# Patient Record
Sex: FEMALE | Race: WHITE | NOT HISPANIC OR LATINO | Employment: FULL TIME | URBAN - METROPOLITAN AREA
[De-identification: names, ages, dates, MRNs, and addresses within clinical notes are randomized per-mention and may not be internally consistent; named-entity substitution may affect disease eponyms.]

---

## 2017-01-14 DIAGNOSIS — I10 ESSENTIAL HYPERTENSION: Chronic | ICD-10-CM

## 2017-01-14 DIAGNOSIS — E11.21 CONTROLLED TYPE 2 DIABETES MELLITUS WITH DIABETIC NEPHROPATHY, WITHOUT LONG-TERM CURRENT USE OF INSULIN: ICD-10-CM

## 2017-01-14 RX ORDER — GLIPIZIDE 5 MG/1
TABLET, FILM COATED, EXTENDED RELEASE ORAL
Qty: 30 TABLET | Refills: 1 | Status: SHIPPED | OUTPATIENT
Start: 2017-01-14 | End: 2017-02-06 | Stop reason: SDUPTHER

## 2017-01-14 RX ORDER — LISINOPRIL 40 MG/1
TABLET ORAL
Qty: 30 TABLET | Refills: 1 | Status: SHIPPED | OUTPATIENT
Start: 2017-01-14 | End: 2017-02-06 | Stop reason: SDUPTHER

## 2017-01-31 DIAGNOSIS — E78.5 HYPERLIPIDEMIA, UNSPECIFIED HYPERLIPIDEMIA TYPE: ICD-10-CM

## 2017-01-31 DIAGNOSIS — I10 ESSENTIAL HYPERTENSION: Primary | ICD-10-CM

## 2017-01-31 DIAGNOSIS — E11.22 CONTROLLED TYPE 2 DIABETES MELLITUS WITH CHRONIC KIDNEY DISEASE, UNSPECIFIED CKD STAGE, UNSPECIFIED LONG TERM INSULIN USE STATUS: ICD-10-CM

## 2017-01-31 DIAGNOSIS — R80.9 PROTEINURIA, UNSPECIFIED TYPE: ICD-10-CM

## 2017-02-02 ENCOUNTER — LAB VISIT (OUTPATIENT)
Dept: LAB | Facility: HOSPITAL | Age: 42
End: 2017-02-02
Attending: INTERNAL MEDICINE
Payer: MEDICAID

## 2017-02-02 DIAGNOSIS — E78.5 HYPERLIPIDEMIA, UNSPECIFIED HYPERLIPIDEMIA TYPE: ICD-10-CM

## 2017-02-02 DIAGNOSIS — I10 ESSENTIAL HYPERTENSION: ICD-10-CM

## 2017-02-02 DIAGNOSIS — E11.22 CONTROLLED TYPE 2 DIABETES MELLITUS WITH CHRONIC KIDNEY DISEASE, UNSPECIFIED CKD STAGE, UNSPECIFIED LONG TERM INSULIN USE STATUS: ICD-10-CM

## 2017-02-02 DIAGNOSIS — R80.9 PROTEINURIA, UNSPECIFIED TYPE: ICD-10-CM

## 2017-02-02 LAB
ALBUMIN SERPL BCP-MCNC: 3.5 G/DL
ALP SERPL-CCNC: 82 U/L
ALT SERPL W/O P-5'-P-CCNC: 17 U/L
ANION GAP SERPL CALC-SCNC: 9 MMOL/L
AST SERPL-CCNC: 16 U/L
BASOPHILS # BLD AUTO: 0.01 K/UL
BASOPHILS NFR BLD: 0.1 %
BILIRUB SERPL-MCNC: 0.6 MG/DL
BUN SERPL-MCNC: 17 MG/DL
CALCIUM SERPL-MCNC: 9.4 MG/DL
CHLORIDE SERPL-SCNC: 101 MMOL/L
CHOLEST/HDLC SERPL: 3.6 {RATIO}
CO2 SERPL-SCNC: 28 MMOL/L
CREAT SERPL-MCNC: 0.8 MG/DL
DIFFERENTIAL METHOD: ABNORMAL
EOSINOPHIL # BLD AUTO: 0.1 K/UL
EOSINOPHIL NFR BLD: 0.9 %
ERYTHROCYTE [DISTWIDTH] IN BLOOD BY AUTOMATED COUNT: 16.4 %
EST. GFR  (AFRICAN AMERICAN): >60 ML/MIN/1.73 M^2
EST. GFR  (NON AFRICAN AMERICAN): >60 ML/MIN/1.73 M^2
GLUCOSE SERPL-MCNC: 106 MG/DL
HCT VFR BLD AUTO: 47.3 %
HDL/CHOLESTEROL RATIO: 27.9 %
HDLC SERPL-MCNC: 136 MG/DL
HDLC SERPL-MCNC: 38 MG/DL
HGB BLD-MCNC: 15.6 G/DL
LDLC SERPL CALC-MCNC: 74.6 MG/DL
LYMPHOCYTES # BLD AUTO: 2.4 K/UL
LYMPHOCYTES NFR BLD: 22.3 %
MCH RBC QN AUTO: 28.9 PG
MCHC RBC AUTO-ENTMCNC: 33 %
MCV RBC AUTO: 88 FL
MONOCYTES # BLD AUTO: 0.8 K/UL
MONOCYTES NFR BLD: 7.4 %
NEUTROPHILS # BLD AUTO: 7.3 K/UL
NEUTROPHILS NFR BLD: 69.1 %
NONHDLC SERPL-MCNC: 98 MG/DL
PLATELET # BLD AUTO: 211 K/UL
PMV BLD AUTO: 10.8 FL
POTASSIUM SERPL-SCNC: 4.7 MMOL/L
PROT SERPL-MCNC: 7.2 G/DL
RBC # BLD AUTO: 5.39 M/UL
SODIUM SERPL-SCNC: 138 MMOL/L
TRIGL SERPL-MCNC: 117 MG/DL
TSH SERPL DL<=0.005 MIU/L-ACNC: 1.97 UIU/ML
WBC # BLD AUTO: 10.63 K/UL

## 2017-02-02 PROCEDURE — 36415 COLL VENOUS BLD VENIPUNCTURE: CPT | Mod: PO

## 2017-02-02 PROCEDURE — 84443 ASSAY THYROID STIM HORMONE: CPT

## 2017-02-02 PROCEDURE — 80061 LIPID PANEL: CPT

## 2017-02-02 PROCEDURE — 83036 HEMOGLOBIN GLYCOSYLATED A1C: CPT

## 2017-02-02 PROCEDURE — 80053 COMPREHEN METABOLIC PANEL: CPT

## 2017-02-02 PROCEDURE — 85025 COMPLETE CBC W/AUTO DIFF WBC: CPT

## 2017-02-03 LAB
ESTIMATED AVG GLUCOSE: 137 MG/DL
HBA1C MFR BLD HPLC: 6.4 %

## 2017-02-06 ENCOUNTER — TELEPHONE (OUTPATIENT)
Dept: INTERNAL MEDICINE | Facility: CLINIC | Age: 42
End: 2017-02-06

## 2017-02-06 ENCOUNTER — OFFICE VISIT (OUTPATIENT)
Dept: INTERNAL MEDICINE | Facility: CLINIC | Age: 42
End: 2017-02-06
Payer: MEDICAID

## 2017-02-06 ENCOUNTER — HOSPITAL ENCOUNTER (OUTPATIENT)
Dept: RADIOLOGY | Facility: HOSPITAL | Age: 42
Discharge: HOME | End: 2017-02-06
Attending: INTERNAL MEDICINE
Payer: MEDICAID

## 2017-02-06 VITALS
HEART RATE: 80 BPM | RESPIRATION RATE: 15 BRPM | WEIGHT: 293 LBS | SYSTOLIC BLOOD PRESSURE: 123 MMHG | HEIGHT: 63 IN | BODY MASS INDEX: 51.91 KG/M2 | DIASTOLIC BLOOD PRESSURE: 77 MMHG | TEMPERATURE: 98 F

## 2017-02-06 DIAGNOSIS — R20.0 NUMBNESS AND TINGLING IN RIGHT HAND: Primary | ICD-10-CM

## 2017-02-06 DIAGNOSIS — Z00.00 ANNUAL PHYSICAL EXAM: Primary | ICD-10-CM

## 2017-02-06 DIAGNOSIS — L81.9 DISCOLORATION OF SKIN OF TOE: ICD-10-CM

## 2017-02-06 DIAGNOSIS — B35.3 TINEA PEDIS OF BOTH FEET: ICD-10-CM

## 2017-02-06 DIAGNOSIS — M25.562 CHRONIC PAIN OF LEFT KNEE: ICD-10-CM

## 2017-02-06 DIAGNOSIS — Z12.31 SCREENING MAMMOGRAM FOR HIGH-RISK PATIENT: ICD-10-CM

## 2017-02-06 DIAGNOSIS — E11.21 CONTROLLED TYPE 2 DIABETES MELLITUS WITH DIABETIC NEPHROPATHY, WITHOUT LONG-TERM CURRENT USE OF INSULIN: Chronic | ICD-10-CM

## 2017-02-06 DIAGNOSIS — H53.8 BLURRY VISION, BILATERAL: ICD-10-CM

## 2017-02-06 DIAGNOSIS — M51.9 LUMBAR DISC DISEASE: ICD-10-CM

## 2017-02-06 DIAGNOSIS — B35.1 ONYCHOMYCOSIS OF LEFT GREAT TOE: ICD-10-CM

## 2017-02-06 DIAGNOSIS — N91.2 AMENORRHEA: ICD-10-CM

## 2017-02-06 DIAGNOSIS — R21 RASH AND NONSPECIFIC SKIN ERUPTION: ICD-10-CM

## 2017-02-06 DIAGNOSIS — I10 ESSENTIAL HYPERTENSION: Chronic | ICD-10-CM

## 2017-02-06 DIAGNOSIS — L71.9 ROSACEA: ICD-10-CM

## 2017-02-06 DIAGNOSIS — G89.29 CHRONIC PAIN OF LEFT KNEE: ICD-10-CM

## 2017-02-06 DIAGNOSIS — R60.0 ARM EDEMA: ICD-10-CM

## 2017-02-06 DIAGNOSIS — M79.89 SWELLING OF ARM: ICD-10-CM

## 2017-02-06 DIAGNOSIS — R20.2 NUMBNESS AND TINGLING IN RIGHT HAND: Primary | ICD-10-CM

## 2017-02-06 DIAGNOSIS — I87.2 STASIS DERMATITIS OF BOTH LEGS: ICD-10-CM

## 2017-02-06 DIAGNOSIS — G47.30 SLEEP APNEA, UNSPECIFIED TYPE: ICD-10-CM

## 2017-02-06 DIAGNOSIS — R09.82 POSTNASAL DRIP: ICD-10-CM

## 2017-02-06 PROCEDURE — 99999 PR PBB SHADOW E&M-EST. PATIENT-LVL IV: CPT | Mod: PBBFAC,,, | Performed by: INTERNAL MEDICINE

## 2017-02-06 PROCEDURE — 77067 SCR MAMMO BI INCL CAD: CPT | Mod: 26,,, | Performed by: RADIOLOGY

## 2017-02-06 PROCEDURE — 99396 PREV VISIT EST AGE 40-64: CPT | Mod: S$PBB,,, | Performed by: INTERNAL MEDICINE

## 2017-02-06 PROCEDURE — 77067 SCR MAMMO BI INCL CAD: CPT | Mod: TC

## 2017-02-06 RX ORDER — METRONIDAZOLE 7.5 MG/G
GEL TOPICAL 2 TIMES DAILY
Qty: 45 G | Refills: 1 | Status: SHIPPED | OUTPATIENT
Start: 2017-02-06 | End: 2020-07-11 | Stop reason: ALTCHOICE

## 2017-02-06 RX ORDER — AZELASTINE 1 MG/ML
1 SPRAY, METERED NASAL 2 TIMES DAILY
Qty: 30 ML | Refills: 1 | Status: SHIPPED | OUTPATIENT
Start: 2017-02-06 | End: 2019-08-13 | Stop reason: ALTCHOICE

## 2017-02-06 RX ORDER — GLIPIZIDE 5 MG/1
5 TABLET, FILM COATED, EXTENDED RELEASE ORAL
Qty: 30 TABLET | Refills: 2 | Status: SHIPPED | OUTPATIENT
Start: 2017-02-06 | End: 2017-08-11 | Stop reason: SDUPTHER

## 2017-02-06 RX ORDER — ECONAZOLE NITRATE 10 MG/G
CREAM TOPICAL 2 TIMES DAILY
Qty: 30 G | Refills: 1 | Status: SHIPPED | OUTPATIENT
Start: 2017-02-06 | End: 2017-02-13 | Stop reason: ALTCHOICE

## 2017-02-06 RX ORDER — DICLOFENAC SODIUM 50 MG/1
50 TABLET, DELAYED RELEASE ORAL 2 TIMES DAILY PRN
Qty: 30 TABLET | Refills: 2 | Status: SHIPPED | OUTPATIENT
Start: 2017-02-06 | End: 2017-05-05

## 2017-02-06 RX ORDER — TRIAMCINOLONE ACETONIDE 1 MG/ML
LOTION TOPICAL 2 TIMES DAILY
Qty: 60 ML | Refills: 1 | Status: SHIPPED | OUTPATIENT
Start: 2017-02-06 | End: 2020-07-14 | Stop reason: SDUPTHER

## 2017-02-06 RX ORDER — FUROSEMIDE 20 MG/1
40 TABLET ORAL DAILY
Qty: 30 TABLET | Refills: 2 | Status: SHIPPED | OUTPATIENT
Start: 2017-02-06 | End: 2017-09-22 | Stop reason: SDUPTHER

## 2017-02-06 RX ORDER — LISINOPRIL 40 MG/1
40 TABLET ORAL DAILY
Qty: 30 TABLET | Refills: 2 | Status: SHIPPED | OUTPATIENT
Start: 2017-02-06 | End: 2017-09-22 | Stop reason: SDUPTHER

## 2017-02-06 NOTE — MR AVS SNAPSHOT
[facility]   [facility]  [place] [address]  Phone: [phone]  Fax: [phone]                  Halley Moreno   2017 9:00 AM   Office Visit    Description:  Female : 1975   Provider:  Yamila Tejeda MD   Department:  [facility]           Reason for Visit     Annual Exam           Diagnoses this Visit        Comments    Annual physical exam    -  Primary     Essential hypertension         Controlled type 2 diabetes mellitus with diabetic nephropathy, without long-term current use of insulin         Sleep apnea, unspecified type         Depression, unspecified depression type         BMI 60.0-69.9, adult         Hyperlipidemia, unspecified hyperlipidemia type         Arm edema         Rosacea         Lumbar disc disease         Onychomycosis of left great toe         Acute pain of left knee         Blurry vision, bilateral         Acute pansinusitis, recurrence not specified         Tinea pedis of both feet         Discoloration of skin of toe         Screening mammogram for high-risk patient                To Do List           Future Appointments        Provider Department Dept Phone    2017 11:00 AM [facility] MAM[facility]1 [facility] [phone]      Goals (5 Years of Data)     None       These Medications        Disp Refills Start End    triamcinolone acetonide 0.1% (KENALOG) 0.1 % Lotn 60 mL 1 2017    Apply topically 2 (two) times daily. - Topical (Top)    Ph[facility]: [facility] [address] [place] [address] [address] [facility] Ph #: [phone]       diclofenac (VOLTAREN) 50 MG EC tablet 30 tablet 2 2017     Take 1 tablet (50 mg total) by mouth 2 (two) times daily as needed. - Oral    Pharmacy: [facility] [id number] [facility] [address] [facility] Ph #: [phone]       azelastine (ASTELIN) 137 mcg (0.1 %) nasal spray 30 mL 1 2017    1 spray (137 mcg total) by Nasal route 2 (two)  times daily. - Nasal    Pharmacy: [facility] [place] [address] Ph #: [phone]       furosemide (LASIX) 20 MG tablet 30 tablet 2 2/6/2017     Take 2 tablets (40 mg total) by mouth once daily. - Oral    Pharmacy: [facility] [place] [address] Ph #: [phone]       econazole nitrate 1 % cream 30 g 1 2/6/2017     Apply topically 2 (two) times daily. - Topical (Top)    Pharmacy: [facility] [place] [address] Ph #: [phone]       metronidazole (METROGEL) 0.75 % gel 45 g 1 2/6/2017 2/6/2018    Apply topically 2 (two) times daily. - Topical (Top)    Pharmacy: [facility] [place] [address] Ph #: [phone]       glipiZIDE (GLUCOTROL) 5 MG TR24 30 tablet 2 2/6/2017     Take 1 tablet (5 mg total) by mouth daily with breakfast. - Oral    Pharmacy: [facility] [place] [address] Ph #: [phone]       lisinopril (PRINIVIL,ZESTRIL) 40 MG tablet 30 tablet 2 2/6/2017     Take 1 tablet (40 mg total) by mouth once daily. - Oral    Pharmacy: [facility] [place] [address] Ph #: [phone]         [facility] On [facility]     [facility] - 24/7 Assistance  Registered nurses in the [facility] provide clinical advisement, health education, appointment booking, and other advisory services.  Call for this free service at [phone].             Medications           Message regarding Medications     Verify the changes and/or additions to your medication regime listed below are the same as discussed with your clinician today.  If any of these changes or additions are incorrect, please notify your healthcare provider.        START taking these NEW medications        Refills    triamcinolone acetonide 0.1% (KENALOG) 0.1 % Lotn 1    Sig: Apply topically 2 (two) times daily.    Class: Normal    Route: Topical (Top)    econazole nitrate 1 % cream 1     Sig: Apply topically 2 (two) times daily.    Class: Normal    Route: Topical (Top)    metronidazole (METROGEL) 0.75 % gel 1    Sig: Apply topically 2 (two) times daily.    Class: Normal    Route: Topical (Top)      CHANGE how you are taking these medications     Start Taking Instead of    furosemide (LASIX) 20 MG tablet furosemide (LASIX) 40 MG tablet    Dosage:  Take 2 tablets (40 mg total) by mouth once daily. Dosage:  Take 1 tablet (40 mg total) by mouth once daily.    Reason for Change:  Reorder     glipiZIDE (GLUCOTROL) 5 MG TR24 glipiZIDE (GLUCOTROL) 5 MG TR24    Dosage:  Take 1 tablet (5 mg total) by mouth daily with breakfast. Dosage:  TAKE ONE TABLET BY MOUTH ONCE DAILY WITH BREAKFAST    Reason for Change:  Reorder     lisinopril (PRINIVIL,ZESTRIL) 40 MG tablet lisinopril (PRINIVIL,ZESTRIL) 40 MG tablet    Dosage:  Take 1 tablet (40 mg total) by mouth once daily. Dosage:  TAKE ONE TABLET BY MOUTH EVERY DAY.    Reason for Change:  Reorder       STOP taking these medications     nystatin-triamcinolone (MYCOLOG II) cream Apply topically 2 (two) times daily.           Verify that the below list of medications is an accurate representation of the medications you are currently taking.  If none reported, the list may be blank. If incorrect, please contact your healthcare provider. Carry this list with you in case of emergency.           Current Medications     azelastine (ASTELIN) 137 mcg (0.1 %) nasal spray 1 spray (137 mcg total) by Nasal route 2 (two) times daily.    blood sugar diagnostic Strp 1 strip by Misc.(Non-Drug; Combo Route) route 2 (two) times daily.    diclofenac (VOLTAREN) 50 MG EC tablet Take 1 tablet (50 mg total) by mouth 2 (two) times daily as needed.    furosemide (LASIX) 20 MG tablet Take 2 tablets (40 mg total) by mouth once daily.    glipiZIDE (GLUCOTROL) 5 MG TR24 Take 1 tablet (5 mg total) by mouth daily with breakfast.    hydrocodone-acetaminophen 7.5-325mg (NORCO) 7.5-325 mg per  "tablet Take 1 tablet by mouth as needed.     lancets Misc 1 lancet by Misc.(Non-Drug; Combo Route) route 2 (two) times daily.    lisinopril (PRINIVIL,ZESTRIL) 40 MG tablet Take 1 tablet (40 mg total) by mouth once daily.    methocarbamol (ROBAXIN) 500 MG tablet Take 500 mg by mouth 2 (two) times daily.     phentermine (ADIPEX-P) 37.5 mg tablet Take 1 tablet (37.5 mg total) by mouth before breakfast.    econazole nitrate 1 % cream Apply topically 2 (two) times daily.    metronidazole (METROGEL) 0.75 % gel Apply topically 2 (two) times daily.    triamcinolone acetonide 0.1% (KENALOG) 0.1 % Lotn Apply topically 2 (two) times daily.           Clinical Reference Information           Your Vitals Were     BP Pulse Temp Resp Height Weight    123/77 (BP Location: Left arm, Patient Position: Sitting, BP Method: Automatic) 80 97.8 °F (36.6 °C) (Oral) 15 5' 3" (1.6 m) 175.8 kg (387 lb 9.1 oz)    BMI                68.65 kg/m2          Blood Pressure          Most Recent Value    BP  123/77      Allergies as of 2/6/2017     No Known Allergies      Immunizations Administered on Date of Encounter - 2/6/2017     Name Date Dose VIS Date Route    Pneumococcal Polysaccharide - 23 Valent  Incomplete 0.5 mL 4/24/2015 Intramuscular    TD  Incomplete 0.5 mL 2/24/2015 Intramuscular      Orders Placed During Today's Visit      Normal Orders This Visit    Ambulatory consult to Optometry     Ambulatory referral to Obstetrics / Gynecology     Pneumococcal Polysaccharide Vaccine (23 Valent) (SQ/IM)     Td Vaccine (Adult) - Preservative Free     Future Labs/Procedures Expected by Expires    Mammo Digital Screening Bilat with CAD  2/6/2017 4/7/2018    Cardiology Lab Ankle Brachial Indices W Stress  As directed 2/6/2018    Cardiology Lab US Upper Extremity Veins Right  As directed 2/6/2018      [facility] Sign-Up     Activating your [facility] account is as easy as 1-2-3!     1) Visit [email or web address], select Sign Up Now, enter this activation code " and your date of birth, then select Next.  [email or web address]  Expires: 3/23/2017  9:46 AM      2) Create a username and password to use when you visit [facility] in the future and select a security question in case you lose your password and select Next.    3) Enter your e-mail address and click Sign Up!    Additional Information  If you have questions, please e-mail [email or web address] or call [phone] to talk to our [facility] staff. Remember, [facility] is NOT to be used for urgent needs. For medical emergencies, dial 911.         Instructions    Fungi Nail    claritin or zyrtec or allegra at night       Language Assistance Services     ATTENTION: Language assistance services are available, free of charge. Please call [phone].      ATENCIÓN: Si habla español, tiene a williamson disposición servicios gratuitos de asistencia lingüística. Llame al [phone].     CHÚ Ý: N?u b?n nói Ti?ng Vi?t, có các d?ch v? h? tr? ngôn ng? mi?n phí dành cho b?n. G?i s? [phone].         [facility] complies with applicable Federal civil rights laws and does not discriminate on the basis of race, color, national origin, age, disability, or sex.

## 2017-02-06 NOTE — TELEPHONE ENCOUNTER
"Patient seen in clinic today. Forgot to get answer for why dR Tejeda feels she "gets so dopey after meals". Reports she is testing blood sugar and it is wnl. Reports this was discussed in visit today but doesn't recall answer.  "

## 2017-02-06 NOTE — PROGRESS NOTES
Subjective:      Halley Moreno is a 41 y.o.yo female who presents for annual exam.    Family History:  family history includes Cancer in her father; Diabetes in her father; Heart disease in her paternal grandfather; Hypertension in her mother.    Health Maintenance:  Health Maintenance       Date Due Completion Date    TETANUS VACCINE 5/28/1991 ---    Pneumococcal PPSV23 (Medium Risk) (1) 5/28/1991 ---    Pap Smear 1/27/2013 1/27/2010 (Done)    Override on 1/27/2010: Done    Eye Exam 9/21/2013 9/21/2012    Influenza Vaccine 8/1/2016 ---    Foot Exam 2/17/2017 2/17/2016    Mammogram 4/15/2017 4/15/2015    Hemoglobin A1c 8/2/2017 2/2/2017    Lipid Panel 2/2/2018 2/2/2017      OB/GYN not in last few years, 2013    Eye exam: blurry vision, no recent eye exam, wears   Dental Exam: no issues    Exercise: walk 2-3 blocks with dog 2x weekly, plans to go to [employer] next week  Diet: overall healthy  Body mass index is 68.65 kg/(m^2).      Meds:   Current Outpatient Prescriptions:     azelastine (ASTELIN) 137 mcg (0.1 %) nasal spray, 1 spray (137 mcg total) by Nasal route 2 (two) times daily., Disp: 30 mL, Rfl: 1    blood sugar diagnostic Strp, 1 strip by Misc.(Non-Drug; Combo Route) route 2 (two) times daily., Disp: 100 strip, Rfl: 5    diclofenac (VOLTAREN) 50 MG EC tablet, Take 1 tablet (50 mg total) by mouth 2 (two) times daily as needed., Disp: 30 tablet, Rfl: 2    furosemide (LASIX) 20 MG tablet, Take 2 tablets (40 mg total) by mouth once daily., Disp: 30 tablet, Rfl: 2    glipiZIDE (GLUCOTROL) 5 MG TR24, Take 1 tablet (5 mg total) by mouth daily with breakfast., Disp: 30 tablet, Rfl: 2    hydrocodone-acetaminophen 7.5-325mg (NORCO) 7.5-325 mg per tablet, Take 1 tablet by mouth as needed. , Disp: , Rfl:     lancets Misc, 1 lancet by Misc.(Non-Drug; Combo Route) route 2 (two) times daily., Disp: 100 each, Rfl: 5    lisinopril (PRINIVIL,ZESTRIL) 40 MG tablet, Take 1 tablet (40 mg total) by mouth once  daily., Disp: 30 tablet, Rfl: 2    methocarbamol (ROBAXIN) 500 MG tablet, Take 500 mg by mouth 2 (two) times daily. , Disp: , Rfl:     phentermine (ADIPEX-P) 37.5 mg tablet, Take 1 tablet (37.5 mg total) by mouth before breakfast., Disp: 30 tablet, Rfl: 0    econazole nitrate 1 % cream, Apply topically 2 (two) times daily., Disp: 30 g, Rfl: 1    metronidazole (METROGEL) 0.75 % gel, Apply topically 2 (two) times daily., Disp: 45 g, Rfl: 1    triamcinolone acetonide 0.1% (KENALOG) 0.1 % Lotn, Apply topically 2 (two) times daily., Disp: 60 mL, Rfl: 1    PMHx:   Past Medical History   Diagnosis Date    BMI 70 and over, adult     Depression     Diabetes mellitus     DM (diabetes mellitus) type II controlled with renal manifestation     HTN (hypertension)     Hyperlipidemia     Lumbar disc disease     Morbid obesity     Recurrent nephrolithiasis     Rosacea     Sleep apnea        PSHx:     Past Surgical History   Procedure Laterality Date     section         SocHx:   Social History     Social History    Marital status: Single     Spouse name: N/A    Number of children: N/A    Years of education: N/A     Social History Main Topics    Smoking status: Never Smoker    Smokeless tobacco: Never Used    Alcohol use No    Drug use: No    Sexual activity: No     Other Topics Concern    None     Social History Narrative    She is a pharmacy tech, works at [employer] in [place].  Nonsmoker, social etoh.  No exercise.                       Review of Systems   Constitutional: Negative for chills, diaphoresis, fatigue and fever.   HENT: Positive for congestion (AM cough with mucus production, resolves during day) and postnasal drip. Negative for dental problem, ear discharge, ear pain, mouth sores, rhinorrhea, sinus pressure and sore throat.    Eyes: Positive for visual disturbance (blurry).   Respiratory: Positive for cough. Negative for chest tightness, shortness of breath and wheezing.     Cardiovascular: Negative for chest pain, palpitations and leg swelling.   Gastrointestinal: Negative for abdominal pain, constipation, diarrhea, nausea and vomiting.        Reports drowsiness after meals   Genitourinary: Positive for menstrual problem (amenorrhea). Negative for dysuria, hematuria and urgency.   Musculoskeletal: Negative for arthralgias and myalgias.   Skin: Negative for rash.   Neurological: Negative for dizziness, weakness, light-headedness, numbness and headaches.        Right hand numbness for the last few weeks   Hematological: Negative for adenopathy.   Psychiatric/Behavioral: Negative for dysphoric mood.        History of depression, stable off medications       Objective:      Physical Exam   Constitutional: She is oriented to person, place, and time. Vital signs are normal. She appears well-developed and well-nourished. No distress.   HENT:   Head: Normocephalic and atraumatic.   Right Ear: Hearing, tympanic membrane, external ear and ear canal normal. Tympanic membrane is not erythematous and not bulging.   Left Ear: Hearing, tympanic membrane, external ear and ear canal normal. Tympanic membrane is not erythematous and not bulging.   Nose: Nose normal.   Mouth/Throat: Uvula is midline, oropharynx is clear and moist and mucous membranes are normal. No oropharyngeal exudate.   Eyes: Conjunctivae, EOM and lids are normal. Pupils are equal, round, and reactive to light. No scleral icterus.   Neck: Normal range of motion. Neck supple.   Cardiovascular: Normal rate, regular rhythm, normal heart sounds and intact distal pulses.    No murmur heard.  Pulmonary/Chest: Effort normal and breath sounds normal. She has no wheezes.   Abdominal: Soft. Bowel sounds are normal. She exhibits no distension. There is no hepatosplenomegaly. There is no tenderness. There is no rigidity, no rebound and no guarding.   Musculoskeletal: Normal range of motion. She exhibits no edema.   Lymphadenopathy:     She has  no cervical adenopathy.        Right: No supraclavicular adenopathy present.        Left: No supraclavicular adenopathy present.   Neurological: She is alert and oriented to person, place, and time. She has normal strength and normal reflexes. No sensory deficit. Coordination and gait normal.   Skin: Skin is warm, dry and intact. No rash noted. She is not diaphoretic.   Psychiatric: She has a normal mood and affect.   Vitals reviewed.      Assessment:       1. Annual physical exam    2. Controlled type 2 diabetes mellitus with diabetic nephropathy, without long-term current use of insulin    3. Essential hypertension    4. Arm edema    5. Stasis dermatitis of both legs    6. Discoloration of skin of toe    7. Tinea pedis of both feet    8. Rosacea    9. Lumbar disc disease    10. Chronic pain of left knee    11. Postnasal drip    12. Sleep apnea, unspecified type    13. Rash and nonspecific skin eruption    14. Onychomycosis of left great toe    15. BMI 60.0-69.9, adult    16. Blurry vision, bilateral    17. Amenorrhea    18. Screening mammogram for high-risk patient        Plan:       1,16,17,18. Ordered CBC, CMP, TSH, HbA1c, lipid panel and U/A. Administered Td and pneumococcal vaccine, ordered mammogram, refer to optometry.     2. Tolerates glipizide well, hba1c is at goal of 6.4, continue.    3. BP well controlled with lisinopril. Decrease lasix to 20mg daily since less edema, will monitor closely.    4. Right arm venous ultrasound.    5,6. Check HARMONY.    7. Unable to refer to Podiatry due to insurance but was seen in past by Dr. Maharaj. Will begin econazole cream twice daily under feet.     8. Trial of metrogel bid    9,10. Refilled voltaren    11. Resume astelin nasal spray, use nom-sedating antihistamine at night    12. Noncompliant with cpap    14. Uses cerave for dry skin under pannus, may use TAC lotion on itchy areas on abdomen.    15. OTC Fungi nail for toenail fungus, if no improvement, may try  penlac    16. Weight decreased from 401 lbs to 387 lbs since last visit, plans to return to gym at [employer] starting next week.     RTC in 3 months or sooner if needed    Yamila Tejeda MD

## 2017-02-07 ENCOUNTER — CLINICAL SUPPORT (OUTPATIENT)
Dept: CARDIOLOGY | Facility: CLINIC | Age: 42
End: 2017-02-07
Payer: MEDICAID

## 2017-02-07 DIAGNOSIS — L81.9 DISCOLORATION OF SKIN OF TOE: ICD-10-CM

## 2017-02-07 DIAGNOSIS — R60.0 ARM EDEMA: ICD-10-CM

## 2017-02-07 LAB — VASCULAR ANKLE BRACHIAL INDEX (ABI) LEFT: 1.08 (ref 0.9–1.2)

## 2017-02-07 PROCEDURE — 93971 EXTREMITY STUDY: CPT | Mod: PBBFAC,PO | Performed by: INTERNAL MEDICINE

## 2017-02-07 PROCEDURE — 93924 LWR XTR VASC STDY BILAT: CPT | Mod: 26,S$PBB,, | Performed by: INTERNAL MEDICINE

## 2017-02-07 NOTE — TELEPHONE ENCOUNTER
Called patient at 1709 on 2/7, no answer    Spoke to patient 2/9 @0889- reports drowsiness after meals, BG normal, advised to pay attention to if symptoms occur when meal is high in sugar    Called patient, will check c-spine x-ray and then refer to either Ortho or to a neurologist who accepts Medicaid, patient agreeable with plan.

## 2017-02-08 ENCOUNTER — OFFICE VISIT (OUTPATIENT)
Dept: OPTOMETRY | Facility: CLINIC | Age: 42
End: 2017-02-08
Payer: MEDICAID

## 2017-02-08 DIAGNOSIS — H52.4 BILATERAL PRESBYOPIA: ICD-10-CM

## 2017-02-08 DIAGNOSIS — E11.9 TYPE 2 DIABETES MELLITUS WITHOUT RETINOPATHY: Primary | ICD-10-CM

## 2017-02-08 PROCEDURE — 99999 PR PBB SHADOW E&M-EST. PATIENT-LVL II: CPT | Mod: PBBFAC,,, | Performed by: OPTOMETRIST

## 2017-02-08 PROCEDURE — 99212 OFFICE O/P EST SF 10 MIN: CPT | Mod: PBBFAC,PO | Performed by: OPTOMETRIST

## 2017-02-08 PROCEDURE — 92014 COMPRE OPH EXAM EST PT 1/>: CPT | Mod: S$PBB,,, | Performed by: OPTOMETRIST

## 2017-02-08 NOTE — LETTER
February 8, 2017      Yamila Tejeda MD  [address] [facility]  [place] [address]           [place] [facility]  [address] [facility]  [place] [address]  Phone: [phone]  Fax: [phone]          Patient: Halley Moreno   MR Number: [id number]   YOB: 1975   Date of Visit: 2/8/2017       Dear Dr. Yamila Tejeda:    Thank you for referring Halley Moreno to me for evaluation. Attached you will find relevant portions of my assessment and plan of care.    If you have questions, please do not hesitate to call me. I look forward to following Halley Moreno along with you.    Sincerely,    Sarjo Archibald, OD    Enclosure  CC:  No Recipients    If you would like to receive this communication electronically, please contact [email or web address] or [phone] to request more information on [employer] Link access.    For providers and/or their staff who would like to refer a patient to [facility], please contact us through our one-stop-shop provider referral line, [facility], at [phone].    If you feel you have received this communication in error or would no longer like to receive these types of communications, please e-mail [email or web address]

## 2017-02-08 NOTE — PROGRESS NOTES
HPI     DLS: 9/21/12  Diabetic eye exam  Pt here for diabetic eye check.  Pt states she is having difficulty   reading small print. Pt states she wears glasses to read that are 16 years   old.     (-)flashes  (-)floaters  (-)eye pain  (-)headaches  (-)itching  (+)tearing  (-)burning    No eyedrops  No eye surgery    Hemoglobin A1C       Date                     Value               Ref Range             Status                02/02/2017               6.4 (H)             4.5 - 6.2 %           Final              Comment:    According to ADA guidelines, hemoglobin A1C <7.0% represents  optimal   control in non-pregnant diabetic patients.  Different  metrics may apply   to specific populations.   Standards of Medical Care in Diabetes -   2016.  For the purpose of screening for the presence of diabetes:  <5.7%       Consistent with the absence of diabetes  5.7-6.4%  Consistent with   increasing risk for diabetes   (prediabetes)  >or=6.5%  Consistent with   diabetes  Currently no consensus exists for use of hemoglobin A1C  for   diagnosis of diabetes for children.         07/29/2016               7.0 (H)             4.5 - 6.2 %           Final              Comment:    According to ADA guidelines, hemoglobin A1C <7.0% represents  optimal   control in non-pregnant diabetic patients.  Different  metrics may apply   to specific populations.   Standards of Medical Care in Diabetes -   2016.  For the purpose of screening for the presence of diabetes:  <5.7%       Consistent with the absence of diabetes  5.7-6.4%  Consistent with   increasing risk for diabetes   (prediabetes)  >or=6.5%  Consistent with   diabetes  Currently no consensus exists for use of hemoglobin A1C  for   diagnosis of diabetes for children.         02/17/2016               7.0 (H)             4.5 - 6.2 %           Final            ----------       Last edited by Saroj Archibald, OD on 2/8/2017 10:44 AM.     ROS     Negative for: Constitutional,  Gastrointestinal, Neurological, Skin,   Genitourinary, Musculoskeletal, HENT, Endocrine, Cardiovascular, Eyes,   Respiratory, Psychiatric, Allergic/Imm, Heme/Lymph    Last edited by Saroj Archibald, OD on 2/8/2017 10:04 AM. (History)        Assessment /Plan     For exam results, see Encounter Report.    Type 2 diabetes mellitus without retinopathy    Bilateral presbyopia      1. No diabetic retinopathy, no csme. Return in 1 year for dilated eye exam.  2. Spec Rx given. Different lens options discussed with patient. RTC 1 year full exam.

## 2017-02-09 ENCOUNTER — HOSPITAL ENCOUNTER (OUTPATIENT)
Dept: RADIOLOGY | Facility: HOSPITAL | Age: 42
Discharge: HOME | End: 2017-02-09
Attending: INTERNAL MEDICINE
Payer: MEDICAID

## 2017-02-09 DIAGNOSIS — R20.0 NUMBNESS AND TINGLING IN RIGHT HAND: ICD-10-CM

## 2017-02-09 DIAGNOSIS — M79.89 SWELLING OF ARM: ICD-10-CM

## 2017-02-09 DIAGNOSIS — R20.2 NUMBNESS AND TINGLING IN RIGHT HAND: ICD-10-CM

## 2017-02-09 PROCEDURE — 72050 X-RAY EXAM NECK SPINE 4/5VWS: CPT | Mod: 26,,, | Performed by: RADIOLOGY

## 2017-02-09 PROCEDURE — 72050 X-RAY EXAM NECK SPINE 4/5VWS: CPT | Mod: TC,PO

## 2017-02-13 ENCOUNTER — TELEPHONE (OUTPATIENT)
Dept: INTERNAL MEDICINE | Facility: CLINIC | Age: 42
End: 2017-02-13

## 2017-02-13 RX ORDER — KETOCONAZOLE 20 MG/G
CREAM TOPICAL DAILY
Qty: 60 G | Refills: 0 | Status: SHIPPED | OUTPATIENT
Start: 2017-02-13 | End: 2020-07-12 | Stop reason: SDUPTHER

## 2017-02-13 NOTE — TELEPHONE ENCOUNTER
----- Message from Tammy S Carrero sent at 2/13/2017  1:59 PM CST -----  Contact: Call Pt [phone]  Pt called stating medication (Econazole Nitrate) was denied by insurance to be filled and is requesting one of these medication instead, Trimazole, ketoconazole miconazole Nystatin, Terbinafine to be sent to [facility] [phone]

## 2017-02-16 ENCOUNTER — TELEPHONE (OUTPATIENT)
Dept: OBSTETRICS AND GYNECOLOGY | Facility: CLINIC | Age: 42
End: 2017-02-16

## 2017-02-16 ENCOUNTER — OFFICE VISIT (OUTPATIENT)
Dept: OBSTETRICS AND GYNECOLOGY | Facility: CLINIC | Age: 42
End: 2017-02-16
Payer: MEDICAID

## 2017-02-16 VITALS
BODY MASS INDEX: 51.91 KG/M2 | HEIGHT: 63 IN | DIASTOLIC BLOOD PRESSURE: 76 MMHG | WEIGHT: 293 LBS | SYSTOLIC BLOOD PRESSURE: 138 MMHG

## 2017-02-16 DIAGNOSIS — R10.2 PELVIC PAIN IN FEMALE: ICD-10-CM

## 2017-02-16 DIAGNOSIS — N91.1 SECONDARY PHYSIOLOGIC AMENORRHEA: ICD-10-CM

## 2017-02-16 DIAGNOSIS — Z01.419 VISIT FOR GYNECOLOGIC EXAMINATION: Primary | ICD-10-CM

## 2017-02-16 PROCEDURE — 99213 OFFICE O/P EST LOW 20 MIN: CPT | Mod: PBBFAC | Performed by: NURSE PRACTITIONER

## 2017-02-16 PROCEDURE — 99999 PR PBB SHADOW E&M-EST. PATIENT-LVL III: CPT | Mod: PBBFAC,,, | Performed by: NURSE PRACTITIONER

## 2017-02-16 PROCEDURE — 88175 CYTOPATH C/V AUTO FLUID REDO: CPT

## 2017-02-16 PROCEDURE — 99386 PREV VISIT NEW AGE 40-64: CPT | Mod: S$PBB,,, | Performed by: NURSE PRACTITIONER

## 2017-02-16 NOTE — PROGRESS NOTES
"HISTORY OF PRESENT ILLNESS:    Halley Moreno is a 41 y.o. female, ., No LMP recorded. Patient is not currently having periods (Reason: Other).,  presents for a routine exam and has gyn no complaints.  -States used to see Dr Davis in [place] for her Gyn Visits and had a D&C in  due to abnormal bleeding, but the path was negative.   -Reports no bleeding since, no hot flashes.   -Not sexually active.    DATA REVIEWED:  Last TSH 17 1.973    Past Medical History   Diagnosis Date    BMI 70 and over, adult     Depression     Diabetes mellitus     DM (diabetes mellitus) type II controlled with renal manifestation     HTN (hypertension)     Hyperlipidemia     Lumbar disc disease     Morbid obesity     Recurrent nephrolithiasis     Rosacea     Sleep apnea        Past Surgical History   Procedure Laterality Date     section       x2    Dilation and curettage of uterus       AUB "negative path" per patient    Endometrial ablation         MEDICATIONS AND ALLERGIES:      Current Outpatient Prescriptions:     azelastine (ASTELIN) 137 mcg (0.1 %) nasal spray, 1 spray (137 mcg total) by Nasal route 2 (two) times daily., Disp: 30 mL, Rfl: 1    blood sugar diagnostic Strp, 1 strip by Misc.(Non-Drug; Combo Route) route 2 (two) times daily., Disp: 100 strip, Rfl: 5    diclofenac (VOLTAREN) 50 MG EC tablet, Take 1 tablet (50 mg total) by mouth 2 (two) times daily as needed., Disp: 30 tablet, Rfl: 2    furosemide (LASIX) 20 MG tablet, Take 2 tablets (40 mg total) by mouth once daily., Disp: 30 tablet, Rfl: 2    glipiZIDE (GLUCOTROL) 5 MG TR24, Take 1 tablet (5 mg total) by mouth daily with breakfast., Disp: 30 tablet, Rfl: 2    hydrocodone-acetaminophen 7.5-325mg (NORCO) 7.5-325 mg per tablet, Take 1 tablet by mouth as needed. , Disp: , Rfl:     ketoconazole (NIZORAL) 2 % cream, Apply topically once daily. To feet, Disp: 60 g, Rfl: 0    lancets Misc, 1 lancet by Misc.(Non-Drug; Combo " "Route) route 2 (two) times daily., Disp: 100 each, Rfl: 5    lisinopril (PRINIVIL,ZESTRIL) 40 MG tablet, Take 1 tablet (40 mg total) by mouth once daily., Disp: 30 tablet, Rfl: 2    metronidazole (METROGEL) 0.75 % gel, Apply topically 2 (two) times daily., Disp: 45 g, Rfl: 1    phentermine (ADIPEX-P) 37.5 mg tablet, Take 1 tablet (37.5 mg total) by mouth before breakfast., Disp: 30 tablet, Rfl: 0    triamcinolone acetonide 0.1% (KENALOG) 0.1 % Lotn, Apply topically 2 (two) times daily., Disp: 60 mL, Rfl: 1    Review of patient's allergies indicates:  No Known Allergies    Family History   Problem Relation Age of Onset    Hypertension Mother     Diabetes Father     Cancer Father      cns lymphoma    Heart disease Paternal Grandfather     Colon cancer Neg Hx     Ovarian cancer Neg Hx     Breast cancer Neg Hx        Social History     Social History    Marital status: Single     Spouse name: N/A    Number of children: N/A    Years of education: N/A     Occupational History    Not on file.     Social History Main Topics    Smoking status: Never Smoker    Smokeless tobacco: Never Used    Alcohol use No    Drug use: No    Sexual activity: No     Other Topics Concern    Not on file     Social History Narrative    She is a pharmacy tech, works at [employer] in [place].  Nonsmoker, social etoh.  No exercise.                       OB HISTORY: Number of C/S:2    COMPREHENSIVE GYN HISTORY:  PAP History: Denies abnormal Paps. UNKNOWN DATE OF LAST PAP "NEG".  Infection History: Denies STDs. Denies PID.  Benign History: Denies uterine fibroids. Denies ovarian cysts. Denies endometriosis. Denies other conditions.  Cancer History: Denies cervical cancer. Denies uterine cancer or hyperplasia. Denies ovarian cancer. Denies vulvar cancer or pre-cancer. Denies vaginal cancer or pre-cancer. Denies breast cancer. Denies colon cancer.  Sexual Activity History: Denies currently being sexually active  Menstrual " "History: Denies menses. Pt is : 2010. Not on HRT.     ROS:  GENERAL: No weight changes. No swelling. + FATIGUE. No fever.  CARDIOVASCULAR: No chest pain. No shortness of breath. No leg cramps.   NEUROLOGICAL: No headaches. No vision changes.  BREASTS: No pain. No lumps. No discharge.  ABDOMEN: No pain. No nausea. No vomiting. No diarrhea. + CONSTIPATION.  REPRODUCTIVE: + AMENORRHEA.   VULVA: No pain. No lesions. No itching.  VAGINA: No relaxation. No itching. No odor. No discharge. No lesions.  URINARY: No incontinence. No nocturia. No frequency. No dysuria.    Visit Vitals    /76    Ht 5' 3" (1.6 m)    Wt (!) 175 kg (385 lb 12.9 oz)    BMI 68.34 kg/m2       PE:  APPEARANCE: OBESE. Well nourished, well developed, in no acute distress.  AFFECT: WNL, alert and oriented x 3.  SKIN: No acne or hirsutism. ROSACEA.  NECK: Neck symmetric, without masses or thyromegaly.  NODES: No inguinal, cervical, axillary or femoral lymph node enlargement.  CHEST: Good respiratory effort.   ABDOMEN: OBESE. Soft. DIFFICULT TO PALPATE ANYTHING. SCALY DRY SKIN OF LOWER ABDOMEN, no lesions under pannus. States has Rx from PCP.   BREASTS: Symmetrical, no skin changes, visible lesions, palpable masses or nipple discharge bilaterally.  PELVIC: External female genitalia without lesions.  Female hair distribution. Adequate perineal body, Normal urethral meatus. Vagina moist and well rugated without lesions or discharge.  No significant cystocele or rectocele present. UNABLE TO COMPLETELY VISUALIZE CERVIX EVEN WITH LARGEST SPECULUM, BUT COULD PALPATE A NORMAL CERVIX. CANNOT PALPATE UTERUS. EXAM IS DIFFICULT DUE TO BODY HABITUS. UTERUS IS SLIGHTLY TENDER. Adnexa without masses or tenderness.  EXTREMITIES: STASIS DERMATITIS and EDEMA of BILATERAL LE'S. States has Rx from PCP.     DIAGNOSIS:  1. Visit for gynecologic examination    2. Secondary physiologic amenorrhea    3. Pelvic pain in female        PLAN:    Orders Placed This " Encounter    US Pelvis Comp with Transvag NON-OB (xpd    Follicle stimulating hormone    Luteinizing hormone    Estradiol    Liquid-based pap smear, screening       COUNSELING:  The patient was counseled today on:  -perimenopause vs menopause and to report severe vasomotor symptoms and/or skipping periods, heavy, prolonged bleeding, spotting in between periods;  -that if her hormone levels are in the jason-menopausal range and the ultrasound is normal, would prescribe Provera, as having a period four times a year is recommended to prevent uterine cancer;  -if ultrasound is abnormal, will refer to Gyn MD for consult;  -A.C.S. Pap and pelvic exam guidelines (pap every 3 years), recomendations for yearly mammogram;  -to follow up with her PCP for other health maintenance.    FOLLOW-UP with me pending test results and annually.       ADDENDUM: When I called pt back with normal hormone levels, she remembered that at the time of the D&C she also had a endometrial ablation. Will request old records. No provera for now. Viki Vegas NP

## 2017-02-16 NOTE — LETTER
February 16, 2017      Yamila Tejeda MD  [address] [facility] [place] [address]           [facility] 5th Floor  [address]  [place] [address]  Phone: [phone]          Patient: Halley Moreno   MR Number: [id number]   YOB: 1975   Date of Visit: 2/16/2017       Dear Dr. Yamila Tejeda:    Thank you for referring Halley Moreno to me for evaluation. Attached you will find relevant portions of my assessment and plan of care.    If you have questions, please do not hesitate to call me. I look forward to following Halley Moreno along with you.    Sincerely,    KELIN Vegas NP    Enclosure  CC:  No Recipients    If you would like to receive this communication electronically, please contact [email or web address] or [phone] to request more information on [facility] access.    For providers and/or their staff who would like to refer a patient to [facility], please contact us through our one-stop-shop provider referral line, [facility], at [phone].    If you feel you have received this communication in error or would no longer like to receive these types of communications, please e-mail [email or web address]

## 2017-02-16 NOTE — TELEPHONE ENCOUNTER
PHONE CALL: MISTY  hormone levels indicate that she is perimenopausal. Rx Provera sent to her pharmacy. Erika Vegas NP

## 2017-02-16 NOTE — TELEPHONE ENCOUNTER
----- Message from Rupa Schafer sent at 2/16/2017 12:54 PM CST -----  Contact: KARISSA GOOD [[id number]]  _  1st Request  _  2nd Request  _  3rd Request        Who: KARISSA GOOD [[id number]]    Why: patient is returning a call in regards to test results    What Number to Call Back: [phone]    When to Expect a call back: (Before the end of the day)   -- if call after 3:00 call back will be tomorrow.    PHONE CALL: Advised of normal hormone levels. Pt now remember she had an endometrial ablation at the time of the D&C. Will need to get records. Advised to come up to 5th floor after US on Monday to sign a records release. Until I get the records and the US results, do not take the Provera. Viki Vegas NP

## 2017-02-20 ENCOUNTER — HOSPITAL ENCOUNTER (OUTPATIENT)
Dept: RADIOLOGY | Facility: HOSPITAL | Age: 42
Discharge: HOME | End: 2017-02-20
Attending: NURSE PRACTITIONER
Payer: MEDICAID

## 2017-02-20 DIAGNOSIS — R10.2 PELVIC PAIN IN FEMALE: ICD-10-CM

## 2017-02-21 ENCOUNTER — TELEPHONE (OUTPATIENT)
Dept: OBSTETRICS AND GYNECOLOGY | Facility: CLINIC | Age: 42
End: 2017-02-21

## 2017-02-21 ENCOUNTER — OFFICE VISIT (OUTPATIENT)
Dept: INTERNAL MEDICINE | Facility: CLINIC | Age: 42
End: 2017-02-21
Payer: MEDICAID

## 2017-02-21 ENCOUNTER — HOSPITAL ENCOUNTER (OUTPATIENT)
Dept: RADIOLOGY | Facility: HOSPITAL | Age: 42
Discharge: HOME | End: 2017-02-21
Attending: NURSE PRACTITIONER
Payer: MEDICAID

## 2017-02-21 VITALS
BODY MASS INDEX: 51.91 KG/M2 | TEMPERATURE: 98 F | SYSTOLIC BLOOD PRESSURE: 119 MMHG | HEART RATE: 57 BPM | DIASTOLIC BLOOD PRESSURE: 83 MMHG | WEIGHT: 293 LBS | HEIGHT: 63 IN

## 2017-02-21 DIAGNOSIS — M54.6 ACUTE RIGHT-SIDED THORACIC BACK PAIN: Primary | ICD-10-CM

## 2017-02-21 DIAGNOSIS — N91.1 AMENORRHEA, SECONDARY: Primary | ICD-10-CM

## 2017-02-21 DIAGNOSIS — M79.18 MUSCULOSKELETAL PAIN: ICD-10-CM

## 2017-02-21 DIAGNOSIS — R10.2 PELVIC PAIN IN FEMALE: ICD-10-CM

## 2017-02-21 PROCEDURE — 76830 TRANSVAGINAL US NON-OB: CPT | Mod: TC

## 2017-02-21 PROCEDURE — 76830 TRANSVAGINAL US NON-OB: CPT | Mod: 26,,, | Performed by: RADIOLOGY

## 2017-02-21 PROCEDURE — 99999 PR PBB SHADOW E&M-EST. PATIENT-LVL III: CPT | Mod: PBBFAC,,, | Performed by: INTERNAL MEDICINE

## 2017-02-21 PROCEDURE — 99214 OFFICE O/P EST MOD 30 MIN: CPT | Mod: S$PBB,,, | Performed by: INTERNAL MEDICINE

## 2017-02-21 RX ORDER — KETOROLAC TROMETHAMINE 30 MG/ML
60 INJECTION, SOLUTION INTRAMUSCULAR; INTRAVENOUS
Status: COMPLETED | OUTPATIENT
Start: 2017-02-21 | End: 2017-02-21

## 2017-02-21 RX ORDER — MEDROXYPROGESTERONE ACETATE 10 MG/1
10 TABLET ORAL DAILY
Qty: 10 TABLET | Refills: 0 | Status: SHIPPED | OUTPATIENT
Start: 2017-02-21 | End: 2017-05-05

## 2017-02-21 RX ORDER — METHOCARBAMOL 500 MG/1
500 TABLET, FILM COATED ORAL 2 TIMES DAILY PRN
Qty: 40 TABLET | Refills: 0 | Status: SHIPPED | OUTPATIENT
Start: 2017-02-21 | End: 2017-03-03

## 2017-02-21 RX ORDER — HYDROCODONE BITARTRATE AND ACETAMINOPHEN 5; 325 MG/1; MG/1
1 TABLET ORAL EVERY 6 HOURS PRN
Qty: 45 TABLET | Refills: 0 | Status: SHIPPED | OUTPATIENT
Start: 2017-02-21 | End: 2017-06-30 | Stop reason: SDUPTHER

## 2017-02-21 RX ADMIN — KETOROLAC TROMETHAMINE 60 MG: 60 INJECTION, SOLUTION INTRAMUSCULAR at 11:02

## 2017-02-21 NOTE — PATIENT INSTRUCTIONS
Relieving Back Pain  Back pain is a common problem. You can strain back muscles by lifting too much weight or just by moving the wrong way. Back strain can be uncomfortable, even painful. And it can take weeks or months to improve. To help yourself feel better and prevent future back strains, try these tips.  Important Note: Do not give aspirin to children or teens without first discussing it with your healthcare provider.      ? Ice    Ice reduces muscle pain and swelling. It helps most during the first 24 to 48 hours after an injury.  · Wrap an ice pack or a bag of frozen peas in a thin towel. (Never place ice directly on your skin.)  · Place the ice where your back hurts the most.  · Dont ice for more than 20 minutes at a time.  · You can use ice several times a day.  ? Medicines  Over-the-counter pain relievers can include acetaminophen and anti-inflammatory medicines, which includes aspirin or ibuprofen. They can help ease discomfort. Some also reduce swelling.  · Tell your healthcare provider about any medicines you are already taking.  · Take medicines only as directed.  ? Heat  After the first 48 hours, heat can relax sore muscles and improve blood flow.  · Try a warm bath or shower. Or use a heating pad set on low. To prevent a burn, keep a cloth between you and the heating pad.  · Dont use a heating pad for more than 15 minutes at a time. Never sleep on a heating pad.  Date Last Reviewed: 9/1/2015  © 1372-0572 [employer]. [address] [place] [address], [place] [address]. All rights reserved. This information is not intended as a substitute for professional medical care. Always follow your healthcare professional's instructions.

## 2017-02-21 NOTE — MR AVS SNAPSHOT
[facility]   [facility]  [place] [address]  Phone: [phone]  Fax: [phone]                  Halley Moreno   2017 11:00 AM   Office Visit    Description:  Female : 1975   Provider:  Yamila Tejeda MD   Department:  [facility]           Reason for Visit     Back Pain           Diagnoses this Visit        Comments    Acute right-sided thoracic back pain    -  Primary     Musculoskeletal pain                To Do List           Future Appointments        Provider Department Dept Phone    2017 2:45 PM [facility] US 11 [facility] [phone]      Goals (5 Years of Data)     None       These Medications        Disp Refills Start End    methocarbamol (ROBAXIN) 500 MG Tab 40 tablet 0 2017 3/3/2017    Take 1 tablet (500 mg total) by mouth 2 (two) times daily as needed. - Oral    Pharmacy: [id number] [place], [place] - [address] Ph #: [phone]       hydrocodone-acetaminophen 5-325mg (NORCO) 5-325 mg per tablet 45 tablet 0 2017     Take 1 tablet by mouth every 6 (six) hours as needed for Pain. - Oral    Pharmacy: [id number] [place], [place] - [address] [facility] Ph #: [phone]         [facility]     [facility] Care [facility] -  Assistance  Registered nurses in the [facility] provide clinical advisement, health education, appointment booking, and other advisory services.  Call for this free service at [phone].             Medications           Message regarding Medications     Verify the changes and/or additions to your medication regime listed below are the same as discussed with your clinician today.  If any of these changes or additions are incorrect, please notify your healthcare provider.        START taking these NEW medications        Refills    methocarbamol (ROBAXIN) 500 MG Tab 0    Sig: Take 1 tablet (500 mg total) by mouth 2  (two) times daily as needed.    Class: Normal    Route: Oral    hydrocodone-acetaminophen 5-325mg (NORCO) 5-325 mg per tablet 0    Sig: Take 1 tablet by mouth every 6 (six) hours as needed for Pain.    Class: Print    Route: Oral      These medications were administered today        Dose Freq    ketorolac injection 60 mg 60 mg Clinic/\[facility]\"" 1 time    Sig: Inject 2 mLs (60 mg total) into the muscle one time.    Class: Normal    Route: Intramuscular      STOP taking these medications     hydrocodone-acetaminophen 7.5-325mg (NORCO) 7.5-325 mg per tablet Take 1 tablet by mouth as needed.            Verify that the below list of medications is an accurate representation of the medications you are currently taking.  If none reported, the list may be blank. If incorrect, please contact your healthcare provider. Carry this list with you in case of emergency.           Current Medications     azelastine (ASTELIN) 137 mcg (0.1 %) nasal spray 1 spray (137 mcg total) by Nasal route 2 (two) times daily.    blood sugar diagnostic Strp 1 strip by Misc.(Non-Drug; Combo Route) route 2 (two) times daily.    diclofenac (VOLTAREN) 50 MG EC tablet Take 1 tablet (50 mg total) by mouth 2 (two) times daily as needed.    furosemide (LASIX) 20 MG tablet Take 2 tablets (40 mg total) by mouth once daily.    glipiZIDE (GLUCOTROL) 5 MG TR24 Take 1 tablet (5 mg total) by mouth daily with breakfast.    hydrocodone-acetaminophen 5-325mg (NORCO) 5-325 mg per tablet Take 1 tablet by mouth every 6 (six) hours as needed for Pain.    ketoconazole (NIZORAL) 2 % cream Apply topically once daily. To feet    lancets Misc 1 lancet by Misc.(Non-Drug; Combo Route) route 2 (two) times daily.    lisinopril (PRINIVIL,ZESTRIL) 40 MG tablet Take 1 tablet (40 mg total) by mouth once daily.    methocarbamol (ROBAXIN) 500 MG Tab Take 1 tablet (500 mg total) by mouth 2 (two) times daily as needed.    metronidazole (METROGEL) 0.75 % gel Apply topically 2 (two) times daily.  "   phentermine (ADIPEX-P) 37.5 mg tablet Take 1 tablet (37.5 mg total) by mouth before breakfast.    triamcinolone acetonide 0.1% (KENALOG) 0.1 % Lotn Apply topically 2 (two) times daily.           Clinical Reference Information           Your Vitals Were     BP Pulse Temp Height Weight BMI    119/83 57 98.2 °F (36.8 °C) (Oral) 5' 3" (1.6 m) 177.4 kg (391 lb 1.5 oz) 69.28 kg/m2      Blood Pressure          Most Recent Value    BP  119/83      Allergies as of 2/21/2017     No Known Allergies      Immunizations Administered on Date of Encounter - 2/21/2017     None      [facility] Sign-Up     Activating your [facility] account is as easy as 1-2-3!     1) Visit [email or web address], select Sign Up Now, enter this activation code and your date of birth, then select Next.  [email or web address]  Expires: 3/23/2017  9:46 AM      2) Create a username and password to use when you visit [facility] in the future and select a security question in case you lose your password and select Next.    3) Enter your e-mail address and click Sign Up!    Additional Information  If you have questions, please e-mail [email or web address] or call [phone] to talk to our [facility] staff. Remember, [facility] is NOT to be used for urgent needs. For medical emergencies, dial 911.         Instructions      Relieving Back Pain  Back pain is a common problem. You can strain back muscles by lifting too much weight or just by moving the wrong way. Back strain can be uncomfortable, even painful. And it can take weeks or months to improve. To help yourself feel better and prevent future back strains, try these tips.  Important Note: Do not give aspirin to children or teens without first discussing it with your healthcare provider.      ? Ice    Ice reduces muscle pain and swelling. It helps most during the first 24 to 48 hours after an injury.  · Wrap an ice pack or a bag of frozen peas in a thin towel. (Never place ice directly on your skin.)  · Place the ice " where your back hurts the most.  · Dont ice for more than 20 minutes at a time.  · You can use ice several times a day.  ? Medicines  Over-the-counter pain relievers can include acetaminophen and anti-inflammatory medicines, which includes aspirin or ibuprofen. They can help ease discomfort. Some also reduce swelling.  · Tell your healthcare provider about any medicines you are already taking.  · Take medicines only as directed.  ? Heat  After the first 48 hours, heat can relax sore muscles and improve blood flow.  · Try a warm bath or shower. Or use a heating pad set on low. To prevent a burn, keep a cloth between you and the heating pad.  · Dont use a heating pad for more than 15 minutes at a time. Never sleep on a heating pad.  Date Last Reviewed: 9/1/2015  © 5754-2462 [employer]. [address] [place] [address]. All rights reserved. This information is not intended as a substitute for professional medical care. Always follow your healthcare professional's instructions.             Language Assistance Services     ATTENTION: Language assistance services are available, free of charge. Please call [phone].      ATENCIÓN: Si habla alejandra, tiene a williamson disposición servicios gratuitos de asistencia lingüística. Llame al [phone].     YANIV Ý: N?u b?n nói Ti?ng Vi?t, có các d?ch v? h? tr? ngôn ng? mi?n phí dành cho b?n. G?i s? [phone].         [facility] complies with applicable Federal civil rights laws and does not discriminate on the basis of race, color, national origin, age, disability, or sex.

## 2017-02-21 NOTE — PROGRESS NOTES
Subjective:       Patient ID: Halley Moreno is a 41 y.o. female who presents for Back Pain      Back Pain   This is a new problem. The current episode started in the past 7 days. The problem occurs constantly. The problem is unchanged. The pain is present in the thoracic spine (right sided). The quality of the pain is described as aching. The pain does not radiate. The pain is at a severity of 8/10. The pain is severe. The pain is the same all the time. The symptoms are aggravated by bending and twisting. Pertinent negatives include no abdominal pain, bladder incontinence, bowel incontinence, chest pain, dysuria, fever, headaches, numbness, paresthesias, tingling or weakness. Risk factors include lack of exercise and sedentary lifestyle. She has tried NSAIDs (tried Voltaren without much improvement) for the symptoms. The treatment provided mild relief.        Review of Systems   Constitutional: Negative for chills, fatigue and fever.   HENT: Negative for congestion, rhinorrhea and sinus pressure.    Eyes: Negative for visual disturbance.   Respiratory: Negative for cough, chest tightness and shortness of breath.    Cardiovascular: Negative for chest pain, palpitations and leg swelling.   Gastrointestinal: Negative for abdominal pain, bowel incontinence, diarrhea, nausea and vomiting.   Genitourinary: Negative for bladder incontinence, dysuria, flank pain, frequency and hematuria.   Musculoskeletal: Positive for back pain. Negative for arthralgias and myalgias.   Skin: Negative for rash.   Neurological: Negative for dizziness, tingling, weakness, numbness, headaches and paresthesias.   Hematological: Negative for adenopathy.       Objective:      Physical Exam   Constitutional: She is oriented to person, place, and time. Vital signs are normal. She appears well-developed and well-nourished. No distress.   HENT:   Head: Normocephalic and atraumatic.   Right Ear: Hearing and external ear normal.   Left Ear:  Hearing and external ear normal.   Nose: Nose normal.   Mouth/Throat: Uvula is midline, oropharynx is clear and moist and mucous membranes are normal. No oropharyngeal exudate.   Eyes: EOM and lids are normal.   Neck: Normal range of motion. Neck supple.   Cardiovascular: Normal rate, regular rhythm, normal heart sounds and intact distal pulses.    No murmur heard.  Pulmonary/Chest: Effort normal and breath sounds normal. She has no wheezes.   Abdominal: Soft. Bowel sounds are normal. She exhibits no distension.   Musculoskeletal: She exhibits no edema.        Cervical back: She exhibits no bony tenderness, no pain and no spasm.        Thoracic back: She exhibits tenderness (in musculature near right scapula), pain and spasm. She exhibits no bony tenderness.        Lumbar back: She exhibits no bony tenderness and no pain.   Neurological: She is alert and oriented to person, place, and time. Coordination and gait normal.   Skin: Skin is warm, dry and intact. No rash noted. She is not diaphoretic.   Psychiatric: She has a normal mood and affect.   Vitals reviewed.      Assessment:       1. Acute right-sided thoracic back pain    2. Musculoskeletal pain        Plan:       -- Toradol 60mg IM  -- Norco 5mg every 6 hours as needed for pain but advised to avoid driving after doses  -- begin Robaxin 500mg twice daily as needed for spasms  -- call if no improvement in the next 1-2 weeks    Yamila Tejeda MD

## 2017-02-21 NOTE — TELEPHONE ENCOUNTER
PHONE CALL: Advised US was unable to evaluate her EMS and that she did not have and endometrial ablation per records review. No path results from D&C, Hysteroscope, only blood. Provera sent. To report no withdrawal bleeding 2 weeks after taking the Provera and will sent to Gyn MD for embx, possible D&C hysteroscope again. Viki Vegas NP

## 2017-03-10 ENCOUNTER — TELEPHONE (OUTPATIENT)
Dept: OBSTETRICS AND GYNECOLOGY | Facility: CLINIC | Age: 42
End: 2017-03-10

## 2017-03-27 ENCOUNTER — TELEPHONE (OUTPATIENT)
Dept: OBSTETRICS AND GYNECOLOGY | Facility: CLINIC | Age: 42
End: 2017-03-27

## 2017-03-27 DIAGNOSIS — N83.202 CYST OF LEFT OVARY: Primary | ICD-10-CM

## 2017-05-04 ENCOUNTER — OFFICE VISIT (OUTPATIENT)
Dept: INTERNAL MEDICINE | Facility: CLINIC | Age: 42
End: 2017-05-04
Payer: MEDICAID

## 2017-05-04 ENCOUNTER — LAB VISIT (OUTPATIENT)
Dept: LAB | Facility: HOSPITAL | Age: 42
End: 2017-05-04
Attending: INTERNAL MEDICINE
Payer: MEDICAID

## 2017-05-04 VITALS
HEIGHT: 62 IN | BODY MASS INDEX: 53.92 KG/M2 | DIASTOLIC BLOOD PRESSURE: 80 MMHG | TEMPERATURE: 98 F | SYSTOLIC BLOOD PRESSURE: 108 MMHG | RESPIRATION RATE: 16 BRPM | HEART RATE: 101 BPM | WEIGHT: 293 LBS

## 2017-05-04 DIAGNOSIS — L03.311 CELLULITIS, ABDOMINAL WALL: Primary | ICD-10-CM

## 2017-05-04 DIAGNOSIS — B37.2 INTERTRIGINOUS CANDIDIASIS: ICD-10-CM

## 2017-05-04 DIAGNOSIS — L03.311 CELLULITIS, ABDOMINAL WALL: ICD-10-CM

## 2017-05-04 DIAGNOSIS — R20.2 TINGLING OF RIGHT UPPER EXTREMITY: Primary | ICD-10-CM

## 2017-05-04 LAB
ALBUMIN SERPL BCP-MCNC: 3.3 G/DL
ALP SERPL-CCNC: 72 U/L
ALT SERPL W/O P-5'-P-CCNC: 18 U/L
ANION GAP SERPL CALC-SCNC: 12 MMOL/L
AST SERPL-CCNC: 16 U/L
BASOPHILS # BLD AUTO: 0.01 K/UL
BASOPHILS NFR BLD: 0 %
BILIRUB SERPL-MCNC: 1.6 MG/DL
BUN SERPL-MCNC: 13 MG/DL
CALCIUM SERPL-MCNC: 9.3 MG/DL
CHLORIDE SERPL-SCNC: 96 MMOL/L
CO2 SERPL-SCNC: 25 MMOL/L
CREAT SERPL-MCNC: 1 MG/DL
DIFFERENTIAL METHOD: ABNORMAL
EOSINOPHIL # BLD AUTO: 0 K/UL
EOSINOPHIL NFR BLD: 0 %
ERYTHROCYTE [DISTWIDTH] IN BLOOD BY AUTOMATED COUNT: 15.6 %
EST. GFR  (AFRICAN AMERICAN): >60 ML/MIN/1.73 M^2
EST. GFR  (NON AFRICAN AMERICAN): >60 ML/MIN/1.73 M^2
GLUCOSE SERPL-MCNC: 156 MG/DL
HCT VFR BLD AUTO: 44.4 %
HGB BLD-MCNC: 15 G/DL
LYMPHOCYTES # BLD AUTO: 0.9 K/UL
LYMPHOCYTES NFR BLD: 4.2 %
MCH RBC QN AUTO: 28.5 PG
MCHC RBC AUTO-ENTMCNC: 33.8 %
MCV RBC AUTO: 84 FL
MONOCYTES # BLD AUTO: 1 K/UL
MONOCYTES NFR BLD: 4.5 %
NEUTROPHILS # BLD AUTO: 20.5 K/UL
NEUTROPHILS NFR BLD: 91.3 %
PLATELET # BLD AUTO: 176 K/UL
PMV BLD AUTO: 10.3 FL
POTASSIUM SERPL-SCNC: 4.3 MMOL/L
PROT SERPL-MCNC: 7.4 G/DL
RBC # BLD AUTO: 5.26 M/UL
SODIUM SERPL-SCNC: 133 MMOL/L
WBC # BLD AUTO: 22.64 K/UL

## 2017-05-04 PROCEDURE — 85025 COMPLETE CBC W/AUTO DIFF WBC: CPT

## 2017-05-04 PROCEDURE — 80053 COMPREHEN METABOLIC PANEL: CPT

## 2017-05-04 PROCEDURE — 36415 COLL VENOUS BLD VENIPUNCTURE: CPT | Mod: PO

## 2017-05-04 PROCEDURE — 99999 PR PBB SHADOW E&M-EST. PATIENT-LVL IV: CPT | Mod: 25,PBBFAC,, | Performed by: INTERNAL MEDICINE

## 2017-05-04 PROCEDURE — 99214 OFFICE O/P EST MOD 30 MIN: CPT | Mod: S$PBB,,, | Performed by: INTERNAL MEDICINE

## 2017-05-04 RX ORDER — CLINDAMYCIN HYDROCHLORIDE 300 MG/1
300 CAPSULE ORAL EVERY 6 HOURS
Qty: 40 CAPSULE | Refills: 0 | Status: SHIPPED | OUTPATIENT
Start: 2017-05-04 | End: 2017-05-11 | Stop reason: SDUPTHER

## 2017-05-04 RX ORDER — NYSTATIN 100000 [USP'U]/G
POWDER TOPICAL 2 TIMES DAILY
Qty: 30 G | Refills: 1 | Status: SHIPPED | OUTPATIENT
Start: 2017-05-04 | End: 2017-05-22 | Stop reason: SDUPTHER

## 2017-05-04 RX ORDER — CEFTRIAXONE 1 G/1
1 INJECTION, POWDER, FOR SOLUTION INTRAMUSCULAR; INTRAVENOUS
Status: COMPLETED | OUTPATIENT
Start: 2017-05-04 | End: 2017-05-04

## 2017-05-04 RX ADMIN — CEFTRIAXONE 1 G: 250 INJECTION, POWDER, FOR SOLUTION INTRAMUSCULAR; INTRAVENOUS at 04:05

## 2017-05-04 NOTE — PROGRESS NOTES
Subjective:       Patient ID: Halley Moreno is a 41 y.o. female who presents for Rash (stomach)      HPI     42yo F with morbid obesity, HTN, HLD and DM2 who presents due to malaise and rash on her abdomen. She first noticed the redness ~2 days ago and began to expand. Reports area very warm and tender but denies any open wounds. She reports malaise today and her mother brought her to clinic because of her history of severe cellulitis requiring hospitalization for IV antibiotics in the past. She denies fever, chills or sweats but feels lethargic. She denies chest pain or palpitations.      Review of Systems   Constitutional: Positive for fatigue. Negative for chills, diaphoresis and fever.   HENT: Negative for congestion, rhinorrhea and sinus pressure.    Eyes: Negative for visual disturbance.   Respiratory: Negative for cough, chest tightness and shortness of breath.    Cardiovascular: Negative for chest pain, palpitations and leg swelling.   Gastrointestinal: Negative for abdominal pain, diarrhea, nausea and vomiting.   Genitourinary: Negative for dysuria and hematuria.   Musculoskeletal: Negative for arthralgias and myalgias.   Skin: Positive for color change and rash.   Neurological: Negative for dizziness, weakness, light-headedness and headaches.        Continued RUE numbness and tingling   Hematological: Negative for adenopathy.       Objective:      Physical Exam   Constitutional: She is oriented to person, place, and time. Vital signs are normal. She appears well-developed and well-nourished. No distress.   HENT:   Head: Normocephalic and atraumatic.   Right Ear: Hearing and external ear normal.   Left Ear: Hearing and external ear normal.   Nose: Nose normal.   Mouth/Throat: Uvula is midline and oropharynx is clear and moist.   Eyes: EOM and lids are normal.   Neck: Normal range of motion. Neck supple.   Cardiovascular: Normal rate, regular rhythm, normal heart sounds and intact distal pulses.    No  murmur heard.  Pulmonary/Chest: Effort normal and breath sounds normal. She has no wheezes.   Abdominal: Soft. Bowel sounds are normal. She exhibits no distension.   Musculoskeletal: She exhibits no edema.   Neurological: She is alert and oriented to person, place, and time. Coordination and gait normal.   Skin: Skin is warm, dry and intact. Rash noted. Rash is macular (erythema in slin fold under pannus, few satellite lesions, some crusting). She is not diaphoretic. There is erythema (large area of erythema, warmth inferior to umbilicus, no open lesion, no fluctuance, some induration, minmal tenderness).   Psychiatric: She has a normal mood and affect.   Vitals reviewed.      Assessment:       1. Cellulitis, abdominal wall    2. Intertriginous candidiasis        Plan:       -- clindamycin 300mg tid for 10 days  -- rocephin 1g IM in office  --outlined the border of her cellulitis, monitor 2-3 times daily and call or ER if enlarges despite antibiotics  -- check temperature 2-3 times daily, tylenol and call if >101  -- ER if develops dizziness, racing heart or worsening cellulitis  -- check cbc, cmp  -- may use nystatin in lower abdominal skin fold bid    Yamila Tejeda MD

## 2017-05-04 NOTE — MR AVS SNAPSHOT
[facility]   [facility]  [place] [address]  Phone: [phone]  Fax: [phone]                  Halley Moreno   2017 4:00 PM   Office Visit    Description:  Female : 1975   Provider:  Yamila Tejeda MD   Department:  [facility]           Reason for Visit     Rash           Diagnoses this Visit        Comments    Cellulitis, abdominal wall    -  Primary     Intertriginous candidiasis                To Do List           Future Appointments        Provider Department Dept Phone    2017 4:45 PM LAB, [facility] - [facility] [phone]    2017 2:00 PM [facility] ALL [facility] [phone]      Goals (5 Years of Data)     None       These Medications        Disp Refills Start End    clindamycin (CLEOCIN) 300 MG capsule 40 capsule 0 2017    Take 1 capsule (300 mg total) by mouth every 6 (six) hours. - Oral    Pharmacy: [id number] - [facility] [place] - [address] [facility] Ph #: [phone]       nystatin (MYCOSTATIN) powder 30 g 1 2017     Apply topically 2 (two) times daily. - Topical (Top)    Pharmacy: [id number] - [place], [place] - [address] [facility] #: [phone]         [facility]     [facility] Care Line -  Assistance  Unless otherwise directed by your provider, please contact [facility], our nurse care line that is available for  assistance.     Registered nurses in the [facility] provide: appointment scheduling, clinical advisement, health education, and other advisory services.  Call: [phone] (toll free)               Medications           Message regarding Medications     Verify the changes and/or additions to your medication regime listed below are the same as discussed with your clinician today.  If any of these changes or additions are incorrect, please notify your healthcare provider.        START  taking these NEW medications        Refills    clindamycin (CLEOCIN) 300 MG capsule 0    Sig: Take 1 capsule (300 mg total) by mouth every 6 (six) hours.    Class: Normal    Route: Oral    nystatin (MYCOSTATIN) powder 1    Sig: Apply topically 2 (two) times daily.    Class: Normal    Route: Topical (Top)      These medications were administered today        Dose Freq    cefTRIAXone injection 1 g 1 g Clinic/HOD 1 time    Sig: Inject 1 g into the muscle one time.    Class: Normal    Route: Intramuscular           Verify that the below list of medications is an accurate representation of the medications you are currently taking.  If none reported, the list may be blank. If incorrect, please contact your healthcare provider. Carry this list with you in case of emergency.           Current Medications     azelastine (ASTELIN) 137 mcg (0.1 %) nasal spray 1 spray (137 mcg total) by Nasal route 2 (two) times daily.    blood sugar diagnostic Strp 1 strip by Misc.(Non-Drug; Combo Route) route 2 (two) times daily.    diclofenac (VOLTAREN) 50 MG EC tablet Take 1 tablet (50 mg total) by mouth 2 (two) times daily as needed.    furosemide (LASIX) 20 MG tablet Take 2 tablets (40 mg total) by mouth once daily.    glipiZIDE (GLUCOTROL) 5 MG TR24 Take 1 tablet (5 mg total) by mouth daily with breakfast.    hydrocodone-acetaminophen 5-325mg (NORCO) 5-325 mg per tablet Take 1 tablet by mouth every 6 (six) hours as needed for Pain.    ketoconazole (NIZORAL) 2 % cream Apply topically once daily. To feet    lancets Misc 1 lancet by Misc.(Non-Drug; Combo Route) route 2 (two) times daily.    lisinopril (PRINIVIL,ZESTRIL) 40 MG tablet Take 1 tablet (40 mg total) by mouth once daily.    medroxyPROGESTERone (PROVERA) 10 MG tablet Take 1 tablet (10 mg total) by mouth once daily.    metronidazole (METROGEL) 0.75 % gel Apply topically 2 (two) times daily.    phentermine (ADIPEX-P) 37.5 mg tablet Take 1 tablet (37.5 mg total) by mouth before  "breakfast.    clindamycin (CLEOCIN) 300 MG capsule Take 1 capsule (300 mg total) by mouth every 6 (six) hours.    nystatin (MYCOSTATIN) powder Apply topically 2 (two) times daily.    triamcinolone acetonide 0.1% (KENALOG) 0.1 % Lotn Apply topically 2 (two) times daily.           Clinical Reference Information           Your Vitals Were     BP Pulse Temp Resp Height Weight    108/80 101 98.4 °F (36.9 °C) (Oral) 16 5' 2" (1.575 m) 177.4 kg (391 lb 1.5 oz)    BMI                71.53 kg/m2          Blood Pressure          Most Recent Value    BP  108/80      Allergies as of 5/4/2017     No Known Allergies      Immunizations Administered on Date of Encounter - 5/4/2017     None      Orders Placed During Today's Visit     Future Labs/Procedures Expected by Expires    CBC auto differential  5/4/2017 7/3/2018    Comprehensive metabolic panel  5/4/2017 7/3/2018      [facility] Sign-Up     Activating your [facility] account is as easy as 1-2-3!     1) Visit [email or web address], select Sign Up Now, enter this activation code and your date of birth, then select Next.  [email or web address]  Expires: 6/18/2017  3:22 PM      2) Create a username and password to use when you visit [facility] in the future and select a security question in case you lose your password and select Next.    3) Enter your e-mail address and click Sign Up!    Additional Information  If you have questions, please e-mail [email or web address] or call [phone] to talk to our [facility] staff. Remember, [facility] is NOT to be used for urgent needs. For medical emergencies, dial 911.         Language Assistance Services     ATTENTION: Language assistance services are available, free of charge. Please call [phone].      ATENCIÓN: Si habla español, tiene a williamson disposición servicios gratuitos de asistencia lingüística. Llame al [phone].     CHÚ Ý: N?u b?n nói Ti?ng Vi?t, có các d?ch v? h? tr? en ng? mi?n phí dành cho b?n. G?i s? [phone].         " [facility] complies with applicable Federal civil rights laws and does not discriminate on the basis of race, color, national origin, age, disability, or sex.

## 2017-05-05 ENCOUNTER — TELEPHONE (OUTPATIENT)
Dept: INTERNAL MEDICINE | Facility: CLINIC | Age: 42
End: 2017-05-05

## 2017-05-05 ENCOUNTER — HOSPITAL ENCOUNTER (EMERGENCY)
Facility: HOSPITAL | Age: 42
Discharge: HOME | End: 2017-05-05
Attending: EMERGENCY MEDICINE
Payer: MEDICAID

## 2017-05-05 VITALS
HEART RATE: 90 BPM | WEIGHT: 293 LBS | BODY MASS INDEX: 51.91 KG/M2 | DIASTOLIC BLOOD PRESSURE: 59 MMHG | OXYGEN SATURATION: 98 % | SYSTOLIC BLOOD PRESSURE: 115 MMHG | TEMPERATURE: 98 F | RESPIRATION RATE: 16 BRPM | HEIGHT: 63 IN

## 2017-05-05 DIAGNOSIS — L03.311 CELLULITIS OF ABDOMINAL WALL: Primary | ICD-10-CM

## 2017-05-05 LAB
ALBUMIN SERPL BCP-MCNC: 2.8 G/DL
ALP SERPL-CCNC: 64 U/L
ALT SERPL W/O P-5'-P-CCNC: 14 U/L
ANION GAP SERPL CALC-SCNC: 10 MMOL/L
APTT BLDCRRT: 29.4 SEC
AST SERPL-CCNC: 15 U/L
B-OH-BUTYR BLD STRIP-SCNC: 0.3 MMOL/L
BACTERIA #/AREA URNS AUTO: ABNORMAL /HPF
BASOPHILS # BLD AUTO: 0.02 K/UL
BASOPHILS NFR BLD: 0.1 %
BILIRUB SERPL-MCNC: 1 MG/DL
BILIRUB UR QL STRIP: NEGATIVE
BUN SERPL-MCNC: 14 MG/DL
CALCIUM SERPL-MCNC: 8 MG/DL
CHLORIDE SERPL-SCNC: 99 MMOL/L
CLARITY UR REFRACT.AUTO: ABNORMAL
CO2 SERPL-SCNC: 24 MMOL/L
COLOR UR AUTO: ABNORMAL
CREAT SERPL-MCNC: 0.9 MG/DL
DIFFERENTIAL METHOD: ABNORMAL
EOSINOPHIL # BLD AUTO: 0 K/UL
EOSINOPHIL NFR BLD: 0 %
ERYTHROCYTE [DISTWIDTH] IN BLOOD BY AUTOMATED COUNT: 16 %
EST. GFR  (AFRICAN AMERICAN): >60 ML/MIN/1.73 M^2
EST. GFR  (NON AFRICAN AMERICAN): >60 ML/MIN/1.73 M^2
GLUCOSE SERPL-MCNC: 144 MG/DL
GLUCOSE UR QL STRIP: NEGATIVE
HCT VFR BLD AUTO: 43.8 %
HGB BLD-MCNC: 15 G/DL
HGB UR QL STRIP: NEGATIVE
HYALINE CASTS UR QL AUTO: 0 /LPF
INR PPP: 1.4
KETONES UR QL STRIP: NEGATIVE
LACTATE SERPL-SCNC: 2 MMOL/L
LACTATE SERPL-SCNC: 3.3 MMOL/L
LEUKOCYTE ESTERASE UR QL STRIP: NEGATIVE
LIPASE SERPL-CCNC: 13 U/L
LYMPHOCYTES # BLD AUTO: 1.3 K/UL
LYMPHOCYTES NFR BLD: 7.4 %
MAGNESIUM SERPL-MCNC: 1.4 MG/DL
MCH RBC QN AUTO: 29 PG
MCHC RBC AUTO-ENTMCNC: 34.2 %
MCV RBC AUTO: 85 FL
MICROSCOPIC COMMENT: ABNORMAL
MONOCYTES # BLD AUTO: 0.9 K/UL
MONOCYTES NFR BLD: 5.3 %
NEUTROPHILS # BLD AUTO: 15.2 K/UL
NEUTROPHILS NFR BLD: 86.7 %
NITRITE UR QL STRIP: NEGATIVE
PH UR STRIP: 5 [PH] (ref 5–8)
PHOSPHATE SERPL-MCNC: 2.9 MG/DL
PLATELET # BLD AUTO: 190 K/UL
PMV BLD AUTO: 10.9 FL
POTASSIUM SERPL-SCNC: 3.5 MMOL/L
PROT SERPL-MCNC: 6.5 G/DL
PROT UR QL STRIP: ABNORMAL
PROTHROMBIN TIME: 14.5 SEC
RBC # BLD AUTO: 5.17 M/UL
RBC #/AREA URNS AUTO: 2 /HPF (ref 0–4)
SODIUM SERPL-SCNC: 133 MMOL/L
SP GR UR STRIP: 1.02 (ref 1–1.03)
SQUAMOUS #/AREA URNS AUTO: 4 /HPF
URN SPEC COLLECT METH UR: ABNORMAL
UROBILINOGEN UR STRIP-ACNC: 2 EU/DL
WBC # BLD AUTO: 17.49 K/UL
WBC #/AREA URNS AUTO: 14 /HPF (ref 0–5)

## 2017-05-05 PROCEDURE — 80053 COMPREHEN METABOLIC PANEL: CPT

## 2017-05-05 PROCEDURE — 82010 KETONE BODYS QUAN: CPT

## 2017-05-05 PROCEDURE — 84100 ASSAY OF PHOSPHORUS: CPT

## 2017-05-05 PROCEDURE — 85610 PROTHROMBIN TIME: CPT

## 2017-05-05 PROCEDURE — 85025 COMPLETE CBC W/AUTO DIFF WBC: CPT

## 2017-05-05 PROCEDURE — 83690 ASSAY OF LIPASE: CPT

## 2017-05-05 PROCEDURE — 83605 ASSAY OF LACTIC ACID: CPT | Mod: 91

## 2017-05-05 PROCEDURE — 83735 ASSAY OF MAGNESIUM: CPT

## 2017-05-05 PROCEDURE — 96365 THER/PROPH/DIAG IV INF INIT: CPT

## 2017-05-05 PROCEDURE — 83605 ASSAY OF LACTIC ACID: CPT

## 2017-05-05 PROCEDURE — 93005 ELECTROCARDIOGRAM TRACING: CPT

## 2017-05-05 PROCEDURE — 96361 HYDRATE IV INFUSION ADD-ON: CPT

## 2017-05-05 PROCEDURE — 99284 EMERGENCY DEPT VISIT MOD MDM: CPT | Mod: 25

## 2017-05-05 PROCEDURE — 85730 THROMBOPLASTIN TIME PARTIAL: CPT

## 2017-05-05 PROCEDURE — 81001 URINALYSIS AUTO W/SCOPE: CPT

## 2017-05-05 PROCEDURE — 87040 BLOOD CULTURE FOR BACTERIA: CPT | Mod: 59

## 2017-05-05 PROCEDURE — 93010 ELECTROCARDIOGRAM REPORT: CPT | Mod: ,,, | Performed by: INTERNAL MEDICINE

## 2017-05-05 PROCEDURE — 25000003 PHARM REV CODE 250: Performed by: EMERGENCY MEDICINE

## 2017-05-05 PROCEDURE — 99285 EMERGENCY DEPT VISIT HI MDM: CPT | Mod: ,,, | Performed by: EMERGENCY MEDICINE

## 2017-05-05 PROCEDURE — 25000003 PHARM REV CODE 250: Performed by: INTERNAL MEDICINE

## 2017-05-05 RX ORDER — CLINDAMYCIN PHOSPHATE 600 MG/50ML
600 INJECTION, SOLUTION INTRAVENOUS ONCE
Status: DISCONTINUED | OUTPATIENT
Start: 2017-05-05 | End: 2017-05-05

## 2017-05-05 RX ORDER — CLINDAMYCIN PHOSPHATE 600 MG/50ML
600 INJECTION, SOLUTION INTRAVENOUS
Status: COMPLETED | OUTPATIENT
Start: 2017-05-05 | End: 2017-05-05

## 2017-05-05 RX ORDER — CEFEPIME HYDROCHLORIDE 2 G/50ML
2 INJECTION, SOLUTION INTRAVENOUS
Status: DISCONTINUED | OUTPATIENT
Start: 2017-05-05 | End: 2017-05-05

## 2017-05-05 RX ADMIN — CLINDAMYCIN IN 5 PERCENT DEXTROSE 600 MG: 12 INJECTION, SOLUTION INTRAVENOUS at 03:05

## 2017-05-05 RX ADMIN — SODIUM CHLORIDE, SODIUM LACTATE, POTASSIUM CHLORIDE, AND CALCIUM CHLORIDE 1000 ML: .6; .31; .03; .02 INJECTION, SOLUTION INTRAVENOUS at 12:05

## 2017-05-05 NOTE — TELEPHONE ENCOUNTER
Patient called to report cellulitis has spread beyond border drawn yesterday and she is feeling worse today in spite of antibiotics. Reviewed labs, spoke with Dr Tejeda and patient advise to go to ER now for evaluation and treatment of cellulitis. Patient agreeable and will go now to [facility] ER now.

## 2017-05-05 NOTE — ED NOTES
Reports cellulitis to her lower abdomen.  States she had rocephin shot yesterday and the redness has spread when she woke up this morning.  Had fever up to 99.3.

## 2017-05-05 NOTE — TELEPHONE ENCOUNTER
----- Message from Yamila Tejeda MD sent at 5/4/2017  4:51 PM CDT -----  Neurology referral entered, external, in out basket

## 2017-05-05 NOTE — DISCHARGE INSTRUCTIONS
You should start to see improvement in your symptoms within 48 hours of treatment.  If they fail to respond after 48 hours of oral antibiotics, please return to ED.    Discharge Instructions for Cellulitis  You have been diagnosed with cellulitis. This is an infection in the deepest layer of the skin. In some cases, the infection also affects the muscle. Cellulitis is caused by bacteria. The bacteria can enter the body through broken skin. This can happen with a cut, scratch, animal bite, or an insect bite that has been scratched. You may have been treated in the hospital with antibiotics and fluids. You will likely be given a prescription for antibiotics to take at home. This sheet will help you take care of yourself at home.  Home care  When you are home:  · Take the prescribed antibiotic medicine you are given as directed until it is gone. Take it even if you feel better. It treats the infection and stops it from returning. Not taking all the medicine can make future infections hard to treat.  · Keep the infected area clean.  · When possible, raise the infected area above the level of your heart. This helps keep swelling down.  · Talk with your healthcare provider if you are in pain. Ask what kind of over-the-counter medicine you can take for pain.  · Apply clean bandages as advised.  · Take your temperature once a day for a week.  · Wash your hands often to prevent spreading the infection.  In the future, wash your hands before and after you touch cuts, scratches, or bandages. This will help prevent infection.   When to call your healthcare provider  Call your healthcare provider immediately if you have any of the following:  · Difficulty or pain when moving the joints above or below the infected area  · Discharge or pus draining from the area  · Fever of 100.4°F (38°C) or higher, or as directed by your healthcare provider  · Pain that gets worse in or around the infected   · Redness that gets worse in or  around the infected area, particularly if the area of redness expands to a wider area  · Shaking chills  · Swelling of the infected area  · Vomiting   Date Last Reviewed: 8/1/2016  © 6114-1356 [employer]. [address] [place] [address], [place] [address]. All rights reserved. This information is not intended as a substitute for professional medical care. Always follow your healthcare professional's instructions.

## 2017-05-05 NOTE — ED PROVIDER NOTES
Encounter Date: 2017    SCRIBE #1 NOTE: I, Jf Power, am scribing for, and in the presence of,  Dr. Baxter. I have scribed the following portions of the note - the Resident attestation and the EKG reading.       History     Chief Complaint   Patient presents with    Cellulitis     Review of patient's allergies indicates:  No Known Allergies  HPI Comments: Ms. Moreno is a 40 yo super morbidly obese (BMI 71.5) WF p/w report of failed opt treatment for cellulitis.  Three days ago () she began having malaise, chills, diarrhea (6 watery, nonbloody BM qd) with an associated erythematous, tender pannus rash.  Her symptoms progressed over the next 48 yhours and she went to see IM opt (yesterday,  @ 16:45) where she was given a one time IM shot of rocephin and started on po clinda 300 TID (patient reports she was supposed to start this today but was unable to get Rx yet).  Her rash was outlined and she was instructed to present to the ED if it extended beyond the outline.  After her clinic visit she began having a sore throat with an associated globus sensation (denies difficulty w/ po intake).  This morning, clinic called her re: her leukocytosis and asked her to present to the ED for further evaluation.  Additionally, this morning reports tinnitis w/o any associated vertigo, HA, & improvement reported with going supine.  Of note, she had a  admission for sepsis  to cellulitis that makes both the mother and patient concerned her current presentation may turn out like her previous experience.    Past Medical History:   Diagnosis Date    BMI 70 and over, adult     Depression     Diabetes mellitus     DM (diabetes mellitus) type II controlled with renal manifestation     HTN (hypertension)     Hyperlipidemia     Lumbar disc disease     Morbid obesity     Recurrent nephrolithiasis     Rosacea     Sleep apnea      Past Surgical History:   Procedure Laterality Date     SECTION      x2     "DILATION AND CURETTAGE OF UTERUS  2010    AUB "negative path" per patient    ENDOMETRIAL ABLATION  2010     Family History   Problem Relation Age of Onset    Hypertension Mother     Diabetes Father     Cancer Father      cns lymphoma    Heart disease Paternal Grandfather     Colon cancer Neg Hx     Ovarian cancer Neg Hx     Breast cancer Neg Hx      Social History   Substance Use Topics    Smoking status: Never Smoker    Smokeless tobacco: Never Used    Alcohol use No     Review of Systems   Constitutional: Positive for chills and fatigue. Negative for fever.   HENT: Positive for sore throat and tinnitus. Negative for ear pain, hearing loss and mouth sores.    Eyes: Negative for photophobia, pain and visual disturbance.   Respiratory: Negative for apnea, cough, chest tightness and shortness of breath.    Cardiovascular: Negative for chest pain and palpitations.   Gastrointestinal: Negative for abdominal distention, abdominal pain, anal bleeding, blood in stool, constipation, diarrhea, nausea, rectal pain and vomiting.   Endocrine: Negative for cold intolerance, heat intolerance, polydipsia and polyuria.   Genitourinary: Negative for dysuria and hematuria.   Musculoskeletal: Negative for arthralgias and myalgias.   Skin: Positive for rash. Negative for wound.   Neurological: Negative for dizziness, syncope, speech difficulty, light-headedness, numbness and headaches.       Physical Exam   Initial Vitals   BP Pulse Resp Temp SpO2   05/05/17 0954 05/05/17 0954 05/05/17 0954 05/05/17 0954 05/05/17 0954   122/65 108 16 98 °F (36.7 °C) 98 %     Physical Exam    Constitutional:   Super morbidly obese WF sitting in chair in NAD   HENT:   Head: Normocephalic and atraumatic.   Eyes: EOM are normal. Pupils are equal, round, and reactive to light.   Neck: Normal range of motion. Neck supple.   Limited by body habitus   Cardiovascular: Normal rate, regular rhythm and intact distal pulses. Exam reveals no gallop and no " friction rub.    No murmur heard.  Pulmonary/Chest: Breath sounds normal. No respiratory distress. She has no wheezes. She has no rhonchi. She has no rales.   Limited by body habitus   Abdominal: Soft. Bowel sounds are normal. She exhibits no distension. There is tenderness (Pannus TTP) in the suprapubic area.   Musculoskeletal: She exhibits no edema or tenderness.   Neurological: She is alert and oriented to person, place, and time. No cranial nerve deficit.   Skin: Skin is warm and dry. Rash noted. There is erythema (Exfoliating).   Please see included images below.                 ED Course   Procedures  Labs Reviewed   CBC W/ AUTO DIFFERENTIAL - Abnormal; Notable for the following:        Result Value    WBC 17.49 (*)     RDW 16.0 (*)     Gran # 15.2 (*)     Gran% 86.7 (*)     Lymph% 7.4 (*)     All other components within normal limits   LACTIC ACID, PLASMA - Abnormal; Notable for the following:     Lactate (Lactic Acid) 3.3 (*)     All other components within normal limits   URINALYSIS - Abnormal; Notable for the following:     Appearance, UA Cloudy (*)     Protein, UA 1+ (*)     All other components within normal limits   PROTIME-INR - Abnormal; Notable for the following:     Prothrombin Time 14.5 (*)     INR 1.4 (*)     All other components within normal limits   COMPREHENSIVE METABOLIC PANEL - Abnormal; Notable for the following:     Sodium 133 (*)     Glucose 144 (*)     Calcium 8.0 (*)     Albumin 2.8 (*)     All other components within normal limits   MAGNESIUM - Abnormal; Notable for the following:     Magnesium 1.4 (*)     All other components within normal limits   URINALYSIS MICROSCOPIC - Abnormal; Notable for the following:     WBC, UA 14 (*)     Bacteria, UA Moderate (*)     All other components within normal limits   CULTURE, BLOOD    Narrative:     Aerobic and anaerobic   CULTURE, BLOOD    Narrative:     Aerobic and anaerobic   BETA - HYDROXYBUTYRATE, SERUM   APTT   LACTIC ACID, PLASMA    PHOSPHORUS   LIPASE     EKG Readings: (Independently Interpreted)   Normal sinus at 96. No sign of acute ischemia          Medical Decision Making:   History:   Old Medical Records: I decided to obtain old medical records.  Old Records Summarized: records from clinic visits.       <> Summary of Records: As above  Initial Assessment:   40 yo DM obese WF w/ cellulitis presenting w/ report of failed opt therapy. Pt had labs yesterday with WBC 21, and given one time dose Ceftriaxone IM, but has not started Clindamycin yet, so not true failed outpatient treatment. Minimal extension of erythema passed outline marked by PCP yesterday, with no fever currently.  Differential Diagnosis:   DDx: Sepsis 2/2 to cellulitis (most likely 2/2 to strep given no fluctuant pocket noted on PE) vs uncomplicated cellulitis vs fingal infection  Independently Interpreted Test(s):   I have ordered and independently interpreted EKG Reading(s) - see prior notes  Clinical Tests:   Lab Tests: Ordered and Reviewed  Medical Tests: Ordered and Reviewed       APC / Resident Notes:   While normally this physician would feel comfortable discharging her to home so she may give the clinda a full 48 h before saying it has failed, her WBC of 22.6, tachycardia (which is more likely related to her overall state of health, but still positive), and report of consitutional Sx is concerning.  However, in light of her not starting there clinda yet and her improving WBC (17.4 from 22.6 s/p 1 x IM rocephin), she shows evidence that her infection is responding appropriately.  Her lactic acid was 3.3, however this came down to 2.0 after only 1 L LR, suggesting she was hypovolemic given recent diarrhea.  Given her appropriate response to opt treatment and resolved lactic acidosis, patient is safe for discharge to resume opt cellulities therapy.  Will give a one time 600 mg IV dose of clindamycin and have her begin her po therapy tomorrow.       Scribe Attestation:    Scribe #1: I performed the above scribed service and the documentation accurately describes the services I performed. I attest to the accuracy of the note.    Attending Attestation:   Physician Attestation Statement for Resident:  As the supervising MD   Physician Attestation Statement: I have personally seen and examined this patient.   I agree with the above history. -:   As the supervising MD I agree with the above PE.    As the supervising MD I agree with the above treatment, course, plan, and disposition.   -:     Pt with morbid obesity and DM presents with diffuse lower abdominal cellulitis. Started on Clinda yesterday at clinic  after IM Rocephin but pt has not taken any doses yet. White count 17 but improved from 21 yesterday. Workup otherwise with lactate 3.3 but after IVF now normal, suggesting dehydration as component more than sepsis. Pt afebrile and otherwise normal vitals in ER. She has not failed outpatient treatment yet and no systemic toxicity to warrant admission for IV Abx. Will give IV Clinda now and discharge to continue taking it with wound check within 2 days, strict return precautions    I have reviewed and agree with the residents interpretation of the following: lab data and EKG.          Physician Attestation for Scribe:  Physician Attestation Statement for Scribe #1: I, Dr. Baxter, reviewed documentation, as scribed by Jf Power in my presence, and it is both accurate and complete.                 ED Course     Clinical Impression:   The encounter diagnosis was Cellulitis of the pannus.    Disposition:   Disposition: Discharged  Condition: Fair       Rambo Chen MD  05/06/17 2705

## 2017-05-05 NOTE — PROVIDER PROGRESS NOTES - EMERGENCY DEPT.
Encounter Date: 5/5/2017    ED Physician Progress Notes       SCRIBE NOTE: I, Dejah Atkinson, am scribing for, and in the presence of,  Dr. Velasquez.  Physician Statement: I, Dr. Velasquez, personally performed the services described in this documentation as scribed by Dejah Atkinson in my presence, and it is both accurate and complete.     Physician Note:   Time seen by intake provider: 10:08 AM    41 year old female with a BMI greater than 70, seen yesterday for developing redness and swelling to pannus. She was given a shot of Rocephin yesterday, however symptoms continue to worsen today. Exam reveals significant redness and edematous, apparently with redness and swelling under the pannus. She is in no acute distress. There is an ink line drawn around the erythema, and it extends past it despite antibiotics. Pt denies fever and chills. She reports a history of this 5 years ago and was admitted at [facility].     I will begin workup with labs, blood culture, lactic acid, beta-hydroxybutyrate, and will send back to the ED.    I initially evaluated this patient and ordered workup while in intake.  The patient will receive a full evaluation in an ED pod when space is available.  All results from tests ordered in intake will not be followed by the intake team, including myself. All results will be followed by the ED Pod team.

## 2017-05-05 NOTE — ED AVS SNAPSHOT
[facility]  [address]  [facility] [address]               Halley Moreno   2017  9:57 AM   ED    Description:  Female : 1975   Department:  [facility]           Your Care was Coordinated By:     Provider Role From To    Rambo Chen MD Attending Provider 17 1027 --    Ge Rodriguez MD Resident 17 1014 --      Reason for Visit     Cellulitis           Diagnoses this Visit        Comments    Cellulitis of abdominal wall    -  Primary       ED Disposition     ED Disposition Condition Comment    Discharge             To Do List           Follow-up Information     Follow up with [facility].    Specialty:  Emergency Medicine    Why:  If symptoms worsen or fail to get better within 48 to 72 hours    Contact information:    [address]  [facility] [id number] [phone]      [facility]     [facility] - 24 Assistance  Unless otherwise directed by your provider, please contact [facility], our nurse care line that is available for  assistance.     Registered nurses in the [facility] provide: appointment scheduling, clinical advisement, health education, and other advisory services.  Call: [phone] (toll free)               Medications           Message regarding Medications     Verify the changes and/or additions to your medication regime listed below are the same as discussed with your clinician today.  If any of these changes or additions are incorrect, please notify your healthcare provider.        These medications were administered today        Dose Freq    lactated ringers bolus 1,000 mL 1,000 mL ED 1 Time    Sig: Inject 1,000 mLs into the vein ED 1 Time.    Class: Normal    Route: Intravenous    clindamycin 600 MG/50 ML D5W 600 mg/50 mL IVPB 600 mg 600 mg ED 1 Time    Sig: Inject 50 mLs (600 mg total) into the vein ED  1 Time.    Class: Normal    Route: Intravenous    clindamycin 600 MG/50 ML D5W 600 mg/50 mL IVPB 600 mg 600 mg Once    Sig: Inject 50 mLs (600 mg total) into the vein once.    Class: Normal    Route: Intravenous      STOP taking these medications     medroxyPROGESTERone (PROVERA) 10 MG tablet Take 1 tablet (10 mg total) by mouth once daily.    diclofenac (VOLTAREN) 50 MG EC tablet Take 1 tablet (50 mg total) by mouth 2 (two) times daily as needed.           Verify that the below list of medications is an accurate representation of the medications you are currently taking.  If none reported, the list may be blank. If incorrect, please contact your healthcare provider. Carry this list with you in case of emergency.           Current Medications     furosemide (LASIX) 20 MG tablet Take 2 tablets (40 mg total) by mouth once daily.    glipiZIDE (GLUCOTROL) 5 MG TR24 Take 1 tablet (5 mg total) by mouth daily with breakfast.    hydrocodone-acetaminophen 5-325mg (NORCO) 5-325 mg per tablet Take 1 tablet by mouth every 6 (six) hours as needed for Pain.    ketoconazole (NIZORAL) 2 % cream Apply topically once daily. To feet    lisinopril (PRINIVIL,ZESTRIL) 40 MG tablet Take 1 tablet (40 mg total) by mouth once daily.    phentermine (ADIPEX-P) 37.5 mg tablet Take 1 tablet (37.5 mg total) by mouth before breakfast.    azelastine (ASTELIN) 137 mcg (0.1 %) nasal spray 1 spray (137 mcg total) by Nasal route 2 (two) times daily.    blood sugar diagnostic Strp 1 strip by Misc.(Non-Drug; Combo Route) route 2 (two) times daily.    clindamycin (CLEOCIN) 300 MG capsule Take 1 capsule (300 mg total) by mouth every 6 (six) hours.    clindamycin 600 MG/50 ML D5W 600 mg/50 mL IVPB 600 mg Inject 50 mLs (600 mg total) into the vein ED 1 Time.    clindamycin 600 MG/50 ML D5W 600 mg/50 mL IVPB 600 mg Inject 50 mLs (600 mg total) into the vein once.    lancets Misc 1 lancet by Misc.(Non-Drug; Combo Route) route 2 (two) times daily.     "metronidazole (METROGEL) 0.75 % gel Apply topically 2 (two) times daily.    nystatin (MYCOSTATIN) powder Apply topically 2 (two) times daily.    triamcinolone acetonide 0.1% (KENALOG) 0.1 % Lotn Apply topically 2 (two) times daily.           Clinical Reference Information           Your Vitals Were     BP Pulse Temp Resp Height Weight    107/55 90 98 °F (36.7 °C) (Oral) 16 5' 3" (1.6 m) 177.4 kg (391 lb)    SpO2 BMI             96% 69.26 kg/m2         Allergies as of 5/5/2017     No Known Allergies      Immunizations Administered on Date of Encounter - 5/5/2017     None      ED Micro, Lab, POCT     Start Ordered       Status Ordering Provider    05/06/17 0400 05/05/17 1017    Early Morning Draw,   Status:  Canceled      Canceled     05/05/17 1400 05/05/17 1145  Lactic acid, plasma  Once      Final result     05/05/17 1153 05/05/17 1152  Comprehensive metabolic panel  STAT      Final result     05/05/17 1153 05/05/17 1152  Magnesium  STAT      Final result     05/05/17 1153 05/05/17 1152  Phosphorus  STAT      Final result     05/05/17 1153 05/05/17 1152  Lipase  STAT      Final result     05/05/17 1143 05/05/17 1142  APTT  STAT      Final result     05/05/17 1143 05/05/17 1142  Protime-INR  STAT      Final result     05/05/17 1047 05/05/17 1048    STAT,   Status:  Canceled      Canceled     05/05/17 1047 05/05/17 1048    STAT,   Status:  Canceled      Canceled     05/05/17 1047 05/05/17 1048    Once,   Status:  Canceled      Canceled     05/05/17 1015 05/05/17 1015  Beta - Hydroxybutyrate, Serum  Once      Final result     05/05/17 1015 05/05/17 1015  Urinalysis Microscopic  Once      Final result     05/05/17 1011 05/05/17 1015    Once,   Status:  Canceled      Canceled     05/05/17 1011 05/05/17 1015  Blood culture x two cultures. Draw prior to antibiotics.  Every 15 min     Comments:  Aerobic and anaerobic   Start Status   05/05/17 1011 In process   05/05/17 1026 In process       Acknowledged     05/05/17 1011 " 05/05/17 1015  CBC auto differential  STAT      Final result     05/05/17 1011 05/05/17 1015    STAT,   Status:  Canceled      Canceled     05/05/17 1011 05/05/17 1015  Lactic acid, plasma #1  STAT      Final result     05/05/17 1011 05/05/17 1015    Once,   Status:  Canceled      Canceled     05/05/17 1011 05/05/17 1015    STAT,   Status:  Canceled      Canceled     05/05/17 1011 05/05/17 1015    STAT,   Status:  Canceled      Canceled     05/05/17 1011 05/05/17 1015    STAT,   Status:  Canceled      Canceled     05/05/17 1011 05/05/17 1015  Urinalysis  STAT      Final result       ED Imaging Orders     Start Ordered       Status Ordering Provider    05/05/17 1016 05/05/17 1017    1 time imaging,   Status:  Canceled      Canceled         Discharge Instructions         You should start to see improvement in your symptoms within 48 hours of treatment.  If they fail to respond after 48 hours of oral antibiotics, please return to ED.    Discharge Instructions for Cellulitis  You have been diagnosed with cellulitis. This is an infection in the deepest layer of the skin. In some cases, the infection also affects the muscle. Cellulitis is caused by bacteria. The bacteria can enter the body through broken skin. This can happen with a cut, scratch, animal bite, or an insect bite that has been scratched. You may have been treated in the hospital with antibiotics and fluids. You will likely be given a prescription for antibiotics to take at home. This sheet will help you take care of yourself at home.  Home care  When you are home:  · Take the prescribed antibiotic medicine you are given as directed until it is gone. Take it even if you feel better. It treats the infection and stops it from returning. Not taking all the medicine can make future infections hard to treat.  · Keep the infected area clean.  · When possible, raise the infected area above the level of your heart. This helps keep swelling down.  · Talk with your  healthcare provider if you are in pain. Ask what kind of over-the-counter medicine you can take for pain.  · Apply clean bandages as advised.  · Take your temperature once a day for a week.  · Wash your hands often to prevent spreading the infection.  In the future, wash your hands before and after you touch cuts, scratches, or bandages. This will help prevent infection.   When to call your healthcare provider  Call your healthcare provider immediately if you have any of the following:  · Difficulty or pain when moving the joints above or below the infected area  · Discharge or pus draining from the area  · Fever of 100.4°F (38°C) or higher, or as directed by your healthcare provider  · Pain that gets worse in or around the infected   · Redness that gets worse in or around the infected area, particularly if the area of redness expands to a wider area  · Shaking chills  · Swelling of the infected area  · Vomiting   Date Last Reviewed: 8/1/2016  © 6286-7680 [employer]. [address] [place] [address]. All rights reserved. This information is not intended as a substitute for professional medical care. Always follow your healthcare professional's instructions.          Your Scheduled Appointments     Jun 20, 2017  2:00 PM CDT   [facility] Non Ob with [facility] ([facility] )    [address] [facility] [address]   [phone]              [facility] Sign-Up     Activating your [facility] account is as easy as 1-2-3!     1) Visit [email or web address], select Sign Up Now, enter this activation code and your date of birth, then select Next.  [email or web address]  Expires: 6/18/2017  3:22 PM      2) Create a username and password to use when you visit [facility] in the future and select a security question in case you lose your password and select Next.    3) Enter your e-mail address and click Sign Up!    Additional Information  If you have questions, please  e-mail [email or web address] or call [phone] to talk to our [facility] staff. Remember, [facility] is NOT to be used for urgent needs. For medical emergencies, dial 911.          [facility] complies with applicable Federal civil rights laws and does not discriminate on the basis of race, color, national origin, age, disability, or sex.        Language Assistance Services     ATTENTION: Language assistance services are available, free of charge. Please call [phone].      ATENCIÓN: Si habla español, tiene a williamson disposición servicios gratuitos de asistencia lingüística. Llame al [phone].     CHÚ Ý: N?u b?n nói Ti?ng Vi?t, có các d?ch v? h? tr? ngôn ng? mi?n phí dành cho b?n. G?i s? [phone].

## 2017-05-10 LAB
BACTERIA BLD CULT: NORMAL
BACTERIA BLD CULT: NORMAL

## 2017-05-11 ENCOUNTER — OFFICE VISIT (OUTPATIENT)
Dept: INTERNAL MEDICINE | Facility: CLINIC | Age: 42
End: 2017-05-11
Payer: MEDICAID

## 2017-05-11 VITALS
DIASTOLIC BLOOD PRESSURE: 78 MMHG | HEART RATE: 81 BPM | BODY MASS INDEX: 51.91 KG/M2 | SYSTOLIC BLOOD PRESSURE: 114 MMHG | TEMPERATURE: 98 F | HEIGHT: 63 IN | RESPIRATION RATE: 18 BRPM | WEIGHT: 293 LBS

## 2017-05-11 DIAGNOSIS — L03.311 ABDOMINAL WALL CELLULITIS: Primary | ICD-10-CM

## 2017-05-11 DIAGNOSIS — E11.21 CONTROLLED TYPE 2 DIABETES MELLITUS WITH DIABETIC NEPHROPATHY, WITHOUT LONG-TERM CURRENT USE OF INSULIN: Chronic | ICD-10-CM

## 2017-05-11 PROCEDURE — 99213 OFFICE O/P EST LOW 20 MIN: CPT | Mod: PBBFAC,PO | Performed by: INTERNAL MEDICINE

## 2017-05-11 PROCEDURE — 99999 PR PBB SHADOW E&M-EST. PATIENT-LVL III: CPT | Mod: PBBFAC,,, | Performed by: INTERNAL MEDICINE

## 2017-05-11 PROCEDURE — 99213 OFFICE O/P EST LOW 20 MIN: CPT | Mod: S$PBB,,, | Performed by: INTERNAL MEDICINE

## 2017-05-11 RX ORDER — CLINDAMYCIN HYDROCHLORIDE 300 MG/1
300 CAPSULE ORAL EVERY 8 HOURS
Qty: 30 CAPSULE | Refills: 0
Start: 2017-05-11 | End: 2017-05-21

## 2017-05-11 NOTE — PROGRESS NOTES
Subjective:       Patient ID: Halley Moreno is a 41 y.o. female who presents for Follow-up    HPI     40yo F with HTN, DM2 and recent lower abdominal wall cellulitis. She initially presented to clinic on 5/4 and was given IV rocephin and oral clindamycin. She had a leukocytosis to 22 and worsening cellulitis the day after starting antibiotics and was evaluated in ER on 5/5. Leukocytosis had improved and she was given a dose of IV clindamycin before going home. Since then, the redness has improved and malaise has resolved. Denies fever, chills or sweats. BG has been controlled and was 110 this morning.       Review of Systems   Constitutional: Negative for chills, diaphoresis, fatigue and fever.   HENT: Negative for congestion, rhinorrhea and sinus pressure.    Eyes: Negative for visual disturbance.   Respiratory: Negative for cough, chest tightness and shortness of breath.    Cardiovascular: Positive for leg swelling. Negative for chest pain.   Gastrointestinal: Negative for abdominal pain, diarrhea, nausea and vomiting.   Musculoskeletal: Negative for arthralgias and myalgias.   Skin: Negative for rash.   Neurological: Negative for dizziness, weakness, light-headedness and headaches.   Hematological: Negative for adenopathy.       Objective:      Physical Exam   Constitutional: She is oriented to person, place, and time. Vital signs are normal. She appears well-developed and well-nourished. No distress.   HENT:   Head: Normocephalic and atraumatic.   Right Ear: Hearing and external ear normal.   Left Ear: Hearing and external ear normal.   Nose: Nose normal.   Mouth/Throat: Uvula is midline and oropharynx is clear and moist.   Eyes: EOM and lids are normal.   Neck: Normal range of motion. Neck supple.   Cardiovascular: Normal rate, regular rhythm, normal heart sounds and intact distal pulses.    No murmur heard.  Pulmonary/Chest: Effort normal and breath sounds normal. She has no wheezes.   Abdominal: Soft.  Bowel sounds are normal. She exhibits no distension. There is no tenderness.   Musculoskeletal: She exhibits edema (1+ BLE).   Neurological: She is alert and oriented to person, place, and time. Coordination and gait normal.   Skin: Skin is warm, dry and intact. No rash noted. She is not diaphoretic. There is erythema (large area of erythema lower abdomen and pannus, less erythema, improving induration, + overlying scaly skin, no open lesions or drainage).   Psychiatric: She has a normal mood and affect.   Vitals reviewed.      Assessment:       1. Abdominal wall cellulitis    2. Controlled type 2 diabetes mellitus with diabetic nephropathy, without long-term current use of insulin        Plan:       -- continue clindamycin 300mg three times daily for total of 10 days, improving  -- call if symptoms worsen after stopping antibiotic    Yamila Tejeda MD

## 2017-05-11 NOTE — MR AVS SNAPSHOT
[facility]   [facility]  [place] [address]  Phone: [phone]  Fax: [phone]                  Halley Moreno   2017 7:40 AM   Office Visit    Description:  Female : 1975   Provider:  Yamila Tejeda MD   Department:  [facility]           Reason for Visit     Follow-up           Diagnoses this Visit        Comments    Abdominal wall cellulitis    -  Primary            To Do List           Future Appointments        Provider Department Dept Phone    2017 2:00 PM [facility] [phone]      Goals (5 Years of Data)     None       These Medications        Disp Refills Start End    clindamycin (CLEOCIN) 300 MG capsule 30 capsule 0 2017    Take 1 capsule (300 mg total) by mouth every 8 (eight) hours. - Oral    Pharmacy: [id number] - [place], [place] - [address] [facility] [facility] Ph #: [phone]         [facility]     [facility] -  Assistance  Unless otherwise directed by your provider, please contact [facility], our nurse care line that is available for  assistance.     Registered nurses in the [facility] provide: appointment scheduling, clinical advisement, health education, and other advisory services.  Call: [phone] (toll free)               Medications           Message regarding Medications     Verify the changes and/or additions to your medication regime listed below are the same as discussed with your clinician today.  If any of these changes or additions are incorrect, please notify your healthcare provider.        CHANGE how you are taking these medications     Start Taking Instead of    clindamycin (CLEOCIN) 300 MG capsule clindamycin (CLEOCIN) 300 MG capsule    Dosage:  Take 1 capsule (300 mg total) by mouth every 8 (eight) hours. Dosage:  Take 1 capsule (300 mg total) by mouth every 6 (six) hours.    Reason  "for Change:  Reorder            Verify that the below list of medications is an accurate representation of the medications you are currently taking.  If none reported, the list may be blank. If incorrect, please contact your healthcare provider. Carry this list with you in case of emergency.           Current Medications     azelastine (ASTELIN) 137 mcg (0.1 %) nasal spray 1 spray (137 mcg total) by Nasal route 2 (two) times daily.    blood sugar diagnostic Strp 1 strip by Misc.(Non-Drug; Combo Route) route 2 (two) times daily.    clindamycin (CLEOCIN) 300 MG capsule Take 1 capsule (300 mg total) by mouth every 8 (eight) hours.    furosemide (LASIX) 20 MG tablet Take 2 tablets (40 mg total) by mouth once daily.    glipiZIDE (GLUCOTROL) 5 MG TR24 Take 1 tablet (5 mg total) by mouth daily with breakfast.    hydrocodone-acetaminophen 5-325mg (NORCO) 5-325 mg per tablet Take 1 tablet by mouth every 6 (six) hours as needed for Pain.    ketoconazole (NIZORAL) 2 % cream Apply topically once daily. To feet    lancets Misc 1 lancet by Misc.(Non-Drug; Combo Route) route 2 (two) times daily.    lisinopril (PRINIVIL,ZESTRIL) 40 MG tablet Take 1 tablet (40 mg total) by mouth once daily.    metronidazole (METROGEL) 0.75 % gel Apply topically 2 (two) times daily.    nystatin (MYCOSTATIN) powder Apply topically 2 (two) times daily.    phentermine (ADIPEX-P) 37.5 mg tablet Take 1 tablet (37.5 mg total) by mouth before breakfast.    triamcinolone acetonide 0.1% (KENALOG) 0.1 % Lotn Apply topically 2 (two) times daily.           Clinical Reference Information           Your Vitals Were     BP Pulse Temp Resp Height Weight    114/78 (BP Location: Left arm, Patient Position: Sitting, BP Method: Automatic) 81 97.5 °F (36.4 °C) (Oral) 18 5' 3" (1.6 m) 177.3 kg (390 lb 14 oz)    BMI                69.24 kg/m2          Blood Pressure          Most Recent Value    BP  114/78      Allergies as of 5/11/2017     No Known Allergies    "   Immunizations Administered on Date of Encounter - 5/11/2017     None      [facility] Sign-Up     Activating your [facility] account is as easy as 1-2-3!     1) Visit [email or web address], select Sign Up Now, enter this activation code and your date of birth, then select Next.  [email or web address]  Expires: 6/18/2017  3:22 PM      2) Create a username and password to use when you visit [facility] in the future and select a security question in case you lose your password and select Next.    3) Enter your e-mail address and click Sign Up!    Additional Information  If you have questions, please e-mail [email or web address] or call [phone] to talk to our [facility] staff. Remember, [facility] is NOT to be used for urgent needs. For medical emergencies, dial 911.         Language Assistance Services     ATTENTION: Language assistance services are available, free of charge. Please call [phone].      ATENCIÓN: Si habla español, tiene a williamson disposición servicios gratuitos de asistencia lingüística. Llame al [phone].     YANIV Ý: N?u b?n nói Ti?ng Vi?t, có các d?ch v? h? tr? ngôn ng? mi?n phí dành cho b?n. G?i s? [phone].         [facility] complies with applicable Federal civil rights laws and does not discriminate on the basis of race, color, national origin, age, disability, or sex.

## 2017-05-23 RX ORDER — NYSTATIN 100000 [USP'U]/G
POWDER TOPICAL 2 TIMES DAILY
Qty: 30 G | Refills: 1 | Status: SHIPPED | OUTPATIENT
Start: 2017-05-23 | End: 2017-05-24 | Stop reason: SDUPTHER

## 2017-05-24 RX ORDER — NYSTATIN 100000 [USP'U]/G
POWDER TOPICAL 2 TIMES DAILY
Qty: 60 G | Refills: 2 | Status: SHIPPED | OUTPATIENT
Start: 2017-05-24 | End: 2018-01-17 | Stop reason: SDUPTHER

## 2017-06-30 ENCOUNTER — OFFICE VISIT (OUTPATIENT)
Dept: INTERNAL MEDICINE | Facility: CLINIC | Age: 42
End: 2017-06-30
Payer: MEDICAID

## 2017-06-30 VITALS
WEIGHT: 293 LBS | BODY MASS INDEX: 51.91 KG/M2 | OXYGEN SATURATION: 91 % | HEIGHT: 63 IN | TEMPERATURE: 98 F | DIASTOLIC BLOOD PRESSURE: 82 MMHG | HEART RATE: 56 BPM | SYSTOLIC BLOOD PRESSURE: 132 MMHG | RESPIRATION RATE: 18 BRPM

## 2017-06-30 DIAGNOSIS — E11.21 CONTROLLED TYPE 2 DIABETES MELLITUS WITH DIABETIC NEPHROPATHY, WITHOUT LONG-TERM CURRENT USE OF INSULIN: Chronic | ICD-10-CM

## 2017-06-30 DIAGNOSIS — M54.9 MUSCULOSKELETAL BACK PAIN: ICD-10-CM

## 2017-06-30 DIAGNOSIS — I10 ESSENTIAL HYPERTENSION: Chronic | ICD-10-CM

## 2017-06-30 DIAGNOSIS — M54.50 ACUTE MIDLINE LOW BACK PAIN WITHOUT SCIATICA: Primary | ICD-10-CM

## 2017-06-30 PROCEDURE — 99214 OFFICE O/P EST MOD 30 MIN: CPT | Mod: S$PBB,,, | Performed by: INTERNAL MEDICINE

## 2017-06-30 PROCEDURE — 99999 PR PBB SHADOW E&M-EST. PATIENT-LVL IV: CPT | Mod: PBBFAC,,, | Performed by: INTERNAL MEDICINE

## 2017-06-30 PROCEDURE — 3044F HG A1C LEVEL LT 7.0%: CPT | Mod: ,,, | Performed by: INTERNAL MEDICINE

## 2017-06-30 PROCEDURE — 99214 OFFICE O/P EST MOD 30 MIN: CPT | Mod: PBBFAC,PO | Performed by: INTERNAL MEDICINE

## 2017-06-30 RX ORDER — DICLOFENAC SODIUM 75 MG/1
75 TABLET, DELAYED RELEASE ORAL 2 TIMES DAILY PRN
Qty: 30 TABLET | Refills: 0 | Status: SHIPPED | OUTPATIENT
Start: 2017-06-30 | End: 2017-08-31 | Stop reason: SDUPTHER

## 2017-06-30 RX ORDER — METHOCARBAMOL 500 MG/1
500 TABLET, FILM COATED ORAL NIGHTLY PRN
Qty: 30 TABLET | Refills: 0 | Status: SHIPPED | OUTPATIENT
Start: 2017-06-30 | End: 2017-07-10

## 2017-06-30 RX ORDER — HYDROCODONE BITARTRATE AND ACETAMINOPHEN 5; 325 MG/1; MG/1
1 TABLET ORAL EVERY 6 HOURS PRN
Qty: 45 TABLET | Refills: 0 | Status: SHIPPED | OUTPATIENT
Start: 2017-06-30 | End: 2017-08-05 | Stop reason: ALTCHOICE

## 2017-06-30 NOTE — PROGRESS NOTES
Subjective:       Patient ID: Halley Moreno is a 42 y.o. female who presents for Spasms (pt states when she first wakes up in the morning she has terrible pain in her back; hurts worse when she stretches or reaches for something. States had same condition in 2010- was given muscle relaxers and pain killers; would like a refill on both meds to alleviate pain. )      Back Pain   This is a recurrent problem. The current episode started in the past 7 days (started 3 days ago). The problem occurs constantly. The problem is unchanged. The pain is present in the lumbar spine. The quality of the pain is described as aching. The pain does not radiate. The pain is at a severity of 8/10. The pain is severe. The symptoms are aggravated by bending and standing. Pertinent negatives include no abdominal pain, chest pain, fever, headaches, leg pain, numbness, paresthesias, tingling or weakness. She has tried heat for the symptoms.   Diabetes   She presents for her follow-up diabetic visit. She has type 2 diabetes mellitus. Her disease course has been stable. Pertinent negatives for hypoglycemia include no dizziness, headaches or sweats. Pertinent negatives for diabetes include no chest pain, no fatigue and no weakness. There are no hypoglycemic complications. Symptoms are stable. There are no diabetic complications. Risk factors for coronary artery disease include diabetes mellitus, dyslipidemia, obesity and hypertension. Current diabetic treatment includes oral agent (monotherapy). Her weight is increasing steadily. She is following a generally healthy diet. She rarely participates in exercise. An ACE inhibitor/angiotensin II receptor blocker is being taken.   Hypertension   This is a chronic problem. The current episode started more than 1 year ago. The problem is unchanged. The problem is controlled. Associated symptoms include peripheral edema. Pertinent negatives include no chest pain, headaches, palpitations, shortness  of breath or sweats. There are no associated agents to hypertension. Risk factors for coronary artery disease include obesity. Past treatments include ACE inhibitors and diuretics. Compliance problems include exercise.  There is no history of kidney disease or heart failure.     Walks 2x per week, goes to weight loss clinic for Adipex which she has taken for months with little weight loss. Has considered bariatric surgery but physician wanted her to lose significant amount of weight before considering the surgery and she became frustrated.       Review of Systems   Constitutional: Negative for chills, fatigue and fever.   HENT: Negative for congestion, rhinorrhea and sinus pressure.    Eyes: Negative for visual disturbance.   Respiratory: Negative for cough, chest tightness and shortness of breath.    Cardiovascular: Negative for chest pain, palpitations and leg swelling.   Gastrointestinal: Negative for abdominal pain, diarrhea, nausea and vomiting.   Musculoskeletal: Positive for back pain. Negative for arthralgias (slipped and fell 2 weeks ago, landed on knees, reports some knee soreness) and myalgias.   Skin: Negative for rash.   Neurological: Negative for dizziness, tingling, weakness, numbness, headaches and paresthesias.       Objective:      Physical Exam   Constitutional: She is oriented to person, place, and time. Vital signs are normal. She appears well-developed and well-nourished. No distress.   HENT:   Head: Normocephalic and atraumatic.   Right Ear: Hearing and external ear normal.   Left Ear: Hearing and external ear normal.   Nose: Nose normal.   Mouth/Throat: Uvula is midline.   Eyes: EOM and lids are normal.   Neck: Normal range of motion.   Cardiovascular: Normal rate, regular rhythm, normal heart sounds and intact distal pulses.    No murmur heard.  Pulmonary/Chest: Effort normal and breath sounds normal. She has no wheezes.   Abdominal: Soft. Bowel sounds are normal. There is no tenderness.    Musculoskeletal: Normal range of motion. She exhibits no edema.        Cervical back: She exhibits no bony tenderness and no pain.        Thoracic back: She exhibits no bony tenderness and no pain.        Lumbar back: She exhibits pain.   Neurological: She is alert and oriented to person, place, and time. Coordination and gait normal.   Skin: Skin is warm, dry and intact. She is not diaphoretic. There is erythema (bilateral lower extremities).   Psychiatric: She has a normal mood and affect.   Vitals reviewed.      Assessment:       1. Acute midline low back pain without sciatica    2. Musculoskeletal back pain    3. Essential hypertension    4. Controlled type 2 diabetes mellitus with diabetic nephropathy, without long-term current use of insulin        Plan:       -- Voltaren 75mg bid as needed for mild-moderate back pain  -- Norco every 8 hours as needed for more severe back pain  -- Robaxin 500mg qhs as needed for spasms, may use heat patches for discomfort  -- check bmp, HbA1c  -- continue lisinopril, furosemide  -- BP controlled today, compliant with glipizide    RTC in 3 months or sooner if needed    Yamila Tejeda MD

## 2017-07-11 ENCOUNTER — LAB VISIT (OUTPATIENT)
Dept: LAB | Facility: HOSPITAL | Age: 42
End: 2017-07-11
Attending: INTERNAL MEDICINE
Payer: MEDICAID

## 2017-07-11 DIAGNOSIS — E11.21 CONTROLLED TYPE 2 DIABETES MELLITUS WITH DIABETIC NEPHROPATHY, WITHOUT LONG-TERM CURRENT USE OF INSULIN: Chronic | ICD-10-CM

## 2017-07-11 LAB
ANION GAP SERPL CALC-SCNC: 10 MMOL/L
BUN SERPL-MCNC: 13 MG/DL
CALCIUM SERPL-MCNC: 9.3 MG/DL
CHLORIDE SERPL-SCNC: 101 MMOL/L
CO2 SERPL-SCNC: 29 MMOL/L
CREAT SERPL-MCNC: 0.8 MG/DL
EST. GFR  (AFRICAN AMERICAN): >60 ML/MIN/1.73 M^2
EST. GFR  (NON AFRICAN AMERICAN): >60 ML/MIN/1.73 M^2
ESTIMATED AVG GLUCOSE: 283 MG/DL
GLUCOSE SERPL-MCNC: 138 MG/DL
HBA1C MFR BLD HPLC: 11.5 %
POTASSIUM SERPL-SCNC: 4.5 MMOL/L
SODIUM SERPL-SCNC: 140 MMOL/L

## 2017-07-11 PROCEDURE — 36415 COLL VENOUS BLD VENIPUNCTURE: CPT | Mod: PO

## 2017-07-11 PROCEDURE — 83036 HEMOGLOBIN GLYCOSYLATED A1C: CPT

## 2017-07-11 PROCEDURE — 80048 BASIC METABOLIC PNL TOTAL CA: CPT

## 2017-08-02 DIAGNOSIS — E11.65 UNCONTROLLED TYPE 2 DIABETES MELLITUS WITH CHRONIC KIDNEY DISEASE, WITHOUT LONG-TERM CURRENT USE OF INSULIN, UNSPECIFIED CKD STAGE: Primary | ICD-10-CM

## 2017-08-02 DIAGNOSIS — E11.22 UNCONTROLLED TYPE 2 DIABETES MELLITUS WITH CHRONIC KIDNEY DISEASE, WITHOUT LONG-TERM CURRENT USE OF INSULIN, UNSPECIFIED CKD STAGE: Primary | ICD-10-CM

## 2017-08-05 ENCOUNTER — TELEPHONE (OUTPATIENT)
Dept: INTERNAL MEDICINE | Facility: CLINIC | Age: 42
End: 2017-08-05

## 2017-08-05 ENCOUNTER — CLINICAL SUPPORT (OUTPATIENT)
Dept: INTERNAL MEDICINE | Facility: CLINIC | Age: 42
End: 2017-08-05
Payer: MEDICAID

## 2017-08-05 DIAGNOSIS — M25.569 KNEE PAIN, UNSPECIFIED CHRONICITY, UNSPECIFIED LATERALITY: ICD-10-CM

## 2017-08-05 DIAGNOSIS — M25.561 ACUTE PAIN OF RIGHT KNEE: Primary | ICD-10-CM

## 2017-08-05 PROCEDURE — 96372 THER/PROPH/DIAG INJ SC/IM: CPT | Mod: PBBFAC,PO

## 2017-08-05 RX ORDER — KETOROLAC TROMETHAMINE 30 MG/ML
60 INJECTION, SOLUTION INTRAMUSCULAR; INTRAVENOUS ONCE
Status: CANCELLED | OUTPATIENT
Start: 2017-08-05 | End: 2017-08-05

## 2017-08-05 RX ORDER — KETOROLAC TROMETHAMINE 30 MG/ML
60 INJECTION, SOLUTION INTRAMUSCULAR; INTRAVENOUS
Status: COMPLETED | OUTPATIENT
Start: 2017-08-05 | End: 2017-08-05

## 2017-08-05 RX ORDER — TRAMADOL HYDROCHLORIDE 50 MG/1
50 TABLET ORAL NIGHTLY PRN
Qty: 40 TABLET | Refills: 0 | Status: SHIPPED | OUTPATIENT
Start: 2017-08-05 | End: 2017-08-15

## 2017-08-05 RX ORDER — NAPROXEN SODIUM 550 MG/1
550 TABLET ORAL 2 TIMES DAILY WITH MEALS
Qty: 30 TABLET | Refills: 0 | Status: SHIPPED | OUTPATIENT
Start: 2017-08-05 | End: 2017-08-31 | Stop reason: ALTCHOICE

## 2017-08-05 RX ADMIN — KETOROLAC TROMETHAMINE 60 MG: 60 INJECTION, SOLUTION INTRAMUSCULAR at 12:08

## 2017-08-05 NOTE — TELEPHONE ENCOUNTER
----- Message from Stacie Chapa sent at 8/4/2017  9:35 AM CDT -----  Contact: pt [phone]  Pt would like a call in regards to her knee pain,please advise

## 2017-08-05 NOTE — PROGRESS NOTES
toradol given IM to Lt dorsalgluteal. Patient tolerated well. Instructed to remain in clinic area [id number] for monitoring.

## 2017-08-08 ENCOUNTER — LAB VISIT (OUTPATIENT)
Dept: LAB | Facility: HOSPITAL | Age: 42
End: 2017-08-08
Attending: INTERNAL MEDICINE
Payer: MEDICAID

## 2017-08-08 DIAGNOSIS — E11.65 UNCONTROLLED TYPE 2 DIABETES MELLITUS WITH CHRONIC KIDNEY DISEASE, WITHOUT LONG-TERM CURRENT USE OF INSULIN, UNSPECIFIED CKD STAGE: ICD-10-CM

## 2017-08-08 DIAGNOSIS — E11.22 UNCONTROLLED TYPE 2 DIABETES MELLITUS WITH CHRONIC KIDNEY DISEASE, WITHOUT LONG-TERM CURRENT USE OF INSULIN, UNSPECIFIED CKD STAGE: ICD-10-CM

## 2017-08-08 LAB
ANION GAP SERPL CALC-SCNC: 11 MMOL/L
BUN SERPL-MCNC: 14 MG/DL
CALCIUM SERPL-MCNC: 9.2 MG/DL
CHLORIDE SERPL-SCNC: 100 MMOL/L
CO2 SERPL-SCNC: 28 MMOL/L
CREAT SERPL-MCNC: 0.8 MG/DL
EST. GFR  (AFRICAN AMERICAN): >60 ML/MIN/1.73 M^2
EST. GFR  (NON AFRICAN AMERICAN): >60 ML/MIN/1.73 M^2
GLUCOSE SERPL-MCNC: 107 MG/DL
POTASSIUM SERPL-SCNC: 4.6 MMOL/L
SODIUM SERPL-SCNC: 139 MMOL/L

## 2017-08-08 PROCEDURE — 36415 COLL VENOUS BLD VENIPUNCTURE: CPT | Mod: PO

## 2017-08-08 PROCEDURE — 80048 BASIC METABOLIC PNL TOTAL CA: CPT

## 2017-08-10 ENCOUNTER — HOSPITAL ENCOUNTER (OUTPATIENT)
Dept: RADIOLOGY | Facility: HOSPITAL | Age: 42
Discharge: HOME | End: 2017-08-10
Attending: INTERNAL MEDICINE
Payer: MEDICAID

## 2017-08-10 ENCOUNTER — OFFICE VISIT (OUTPATIENT)
Dept: INTERNAL MEDICINE | Facility: CLINIC | Age: 42
End: 2017-08-10
Payer: MEDICAID

## 2017-08-10 VITALS
WEIGHT: 293 LBS | HEIGHT: 63 IN | SYSTOLIC BLOOD PRESSURE: 121 MMHG | TEMPERATURE: 98 F | HEART RATE: 76 BPM | DIASTOLIC BLOOD PRESSURE: 78 MMHG | BODY MASS INDEX: 51.91 KG/M2

## 2017-08-10 DIAGNOSIS — E11.21 CONTROLLED TYPE 2 DIABETES MELLITUS WITH DIABETIC NEPHROPATHY, WITHOUT LONG-TERM CURRENT USE OF INSULIN: Chronic | ICD-10-CM

## 2017-08-10 DIAGNOSIS — M25.561 RIGHT KNEE PAIN, UNSPECIFIED CHRONICITY: Primary | ICD-10-CM

## 2017-08-10 DIAGNOSIS — I15.2 HYPERTENSION ASSOCIATED WITH DIABETES: ICD-10-CM

## 2017-08-10 DIAGNOSIS — E11.59 HYPERTENSION ASSOCIATED WITH DIABETES: ICD-10-CM

## 2017-08-10 DIAGNOSIS — M25.561 RIGHT KNEE PAIN, UNSPECIFIED CHRONICITY: ICD-10-CM

## 2017-08-10 DIAGNOSIS — M76.891 TENDONITIS OF KNEE, RIGHT: ICD-10-CM

## 2017-08-10 LAB — GLUCOSE SERPL-MCNC: 133 MG/DL (ref 70–110)

## 2017-08-10 PROCEDURE — 99214 OFFICE O/P EST MOD 30 MIN: CPT | Mod: S$PBB,,, | Performed by: INTERNAL MEDICINE

## 2017-08-10 PROCEDURE — 73562 X-RAY EXAM OF KNEE 3: CPT | Mod: TC,PO,RT

## 2017-08-10 PROCEDURE — 3078F DIAST BP <80 MM HG: CPT | Mod: ,,, | Performed by: INTERNAL MEDICINE

## 2017-08-10 PROCEDURE — 3046F HEMOGLOBIN A1C LEVEL >9.0%: CPT | Mod: ,,, | Performed by: INTERNAL MEDICINE

## 2017-08-10 PROCEDURE — 73562 X-RAY EXAM OF KNEE 3: CPT | Mod: 26,RT,, | Performed by: RADIOLOGY

## 2017-08-10 PROCEDURE — 99999 PR PBB SHADOW E&M-EST. PATIENT-LVL IV: CPT | Mod: PBBFAC,,, | Performed by: INTERNAL MEDICINE

## 2017-08-10 PROCEDURE — 3074F SYST BP LT 130 MM HG: CPT | Mod: ,,, | Performed by: INTERNAL MEDICINE

## 2017-08-10 PROCEDURE — 3008F BODY MASS INDEX DOCD: CPT | Mod: ,,, | Performed by: INTERNAL MEDICINE

## 2017-08-11 DIAGNOSIS — E11.21 CONTROLLED TYPE 2 DIABETES MELLITUS WITH DIABETIC NEPHROPATHY, WITHOUT LONG-TERM CURRENT USE OF INSULIN: Chronic | ICD-10-CM

## 2017-08-11 RX ORDER — GLIPIZIDE 5 MG/1
TABLET, FILM COATED, EXTENDED RELEASE ORAL
Qty: 30 TABLET | Refills: 2 | Status: SHIPPED | OUTPATIENT
Start: 2017-08-11 | End: 2017-12-14 | Stop reason: SDUPTHER

## 2017-08-14 ENCOUNTER — TELEPHONE (OUTPATIENT)
Dept: INTERNAL MEDICINE | Facility: CLINIC | Age: 42
End: 2017-08-14

## 2017-08-14 NOTE — TELEPHONE ENCOUNTER
Voice message left for patient. Results reviewed per Dr. Tejeda notes. Instructed to call with further concerns

## 2017-08-14 NOTE — TELEPHONE ENCOUNTER
----- Message from Yamila Tejeda MD sent at 8/14/2017  1:34 AM CDT -----  Knee X-ray showed no significant abnormalities in the joint.

## 2017-08-17 ENCOUNTER — TELEPHONE (OUTPATIENT)
Dept: INTERNAL MEDICINE | Facility: CLINIC | Age: 42
End: 2017-08-17

## 2017-08-17 DIAGNOSIS — M76.891 TENDONITIS OF KNEE, RIGHT: ICD-10-CM

## 2017-08-17 DIAGNOSIS — M25.561 ACUTE PAIN OF RIGHT KNEE: Primary | ICD-10-CM

## 2017-08-17 RX ORDER — HYDROCODONE BITARTRATE AND ACETAMINOPHEN 5; 325 MG/1; MG/1
1 TABLET ORAL EVERY 8 HOURS PRN
Qty: 15 TABLET | Refills: 0 | Status: SHIPPED | OUTPATIENT
Start: 2017-08-17 | End: 2017-09-27 | Stop reason: SDUPTHER

## 2017-08-17 NOTE — TELEPHONE ENCOUNTER
Spoke with patient, reports she has tried ice, heat, wrapping knee, elevation, increased tramadol to 2 tabs and nothing is helping. Seems pain has become worse. Stays off of it most of day but walk on it at work intermittently 1-2 hrs twice daily. Calling [facility] for appt today but needs some relief. Reports unable to sleep due to pain. Message forwarded to Dr Tejeda for advise.

## 2017-08-17 NOTE — TELEPHONE ENCOUNTER
----- Message from Marci Franklin sent at 8/17/2017  9:21 AM CDT -----  Contact: self/[phone]  Pt called in regards to still having pain in right knee. Pt Did x rays and still in bad pain.        Please advise

## 2017-08-17 NOTE — TELEPHONE ENCOUNTER
No relief with NSAID's or tramadol, will try Norco every 8 hours as needed for pain, schedule ortho appt.

## 2017-08-18 NOTE — TELEPHONE ENCOUNTER
----- Message from Martha Felix sent at 8/18/2017  3:25 PM CDT -----  Contact: Mobile: [phone]   Patient would like to get a referral.  Does the patient already have the specialty clinic appointment scheduled:  No  If yes, what date is the appointment scheduled:   N/a   Referral to what specialty:  Orthopedics  Reason (be specific):  Right knee pain   Does the patient want the referral with a specific physician:  Pavel Rodriguez, [facility] [address]  Ph# [phone] and Fax # [phone]  [facility]:  [facility]    Comments:  They will need the Clinical notes, X-rays, her demographic and the referral auth fax to them. Attn: Hailey

## 2017-08-18 NOTE — TELEPHONE ENCOUNTER
----- Message from Ya Burns sent at 8/18/2017 11:29 AM CDT -----  Contact: Self [phone]  Pt requesting a call back in regards to referral to [facility]. They do not accept Medicaid, would like to request another referral. Please advise

## 2017-08-18 NOTE — TELEPHONE ENCOUNTER
Spoke with patient, instructed on hydrocodone rx. Patient reports she already has hydrocodone 5-325mg and is has not helped. Questioned if she can take two tabs or high dose may work? Also informed nurse  [facility] doesn't take medicaid. Needs new referral. Notified Dr Tejeda of above.

## 2017-08-21 ENCOUNTER — TELEPHONE (OUTPATIENT)
Dept: INTERNAL MEDICINE | Facility: CLINIC | Age: 42
End: 2017-08-21

## 2017-08-21 NOTE — TELEPHONE ENCOUNTER
----- Message from Stacie Sammi sent at 8/21/2017  3:29 PM CDT -----  Contact: pt [phone]  Pt would like a call from the nurse in regards to her Orthopedics Dr,pt said she sent a message on Friday and will like to know if her request was sent,please advise

## 2017-08-22 ENCOUNTER — TELEPHONE (OUTPATIENT)
Dept: INTERNAL MEDICINE | Facility: CLINIC | Age: 42
End: 2017-08-22

## 2017-08-22 NOTE — TELEPHONE ENCOUNTER
Is there another Dr. You would like to refer the pt to.  [facility] does not take her insurance.    Please advise.    Thank you

## 2017-08-22 NOTE — TELEPHONE ENCOUNTER
----- Message from Ya Burns sent at 8/22/2017 10:26 AM CDT -----  Contact: [facility]   Pt was referred to Dr Fransisco stevens/ [facility] by Dr Tejeda. Their office does not accept her insurance. Please advise

## 2017-08-22 NOTE — TELEPHONE ENCOUNTER
Spoke with pt re: needing an ortho referral. She found a Dr. @ [facility] in [place] that will see her.  She stated that the Dr is requesting clinic notes, xrays, demographics & a referral.  She is coming to the office to  RX & at that time she will sign a medical release form in order to get things started.  Dr is: Dr. Cox  Attn: June Ofc#: [phone]  Fax #: [phone]  They want to all info faxed to them    Please advise.    Thank you

## 2017-08-22 NOTE — TELEPHONE ENCOUNTER
New external referral was faxed on 8/21/17 for Pavel Rodriguez, [facility] [address]  Ph# [phone] and Fax # [phone]    See telephone note 8/17/17

## 2017-08-22 NOTE — TELEPHONE ENCOUNTER
----- Message from Symone Merida sent at 8/22/2017  8:06 AM CDT -----  Contact: Self/[phone]  Pt is requesting a call in regards to information for orthopedics. Please call pt.

## 2017-08-23 ENCOUNTER — TELEPHONE (OUTPATIENT)
Dept: INTERNAL MEDICINE | Facility: CLINIC | Age: 42
End: 2017-08-23

## 2017-08-23 NOTE — TELEPHONE ENCOUNTER
----- Message from Sushil Pabon sent at 8/23/2017 12:06 PM CDT -----  Contact: self [phone]  Patient is calling to discuss getting a referral for PT . Please call and advise ., Thanks !

## 2017-08-25 ENCOUNTER — HOSPITAL ENCOUNTER (EMERGENCY)
Facility: OTHER | Age: 42
Discharge: HOME | End: 2017-08-25
Attending: EMERGENCY MEDICINE
Payer: MEDICAID

## 2017-08-25 VITALS
HEIGHT: 63 IN | RESPIRATION RATE: 18 BRPM | OXYGEN SATURATION: 95 % | WEIGHT: 293 LBS | HEART RATE: 77 BPM | SYSTOLIC BLOOD PRESSURE: 162 MMHG | TEMPERATURE: 98 F | BODY MASS INDEX: 51.91 KG/M2 | DIASTOLIC BLOOD PRESSURE: 83 MMHG

## 2017-08-25 DIAGNOSIS — M25.561 ACUTE PAIN OF RIGHT KNEE: Primary | ICD-10-CM

## 2017-08-25 DIAGNOSIS — R52 PAIN: ICD-10-CM

## 2017-08-25 PROCEDURE — 99283 EMERGENCY DEPT VISIT LOW MDM: CPT

## 2017-08-25 RX ORDER — TRAMADOL HYDROCHLORIDE 50 MG/1
50 TABLET ORAL EVERY 6 HOURS PRN
COMMUNITY
End: 2017-11-29

## 2017-08-25 RX ORDER — LIDOCAINE 50 MG/G
1 PATCH TOPICAL 2 TIMES DAILY
Qty: 30 PATCH | Refills: 0 | Status: SHIPPED | OUTPATIENT
Start: 2017-08-25 | End: 2019-08-13

## 2017-08-26 NOTE — ED PROVIDER NOTES
Encounter Date: 8/25/2017       History     Chief Complaint   Patient presents with    Knee Injury     stepped wrong on her right knee today and is having pain     42-year-old female who is morbidly obese with depression, diabetes, hypertension, hyperlipidemia, lumbar disc disease, rosacea, sleep apnea and recurrent renal calculi presents to the emergency department with complaints of right knee pain.  She states the pain started at the end of July.  She admits that she's been seen by her primary care physician where she has received prescriptions for diclofenac and Norco.  She states that she has gotten a little relief with these medications however the pain has persisted.  She denies known trauma or injury.  She states that she had an outpatient x-ray obtained with no acute findings.  She states that her doctor told her they were concerned for arthritis versus soft tissue injury causing her pain.  She admits that she was seen at urgent care and received IM Toradol and a prescription for naproxen.  She states that she had no relief with the naproxen so she switched back to be diclofenac and Norco.  She states that today while at work, she misstepped on steps and somehow worsened the pain to her right knee.  She denies any numbness or weakness.  She denies falls.  She states that the pain has significantly increased and is now a 10 out of 10.  She denies any redness, warmth, fever, chills, streaking or other associated symptoms      The history is provided by the patient and a parent.     Review of patient's allergies indicates:  No Known Allergies  Past Medical History:   Diagnosis Date    BMI 70 and over, adult     Depression     Diabetes mellitus     DM (diabetes mellitus) type II controlled with renal manifestation     HTN (hypertension)     Hyperlipidemia     Lumbar disc disease     Morbid obesity     Recurrent nephrolithiasis     Rosacea     Sleep apnea      Past Surgical History:   Procedure  "Laterality Date     SECTION      x2    DILATION AND CURETTAGE OF UTERUS  2010    AUB "negative path" per patient    ENDOMETRIAL ABLATION  2010     Family History   Problem Relation Age of Onset    Hypertension Mother     Diabetes Father     Cancer Father      cns lymphoma    Heart disease Paternal Grandfather     Colon cancer Neg Hx     Ovarian cancer Neg Hx     Breast cancer Neg Hx      Social History   Substance Use Topics    Smoking status: Never Smoker    Smokeless tobacco: Never Used    Alcohol use No     Review of Systems   Constitutional: Negative for chills and fever.   HENT: Negative for sore throat.    Respiratory: Negative for shortness of breath.    Cardiovascular: Negative for chest pain.   Gastrointestinal: Negative for nausea and vomiting.   Genitourinary: Negative for dysuria.   Musculoskeletal: Positive for arthralgias and gait problem. Negative for back pain, neck pain and neck stiffness.        Right knee   Skin: Negative for rash.   Neurological: Negative for weakness and numbness.   Hematological: Does not bruise/bleed easily.       Physical Exam     Initial Vitals [17]   BP Pulse Resp Temp SpO2   (!) 142/78 77 18 98 °F (36.7 °C) 95 %      MAP       99.33         Physical Exam    Nursing note and vitals reviewed.  Constitutional: She appears well-developed and well-nourished. She is not diaphoretic. She is Obese .  Non-toxic appearance. No distress.   Morbidly obese   HENT:   Head: Normocephalic and atraumatic.   Right Ear: External ear normal.   Left Ear: External ear normal.   Nose: Nose normal.   Mouth/Throat: Oropharynx is clear and moist.   Eyes: Conjunctivae and lids are normal. No scleral icterus.   Neck: Normal range of motion and phonation normal. Neck supple.   Cardiovascular: Normal rate and regular rhythm.   Abdominal: Normal appearance.   Musculoskeletal: Normal range of motion.        Right knee: She exhibits normal range of motion, no swelling, no " effusion, no ecchymosis, no deformity, no laceration, no erythema, normal alignment, no LCL laxity, normal patellar mobility, no bony tenderness, normal meniscus and no MCL laxity. Tenderness found. MCL tenderness noted.        Legs:  No obvious deformities, moving all extremities  Right knee-no bony deformity or bony tenderness palpation.  She has pain with tenderness palpation over the MCL.  Pain with range of motion however full range of motion.  Strength 5 out of 5.  Intact distal pulses and no sensory deficits.  No erythema, warmth, edema or ecchymosis.the pain is valgus or varus strain.   Neurological: She is alert and oriented to person, place, and time. She has normal strength. She displays no atrophy. No sensory deficit. She exhibits normal muscle tone. GCS eye subscore is 4. GCS verbal subscore is 5. GCS motor subscore is 6.   Skin: Skin is warm, dry and intact. Capillary refill takes less than 2 seconds. No abrasion, no bruising, no ecchymosis, no laceration, no lesion and no rash noted. No erythema.   Psychiatric: She has a normal mood and affect. Her speech is normal and behavior is normal. Judgment normal. Cognition and memory are normal.         ED Course   Procedures  Labs Reviewed - No data to display     Imaging Results          X-Ray Knee 3 View Right (In process)                 X-Rays:   Independently Interpreted Readings:   Other Readings:  Right knee-no acute fracture or dislocation    Medical Decision Making:   History:   I obtained history from: someone other than patient.       <> Summary of History: mother  Old Medical Records: I decided to obtain old medical records.  Initial Assessment:   42-year-old female with complaints consistent with right knee pain.  Vital signs stable, afebrile, neurovascularly intact.  She is alert, healthy and nontoxic appearing.  She is in no apparent distress.  She is morbidly obese.  Exam documented above.  I am concerned for soft tissue injury, less likely  fracture dislocation.  No evidence of septic joint.  Clinical Tests:   Radiological Study: Ordered and Reviewed  ED Management:  X-ray was obtained from triage and independently interpreted by myself with no evidence of fracture dislocation.  Patient was unable to ambulate with crutches.  Knee immobilizer not large enough to patient.  Ace wrap was applied. Patient's mother has a walker at home which patient is able to use.  Will write for a wheelchair for home use.  She is urged to continue medications as prescribed by her doctor.  Will prescribe lidocaine patches.  She is to follow-up with orthopedist at first available appointment and I do recommend outpatient MRI.  She is urged return for any worsening signs or symptoms.  This patient was discussed with the attending physician who agrees with treatment plan.  Other:   I have discussed this case with another health care provider.       <> Summary of the Discussion: Felix  This note was created using Dragon Medical dictation.  There may be typographical errors secondary to dictation.                     ED Course     Clinical Impression:     1. Acute pain of right knee    2. Pain          Disposition:   Disposition: Discharged  Condition: Stable                        Sandra Vasquez PA-C  08/25/17 2112       Sandra Vasquez PA-C  08/25/17 2135

## 2017-08-26 NOTE — ED NOTES
"Unable to able to apply knee immobilizer due to improper fitting. Crutch training performed by LPN. Pt attempted tp return demonstration "I can't hop on my good leg. I know I will fall."  Pt refused crutches. CHEIKH Conner notified. New orders received and implemented.   "

## 2017-08-29 NOTE — TELEPHONE ENCOUNTER
Spoke with patient, will call [facility] as she was previously seen there under medicaid, if this isn't an option than she will call medicaid and return call to this office with who is medicaid provider for physical therapy.

## 2017-08-30 ENCOUNTER — TELEPHONE (OUTPATIENT)
Dept: INTERNAL MEDICINE | Facility: CLINIC | Age: 42
End: 2017-08-30

## 2017-08-30 DIAGNOSIS — E66.01 MORBID OBESITY, UNSPECIFIED OBESITY TYPE: ICD-10-CM

## 2017-08-30 DIAGNOSIS — R29.898 DIFFICULTY BEARING WEIGHT ON RIGHT LOWER EXTREMITY: ICD-10-CM

## 2017-08-30 DIAGNOSIS — M25.561 ACUTE PAIN OF RIGHT KNEE: Primary | ICD-10-CM

## 2017-08-30 DIAGNOSIS — S83.241A TEAR OF MEDIAL MENISCUS OF RIGHT KNEE, CURRENT, UNSPECIFIED TEAR TYPE, INITIAL ENCOUNTER: ICD-10-CM

## 2017-08-30 NOTE — TELEPHONE ENCOUNTER
"Patient will check into provider for Physical therapy. Requesting MRI secondary to ER visit after fall. Was told she had "no broken bones but soft tissue damage which could mean she tore her menicus and needed to be off leg as much as possible. Would like MRI to verify and to help with obtaining motorized w/c to assist with mobility to allow her to work and move about home. Elderly mother can't move patient/patient can't move self in standard w/c. Sent to Dr Tejeda for advice.  "

## 2017-08-30 NOTE — TELEPHONE ENCOUNTER
----- Message from Vargas Mims sent at 8/28/2017  9:15 AM CDT -----  Contact: [facility]   Patient would like to get a referral.  Does the patient already have the specialty clinic appointment scheduled:  no  If yes, what date is the appointment scheduled:  no   Referral to what specialty:  Mri  Reason (be specific):  Right Torn meniscus  Does the patient want the referral with a specific physician:  no  [facility]  Comments:      ##Advise the patient that once the physician approves this either a nurse or the  will return their call##

## 2017-08-30 NOTE — TELEPHONE ENCOUNTER
Patient was seen in ER after possibly new knee injury, landed on leg while on steps at work with significant worsening of pain.     MRI knee ordered, if there is evidence of a significant tear then will need Ortho asap to determine need for other procedures or surgery.     Wheelchair was ordered in ER, will need to find out who can provide a motorized wc with medicaid.

## 2017-08-31 RX ORDER — DICLOFENAC SODIUM 75 MG/1
TABLET, DELAYED RELEASE ORAL
Qty: 30 TABLET | Refills: 0 | Status: SHIPPED | OUTPATIENT
Start: 2017-08-31 | End: 2017-09-29 | Stop reason: SDUPTHER

## 2017-09-02 ENCOUNTER — HOSPITAL ENCOUNTER (OUTPATIENT)
Dept: RADIOLOGY | Facility: HOSPITAL | Age: 42
Discharge: HOME | End: 2017-09-02
Attending: INTERNAL MEDICINE
Payer: MEDICAID

## 2017-09-02 DIAGNOSIS — M25.561 ACUTE PAIN OF RIGHT KNEE: ICD-10-CM

## 2017-09-02 PROCEDURE — 73721 MRI JNT OF LWR EXTRE W/O DYE: CPT | Mod: 26,RT,, | Performed by: RADIOLOGY

## 2017-09-02 PROCEDURE — 73721 MRI JNT OF LWR EXTRE W/O DYE: CPT | Mod: TC,RT

## 2017-09-05 PROBLEM — S83.241A TEAR OF MEDIAL MENISCUS OF RIGHT KNEE, CURRENT: Status: ACTIVE | Noted: 2017-09-05

## 2017-09-08 ENCOUNTER — TELEPHONE (OUTPATIENT)
Dept: INTERNAL MEDICINE | Facility: CLINIC | Age: 42
End: 2017-09-08

## 2017-09-08 NOTE — TELEPHONE ENCOUNTER
----- Message from Ya Burns sent at 9/8/2017  8:50 AM CDT -----  Contact: self [phone]  Pt requesting a call back in regards to MRI results. Would also like to speak about a wheelchair order. Please advsie

## 2017-09-08 NOTE — TELEPHONE ENCOUNTER
----- Message from Ya Burns sent at 9/8/2017  2:55 PM CDT -----  Contact: Self [phone]  Requesting a call back in regards to a knee brace. Pt would like to know specifically which kind/brand. Please please call back by the end of bussiness today as she will be leaving on a cruise on Sun.

## 2017-09-11 ENCOUNTER — TELEPHONE (OUTPATIENT)
Dept: INTERNAL MEDICINE | Facility: CLINIC | Age: 42
End: 2017-09-11

## 2017-09-11 NOTE — TELEPHONE ENCOUNTER
Called and left message. Should rest and ice the knee as previously recommended. A brace to immobilize would be acceptable.

## 2017-09-11 NOTE — TELEPHONE ENCOUNTER
----- Message from Yamila Tejeda MD sent at 9/5/2017  8:52 AM CDT -----  Torn right medial meniscus, will enter new referral to Dr. Van for patient to be seen asap.

## 2017-09-11 NOTE — TELEPHONE ENCOUNTER
Spoke with patient. Results reviewed per Dr. Tejeda notes. Verbalized understanding. Will call with further concerns. referraL sent to Lianna for authorization. Patient will reschedule with Dr. Van

## 2017-09-22 DIAGNOSIS — I10 ESSENTIAL HYPERTENSION: Chronic | ICD-10-CM

## 2017-09-22 RX ORDER — FUROSEMIDE 20 MG/1
TABLET ORAL
Qty: 60 TABLET | Refills: 2 | Status: SHIPPED | OUTPATIENT
Start: 2017-09-22 | End: 2018-02-19 | Stop reason: SDUPTHER

## 2017-09-22 RX ORDER — LISINOPRIL 40 MG/1
TABLET ORAL
Qty: 30 TABLET | Refills: 2 | Status: SHIPPED | OUTPATIENT
Start: 2017-09-22 | End: 2018-01-17 | Stop reason: SDUPTHER

## 2017-09-27 NOTE — TELEPHONE ENCOUNTER
"----- Message from Martha Felix sent at 9/27/2017 10:44 AM CDT -----  Contact: Mobile: [phone]   RX request - refill or new RX.  Is this a refill or new RX:  Refill  RX name and strength: hydrocodone-acetaminophen 5-325mg (NORCO) 5-325 mg per tablet  Directions:   Is this a 30 day or 90 day RX: 30  Pharmacy name and phone # (DON'T enter "on file" or "in chart"):    Comments:             "

## 2017-09-28 RX ORDER — HYDROCODONE BITARTRATE AND ACETAMINOPHEN 5; 325 MG/1; MG/1
1 TABLET ORAL EVERY 8 HOURS PRN
Qty: 15 TABLET | Refills: 0 | Status: SHIPPED | OUTPATIENT
Start: 2017-09-28 | End: 2017-11-29 | Stop reason: SDUPTHER

## 2017-09-28 NOTE — TELEPHONE ENCOUNTER
Spoke with Dr Cox's nurse at [facility] orthopedics. Requesting copy of mri or ct proving patient has torn meniscus prior to reviewing referral.

## 2017-09-28 NOTE — TELEPHONE ENCOUNTER
Rx printed,     Does she have surgery planned for the knee injury?    Please remind her only one MD( office) can Rx narcotics at one time    (Covering Dr. Tejeda during her medical leave)

## 2017-09-28 NOTE — TELEPHONE ENCOUNTER
----- Message from Stacia Crain sent at 9/25/2017  3:23 PM CDT -----  Contact: [phone]  Halley  Patient's referral to [facility]  was denied.  Patient states she was told that she needs PT prior to them authorizing referral.  Please call to discuss.    M25.561 (ICD-10-CM) - Acute pain of right knee  Difficulty bearing weight on right lower extremity  S83.241A (ICD-10-CM) - Tear of medial meniscus of right knee, current, unspecified tear type, initial encounter    ThanksLianna

## 2017-09-29 ENCOUNTER — TELEPHONE (OUTPATIENT)
Dept: INTERNAL MEDICINE | Facility: CLINIC | Age: 42
End: 2017-09-29

## 2017-09-29 RX ORDER — DICLOFENAC SODIUM 75 MG/1
TABLET, DELAYED RELEASE ORAL
Qty: 60 TABLET | Refills: 1 | Status: SHIPPED | OUTPATIENT
Start: 2017-09-29 | End: 2017-09-29 | Stop reason: SDUPTHER

## 2017-09-29 RX ORDER — DICLOFENAC SODIUM 75 MG/1
75 TABLET, DELAYED RELEASE ORAL 2 TIMES DAILY
Qty: 60 TABLET | Refills: 0 | Status: SHIPPED | OUTPATIENT
Start: 2017-09-29 | End: 2017-11-29 | Stop reason: SDUPTHER

## 2017-10-04 ENCOUNTER — TELEPHONE (OUTPATIENT)
Dept: INTERNAL MEDICINE | Facility: CLINIC | Age: 42
End: 2017-10-04

## 2017-10-04 NOTE — TELEPHONE ENCOUNTER
Spoke with patient, medication reordered, new order for referral faxed to [facility] Friday with MRI included to reconsider ortho consult.

## 2017-10-11 ENCOUNTER — TELEPHONE (OUTPATIENT)
Dept: INTERNAL MEDICINE | Facility: CLINIC | Age: 42
End: 2017-10-11

## 2017-10-11 NOTE — TELEPHONE ENCOUNTER
----- Message from Denton Denson sent at 10/11/2017 12:34 PM CDT -----  Contact: June - [facility] at [phone]  At this time doctors can not do anything for her needs. Pt need to loose weight and get her diabetes in control. Pt can do her basic injections with her primary care doctor.       If Dr. Tejeda feels differently then she can refer pt to [facility]. [phone] (phone)  [phone] (fax)

## 2017-10-13 ENCOUNTER — TELEPHONE (OUTPATIENT)
Dept: INTERNAL MEDICINE | Facility: CLINIC | Age: 42
End: 2017-10-13

## 2017-10-16 ENCOUNTER — LAB VISIT (OUTPATIENT)
Dept: LAB | Facility: HOSPITAL | Age: 42
End: 2017-10-16
Attending: INTERNAL MEDICINE
Payer: MEDICAID

## 2017-10-16 DIAGNOSIS — E11.22 UNCONTROLLED TYPE 2 DIABETES MELLITUS WITH CHRONIC KIDNEY DISEASE, WITHOUT LONG-TERM CURRENT USE OF INSULIN, UNSPECIFIED CKD STAGE: ICD-10-CM

## 2017-10-16 DIAGNOSIS — E11.65 UNCONTROLLED TYPE 2 DIABETES MELLITUS WITH CHRONIC KIDNEY DISEASE, WITHOUT LONG-TERM CURRENT USE OF INSULIN, UNSPECIFIED CKD STAGE: ICD-10-CM

## 2017-10-16 LAB
ESTIMATED AVG GLUCOSE: 140 MG/DL
HBA1C MFR BLD HPLC: 6.5 %

## 2017-10-16 PROCEDURE — 83036 HEMOGLOBIN GLYCOSYLATED A1C: CPT

## 2017-10-16 PROCEDURE — 36415 COLL VENOUS BLD VENIPUNCTURE: CPT | Mod: PO

## 2017-10-17 NOTE — TELEPHONE ENCOUNTER
Spoke with patietn regarding deniel of referral to [facility]. Patient would like to put this referral on hold for now.  She has appt with [facility] orthopedics from previous referral 6months ago for knee pain and will attempt to get torn meniscus addressed at this visit.

## 2017-10-18 NOTE — TELEPHONE ENCOUNTER
Lupe, pt visit has been declined, with this rejoiner    If Dr. Tejeda feels differently then she can refer pt to [facility]. [phone] (phone)   [phone] (fax)   (Routing comment    Thought she was referred to [facility]  Can you see if this is different and what needs to be done to pursue further eval

## 2017-11-29 DIAGNOSIS — G89.29 CHRONIC PAIN OF RIGHT KNEE: ICD-10-CM

## 2017-11-29 DIAGNOSIS — S83.241S TEAR OF MEDIAL MENISCUS OF RIGHT KNEE, CURRENT, UNSPECIFIED TEAR TYPE, SEQUELA: Primary | ICD-10-CM

## 2017-11-29 DIAGNOSIS — M25.561 CHRONIC PAIN OF RIGHT KNEE: ICD-10-CM

## 2017-11-29 RX ORDER — DICLOFENAC SODIUM 75 MG/1
75 TABLET, DELAYED RELEASE ORAL 2 TIMES DAILY
Qty: 60 TABLET | Refills: 0 | Status: SHIPPED | OUTPATIENT
Start: 2017-11-29 | End: 2018-02-19 | Stop reason: SDUPTHER

## 2017-11-29 RX ORDER — HYDROCODONE BITARTRATE AND ACETAMINOPHEN 5; 325 MG/1; MG/1
1 TABLET ORAL EVERY 8 HOURS PRN
Qty: 30 TABLET | Refills: 0 | Status: SHIPPED | OUTPATIENT
Start: 2017-11-29 | End: 2017-11-30 | Stop reason: SDUPTHER

## 2017-11-29 NOTE — TELEPHONE ENCOUNTER
Spoke with patient, agreeable to pt/ot consult and will call [facility] pt/ot dept for available therapist. Patient reports she gets good pain relief with diclofenac use with use of norco for severe pain(which she reports occurs once to a few times a week.). Reports needing refills of both meds. Request sent to Dr nina

## 2017-11-29 NOTE — TELEPHONE ENCOUNTER
----- Message from Symone Merida sent at 11/29/2017 11:27 AM CST -----  Contact: Self/[phone]  Pt is requesting a call in regards to the orthopedics denying her referral. Pt needs to know how knee pain will be treated. Pt also will like to know the status of her abraham chair. Please call and advise.

## 2017-11-29 NOTE — TELEPHONE ENCOUNTER
Ortho recommended PT first after reviewing MRI. Continue pain management with agent that controls her pain, avoid aggravating movement. Will refer to [facility]. Patient may need to call a few locations if able to arrange session with another physical therapist if necessary.

## 2017-11-30 DIAGNOSIS — M25.561 RIGHT KNEE PAIN, UNSPECIFIED CHRONICITY: ICD-10-CM

## 2017-11-30 DIAGNOSIS — S83.241S TEAR OF MEDIAL MENISCUS OF RIGHT KNEE, CURRENT, UNSPECIFIED TEAR TYPE, SEQUELA: Primary | ICD-10-CM

## 2017-11-30 RX ORDER — HYDROCODONE BITARTRATE AND ACETAMINOPHEN 5; 325 MG/1; MG/1
1 TABLET ORAL EVERY 8 HOURS PRN
Qty: 30 TABLET | Refills: 0 | Status: SHIPPED | OUTPATIENT
Start: 2017-11-30 | End: 2018-02-19 | Stop reason: SDUPTHER

## 2017-12-01 ENCOUNTER — TELEPHONE (OUTPATIENT)
Dept: INTERNAL MEDICINE | Facility: CLINIC | Age: 42
End: 2017-12-01

## 2017-12-01 NOTE — TELEPHONE ENCOUNTER
----- Message from Yamila Tejeda MD sent at 12/1/2017  1:18 PM CST -----  New order entered    ----- Message -----  From: Lupe Finley LPN  Sent: 12/1/2017  12:42 PM  To: MD Lianna Canas, coordinator, requested re-enter referral for patient to [facility] and bring to me to process because [facility] doesn't have PT creditialed for medicaid. Medicaid patients are seen at [facility]  ----- Message -----  From: Stacia Crain  Sent: 12/1/2017  11:13 AM  To: Nikhil BOONE Staff    Dr Tejeda entered an external referral for patient to have PT Services at [facility].  I faxed authorized referral to them.  There PT department stated that they don't have a Physical Therapist at their location credentialed with Medicaid.  They stated that they send all Medicaid to [facility].  Please re-enter referral for patient to [facility] and bring to me to process.    Tear of medial meniscus of right knee, current, unspecified tear type, sequela [S83.241S]  Chronic pain of right knee [M25.561, G89.29]    ThanksLianna

## 2017-12-07 DIAGNOSIS — M54.16 LUMBAR RADICULOPATHY: ICD-10-CM

## 2017-12-07 DIAGNOSIS — M54.12 CERVICAL RADICULOPATHY, CHRONIC: Primary | ICD-10-CM

## 2017-12-14 DIAGNOSIS — E11.21 CONTROLLED TYPE 2 DIABETES MELLITUS WITH DIABETIC NEPHROPATHY, WITHOUT LONG-TERM CURRENT USE OF INSULIN: Chronic | ICD-10-CM

## 2017-12-14 RX ORDER — GLIPIZIDE 5 MG/1
TABLET, FILM COATED, EXTENDED RELEASE ORAL
Qty: 30 TABLET | Refills: 1 | Status: SHIPPED | OUTPATIENT
Start: 2017-12-14 | End: 2018-02-19 | Stop reason: SDUPTHER

## 2017-12-20 ENCOUNTER — CLINICAL SUPPORT (OUTPATIENT)
Dept: REHABILITATION | Facility: HOSPITAL | Age: 42
End: 2017-12-20
Attending: STUDENT IN AN ORGANIZED HEALTH CARE EDUCATION/TRAINING PROGRAM
Payer: MEDICAID

## 2017-12-20 DIAGNOSIS — M54.2 NECK PAIN: ICD-10-CM

## 2017-12-20 PROCEDURE — 97161 PT EVAL LOW COMPLEX 20 MIN: CPT

## 2017-12-20 PROCEDURE — 97140 MANUAL THERAPY 1/> REGIONS: CPT

## 2017-12-20 NOTE — PLAN OF CARE
[facility]   PATIENT EVALUATION    Date: 12/20/2017  Start Time: 0930  Stop Time: 1030    Patient Name: Halley Moreno  Clinic Number: [id number]  Age: 42 y.o.  Gender: female    Diagnosis:   Encounter Diagnosis   Name Primary?    Neck pain        Referring Physician: Mendy Szymanski MD  Treatment Orders: PT Eval and Treat      History     Past Medical History:   Diagnosis Date    BMI 70 and over, adult     Depression     Diabetes mellitus     DM (diabetes mellitus) type II controlled with renal manifestation     HTN (hypertension)     Hyperlipidemia     Lumbar disc disease     Morbid obesity     Recurrent nephrolithiasis     Rosacea     Sleep apnea        Current Outpatient Prescriptions   Medication Sig    azelastine (ASTELIN) 137 mcg (0.1 %) nasal spray 1 spray (137 mcg total) by Nasal route 2 (two) times daily.    blood sugar diagnostic Strp 1 strip by Misc.(Non-Drug; Combo Route) route 2 (two) times daily.    diclofenac (VOLTAREN) 75 MG EC tablet Take 1 tablet (75 mg total) by mouth 2 (two) times daily.    furosemide (LASIX) 20 MG tablet TAKE TWO TABLETS BY MOUTH ONCE DAILY    glipiZIDE (GLUCOTROL) 5 MG TR24 TAKE ONE TABLET BY MOUTH ONCE DAILY WITH BREAKFAST    hydrocodone-acetaminophen 5-325mg (NORCO) 5-325 mg per tablet Take 1 tablet by mouth every 8 (eight) hours as needed for Pain.    ketoconazole (NIZORAL) 2 % cream Apply topically once daily. To feet    lancets Misc 1 lancet by Misc.(Non-Drug; Combo Route) route 2 (two) times daily.    lidocaine (LIDODERM) 5 % Place 1 patch onto the skin 2 (two) times daily. Remove & Discard patch within 12 hours or as directed by MD    lisinopril (PRINIVIL,ZESTRIL) 40 MG tablet TAKE ONE TABLET BY MOUTH EVERY DAY.    metronidazole (METROGEL) 0.75 % gel Apply topically 2 (two) times daily.    nystatin (MYCOSTATIN) powder Apply topically 2 (two) times daily. Apply 2gm twice daily    phentermine (ADIPEX-P)  37.5 mg tablet Take 1 tablet (37.5 mg total) by mouth before breakfast.    triamcinolone acetonide 0.1% (KENALOG) 0.1 % Lotn Apply topically 2 (two) times daily.     No current facility-administered medications for this visit.        Review of patient's allergies indicates:  No Known Allergies      Subjective     History of Present Condition: Pt presents to PT with reports of neck and back pain with tingling into the RUE. Prior injury in 2010 that was resolved with physical therapy. Pt reports that she has been able to manage her pain for the most part with band and postural exercises. Pt does report occasional tingling into the hand and this sensation has decreased since she decreased bowling frequency. Notes a ROM limitation turning to the left. Denies headaches, Tinnitus, change in bowel/bladder.    Pt has also been seen for right knee pain secondary to a torn meniscus. 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 Dry skin/discoloration indicating circulation impairment  Posture: Forward head: mild  Gait: Pattern restricted secondary to soft tissue approximation    Dermatomes: Intact  DTRs: Biceps, Triceps, Brachialis 2+    Palpation:  - RUE Flexor Bundle Tenderness  - Left CT junction hypomobility    Cervical Range of Motion  Flex: 50  Ext: 50  R SB: 45  L SB: 50  RR: 70  LR: 55    Shoulder ROM  Left:  Flex: 180  Abd: 170   IR: T7  ER: 90    Right:  Flex: 180  Abd: 170  IR: T9  ER: 90    Pain During Movement: Discomfort with active rotation to the left    Joint Mobility:   - Left Closing restriction with left rotaiton  Flexibility:  - Right Levator Scapula and bilateral UT restriction      Strength   Cervical:  Flex: 4  Ext: 4  R SB: 4  L SB: 4  RR: 4  LR: 4    Shoulder:  Left:  Flex: 5  Abd: 5  IR: 5  ER: 5  : 50#    Right:  Flex: 5  Abd: 5  IR: 5  ER: 4+  : 46#      Special Tests:   - Spurling (-)  - Upper cervical ligamentous stability intact (alar and transverse)  - Median nerve tension testing (+)    Treatment:   - IASTM to flexor bundle with mobilization gliding  - HEP edu: Cervical retraction, Median nerve glides, quad sets, hip abduction, hamstring and hip flexor stretching      Functional Limitations Reports - G Codes  Category: Mobility  Tool: FOTO  Score: 61              Assessment     This is a 42 y.o. female referred to outpatient physical therapy and presents with a medical diagnosis of cervical and right knee pain and demonstrates limitations as described in the problem list. Testing is consistent with a mechanical dysfunction of the cervical spine with median nerve pathology. Pt demonstrates fair rehab potential. Pt will benefit from physcial therapy services in order to maximize pain free and/or functional use of right UE. The following goals were discussed with the patient and patient is in agreement with them as to be addressed in the treatment plan. Pt was given a HEP. Pt verbally  understood the instructions as they were given and demonstrated proper form and technique during therapy. Pt was advised to perform these exercises free of pain, and to stop performing them if pain occurs.     Medical necessity is demonstrated by the following problem list:   - Pain limits function of effected part for all activities  - Unable to participate in daily activities   - Requires skilled supervision to complete and progress HEP  - Continued inability to participate in vocational pursuits    Short Term Goals (6 Weeks):  - Pt will increase ROM to left cervical rotation to 65 degrees  - Pt will increase right  strength to match contralateral side  - Decrease Pain to 0-5/10 pain with standing ADLs involving reaching/grasping  - Pt to self correct posture in sitting without VC  - Pt independent with HEP with progressions.         Plan     Pt will be treated by physical therapy 1 times a week for 8 weeks for manual therapy, therapeutic exercise, home exercise program, patient education, and modalities PRN to achieve established goals. Halley may at times be seen by a PTA as part of the Rehab Team.     Kleber Briscoe, PT, DPT, OCS  12/20/2017    I CERTIFY THE NEED FOR THESE SERVICES FURNISHED UNDER THIS PLAN OF TREATMENT AND WHILE UNDER MY CARE    Physician's comments: ____________________________________________________________________________________________________________________________________________    Physician's Name: ___________________________________

## 2018-01-10 ENCOUNTER — CLINICAL SUPPORT (OUTPATIENT)
Dept: REHABILITATION | Facility: HOSPITAL | Age: 43
End: 2018-01-10
Attending: STUDENT IN AN ORGANIZED HEALTH CARE EDUCATION/TRAINING PROGRAM
Payer: MEDICAID

## 2018-01-10 DIAGNOSIS — M54.2 NECK PAIN: Primary | ICD-10-CM

## 2018-01-10 DIAGNOSIS — M54.12 CERVICAL RADICULOPATHY, CHRONIC: ICD-10-CM

## 2018-01-10 PROCEDURE — 97110 THERAPEUTIC EXERCISES: CPT

## 2018-01-10 NOTE — PROGRESS NOTES
"See POC  Clinical Support     12/20/2017  [facility]   Physical Therapy   Neck pain   Dx   PT Initial Evaluation; Referred by Mendy Szymanski MD   Reason for Visit    Additional Documentation     Encounter Info:    Billing Info,    Detailed Report,    Patient Education,    Care Plan,    History,    Allergies,    Patient-Entered Questionnaires       Plan of Care   Kleber Briscoe PT (Physical Therapist)   Physical Therapy   Cosign Needed      [facility]   PATIENT EVALUATION     Date: 12/20/2017  Start Time: 1015  Stop Time: 1100     Patient Name: Halley Moreno  Clinic Number: [id number]  Age: 42 y.o.  Gender: female          Subjective      Pt denies neck or back pain at present time. Stated compliance with HEP. Pain scale 0/10    Pt has also been seen for right knee pain secondary to a torn meniscus. Pt is unsure how this injury occurred      Onset Date: Chronic  Date of Surgery: NA  Precautions:      Mechanism of Injury: insidious  Pain with cough, sneeze, or strain: -          Objective    15' late for tx today.  Observation: Pt enters PT independently without assistive device or external support.   Posture: Forward head: mild  Gait: Pattern restricted secondary to soft tissue approximation     Treatment:   Moist heat x 10' Neck and back   LTR 20x  GS 20x/3"  PPT 20x/3"  B Median nerve gliding 20x/ea   Cervical retraction 20x  UT stretch 1x/1' ea   Cervical neck flexion/ext 20x  Cervical rotation R/L 20x ea        - IASTM to flexor bundle with mobilization glidingnp  - PTA reviewed HEP with patient: Cervical retraction, Median nerve glides, quad sets, hip abduction, hamstring and hip flexor stretching                      Assessment      This is a 42 y.o. female referred to outpatient physical therapy and presents with a medical diagnosis of cervical and right knee pain and demonstrates limitations as described in the problem list. Testing is " consistent with a mechanical dysfunction of the cervical spine with median nerve pathology. Pt demonstrates fair rehab potential. Pt will benefit from physcial therapy services in order to maximize pain free and/or functional use of right UE. The following goals were discussed with the patient and patient is in agreement with them as to be addressed in the treatment plan. Pt was given a HEP. Pt verbally understood the instructions as they were given and demonstrated proper form and technique during therapy. Pt was advised to perform these exercises free of pain, and to stop performing them if pain occurs.      Medical necessity is demonstrated by the following problem list:   - Pain limits function of effected part for all activities  - Unable to participate in daily activities   - Requires skilled supervision to complete and progress HEP  - Continued inability to participate in vocational pursuits     Short Term Goals (6 Weeks):  - Pt will increase ROM to left cervical rotation to 65 degrees  - Pt will increase right  strength to match contralateral side  - Decrease Pain to 0-5/10 pain with standing ADLs involving reaching/grasping  - Pt to self correct posture in sitting without VC  - Pt independent with HEP with progressions.            Plan      Pt will be treated by physical therapy 1 times a week for 8 weeks for manual therapy, therapeutic exercise, home exercise program, patient education, and modalities PRN to achieve established goals. Halley may at times be seen by a PTA as part of the Rehab Team.      Deep Mora PTA, STS

## 2018-01-17 DIAGNOSIS — I10 ESSENTIAL HYPERTENSION: Chronic | ICD-10-CM

## 2018-01-17 RX ORDER — LISINOPRIL 40 MG/1
TABLET ORAL
Qty: 30 TABLET | Refills: 0 | Status: SHIPPED | OUTPATIENT
Start: 2018-01-17 | End: 2018-02-19 | Stop reason: SDUPTHER

## 2018-01-17 RX ORDER — NYSTATIN 100000 [USP'U]/G
POWDER TOPICAL 2 TIMES DAILY
Qty: 60 G | Refills: 1 | Status: SHIPPED | OUTPATIENT
Start: 2018-01-17 | End: 2018-02-19 | Stop reason: SDUPTHER

## 2018-01-24 ENCOUNTER — CLINICAL SUPPORT (OUTPATIENT)
Dept: REHABILITATION | Facility: HOSPITAL | Age: 43
End: 2018-01-24
Attending: STUDENT IN AN ORGANIZED HEALTH CARE EDUCATION/TRAINING PROGRAM
Payer: MEDICAID

## 2018-01-24 DIAGNOSIS — M54.2 NECK PAIN: ICD-10-CM

## 2018-01-24 PROCEDURE — 97110 THERAPEUTIC EXERCISES: CPT

## 2018-01-24 NOTE — PROGRESS NOTES
"See POC  Clinical Support     12/20/2017  [facility]   Physical Therapy   Neck pain   Dx   PT Initial Evaluation; Referred by Mendy Szymanski MD   Reason for Visit    Additional Documentation     Encounter Info:    Billing Info,    Detailed Report,    Patient Education,    Care Plan,    History,    Allergies,    Patient-Entered Questionnaires       Plan of Care   Kleber Briscoe PT (Physical Therapist)   Physical Therapy   Cosign Needed      [facility]   PATIENT EVALUATION     Date: 1/24/2018  Start Time: 0930  Stop Time: 1030     Patient Name: Halley Moreno  Clinic Number: [id number]  Age: 42 y.o.  Gender: female          Subjective      Pt denies neck pain at this time but notes that her knee has been extremely sore secondary to weather and her meniscus pathology.    Pt has also been seen for right knee pain secondary to a torn meniscus. Pt is unsure how this injury occurred      Onset Date: Chronic  Date of Surgery: NA  Precautions:      Mechanism of Injury: insidious  Pain with cough, sneeze, or strain: -          Objective    10' late for tx today.  Observation: Pt enters PT independently without assistive device or external support.   Posture: Forward head: mild  Gait: Pattern restricted secondary to soft tissue approximation     Treatment:   Moist heat x 10' Neck and back and knee  LTR 20x  GS 20x/3"  PPT 20x/3"  Quad set x 20  Standing hip ext/abd x 20  Heel slides x 20  B Median nerve gliding 20x/ea   Cervical retraction 20x  UT stretch 1x/1' ea   Cervical neck flexion/ext 20x  Cervical rotation R/L 20x ea      -STM to right quad x 8 min                      Assessment      Pt tolerated tx extremely well noting a decrease in pain and an improvement in her gait pattern. Pt has been advised and educated on performing today's activities at home as part of he HEP.      Medical necessity is demonstrated by the following problem list:   - Pain " limits function of effected part for all activities  - Unable to participate in daily activities   - Requires skilled supervision to complete and progress HEP  - Continued inability to participate in vocational pursuits     Short Term Goals (6 Weeks):  - Pt will increase ROM to left cervical rotation to 65 degrees  - Pt will increase right  strength to match contralateral side  - Decrease Pain to 0-5/10 pain with standing ADLs involving reaching/grasping  - Pt to self correct posture in sitting without VC  - Pt independent with HEP with progressions.            Plan      Pt will be treated by physical therapy 1 times a week for 8 weeks for manual therapy, therapeutic exercise, home exercise program, patient education, and modalities PRN to achieve established goals. Halley may at times be seen by a PTA as part of the Rehab Team.      Kleber Briscoe, PT , DPT, OCS

## 2018-02-01 DIAGNOSIS — Z00.00 ANNUAL PHYSICAL EXAM: Primary | ICD-10-CM

## 2018-02-01 DIAGNOSIS — E11.59 HYPERTENSION ASSOCIATED WITH DIABETES: ICD-10-CM

## 2018-02-01 DIAGNOSIS — E11.29 CONTROLLED TYPE 2 DIABETES MELLITUS WITH OTHER DIABETIC KIDNEY COMPLICATION, UNSPECIFIED LONG TERM INSULIN USE STATUS: ICD-10-CM

## 2018-02-01 DIAGNOSIS — Z12.39 SCREENING FOR BREAST CANCER: Primary | ICD-10-CM

## 2018-02-01 DIAGNOSIS — I15.2 HYPERTENSION ASSOCIATED WITH DIABETES: ICD-10-CM

## 2018-02-14 ENCOUNTER — CLINICAL SUPPORT (OUTPATIENT)
Dept: REHABILITATION | Facility: HOSPITAL | Age: 43
End: 2018-02-14
Attending: STUDENT IN AN ORGANIZED HEALTH CARE EDUCATION/TRAINING PROGRAM
Payer: MEDICAID

## 2018-02-14 DIAGNOSIS — M54.16 LUMBAR RADICULOPATHY: ICD-10-CM

## 2018-02-14 DIAGNOSIS — M54.2 NECK PAIN: Primary | ICD-10-CM

## 2018-02-14 DIAGNOSIS — M54.12 CERVICAL RADICULOPATHY, CHRONIC: ICD-10-CM

## 2018-02-14 PROCEDURE — 97110 THERAPEUTIC EXERCISES: CPT

## 2018-02-14 NOTE — PROGRESS NOTES
"See POC [facility]        Date: 2/14/2018  Start Time: 1005  Stop Time: 1100     Patient Name: Halley Moreno  Clinic Number: [id number]  Age: 42 y.o.  Gender: female          Subjective      Pt denies neck pain and back pain but reporting min R knee pain today. Stated compliant with HEP. Pain scale 2/10.     Pt has also been seen for right knee pain secondary to a torn meniscus. Pt is unsure how this injury occurred      Onset Date: Chronic  Date of Surgery: NA  Precautions:      Mechanism of Injury: insidious  Pain with cough, sneeze, or strain: -          Objective     Observation: Pt enters PT independently without assistive device or external support.   Posture: Forward head: mild  Gait: Pattern restricted secondary to soft tissue approximation     Treatment:   Moist heat x 10' neck, back and R knee  LTR 30x  QS R 30x   B SAQ 30x   B SLR 3x10  GS 30x/3"  PPT 30x/3"  Supine B hip abd 3x10  CP x10' R knee       Standing hip ext/abd x 20  Heel slides x 20  B Median nerve gliding 20x/ea   Cervical retraction 20x  UT stretch 1x/1' ea   Cervical neck flexion/ext 20x  Cervical rotation R/L 20x ea                            Assessment      Pt tolerated tx fair with fatigue after increased rep with therex. Min R quad pain with SLR but no increased knee pain Pt has been advised and educated on performing today's activities at home as part of he HEP.      Medical necessity is demonstrated by the following problem list:   - Pain limits function of effected part for all activities  - Unable to participate in daily activities   - Requires skilled supervision to complete and progress HEP  - Continued inability to participate in vocational pursuits     Short Term Goals (6 Weeks):  - Pt will increase ROM to left cervical rotation to 65 degrees  - Pt will increase right  strength to match contralateral side  - Decrease Pain to 0-5/10 pain with standing ADLs involving " reaching/grasping  - Pt to self correct posture in sitting without VC  - Pt independent with HEP with progressions.            Plan      Pt will be treated by physical therapy 1 times a week for 8 weeks for manual therapy, therapeutic exercise, home exercise program, patient education, and modalities PRN to achieve established goals. Halley may at times be seen by a PTA as part of the Rehab Team.      Deep Mora PTA, STS

## 2018-02-15 ENCOUNTER — OFFICE VISIT (OUTPATIENT)
Dept: OPTOMETRY | Facility: CLINIC | Age: 43
End: 2018-02-15
Payer: MEDICAID

## 2018-02-15 ENCOUNTER — HOSPITAL ENCOUNTER (OUTPATIENT)
Dept: RADIOLOGY | Facility: HOSPITAL | Age: 43
Discharge: HOME | End: 2018-02-15
Attending: INTERNAL MEDICINE
Payer: MEDICAID

## 2018-02-15 DIAGNOSIS — Z12.39 SCREENING FOR BREAST CANCER: ICD-10-CM

## 2018-02-15 DIAGNOSIS — E11.9 TYPE 2 DIABETES MELLITUS WITHOUT RETINOPATHY: Primary | ICD-10-CM

## 2018-02-15 DIAGNOSIS — H52.4 BILATERAL PRESBYOPIA: ICD-10-CM

## 2018-02-15 PROCEDURE — 99212 OFFICE O/P EST SF 10 MIN: CPT | Mod: PBBFAC,PO | Performed by: OPTOMETRIST

## 2018-02-15 PROCEDURE — 99999 PR PBB SHADOW E&M-EST. PATIENT-LVL II: CPT | Mod: PBBFAC,,, | Performed by: OPTOMETRIST

## 2018-02-15 PROCEDURE — 77067 SCR MAMMO BI INCL CAD: CPT | Mod: TC,PO

## 2018-02-15 PROCEDURE — 92014 COMPRE OPH EXAM EST PT 1/>: CPT | Mod: S$PBB,,, | Performed by: OPTOMETRIST

## 2018-02-15 PROCEDURE — 77067 SCR MAMMO BI INCL CAD: CPT | Mod: 26,,, | Performed by: RADIOLOGY

## 2018-02-15 PROCEDURE — 77063 BREAST TOMOSYNTHESIS BI: CPT | Mod: 26,,, | Performed by: RADIOLOGY

## 2018-02-15 NOTE — PROGRESS NOTES
ARLYN HURST 02/2017 Diabetic BS 92 yesterday. Has OTC +1.00 readers, only uses   for fine print.  Hasn't noticed any vision changes.    Hemoglobin A1C       Date                     Value               Ref Range             Status                10/16/2017               6.5 (H)             4.0 - 5.6 %           Final         07/11/2017               11.5 (H)            4.0 - 5.6 %           Final           02/02/2017               6.4 (H)             4.5 - 6.2 %           Final              Last edited by My Luis on 2/15/2018  1:14 PM. (History)            Assessment /Plan     For exam results, see Encounter Report.    Type 2 diabetes mellitus without retinopathy    Bilateral presbyopia      1. No diabetic retinopathy, no csme. Return in 1 year for dilated eye exam.  2. Cont with OTC readers.

## 2018-02-19 ENCOUNTER — OFFICE VISIT (OUTPATIENT)
Dept: INTERNAL MEDICINE | Facility: CLINIC | Age: 43
End: 2018-02-19
Payer: MEDICAID

## 2018-02-19 VITALS
TEMPERATURE: 98 F | SYSTOLIC BLOOD PRESSURE: 118 MMHG | HEIGHT: 63 IN | BODY MASS INDEX: 51.91 KG/M2 | WEIGHT: 293 LBS | HEART RATE: 76 BPM | RESPIRATION RATE: 16 BRPM | DIASTOLIC BLOOD PRESSURE: 78 MMHG

## 2018-02-19 DIAGNOSIS — S83.241S TEAR OF MEDIAL MENISCUS OF RIGHT KNEE, CURRENT, UNSPECIFIED TEAR TYPE, SEQUELA: ICD-10-CM

## 2018-02-19 DIAGNOSIS — I15.2 HYPERTENSION ASSOCIATED WITH DIABETES: Primary | ICD-10-CM

## 2018-02-19 DIAGNOSIS — B37.2 CANDIDAL INTERTRIGO: ICD-10-CM

## 2018-02-19 DIAGNOSIS — E11.21 CONTROLLED TYPE 2 DIABETES MELLITUS WITH DIABETIC NEPHROPATHY, WITHOUT LONG-TERM CURRENT USE OF INSULIN: Chronic | ICD-10-CM

## 2018-02-19 DIAGNOSIS — I15.2 OBESITY, DIABETES, AND HYPERTENSION SYNDROME: ICD-10-CM

## 2018-02-19 DIAGNOSIS — E11.69 OBESITY, DIABETES, AND HYPERTENSION SYNDROME: ICD-10-CM

## 2018-02-19 DIAGNOSIS — E66.01 MORBID OBESITY WITH BMI OF 60.0-69.9, ADULT: ICD-10-CM

## 2018-02-19 DIAGNOSIS — E11.59 OBESITY, DIABETES, AND HYPERTENSION SYNDROME: ICD-10-CM

## 2018-02-19 DIAGNOSIS — E66.9 OBESITY, DIABETES, AND HYPERTENSION SYNDROME: ICD-10-CM

## 2018-02-19 DIAGNOSIS — E11.59 HYPERTENSION ASSOCIATED WITH DIABETES: Primary | ICD-10-CM

## 2018-02-19 DIAGNOSIS — G47.30 SLEEP APNEA, UNSPECIFIED TYPE: ICD-10-CM

## 2018-02-19 PROCEDURE — 99999 PR PBB SHADOW E&M-EST. PATIENT-LVL IV: CPT | Mod: PBBFAC,,, | Performed by: INTERNAL MEDICINE

## 2018-02-19 PROCEDURE — 99396 PREV VISIT EST AGE 40-64: CPT | Mod: S$PBB,,, | Performed by: INTERNAL MEDICINE

## 2018-02-19 PROCEDURE — 99214 OFFICE O/P EST MOD 30 MIN: CPT | Mod: PBBFAC,PO | Performed by: INTERNAL MEDICINE

## 2018-02-19 RX ORDER — LISINOPRIL 40 MG/1
40 TABLET ORAL DAILY
Qty: 30 TABLET | Refills: 5 | Status: SHIPPED | OUTPATIENT
Start: 2018-02-19 | End: 2018-11-20 | Stop reason: SDUPTHER

## 2018-02-19 RX ORDER — NYSTATIN 100000 [USP'U]/G
POWDER TOPICAL 2 TIMES DAILY
Qty: 60 G | Refills: 1 | Status: SHIPPED | OUTPATIENT
Start: 2018-02-19 | End: 2020-05-06

## 2018-02-19 RX ORDER — GLIPIZIDE 5 MG/1
TABLET, FILM COATED, EXTENDED RELEASE ORAL
Qty: 30 TABLET | Refills: 5 | Status: SHIPPED | OUTPATIENT
Start: 2018-02-19 | End: 2018-11-20 | Stop reason: SDUPTHER

## 2018-02-19 RX ORDER — FUROSEMIDE 20 MG/1
40 TABLET ORAL DAILY
Qty: 60 TABLET | Refills: 2 | Status: SHIPPED | OUTPATIENT
Start: 2018-02-19 | End: 2018-04-20

## 2018-02-19 RX ORDER — CLOTRIMAZOLE AND BETAMETHASONE DIPROPIONATE 10; .64 MG/G; MG/G
CREAM TOPICAL 2 TIMES DAILY
Qty: 45 G | Refills: 0 | Status: SHIPPED | OUTPATIENT
Start: 2018-02-19 | End: 2018-11-20 | Stop reason: SDUPTHER

## 2018-02-19 RX ORDER — DICLOFENAC SODIUM 75 MG/1
75 TABLET, DELAYED RELEASE ORAL 2 TIMES DAILY
Qty: 60 TABLET | Refills: 1 | Status: SHIPPED | OUTPATIENT
Start: 2018-02-19 | End: 2018-10-23 | Stop reason: SDUPTHER

## 2018-02-19 RX ORDER — HYDROCODONE BITARTRATE AND ACETAMINOPHEN 5; 325 MG/1; MG/1
1 TABLET ORAL EVERY 8 HOURS PRN
Qty: 30 TABLET | Refills: 0 | Status: SHIPPED | OUTPATIENT
Start: 2018-02-19 | End: 2019-03-08 | Stop reason: SDUPTHER

## 2018-02-19 NOTE — PROGRESS NOTES
Subjective:      Halley Moreno is a 42 y.o. female who presents for annual exam.    Patient has YARY and uses CPAP but notices increasing daytime sleepiness. A few weeks ago, she got out of bed and to go to the bathroom, fell asleep and landed on the floor. She did not wake up when she hit the floor and her daughter found her sleeping. She hit and bruised her knee. Reports daytime drowsiness, has not had cpap settings re-evaluated.     Family History:  family history includes Cancer in her father; Diabetes in her father; Heart disease in her paternal grandfather; Hypertension in her mother.    Health Maintenance:  Health Maintenance       Date Due Completion Date    Influenza Vaccine 2017 ---    Foot Exam 2018    Hemoglobin A1c 08/15/2018 2/15/2018    Lipid Panel 02/15/2019 2/15/2018    Eye Exam 02/15/2019 2/15/2018    Mammogram 02/15/2020 2/15/2018    Pap Smear with HPV Cotest 2020    Override on 2010: Done    TETANUS VACCINE 2027    Pneumococcal PPSV23 (Medium Risk) (2) 2038        Eye exam: 2018  Dental Exam: needs  OB/GYN: 3/2017    MM2018  Last Pap: 2017    Influenza: declines  Tetanus: 2017    Exercise: has been working with PT  Diet: healthy  Body mass index is 68.07 kg/m².    Meds:   Current Outpatient Prescriptions:     blood sugar diagnostic Strp, 1 strip by Misc.(Non-Drug; Combo Route) route 2 (two) times daily., Disp: 100 strip, Rfl: 5    diclofenac (VOLTAREN) 75 MG EC tablet, Take 1 tablet (75 mg total) by mouth 2 (two) times daily., Disp: 60 tablet, Rfl: 1    furosemide (LASIX) 20 MG tablet, Take 2 tablets (40 mg total) by mouth once daily., Disp: 60 tablet, Rfl: 2    glipiZIDE (GLUCOTROL) 5 MG TR24, TAKE ONE TABLET BY MOUTH ONCE DAILY WITH BREAKFAST, Disp: 30 tablet, Rfl: 5    ketoconazole (NIZORAL) 2 % cream, Apply topically once daily. To feet, Disp: 60 g, Rfl: 0    lancets Misc, 1 lancet by Misc.(Non-Drug;  "Combo Route) route 2 (two) times daily., Disp: 100 each, Rfl: 5    lidocaine (LIDODERM) 5 %, Place 1 patch onto the skin 2 (two) times daily. Remove & Discard patch within 12 hours or as directed by MD, Disp: 30 patch, Rfl: 0    lisinopril (PRINIVIL,ZESTRIL) 40 MG tablet, Take 1 tablet (40 mg total) by mouth once daily., Disp: 30 tablet, Rfl: 5    nystatin (MYCOSTATIN) powder, Apply topically 2 (two) times daily. Apply 2gm twice daily, Disp: 60 g, Rfl: 1    phentermine (ADIPEX-P) 37.5 mg tablet, Take 1 tablet (37.5 mg total) by mouth before breakfast., Disp: 30 tablet, Rfl: 0    PHENTERMINE-TOPIRAMATE ORAL, Take by mouth., Disp: , Rfl:     azelastine (ASTELIN) 137 mcg (0.1 %) nasal spray, 1 spray (137 mcg total) by Nasal route 2 (two) times daily., Disp: 30 mL, Rfl: 1    clotrimazole-betamethasone 1-0.05% (LOTRISONE) cream, Apply topically 2 (two) times daily., Disp: 45 g, Rfl: 0    hydrocodone-acetaminophen 5-325mg (NORCO) 5-325 mg per tablet, Take 1 tablet by mouth every 8 (eight) hours as needed for Pain., Disp: 30 tablet, Rfl: 0    metronidazole (METROGEL) 0.75 % gel, Apply topically 2 (two) times daily., Disp: 45 g, Rfl: 1    triamcinolone acetonide 0.1% (KENALOG) 0.1 % Lotn, Apply topically 2 (two) times daily., Disp: 60 mL, Rfl: 1    PMHx:   Past Medical History:   Diagnosis Date    BMI 70 and over, adult     Depression     Diabetes mellitus     DM (diabetes mellitus) type II controlled with renal manifestation     HTN (hypertension)     Hyperlipidemia     Lumbar disc disease     Morbid obesity     Recurrent nephrolithiasis     Rosacea     Sleep apnea        PSHx:     Past Surgical History:   Procedure Laterality Date     SECTION      x2    DILATION AND CURETTAGE OF UTERUS      AUB "negative path" per patient    ENDOMETRIAL ABLATION         SocHx:   Social History     Social History    Marital status: Single     Spouse name: N/A    Number of children: N/A    Years of " education: N/A     Social History Main Topics    Smoking status: Never Smoker    Smokeless tobacco: Never Used    Alcohol use No    Drug use: No    Sexual activity: No     Other Topics Concern    None     Social History Narrative    She is a pharmacy tech, works at [employer] in [place].  Nonsmoker, social etoh.  No exercise.       Review of Systems   Constitutional: Negative for chills, fatigue and fever.   HENT: Negative for congestion, ear discharge, ear pain, mouth sores, postnasal drip, rhinorrhea, sinus pressure and sore throat.    Eyes: Negative for redness and visual disturbance.   Respiratory: Negative for cough, chest tightness and shortness of breath.    Cardiovascular: Negative for chest pain, palpitations and leg swelling.   Gastrointestinal: Negative for abdominal pain, constipation, diarrhea, nausea and vomiting.   Endocrine: Negative for polydipsia and polyuria.   Genitourinary: Negative for dysuria, frequency and hematuria.   Musculoskeletal: Positive for arthralgias (right knee pain) and back pain (intermittent low back pain). Negative for myalgias.   Skin: Positive for rash (redness under pannus).   Neurological: Negative for dizziness, weakness, light-headedness, numbness and headaches.   Hematological: Negative for adenopathy.   Psychiatric/Behavioral: Negative for dysphoric mood. The patient is not nervous/anxious.        Objective:      Physical Exam   Constitutional: She is oriented to person, place, and time. Vital signs are normal. She appears well-developed and well-nourished. No distress.   HENT:   Head: Normocephalic and atraumatic.   Right Ear: Hearing, tympanic membrane, external ear and ear canal normal. Tympanic membrane is not erythematous and not bulging.   Left Ear: Hearing, tympanic membrane, external ear and ear canal normal. Tympanic membrane is not erythematous and not bulging.   Nose: Nose normal.   Mouth/Throat: Uvula is midline, oropharynx is clear and moist and  mucous membranes are normal. No oropharyngeal exudate or posterior oropharyngeal erythema.   Eyes: Conjunctivae, EOM and lids are normal. Pupils are equal, round, and reactive to light. No scleral icterus.   Neck: Normal range of motion. Neck supple. No thyroid mass and no thyromegaly present.   Cardiovascular: Normal rate, regular rhythm, normal heart sounds and intact distal pulses.    No murmur heard.  Pulmonary/Chest: Effort normal and breath sounds normal. She has no wheezes.   Abdominal: Soft. Bowel sounds are normal. She exhibits no distension. There is no tenderness. There is no rigidity, no rebound and no guarding.   Musculoskeletal: Normal range of motion. She exhibits no edema.   Lymphadenopathy:     She has no cervical adenopathy.        Right: No supraclavicular adenopathy present.        Left: No supraclavicular adenopathy present.   Neurological: She is alert and oriented to person, place, and time. She has normal reflexes. Coordination and gait normal.   Skin: Skin is warm, dry and intact. No rash noted. She is not diaphoretic. There is erythema (under pannus).   Extremely dry skin lower legs, + hyperpigmentation   Psychiatric: She has a normal mood and affect.   Vitals reviewed.      Assessment:       1. Hypertension associated with diabetes    2. Controlled type 2 diabetes mellitus with diabetic nephropathy, without long-term current use of insulin    3. Tear of medial meniscus of right knee, current, unspecified tear type, sequela    4. Sleep apnea, unspecified type    5. Candidal intertrigo    6. Morbid obesity with BMI of 60.0-69.9, adult    7. Obesity, diabetes, and hypertension syndrome        Plan:       1. Hypertension associated with diabetes  - BP controlled  - furosemide (LASIX) 20 MG tablet; Take 2 tablets (40 mg total) by mouth once daily.  Dispense: 60 tablet; Refill: 2  - lisinopril (PRINIVIL,ZESTRIL) 40 MG tablet; Take 1 tablet (40 mg total) by mouth once daily.  Dispense: 30 tablet;  Refill: 5    2. Controlled type 2 diabetes mellitus with diabetic nephropathy, without long-term current use of insulin  - HbA1c 6.0 at goal  - glipiZIDE (GLUCOTROL) 5 MG TR24; TAKE ONE TABLET BY MOUTH ONCE DAILY WITH BREAKFAST  Dispense: 30 tablet; Refill: 5    3. Tear of medial meniscus of right knee, current, unspecified tear type, sequela  - diclofenac (VOLTAREN) 75 MG EC tablet; Take 1 tablet (75 mg total) by mouth 2 (two) times daily.  Dispense: 60 tablet; Refill: 1  - hydrocodone-acetaminophen 5-325mg (NORCO) 5-325 mg per tablet; Take 1 tablet by mouth every 8 (eight) hours as needed for Pain.  Dispense: 30 tablet; Refill: 0  - pain is improving, continue PT    4. Sleep apnea, unspecified type  - Ambulatory consult to Sleep Disorders  - CPAP/BIPAP SUPPLIES    5. Candidal intertrigo  - nystatin (MYCOSTATIN) powder; Apply topically 2 (two) times daily. Apply 2gm twice daily  Dispense: 60 g; Refill: 1  - clotrimazole-betamethasone 1-0.05% (LOTRISONE) cream; Apply topically 2 (two) times daily.  Dispense: 45 g; Refill: 0  - call if no improvement and may consider diflucan    6. Morbid obesity with BMI of 60.0-69.9, adult  - Ambulatory Referral to [facility] ([facility])  - patient thinks she will,consider bariatrics surgery after she loses ~45 lbs    7. Obesity, diabetes and hypertension syndrome  - weight has decreased 8 lbs in the last few months    RTC in 4 months or sooner if needed    Yamila Tejeda MD

## 2018-03-07 ENCOUNTER — CLINICAL SUPPORT (OUTPATIENT)
Dept: REHABILITATION | Facility: HOSPITAL | Age: 43
End: 2018-03-07
Attending: STUDENT IN AN ORGANIZED HEALTH CARE EDUCATION/TRAINING PROGRAM
Payer: MEDICAID

## 2018-03-07 DIAGNOSIS — M54.2 NECK PAIN: Primary | ICD-10-CM

## 2018-03-07 PROCEDURE — 97110 THERAPEUTIC EXERCISES: CPT

## 2018-03-07 NOTE — PROGRESS NOTES
"See POC [facility]        Date: 2/14/2018  Start Time: 1005  Stop Time: 1100     Patient Name: Halley Moreno  Clinic Number: [id number]  Age: 42 y.o.  Gender: female          Subjective      Pt denies neck pain and back pain but TTP R upper trap  min R knee pain today. Stated compliant with HEP. Pain scale 3/10.     Pt has also been seen for right knee pain secondary to a torn meniscus. Pt is unsure how this injury occurred      Onset Date: Chronic  Date of Surgery: NA  Precautions:      Mechanism of Injury: insidious  Pain with cough, sneeze, or strain: -          Objective     Observation: Pt enters PT independently without assistive device or external support.   Posture: Forward head: mild  Gait: Pattern restricted secondary to soft tissue approximation     Treatment:   Moist heat x 10' neck and R Knee   B Median nerve gliding 20x/ea  Elevated supine  chin tucks 3x10  LTR 30x  PPT 30x/3"  Supine B hip abd 3x10  Cervical rotation R/L 20x ea    B SAQ 30x/3"     CP x 10' R knee     Not today:  QS R 30x   B SLR 3x10  GS 30x/3"  CP x10' R knee   Standing hip ext/abd x 20  Heel slides x 20  UT stretch 1x/1' ea   Cervical neck flexion/ext 20x                            Assessment      Pt tolerated tx well with no increased neck or R knee pain . VC/TC for correcting form/technique. Continue to progress as tolerated. Pt has been advised and educated on performing today's activities at home as part of he HEP.      Medical necessity is demonstrated by the following problem list:   - Pain limits function of effected part for all activities  - Unable to participate in daily activities   - Requires skilled supervision to complete and progress HEP  - Continued inability to participate in vocational pursuits     Short Term Goals (6 Weeks):  - Pt will increase ROM to left cervical rotation to 65 degrees  - Pt will increase right  strength to match contralateral side  - Decrease " Pain to 0-5/10 pain with standing ADLs involving reaching/grasping  - Pt to self correct posture in sitting without VC  - Pt independent with HEP with progressions.            Plan      Pt will be treated by physical therapy 1 times a week for 8 weeks for manual therapy, therapeutic exercise, home exercise program, patient education, and modalities PRN to achieve established goals. Halley may at times be seen by a PTA as part of the Rehab Team.      Deep Mora PTA, STS

## 2018-03-14 ENCOUNTER — CLINICAL SUPPORT (OUTPATIENT)
Dept: REHABILITATION | Facility: HOSPITAL | Age: 43
End: 2018-03-14
Attending: STUDENT IN AN ORGANIZED HEALTH CARE EDUCATION/TRAINING PROGRAM
Payer: MEDICAID

## 2018-03-14 DIAGNOSIS — M54.12 CERVICAL RADICULOPATHY, CHRONIC: ICD-10-CM

## 2018-03-14 DIAGNOSIS — M54.2 NECK PAIN: Primary | ICD-10-CM

## 2018-03-14 DIAGNOSIS — M54.16 LUMBAR RADICULOPATHY: ICD-10-CM

## 2018-04-02 ENCOUNTER — PATIENT MESSAGE (OUTPATIENT)
Dept: ADMINISTRATIVE | Facility: OTHER | Age: 43
End: 2018-04-02

## 2018-04-17 ENCOUNTER — TELEPHONE (OUTPATIENT)
Dept: INTERNAL MEDICINE | Facility: CLINIC | Age: 43
End: 2018-04-17

## 2018-04-17 NOTE — TELEPHONE ENCOUNTER
----- Message from Marci Oliveira sent at 4/17/2018 12:45 PM CDT -----  Contact: patient [phone]  Pt asked to speak with Lupe. She is retaining a lot of water and wants to know if you can ask  to change her lasix dosage .

## 2018-04-17 NOTE — TELEPHONE ENCOUNTER
Emailed patient.      Increase Lasix to 60mg daily for next 2 days but needs re-eval for worsening edema.

## 2018-04-20 ENCOUNTER — LAB VISIT (OUTPATIENT)
Dept: LAB | Facility: HOSPITAL | Age: 43
End: 2018-04-20
Attending: INTERNAL MEDICINE
Payer: MEDICAID

## 2018-04-20 ENCOUNTER — OFFICE VISIT (OUTPATIENT)
Dept: INTERNAL MEDICINE | Facility: CLINIC | Age: 43
End: 2018-04-20
Payer: MEDICAID

## 2018-04-20 VITALS
SYSTOLIC BLOOD PRESSURE: 131 MMHG | BODY MASS INDEX: 51.91 KG/M2 | HEIGHT: 63 IN | HEART RATE: 81 BPM | WEIGHT: 293 LBS | TEMPERATURE: 98 F | RESPIRATION RATE: 16 BRPM | DIASTOLIC BLOOD PRESSURE: 79 MMHG

## 2018-04-20 DIAGNOSIS — E11.59 HYPERTENSION ASSOCIATED WITH DIABETES: ICD-10-CM

## 2018-04-20 DIAGNOSIS — I15.2 HYPERTENSION ASSOCIATED WITH DIABETES: ICD-10-CM

## 2018-04-20 DIAGNOSIS — R60.0 BILATERAL LEG EDEMA: ICD-10-CM

## 2018-04-20 DIAGNOSIS — R60.0 BILATERAL LEG EDEMA: Primary | ICD-10-CM

## 2018-04-20 LAB
ALBUMIN SERPL BCP-MCNC: 3.3 G/DL
ALP SERPL-CCNC: 88 U/L
ALT SERPL W/O P-5'-P-CCNC: 25 U/L
ANION GAP SERPL CALC-SCNC: 8 MMOL/L
AST SERPL-CCNC: 20 U/L
BASOPHILS # BLD AUTO: 0.04 K/UL
BASOPHILS NFR BLD: 0.4 %
BILIRUB SERPL-MCNC: 0.5 MG/DL
BNP SERPL-MCNC: 13 PG/ML
BUN SERPL-MCNC: 13 MG/DL
CALCIUM SERPL-MCNC: 9 MG/DL
CHLORIDE SERPL-SCNC: 101 MMOL/L
CO2 SERPL-SCNC: 31 MMOL/L
CREAT SERPL-MCNC: 0.8 MG/DL
DIFFERENTIAL METHOD: ABNORMAL
EOSINOPHIL # BLD AUTO: 0.1 K/UL
EOSINOPHIL NFR BLD: 0.7 %
ERYTHROCYTE [DISTWIDTH] IN BLOOD BY AUTOMATED COUNT: 16.2 %
EST. GFR  (AFRICAN AMERICAN): >60 ML/MIN/1.73 M^2
EST. GFR  (NON AFRICAN AMERICAN): >60 ML/MIN/1.73 M^2
GLUCOSE SERPL-MCNC: 127 MG/DL
HCT VFR BLD AUTO: 45.2 %
HGB BLD-MCNC: 14.4 G/DL
IMM GRANULOCYTES # BLD AUTO: 0.07 K/UL
IMM GRANULOCYTES NFR BLD AUTO: 0.6 %
LYMPHOCYTES # BLD AUTO: 2.2 K/UL
LYMPHOCYTES NFR BLD: 19.6 %
MCH RBC QN AUTO: 27.9 PG
MCHC RBC AUTO-ENTMCNC: 31.9 G/DL
MCV RBC AUTO: 88 FL
MONOCYTES # BLD AUTO: 0.7 K/UL
MONOCYTES NFR BLD: 5.9 %
NEUTROPHILS # BLD AUTO: 8 K/UL
NEUTROPHILS NFR BLD: 72.8 %
NRBC BLD-RTO: 0 /100 WBC
PLATELET # BLD AUTO: 215 K/UL
PMV BLD AUTO: 10.2 FL
POTASSIUM SERPL-SCNC: 5 MMOL/L
PROT SERPL-MCNC: 7.2 G/DL
RBC # BLD AUTO: 5.16 M/UL
SODIUM SERPL-SCNC: 140 MMOL/L
TSH SERPL DL<=0.005 MIU/L-ACNC: 1.73 UIU/ML
WBC # BLD AUTO: 10.99 K/UL

## 2018-04-20 PROCEDURE — 80053 COMPREHEN METABOLIC PANEL: CPT

## 2018-04-20 PROCEDURE — 99214 OFFICE O/P EST MOD 30 MIN: CPT | Mod: S$PBB,,, | Performed by: INTERNAL MEDICINE

## 2018-04-20 PROCEDURE — 36415 COLL VENOUS BLD VENIPUNCTURE: CPT | Mod: PO

## 2018-04-20 PROCEDURE — 85025 COMPLETE CBC W/AUTO DIFF WBC: CPT

## 2018-04-20 PROCEDURE — 99213 OFFICE O/P EST LOW 20 MIN: CPT | Mod: PBBFAC,PO | Performed by: INTERNAL MEDICINE

## 2018-04-20 PROCEDURE — 99999 PR PBB SHADOW E&M-EST. PATIENT-LVL III: CPT | Mod: PBBFAC,,, | Performed by: INTERNAL MEDICINE

## 2018-04-20 PROCEDURE — 84443 ASSAY THYROID STIM HORMONE: CPT

## 2018-04-20 PROCEDURE — 83880 ASSAY OF NATRIURETIC PEPTIDE: CPT

## 2018-04-20 RX ORDER — PHENDIMETRAZINE TARTRATE 105 MG/1
CAPSULE, EXTENDED RELEASE ORAL
Refills: 0 | COMMUNITY
Start: 2018-03-21 | End: 2019-10-14

## 2018-04-20 RX ORDER — FUROSEMIDE 40 MG/1
60 TABLET ORAL DAILY
Qty: 45 TABLET | Refills: 0 | Status: SHIPPED | OUTPATIENT
Start: 2018-04-20 | End: 2018-08-06 | Stop reason: SDUPTHER

## 2018-04-20 NOTE — PROGRESS NOTES
Subjective:       Patient ID: Halley Moreno is a 42 y.o. female who presents for Follow-up; Edema; Hypertension; and Weight Gain      Edema   This is a new problem. The current episode started 1 to 4 weeks ago. The problem occurs constantly. The problem has been unchanged. Pertinent negatives include no abdominal pain, arthralgias, change in bowel habit, chest pain, chills, congestion, coughing, fatigue, fever, headaches, myalgias, nausea, rash, sore throat, urinary symptoms, visual change, vomiting or weakness. Nothing aggravates the symptoms. Treatments tried: takes Lasix 40mg daily but was increased to Lasix 60mg daily in the last 3 days. The treatment provided no relief.   Hypertension   This is a chronic problem. The current episode started more than 1 year ago. The problem is unchanged. Associated symptoms include peripheral edema. Pertinent negatives include no chest pain, headaches, malaise/fatigue, orthopnea, palpitations or shortness of breath. There are no associated agents to hypertension. Risk factors for coronary artery disease include obesity, diabetes mellitus, dyslipidemia and sedentary lifestyle. Past treatments include diuretics and ACE inhibitors. The current treatment provides moderate improvement. Compliance problems include exercise.  There is no history of kidney disease or heart failure. There is no history of chronic renal disease.      Reports weight gain in last few weeks, increase in leg swelling but no shortness of breath. Denies any significant change in diet. Avoids sodium. Previously on phentermine possibly for years. She was switched to Bonidil for 2 weeks and noticed a weight increased. She is a patient of the [facility] clinic.      Review of Systems   Constitutional: Negative for chills, fatigue, fever and malaise/fatigue.   HENT: Negative for congestion and sore throat.    Eyes: Negative for redness and visual disturbance.   Respiratory: Negative for cough and shortness of  breath.         Denies pnd   Cardiovascular: Positive for leg swelling. Negative for chest pain, palpitations and orthopnea.   Gastrointestinal: Negative for abdominal pain, change in bowel habit, diarrhea, nausea and vomiting.   Genitourinary: Negative for decreased urine volume, dysuria and hematuria.   Musculoskeletal: Negative for arthralgias and myalgias.   Skin: Negative for rash.   Neurological: Negative for dizziness, weakness, light-headedness and headaches.       Objective:      Physical Exam   Constitutional: She is oriented to person, place, and time. Vital signs are normal. She appears well-developed and well-nourished. No distress.   HENT:   Head: Normocephalic and atraumatic.   Right Ear: Hearing and external ear normal.   Left Ear: Hearing and external ear normal.   Nose: Nose normal.   Mouth/Throat: Uvula is midline and mucous membranes are normal.   Eyes: Lids are normal.   Neck: Full passive range of motion without pain.   Cardiovascular: Normal rate, regular rhythm and intact distal pulses.    No murmur heard.  Pulmonary/Chest: Effort normal and breath sounds normal. She has no wheezes.   Abdominal: Soft. Bowel sounds are normal. She exhibits no distension. There is no tenderness.   Musculoskeletal: Normal range of motion. She exhibits edema (1+ BLE edema).        Right ankle: She exhibits swelling.        Left ankle: She exhibits swelling.        Right lower leg: She exhibits swelling.        Left lower leg: She exhibits swelling.   Neurological: She is alert and oriented to person, place, and time.   Skin: Skin is warm, dry and intact. No rash noted. She is not diaphoretic.   Dry scaly skin lower legs   Psychiatric: She has a normal mood and affect.   Vitals reviewed.      Assessment:       1. Bilateral leg edema    2. Hypertension associated with diabetes        Plan:       1. Bilateral leg edema  - CBC auto differential; Future  - TSH; Future  - Comprehensive metabolic panel; Future  - Brain  natriuretic peptide; Future  - increase Lasix to 60mg daily  - furosemide (LASIX) 40 MG tablet; Take 1.5 tablets (60 mg total) by mouth once daily.  Dispense: 45 tablet; Refill: 0  - previous long-term use of adipex, will order 2d echo    2. Hypertension associated with diabetes  - furosemide (LASIX) 40 MG tablet; Take 1.5 tablets (60 mg total) by mouth once daily.  Dispense: 45 tablet; Refill: 0    Yamila Tejeda MD

## 2018-05-02 ENCOUNTER — CLINICAL SUPPORT (OUTPATIENT)
Dept: CARDIOLOGY | Facility: CLINIC | Age: 43
End: 2018-05-02
Attending: INTERNAL MEDICINE
Payer: MEDICAID

## 2018-05-02 DIAGNOSIS — R60.0 BILATERAL LEG EDEMA: ICD-10-CM

## 2018-05-02 LAB
DIASTOLIC DYSFUNCTION: NO
ESTIMATED PA SYSTOLIC PRESSURE: 19.01
RETIRED EF AND QEF - SEE NOTES: 60 (ref 55–65)
TRICUSPID VALVE REGURGITATION: NORMAL

## 2018-05-02 PROCEDURE — 93306 TTE W/DOPPLER COMPLETE: CPT | Mod: PBBFAC,PO | Performed by: INTERNAL MEDICINE

## 2018-08-06 ENCOUNTER — LAB VISIT (OUTPATIENT)
Dept: LAB | Facility: HOSPITAL | Age: 43
End: 2018-08-06
Attending: INTERNAL MEDICINE
Payer: MEDICAID

## 2018-08-06 DIAGNOSIS — I10 HYPERTENSION, UNSPECIFIED TYPE: ICD-10-CM

## 2018-08-06 DIAGNOSIS — R53.83 FATIGUE, UNSPECIFIED TYPE: ICD-10-CM

## 2018-08-06 DIAGNOSIS — I15.2 HYPERTENSION ASSOCIATED WITH DIABETES: ICD-10-CM

## 2018-08-06 DIAGNOSIS — E11.8 TYPE 2 DIABETES MELLITUS WITH COMPLICATION, UNSPECIFIED WHETHER LONG TERM INSULIN USE: Primary | ICD-10-CM

## 2018-08-06 DIAGNOSIS — E11.8 TYPE 2 DIABETES MELLITUS WITH COMPLICATION, UNSPECIFIED WHETHER LONG TERM INSULIN USE: ICD-10-CM

## 2018-08-06 DIAGNOSIS — R60.0 BILATERAL LEG EDEMA: ICD-10-CM

## 2018-08-06 DIAGNOSIS — E11.59 HYPERTENSION ASSOCIATED WITH DIABETES: ICD-10-CM

## 2018-08-06 LAB
ALBUMIN SERPL BCP-MCNC: 3.7 G/DL
ALP SERPL-CCNC: 86 U/L
ALT SERPL W/O P-5'-P-CCNC: 19 U/L
ANION GAP SERPL CALC-SCNC: 6 MMOL/L
AST SERPL-CCNC: 14 U/L
BASOPHILS # BLD AUTO: 0.03 K/UL
BASOPHILS NFR BLD: 0.3 %
BILIRUB SERPL-MCNC: 0.4 MG/DL
BUN SERPL-MCNC: 13 MG/DL
CALCIUM SERPL-MCNC: 9.7 MG/DL
CHLORIDE SERPL-SCNC: 104 MMOL/L
CO2 SERPL-SCNC: 28 MMOL/L
CREAT SERPL-MCNC: 0.9 MG/DL
DIFFERENTIAL METHOD: ABNORMAL
EOSINOPHIL # BLD AUTO: 0.1 K/UL
EOSINOPHIL NFR BLD: 1 %
ERYTHROCYTE [DISTWIDTH] IN BLOOD BY AUTOMATED COUNT: 16 %
EST. GFR  (AFRICAN AMERICAN): >60 ML/MIN/1.73 M^2
EST. GFR  (NON AFRICAN AMERICAN): >60 ML/MIN/1.73 M^2
ESTIMATED AVG GLUCOSE: 148 MG/DL
GLUCOSE SERPL-MCNC: 113 MG/DL
HBA1C MFR BLD HPLC: 6.8 %
HCT VFR BLD AUTO: 46.5 %
HGB BLD-MCNC: 14.8 G/DL
IMM GRANULOCYTES # BLD AUTO: 0.05 K/UL
IMM GRANULOCYTES NFR BLD AUTO: 0.5 %
LYMPHOCYTES # BLD AUTO: 2.3 K/UL
LYMPHOCYTES NFR BLD: 22.9 %
MCH RBC QN AUTO: 28.2 PG
MCHC RBC AUTO-ENTMCNC: 31.8 G/DL
MCV RBC AUTO: 89 FL
MONOCYTES # BLD AUTO: 0.8 K/UL
MONOCYTES NFR BLD: 7.4 %
NEUTROPHILS # BLD AUTO: 7 K/UL
NEUTROPHILS NFR BLD: 67.9 %
NRBC BLD-RTO: 0 /100 WBC
PLATELET # BLD AUTO: 230 K/UL
PMV BLD AUTO: 11 FL
POTASSIUM SERPL-SCNC: 4.2 MMOL/L
PROT SERPL-MCNC: 7.7 G/DL
RBC # BLD AUTO: 5.25 M/UL
SODIUM SERPL-SCNC: 138 MMOL/L
TSH SERPL DL<=0.005 MIU/L-ACNC: 2.07 UIU/ML
WBC # BLD AUTO: 10.23 K/UL

## 2018-08-06 PROCEDURE — 85025 COMPLETE CBC W/AUTO DIFF WBC: CPT

## 2018-08-06 PROCEDURE — 83036 HEMOGLOBIN GLYCOSYLATED A1C: CPT

## 2018-08-06 PROCEDURE — 84443 ASSAY THYROID STIM HORMONE: CPT

## 2018-08-06 PROCEDURE — 36415 COLL VENOUS BLD VENIPUNCTURE: CPT | Mod: PO

## 2018-08-06 PROCEDURE — 80053 COMPREHEN METABOLIC PANEL: CPT

## 2018-08-06 RX ORDER — FUROSEMIDE 40 MG/1
60 TABLET ORAL DAILY
Qty: 45 TABLET | Refills: 1 | Status: SHIPPED | OUTPATIENT
Start: 2018-08-06 | End: 2018-11-20 | Stop reason: SDUPTHER

## 2018-08-14 ENCOUNTER — OFFICE VISIT (OUTPATIENT)
Dept: INTERNAL MEDICINE | Facility: CLINIC | Age: 43
End: 2018-08-14
Payer: MEDICAID

## 2018-08-14 VITALS
RESPIRATION RATE: 16 BRPM | TEMPERATURE: 98 F | HEART RATE: 79 BPM | SYSTOLIC BLOOD PRESSURE: 118 MMHG | WEIGHT: 293 LBS | HEIGHT: 63 IN | BODY MASS INDEX: 51.91 KG/M2 | DIASTOLIC BLOOD PRESSURE: 72 MMHG

## 2018-08-14 DIAGNOSIS — E11.59 HYPERTENSION ASSOCIATED WITH DIABETES: ICD-10-CM

## 2018-08-14 DIAGNOSIS — E11.21 CONTROLLED TYPE 2 DIABETES MELLITUS WITH DIABETIC NEPHROPATHY, WITHOUT LONG-TERM CURRENT USE OF INSULIN: Primary | Chronic | ICD-10-CM

## 2018-08-14 DIAGNOSIS — M25.562 CHRONIC PAIN OF LEFT KNEE: ICD-10-CM

## 2018-08-14 DIAGNOSIS — E11.69 OBESITY, DIABETES, AND HYPERTENSION SYNDROME: ICD-10-CM

## 2018-08-14 DIAGNOSIS — E11.59 OBESITY, DIABETES, AND HYPERTENSION SYNDROME: ICD-10-CM

## 2018-08-14 DIAGNOSIS — G47.30 SLEEP APNEA, UNSPECIFIED TYPE: ICD-10-CM

## 2018-08-14 DIAGNOSIS — I15.2 HYPERTENSION ASSOCIATED WITH DIABETES: ICD-10-CM

## 2018-08-14 DIAGNOSIS — E66.9 OBESITY, DIABETES, AND HYPERTENSION SYNDROME: ICD-10-CM

## 2018-08-14 DIAGNOSIS — G89.29 CHRONIC PAIN OF LEFT KNEE: ICD-10-CM

## 2018-08-14 DIAGNOSIS — I15.2 OBESITY, DIABETES, AND HYPERTENSION SYNDROME: ICD-10-CM

## 2018-08-14 PROCEDURE — 99999 PR PBB SHADOW E&M-EST. PATIENT-LVL III: CPT | Mod: PBBFAC,,, | Performed by: INTERNAL MEDICINE

## 2018-08-14 PROCEDURE — 99214 OFFICE O/P EST MOD 30 MIN: CPT | Mod: S$PBB,,, | Performed by: INTERNAL MEDICINE

## 2018-08-14 PROCEDURE — 99213 OFFICE O/P EST LOW 20 MIN: CPT | Mod: PBBFAC,PO | Performed by: INTERNAL MEDICINE

## 2018-08-14 NOTE — PROGRESS NOTES
Subjective:       Patient ID: Halley Moreno is a 43 y.o. female who presents for Follow-up; Diabetes; Leg Pain; Fatigue; and Obesity      Leg Pain    The incident occurred more than 1 week ago (left leg and knee pains that started months ago). There was no injury mechanism. The pain is present in the left leg and left knee. The quality of the pain is described as aching. The pain is moderate. The pain has been intermittent since onset. Associated symptoms include an inability to bear weight (walks with limp). Pertinent negatives include no loss of motion, muscle weakness or tingling. The symptoms are aggravated by weight bearing. She has tried NSAIDs (uses norco as needed for severe pains) for the symptoms. The treatment provided mild relief.   Fatigue   This is a chronic problem. The current episode started more than 1 month ago. The problem occurs constantly. The problem has been unchanged. Associated symptoms include fatigue. Pertinent negatives include no abdominal pain, arthralgias, chest pain, chills, congestion, coughing, fever, headaches, myalgias, nausea, rash, vomiting or weakness. The symptoms are aggravated by standing and walking. She has tried rest for the symptoms.   Diabetes   She presents for her follow-up diabetic visit. She has type 2 diabetes mellitus. Her disease course has been stable. Pertinent negatives for hypoglycemia include no dizziness or headaches. Associated symptoms include fatigue. Pertinent negatives for diabetes include no chest pain, no polydipsia, no polyuria and no weakness. Symptoms are stable. Pertinent negatives for diabetic complications include no heart disease or peripheral neuropathy. Risk factors for coronary artery disease include diabetes mellitus, hypertension, sedentary lifestyle and obesity. Current diabetic treatment includes oral agent (monotherapy). She is compliant with treatment all of the time. Her weight is stable. She rarely participates in exercise.  There is no change in her home blood glucose trend. An ACE inhibitor/angiotensin II receptor blocker is being taken.      Reports healthy diet but admits to eating [employer] cheeseburger with regular Coke recently. Lunch is usually @ [employer]- 6 inch roasted chicken or [employer] Chicken Carbonara. She eats chips sometimes. Other times, she eats at [employer] or [employer].     Weights-  391 lbs 8/2017 --> 384 lbs 2/2018 --> 396 lbs in 4/2018    Body mass index is 68.97 kg/m².      Review of Systems   Constitutional: Positive for fatigue. Negative for chills and fever.   HENT: Negative for congestion and sinus pressure.    Eyes: Negative for redness and visual disturbance.   Respiratory: Negative for cough and shortness of breath.    Cardiovascular: Negative for chest pain and palpitations.   Gastrointestinal: Negative for abdominal pain, diarrhea, nausea and vomiting.   Endocrine: Negative for polydipsia and polyuria.   Genitourinary: Negative for dysuria and hematuria.   Musculoskeletal: Negative for arthralgias and myalgias.   Skin: Negative for rash.   Neurological: Negative for dizziness, tingling, weakness, light-headedness and headaches.   Hematological: Negative for adenopathy.       Objective:      Physical Exam   Constitutional: She is oriented to person, place, and time. Vital signs are normal. She appears well-developed and well-nourished. No distress.   HENT:   Head: Normocephalic and atraumatic.   Right Ear: Hearing and external ear normal.   Left Ear: Hearing and external ear normal.   Nose: Nose normal.   Mouth/Throat: Uvula is midline.   Eyes: Lids are normal.   Cardiovascular: Normal rate, regular rhythm, normal heart sounds and intact distal pulses.   No murmur heard.  Pulmonary/Chest: Effort normal and breath sounds normal. She has no wheezes.   Abdominal: Soft. Bowel sounds are normal. She exhibits no distension. There is no tenderness.   Musculoskeletal: Normal range of motion. She exhibits no edema.    Neurological: She is alert and oriented to person, place, and time.   Skin: Skin is warm, dry and intact. No rash noted. She is not diaphoretic.   Psychiatric: She has a normal mood and affect.   Vitals reviewed.      Assessment/Plan:       1. Controlled type 2 diabetes mellitus with diabetic nephropathy, without long-term current use of insulin  - recent HbA1c controlled, 6.8  - stable, continue glipizide    2. Hypertension associated with diabetes  - BP is controlled, stable, continue lisinopril and lasix    3. Obesity, diabetes, and hypertension syndrome  - increase physical activity, reduce calorie intake    4. YARY  - re-start CPAP    5. Left knee pain  - will refer to Ortho for continued pain    RTC in 3 months or sooner if needed    Yamila Tejeda MD

## 2018-10-18 ENCOUNTER — TELEPHONE (OUTPATIENT)
Dept: INTERNAL MEDICINE | Facility: CLINIC | Age: 43
End: 2018-10-18

## 2018-10-18 DIAGNOSIS — I15.2 HYPERTENSION ASSOCIATED WITH DIABETES: Primary | ICD-10-CM

## 2018-10-18 DIAGNOSIS — E11.59 HYPERTENSION ASSOCIATED WITH DIABETES: Primary | ICD-10-CM

## 2018-10-18 RX ORDER — MUPIROCIN 20 MG/G
OINTMENT TOPICAL 3 TIMES DAILY
Qty: 15 G | Refills: 0
Start: 2018-10-18 | End: 2018-10-24

## 2018-10-18 NOTE — TELEPHONE ENCOUNTER
If there is extreme redness or pus is draining, then she needs to be seen right away. Should go to urgent care.    If clear drainage and minimal clear drainage, will send bactroban ointment. Will need to see her in clinic in the next week.

## 2018-10-18 NOTE — TELEPHONE ENCOUNTER
----- Message from Shira Burns sent at 10/18/2018  4:06 PM CDT -----  Doctor appointment and lab have been scheduled.  Please link lab orders to the lab appointment.  Date of doctor appointment:  11/20  Physical or EP:  EP/ 3 mo f/u  Date of lab appointment:  11/16  Comments:

## 2018-10-18 NOTE — TELEPHONE ENCOUNTER
"----- Message from Shira Burns sent at 10/18/2018  4:04 PM CDT -----  Contact: Pt [phone]  Patient would like to get medical advice.    Symptoms (please be specific):  Skin discoloration on ankle, skin cracked and oozing    How long has patient had these symptoms:  1 day    Pharmacy name and phone # (DON'T enter "on file" or "in chart"):  [id number] [place] [id number] [place] [phone] (Phone)  [phone] (Fax)      Any drug allergies:  No    Would you prefer a response via [employer]?:  no    Comments:    "

## 2018-10-18 NOTE — TELEPHONE ENCOUNTER
Spoke with pt; states no pus; oozing with clear liquid; swollen ankles, provider sending bactrim; scheduled appt for Tuesday @ 1:40

## 2018-10-23 ENCOUNTER — OFFICE VISIT (OUTPATIENT)
Dept: INTERNAL MEDICINE | Facility: CLINIC | Age: 43
End: 2018-10-23
Payer: MEDICAID

## 2018-10-23 VITALS
WEIGHT: 293 LBS | TEMPERATURE: 98 F | BODY MASS INDEX: 51.91 KG/M2 | HEART RATE: 68 BPM | HEIGHT: 63 IN | SYSTOLIC BLOOD PRESSURE: 114 MMHG | RESPIRATION RATE: 18 BRPM | DIASTOLIC BLOOD PRESSURE: 80 MMHG

## 2018-10-23 DIAGNOSIS — Z91.89 CANDIDATE FOR STATIN THERAPY DUE TO RISK OF FUTURE CARDIOVASCULAR EVENT: ICD-10-CM

## 2018-10-23 DIAGNOSIS — E66.01 MORBID OBESITY WITH BMI OF 60.0-69.9, ADULT: ICD-10-CM

## 2018-10-23 DIAGNOSIS — G89.29 CHRONIC PAIN OF RIGHT KNEE: ICD-10-CM

## 2018-10-23 DIAGNOSIS — M25.512 ACUTE PAIN OF LEFT SHOULDER: Primary | ICD-10-CM

## 2018-10-23 DIAGNOSIS — M25.561 CHRONIC PAIN OF RIGHT KNEE: ICD-10-CM

## 2018-10-23 PROCEDURE — 99214 OFFICE O/P EST MOD 30 MIN: CPT | Mod: S$PBB,,, | Performed by: INTERNAL MEDICINE

## 2018-10-23 PROCEDURE — 99213 OFFICE O/P EST LOW 20 MIN: CPT | Mod: PBBFAC,PO | Performed by: INTERNAL MEDICINE

## 2018-10-23 PROCEDURE — 99999 PR PBB SHADOW E&M-EST. PATIENT-LVL III: CPT | Mod: PBBFAC,,, | Performed by: INTERNAL MEDICINE

## 2018-10-23 RX ORDER — METHOCARBAMOL 500 MG/1
500 TABLET, FILM COATED ORAL NIGHTLY PRN
Qty: 30 TABLET | Refills: 0 | Status: SHIPPED | OUTPATIENT
Start: 2018-10-23 | End: 2018-11-02

## 2018-10-23 RX ORDER — ATORVASTATIN CALCIUM 10 MG/1
10 TABLET, FILM COATED ORAL DAILY
Qty: 90 TABLET | Refills: 0 | Status: SHIPPED | OUTPATIENT
Start: 2018-10-23 | End: 2019-04-12 | Stop reason: SDUPTHER

## 2018-10-23 RX ORDER — DICLOFENAC SODIUM 75 MG/1
75 TABLET, DELAYED RELEASE ORAL 2 TIMES DAILY
Qty: 60 TABLET | Refills: 0 | Status: SHIPPED | OUTPATIENT
Start: 2018-10-23 | End: 2019-10-14

## 2018-10-23 NOTE — PROGRESS NOTES
Subjective:       Patient ID: Halley Moreno is a 43 y.o. female who presents for Skin Check (lower legs); Shoulder Pain (left-sharp pain-limited movement); Arm Pain (left- requesting PT or xray); Knee Pain (right); and Obesity      Shoulder Pain    The pain is present in the left shoulder. This is a new problem. The current episode started 1 to 4 weeks ago. There has been no history of extremity trauma. The problem occurs constantly. The problem has been waxing and waning. The quality of the pain is described as aching. The pain is moderate. Pertinent negatives include no fever, headaches, inability to bear weight, joint swelling, limited range of motion, numbness, stiffness or tingling. The symptoms are aggravated by activity. She has tried NSAIDS for the symptoms. The treatment provided mild relief. Her past medical history is significant for diabetes. There is no history of Injuries to Extremity.   Knee Pain    The incident occurred more than 1 week ago. There was no injury mechanism. The pain is present in the right knee. The quality of the pain is described as aching. The pain is moderate. The pain has been intermittent since onset. Pertinent negatives include no inability to bear weight, muscle weakness, numbness or tingling. Nothing aggravates the symptoms.   Wound Check   She was originally treated 10 to 14 days ago. Previous treatment included wound cleansing or irrigation. Her temperature was unmeasured prior to arrival. There has been no drainage (wound was initially open and red not has since then closed and is healing) from the wound. The redness has not changed (area has a purple/red hue). The swelling has not changed. The pain has improved.     For breakfast, she ate a [employer] with a biscuit and jelly. She eats sandwiches from [employer] and [employer]. Drinks sweet tea regularly. She is more active with her new job and has minimized fast food intake. She does admit to feeling  sleepy after each meal. Reports losing 4 lbs since last appointment.    Body mass index is 68.26 kg/m².      Review of Systems   Constitutional: Negative for chills, diaphoresis and fever.   HENT: Negative for congestion, rhinorrhea and sinus pressure.    Eyes: Negative for redness and visual disturbance.   Respiratory: Negative for cough and shortness of breath.    Cardiovascular: Positive for leg swelling (bilateral leg). Negative for chest pain and palpitations.   Gastrointestinal: Negative for abdominal pain, constipation, diarrhea, nausea and vomiting.   Musculoskeletal: Positive for arthralgias (shoulder pain that radiates to left arm, right knee pain). Negative for myalgias and stiffness.   Skin: Positive for color change (bruising near bilateral ankles) and wound (closed, no drainage). Negative for rash.   Neurological: Negative for dizziness, tingling, weakness, numbness and headaches.   Hematological: Negative for adenopathy.       Objective:      Physical Exam   Constitutional: She is oriented to person, place, and time. Vital signs are normal. She appears well-developed and well-nourished. No distress.   HENT:   Head: Normocephalic and atraumatic.   Right Ear: Hearing and external ear normal.   Left Ear: Hearing and external ear normal.   Nose: Nose normal.   Mouth/Throat: Uvula is midline.   Eyes: Lids are normal.   Cardiovascular: Normal rate, regular rhythm, normal heart sounds and intact distal pulses.   No murmur heard.  Pulmonary/Chest: Effort normal and breath sounds normal. She has no wheezes.   Abdominal: Soft. Bowel sounds are normal. She exhibits no distension. There is no tenderness.   Musculoskeletal: She exhibits edema (BLE).        Left shoulder: She exhibits decreased range of motion, bony tenderness and pain. She exhibits no swelling.        Right knee: She exhibits normal range of motion, no ecchymosis, no erythema and normal patellar mobility. Tenderness found. Medial joint line and  lateral joint line tenderness noted.   Neurological: She is alert and oriented to person, place, and time.   Skin: Skin is warm and dry. Abrasion (healing) and bruising (BLE bruising superior to ankles) noted. No rash noted. She is not diaphoretic.   Psychiatric: She has a normal mood and affect.   Vitals reviewed.      Assessment/Plan:       1. Acute pain of left shoulder  - X-Ray Shoulder 2 or More Views Left; Future  - diclofenac (VOLTAREN) 75 MG EC tablet; Take 1 tablet (75 mg total) by mouth 2 (two) times daily.  Dispense: 60 tablet; Refill: 0  - methocarbamol (ROBAXIN) 500 MG Tab; Take 1 tablet (500 mg total) by mouth nightly as needed.  Dispense: 30 tablet; Refill: 0    2. Chronic pain of right knee  - diclofenac (VOLTAREN) 75 MG EC tablet; Take 1 tablet (75 mg total) by mouth 2 (two) times daily.  Dispense: 60 tablet; Refill: 0  - advised to call insurer to find list of Orthopedists    3. Candidate for statin therapy due to risk of future cardiovascular event  - atorvastatin (LIPITOR) 10 MG tablet; Take 1 tablet (10 mg total) by mouth once daily.  Dispense: 90 tablet; Refill: 0    4. Morbid obesity  - shared [employer] fast food list with patient, she will limit choices from [employer] and [employer] Tejeda MD

## 2018-10-24 ENCOUNTER — HOSPITAL ENCOUNTER (OUTPATIENT)
Dept: RADIOLOGY | Facility: HOSPITAL | Age: 43
Discharge: HOME | End: 2018-10-24
Attending: INTERNAL MEDICINE
Payer: MEDICAID

## 2018-10-24 DIAGNOSIS — M25.512 ACUTE PAIN OF LEFT SHOULDER: ICD-10-CM

## 2018-10-24 PROCEDURE — 73030 X-RAY EXAM OF SHOULDER: CPT | Mod: 26,LT,, | Performed by: RADIOLOGY

## 2018-10-24 PROCEDURE — 73030 X-RAY EXAM OF SHOULDER: CPT | Mod: TC,PO,LT

## 2018-10-25 ENCOUNTER — TELEPHONE (OUTPATIENT)
Dept: INTERNAL MEDICINE | Facility: CLINIC | Age: 43
End: 2018-10-25

## 2018-10-25 NOTE — TELEPHONE ENCOUNTER
----- Message from Yamila Tejeda MD sent at 10/25/2018  9:51 AM CDT -----  Message sent via patient portal.

## 2018-11-16 ENCOUNTER — LAB VISIT (OUTPATIENT)
Dept: LAB | Facility: HOSPITAL | Age: 43
End: 2018-11-16
Attending: INTERNAL MEDICINE
Payer: MEDICAID

## 2018-11-16 DIAGNOSIS — I15.2 HYPERTENSION ASSOCIATED WITH DIABETES: ICD-10-CM

## 2018-11-16 DIAGNOSIS — E11.59 HYPERTENSION ASSOCIATED WITH DIABETES: ICD-10-CM

## 2018-11-16 LAB
ALBUMIN SERPL BCP-MCNC: 3.5 G/DL
ALP SERPL-CCNC: 81 U/L
ALT SERPL W/O P-5'-P-CCNC: 20 U/L
ANION GAP SERPL CALC-SCNC: 6 MMOL/L
AST SERPL-CCNC: 17 U/L
BILIRUB SERPL-MCNC: 0.4 MG/DL
BUN SERPL-MCNC: 16 MG/DL
CALCIUM SERPL-MCNC: 9.5 MG/DL
CHLORIDE SERPL-SCNC: 104 MMOL/L
CHOLEST SERPL-MCNC: 114 MG/DL
CHOLEST/HDLC SERPL: 2.7 {RATIO}
CO2 SERPL-SCNC: 29 MMOL/L
CREAT SERPL-MCNC: 0.9 MG/DL
EST. GFR  (AFRICAN AMERICAN): >60 ML/MIN/1.73 M^2
EST. GFR  (NON AFRICAN AMERICAN): >60 ML/MIN/1.73 M^2
GLUCOSE SERPL-MCNC: 144 MG/DL
HDLC SERPL-MCNC: 42 MG/DL
HDLC SERPL: 36.8 %
LDLC SERPL CALC-MCNC: 54.2 MG/DL
NONHDLC SERPL-MCNC: 72 MG/DL
POTASSIUM SERPL-SCNC: 4.8 MMOL/L
PROT SERPL-MCNC: 7.3 G/DL
SODIUM SERPL-SCNC: 139 MMOL/L
TRIGL SERPL-MCNC: 89 MG/DL

## 2018-11-16 PROCEDURE — 80053 COMPREHEN METABOLIC PANEL: CPT

## 2018-11-16 PROCEDURE — 36415 COLL VENOUS BLD VENIPUNCTURE: CPT | Mod: PO

## 2018-11-16 PROCEDURE — 80061 LIPID PANEL: CPT

## 2018-11-20 ENCOUNTER — LAB VISIT (OUTPATIENT)
Dept: LAB | Facility: HOSPITAL | Age: 43
End: 2018-11-20
Attending: INTERNAL MEDICINE
Payer: MEDICAID

## 2018-11-20 ENCOUNTER — OFFICE VISIT (OUTPATIENT)
Dept: INTERNAL MEDICINE | Facility: CLINIC | Age: 43
End: 2018-11-20
Payer: MEDICAID

## 2018-11-20 VITALS
SYSTOLIC BLOOD PRESSURE: 120 MMHG | HEART RATE: 81 BPM | HEIGHT: 63 IN | DIASTOLIC BLOOD PRESSURE: 79 MMHG | TEMPERATURE: 98 F | WEIGHT: 293 LBS | BODY MASS INDEX: 51.91 KG/M2

## 2018-11-20 DIAGNOSIS — M25.512 CHRONIC LEFT SHOULDER PAIN: ICD-10-CM

## 2018-11-20 DIAGNOSIS — I15.2 HYPERTENSION ASSOCIATED WITH DIABETES: ICD-10-CM

## 2018-11-20 DIAGNOSIS — E11.59 HYPERTENSION ASSOCIATED WITH DIABETES: ICD-10-CM

## 2018-11-20 DIAGNOSIS — R60.0 BILATERAL LEG EDEMA: ICD-10-CM

## 2018-11-20 DIAGNOSIS — E11.21 CONTROLLED TYPE 2 DIABETES MELLITUS WITH DIABETIC NEPHROPATHY, WITHOUT LONG-TERM CURRENT USE OF INSULIN: Chronic | ICD-10-CM

## 2018-11-20 DIAGNOSIS — E11.21 CONTROLLED TYPE 2 DIABETES MELLITUS WITH DIABETIC NEPHROPATHY, WITHOUT LONG-TERM CURRENT USE OF INSULIN: Primary | Chronic | ICD-10-CM

## 2018-11-20 DIAGNOSIS — B36.9 FUNGAL SKIN INFECTION: ICD-10-CM

## 2018-11-20 DIAGNOSIS — M54.2 NECK PAIN: ICD-10-CM

## 2018-11-20 DIAGNOSIS — G89.29 CHRONIC LEFT SHOULDER PAIN: ICD-10-CM

## 2018-11-20 LAB
ESTIMATED AVG GLUCOSE: 143 MG/DL
HBA1C MFR BLD HPLC: 6.6 %

## 2018-11-20 PROCEDURE — 99999 PR PBB SHADOW E&M-EST. PATIENT-LVL III: CPT | Mod: PBBFAC,,, | Performed by: INTERNAL MEDICINE

## 2018-11-20 PROCEDURE — 83036 HEMOGLOBIN GLYCOSYLATED A1C: CPT

## 2018-11-20 PROCEDURE — 99214 OFFICE O/P EST MOD 30 MIN: CPT | Mod: S$PBB,,, | Performed by: INTERNAL MEDICINE

## 2018-11-20 PROCEDURE — 99213 OFFICE O/P EST LOW 20 MIN: CPT | Mod: PBBFAC,PO,25 | Performed by: INTERNAL MEDICINE

## 2018-11-20 PROCEDURE — 90471 IMMUNIZATION ADMIN: CPT | Mod: PBBFAC,PO

## 2018-11-20 PROCEDURE — 36415 COLL VENOUS BLD VENIPUNCTURE: CPT | Mod: PO

## 2018-11-20 RX ORDER — CLOTRIMAZOLE AND BETAMETHASONE DIPROPIONATE 10; .64 MG/G; MG/G
CREAM TOPICAL 2 TIMES DAILY
Qty: 45 G | Refills: 0 | Status: SHIPPED | OUTPATIENT
Start: 2018-11-20 | End: 2019-08-13 | Stop reason: SDUPTHER

## 2018-11-20 RX ORDER — LISINOPRIL 40 MG/1
40 TABLET ORAL DAILY
Qty: 90 TABLET | Refills: 1 | Status: SHIPPED | OUTPATIENT
Start: 2018-11-20 | End: 2019-08-13 | Stop reason: ALTCHOICE

## 2018-11-20 RX ORDER — FUROSEMIDE 40 MG/1
60 TABLET ORAL DAILY
Qty: 135 TABLET | Refills: 1 | Status: SHIPPED | OUTPATIENT
Start: 2018-11-20 | End: 2019-09-19 | Stop reason: SDUPTHER

## 2018-11-20 RX ORDER — GLIPIZIDE 5 MG/1
TABLET, FILM COATED, EXTENDED RELEASE ORAL
Qty: 90 TABLET | Refills: 1 | Status: SHIPPED | OUTPATIENT
Start: 2018-11-20 | End: 2019-09-19 | Stop reason: SDUPTHER

## 2018-11-20 NOTE — PROGRESS NOTES
Subjective:       Patient ID: Halley Moreno is a 43 y.o. female who presents for Follow-up (3 month); Diabetes; Wound Check; and Shoulder Pain      Diabetes   She presents for her follow-up diabetic visit. She has type 2 diabetes mellitus. Her disease course has been stable. There are no hypoglycemic associated symptoms. Pertinent negatives for hypoglycemia include no dizziness or headaches. Pertinent negatives for diabetes include no chest pain, no polydipsia, no polyuria and no weakness. There are no hypoglycemic complications. Symptoms are stable. Pertinent negatives for diabetic complications include no nephropathy. Risk factors for coronary artery disease include diabetes mellitus, family history, obesity, sedentary lifestyle and hypertension. Current diabetic treatment includes oral agent (monotherapy). She is compliant with treatment all of the time. Her weight is decreasing steadily. She is following a generally healthy diet. She rarely participates in exercise. There is no change in her home blood glucose trend. An ACE inhibitor/angiotensin II receptor blocker is being taken.   Wound Check   She was originally treated more than 14 days ago (chronic lower leg discoloration but now left lower leg is scaly and reddened). Her temperature was unmeasured prior to arrival. There has been no drainage from the wound. The redness has worsened. The swelling has not changed. There is no pain present.   Shoulder Pain    The pain is present in the left shoulder. This is a chronic problem. The current episode started more than 1 month ago. There has been no history of extremity trauma. The problem occurs intermittently. The problem has been waxing and waning. The quality of the pain is described as aching. The pain is moderate. Associated symptoms include stiffness. Pertinent negatives include no fever, headaches, joint swelling, limited range of motion or numbness. The symptoms are aggravated by activity. Her past  medical history is significant for diabetes.      385 lbs --> 382 lbs and plans to lose ~10 lbs before the end of the year      Review of Systems   Constitutional: Negative for chills, diaphoresis and fever.   HENT: Negative for congestion, rhinorrhea and sinus pressure.    Eyes: Negative for redness and visual disturbance.   Respiratory: Negative for cough and shortness of breath.    Cardiovascular: Positive for leg swelling (BLE). Negative for chest pain and palpitations.   Gastrointestinal: Negative for abdominal pain, diarrhea, nausea and vomiting.   Endocrine: Negative for polydipsia and polyuria.   Musculoskeletal: Positive for arthralgias, neck pain and stiffness. Negative for gait problem and myalgias.   Skin: Negative for rash.   Neurological: Negative for dizziness, weakness, numbness and headaches.       Objective:      Physical Exam   Constitutional: She is oriented to person, place, and time. Vital signs are normal. She appears well-developed and well-nourished. No distress.   HENT:   Head: Normocephalic and atraumatic.   Right Ear: Hearing and external ear normal.   Left Ear: Hearing and external ear normal.   Nose: Nose normal.   Mouth/Throat: Uvula is midline.   Eyes: Lids are normal.   Neck: Normal range of motion. Neck supple. Muscular tenderness present.   Cardiovascular: Normal rate, regular rhythm, normal heart sounds and intact distal pulses.   No murmur heard.  Pulmonary/Chest: Effort normal and breath sounds normal. She has no wheezes.   Abdominal: Soft. Bowel sounds are normal. She exhibits no distension. There is no tenderness.   Musculoskeletal: Normal range of motion. She exhibits no edema.        Left shoulder: She exhibits tenderness and pain. She exhibits no swelling.   Neurological: She is alert and oriented to person, place, and time.   Skin: Skin is warm, dry and intact. No rash noted. She is not diaphoretic.   Psychiatric: She has a normal mood and affect.   Vitals reviewed.       Assessment/Plan:         1. Controlled type 2 diabetes mellitus with diabetic nephropathy, without long-term current use of insulin  - glipiZIDE (GLUCOTROL) 5 MG TR24; TAKE ONE TABLET BY MOUTH ONCE DAILY WITH BREAKFAST  Dispense: 90 tablet; Refill: 1  - Hemoglobin A1c; Future    2. Hypertension associated with diabetes  - lisinopril (PRINIVIL,ZESTRIL) 40 MG tablet; Take 1 tablet (40 mg total) by mouth once daily.  Dispense: 90 tablet; Refill: 1  - furosemide (LASIX) 40 MG tablet; Take 1.5 tablets (60 mg total) by mouth once daily.  Dispense: 135 tablet; Refill: 1  - BP is controlled    3. Bilateral leg edema  - furosemide (LASIX) 40 MG tablet; Take 1.5 tablets (60 mg total) by mouth once daily.  Dispense: 135 tablet; Refill: 1    4. Chronic left shoulder pain  - Ambulatory consult to Physical Therapy    5. Neck pain  - Ambulatory consult to Physical Therapy    6. Fungal skin infection  - clotrimazole-betamethasone 1-0.05% (LOTRISONE) cream; Apply topically 2 (two) times daily. Apply to left lower leg  Dispense: 45 g; Refill: 0  - may need Dermatology if no improvement    RTC in 2 months or sooner if needed    Yamila Tejeda MD

## 2018-11-24 PROBLEM — M25.512 CHRONIC LEFT SHOULDER PAIN: Status: ACTIVE | Noted: 2018-11-24

## 2018-11-24 PROBLEM — G89.29 CHRONIC LEFT SHOULDER PAIN: Status: ACTIVE | Noted: 2018-11-24

## 2019-01-04 ENCOUNTER — OFFICE VISIT (OUTPATIENT)
Dept: INTERNAL MEDICINE | Facility: CLINIC | Age: 44
End: 2019-01-04
Payer: MEDICAID

## 2019-01-04 VITALS
TEMPERATURE: 99 F | HEART RATE: 64 BPM | HEIGHT: 63 IN | RESPIRATION RATE: 16 BRPM | WEIGHT: 293 LBS | SYSTOLIC BLOOD PRESSURE: 124 MMHG | DIASTOLIC BLOOD PRESSURE: 68 MMHG | BODY MASS INDEX: 51.91 KG/M2

## 2019-01-04 DIAGNOSIS — E11.21 CONTROLLED TYPE 2 DIABETES MELLITUS WITH DIABETIC NEPHROPATHY, WITHOUT LONG-TERM CURRENT USE OF INSULIN: Primary | Chronic | ICD-10-CM

## 2019-01-04 DIAGNOSIS — E11.59 OBESITY, DIABETES, AND HYPERTENSION SYNDROME: ICD-10-CM

## 2019-01-04 DIAGNOSIS — E66.01 MORBID OBESITY WITH BMI OF 60.0-69.9, ADULT: ICD-10-CM

## 2019-01-04 DIAGNOSIS — E11.59 HYPERTENSION ASSOCIATED WITH DIABETES: ICD-10-CM

## 2019-01-04 DIAGNOSIS — I15.2 OBESITY, DIABETES, AND HYPERTENSION SYNDROME: ICD-10-CM

## 2019-01-04 DIAGNOSIS — E66.9 OBESITY, DIABETES, AND HYPERTENSION SYNDROME: ICD-10-CM

## 2019-01-04 DIAGNOSIS — E11.69 OBESITY, DIABETES, AND HYPERTENSION SYNDROME: ICD-10-CM

## 2019-01-04 DIAGNOSIS — I15.2 HYPERTENSION ASSOCIATED WITH DIABETES: ICD-10-CM

## 2019-01-04 PROCEDURE — 99213 OFFICE O/P EST LOW 20 MIN: CPT | Mod: PBBFAC,PO | Performed by: INTERNAL MEDICINE

## 2019-01-04 PROCEDURE — 99999 PR PBB SHADOW E&M-EST. PATIENT-LVL III: CPT | Mod: PBBFAC,,, | Performed by: INTERNAL MEDICINE

## 2019-01-04 PROCEDURE — 99999 PR PBB SHADOW E&M-EST. PATIENT-LVL III: ICD-10-PCS | Mod: PBBFAC,,, | Performed by: INTERNAL MEDICINE

## 2019-01-04 PROCEDURE — 99214 OFFICE O/P EST MOD 30 MIN: CPT | Mod: S$PBB,,, | Performed by: INTERNAL MEDICINE

## 2019-01-04 PROCEDURE — 99214 PR OFFICE/OUTPT VISIT, EST, LEVL IV, 30-39 MIN: ICD-10-PCS | Mod: S$PBB,,, | Performed by: INTERNAL MEDICINE

## 2019-01-04 RX ORDER — TOPIRAMATE 25 MG/1
TABLET ORAL
COMMUNITY
Start: 2019-01-02 | End: 2020-05-06

## 2019-01-04 NOTE — PROGRESS NOTES
Subjective:       Patient ID: Halley Moreno is a 43 y.o. female who presents for Follow-up; Obesity; Diabetes; and Rash      Diabetes   She presents for her follow-up diabetic visit. She has type 2 diabetes mellitus. Her disease course has been stable. Pertinent negatives for hypoglycemia include no dizziness, headaches, sweats or tremors. There are no diabetic associated symptoms. Pertinent negatives for diabetes include no chest pain, no polydipsia, no polyuria and no weakness. There are no hypoglycemic complications. There are no diabetic complications. Risk factors for coronary artery disease include diabetes mellitus, dyslipidemia, family history, obesity and hypertension. Current diabetic treatment includes oral agent (monotherapy). She is compliant with treatment all of the time. Her weight is decreasing steadily. She is following a generally healthy diet. She rarely participates in exercise. There is no change in her home blood glucose trend. An ACE inhibitor/angiotensin II receptor blocker is being taken.   Rash   This is a recurrent problem. The current episode started in the past 7 days. The problem has been gradually improving since onset. The affected locations include the abdomen (under pannus). The rash is characterized by dryness and draining (at one time, wound was oozing). She was exposed to nothing. Pertinent negatives include no congestion, cough, diarrhea, fever, rhinorrhea, shortness of breath or vomiting. Treatments tried: used medicated powder, calmoseptine cream. The treatment provided mild relief. There is no history of allergies.      Weight a few months ago was 389 and weight today is 380. She lost 2 lbs since last appointment 2 weeks ago. She has been reducing meal portion sizes and she switched to wheat bread and pasta. She has avoided sugary drinks. Has not started exercising but will start PT on Thursday.    Body mass index is 67.4 kg/m².      Review of Systems   Constitutional:  Negative for chills and fever.   HENT: Negative for congestion, rhinorrhea and sinus pressure.    Eyes: Negative for discharge and redness.   Respiratory: Negative for cough and shortness of breath.    Cardiovascular: Negative for chest pain and palpitations.   Gastrointestinal: Negative for abdominal pain, constipation, diarrhea, nausea and vomiting.   Endocrine: Negative for polydipsia and polyuria.   Genitourinary: Negative for frequency and urgency.   Musculoskeletal: Negative for arthralgias and myalgias.   Skin: Positive for color change and rash.   Neurological: Negative for dizziness, tremors, weakness and headaches.       Objective:      Physical Exam   Constitutional: She is oriented to person, place, and time. Vital signs are normal. She appears well-developed and well-nourished. No distress.   HENT:   Head: Normocephalic and atraumatic.   Right Ear: Hearing and external ear normal.   Left Ear: Hearing and external ear normal.   Nose: Nose normal.   Mouth/Throat: Uvula is midline.   Eyes: Lids are normal.   Cardiovascular: Normal rate, regular rhythm, normal heart sounds and intact distal pulses.   No murmur heard.  Pulmonary/Chest: Effort normal and breath sounds normal. She has no wheezes.   Abdominal: Soft. Bowel sounds are normal. She exhibits no distension. There is no tenderness.   Musculoskeletal: Normal range of motion.   Neurological: She is alert and oriented to person, place, and time.   Skin: Skin is warm and dry. No rash noted. She is not diaphoretic. There is erythema (under pannus, mild, no open lesions).   Psychiatric: She has a normal mood and affect.   Vitals reviewed.      Assessment/Plan:       1. Controlled type 2 diabetes mellitus with diabetic nephropathy, without long-term current use of insulin  - Hemoglobin A1c; Future  - stable, controlled, continue glipizide    2. Hypertension associated with diabetes  - continue lisinopril  - Basic metabolic panel; Future    3. Obesity,  diabetes, and hypertension syndrome  - discussed additional dietary modifications to try    4. Morbid obesity with BMI of 60.0-69.9, adult  - patient agreeable with walking 3-5 times weekly    RTC in 2 months or sooner if needed    Yamila Tejeda MD

## 2019-01-08 ENCOUNTER — CLINICAL SUPPORT (OUTPATIENT)
Dept: REHABILITATION | Facility: HOSPITAL | Age: 44
End: 2019-01-08
Attending: INTERNAL MEDICINE
Payer: MEDICAID

## 2019-01-08 DIAGNOSIS — R26.89 DECREASED FUNCTIONAL MOBILITY: ICD-10-CM

## 2019-01-08 DIAGNOSIS — R26.89 DECREASED SPINAL MOBILITY: ICD-10-CM

## 2019-01-08 PROCEDURE — 97161 PT EVAL LOW COMPLEX 20 MIN: CPT | Mod: PO

## 2019-01-08 NOTE — PROGRESS NOTES
Physical Therapy Initial Evaluation     Name: Halley Moreno  Clinic Number: [id number]    Diagnosis:   Encounter Diagnoses   Name Primary?    Decreased spinal mobility     Decreased functional mobility      Physician: Yamila Tejeda MD  Treatment Orders: PT Eval and Treat  Past Medical History:   Diagnosis Date    BMI 70 and over, adult     Depression     Diabetes mellitus     DM (diabetes mellitus) type II controlled with renal manifestation     HTN (hypertension)     Hyperlipidemia     Lumbar disc disease     Morbid obesity     Recurrent nephrolithiasis     Rosacea     Sleep apnea      Current Outpatient Medications   Medication Sig    atorvastatin (LIPITOR) 10 MG tablet Take 1 tablet (10 mg total) by mouth once daily.    azelastine (ASTELIN) 137 mcg (0.1 %) nasal spray 1 spray (137 mcg total) by Nasal route 2 (two) times daily.    blood sugar diagnostic Strp 1 strip by Misc.(Non-Drug; Combo Route) route 2 (two) times daily.    clotrimazole-betamethasone 1-0.05% (LOTRISONE) cream Apply topically 2 (two) times daily. Apply to left lower leg    diclofenac (VOLTAREN) 75 MG EC tablet Take 1 tablet (75 mg total) by mouth 2 (two) times daily.    furosemide (LASIX) 40 MG tablet Take 1.5 tablets (60 mg total) by mouth once daily.    glipiZIDE (GLUCOTROL) 5 MG TR24 TAKE ONE TABLET BY MOUTH ONCE DAILY WITH BREAKFAST    hydrocodone-acetaminophen 5-325mg (NORCO) 5-325 mg per tablet Take 1 tablet by mouth every 8 (eight) hours as needed for Pain.    ketoconazole (NIZORAL) 2 % cream Apply topically once daily. To feet    lancets Misc 1 lancet by Misc.(Non-Drug; Combo Route) route 2 (two) times daily.    lidocaine (LIDODERM) 5 % Place 1 patch onto the skin 2 (two) times daily. Remove & Discard patch within 12 hours or as directed by MD    lisinopril (PRINIVIL,ZESTRIL) 40 MG tablet Take 1 tablet (40 mg total) by mouth once daily.     metronidazole (METROGEL) 0.75 % gel Apply topically 2 (two) times daily.    nystatin (MYCOSTATIN) powder Apply topically 2 (two) times daily. Apply 2gm twice daily    phendimetrazine tartrate 105 mg CpSR TK 1 C PO QD    topiramate (TOPAMAX) 25 MG tablet     triamcinolone acetonide 0.1% (KENALOG) 0.1 % Lotn Apply topically 2 (two) times daily.     No current facility-administered medications for this visit.      Review of patient's allergies indicates:  No Known Allergies    Evaluation Date: 1/8/19   Visit # authorized: 1  Authorization period: 11/20/19   Plan of care Expiration: 3/8/19   MD referral: Y      Subjective     Patient states: That a few year ago she had an accident at the [email or web address]. She went to use the restroom and the toilet seat broke and she fell. She reports that after that she had a lot of low back pain. She reports that she was in PT at our [facility] and that did help some. She then began having pain in her R knee, neck and shoulder. She was in PT for that as well but was unable to finish that therapy. She reports that the therapy for that was helping significantly at the time. She reports today that she is having difficulty turning her head to the side, difficulty lifting her arms above her head, pain with carrying her purse for too long and difficulty sleeping. Pt denies radicular symptoms in her arms but does report being dx with B carpal tunnel and still gets periodic symptoms from that. She does reports that she uses B wrist braces and that helps a lot with the carpal tunnel pain. The pt takes prescription pain meds and anti-inflammatories when the pain gets really bad and that helps.   Pain Scale: Halley rates pain on a scale of 0-10 to be 7 at worst; 5 currently; 1 at best .  Onset: sudden  Radicular symptoms:  B carpal tunnel   Aggravating factors:   See above   Easing factors:  Medication and heat   Prior Therapy: multiple rounds of PT that were very helpful   Functional Deficits  Leading to Referral: pain, decreased mobility   Prior functional status: independent with no pain prior to the fall   DME owned/used: none   Occupation:  Unemployed                        Pts goals:  To reduce the pain     Objective     Posture Alignment: increased thoracic kyphosis, forward head, rounded shoulders     CERVICAL SPINE AROM:   Flexion: 80   Extension: 55   Left Sidebend: 28   Right Sidebend: 34   Left Rotation: 58   Right Rotation: 55     SEGMENTAL MOBILITY: Unable to assess due to body habitus that does not allow for palpation of bony structures     UPPER EXTREMITY STRENGTH:   Left Right   Shoulder Flexion 4/5 4/5   Shoulder Abduction 4/5 4/5     Shoulder Internal Rotation 4/5 4/5   Shoulder External Rotation 4/5 4/5       DTR's: WNL     Dermatomes: Sensation: Light Touch: Intact  Myotomes: WNL  Palpation: unable to palpate bony structures due to body habitus but no tenderness to palpation of soft tissue structures     Pt/family was provided educational information, including: role of PT, goals for PT, scheduling - pt verbalized understanding. Discussed insurance limitations with pt.     TREATMENT     Time In: 11:00 AM   Time Out: 11:40 AM     PT Evaluation Completed? Yes  Discussed Plan of Care with patient: Yes    Halley received 10 minutes of therapeutic exercise & instruction including:  EDUCATION OF HEP SEE PATIENT INSTRUCTIONS     Written Home Exercises Provided: SEE PATIENT INSTRUCTIONS   Halley demo good understanding of the education provided. Patient demo good return demo of skill of exercises.    Assessment     Patient presents to the clinic with c/o neck pain. The pt does present with decreased neck AROM, poor postural awareness, decreased functional mobility and decreased UE strength. The pt was also falling asleep at the treatment table multiple times throughout the evaluation.   Pt prognosis is Fair.  Pt will benefit from skilled outpatient physical therapy to address the above stated  deficits, provide pt/family education and to maximize pt's level of independence.     Medical necessity is demonstrated by the following IMPAIRMENTS/PROBLEMS:  1. Increased Pain  2. Decreased Segmental Mobility & Decreased ROM  3. Decreased Core & BUE strength  4. Postural Imbalance  5. Decreased Tolerance to Functional Activities    Pt's spiritual, cultural and educational needs considered and pt agreeable to plan of care and goals as stated below:     Anticipated Barriers for physical therapy: Body habitus, several co-morbidities     History  Co-morbidities and personal factors that may impact the plan of care Examination  Body Structures and Functions, activity limitations and participation restrictions that may impact the plan of care    Clinical Presentation   Co-morbidities:   CKD stage unknown , diabetes, difficulty sleeping, high BMI and HTN        Personal Factors:   social background  lifestyle Body Regions:   neck  upper extremities    Body Systems:    ROM  strength            Participation Restrictions:   Unable to work per the patient      Activity limitations:   Learning and applying knowledge  no deficits    General Tasks and Commands  no deficits    Communication  no deficits    Mobility  lifting and carrying objects  fine hand use (grasping/picking up)    Self care  caring for body parts (brushing teeth, shaving, grooming)    Domestic Life  doing house work (cleaning house, washing dishes, laundry)    Interactions/Relationships  no deficits    Life Areas  employment    Community and Social Life  community life  recreation and leisure         stable and uncomplicated                      low   low  moderate Decision Making/ Complexity Score:  low       Short Term GOALS: 2 weeks. Pt agrees with goals set.  1. Patient demonstrates independence with HEP.   2. Patient demonstrates independence with Postural Awareness.   3. Patient demonstrates independence with body mechanics.     Long Term GOALS: 4  weeks. Pt agrees with goals set.  1. Patient demonstrates increased ROM to WNL to improve tolerance to functional activities pain free.   2. Patient demonstrates increased strength BUE's to 4+/5 or greater to improve tolerance to functional activities pain free.     PLAN     Outpatient physical therapy 1-2 times weekly to include: pt ed, hep, therapeutic exercises, neuromuscular re-education/ balance exercises, joint mobilizations, aquatic therapy and modalities prn. Cont PT for  4 weeks. Pt may be seen by PTA as part of the rehabilitation team.     Therapist: Gila Joseph, PT  1/8/2019

## 2019-01-08 NOTE — PLAN OF CARE
Physical Therapy Initial Evaluation     Name: Halley Moreno  Clinic Number: [id number]    Diagnosis:   Encounter Diagnoses   Name Primary?    Decreased spinal mobility     Decreased functional mobility      Physician: Yamila Tejeda MD  Treatment Orders: PT Eval and Treat  Past Medical History:   Diagnosis Date    BMI 70 and over, adult     Depression     Diabetes mellitus     DM (diabetes mellitus) type II controlled with renal manifestation     HTN (hypertension)     Hyperlipidemia     Lumbar disc disease     Morbid obesity     Recurrent nephrolithiasis     Rosacea     Sleep apnea      Current Outpatient Medications   Medication Sig    atorvastatin (LIPITOR) 10 MG tablet Take 1 tablet (10 mg total) by mouth once daily.    azelastine (ASTELIN) 137 mcg (0.1 %) nasal spray 1 spray (137 mcg total) by Nasal route 2 (two) times daily.    blood sugar diagnostic Strp 1 strip by Misc.(Non-Drug; Combo Route) route 2 (two) times daily.    clotrimazole-betamethasone 1-0.05% (LOTRISONE) cream Apply topically 2 (two) times daily. Apply to left lower leg    diclofenac (VOLTAREN) 75 MG EC tablet Take 1 tablet (75 mg total) by mouth 2 (two) times daily.    furosemide (LASIX) 40 MG tablet Take 1.5 tablets (60 mg total) by mouth once daily.    glipiZIDE (GLUCOTROL) 5 MG TR24 TAKE ONE TABLET BY MOUTH ONCE DAILY WITH BREAKFAST    hydrocodone-acetaminophen 5-325mg (NORCO) 5-325 mg per tablet Take 1 tablet by mouth every 8 (eight) hours as needed for Pain.    ketoconazole (NIZORAL) 2 % cream Apply topically once daily. To feet    lancets Misc 1 lancet by Misc.(Non-Drug; Combo Route) route 2 (two) times daily.    lidocaine (LIDODERM) 5 % Place 1 patch onto the skin 2 (two) times daily. Remove & Discard patch within 12 hours or as directed by MD    lisinopril (PRINIVIL,ZESTRIL) 40 MG tablet Take 1 tablet (40 mg total) by mouth once daily.     metronidazole (METROGEL) 0.75 % gel Apply topically 2 (two) times daily.    nystatin (MYCOSTATIN) powder Apply topically 2 (two) times daily. Apply 2gm twice daily    phendimetrazine tartrate 105 mg CpSR TK 1 C PO QD    topiramate (TOPAMAX) 25 MG tablet     triamcinolone acetonide 0.1% (KENALOG) 0.1 % Lotn Apply topically 2 (two) times daily.     No current facility-administered medications for this visit.      Review of patient's allergies indicates:  No Known Allergies    Evaluation Date: 1/8/19   Visit # authorized: 1  Authorization period: 11/20/19   Plan of care Expiration: 3/8/19   MD referral: Y      Subjective     Patient states: That a few year ago she had an accident at the [employer]. She went to use the restroom and the toilet seat broke and she fell. She reports that after that she had a lot of low back pain. She reports that she was in PT at our [facility] and that did help some. She then began having pain in her R knee, neck and shoulder. She was in PT for that as well but was unable to finish that therapy. She reports that the therapy for that was helping significantly at the time. She reports today that she is having difficulty turning her head to the side, difficulty lifting her arms above her head, pain with carrying her purse for too long and difficulty sleeping. Pt denies radicular symptoms in her arms but does report being dx with B carpal tunnel and still gets periodic symptoms from that. She does reports that she uses B wrist braces and that helps a lot with the carpal tunnel pain. The pt takes prescription pain meds and anti-inflammatories when the pain gets really bad and that helps.   Pain Scale: Halley rates pain on a scale of 0-10 to be 7 at worst; 5 currently; 1 at best .  Onset: sudden  Radicular symptoms:  B carpal tunnel   Aggravating factors:   See above   Easing factors:  Medication and heat   Prior Therapy: multiple rounds of PT that were very helpful   Functional Deficits  Leading to Referral: pain, decreased mobility   Prior functional status: independent with no pain prior to the fall   DME owned/used: none   Occupation:  Unemployed                        Pts goals:  To reduce the pain     Objective     Posture Alignment: increased thoracic kyphosis, forward head, rounded shoulders     CERVICAL SPINE AROM:   Flexion: 80   Extension: 55   Left Sidebend: 28   Right Sidebend: 34   Left Rotation: 58   Right Rotation: 55     SEGMENTAL MOBILITY: Unable to assess due to body habitus that does not allow for palpation of bony structures     UPPER EXTREMITY STRENGTH:   Left Right   Shoulder Flexion 4/5 4/5   Shoulder Abduction 4/5 4/5     Shoulder Internal Rotation 4/5 4/5   Shoulder External Rotation 4/5 4/5       DTR's: WNL     Dermatomes: Sensation: Light Touch: Intact  Myotomes: WNL  Palpation: unable to palpate bony structures due to body habitus but no tenderness to palpation of soft tissue structures     Pt/family was provided educational information, including: role of PT, goals for PT, scheduling - pt verbalized understanding. Discussed insurance limitations with pt.     TREATMENT     Time In: 11:00 AM   Time Out: 11:40 AM     PT Evaluation Completed? Yes  Discussed Plan of Care with patient: Yes    Halley received 10 minutes of therapeutic exercise & instruction including:  EDUCATION OF HEP SEE PATIENT INSTRUCTIONS     Written Home Exercises Provided: SEE PATIENT INSTRUCTIONS   Halley demo good understanding of the education provided. Patient demo good return demo of skill of exercises.    Assessment     Patient presents to the clinic with c/o neck pain. The pt does present with decreased neck AROM, poor postural awareness, decreased functional mobility and decreased UE strength. The pt was also falling asleep at the treatment table multiple times throughout the evaluation.   Pt prognosis is Fair.  Pt will benefit from skilled outpatient physical therapy to address the above stated  deficits, provide pt/family education and to maximize pt's level of independence.     Medical necessity is demonstrated by the following IMPAIRMENTS/PROBLEMS:  1. Increased Pain  2. Decreased Segmental Mobility & Decreased ROM  3. Decreased Core & BUE strength  4. Postural Imbalance  5. Decreased Tolerance to Functional Activities    Pt's spiritual, cultural and educational needs considered and pt agreeable to plan of care and goals as stated below:     Anticipated Barriers for physical therapy: Body habitus, several co-morbidities     History  Co-morbidities and personal factors that may impact the plan of care Examination  Body Structures and Functions, activity limitations and participation restrictions that may impact the plan of care    Clinical Presentation   Co-morbidities:   CKD stage unknown , diabetes, difficulty sleeping, high BMI and HTN        Personal Factors:   social background  lifestyle Body Regions:   neck  upper extremities    Body Systems:    ROM  strength            Participation Restrictions:   Unable to work per the patient      Activity limitations:   Learning and applying knowledge  no deficits    General Tasks and Commands  no deficits    Communication  no deficits    Mobility  lifting and carrying objects  fine hand use (grasping/picking up)    Self care  caring for body parts (brushing teeth, shaving, grooming)    Domestic Life  doing house work (cleaning house, washing dishes, laundry)    Interactions/Relationships  no deficits    Life Areas  employment    Community and Social Life  community life  recreation and leisure         stable and uncomplicated                      low   low  moderate Decision Making/ Complexity Score:  low       Short Term GOALS: 2 weeks. Pt agrees with goals set.  1. Patient demonstrates independence with HEP.   2. Patient demonstrates independence with Postural Awareness.   3. Patient demonstrates independence with body mechanics.     Long Term GOALS: 4  weeks. Pt agrees with goals set.  1. Patient demonstrates increased ROM to WNL to improve tolerance to functional activities pain free.   2. Patient demonstrates increased strength BUE's to 4+/5 or greater to improve tolerance to functional activities pain free.     PLAN     Outpatient physical therapy 1-2 times weekly to include: pt ed, hep, therapeutic exercises, neuromuscular re-education/ balance exercises, joint mobilizations, aquatic therapy and modalities prn. Cont PT for  4 weeks. Pt may be seen by PTA as part of the rehabilitation team.     Therapist: Gila Joseph, PT  1/8/2019

## 2019-01-15 ENCOUNTER — CLINICAL SUPPORT (OUTPATIENT)
Dept: REHABILITATION | Facility: HOSPITAL | Age: 44
End: 2019-01-15
Attending: INTERNAL MEDICINE
Payer: MEDICAID

## 2019-01-15 DIAGNOSIS — R26.89 DECREASED SPINAL MOBILITY: ICD-10-CM

## 2019-01-15 DIAGNOSIS — R26.89 DECREASED FUNCTIONAL MOBILITY: ICD-10-CM

## 2019-01-15 PROCEDURE — 97110 THERAPEUTIC EXERCISES: CPT | Mod: PO

## 2019-01-24 NOTE — PROGRESS NOTES
Physical Therapy Daily Note     Name: Halley Moreno  Clinic Number: [id number]  Diagnosis:   Encounter Diagnoses   Name Primary?    Decreased spinal mobility     Decreased functional mobility      Physician: Yamila Tejeda MD  Precautions: fall   Visit #: 2 of 12  PTA Visit #: 0  Time In: 11:00 am   Time Out: 11:45 pm   Total Treatment Time 1:1: 45    Evaluation Date: 1/8/19  Visit # authorized: 20   Authorization period: 12/31/19   Plan of care Expiration: 3/8/19   MD referral: y     Subjective     Pt reports: that she still has a lot of neck and low back pain. She reports that she just cannot get comfortable   Pain Scale: Halley rates pain on a scale of 0-10 to be 5 currently.    Objective     Halley received individual therapeutic exercises to develop strength, endurance and core stabilization for 45 minutes including:  Mini squats holding edge of mat 3  x10   Standing hip ext /abd 2 x 10 each B   Tband row LOTB 2 X 10   Tband ext LOTB 2 x 10     Written Home Exercises Provided: per above   Pt demo good understanding of the education provided. Halley demonstrated good return demonstration of activities.     Education provided re: no new   Halley verbalized good understanding of education provided.   No spiritual or educational barriers to learning provided    Assessment     Patient tolerated all treatment fairly but demos poor tolerance to mat exercises due to inability to lie prone even with the head of the bed elevated. The pt also with decreased tolerance to prolonged standing which also limits the amount of exercises we are able to perform. Will continue to attempt to advance core stabilization exercises to improve endurance and overall function.   This is a 43 y.o. female referred to outpatient physical therapy and presents with a medical diagnosis of cervical and lumbar pain and demonstrates limitations as described in the problem list. Pt prognosis is  Fair. Pt will continue to benefit from skilled outpatient physical therapy to address the deficits listed in the problem list, provide pt/family education and to maximize pt's level of independence in the home and community environment.      Plan     Continue with established Plan of Care towards PT goals.    Therapist: Gila Joseph, PT  1/24/2019

## 2019-01-31 ENCOUNTER — CLINICAL SUPPORT (OUTPATIENT)
Dept: REHABILITATION | Facility: HOSPITAL | Age: 44
End: 2019-01-31
Attending: INTERNAL MEDICINE
Payer: MEDICAID

## 2019-01-31 DIAGNOSIS — R26.89 DECREASED FUNCTIONAL MOBILITY: ICD-10-CM

## 2019-01-31 DIAGNOSIS — R26.89 DECREASED SPINAL MOBILITY: ICD-10-CM

## 2019-01-31 PROCEDURE — 97110 THERAPEUTIC EXERCISES: CPT | Mod: PO

## 2019-02-08 ENCOUNTER — CLINICAL SUPPORT (OUTPATIENT)
Dept: REHABILITATION | Facility: HOSPITAL | Age: 44
End: 2019-02-08
Attending: INTERNAL MEDICINE
Payer: MEDICAID

## 2019-02-08 DIAGNOSIS — R26.89 DECREASED SPINAL MOBILITY: ICD-10-CM

## 2019-02-08 DIAGNOSIS — R26.89 DECREASED FUNCTIONAL MOBILITY: ICD-10-CM

## 2019-02-08 PROCEDURE — 97110 THERAPEUTIC EXERCISES: CPT | Mod: PO

## 2019-02-19 NOTE — PROGRESS NOTES
Physical Therapy Daily Note     Name: Halley Moreno  Clinic Number: [id number]  Diagnosis:   Encounter Diagnoses   Name Primary?    Decreased spinal mobility     Decreased functional mobility      Physician: Yamila Tejeda MD  Precautions: fall   Visit #: 4 of 12  PTA Visit #: 0  Time In: 12:30 Pm   Time Out: 1:20 Pm   Total Treatment Time 1:1: 30    Evaluation Date: 1/8/19  Visit # authorized: 20   Authorization period: 12/31/19   Plan of care Expiration: 3/8/19   MD referral: y     Subjective     Pt reports: that she is feeling better and notes continued compliance with her HEP.    Pain Scale: Halley rates pain on a scale of 0-10 to be 3 currently.    Objective     Halley received individual therapeutic exercises to develop strength, endurance and core stabilization for 50 minutes including:  UBE 2 min F/2 min B   Seated Breuggers OTB 3 x 10   Sit to stand 3 x 10from low mat    Standing hip ext /abd 3 x 10 each B   Tband row LOTB 2 X 10   Tband ext LOTB 2 x 10     Written Home Exercises Provided: per above   Pt demo good understanding of the education provided. Halley demonstrated good return demonstration of activities.     Education provided re: no new   Halley verbalized good understanding of education provided.   No spiritual or educational barriers to learning provided    Assessment     Although no new exercises were added today, the pt was able to increase her repetitions on many of her exercises today by at least 10. The ot demos improving endurance and overall postural awareness. Will continue to gradually progress as tolerated.   This is a 43 y.o. female referred to outpatient physical therapy and presents with a medical diagnosis of cervical and lumbar pain and demonstrates limitations as described in the problem list. Pt prognosis is Fair. Pt will continue to benefit from skilled outpatient physical therapy to address the deficits listed in the  problem list, provide pt/family education and to maximize pt's level of independence in the home and community environment.      Plan     Continue with established Plan of Care towards PT goals.    Therapist: Gila Joseph, PT  2/19/2019

## 2019-02-27 ENCOUNTER — CLINICAL SUPPORT (OUTPATIENT)
Dept: REHABILITATION | Facility: HOSPITAL | Age: 44
End: 2019-02-27
Attending: INTERNAL MEDICINE
Payer: MEDICAID

## 2019-02-27 DIAGNOSIS — R26.89 DECREASED SPINAL MOBILITY: ICD-10-CM

## 2019-02-27 DIAGNOSIS — R26.89 DECREASED FUNCTIONAL MOBILITY: ICD-10-CM

## 2019-02-27 PROCEDURE — 97110 THERAPEUTIC EXERCISES: CPT | Mod: PO

## 2019-03-04 ENCOUNTER — LAB VISIT (OUTPATIENT)
Dept: LAB | Facility: HOSPITAL | Age: 44
End: 2019-03-04
Attending: INTERNAL MEDICINE
Payer: MEDICAID

## 2019-03-04 DIAGNOSIS — E11.21 CONTROLLED TYPE 2 DIABETES MELLITUS WITH DIABETIC NEPHROPATHY, WITHOUT LONG-TERM CURRENT USE OF INSULIN: Chronic | ICD-10-CM

## 2019-03-04 DIAGNOSIS — I15.2 HYPERTENSION ASSOCIATED WITH DIABETES: ICD-10-CM

## 2019-03-04 DIAGNOSIS — E11.59 HYPERTENSION ASSOCIATED WITH DIABETES: ICD-10-CM

## 2019-03-04 LAB
ANION GAP SERPL CALC-SCNC: 6 MMOL/L
BUN SERPL-MCNC: 12 MG/DL
CALCIUM SERPL-MCNC: 9.1 MG/DL
CHLORIDE SERPL-SCNC: 104 MMOL/L
CO2 SERPL-SCNC: 28 MMOL/L
CREAT SERPL-MCNC: 0.8 MG/DL
EST. GFR  (AFRICAN AMERICAN): >60 ML/MIN/1.73 M^2
EST. GFR  (NON AFRICAN AMERICAN): >60 ML/MIN/1.73 M^2
ESTIMATED AVG GLUCOSE: 151 MG/DL
GLUCOSE SERPL-MCNC: 183 MG/DL
HBA1C MFR BLD HPLC: 6.9 %
POTASSIUM SERPL-SCNC: 4.9 MMOL/L
SODIUM SERPL-SCNC: 138 MMOL/L

## 2019-03-04 PROCEDURE — 83036 HEMOGLOBIN GLYCOSYLATED A1C: CPT

## 2019-03-04 PROCEDURE — 80048 BASIC METABOLIC PNL TOTAL CA: CPT

## 2019-03-04 PROCEDURE — 36415 COLL VENOUS BLD VENIPUNCTURE: CPT | Mod: PO

## 2019-03-08 ENCOUNTER — PATIENT MESSAGE (OUTPATIENT)
Dept: INTERNAL MEDICINE | Facility: CLINIC | Age: 44
End: 2019-03-08

## 2019-03-08 ENCOUNTER — CLINICAL SUPPORT (OUTPATIENT)
Dept: REHABILITATION | Facility: HOSPITAL | Age: 44
End: 2019-03-08
Attending: INTERNAL MEDICINE
Payer: MEDICAID

## 2019-03-08 ENCOUNTER — OFFICE VISIT (OUTPATIENT)
Dept: INTERNAL MEDICINE | Facility: CLINIC | Age: 44
End: 2019-03-08
Payer: MEDICAID

## 2019-03-08 VITALS
SYSTOLIC BLOOD PRESSURE: 118 MMHG | BODY MASS INDEX: 51.91 KG/M2 | WEIGHT: 293 LBS | DIASTOLIC BLOOD PRESSURE: 74 MMHG | HEART RATE: 82 BPM | TEMPERATURE: 99 F | HEIGHT: 63 IN

## 2019-03-08 DIAGNOSIS — R26.89 DECREASED FUNCTIONAL MOBILITY: ICD-10-CM

## 2019-03-08 DIAGNOSIS — S83.241S TEAR OF MEDIAL MENISCUS OF RIGHT KNEE, CURRENT, UNSPECIFIED TEAR TYPE, SEQUELA: ICD-10-CM

## 2019-03-08 DIAGNOSIS — G47.30 SLEEP APNEA, UNSPECIFIED TYPE: ICD-10-CM

## 2019-03-08 DIAGNOSIS — R26.89 DECREASED SPINAL MOBILITY: ICD-10-CM

## 2019-03-08 DIAGNOSIS — M51.9 LUMBAR DISC DISEASE: ICD-10-CM

## 2019-03-08 DIAGNOSIS — E66.01 MORBID OBESITY WITH BMI OF 60.0-69.9, ADULT: Primary | ICD-10-CM

## 2019-03-08 PROCEDURE — 97110 THERAPEUTIC EXERCISES: CPT | Mod: PO

## 2019-03-08 PROCEDURE — 99999 PR PBB SHADOW E&M-EST. PATIENT-LVL III: CPT | Mod: PBBFAC,,, | Performed by: INTERNAL MEDICINE

## 2019-03-08 PROCEDURE — 99213 OFFICE O/P EST LOW 20 MIN: CPT | Mod: PBBFAC,PO | Performed by: INTERNAL MEDICINE

## 2019-03-08 PROCEDURE — 99214 OFFICE O/P EST MOD 30 MIN: CPT | Mod: S$PBB,,, | Performed by: INTERNAL MEDICINE

## 2019-03-08 PROCEDURE — 99214 PR OFFICE/OUTPT VISIT, EST, LEVL IV, 30-39 MIN: ICD-10-PCS | Mod: S$PBB,,, | Performed by: INTERNAL MEDICINE

## 2019-03-08 PROCEDURE — 99999 PR PBB SHADOW E&M-EST. PATIENT-LVL III: ICD-10-PCS | Mod: PBBFAC,,, | Performed by: INTERNAL MEDICINE

## 2019-03-08 RX ORDER — HYDROCODONE BITARTRATE AND ACETAMINOPHEN 5; 325 MG/1; MG/1
1 TABLET ORAL
Qty: 15 TABLET | Refills: 0 | Status: SHIPPED | OUTPATIENT
Start: 2019-03-08 | End: 2019-10-14 | Stop reason: SDUPTHER

## 2019-03-08 NOTE — PROGRESS NOTES
Subjective:       Patient ID: Halley Moreno is a 43 y.o. female who presents for Follow-up; Obesity; Knee Pain; and Back Pain      Knee Pain    The incident occurred more than 1 week ago. The injury mechanism is unknown. The pain is present in the right knee. The quality of the pain is described as aching. The pain is moderate. The pain has been intermittent since onset. Pertinent negatives include no inability to bear weight, loss of motion, muscle weakness, numbness or tingling. The symptoms are aggravated by weight bearing. She has tried elevation, rest, NSAIDs and acetaminophen for the symptoms. The treatment provided mild relief.   Back Pain   This is a chronic problem. The current episode started more than 1 month ago. The problem occurs intermittently. The problem has been waxing and waning since onset. The pain is present in the lumbar spine. The quality of the pain is described as aching. The pain does not radiate. The pain is moderate. Pertinent negatives include no abdominal pain, chest pain, dysuria, fever, headaches, numbness, pelvic pain or tingling. She has tried NSAIDs for the symptoms. The treatment provided mild relief.      Weight 380 lbs on 1/4 but increased to 382 lbs today  Body mass index is 67.8 kg/m².      Review of Systems   Constitutional: Positive for fatigue. Negative for chills and fever.        + drowsiness but does not use CPAP   HENT: Negative for congestion, rhinorrhea and sinus pressure.    Respiratory: Negative for cough, chest tightness, shortness of breath and wheezing.    Cardiovascular: Negative for chest pain and palpitations.   Gastrointestinal: Negative for abdominal pain, constipation, diarrhea, nausea and vomiting.   Endocrine: Negative for polydipsia and polyuria.   Genitourinary: Negative for dysuria, hematuria and pelvic pain.   Musculoskeletal: Positive for arthralgias (chronic right knee pain, now in therapy) and back pain. Negative for myalgias.   Skin:  Negative for color change and rash.   Neurological: Negative for dizziness, tingling, numbness and headaches.       Objective:      Physical Exam   Constitutional: She is oriented to person, place, and time. Vital signs are normal. She appears well-developed and well-nourished. No distress.   HENT:   Head: Normocephalic and atraumatic.   Right Ear: Hearing and external ear normal.   Left Ear: Hearing and external ear normal.   Nose: Nose normal.   Mouth/Throat: Uvula is midline and mucous membranes are normal.   Eyes: Lids are normal.   Cardiovascular: Normal rate, regular rhythm, normal heart sounds and intact distal pulses.   No murmur heard.  Pulmonary/Chest: Effort normal and breath sounds normal. She has no wheezes.   Abdominal: Soft. Bowel sounds are normal. She exhibits no distension. There is no tenderness.   Musculoskeletal: Normal range of motion. She exhibits no edema.   Neurological: She is alert and oriented to person, place, and time.   Skin: Skin is warm, dry and intact. No rash noted. She is not diaphoretic.   Psychiatric: She has a normal mood and affect.   Vitals reviewed.      Assessment/Plan:         1. Morbid obesity with BMI of 60.0-69.9, adult  - continues with compounded topamax and phentermine with minimal weight loss  - discussed diet and patient will record a food journal    2. Tear of medial meniscus of right knee, current, unspecified tear type, sequela  - HYDROcodone-acetaminophen (NORCO) 5-325 mg per tablet; Take 1 tablet by mouth every 24 hours as needed for Pain.  Dispense: 15 tablet; Refill: 0  - continue with PT    3. Sleep apnea, unspecified type  - advised to resume using CPAP    4. Lumbar disc disease  - should continue with PT    RTC in 3 months for annual exam or sooner if needed    Yamila Tejeda MD

## 2019-03-14 NOTE — PROGRESS NOTES
Physical Therapy Daily Note     Name: Halley Moreno  Clinic Number: [id number]  Diagnosis:   Encounter Diagnoses   Name Primary?    Decreased spinal mobility     Decreased functional mobility      Physician: Yamila Tejeda MD  Precautions: fall   Visit #: 5 of 12  PTA Visit #: 0  Time In: 11:30 Pm   Time Out: 12:20 Pm   Total Treatment Time 1:1: 50    Evaluation Date: 1/8/19  Visit # authorized: 20   Authorization period: 12/31/19   Plan of care Expiration: 3/8/19   MD referral: y     Subjective     Pt reports: that her knees are bothering her more than usual lately. .    Pain Scale: Halley rates pain on a scale of 0-10 to be 3 currently.    Objective     Halley received individual therapeutic exercises to develop strength, endurance and core stabilization for 50 minutes including:  UBE 2 min F/2 min B   Seated Breuggers OTB 3 x 10   Sit to stand 3 x 10from low mat    Standing hip ext /abd 3 x 10 each B   Tband row LOTB 2 X 10   Tband ext LOTB 2 x 10   Leg press 20# 2 x 10   tband pavloff press OTB 2 x 10 B     Written Home Exercises Provided: per above   Pt demo good understanding of the education provided. Halley demonstrated good return demonstration of activities.     Education provided re: no new   Halley verbalized good understanding of education provided.   No spiritual or educational barriers to learning provided    Assessment   Pt continues to demo decreased muscular and cardiovascular endurance. The pt does demo good form with therex but overall decreased functional mobility. It is evident the patient is compliant with HEP and demos good motivation. The pt was able to add leg press and notes improvement in knee pain following leg press.   This is a 43 y.o. female referred to outpatient physical therapy and presents with a medical diagnosis of cervical and lumbar pain and demonstrates limitations as described in the problem list. Pt prognosis is Fair. Pt  will continue to benefit from skilled outpatient physical therapy to address the deficits listed in the problem list, provide pt/family education and to maximize pt's level of independence in the home and community environment.      Plan     Continue with established Plan of Care towards PT goals.    Therapist: Gila Joseph, PT  3/14/2019

## 2019-03-19 NOTE — PROGRESS NOTES
Physical Therapy Daily Note     Name: Halley Moreno  Clinic Number: [id number]  Diagnosis:   Encounter Diagnoses   Name Primary?    Decreased spinal mobility     Decreased functional mobility      Physician: Yamila Tejeda MD  Precautions: fall   Visit #: 6 of 12  PTA Visit #: 0  Time In: 12:30 Pm   Time Out: 1:20 Pm   Total Treatment Time 1:1: 30    Evaluation Date: 1/8/19  Visit # authorized: 20   Authorization period: 12/31/19   Plan of care Expiration: 3/8/19   MD referral: y     Subjective     Pt reports: that her knees are bothering her more than usual lately and that the R knee is extremely sore.    Pain Scale: Halley rates pain on a scale of 0-10 to be 3 currently.    Objective     Halley received individual therapeutic exercises to develop strength, endurance and core stabilization for 40 minutes including:  UBE 2 min F/2 min B   Seated Breuggers OTB 3 x 10   Sit to stand 3 x 10from low mat    Standing hip ext /abd 3 x 10 each B   Tband row LOTB 2 X 10   Tband ext LOTB 2 x 10   Leg press 20# 2 x 10 --np today   tband pavloff press OTB 2 x 10 B       ICE X 10 MIN FOLLOWING THEREX     Written Home Exercises Provided: per above   Pt demo good understanding of the education provided. Halley demonstrated good return demonstration of activities.     Education provided re: no new   Halley verbalized good understanding of education provided.   No spiritual or educational barriers to learning provided    Assessment   Pt tolerated treatment fairly but was still able to perform most exercises. We were unable to advance today secondary to her knee pain. Will attempt to advance next session. Will also re-assess next session.   This is a 43 y.o. female referred to outpatient physical therapy and presents with a medical diagnosis of cervical and lumbar pain and demonstrates limitations as described in the problem list. Pt prognosis is Fair. Pt will continue to benefit  from skilled outpatient physical therapy to address the deficits listed in the problem list, provide pt/family education and to maximize pt's level of independence in the home and community environment.      Plan     Continue with established Plan of Care towards PT goals.    Therapist: Gila Joseph, PT  3/19/2019

## 2019-03-29 ENCOUNTER — CLINICAL SUPPORT (OUTPATIENT)
Dept: REHABILITATION | Facility: HOSPITAL | Age: 44
End: 2019-03-29
Attending: INTERNAL MEDICINE
Payer: MEDICAID

## 2019-03-29 DIAGNOSIS — R26.89 DECREASED SPINAL MOBILITY: ICD-10-CM

## 2019-03-29 DIAGNOSIS — R26.89 DECREASED FUNCTIONAL MOBILITY: ICD-10-CM

## 2019-03-29 PROCEDURE — 97110 THERAPEUTIC EXERCISES: CPT | Mod: PO

## 2019-04-10 NOTE — PROGRESS NOTES
Physical Therapy Daily Note     Name: Halley Moreno  Clinic Number: [id number]  Diagnosis:   Encounter Diagnoses   Name Primary?    Decreased spinal mobility     Decreased functional mobility      Physician: Yamila Tejeda MD  Precautions: fall   Visit #: 7 of 12  PTA Visit #: 0  Time In: 11:30 Pm   Time Out: 12:20 Pm   Total Treatment Time 1:1: 15  Evaluation Date: 1/8/19  Visit # authorized: 20   Authorization period: 12/31/19   Plan of care Expiration: 3/8/19   MD referral: y     Subjective     Pt reports: that her knees continue to cause her discomfort and have not improved.   Pain Scale: Halley rates pain on a scale of 0-10 to be 3 currently.    Objective     Halley received individual therapeutic exercises to develop strength, endurance and core stabilization for 35 minutes including:  UBE 2 min F/2 min B   Seated Breuggers OTB 3 x 10   Sit to stand 3 x 10from low mat    Standing hip ext /abd 3 x 10 each B   Tband row LOTB 2 X 10   Tband ext LOTB 2 x 10   Leg press 20# 2 x 10 --np today   tband pavloff press OTB 2 x 10 B       ICE X 10 MIN FOLLOWING THEREX     Written Home Exercises Provided: per above   Pt demo good understanding of the education provided. Halley demonstrated good return demonstration of activities.     Education provided re: no new   Halley verbalized good understanding of education provided.   No spiritual or educational barriers to learning provided    Assessment   Pt continues to make slow progress in overall functional strength and mobility. The pt also with limited improvement in function with ADLs. The pt does demo slight improvement in endurance as evidenced by decreased need for rest breaks. Will attempt to advance therex nest session and will develop HEP to transition pt to HEP.   This is a 43 y.o. female referred to outpatient physical therapy and presents with a medical diagnosis of cervical and lumbar pain and demonstrates  limitations as described in the problem list. Pt prognosis is Fair. Pt will continue to benefit from skilled outpatient physical therapy to address the deficits listed in the problem list, provide pt/family education and to maximize pt's level of independence in the home and community environment.      Plan     Continue with established Plan of Care towards PT goals.    Therapist: Gila Joseph, PT  4/9/2019

## 2019-04-11 ENCOUNTER — CLINICAL SUPPORT (OUTPATIENT)
Dept: REHABILITATION | Facility: HOSPITAL | Age: 44
End: 2019-04-11
Attending: INTERNAL MEDICINE
Payer: MEDICAID

## 2019-04-11 DIAGNOSIS — R26.89 DECREASED FUNCTIONAL MOBILITY: ICD-10-CM

## 2019-04-11 DIAGNOSIS — R26.89 DECREASED SPINAL MOBILITY: ICD-10-CM

## 2019-04-11 PROCEDURE — 97110 THERAPEUTIC EXERCISES: CPT | Mod: PO

## 2019-04-11 NOTE — PROGRESS NOTES
Physical Therapy Daily Note     Name: Halley Moreno  Clinic Number: [id number]  Diagnosis:   Encounter Diagnoses   Name Primary?    Decreased spinal mobility     Decreased functional mobility      Physician: Yamila Tejeda MD  Precautions: fall   Visit #:   PTA Visit #: 0  Time In: 10:15 AM (pt with late arrival)  Time Out: 10:55AM  Total Treatment Time 1:1: 15  Evaluation Date: 19  Visit # authorized: 20   Authorization period: 19   Plan of care Expiration: 5/10/19  MD referral: y     Subjective     Pt reports: that her knees continue to cause her discomfort but her neck and arms feel good. Pt reports she is doing exercises at home.  Pain Scale: Halley rates pain on a scale of 0-10 to be 3/10 in R knee currently. No neck pain    Objective   UPPER EXTREMITY STRENGTH:  Upper Extremity Strength  (R) UE  (L) UE    Shoulder flexion: 5/5 Shoulder flexion: 5/5   Shoulder Abduction: 5/5 Shoulder abduction: 5/5   Shoulder ER 5/5 Shoulder ER 5/5   Shoulder IR 5/5 Shoulder IR 5/5   Elbow flexion: 5/5 Elbow flexion: 5/5   Elbow extension: 5/5 Elbow extension: 5/5    good : good           Halley received individual therapeutic exercises to develop strength, endurance and core stabilization for 34 minutes includin:1 15 min    UBE 3min forward, 3 min backward  Seated Breuggers OTB 3 x 10   Sit to stand 3 x 5 from chair  Tband row GTB 2 X 10   Tband ext GTB 2 x 10   tband pavloff press GTB 2 x 10 B   Supine Horizontal ABD, 2 x 10 reps  Supine serratus punches, 2 x 10 reps  Shoulder flexion with ball on wall, 2 x 10 reps    Written Home Exercises Provided: no change   Pt demo good understanding of the education provided. Halley demonstrated good return demonstration of activities.     Education provided re: no new   Halley verbalized good understanding of education provided.   No spiritual or educational barriers to learning provided    Assessment    Pt continues to make slow progress in overall functional strength. Pt demo's improved BUE strength and denies cervical pain. Pt has met goals as noted below. Pt's primary complaint at this time is knee pain. PT explained that pt would need new PT referral for knee pain, so that PT can perform evaluation to address her knee symptoms. Pt verbalizes understanding. Pt states she will reach out to her MD for new referral, as will PT.  Will follow up as appropriate. In the meantime, extending POC to 5/10/19.  This is a 43 y.o. female referred to outpatient physical therapy and presents with a medical diagnosis of cervical and lumbar pain and demonstrates limitations as described in the problem list. Pt prognosis is Fair. Pt will continue to benefit from skilled outpatient physical therapy to address the deficits listed in the problem list, provide pt/family education and to maximize pt's level of independence in the home and community environment.     Medical necessity is demonstrated by the following IMPAIRMENTS/PROBLEMS:  1. Increased Pain  2. Decreased Segmental Mobility & Decreased ROM  3. Decreased Core & BUE strength  4. Postural Imbalance  5. Decreased Tolerance to Functional Activities     Pt's spiritual, cultural and educational needs considered and pt agreeable to plan of care and goals as stated below:      Anticipated Barriers for physical therapy: Body habitus, several co-morbidities              History  Co-morbidities and personal factors that may impact the plan of care Examination  Body Structures and Functions, activity limitations and participation restrictions that may impact the plan of care      Clinical Presentation   Co-morbidities:   CKD stage unknown , diabetes, difficulty sleeping, high BMI and HTN           Personal Factors:   social background  lifestyle Body Regions:   neck  upper extremities     Body Systems:    ROM  strength                 Participation Restrictions:   Unable to work per  the patient        Activity limitations:   Learning and applying knowledge  no deficits     General Tasks and Commands  no deficits     Communication  no deficits     Mobility  lifting and carrying objects  fine hand use (grasping/picking up)     Self care  caring for body parts (brushing teeth, shaving, grooming)     Domestic Life  doing house work (cleaning house, washing dishes, laundry)     Interactions/Relationships  no deficits     Life Areas  employment     Community and Social Life  community life  recreation and leisure             stable and uncomplicated                                low   low   moderate Decision Making/ Complexity Score:  low         Short Term GOALS: 2 weeks. Pt agrees with goals set.  1. Patient demonstrates independence with HEP.- Goal met  2. Patient demonstrates independence with Postural Awareness. Progressing, not met  3. Patient demonstrates independence with body mechanics. Progressing, not met     Long Term GOALS: 4 weeks. Pt agrees with goals set.  1. Patient demonstrates increased Cervical ROM to WNL to improve tolerance to functional activities pain free. -Ongoing  2. Patient demonstrates increased strength BUE's to 4+/5 or greater to improve tolerance to functional activities pain free. - Goal exceeded, 4/11              Plan   Outpatient physical therapy 1-2 times weekly to include: pt ed, hep, therapeutic exercises, neuromuscular re-education/ balance exercises, joint mobilizations, aquatic therapy and modalities prn. Cont PT for  4 weeks. Pt may be seen by PTA as part of the rehabilitation team. Will evaluate for knee, pending receipt of new PT referral          Therapist: Gila Bell, PT  4/11/2019

## 2019-04-12 ENCOUNTER — TELEPHONE (OUTPATIENT)
Dept: INTERNAL MEDICINE | Facility: CLINIC | Age: 44
End: 2019-04-12

## 2019-04-12 DIAGNOSIS — Z87.828 HISTORY OF KNEE SPRAIN: ICD-10-CM

## 2019-04-12 DIAGNOSIS — Z91.89 CANDIDATE FOR STATIN THERAPY DUE TO RISK OF FUTURE CARDIOVASCULAR EVENT: ICD-10-CM

## 2019-04-12 DIAGNOSIS — G89.29 CHRONIC PAIN OF RIGHT KNEE: Primary | ICD-10-CM

## 2019-04-12 DIAGNOSIS — M25.561 CHRONIC PAIN OF RIGHT KNEE: Primary | ICD-10-CM

## 2019-04-12 RX ORDER — ATORVASTATIN CALCIUM 10 MG/1
TABLET, FILM COATED ORAL
Qty: 90 TABLET | Refills: 1 | Status: SHIPPED | OUTPATIENT
Start: 2019-04-12 | End: 2020-01-29 | Stop reason: SDUPTHER

## 2019-04-12 NOTE — TELEPHONE ENCOUNTER
----- Message from Arnel Lyn sent at 4/12/2019  2:46 PM CDT -----  Contact: Patient [phone]  She is getting PT on her shoulder and neck and is requesting orders to get PT for her knee per, the [facility].    Thank you

## 2019-04-12 NOTE — PLAN OF CARE
Outpatient Therapy Updated Plan of Care     Visit Date: 4/11/2019  Name: Halley Moreno  Clinic Number: [id number]    Therapy Diagnosis:   Encounter Diagnoses   Name Primary?    Decreased spinal mobility     Decreased functional mobility      Physician: Yamila Tejeda MD    Physician Orders: PT Eval and treat  Medical Diagnosis: Cervical and shoulder pain  Evaluation Date: 1/8/19    Total Visits Received: 8  Cancelled Visits: unknown  No Show Visits: unknown    Current Certification Period:  1/8/19 to 3/8/19  Precautions:  Falls, safety  Visits from Evaluation Date:  7  Functional Level Prior to Evaluation:  Increased neck and shoulder pain    Subjective     Update: no neck or shoulder pain, only R knee pain    Objective     Update: 5/5 BUE strength    Assessment     Update: Pt continues to make slow progress in overall functional strength. Pt demo's improved BUE strength and denies cervical pain. Pt has met goals as noted below. Pt's primary complaint at this time is knee pain. PT explained that pt would need new PT referral for knee pain, so that PT can perform evaluation to address her knee symptoms. Pt verbalizes understanding. Pt states she will reach out to her MD for new referral, as will PT.  Will follow up as appropriate. In the meantime, extending POC to 5/10/19.  This is a 43 y.o. female referred to outpatient physical therapy and presents with a medical diagnosis of cervical and lumbar pain and demonstrates limitations as described in the problem list. Pt prognosis is Fair. Pt will continue to benefit from skilled outpatient physical therapy to address the deficits listed in the problem list, provide pt/family education and to maximize pt's level of independence in the home and community environment.         Previous Short Term Goals Status:   Partially met  New Short Term Goals Status:   Partially met  Long Term Goal Status:   modified:  See today's note  Reasons for Recertification of  Therapy:   Continue POC as pt has made progress, Requesting new PT referral for knee pain    Plan     Updated Certification Period: 4/11/2019 to 5/10/19  Recommended Treatment Plan: 1-2 times per week for 4 weeks: Manual Therapy, Moist Heat/ Ice, Neuromuscular Re-ed, Patient Education, Self Care, Therapeutic Activites, Therapeutic Exercise and modliaties as appropriate  Other Recommendations: none    Gila Bell, PT  4/11/2019      I CERTIFY THE NEED FOR THESE SERVICES FURNISHED UNDER THIS PLAN OF TREATMENT AND WHILE UNDER MY CARE    Physician's comments:        Physician's Signature: ___________________________________________________

## 2019-04-16 DIAGNOSIS — E11.22 CONTROLLED TYPE 2 DIABETES MELLITUS WITH CHRONIC KIDNEY DISEASE, UNSPECIFIED CKD STAGE, UNSPECIFIED WHETHER LONG TERM INSULIN USE: Primary | Chronic | ICD-10-CM

## 2019-04-30 ENCOUNTER — TELEPHONE (OUTPATIENT)
Dept: INTERNAL MEDICINE | Facility: CLINIC | Age: 44
End: 2019-04-30

## 2019-04-30 NOTE — TELEPHONE ENCOUNTER
----- Message from Arnel Lyn sent at 4/29/2019  2:27 PM CDT -----  Contact: [facility]/ [place] [phone]  They faxed prescription request for a CPAP machine and supplies on 4/23/19 and she will re fax one today.    Please call to let them know if have received it.    Thank you

## 2019-05-17 ENCOUNTER — CLINICAL SUPPORT (OUTPATIENT)
Dept: REHABILITATION | Facility: HOSPITAL | Age: 44
End: 2019-05-17
Attending: INTERNAL MEDICINE
Payer: MEDICAID

## 2019-05-17 DIAGNOSIS — M25.361 INSTABILITY OF RIGHT KNEE JOINT: ICD-10-CM

## 2019-05-17 DIAGNOSIS — M62.81 MUSCLE WEAKNESS: ICD-10-CM

## 2019-05-17 DIAGNOSIS — Z74.09 DECREASED FUNCTIONAL MOBILITY AND ENDURANCE: ICD-10-CM

## 2019-05-17 DIAGNOSIS — M25.569 KNEE PAIN, UNSPECIFIED CHRONICITY, UNSPECIFIED LATERALITY: ICD-10-CM

## 2019-05-17 PROCEDURE — 97161 PT EVAL LOW COMPLEX 20 MIN: CPT | Mod: PO

## 2019-05-17 PROCEDURE — 97110 THERAPEUTIC EXERCISES: CPT | Mod: PO

## 2019-05-17 NOTE — PLAN OF CARE
"[facility]  Physical Therapy Initial Evaluation    Name: Halley Moreno  Clinic Number: [id number]    Therapy Diagnosis:   Encounter Diagnoses   Name Primary?    Knee pain, unspecified chronicity, unspecified laterality     Muscle weakness     Decreased functional mobility and endurance     Instability of right knee joint      Physician: Yamila Tejeda MD    Physician Orders: PT Eval and Treat   Medical Diagnosis from Referral: Chronic pain of right knee, history of knee sprain  Evaluation Date: 2019  Authorization Period Expiration: 20  Plan of Care Expiration: 19  Visit # / Visits authorized:     Time In: 8:15 (late arrival)  Time Out: 8:55  Total Billable Time: 40 minutes    Precautions: Standard and Fall    Subjective   Date of onset: ~1-2 years ago  History of current condition - Halley reports: R knee pain that's "kind of there all the time." pt states she had an MRI in the past that revealed a R meniscus tear. Pt denies specific injury or event that led to onset of symptoms. "I have good days and bad days." pt denies recent imaging. Of note, pt had an incident in , where the toilet broke underneath her and she hurt her back and hit her R knee.     Medical History:   Past Medical History:   Diagnosis Date    BMI 70 and over, adult     Depression     Diabetes mellitus     DM (diabetes mellitus) type II controlled with renal manifestation     HTN (hypertension)     Hyperlipidemia     Lumbar disc disease     Morbid obesity     Recurrent nephrolithiasis     Rosacea     Sleep apnea        Surgical History:   Halley Moreno  has a past surgical history that includes  section; Dilation and curettage of uterus (); and Endometrial ablation ().    Medications:   Halley has a current medication list which includes the following prescription(s): atorvastatin, azelastine, blood sugar diagnostic, clotrimazole-betamethasone 1-0.05%, " "diclofenac, furosemide, glipizide, hydrocodone-acetaminophen, ketoconazole, lancets, lidocaine, lisinopril, metronidazole, nystatin, phendimetrazine tartrate, topiramate, and triamcinolone acetonide 0.1%.    Allergies:   Review of patient's allergies indicates:  No Known Allergies     Imaging, MRI studies: Markedly limited examination secondary to patient's body habitus.    Radial tear junction of body segment and posterior horn medial meniscus with resulting 0.8 cm fluid filled gap.    Edema about the superficial segment of the MCL, likely reactive in nature to the meniscal tear. A grade I sprain is possible given patient's reported history of recent trauma.    Small joint effusion.    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 Pt states she doesn't have to  kids. Pt states she may have to go on field trips with summer camp and wants to be able to walk better for this  Prior Level of Function: better standing and walking tolerance, could stand for 8 hours/day.  Pt states she could tolerate going up and down stairs better.  Current Level of Function: has to negotiate stairs with step-to pattern, unable to perform single limb stance on her RLE. Pt states she can walk for ~15min before pain becomes an issue, cramps in R knee at night.    Pain:  Current 0/10, worst 7/10, best 0/10   Location: right knee- anterior aspect  Description: Aching and Throbbing  Aggravating Factors: Standing, Walking and stairs  Easing Factors: pain medication, ice, heating pad and rest    Pts goals: "I'd like to be more active in preparation for summer camp and strengthen the knee a little more"    Objective     Observation: pt received amb independently. Pt with fair gait speed, increased SALVADOR, B out-toeing " noted, decreased B step length and heel strike. No loss of balance noted    Posture: rounded shoulders, forward head, morbidly obse      Range of Motion:   Knee Left active Right Active   Flexion 113 105   Extension 0 -2           Lower Extremity Strength (Seated)  Right LE  Left LE    Knee extension: 4+/5 Knee extension: 5/5   Knee flexion: 5/5 Knee flexion: 5/5   Hip flexion: 4+/5 Hip flexion: 5/5   Hip extension:  >/=3+/5 Hip extension: >/=3+/5   Hip abduction: 4-/5 Hip abduction: 5/5   Hip adduction: 5/5 Hip adduction 5/5   Ankle dorsiflexion: 5/5 Ankle dorsiflexion: 5/5   Ankle plantarflexion: 5/5 Ankle plantarflexion: 5/5           Special Tests:   Left Right   Valgus Stress Test -   -   Varus Stress test -   -         Function:    - SLS R: negative  - SLS L: negative  - Sit <--> Stand:Mod I   - Bed Mobility: Mod I  - Bridge test: negative      Palpation: tender to palpation over R patellar tendon and medial joint line    Sensation: intact    Edema: none noted but distal LE's are dark and dry, symmetrical          TREATMENT   Treatment Time In: 8:45  Treatment Time Out: 8:55  Total Treatment time separate from Evaluation: 10 minutes    Halley received therapeutic exercises to develop strength, ROM, posture and core stabilization for 10 minutes including:  SAQ's x 10 reps, 5s holds  Supine Hip ABD, orange band, 10 reps, 3s  R SLR (Straight leg raise), 10 reps, 3s    Home Exercises and Patient Education Provided    Education provided:   - exercise technique, HEP, POC, potential benefit of walking/exercising in the pool    Written Home Exercises Provided: yes.  Exercises were reviewed and Halley was able to demonstrate them prior to the end of the session.  Halley demonstrated good  understanding of the education provided.     See EMR under Patient Instructions for exercises provided 5/17/2019.    Assessment   Halley is a 43 y.o. female referred to outpatient Physical Therapy with a medical diagnosis of chronic pain of R  knee and history of R knee sprain. Pt presents with R knee pain, decreased strength, poor posture,impaired gait, decreased endurance, impaired functional mobility, and knee instability      Pt prognosis is Good.   Pt will benefit from skilled outpatient Physical Therapy to address the deficits stated above and in the chart below, provide pt/family education, and to maximize pt's level of independence.     Plan of care discussed with patient: Yes  Pt's spiritual, cultural and educational needs considered and patient is agreeable to the plan of care and goals as stated below:     Anticipated Barriers for therapy: Chronicity of symptoms, co-morbidities    Medical Necessity is demonstrated by the following  History  Co-morbidities and personal factors that may impact the plan of care Co-morbidities:   depression, diabetes, HTN and morbid obesity    Personal Factors:   no deficits     moderate   Examination  Body Structures and Functions, activity limitations and participation restrictions that may impact the plan of care Body Regions:   lower extremities  trunk    Body Systems:    strength  balance  gait  endurance/activity tolerance    Participation Restrictions:   None anticipated    Activity limitations:   Learning and applying knowledge  no deficits    General Tasks and Commands  no deficits    Communication  no deficits    Mobility  lifting and carrying objects  walking    Self care  no deficits    Domestic Life  doing house work (cleaning house, washing dishes, laundry)    Interactions/Relationships  no deficits    Life Areas  employment    Community and Social Life  recreation and leisure         high   Clinical Presentation stable and uncomplicated low   Decision Making/ Complexity Score: low     Goals:  Short Term Goals: 4 weeks   1. Pt will tolerate HEP for improved strength, functional mobility, ROM, posture, and endurance. (progressing, not met)  2. Pt will demo 5/5 strength in BLE's for improved functional  mobility, endurance, and posture. (progressing, not met)  3. Pt will report reduced R knee pain to </= 5/10 at worst for improved functional mobility and ability to participate in work activities/ADL's. (progressing, not met)  4. Pt will report being able to walk for >/=25min without increase in symptoms for improved functional mobility/community access. (progressing, not met)     Long Term Goals: 8 weeks   1. Pt will be Independent with updated HEP for improved strength, functional mobility, ROM, posture, and endurance. (progressing, not met)  2. Pt will negotiate 1 flight of stairs reciprocally and without pain for improved functional mobility. (progressing, not met)  3. Pt will report reduced R knee pain to </= 3/10 at worst for improved functional mobility and ability to participate in work activities/ADL's. (progressing, not met)  4. Pt will report being able to tolerate walking during a field trip at work without increase in symptoms for improved functional mobility/community access. (progressing, not met)     Plan   Plan of care Certification: 5/17/2019 to 7/12/19.    Outpatient Physical Therapy 2 times weekly for 8 weeks to include the following interventions: Gait Training, Manual Therapy, Moist Heat/ Ice, Neuromuscular Re-ed, Patient Education, Self Care, Therapeutic Activites, Therapeutic Exercise and modalities as appropriate.     Gila Bell, PT

## 2019-05-20 PROBLEM — R26.89 DECREASED FUNCTIONAL MOBILITY: Status: RESOLVED | Noted: 2019-01-08 | Resolved: 2019-05-20

## 2019-05-20 PROBLEM — M25.569 KNEE PAIN: Status: ACTIVE | Noted: 2019-05-20

## 2019-05-20 PROBLEM — R26.89 DECREASED SPINAL MOBILITY: Status: RESOLVED | Noted: 2019-01-08 | Resolved: 2019-05-20

## 2019-05-20 PROBLEM — M62.81 MUSCLE WEAKNESS: Status: ACTIVE | Noted: 2019-05-20

## 2019-05-20 PROBLEM — M25.361 INSTABILITY OF RIGHT KNEE JOINT: Status: ACTIVE | Noted: 2019-05-20

## 2019-05-20 PROBLEM — Z74.09 DECREASED FUNCTIONAL MOBILITY AND ENDURANCE: Status: ACTIVE | Noted: 2019-05-20

## 2019-06-05 ENCOUNTER — CLINICAL SUPPORT (OUTPATIENT)
Dept: REHABILITATION | Facility: HOSPITAL | Age: 44
End: 2019-06-05
Attending: INTERNAL MEDICINE
Payer: MEDICAID

## 2019-06-05 DIAGNOSIS — M25.569 KNEE PAIN, UNSPECIFIED CHRONICITY, UNSPECIFIED LATERALITY: ICD-10-CM

## 2019-06-05 DIAGNOSIS — M62.81 MUSCLE WEAKNESS: ICD-10-CM

## 2019-06-05 DIAGNOSIS — Z74.09 DECREASED FUNCTIONAL MOBILITY AND ENDURANCE: ICD-10-CM

## 2019-06-05 DIAGNOSIS — M25.361 INSTABILITY OF RIGHT KNEE JOINT: ICD-10-CM

## 2019-06-05 PROCEDURE — 97110 THERAPEUTIC EXERCISES: CPT | Mod: PO

## 2019-06-19 ENCOUNTER — TELEPHONE (OUTPATIENT)
Dept: INTERNAL MEDICINE | Facility: CLINIC | Age: 44
End: 2019-06-19

## 2019-06-19 DIAGNOSIS — E11.59 HYPERTENSION ASSOCIATED WITH DIABETES: ICD-10-CM

## 2019-06-19 DIAGNOSIS — I15.2 HYPERTENSION ASSOCIATED WITH DIABETES: ICD-10-CM

## 2019-06-19 DIAGNOSIS — E11.21 CONTROLLED TYPE 2 DIABETES MELLITUS WITH DIABETIC NEPHROPATHY, WITHOUT LONG-TERM CURRENT USE OF INSULIN: Primary | Chronic | ICD-10-CM

## 2019-06-19 DIAGNOSIS — Z00.00 ANNUAL PHYSICAL EXAM: ICD-10-CM

## 2019-06-19 NOTE — TELEPHONE ENCOUNTER
----- Message from Marci Oliveira sent at 6/19/2019  3:57 PM CDT -----  Contact: fyi  Doctor appointment and lab have been scheduled.  Please link lab orders to the lab appointment.  Date of doctor appointment:  08/13/19  Date of lab appointment:  08/06/19  Physical or EP: physical  Comments:

## 2019-07-08 ENCOUNTER — CLINICAL SUPPORT (OUTPATIENT)
Dept: REHABILITATION | Facility: HOSPITAL | Age: 44
End: 2019-07-08
Attending: INTERNAL MEDICINE
Payer: MEDICAID

## 2019-07-08 DIAGNOSIS — M25.569 KNEE PAIN, UNSPECIFIED CHRONICITY, UNSPECIFIED LATERALITY: ICD-10-CM

## 2019-07-08 DIAGNOSIS — Z74.09 DECREASED FUNCTIONAL MOBILITY AND ENDURANCE: ICD-10-CM

## 2019-07-08 DIAGNOSIS — M25.361 INSTABILITY OF RIGHT KNEE JOINT: ICD-10-CM

## 2019-07-08 DIAGNOSIS — M62.81 MUSCLE WEAKNESS: ICD-10-CM

## 2019-07-08 PROCEDURE — 97110 THERAPEUTIC EXERCISES: CPT | Mod: PO

## 2019-07-08 NOTE — PLAN OF CARE
"  Outpatient Therapy Updated Plan of Care     Visit Date: 7/8/2019  Name: Halley Moreno  Clinic Number: [id number]    Therapy Diagnosis:   Encounter Diagnoses   Name Primary?    Knee pain, unspecified chronicity, unspecified laterality     Muscle weakness     Decreased functional mobility and endurance     Instability of right knee joint      Physician: Yamila Tejeda MD    Physician Orders: PT Eval and Treat   Medical Diagnosis from Referral: Chronic pain of right knee, history of knee sprain  Evaluation Date: 5/17/2019    Total Visits Received: 3  Cancelled Visits: unknown  No Show Visits: unknown    Current Certification Period:  5/17/19 to 7/12/19  Precautions:  Standard, falls  Visits from Evaluation Date:  2      Subjective     Update: Pt reports: she has been sleeping on the couch because she had a bed bug infestation and had to get a new mattress, so she is waiting for that. Pt states she's been getting pain in her knee after she lies with her knees straight and gets knee pain when trying to bend them. Pt endorses cramping. Pt states walking feels fine.  Pt endorses pain in R heel with walking.  She was compliant with home exercise program  Response to previous treatment: "felt pretty good"  Functional change: none     Pain: 0/10 in R knee, 4 /10 in R heel    Objective     Update: Lower Extremity Strength (Seated)  Right LE   Left LE     Knee extension: 5/5 Knee extension: 5/5   Knee flexion: 5/5 Knee flexion: 5/5   Hip flexion: 5/5* Hip flexion: 5/5*   Hip extension:  >/=3+/5 Hip extension: >/=3+/5   Hip abduction: 5/5 Hip abduction: 5/5   Hip adduction: 5/5 Hip adduction 5/5   Ankle dorsiflexion: 5/5 Ankle dorsiflexion: 5/5   Ankle plantarflexion: 5/5 Ankle plantarflexion: 5/5          Range of Motion:   Knee Left active Right Active   Flexion 105 110   Extension 0 0          Assessment     Update: Pt presents with minimal knee pain today and with a good tolerance to progressions made. Pt's " attendance has been limited due to pt's recent work schedule, but pt states now her schedule will be more consistent. Regardless pt presents with reduced knee pain, and she demo's increased strength. Pt has met goals as noted below  Will continue to progress with knee strengthening and ROM as tolerated. Extending POC to 8/30/19.  Halley is progressing well towards her goals.   Pt prognosis is Good.     Pt will continue to benefit from skilled outpatient physical therapy to address the deficits listed in the problem list box on initial evaluation, provide pt/family education and to maximize pt's level of independence in the home and community environment.   Pt's spiritual, cultural and educational needs considered and pt agreeable to plan of care and goals.    Previous Short Term Goals Status:   Partially met, see today's note  New Short Term Goals Status:   N/A  Long Term Goal Status:   modified:  New goal (7/8/19):  5.. Pt will demo 5/5 strength in BL gluteal muscles for improved functional mobility, endurance, and posture (progressing, not met)  Reasons for Recertification of Therapy:   To continue to make gains in strength, function, endurance, and stability, as pt now has more regular work schedule    Plan     Updated Certification Period: 7/8/2019 to 8/30/19  Recommended Treatment Plan: 2 times per week for 6 weeks: Gait Training, Manual Therapy, Moist Heat/ Ice, Neuromuscular Re-ed, Patient Education, Therapeutic Activites, Therapeutic Exercise and modalities as appropriate  Other Recommendations: none at this time    Gila Bell, PT  7/8/2019      I CERTIFY THE NEED FOR THESE SERVICES FURNISHED UNDER THIS PLAN OF TREATMENT AND WHILE UNDER MY CARE    Physician's comments:        Physician's Signature: ___________________________________________________

## 2019-07-08 NOTE — PROGRESS NOTES
"  Physical Therapy Daily Treatment Note     Name: Halley Moreno  Clinic Number: [id number]    Therapy Diagnosis:   Encounter Diagnoses   Name Primary?    Knee pain, unspecified chronicity, unspecified laterality     Muscle weakness     Decreased functional mobility and endurance     Instability of right knee joint      Physician: Yamila eTjeda MD    Visit Date: 7/8/2019    Physician Orders: PT Eval and Treat   Medical Diagnosis from Referral: Chronic pain of right knee, history of knee sprain  Evaluation Date: 5/17/2019  Authorization Period Expiration: 4/11/20  Plan of Care Expiration: 8/30/19   Visit # / Visits authorized: 3 (auth pending)      Time In: 9:05 AM  Time Out:  10:00 AM  Total Billable Time: 55 min    Precautions: Standard    Subjective     Pt reports: she has been sleeping on the couch because she had a bed bug infestation and had to get a new mattress, so she is waiting for that. Pt states she's been getting pain in her knee after she lies with her knees straight and gets knee pain when trying to bend them. Pt endorses cramping. Pt states walking feels fine.  Pt endorses pain in R heel with walking.  She was compliant with home exercise program  Response to previous treatment: "felt pretty good"  Functional change: none    Pain: 0/10 in R knee, 4 /10 in R heel  Location: right knee      Objective     Halley received therapeutic exercises to develop strength, endurance and ROM for 55 minutes including:    Lower Extremity Strength (Seated)  Right LE   Left LE     Knee extension: 5/5 Knee extension: 5/5   Knee flexion: 5/5 Knee flexion: 5/5   Hip flexion: 5/5* Hip flexion: 5/5*   Hip extension:  >/=3+/5 Hip extension: >/=3+/5   Hip abduction: 5/5 Hip abduction: 5/5   Hip adduction: 5/5 Hip adduction 5/5   Ankle dorsiflexion: 5/5 Ankle dorsiflexion: 5/5   Ankle plantarflexion: 5/5 Ankle plantarflexion: 5/5          Range of Motion:   Knee Left active Right Active   Flexion 105 110   Extension " "0 0        Seated ankle pumps x 20  Seated kicks 20/LE  SAQ's  2 x 10 reps, 5s holds  Supine Hip ADD, 2 x 10, 3s holds  Posterior pelvic tilts, 2 x 10 reps  Bridge on wedge, 10 x 2s holds  LTR 10 reps B  Hip ABD in sitting, orange resistance, 2 x 10  Seated hamstring curl, 2 x 10 reps, orange resistance  Standing Hip ABD, 10 reps/LE  Standing Hip Ext, 10 reps/LE  Calf stretch at table, 2 x 30s/LE    Not performed today:  Quad sets, 5 sec hold, 2 x 10 reps  Seated Hip ABD, orange band, 10 reps, 3s  R SLR (Straight leg raise), 10 reps, 3s  Long sitting gastroc stretch c/ strap, 20" hold x 4 reps  Ankle pumps c/ orange t-band, 3 x 10 reps  LAQ, 3 x 10 reps, 3 sec hold   Sit to stands,  2 x 10 reps  Standing calf raises, 3 x 10 reps   Lateral walks x 2 laps along counter (knee pain with weigt bearing on R)      Home Exercises Provided and Patient Education Provided     Education provided:   - Progress to date, exercise technique, POC    Written Home Exercises Provided: yes.  Exercises were reviewed and Halley was able to demonstrate them prior to the end of the session.  Halley demonstrated good  understanding of the education provided.     See EMR under Patient Instructions for exercises provided 6/5/2019.    Assessment     Pt presents with minimal knee pain today and with a good tolerance to progressions made. Pt's attendance has been limited due to pt's recent work schedule, but pt states now her schedule will be more consistent. Regardless pt presents with reduced knee pain, and she demo's increased strength. Pt has met goals as noted below  Will continue to progress with knee strengthening and ROM as tolerated. Extending POC to 8/30/19.  Halley is progressing well towards her goals.   Pt prognosis is Good.     Pt will continue to benefit from skilled outpatient physical therapy to address the deficits listed in the problem list box on initial evaluation, provide pt/family education and to maximize pt's level of " independence in the home and community environment.   Pt's spiritual, cultural and educational needs considered and pt agreeable to plan of care and goals.    Anticipated barriers to physical therapy: Chronicity of symptoms, co-morbidities    Goals: Short Term Goals: 4 weeks   1. Pt will tolerate HEP for improved strength, functional mobility, ROM, posture, and endurance. (Goal met. 7/8/19)  2. Pt will demo 5/5 strength in BLE's for improved functional mobility, endurance, and posture. (Goal met with exception of glutes, 7/8/19)  3. Pt will report reduced R knee pain to </= 5/10 at worst for improved functional mobility and ability to participate in work activities/ADL's. (Goal met 7/8/19)  4. Pt will report being able to walk for >/=25min without increase in symptoms for improved functional mobility/community access. (progressing, not met)      Long Term Goals: 8 weeks   1. Pt will be Independent with updated HEP for improved strength, functional mobility, ROM, posture, and endurance. (progressing, not met)  2. Pt will negotiate 1 flight of stairs reciprocally and without pain for improved functional mobility. (progressing, not met)  3. Pt will report reduced R knee pain to </= 3/10 at worst for improved functional mobility and ability to participate in work activities/ADL's. (progressing, not met)  4. Pt will report being able to tolerate walking during a field trip at work without increase in symptoms for improved functional mobility/community access. (progressing, not met)     New goal (7/8/19):  5.. Pt will demo 5/5 strength in BL gluteal muscles for improved functional mobility, endurance, and posture (progressing, not met)    Plan     Progress with R LE strengthening and ROM.     Gila Bell, PT

## 2019-07-16 ENCOUNTER — CLINICAL SUPPORT (OUTPATIENT)
Dept: REHABILITATION | Facility: HOSPITAL | Age: 44
End: 2019-07-16
Attending: INTERNAL MEDICINE
Payer: MEDICAID

## 2019-07-16 DIAGNOSIS — M62.81 MUSCLE WEAKNESS: ICD-10-CM

## 2019-07-16 DIAGNOSIS — M25.569 KNEE PAIN, UNSPECIFIED CHRONICITY, UNSPECIFIED LATERALITY: ICD-10-CM

## 2019-07-16 DIAGNOSIS — M25.361 INSTABILITY OF RIGHT KNEE JOINT: ICD-10-CM

## 2019-07-16 DIAGNOSIS — Z74.09 DECREASED FUNCTIONAL MOBILITY AND ENDURANCE: ICD-10-CM

## 2019-07-16 PROCEDURE — 97110 THERAPEUTIC EXERCISES: CPT | Mod: PO

## 2019-07-16 NOTE — PROGRESS NOTES
"  Physical Therapy Daily Treatment Note     Name: Halley Moreno  Clinic Number: [id number]    Therapy Diagnosis:   Encounter Diagnoses   Name Primary?    Knee pain, unspecified chronicity, unspecified laterality     Muscle weakness     Decreased functional mobility and endurance     Instability of right knee joint      Physician: Yamila Tejeda MD    Visit Date: 7/16/2019    Physician Orders: PT Eval and Treat   Medical Diagnosis from Referral: Chronic pain of right knee, history of knee sprain  Evaluation Date: 5/17/2019  Authorization Period Expiration: 4/11/20  Plan of Care Expiration: 8/30/19   Visit # / Visits authorized: 4 (auth pending)      Time In: 11:14 AM  Time Out: 12:00  Total Billable Time: 40 min    Precautions: Standard    Subjective     Pt reports: "I had a lot of pain in both legs over the past few days, but it's better now. I think it's because of the weather."  She was compliant with home exercise program  Response to previous treatment: "felt pretty good"  Functional change: none    Pain: 0/10 in R knee, 4 /10 in R heel  Location: right knee      Objective     Halley received therapeutic exercises to develop strength, endurance and ROM for 35 minutes including:    Seated marching x 20  Seated kicks 20/LE  SAQ's  2 x 10 reps, 5s holds  Bridge on wedge, 10 x 2s holds  Single leg fallouts in hook-lying, orange band, 2 x 10 reps  B UE pulldowns with LE's on wedge, orange band, 2 x 10 reps  Seated Hip ADD, 2 x 10, 3s holds    Sci-fit stepper x 5 min  Pt walks 1 x 100 and 2 x 150' without a device for improved endurance.    Halley received the following manual therapy techniques: Soft tissue Mobilization were applied to the: B quads for 5 minutes.    thera-roller to B quads        Not performed today:  Posterior pelvic tilts, 2 x 10 reps  LTR 10 reps B  Hip ABD in sitting, orange resistance, 2 x 10  Seated hamstring curl, 2 x 10 reps, orange resistance  Standing Hip ABD, 10 reps/LE  Standing " "Hip Ext, 10 reps/LE  Calf stretch at table, 2 x 30s/LE  Quad sets, 5 sec hold, 2 x 10 reps  Seated Hip ABD, orange band, 10 reps, 3s  R SLR (Straight leg raise), 10 reps, 3s  Long sitting gastroc stretch c/ strap, 20" hold x 4 reps  Ankle pumps c/ orange t-band, 3 x 10 reps  LAQ, 3 x 10 reps, 3 sec hold   Sit to stands,  2 x 10 reps  Standing calf raises, 3 x 10 reps   Lateral walks x 2 laps along counter (knee pain with weigt bearing on R)          Home Exercises Provided and Patient Education Provided     Education provided:   - Progress to date, exercise technique, POC    Written Home Exercises Provided: Patient instructed to cont prior HEP.  Exercises were reviewed and Halley was able to demonstrate them prior to the end of the session.  Halley demonstrated good  understanding of the education provided.     See EMR under Patient Instructions for exercises provided prior visit.    Assessment     Pt presents with minimal knee pain today and with a good tolerance to progressions made. Session limited due to late arrival. Pt reports increased pain over the past 3-4 days, but state it's better now. Pt responds well to manual techniques. Pt also endorses reduced heel pain since incorporating calf stretches. Will continue to progress with knee strengthening and ROM as tolerated. Extending POC to 8/30/19.  Halley is progressing well towards her goals.   Pt prognosis is Good.     Pt will continue to benefit from skilled outpatient physical therapy to address the deficits listed in the problem list box on initial evaluation, provide pt/family education and to maximize pt's level of independence in the home and community environment.   Pt's spiritual, cultural and educational needs considered and pt agreeable to plan of care and goals.    Anticipated barriers to physical therapy: Chronicity of symptoms, co-morbidities    Goals: Short Term Goals: 4 weeks   1. Pt will tolerate HEP for improved strength, functional mobility, ROM, " posture, and endurance. (Goal met. 7/8/19)  2. Pt will demo 5/5 strength in BLE's for improved functional mobility, endurance, and posture. (Goal met with exception of glutes, 7/8/19)  3. Pt will report reduced R knee pain to </= 5/10 at worst for improved functional mobility and ability to participate in work activities/ADL's. (Goal met 7/8/19)  4. Pt will report being able to walk for >/=25min without increase in symptoms for improved functional mobility/community access. (progressing, not met)      Long Term Goals: 8 weeks   1. Pt will be Independent with updated HEP for improved strength, functional mobility, ROM, posture, and endurance. (progressing, not met)  2. Pt will negotiate 1 flight of stairs reciprocally and without pain for improved functional mobility. (progressing, not met)  3. Pt will report reduced R knee pain to </= 3/10 at worst for improved functional mobility and ability to participate in work activities/ADL's. (progressing, not met)  4. Pt will report being able to tolerate walking during a field trip at work without increase in symptoms for improved functional mobility/community access. (progressing, not met)     New goal (7/8/19):  5.. Pt will demo 5/5 strength in BL gluteal muscles for improved functional mobility, endurance, and posture (progressing, not met)    Plan     Progress with R LE strengthening and ROM.     Gila Bell, PT

## 2019-07-23 ENCOUNTER — CLINICAL SUPPORT (OUTPATIENT)
Dept: REHABILITATION | Facility: HOSPITAL | Age: 44
End: 2019-07-23
Attending: INTERNAL MEDICINE
Payer: MEDICAID

## 2019-07-23 DIAGNOSIS — M25.569 KNEE PAIN, UNSPECIFIED CHRONICITY, UNSPECIFIED LATERALITY: ICD-10-CM

## 2019-07-23 DIAGNOSIS — Z74.09 DECREASED FUNCTIONAL MOBILITY AND ENDURANCE: ICD-10-CM

## 2019-07-23 DIAGNOSIS — M62.81 MUSCLE WEAKNESS: ICD-10-CM

## 2019-07-23 DIAGNOSIS — M25.361 INSTABILITY OF RIGHT KNEE JOINT: ICD-10-CM

## 2019-07-23 PROCEDURE — 97110 THERAPEUTIC EXERCISES: CPT | Mod: PO

## 2019-07-23 NOTE — PROGRESS NOTES
"  Physical Therapy Daily Treatment Note     Name: Halley Moreno  Clinic Number: [id number]    Therapy Diagnosis:   Encounter Diagnoses   Name Primary?    Knee pain, unspecified chronicity, unspecified laterality     Muscle weakness     Decreased functional mobility and endurance     Instability of right knee joint      Physician: Yamila Tejeda MD    Visit Date: 7/23/2019    Physician Orders: PT Eval and Treat   Medical Diagnosis from Referral: Chronic pain of right knee, history of knee sprain  Evaluation Date: 5/17/2019  Authorization Period Expiration: 4/11/20  Plan of Care Expiration: 8/30/19   Visit # / Visits authorized: 5 (auth pending)      Time In: 9: 13 am   Time Out: 10:00 am  Total Billable Time: 47 minutes (TE-3)    Precautions: Standard, falls    Subjective     Pt reports: she was in a lot of pain last week and could barely walk, stating she thinks it was because of the bad weather. Pt stated she is doing a lot better today . Pt stated her heel pain is feeling a lot better as well.  Pt stated that she missed her appointment on Friday because she over slept and had to drop her mom off at work .   She was compliant with home exercise program  Response to previous treatment: "I felt really good and I haven't had much pain at all since".   Functional change: none    Pain: 3/10 in R knee, 0 /10 in R heel  Location: right knee      Objective     Halley received therapeutic exercises to develop strength, endurance and ROM for 47 minutes including:    Seated marching x 20  Seated kicks 20/LE  SAQ's  2 x 10 reps, 5s holds  Bridge on wedge, x15 x 2s holds  Single leg fallouts in hook-lying, orange band, 2 x 10 reps  B UE pulldowns with LE's on wedge, orange band, 2 x 10 reps  Seated Hip ADD, 2 x 10, 3s holds  Hip ABD in sitting, orange resistance, 2 x 10  Seated hamstring curl, 2 x 10 reps, orange resistance  Standing Hip ABD, 12 reps/LE  Standing Hip Ext, 12 reps/LE  Standing heel raises: 3 x " "10  Sit<>Stands from hi-lo table : 2 x 10      Pt walks 1 x 200ft with no AD and 1 x 150' with quad cane for improved gait mechanics with cues for proper cane sequencing provided .     Not performed today:  Sci-fit stepper x 5 min - NP  Posterior pelvic tilts, 2 x 10 reps  LTR 10 reps B  Calf stretch at table, 2 x 30s/LE  Quad sets, 5 sec hold, 2 x 10 reps  Seated Hip ABD, orange band, 10 reps, 3s  R SLR (Straight leg raise), 10 reps, 3s  Long sitting gastroc stretch c/ strap, 20" hold x 4 reps  Ankle pumps c/ orange t-band, 3 x 10 reps  LAQ, 3 x 10 reps, 3 sec hold   Sit to stands,  2 x 10 reps  Standing calf raises, 3 x 10 reps   Lateral walks x 2 laps along counter (knee pain with weigt bearing on R)    Halley received the following manual therapy techniques: Soft tissue Mobilization were applied to the: B quads for 0 minutes. - NP due to time constraints   thera-roller to B quads      Home Exercises Provided and Patient Education Provided     Education provided:   - Progress to date, exercise technique, POC    Written Home Exercises Provided: Patient instructed to cont prior HEP.  Exercises were reviewed and Halley was able to demonstrate them prior to the end of the session.  Halley demonstrated good  understanding of the education provided.     See EMR under Patient Instructions for exercises provided prior visit.    Assessment     Pt presents with decreased pain in her knee and heel. Pt was able to slightly progress today and complete session with no adverse effects. Pt denoted some discomfort while weight bearing on RLE with standing activities although it is not as bad as it used to be. Pt reports a painless crack in her knee every time she lowers her leg while performing LAQs. Pt practiced gait training with and without an AD today. Pt demonstrated a slighty improved gait pattern while using the quad cane and would benefit from continued gait training and LE strengthening.   Halley is progressing well towards her " goals.   Pt prognosis is Good.     Pt will continue to benefit from skilled outpatient physical therapy to address the deficits listed in the problem list box on initial evaluation, provide pt/family education and to maximize pt's level of independence in the home and community environment.   Pt's spiritual, cultural and educational needs considered and pt agreeable to plan of care and goals.    Anticipated barriers to physical therapy: Chronicity of symptoms, co-morbidities    Goals: Short Term Goals: 4 weeks   1. Pt will tolerate HEP for improved strength, functional mobility, ROM, posture, and endurance. (Goal met. 7/8/19)  2. Pt will demo 5/5 strength in BLE's for improved functional mobility, endurance, and posture. (Goal met with exception of glutes, 7/8/19)  3. Pt will report reduced R knee pain to </= 5/10 at worst for improved functional mobility and ability to participate in work activities/ADL's. (Goal met 7/8/19)  4. Pt will report being able to walk for >/=25min without increase in symptoms for improved functional mobility/community access. (progressing, not met)      Long Term Goals: 8 weeks   1. Pt will be Independent with updated HEP for improved strength, functional mobility, ROM, posture, and endurance. (progressing, not met)  2. Pt will negotiate 1 flight of stairs reciprocally and without pain for improved functional mobility. (progressing, not met)  3. Pt will report reduced R knee pain to </= 3/10 at worst for improved functional mobility and ability to participate in work activities/ADL's. (progressing, not met)  4. Pt will report being able to tolerate walking during a field trip at work without increase in symptoms for improved functional mobility/community access. (progressing, not met)     New goal (7/8/19):  5.. Pt will demo 5/5 strength in BL gluteal muscles for improved functional mobility, endurance, and posture (progressing, not met)    Plan     Progress with R LE strengthening and  ROM.     Sowmya Soria, PTA

## 2019-08-01 ENCOUNTER — CLINICAL SUPPORT (OUTPATIENT)
Dept: REHABILITATION | Facility: HOSPITAL | Age: 44
End: 2019-08-01
Attending: INTERNAL MEDICINE
Payer: MEDICAID

## 2019-08-01 DIAGNOSIS — M25.361 INSTABILITY OF RIGHT KNEE JOINT: ICD-10-CM

## 2019-08-01 DIAGNOSIS — M62.81 MUSCLE WEAKNESS: ICD-10-CM

## 2019-08-01 DIAGNOSIS — Z74.09 DECREASED FUNCTIONAL MOBILITY AND ENDURANCE: ICD-10-CM

## 2019-08-01 DIAGNOSIS — M25.569 KNEE PAIN, UNSPECIFIED CHRONICITY, UNSPECIFIED LATERALITY: ICD-10-CM

## 2019-08-01 PROCEDURE — 97110 THERAPEUTIC EXERCISES: CPT | Mod: PO

## 2019-08-01 NOTE — PROGRESS NOTES
"  Physical Therapy Daily Treatment Note     Name: Halley Moreno  Clinic Number: [id number]    Therapy Diagnosis:   Encounter Diagnoses   Name Primary?    Knee pain, unspecified chronicity, unspecified laterality     Muscle weakness     Decreased functional mobility and endurance     Instability of right knee joint      Physician: Yamila Tejeda MD    Visit Date: 8/1/2019    Physician Orders: PT Eval and Treat   Medical Diagnosis from Referral: Chronic pain of right knee, history of knee sprain  Evaluation Date: 5/17/2019  Authorization Period Expiration: 4/11/20  Plan of Care Expiration: 8/30/19   Visit # / Visits authorized: 6 (auth pending)      Time In: 2:00 pm  Time Out: 2:55 pm  Total Billable Time: 55 minutes (TE-4)    Precautions: Standard, falls    Subjective     Pt reports: she feels great and only has minimal pain in her left knee. Pt stated she did a lot of walking over the weekend at the [employer]. Pt stated that her mom gave her a soft brace for her heel and her heel pain is now gone.   She was compliant with home exercise program  Response to previous treatment: "I felt really good after"  Functional change: none    Pain: 2-3/10 in R knee, 0 /10 in R heel   Location: right knee      Objective     Halley received therapeutic exercises to develop strength, endurance and ROM for 55 minutes including:    Sci-fit stepper x 10 min   Seated marching x 20  Seated kicks : 3 x 10 B  Bridge on wedge, x15 x 2s holds  Single leg fallouts in hook-lying, green band, 2 x 10 reps  Seated Hip ADD, 2 x 10, 3s holds  Hip ABD in sitting, orange resistance, 2 x 10  Seated hamstring curl, 2 x 10 reps, orange resistance  Standing Hip ABD, 12 reps/RLE  Standing Hip Ext, 12 reps/RLE  Standing heel raises: 3 x 10  Sit<>Stands from hi-lo table : 2 x 10      Pt walks 1 x 200ft with no AD and 1 x 150' with quad cane for improved gait mechanics with cues for proper cane sequencing provided .     Not performed today:    SAQ's  " "2 x 10 reps, 5s holds   B UE pulldowns with LE's on wedge, orange band, 2 x 10 reps  Posterior pelvic tilts, 2 x 10 reps  LTR 10 reps B  Calf stretch at table, 2 x 30s/LE  Quad sets, 5 sec hold, 2 x 10 reps  Seated Hip ABD, orange band, 10 reps, 3s  R SLR (Straight leg raise), 10 reps, 3s  Long sitting gastroc stretch c/ strap, 20" hold x 4 reps  Ankle pumps c/ orange t-band, 3 x 10 reps  LAQ, 3 x 10 reps, 3 sec hold   Sit to stands,  2 x 10 reps  Standing calf raises, 3 x 10 reps   Lateral walks x 2 laps along counter (knee pain with weigt bearing on R)    Halley received the following manual therapy techniques: Soft tissue Mobilization were applied to the: B quads for 0 minutes. - NP due to time constraints   thera-roller to B quads      Home Exercises Provided and Patient Education Provided     Education provided:   - Progress to date, exercise technique, POC  - Walking program    Written Home Exercises Provided: Patient instructed to cont prior HEP.  Exercises were reviewed and Halley was able to demonstrate them prior to the end of the session.  Halley demonstrated good  understanding of the education provided.     See EMR under Patient Instructions for exercises provided prior visit.    Assessment     Pt presents with decreased pain in her knee and heel. Pt was able to complete session with no adverse effects. Pt progressed to 10 minutes on the Nu[employer] and reports enjoying performing this as she does not know how else to do cardio without her knee bothering her. Discussed a walking progression program with pt which she will possibly start to try to do.  Pt with an appropriate amount of exercise induced muscular fatigue towards completion.   Halley is progressing well towards her goals.   Pt prognosis is Good.     Pt will continue to benefit from skilled outpatient physical therapy to address the deficits listed in the problem list box on initial evaluation, provide pt/family education and to maximize pt's level of " independence in the home and community environment.   Pt's spiritual, cultural and educational needs considered and pt agreeable to plan of care and goals.    Anticipated barriers to physical therapy: Chronicity of symptoms, co-morbidities    Goals: Short Term Goals: 4 weeks   1. Pt will tolerate HEP for improved strength, functional mobility, ROM, posture, and endurance. (Goal met. 7/8/19)  2. Pt will demo 5/5 strength in BLE's for improved functional mobility, endurance, and posture. (Goal met with exception of glutes, 7/8/19)  3. Pt will report reduced R knee pain to </= 5/10 at worst for improved functional mobility and ability to participate in work activities/ADL's. (Goal met 7/8/19)  4. Pt will report being able to walk for >/=25min without increase in symptoms for improved functional mobility/community access. (progressing, not met)      Long Term Goals: 8 weeks   1. Pt will be Independent with updated HEP for improved strength, functional mobility, ROM, posture, and endurance. (progressing, not met)  2. Pt will negotiate 1 flight of stairs reciprocally and without pain for improved functional mobility. (progressing, not met)  3. Pt will report reduced R knee pain to </= 3/10 at worst for improved functional mobility and ability to participate in work activities/ADL's. (progressing, not met)  4. Pt will report being able to tolerate walking during a field trip at work without increase in symptoms for improved functional mobility/community access. (progressing, not met)     New goal (7/8/19):  5.. Pt will demo 5/5 strength in BL gluteal muscles for improved functional mobility, endurance, and posture (progressing, not met)    Plan     Progress with R LE strengthening and ROM.     Sowmya Soria, PTA

## 2019-08-06 ENCOUNTER — LAB VISIT (OUTPATIENT)
Dept: LAB | Facility: HOSPITAL | Age: 44
End: 2019-08-06
Attending: INTERNAL MEDICINE
Payer: MEDICAID

## 2019-08-06 DIAGNOSIS — E11.59 HYPERTENSION ASSOCIATED WITH DIABETES: ICD-10-CM

## 2019-08-06 DIAGNOSIS — Z00.00 ANNUAL PHYSICAL EXAM: ICD-10-CM

## 2019-08-06 DIAGNOSIS — E11.21 CONTROLLED TYPE 2 DIABETES MELLITUS WITH DIABETIC NEPHROPATHY, WITHOUT LONG-TERM CURRENT USE OF INSULIN: Chronic | ICD-10-CM

## 2019-08-06 DIAGNOSIS — I15.2 HYPERTENSION ASSOCIATED WITH DIABETES: ICD-10-CM

## 2019-08-06 LAB
25(OH)D3+25(OH)D2 SERPL-MCNC: 18 NG/ML (ref 30–96)
ALBUMIN SERPL BCP-MCNC: 3.7 G/DL (ref 3.5–5.2)
ALP SERPL-CCNC: 87 U/L (ref 55–135)
ALT SERPL W/O P-5'-P-CCNC: 19 U/L (ref 10–44)
ANION GAP SERPL CALC-SCNC: 14 MMOL/L (ref 8–16)
AST SERPL-CCNC: 17 U/L (ref 10–40)
BASOPHILS # BLD AUTO: 0.03 K/UL (ref 0–0.2)
BASOPHILS NFR BLD: 0.3 % (ref 0–1.9)
BILIRUB SERPL-MCNC: 0.4 MG/DL (ref 0.1–1)
BUN SERPL-MCNC: 15 MG/DL (ref 6–20)
CALCIUM SERPL-MCNC: 9.8 MG/DL (ref 8.7–10.5)
CHLORIDE SERPL-SCNC: 98 MMOL/L (ref 95–110)
CHOLEST SERPL-MCNC: 125 MG/DL (ref 120–199)
CHOLEST/HDLC SERPL: 2.8 {RATIO} (ref 2–5)
CO2 SERPL-SCNC: 27 MMOL/L (ref 23–29)
CREAT SERPL-MCNC: 0.9 MG/DL (ref 0.5–1.4)
DIFFERENTIAL METHOD: ABNORMAL
EOSINOPHIL # BLD AUTO: 0.1 K/UL (ref 0–0.5)
EOSINOPHIL NFR BLD: 1.1 % (ref 0–8)
ERYTHROCYTE [DISTWIDTH] IN BLOOD BY AUTOMATED COUNT: 16 % (ref 11.5–14.5)
EST. GFR  (AFRICAN AMERICAN): >60 ML/MIN/1.73 M^2
EST. GFR  (NON AFRICAN AMERICAN): >60 ML/MIN/1.73 M^2
ESTIMATED AVG GLUCOSE: 154 MG/DL (ref 68–131)
GLUCOSE SERPL-MCNC: 149 MG/DL (ref 70–110)
HBA1C MFR BLD HPLC: 7 % (ref 4–5.6)
HCT VFR BLD AUTO: 48.1 % (ref 37–48.5)
HDLC SERPL-MCNC: 45 MG/DL (ref 40–75)
HDLC SERPL: 36 % (ref 20–50)
HGB BLD-MCNC: 14.9 G/DL (ref 12–16)
IMM GRANULOCYTES # BLD AUTO: 0.07 K/UL (ref 0–0.04)
IMM GRANULOCYTES NFR BLD AUTO: 0.6 % (ref 0–0.5)
LDLC SERPL CALC-MCNC: 60.2 MG/DL (ref 63–159)
LYMPHOCYTES # BLD AUTO: 2.4 K/UL (ref 1–4.8)
LYMPHOCYTES NFR BLD: 21.3 % (ref 18–48)
MCH RBC QN AUTO: 28.4 PG (ref 27–31)
MCHC RBC AUTO-ENTMCNC: 31 G/DL (ref 32–36)
MCV RBC AUTO: 92 FL (ref 82–98)
MONOCYTES # BLD AUTO: 0.7 K/UL (ref 0.3–1)
MONOCYTES NFR BLD: 6.1 % (ref 4–15)
NEUTROPHILS # BLD AUTO: 7.9 K/UL (ref 1.8–7.7)
NEUTROPHILS NFR BLD: 70.6 % (ref 38–73)
NONHDLC SERPL-MCNC: 80 MG/DL
NRBC BLD-RTO: 0 /100 WBC
PLATELET # BLD AUTO: 248 K/UL (ref 150–350)
PMV BLD AUTO: 10.5 FL (ref 9.2–12.9)
POTASSIUM SERPL-SCNC: 4.4 MMOL/L (ref 3.5–5.1)
PROT SERPL-MCNC: 7.6 G/DL (ref 6–8.4)
RBC # BLD AUTO: 5.24 M/UL (ref 4–5.4)
SODIUM SERPL-SCNC: 139 MMOL/L (ref 136–145)
TRIGL SERPL-MCNC: 99 MG/DL (ref 30–150)
WBC # BLD AUTO: 11.25 K/UL (ref 3.9–12.7)

## 2019-08-06 PROCEDURE — 82306 VITAMIN D 25 HYDROXY: CPT

## 2019-08-06 PROCEDURE — 36415 COLL VENOUS BLD VENIPUNCTURE: CPT | Mod: PO

## 2019-08-06 PROCEDURE — 83036 HEMOGLOBIN GLYCOSYLATED A1C: CPT

## 2019-08-06 PROCEDURE — 85025 COMPLETE CBC W/AUTO DIFF WBC: CPT

## 2019-08-06 PROCEDURE — 80053 COMPREHEN METABOLIC PANEL: CPT

## 2019-08-06 PROCEDURE — 80061 LIPID PANEL: CPT

## 2019-08-13 ENCOUNTER — OFFICE VISIT (OUTPATIENT)
Dept: INTERNAL MEDICINE | Facility: CLINIC | Age: 44
End: 2019-08-13
Payer: MEDICAID

## 2019-08-13 ENCOUNTER — TELEPHONE (OUTPATIENT)
Dept: INTERNAL MEDICINE | Facility: CLINIC | Age: 44
End: 2019-08-13

## 2019-08-13 VITALS
DIASTOLIC BLOOD PRESSURE: 58 MMHG | HEIGHT: 60 IN | WEIGHT: 293 LBS | RESPIRATION RATE: 16 BRPM | TEMPERATURE: 98 F | SYSTOLIC BLOOD PRESSURE: 94 MMHG | BODY MASS INDEX: 57.52 KG/M2 | HEART RATE: 88 BPM

## 2019-08-13 DIAGNOSIS — E55.9 VITAMIN D INSUFFICIENCY: ICD-10-CM

## 2019-08-13 DIAGNOSIS — B36.9 FUNGAL SKIN INFECTION: ICD-10-CM

## 2019-08-13 DIAGNOSIS — E11.59 OBESITY, DIABETES, AND HYPERTENSION SYNDROME: ICD-10-CM

## 2019-08-13 DIAGNOSIS — Z12.31 ENCOUNTER FOR SCREENING MAMMOGRAM FOR HIGH-RISK PATIENT: ICD-10-CM

## 2019-08-13 DIAGNOSIS — E11.59 HYPERTENSION ASSOCIATED WITH DIABETES: ICD-10-CM

## 2019-08-13 DIAGNOSIS — E66.01 MORBID OBESITY WITH BMI OF 70 AND OVER, ADULT: ICD-10-CM

## 2019-08-13 DIAGNOSIS — E11.21 CONTROLLED TYPE 2 DIABETES MELLITUS WITH DIABETIC NEPHROPATHY, WITHOUT LONG-TERM CURRENT USE OF INSULIN: Primary | Chronic | ICD-10-CM

## 2019-08-13 DIAGNOSIS — E11.69 OBESITY, DIABETES, AND HYPERTENSION SYNDROME: ICD-10-CM

## 2019-08-13 DIAGNOSIS — I15.2 HYPERTENSION ASSOCIATED WITH DIABETES: ICD-10-CM

## 2019-08-13 DIAGNOSIS — E04.9 PALPABLE THYROID: ICD-10-CM

## 2019-08-13 DIAGNOSIS — E66.9 OBESITY, DIABETES, AND HYPERTENSION SYNDROME: ICD-10-CM

## 2019-08-13 DIAGNOSIS — I15.2 OBESITY, DIABETES, AND HYPERTENSION SYNDROME: ICD-10-CM

## 2019-08-13 DIAGNOSIS — Z00.00 ANNUAL PHYSICAL EXAM: ICD-10-CM

## 2019-08-13 PROBLEM — M25.569 KNEE PAIN: Status: RESOLVED | Noted: 2019-05-20 | Resolved: 2019-08-13

## 2019-08-13 PROBLEM — Z74.09 DECREASED FUNCTIONAL MOBILITY AND ENDURANCE: Status: RESOLVED | Noted: 2019-05-20 | Resolved: 2019-08-13

## 2019-08-13 PROBLEM — M54.2 NECK PAIN: Status: RESOLVED | Noted: 2017-12-20 | Resolved: 2019-08-13

## 2019-08-13 PROBLEM — M25.361 INSTABILITY OF RIGHT KNEE JOINT: Status: RESOLVED | Noted: 2019-05-20 | Resolved: 2019-08-13

## 2019-08-13 PROCEDURE — 99214 OFFICE O/P EST MOD 30 MIN: CPT | Mod: PBBFAC,PO | Performed by: INTERNAL MEDICINE

## 2019-08-13 PROCEDURE — 99214 OFFICE O/P EST MOD 30 MIN: CPT | Mod: S$PBB,,, | Performed by: INTERNAL MEDICINE

## 2019-08-13 PROCEDURE — 99214 PR OFFICE/OUTPT VISIT, EST, LEVL IV, 30-39 MIN: ICD-10-PCS | Mod: S$PBB,,, | Performed by: INTERNAL MEDICINE

## 2019-08-13 PROCEDURE — 99999 PR PBB SHADOW E&M-EST. PATIENT-LVL IV: ICD-10-PCS | Mod: PBBFAC,,, | Performed by: INTERNAL MEDICINE

## 2019-08-13 PROCEDURE — 99999 PR PBB SHADOW E&M-EST. PATIENT-LVL IV: CPT | Mod: PBBFAC,,, | Performed by: INTERNAL MEDICINE

## 2019-08-13 RX ORDER — LOSARTAN POTASSIUM 50 MG/1
50 TABLET ORAL DAILY
Qty: 90 TABLET | Refills: 0 | Status: SHIPPED | OUTPATIENT
Start: 2019-08-13 | End: 2019-09-25 | Stop reason: SDUPTHER

## 2019-08-13 RX ORDER — VIT C/E/ZN/COPPR/LUTEIN/ZEAXAN 250MG-90MG
2000 CAPSULE ORAL DAILY
Refills: 0 | COMMUNITY
Start: 2019-08-13

## 2019-08-13 RX ORDER — CLOTRIMAZOLE AND BETAMETHASONE DIPROPIONATE 10; .64 MG/G; MG/G
CREAM TOPICAL 2 TIMES DAILY
Qty: 45 G | Refills: 2 | Status: SHIPPED | OUTPATIENT
Start: 2019-08-13 | End: 2020-01-29

## 2019-08-13 RX ORDER — MULTIVITAMIN
1 TABLET ORAL DAILY
COMMUNITY

## 2019-08-13 NOTE — PATIENT INSTRUCTIONS
To Do List:       - Prescriptions:   1. : Lotrisone, losartan      Misc. Tests:  Mammogram, thyroid ultrasound    Follow-up appointments:    In 2 months

## 2019-08-13 NOTE — PROGRESS NOTES
Subjective:      Halley Moreno is a 44 y.o. female who presents for annual exam.    HTN- well controlled, compliant with medications, does not eat a high-sodium diet. No chest pain, no palpitations, no shortness of breath, no headaches.    HLD- compliant, admits to eating fatty foods regularly. No calf cramping, no leg pains.    DM2- reviewed recent labs and HbA1c was 7.0. Patient reports a healthy diet but often eats at restaurants and fast food restaurants.  She does not exercise.  She is compliant with her medication and checks blood sugars regularly.  Denies excessive thirst or hunger.      Family History:  family history includes Cancer in her father; Diabetes in her father; Heart disease in her paternal grandfather; Hypertension in her mother.    Health Maintenance:  Health Maintenance       Date Due Completion Date    Foot Exam 2018    Eye Exam 02/15/2019 2/15/2018    Influenza Vaccine (1) 2019    Hemoglobin A1c 2020    Mammogram 02/15/2020 2/15/2018    Pap Smear with HPV Cotest 2020    Override on 2010: Done    Low Dose Statin 2020    Lipid Panel 2020    TETANUS VACCINE 2027        Eye exam: 2018  OB/GYN: [facility]    MM2018  Last Pap: 2017    Tetanus: 2017    Exercise: physical therapy  - swims 0-1 times per week  Diet: fairly unhealthy, eats restaurant food and fast food 4 times weekly, [place], [employer], [employer], [facility]  Body mass index is 74.96 kg/m².    Meds:   Current Outpatient Medications:     atorvastatin (LIPITOR) 10 MG tablet, TAKE ONE TABLET BY MOUTH EVERY DAY., Disp: 90 tablet, Rfl: 1    blood sugar diagnostic Strp, 1 strip by Misc.(Non-Drug; Combo Route) route 2 (two) times daily., Disp: 100 strip, Rfl: 5    clotrimazole-betamethasone 1-0.05% (LOTRISONE) cream, Apply topically 2 (two) times daily. Apply to left lower leg, Disp: 45 g, Rfl: 2     diclofenac (VOLTAREN) 75 MG EC tablet, Take 1 tablet (75 mg total) by mouth 2 (two) times daily., Disp: 60 tablet, Rfl: 0    furosemide (LASIX) 40 MG tablet, Take 1.5 tablets (60 mg total) by mouth once daily., Disp: 135 tablet, Rfl: 1    glipiZIDE (GLUCOTROL) 5 MG TR24, TAKE ONE TABLET BY MOUTH ONCE DAILY WITH BREAKFAST, Disp: 90 tablet, Rfl: 1    HYDROcodone-acetaminophen (NORCO) 5-325 mg per tablet, Take 1 tablet by mouth every 24 hours as needed for Pain., Disp: 15 tablet, Rfl: 0    ketoconazole (NIZORAL) 2 % cream, Apply topically once daily. To feet, Disp: 60 g, Rfl: 0    lancets Misc, 1 lancet by Misc.(Non-Drug; Combo Route) route 2 (two) times daily., Disp: 100 each, Rfl: 5    metronidazole (METROGEL) 0.75 % gel, Apply topically 2 (two) times daily., Disp: 45 g, Rfl: 1    multivitamin (ONE DAILY MULTIVITAMIN) per tablet, Take 1 tablet by mouth once daily., Disp: , Rfl:     phendimetrazine tartrate 105 mg CpSR, TK 1 C PO QD, Disp: , Rfl: 0    topiramate (TOPAMAX) 25 MG tablet, , Disp: , Rfl:     triamcinolone acetonide 0.1% (KENALOG) 0.1 % Lotn, Apply topically 2 (two) times daily., Disp: 60 mL, Rfl: 1    cholecalciferol, vitamin D3, (VITAMIN D3) 1,000 unit capsule, Take 2 capsules (2,000 Units total) by mouth once daily., Disp: , Rfl: 0    losartan (COZAAR) 50 MG tablet, Take 1 tablet (50 mg total) by mouth once daily., Disp: 90 tablet, Rfl: 0    nystatin (MYCOSTATIN) powder, Apply topically 2 (two) times daily. Apply 2gm twice daily, Disp: 60 g, Rfl: 1    PMHx:   Past Medical History:   Diagnosis Date    BMI 70 and over, adult     Depression     Diabetes mellitus     DM (diabetes mellitus) type II controlled with renal manifestation     HTN (hypertension)     Hyperlipidemia     Lumbar disc disease     Morbid obesity     Recurrent nephrolithiasis     Rosacea     Sleep apnea        PSHx:  Past Surgical History:   Procedure Laterality Date     SECTION      x2    DILATION AND  "CURETTAGE OF UTERUS  2010    AUB "negative path" per patient    ENDOMETRIAL ABLATION  2010       SocHx:   Social History     Socioeconomic History    Marital status: Single     Spouse name: Not on file    Number of children: Not on file    Years of education: Not on file    Highest education level: Not on file   Occupational History    Not on file   Social Needs    Financial resource strain: Not on file    Food insecurity:     Worry: Not on file     Inability: Not on file    Transportation needs:     Medical: Not on file     Non-medical: Not on file   Tobacco Use    Smoking status: Never Smoker    Smokeless tobacco: Never Used   Substance and Sexual Activity    Alcohol use: No    Drug use: No    Sexual activity: Never     Partners: Male   Lifestyle    Physical activity:     Days per week: Not on file     Minutes per session: Not on file    Stress: Not on file   Relationships    Social connections:     Talks on phone: Not on file     Gets together: Not on file     Attends Islam service: Not on file     Active member of club or organization: Not on file     Attends meetings of clubs or organizations: Not on file     Relationship status: Not on file   Other Topics Concern    Not on file   Social History Narrative    She is a pharmacy tech, works at [employer] in [place].  Nonsmoker, social etoh.  No exercise.       Review of Systems   Constitutional: Negative for chills, diaphoresis and fever.   HENT: Negative for congestion, dental problem, ear discharge, ear pain, postnasal drip, rhinorrhea, sinus pressure, sore throat and trouble swallowing.    Eyes: Negative for discharge, redness and visual disturbance.   Respiratory: Negative for cough, chest tightness and shortness of breath.    Cardiovascular: Positive for leg swelling. Negative for chest pain and palpitations.   Gastrointestinal: Negative for abdominal pain, blood in stool, constipation, diarrhea, nausea and vomiting.   Endocrine: " Negative for polydipsia and polyuria.   Genitourinary: Negative for decreased urine volume, dysuria, frequency, hematuria and urgency.   Musculoskeletal: Positive for arthralgias (knee pain). Negative for myalgias.   Skin: Positive for color change (hyperpigmentation on lower legs) and rash (on abdomen). Negative for wound.   Neurological: Negative for dizziness, weakness, numbness and headaches.   Hematological: Negative for adenopathy.   Psychiatric/Behavioral: Negative for dysphoric mood and sleep disturbance. The patient is not nervous/anxious.        Objective:      Physical Exam   Constitutional: She is oriented to person, place, and time. Vital signs are normal. She appears well-developed and well-nourished. No distress.   HENT:   Head: Normocephalic and atraumatic.   Right Ear: Hearing, tympanic membrane, external ear and ear canal normal. Tympanic membrane is not erythematous and not bulging.   Left Ear: Hearing, tympanic membrane, external ear and ear canal normal. Tympanic membrane is not erythematous and not bulging.   Nose: Nose normal.   Mouth/Throat: Uvula is midline, oropharynx is clear and moist and mucous membranes are normal. No oropharyngeal exudate or posterior oropharyngeal erythema.   Eyes: Pupils are equal, round, and reactive to light. Conjunctivae, EOM and lids are normal. No scleral icterus.   Neck: Normal range of motion. Neck supple. Muscular tenderness present. No thyroid mass present. Thyromegaly: thyroid is palpable.   Cardiovascular: Normal rate, regular rhythm, normal heart sounds and intact distal pulses.   No murmur heard.  Pulmonary/Chest: Effort normal and breath sounds normal. She has no wheezes.   Abdominal: Soft. Bowel sounds are normal. There is no tenderness. There is no rigidity, no rebound and no guarding.   Musculoskeletal: Normal range of motion. She exhibits edema (BLE).   Lymphadenopathy:     She has no cervical adenopathy.        Right: No supraclavicular adenopathy  present.        Left: No supraclavicular adenopathy present.   Neurological: She is alert and oriented to person, place, and time. She has normal reflexes. Coordination and gait normal.   Skin: Skin is warm, dry and intact. Rash noted. Rash is maculopapular (erythematous rash lower abdomen). She is not diaphoretic. There is erythema (hyperpigementation BLE above ankles).   Psychiatric: She has a normal mood and affect.   Vitals reviewed.      Assessment:       1. Controlled type 2 diabetes mellitus with diabetic nephropathy, without long-term current use of insulin    2. Hypertension associated with diabetes    3. Obesity, diabetes, and hypertension syndrome    4. Vitamin D insufficiency    5. Palpable thyroid    6. Fungal skin infection    7. Morbid obesity with BMI of 70 and over, adult    8. Annual physical exam    9. Encounter for screening mammogram for high-risk patient        Plan:       1. Controlled type 2 diabetes mellitus with diabetic nephropathy, without long-term current use of insulin  - hemoglobin A1c has increased, decrease sugar intake and exercise  - continue current regimen    2. Hypertension associated with diabetes  - blood pressure is somewhat low today  - discussed the risks associated with ACE-I, change lisinopril to losartan  - losartan (COZAAR) 50 MG tablet; Take 1 tablet (50 mg total) by mouth once daily.  Dispense: 90 tablet; Refill: 0  - monitor blood pressure closely and call with readings    3. Obesity, diabetes, and hypertension syndrome  - reduce sugar intake, reduce fat intake, reduce salt intake and exercise    4. Vitamin D insufficiency  - cholecalciferol, vitamin D3, (VITAMIN D3) 1,000 unit capsule; Take 2 capsules (2,000 Units total) by mouth once daily.; Refill: 0    5. Palpable thyroid  - US Soft Tissue Head Neck Thyroid; Future    6. Fungal skin infection  - clotrimazole-betamethasone 1-0.05% (LOTRISONE) cream; Apply topically 2 (two) times daily. Apply to left lower leg   Dispense: 45 g; Refill: 2    7. Morbid obesity with BMI of 70 and over, adult  - continues to take medication from a weight loss clinic, only lost 2 lb in the last few months  - discussed reducing fast food intake, start an exercise program    8. Annual physical exam  - reviewed recent labs with patient    9. Encounter for screening mammogram for high-risk patient  - Mammo Digital Screening Bilat; Future    RTC in 2 months for follow-up or sooner if needed    Yamila Tejeda MD  Internal Medicine, [facility]

## 2019-08-13 NOTE — TELEPHONE ENCOUNTER
----- Message from Dixie Ingram sent at 8/13/2019  2:56 PM CDT -----  Pt has medicaid and needs to be scheduled for a 2 month follow up am unable to schedule this appointment

## 2019-08-16 ENCOUNTER — CLINICAL SUPPORT (OUTPATIENT)
Dept: REHABILITATION | Facility: HOSPITAL | Age: 44
End: 2019-08-16
Attending: INTERNAL MEDICINE
Payer: MEDICAID

## 2019-08-16 DIAGNOSIS — M62.81 MUSCLE WEAKNESS: ICD-10-CM

## 2019-08-16 DIAGNOSIS — M25.562 CHRONIC PAIN OF BOTH KNEES: ICD-10-CM

## 2019-08-16 DIAGNOSIS — G89.29 CHRONIC PAIN OF BOTH KNEES: ICD-10-CM

## 2019-08-16 DIAGNOSIS — M25.361 INSTABILITY OF RIGHT KNEE JOINT: ICD-10-CM

## 2019-08-16 DIAGNOSIS — Z74.09 DECREASED FUNCTIONAL MOBILITY AND ENDURANCE: ICD-10-CM

## 2019-08-16 DIAGNOSIS — M25.561 CHRONIC PAIN OF BOTH KNEES: ICD-10-CM

## 2019-08-16 PROCEDURE — 97110 THERAPEUTIC EXERCISES: CPT | Mod: PO

## 2019-08-16 NOTE — PROGRESS NOTES
"  Physical Therapy Daily Treatment Note     Name: Halley Moreno  Clinic Number: [id number]    Therapy Diagnosis:   Encounter Diagnoses   Name Primary?    Muscle weakness     Chronic pain of both knees     Instability of right knee joint     Decreased functional mobility and endurance      Physician: Yamila Tejeda MD    Visit Date: 8/16/2019    Physician Orders: PT Eval and Treat   Medical Diagnosis from Referral: Chronic pain of right knee, history of knee sprain  Evaluation Date: 5/17/2019  Authorization Period Expiration: 4/11/20  Plan of Care Expiration: 8/30/19   Visit # / Visits authorized: 7 (auth pending)      Time In: 9:05 AM  Time Out: 9:55 AM  Total Billable Time: 50 minutes     Precautions: Standard, falls    Subjective     Pt reports: No knee pain, "I just get some cracking in the right knee sometimes."  "My doctor wants me to walk more."  She was compliant with home exercise program  Response to previous treatment: "I felt really good after"  Functional change: none    Pain: 010 in R knee, 0 /10 in R heel   Location: right knee      Objective     Halley received therapeutic exercises to develop strength, endurance and ROM for 50 minutes including:      Seated marching x 20, 1.5#  Seated kicks : 2 x 10 B, 1.5#  Hip ADD squeeze,  x 10 reps,  Pilates ring for 1st set and ball for 2nd set  Hip ABD in sitting, orange resistance, 2 x 10/LE  Seated hamstring curl, 2 x 10 reps, green resistance  Bridge on wedge, x20 x 3s holds  SAQ on wedge, 15/LE  Standing Hip Ext, 15 reps/RLE  Standing toe raises, 2 x 10  Standing heel raises: 2x 10  Standing Hip ABD, 10 reps/RLE  Standing Hip Ext, 10 reps/LE    Pt walks between exercises for increased endurance, 1 x 400' and 1 x 300' with no AD     Not performed today:  Sci-fit stepper x 10 min   Single leg fallouts in hook-lying, green band, 2 x 10 reps  Seated Hip ADD, 2 x 10, 3s holds  Sit<>Stands from hi-lo table : 2 x 10  B UE pulldowns with LE's on wedge, " "orange band, 2 x 10 reps  Posterior pelvic tilts, 2 x 10 reps  LTR 10 reps B  Calf stretch at table, 2 x 30s/LE  Quad sets, 5 sec hold, 2 x 10 reps  R SLR (Straight leg raise), 10 reps, 3s  Long sitting gastroc stretch c/ strap, 20" hold x 4 reps  Ankle pumps c/ orange t-band, 3 x 10 reps  Lateral walks x 2 laps along counter (knee pain with weigt bearing on R)          Home Exercises Provided and Patient Education Provided     Education provided:   - Progress to date, exercise technique, POC  - Walking program    Written Home Exercises Provided: Patient instructed to cont prior HEP.  Exercises were reviewed and Halley was able to demonstrate them prior to the end of the session.  Halley demonstrated good  understanding of the education provided.     See EMR under Patient Instructions for exercises provided prior visit.    Assessment     Pt presents with decreased pain in her knee and heel. Pt endorses reduced pain with standing activities and feels stronger.  Pt states steps are the most difficult. Increased walking today.   Pt with an appropriate amount of exercise induced muscular fatigue towards completion. Will progress as tolerated with emphasis on stair training.  Halley is progressing well towards her goals.   Pt prognosis is Good.     Pt will continue to benefit from skilled outpatient physical therapy to address the deficits listed in the problem list box on initial evaluation, provide pt/family education and to maximize pt's level of independence in the home and community environment.   Pt's spiritual, cultural and educational needs considered and pt agreeable to plan of care and goals.    Anticipated barriers to physical therapy: Chronicity of symptoms, co-morbidities    Goals: Short Term Goals: 4 weeks   1. Pt will tolerate HEP for improved strength, functional mobility, ROM, posture, and endurance. (Goal met. 7/8/19)  2. Pt will demo 5/5 strength in BLE's for improved functional mobility, endurance, and " posture. (Goal met with exception of glutes, 7/8/19)  3. Pt will report reduced R knee pain to </= 5/10 at worst for improved functional mobility and ability to participate in work activities/ADL's. (Goal met 7/8/19)  4. Pt will report being able to walk for >/=25min without increase in symptoms for improved functional mobility/community access. (progressing, not met)      Long Term Goals: 8 weeks   1. Pt will be Independent with updated HEP for improved strength, functional mobility, ROM, posture, and endurance. (progressing, not met)  2. Pt will negotiate 1 flight of stairs reciprocally and without pain for improved functional mobility. (progressing, not met)  3. Pt will report reduced R knee pain to </= 3/10 at worst for improved functional mobility and ability to participate in work activities/ADL's. (progressing, not met)  4. Pt will report being able to tolerate walking during a field trip at work without increase in symptoms for improved functional mobility/community access. (progressing, not met)     New goal (7/8/19):  5.. Pt will demo 5/5 strength in BL gluteal muscles for improved functional mobility, endurance, and posture (progressing, not met)    Plan     Progress with R LE strengthening and ROM.     Gila Bell, PT

## 2019-08-20 ENCOUNTER — DOCUMENTATION ONLY (OUTPATIENT)
Dept: REHABILITATION | Facility: HOSPITAL | Age: 44
End: 2019-08-20

## 2019-08-20 ENCOUNTER — CLINICAL SUPPORT (OUTPATIENT)
Dept: REHABILITATION | Facility: HOSPITAL | Age: 44
End: 2019-08-20
Attending: INTERNAL MEDICINE
Payer: MEDICAID

## 2019-08-20 DIAGNOSIS — M25.562 CHRONIC PAIN OF BOTH KNEES: ICD-10-CM

## 2019-08-20 DIAGNOSIS — M62.81 MUSCLE WEAKNESS: ICD-10-CM

## 2019-08-20 DIAGNOSIS — G89.29 CHRONIC PAIN OF BOTH KNEES: ICD-10-CM

## 2019-08-20 DIAGNOSIS — M25.561 CHRONIC PAIN OF BOTH KNEES: ICD-10-CM

## 2019-08-20 DIAGNOSIS — Z74.09 DECREASED FUNCTIONAL MOBILITY AND ENDURANCE: ICD-10-CM

## 2019-08-20 DIAGNOSIS — M25.361 INSTABILITY OF RIGHT KNEE JOINT: ICD-10-CM

## 2019-08-20 PROCEDURE — 97110 THERAPEUTIC EXERCISES: CPT | Mod: PO

## 2019-08-20 NOTE — PROGRESS NOTES
Physical Therapy Daily Treatment Note     Name: Halley Moreno  Clinic Number: [id number]    Therapy Diagnosis:   Encounter Diagnoses   Name Primary?    Chronic pain of both knees     Instability of right knee joint     Decreased functional mobility and endurance     Muscle weakness      Physician: Yamila Tejeda MD    Visit Date: 8/20/2019    Physician Orders: PT Eval and Treat   Medical Diagnosis from Referral: Chronic pain of right knee, history of knee sprain  Evaluation Date: 5/17/2019  Authorization Period Expiration: 4/11/20  Plan of Care Expiration: 8/30/19   Visit # / Visits authorized: 8 (auth pending)      Time In: 11:04 AM  Time Out: 11:55 AM  Total Billable Time: 50 minutes     Precautions: Standard, falls    Subjective     Pt reports: some discomfort in the knee since Saturday. Reports her heel pain comes and goes and that whenever it gives her trouble she wears the brace for a day or two.   She was compliant with home exercise program  Response to previous treatment: no adverse effects   Functional change: none    Pain: 4-510 in R knee, 0 /10 in R heel   Location: right knee      Objective     Halley received therapeutic exercises to develop strength, endurance and ROM for 50 minutes including:    Sci-fit stepper x 10 min   Seated marching x 20, 1.5#  Seated kicks : 2 x 10 B, 1.5#  Hip ADD squeeze, 2 x 10 reps   Hip ABD in sitting, orange resistance, 2 x 10/LE  Seated hamstring curl, 2 x 10 reps, green resistance  Bridge on wedge, x20 x 3s holds  SAQ on wedge, 15/LE  Standing Hip Ext, 10 reps/RLE  Standing toe raises, 2 x 10  Standing heel raises: 2x 10  Standing Hip ABD, 10 reps/RLE    Pt walks between exercises for increased endurance, 2 x 400' and 1 x 200' with no AD     Not performed today:  Single leg fallouts in hook-lying, green band, 2 x 10 reps  Seated Hip ADD, 2 x 10, 3s holds  Sit<>Stands from hi-lo table : 2 x 10  B UE pulldowns with LE's on wedge, orange band, 2 x 10  "reps  Posterior pelvic tilts, 2 x 10 reps  LTR 10 reps B  Calf stretch at table, 2 x 30s/LE  Quad sets, 5 sec hold, 2 x 10 reps  R SLR (Straight leg raise), 10 reps, 3s  Long sitting gastroc stretch c/ strap, 20" hold x 4 reps  Ankle pumps c/ orange t-band, 3 x 10 reps  Lateral walks x 2 laps along counter (knee pain with weight bearing on R)      Home Exercises Provided and Patient Education Provided     Education provided:   - Progress to date, exercise technique, POC  - Walking program    Written Home Exercises Provided: Patient instructed to cont prior HEP.  Exercises were reviewed and Halley was able to demonstrate them prior to the end of the session.  Halley demonstrated good  understanding of the education provided.     See EMR under Patient Instructions for exercises provided prior visit.    Assessment     Pt presents with increased knee discomfort today. Patient tolerated treatment well overall. Patient with no reports of knee discomfort while on the nu-step or with exercises, however initially when pt begins to walk she has discomfort that decreases the more she walks. Patient rates pain to be a 1-2/10 post treatment. Will progress as tolerated with emphasis on stair training.  Halley is progressing well towards her goals.   Pt prognosis is Good.     Pt will continue to benefit from skilled outpatient physical therapy to address the deficits listed in the problem list box on initial evaluation, provide pt/family education and to maximize pt's level of independence in the home and community environment.   Pt's spiritual, cultural and educational needs considered and pt agreeable to plan of care and goals.    Anticipated barriers to physical therapy: Chronicity of symptoms, co-morbidities    Goals: Short Term Goals: 4 weeks   1. Pt will tolerate HEP for improved strength, functional mobility, ROM, posture, and endurance. (Goal met. 7/8/19)  2. Pt will demo 5/5 strength in BLE's for improved functional mobility, " endurance, and posture. (Goal met with exception of glutes, 7/8/19)  3. Pt will report reduced R knee pain to </= 5/10 at worst for improved functional mobility and ability to participate in work activities/ADL's. (Goal met 7/8/19)  4. Pt will report being able to walk for >/=25min without increase in symptoms for improved functional mobility/community access. (progressing, not met)      Long Term Goals: 8 weeks   1. Pt will be Independent with updated HEP for improved strength, functional mobility, ROM, posture, and endurance. (progressing, not met)  2. Pt will negotiate 1 flight of stairs reciprocally and without pain for improved functional mobility. (progressing, not met)  3. Pt will report reduced R knee pain to </= 3/10 at worst for improved functional mobility and ability to participate in work activities/ADL's. (progressing, not met)  4. Pt will report being able to tolerate walking during a field trip at work without increase in symptoms for improved functional mobility/community access. (progressing, not met)     New goal (7/8/19):  5.. Pt will demo 5/5 strength in BL gluteal muscles for improved functional mobility, endurance, and posture (progressing, not met)    Plan     Progress with R LE strengthening and ROM.     Ekaterina Blair, PTA

## 2019-08-20 NOTE — PROGRESS NOTES
PT/PTA met face to face to discuss pt's treatment plan and progress towards established goals. Pt will be seen by a physical therapist minimally every 6th visit or every 30 days.      Ekaterina Blair PTA

## 2019-08-29 ENCOUNTER — CLINICAL SUPPORT (OUTPATIENT)
Dept: REHABILITATION | Facility: HOSPITAL | Age: 44
End: 2019-08-29
Attending: INTERNAL MEDICINE
Payer: MEDICAID

## 2019-08-29 DIAGNOSIS — M25.561 CHRONIC PAIN OF BOTH KNEES: ICD-10-CM

## 2019-08-29 DIAGNOSIS — Z74.09 DECREASED FUNCTIONAL MOBILITY AND ENDURANCE: ICD-10-CM

## 2019-08-29 DIAGNOSIS — M62.81 MUSCLE WEAKNESS: ICD-10-CM

## 2019-08-29 DIAGNOSIS — M25.361 INSTABILITY OF RIGHT KNEE JOINT: ICD-10-CM

## 2019-08-29 DIAGNOSIS — M25.562 CHRONIC PAIN OF BOTH KNEES: ICD-10-CM

## 2019-08-29 DIAGNOSIS — G89.29 CHRONIC PAIN OF BOTH KNEES: ICD-10-CM

## 2019-08-29 PROCEDURE — 97110 THERAPEUTIC EXERCISES: CPT | Mod: PO

## 2019-08-29 NOTE — PLAN OF CARE
Outpatient Therapy Updated Plan of Care     Visit Date: 8/29/2019  Name: Halley Moreno  Clinic Number: [id number]    Therapy Diagnosis:   Encounter Diagnoses   Name Primary?    Chronic pain of both knees     Instability of right knee joint     Decreased functional mobility and endurance     Muscle weakness      Physician: Yamila Tejeda MD    Physician Orders: PT Eval and Treat   Medical Diagnosis from Referral: Chronic pain of right knee, history of knee sprain  Evaluation Date: 5/17/2019    Total Visits Received: 9  Cancelled Visits: 2  No Show Visits: unknown    Current Certification Period: 7/12/19 to 8/30/19  Precautions:  Standard, falls  Visits from Evaluation Date:  8      Subjective     Update: Pt reports: pain in knee but likely due to standing all day. Pt states she can walk better but still doesn't feel confident walking her dog  Response to previous treatment: no adverse reaction  Functional change: better able to walk (can walk 10 min before having to rest)     Pain: 3/10 in R knee    Objective     Update: Lower Extremity Strength (Seated)  Right LE   Left LE     Knee extension: 5/5 Knee extension: 5/5   Knee flexion: 5/5 Knee flexion: 5/5   Hip flexion: 5/5 Hip flexion: 5/5   Hip extension:  >/=3+/5 Hip extension: >/=3+/5   Hip abduction: 5/5 Hip abduction: 5/5   Hip adduction: 5/5 Hip adduction 5/5   Ankle dorsiflexion: 5/5 Ankle dorsiflexion: 5/5   Ankle plantarflexion: 5/5 Ankle plantarflexion: 5/5          Range of Motion:   Knee Left active Right Active   Flexion 110 110   Extension 0 0      Sit <> stands in 30s from bench: 8 reps with arms    TUG: (gold chair, no device): 12s    SSWS (self-selected walking speed): 0.75 m/s  FWS: (fast walking speed): 1m/s    Assessment     Update: Pt tolerated session well despite some R knee pain today. Pt demo's improved knee AROM, she reports improved walking tolerance, and states she has been more active. Pt reports she does not feel confident  walking her dog yet, and has pain with stair and curb negotiation. Will progress as tolerated with emphasis on stair training and dynamic standing activities. Extending POC to 9/30/19.  Pt has met goals as noted below and will work towards remaining/new goals.  Halley is progressing well towards her goals.   Pt prognosis is Good.     Pt will continue to benefit from skilled outpatient physical therapy to address the deficits listed in the problem list box on initial evaluation, provide pt/family education and to maximize pt's level of independence in the home and community environment.   Pt's spiritual, cultural and educational needs considered and pt agreeable to plan of care and goals.    Previous Short Term Goals Status:   Partially met, see today's note  New Short Term Goals Status:   N/A  Long Term Goal Status:   modified:    6. Pt will reduce TUG (timed up and go) time to </= 10 sec for improved safety with community ambulation and decreased falls risk. (progressing, not met)  7. Pt will improve SSWS (self selected walking speed) by >/=1s for improved safety with community ambulation. (progressing, not met)  8. Pt will perform 10 sit <> stands with use of armrests in 30s for improved endurance. (progressing, not met)  Reasons for Recertification of Therapy:   To continue to make gains in strength, function, endurance, and stability, as pt now has more regular work schedule    Plan     Updated Certification Period: 8/29/2019 to 8/30/19  Recommended Treatment Plan: 2 times per week for 6 weeks: Gait Training, Manual Therapy, Moist Heat/ Ice, Neuromuscular Re-ed, Patient Education, Therapeutic Activites, Therapeutic Exercise and modalities as appropriate  Other Recommendations: none at this time    Gila Bell, PT  8/29/2019      I CERTIFY THE NEED FOR THESE SERVICES FURNISHED UNDER THIS PLAN OF TREATMENT AND WHILE UNDER MY CARE    Physician's comments:        Physician's Signature:  ___________________________________________________

## 2019-08-29 NOTE — PROGRESS NOTES
Physical Therapy Daily Treatment Note     Name: Halley Moreno  Clinic Number: [id number]    Therapy Diagnosis:   Encounter Diagnoses   Name Primary?    Chronic pain of both knees     Instability of right knee joint     Decreased functional mobility and endurance     Muscle weakness      Physician: Yamila Tejeda MD    Visit Date: 8/29/2019    Physician Orders: PT Eval and Treat   Medical Diagnosis from Referral: Chronic pain of right knee, history of knee sprain  Evaluation Date: 5/17/2019  Authorization Period Expiration: 4/11/20  Updated Plan of Care Expiration: 9/30/19   Visit # / Visits authorized: 9 (6/20 authorized visits)      Time In: 10:10 AM (pt with late arrival)  Time Out: 11:00 AM  Total Billable Time: 40 minutes     Precautions: Standard, falls    Subjective     Pt reports: some discomfort in her R knee, but states it is likely because she's been on her feet all morning. Pt states she wants to be able to better go up a step or curb with less pain.   She was compliant with home exercise program  Response to previous treatment: no adverse effects   Functional change: increased ability to stand and walk     Pain: 3/10  Location: right knee      Objective     Halley participates in physical performance testing with report x 15 min- see Plan of Care for details (Objective measures)    Halley received therapeutic exercises to develop strength, endurance and ROM for 25 minutes including:    Sci-fit stepper x 10 min, level  Posterior pelvic tilt x 20, 3s holds  SAQ, 2 x 10, 2#  Bridge, x 20  SLR x 10/LE  Hip ADD squeeze, 2 x 10 reps, seated    Not performed today:  Seated marching x 20, 1.5#  Seated kicks : 2 x 10 B, 1.5#  Hip ABD in sitting, orange resistance, 2 x 10/LE  Seated hamstring curl, 2 x 10 reps, green resistance  Standing Hip Ext, 10 reps/RLE  Standing toe raises, 2 x 10  Standing heel raises: 2x 10  Standing Hip ABD, 10 reps/RLE        Home Exercises Provided and Patient Education  Provided     Education provided:   - Progress to date, exercise technique, POC      Written Home Exercises Provided: Patient instructed to cont prior HEP.  Exercises were reviewed and Halley was able to demonstrate them prior to the end of the session.  Halley demonstrated good  understanding of the education provided.     See EMR under Patient Instructions for exercises provided prior visit.    Assessment     Pt tolerated session well despite some R knee pain today. Pt demo's improved knee AROM, she reports improved walking tolerance, and states she has been more active. Pt reports she does not feel confident walking her dog yet, and has pain with stair and curb negotiation. Will progress as tolerated with emphasis on stair training and dynamic standing activities. Extending POC to 9/30/19.  Pt has met goals as noted below and will work towards remaining/new goals.  Halley is progressing well towards her goals.   Pt prognosis is Good.     Pt will continue to benefit from skilled outpatient physical therapy to address the deficits listed in the problem list box on initial evaluation, provide pt/family education and to maximize pt's level of independence in the home and community environment.   Pt's spiritual, cultural and educational needs considered and pt agreeable to plan of care and goals.    Anticipated barriers to physical therapy: Chronicity of symptoms, co-morbidities    Goals: Short Term Goals: 4 weeks   1. Pt will tolerate HEP for improved strength, functional mobility, ROM, posture, and endurance. (Goal met. 7/8/19)  2. Pt will demo 5/5 strength in BLE's for improved functional mobility, endurance, and posture. (Goal met with exception of glutes, 7/8/19)  3. Pt will report reduced R knee pain to </= 5/10 at worst for improved functional mobility and ability to participate in work activities/ADL's. (Goal met 7/8/19)  4. Pt will report being able to walk for >/=25min without increase in symptoms for improved  functional mobility/community access. (progressing, not met)      Long Term Goals: 8 weeks   1. Pt will be Independent with updated HEP for improved strength, functional mobility, ROM, posture, and endurance. (Met 8/29)  2. Pt will negotiate 1 flight of stairs reciprocally and without pain for improved functional mobility. (progressing, not met)  3. Pt will report reduced R knee pain to </= 3/10 at worst for improved functional mobility and ability to participate in work activities/ADL's. (progressing, not met)  4. Pt will report being able to tolerate walking during a field trip at work without increase in symptoms for improved functional mobility/community access. (progressing, not met)     New goal (7/8/19):  5.. Pt will demo 5/5 strength in BL gluteal muscles for improved functional mobility, endurance, and posture (progressing, not met)    New goals: (8/29/19):  6. Pt will reduce TUG (timed up and go) time to </= 10 sec for improved safety with community ambulation and decreased falls risk. (progressing, not met)  7. Pt will improve SSWS (self selected walking speed) by >/=1s for improved safety with community ambulation. (progressing, not met)  8. Pt will perform 10 sit <> stands with use of armrests in 30s for improved endurance. (progressing, not met)      Plan     Cont PT POC, extending to 9/30/19.  Emphasize stair training and dynamic standing exercises    Gila Bell, PT

## 2019-09-19 ENCOUNTER — CLINICAL SUPPORT (OUTPATIENT)
Dept: REHABILITATION | Facility: HOSPITAL | Age: 44
End: 2019-09-19
Attending: INTERNAL MEDICINE
Payer: MEDICAID

## 2019-09-19 ENCOUNTER — TELEPHONE (OUTPATIENT)
Dept: INTERNAL MEDICINE | Facility: CLINIC | Age: 44
End: 2019-09-19

## 2019-09-19 DIAGNOSIS — R60.0 BILATERAL LEG EDEMA: ICD-10-CM

## 2019-09-19 DIAGNOSIS — I15.2 HYPERTENSION ASSOCIATED WITH DIABETES: ICD-10-CM

## 2019-09-19 DIAGNOSIS — G89.29 CHRONIC PAIN OF BOTH KNEES: ICD-10-CM

## 2019-09-19 DIAGNOSIS — Z74.09 DECREASED FUNCTIONAL MOBILITY AND ENDURANCE: ICD-10-CM

## 2019-09-19 DIAGNOSIS — E11.59 HYPERTENSION ASSOCIATED WITH DIABETES: ICD-10-CM

## 2019-09-19 DIAGNOSIS — M25.562 CHRONIC PAIN OF BOTH KNEES: ICD-10-CM

## 2019-09-19 DIAGNOSIS — M25.361 INSTABILITY OF RIGHT KNEE JOINT: ICD-10-CM

## 2019-09-19 DIAGNOSIS — M62.81 MUSCLE WEAKNESS: ICD-10-CM

## 2019-09-19 DIAGNOSIS — M25.561 CHRONIC PAIN OF BOTH KNEES: ICD-10-CM

## 2019-09-19 DIAGNOSIS — E11.21 CONTROLLED TYPE 2 DIABETES MELLITUS WITH DIABETIC NEPHROPATHY, WITHOUT LONG-TERM CURRENT USE OF INSULIN: Chronic | ICD-10-CM

## 2019-09-19 PROCEDURE — 97110 THERAPEUTIC EXERCISES: CPT | Mod: PO

## 2019-09-19 RX ORDER — FUROSEMIDE 40 MG/1
TABLET ORAL
Qty: 135 TABLET | Refills: 0 | Status: SHIPPED | OUTPATIENT
Start: 2019-09-19 | End: 2020-01-29 | Stop reason: SDUPTHER

## 2019-09-19 RX ORDER — GLIPIZIDE 5 MG/1
TABLET, FILM COATED, EXTENDED RELEASE ORAL
Qty: 90 TABLET | Refills: 1 | Status: SHIPPED | OUTPATIENT
Start: 2019-09-19 | End: 2020-06-19 | Stop reason: SDUPTHER

## 2019-09-19 NOTE — PROGRESS NOTES
Physical Therapy Daily Treatment Note     Name: Halley Moreno  Clinic Number: [id number]    Therapy Diagnosis:   Encounter Diagnoses   Name Primary?    Chronic pain of both knees     Instability of right knee joint     Decreased functional mobility and endurance     Muscle weakness      Physician: Yamila Tejeda MD    Visit Date: 9/19/2019    Physician Orders: PT Eval and Treat   Medical Diagnosis from Referral: Chronic pain of right knee, history of knee sprain  Evaluation Date: 5/17/2019  Authorization Period Expiration: 4/11/20  Updated Plan of Care Expiration: 9/30/19   Visit # / Visits authorized: 10 (7/20 authorized visits)      Time In: 10:00 am   Time Out: 10:55 am   Total Billable Time:  55 minutes (TE-4)    Precautions: Standard, falls    Subjective     Pt reports: she is doing well , today is one of her better days . She has been doing her best with trying to stay active and performing her exercises. Pt stated she wants to know what to do to strengthen her ankle because she still has ankle pain.      She was compliant with home exercise program  Response to previous treatment: no adverse effects   Functional change: increased ability to stand and walk     Pain: 2/10  Location: right knee      Objective     Halley received therapeutic exercises to develop strength, endurance and ROM for 55 minutes including:    Sci-fit stepper x 10 min, level 2  Posterior pelvic tilt x 20, 3s holds  SAQ, 2 x 10, 2#  Bridge, x 20  SLR x 10/LE  Seated gastroc stretch c/ strap: 3 x 30''   Ankle t-band 4 way OTB: x 10 each  Standing heel raises: 2x 10  Standing Hip ABD, 10 reps/RLE  Standing Marching x 15  Standing Hip Ext, 10 reps/RLE  Mini squats : x 15  Stair navigation 4 steps reciprocally x3 (minimal pain reported with last round )         Not performed today:  Hip ADD squeeze, 2 x 10 reps, seated   Seated marching x 20, 1.5#  Seated kicks : 2 x 10 B, 1.5#  Hip ABD in sitting, orange resistance, 2 x  10/LE  Seated hamstring curl, 2 x 10 reps, green resistance  Standing toe raises, 2 x 10      Home Exercises Provided and Patient Education Provided     Education provided:   - Progress to date, exercise technique, POC      Written Home Exercises Provided: Patient instructed to cont prior HEP.  Exercises were reviewed and Halley was able to demonstrate them prior to the end of the session.  Halley demonstrated good  understanding of the education provided.     See EMR under Patient Instructions for exercises provided prior visit.    Assessment     Pt tolerated session well and was able to complete with no adverse effects . Pt was shown gastroc stretch and ankle t-band exercises per pt request of wanting to strengthen her right ankle . Pt was able to complete stair navigation with no pain in her knee for the first two rounds , she denoted a slight pain while performing her last round. Pt with no pain while weight bearing on her right leg with standing activities . Will continue to progress as tolerated.   Halley is progressing well towards her goals.   Pt prognosis is Good.     Pt will continue to benefit from skilled outpatient physical therapy to address the deficits listed in the problem list box on initial evaluation, provide pt/family education and to maximize pt's level of independence in the home and community environment.   Pt's spiritual, cultural and educational needs considered and pt agreeable to plan of care and goals.    Anticipated barriers to physical therapy: Chronicity of symptoms, co-morbidities    Goals: Short Term Goals: 4 weeks   1. Pt will tolerate HEP for improved strength, functional mobility, ROM, posture, and endurance. (Goal met. 7/8/19)  2. Pt will demo 5/5 strength in BLE's for improved functional mobility, endurance, and posture. (Goal met with exception of glutes, 7/8/19)  3. Pt will report reduced R knee pain to </= 5/10 at worst for improved functional mobility and ability to participate  in work activities/ADL's. (Goal met 7/8/19)  4. Pt will report being able to walk for >/=25min without increase in symptoms for improved functional mobility/community access. (progressing, not met)      Long Term Goals: 8 weeks   1. Pt will be Independent with updated HEP for improved strength, functional mobility, ROM, posture, and endurance. (Met 8/29)  2. Pt will negotiate 1 flight of stairs reciprocally and without pain for improved functional mobility. (progressing, not met)  3. Pt will report reduced R knee pain to </= 3/10 at worst for improved functional mobility and ability to participate in work activities/ADL's. (progressing, not met)  4. Pt will report being able to tolerate walking during a field trip at work without increase in symptoms for improved functional mobility/community access. (progressing, not met)     New goal (7/8/19):  5.. Pt will demo 5/5 strength in BL gluteal muscles for improved functional mobility, endurance, and posture (progressing, not met)    New goals: (8/29/19):  6. Pt will reduce TUG (timed up and go) time to </= 10 sec for improved safety with community ambulation and decreased falls risk. (progressing, not met)  7. Pt will improve SSWS (self selected walking speed) by >/=1s for improved safety with community ambulation. (progressing, not met)  8. Pt will perform 10 sit <> stands with use of armrests in 30s for improved endurance. (progressing, not met)      Plan     Cont PT POC, extending to 9/30/19.  Emphasize stair training and dynamic standing exercises    Sowmya Soria, PTA

## 2019-09-19 NOTE — TELEPHONE ENCOUNTER
----- Message from Martha Felix sent at 9/19/2019 12:47 PM CDT -----  Patient is calling for an RX refill or new RX.  Is this a refill or new RX:  Refill  RX name and strength: Lasix 40 mg   Directions (copy/paste from chart):  Take 1 1/2 daily  Is this a 30 day or 90 day RX:  90  Local pharmacy or mail order pharmacy: Local   Pharmacy name and phone # (copy/paste from chart):  [facility]  [phone]   Comments:          Patient is calling for an RX refill or new RX.  Is this a refill or new RX:  Refill  RX name and strength: Glipizide ER 5 mg  Directions (copy/paste from chart):  Take one daily  Is this a 30 day or 90 day RX: 90   Local pharmacy or mail order pharmacy:  Local   Pharmacy name and phone # (copy/paste from chart):[facility]     Comments:

## 2019-09-24 ENCOUNTER — CLINICAL SUPPORT (OUTPATIENT)
Dept: REHABILITATION | Facility: HOSPITAL | Age: 44
End: 2019-09-24
Attending: INTERNAL MEDICINE
Payer: MEDICAID

## 2019-09-24 DIAGNOSIS — M62.81 MUSCLE WEAKNESS: ICD-10-CM

## 2019-09-24 DIAGNOSIS — M25.361 INSTABILITY OF RIGHT KNEE JOINT: ICD-10-CM

## 2019-09-24 DIAGNOSIS — G89.29 CHRONIC PAIN OF BOTH KNEES: ICD-10-CM

## 2019-09-24 DIAGNOSIS — M25.561 CHRONIC PAIN OF BOTH KNEES: ICD-10-CM

## 2019-09-24 DIAGNOSIS — Z74.09 DECREASED FUNCTIONAL MOBILITY AND ENDURANCE: ICD-10-CM

## 2019-09-24 DIAGNOSIS — M25.562 CHRONIC PAIN OF BOTH KNEES: ICD-10-CM

## 2019-09-24 PROCEDURE — 97110 THERAPEUTIC EXERCISES: CPT | Mod: PO

## 2019-09-24 NOTE — PROGRESS NOTES
"  Physical Therapy Daily Treatment Note     Name: Halley Moreno  Clinic Number: [id number]    Therapy Diagnosis:   Encounter Diagnoses   Name Primary?    Chronic pain of both knees     Instability of right knee joint     Decreased functional mobility and endurance     Muscle weakness      Physician: Yamila Tejeda MD    Visit Date: 2019    Physician Orders: PT Eval and Treat   Medical Diagnosis from Referral: Chronic pain of right knee, history of knee sprain  Evaluation Date: 2019  Authorization Period Expiration: 20  Updated Plan of Care Expiration: 19   Visit # / Visits authorized: 11 ( authorized visits)      Time In: 10:00 am   Time Out: 10:55 am   Total Billable Time:  55 minutes (TE-4)    Precautions: Standard, falls    Subjective     Pt reports: she is doing well.  Pt states walking and stairs are getting better. Pt reports she's been getting heel pain     She was compliant with home exercise program  Response to previous treatment: no adverse effects   Functional change: improved ability to walk and climb stairs     Pain: 1/10  Location: right knee      Objective     Halley received therapeutic exercises to develop strength, endurance and ROM for 55 minutes includin:1 for 23 min    Sci-fit stepper x 10 min, level 2- -not performed today  Pt amb x 3 laps around perimeter of downstairs gym  Seated gastroc stretch c/ strap: 3 x 30''/LE   Ankle t-band 4 way OTB: x 10 each LE  Standing heel raises: 2x 10  Standing Hip ABD, 10 reps/LE  Standing Marching x 15 B  Standing Hip Ext, 10 reps/LE  Mini squats : x 15  Seated Hamstring curl, 2 x 12 reps, green band  Sit <> stands with 5# kettlebell: 10 reps with bell at chest, 10 reps with chest press  Seated hamstring stretch, 2 x 30s/LE  Step-ups on 4" step: 10 forward and lateral on each LE  Standing gastroc stretch, 1 min/LE    Not performed today:  Posterior pelvic tilt x 20, 3s holds  SAQ, 2 x 10, 2#  Bridge, x 20  SLR x " 10/LE  Stair navigation 4 steps reciprocally x3 (minimal pain reported with last round )   Hip ADD squeeze, 2 x 10 reps, seated   Hip ABD in sitting, orange resistance, 2 x 10/LE  Standing toe raises, 2 x 10      Home Exercises Provided and Patient Education Provided     Education provided:   - Progress to date, exercise technique, POC      Written Home Exercises Provided: Patient instructed to cont prior HEP.  Exercises were reviewed and Halley was able to demonstrate them prior to the end of the session.  Halley demonstrated good  understanding of the education provided.     See EMR under Patient Instructions for exercises provided prior visit.    Assessment     Pt tolerated session well and was able to complete with no adverse effects. Pt advised to continue with gastric stretches and use of ice for hell pain. Pt with minimal pain today. Pt reports improved ability to walk and climb stairs. Pt able to increase standing exercises an progress weight without issue. Will continue to progress as tolerated.   Halley is progressing well towards her goals.   Pt prognosis is Good.     Pt will continue to benefit from skilled outpatient physical therapy to address the deficits listed in the problem list box on initial evaluation, provide pt/family education and to maximize pt's level of independence in the home and community environment.   Pt's spiritual, cultural and educational needs considered and pt agreeable to plan of care and goals.    Anticipated barriers to physical therapy: Chronicity of symptoms, co-morbidities    Goals: Short Term Goals: 4 weeks   1. Pt will tolerate HEP for improved strength, functional mobility, ROM, posture, and endurance. (Goal met. 7/8/19)  2. Pt will demo 5/5 strength in BLE's for improved functional mobility, endurance, and posture. (Goal met with exception of glutes, 7/8/19)  3. Pt will report reduced R knee pain to </= 5/10 at worst for improved functional mobility and ability to participate  in work activities/ADL's. (Goal met 7/8/19)  4. Pt will report being able to walk for >/=25min without increase in symptoms for improved functional mobility/community access. (progressing, not met)      Long Term Goals: 8 weeks   1. Pt will be Independent with updated HEP for improved strength, functional mobility, ROM, posture, and endurance. (Met 8/29)  2. Pt will negotiate 1 flight of stairs reciprocally and without pain for improved functional mobility. (progressing, not met)  3. Pt will report reduced R knee pain to </= 3/10 at worst for improved functional mobility and ability to participate in work activities/ADL's. (progressing, not met)  4. Pt will report being able to tolerate walking during a field trip at work without increase in symptoms for improved functional mobility/community access. (progressing, not met)     New goal (7/8/19):  5.. Pt will demo 5/5 strength in BL gluteal muscles for improved functional mobility, endurance, and posture (progressing, not met)    New goals: (8/29/19):  6. Pt will reduce TUG (timed up and go) time to </= 10 sec for improved safety with community ambulation and decreased falls risk. (progressing, not met)  7. Pt will improve SSWS (self selected walking speed) by >/=1s for improved safety with community ambulation. (progressing, not met)  8. Pt will perform 10 sit <> stands with use of armrests in 30s for improved endurance. (progressing, not met)      Plan     Cont PT POC, extending to 9/30/19.  Emphasize stair training and dynamic standing exercises    Gila Bell, PT

## 2019-09-25 DIAGNOSIS — E11.59 HYPERTENSION ASSOCIATED WITH DIABETES: ICD-10-CM

## 2019-09-25 DIAGNOSIS — I15.2 HYPERTENSION ASSOCIATED WITH DIABETES: ICD-10-CM

## 2019-09-25 RX ORDER — LOSARTAN POTASSIUM 50 MG/1
TABLET ORAL
Qty: 90 TABLET | Refills: 0 | Status: SHIPPED | OUTPATIENT
Start: 2019-09-25 | End: 2020-01-29 | Stop reason: SDUPTHER

## 2019-09-26 ENCOUNTER — TELEPHONE (OUTPATIENT)
Dept: INTERNAL MEDICINE | Facility: CLINIC | Age: 44
End: 2019-09-26

## 2019-09-26 NOTE — TELEPHONE ENCOUNTER
I sent in glipizide TR 24 5mg daily on 9/19/19 and it is the same medication order that was sent previously.    Please call pharmacy and tell them she takes the extended release (time release) 24 hour glucotrol daily.

## 2019-09-26 NOTE — TELEPHONE ENCOUNTER
[facility] calling for clarification on Glipizide Rx.    States previous Rx was ER, this one was regular.    Should we continue with Extended Release.  Don't see any info on a change.

## 2019-10-03 ENCOUNTER — CLINICAL SUPPORT (OUTPATIENT)
Dept: REHABILITATION | Facility: HOSPITAL | Age: 44
End: 2019-10-03
Attending: INTERNAL MEDICINE
Payer: MEDICAID

## 2019-10-03 DIAGNOSIS — Z74.09 DECREASED FUNCTIONAL MOBILITY AND ENDURANCE: ICD-10-CM

## 2019-10-03 DIAGNOSIS — M62.81 MUSCLE WEAKNESS: ICD-10-CM

## 2019-10-03 DIAGNOSIS — M25.561 CHRONIC PAIN OF BOTH KNEES: ICD-10-CM

## 2019-10-03 DIAGNOSIS — M25.361 INSTABILITY OF RIGHT KNEE JOINT: ICD-10-CM

## 2019-10-03 DIAGNOSIS — M25.562 CHRONIC PAIN OF BOTH KNEES: ICD-10-CM

## 2019-10-03 DIAGNOSIS — G89.29 CHRONIC PAIN OF BOTH KNEES: ICD-10-CM

## 2019-10-03 PROCEDURE — 97110 THERAPEUTIC EXERCISES: CPT | Mod: PO

## 2019-10-03 NOTE — PLAN OF CARE
Outpatient Therapy Updated Plan of Care     Visit Date: 10/3/2019  Name: Halley Moreno  Clinic Number: [id number]    Therapy Diagnosis:   Encounter Diagnoses   Name Primary?    Chronic pain of both knees     Instability of right knee joint     Decreased functional mobility and endurance     Muscle weakness      Physician: Yamila Tejeda MD    Physician Orders: PT Eval and Treat   Medical Diagnosis from Referral: Chronic pain of right knee, history of knee sprain  Evaluation Date: 5/17/2019    Total Visits Received: 9  Cancelled Visits: 2  No Show Visits: unknown    Current Certification Period: 7/12/19 to 8/30/19  Precautions:  Standard, falls  Visits from Evaluation Date:  8      Subjective     Update: Pt reports: pain in knee but likely due to standing all day. Pt states she can walk better but still doesn't feel confident walking her dog  Response to previous treatment: no adverse reaction  Functional change: better able to walk (can walk 10 min before having to rest)     Pain: 3/10 in R knee    Objective     Update: Lower Extremity Strength (Seated)  Right LE   Left LE     Knee extension: 5/5 Knee extension: 5/5   Knee flexion: 5/5 Knee flexion: 5/5   Hip flexion: 5/5 Hip flexion: 5/5   Hip extension:  >/=3+/5 Hip extension: >/=3+/5   Hip abduction: 5/5 Hip abduction: 5/5   Hip adduction: 5/5 Hip adduction 5/5   Ankle dorsiflexion: 5/5 Ankle dorsiflexion: 5/5   Ankle plantarflexion: 5/5 Ankle plantarflexion: 5/5       Sit <> stands in 30s from bench:14 reps with arms    TUG: (gold chair, no device): 7s      Assessment     Update: Pt tolerated session well despite some R knee pain today. Pt demo's improved knee AROM, she reports improved walking tolerance, and states she has been more active. Pt reports she does not feel confident walking her dog yet, and has pain with stair and curb negotiation. Will progress as tolerated with emphasis on stair training and dynamic standing activities. Extending  POC to 9/30/19.  Pt has met goals as noted below and will work towards remaining/new goals.  Halley is progressing well towards her goals.   Pt prognosis is Good.     Pt will continue to benefit from skilled outpatient physical therapy to address the deficits listed in the problem list box on initial evaluation, provide pt/family education and to maximize pt's level of independence in the home and community environment.   Pt's spiritual, cultural and educational needs considered and pt agreeable to plan of care and goals.    Previous Short Term Goals Status:   Partially met, see today's note  New Short Term Goals Status:   N/A  Long Term Goal Status:   modified:    6. Pt will reduce TUG (timed up and go) time to </= 10 sec for improved safety with community ambulation and decreased falls risk. (progressing, not met)  7. Pt will improve SSWS (self selected walking speed) by >/=1s for improved safety with community ambulation. (progressing, not met)  8. Pt will perform 10 sit <> stands with use of armrests in 30s for improved endurance. (progressing, not met)  Reasons for Recertification of Therapy:   To continue to make gains in strength, function, endurance, and stability, as pt now has more regular work schedule    Plan     Updated Certification Period: 10/3/2019 to 8/30/19  Recommended Treatment Plan: 2 times per week for 6 weeks: Gait Training, Manual Therapy, Moist Heat/ Ice, Neuromuscular Re-ed, Patient Education, Therapeutic Activites, Therapeutic Exercise and modalities as appropriate  Other Recommendations: none at this time    Gila Bell, PT  10/3/2019      I CERTIFY THE NEED FOR THESE SERVICES FURNISHED UNDER THIS PLAN OF TREATMENT AND WHILE UNDER MY CARE    Physician's comments:        Physician's Signature: ___________________________________________________

## 2019-10-03 NOTE — PROGRESS NOTES
Physical Therapy Daily Treatment Note     Name: Halley Moreno  Clinic Number: [id number]    Therapy Diagnosis:   Encounter Diagnoses   Name Primary?    Chronic pain of both knees     Instability of right knee joint     Decreased functional mobility and endurance     Muscle weakness      Physician: Yamila Tejeda MD    Visit Date: 10/3/2019    Physician Orders: PT Eval and Treat   Medical Diagnosis from Referral: Chronic pain of right knee, history of knee sprain  Evaluation Date: 5/17/2019  Authorization Period Expiration: 4/11/20  Updated Plan of Care Expiration: 9/30/19   Visit # / Visits authorized: 12 (9/20 authorized visits)      Time In: 11:08 am   Time Out: 12:00 pm  Total Billable Time: 15  minutes (PT worked with pt 1:1 for 15 min, pt was seen by Sowmya Soria PTA for remainder of time. Please refer to Sowmya's note for details    Precautions: Standard, falls    Subjective     Pt reports:Denies knee pain.  Pt states walking and stairs are getting better, and that she is trying to be more active. Pt reports she's been getting R heel pain, that is worse in the morning, but eases with movement. PT explains this is likely plantar fasPt states she has been icing and stretching. PT advised pt to try icing 2x/day, try resting night splint, and shoe insert vs. New shoes. PT explained if symptoms do not improve, pt can request referral for PT to address R heel pain/plantar fasciitis. Pt with good understanding.     She was compliant with home exercise program  Response to previous treatment: no adverse effects   Functional change: improved ability to walk and climb stairs     Pain: 1/10  Location: right knee      Objective     Halley received physical performance testing with report for 10 minutes, see below:    Update: Lower Extremity Strength (Seated)  Right LE   Left LE     Knee extension: 5/5 Knee extension: 5/5   Knee flexion: 5/5 Knee flexion: 5/5   Hip flexion: 5/5 Hip flexion: 5/5   Hip  extension:  >/=3+/5 Hip extension: >/=3+/5   Hip abduction: 5/5 Hip abduction: 5/5   Hip adduction: 5/5 Hip adduction 5/5   Ankle dorsiflexion: 5/5 Ankle dorsiflexion: 5/5   Ankle plantarflexion: 5/5 Ankle plantarflexion: 5/5       Sit <> stands in 30s from bench:14 reps with arms    TUG: (gold chair, no device): 7s    Pt participated in 1:1 education x 5min to discuss Plan of Care and recommendations for heel pain. Please refer to subjective portion for full details.        Home Exercises Provided and Patient Education Provided     Education provided:   - Progress to date, exercise technique, POC      Written Home Exercises Provided: Patient instructed to cont prior HEP.  Exercises were reviewed and Halley was able to demonstrate them prior to the end of the session.  Halley demonstrated good  understanding of the education provided.     See EMR under Patient Instructions for exercises provided prior visit.    Assessment     Pt tolerated session well and was able to complete with no adverse effects. Pt with improved endurance and reduced TUG time which show improved community mobility and decreased falls risk. Pt reports improved ambulation tolerance and endurance and states she's been more active. Pt has met goals as noted below and is appropriate for discharge from PT at this time. If heel pain symptoms do not improve, pt understands she can request a referral for PT for this.    Halley is progressing well towards her goals.   Pt prognosis is Good.     Pt will continue to benefit from skilled outpatient physical therapy to address the deficits listed in the problem list box on initial evaluation, provide pt/family education and to maximize pt's level of independence in the home and community environment.   Pt's spiritual, cultural and educational needs considered and pt agreeable to plan of care and goals.    Anticipated barriers to physical therapy: Chronicity of symptoms, co-morbidities    Goals: Short Term Goals: 4  weeks   1. Pt will tolerate HEP for improved strength, functional mobility, ROM, posture, and endurance. (Goal met. 7/8/19)  2. Pt will demo 5/5 strength in BLE's for improved functional mobility, endurance, and posture. (Goal met with exception of glutes, 7/8/19)  3. Pt will report reduced R knee pain to </= 5/10 at worst for improved functional mobility and ability to participate in work activities/ADL's. (Goal met 7/8/19)  4. Pt will report being able to walk for >/=25min without increase in symptoms for improved functional mobility/community access. (Met 10/3   Long Term Goals: 8 weeks   1. Pt will be Independent with updated HEP for improved strength, functional mobility, ROM, posture, and endurance. (Met 8/29)  2. Pt will negotiate 1 flight of stairs reciprocally and without pain for improved functional mobility. (Met, 10/3)  3. Pt will report reduced R knee pain to </= 3/10 at worst for improved functional mobility and ability to participate in work activities/ADL's. (met 10/3)  4. Pt will report being able to tolerate walking during a field trip at work without increase in symptoms for improved functional mobility/community access. (Met 10/3)     New goal (7/8/19):  5.. Pt will demo 5/5 strength in BL gluteal muscles for improved functional mobility, endurance, and posture (Met     New goals: (8/29/19):  6. Pt will reduce TUG (timed up and go) time to </= 10 sec for improved safety with community ambulation and decreased falls risk. (Met 10/3  7. Pt will improve SSWS (self selected walking speed) by >/=1s for improved safety with community ambulation. (not tested)  8. Pt will perform 10 sit <> stands with use of armrests in 30s for improved endurance. (met 10/3)      Plan     Discharge from PT    Gila Bell, PT

## 2019-10-03 NOTE — PROGRESS NOTES
"  Physical Therapy Daily Treatment Note     Name: Halley Moreno  Clinic Number: [id number]    Therapy Diagnosis:   No diagnosis found.  Physician: Yamila Tejeda MD    Visit Date: 10/3/2019    Physician Orders: PT Eval and Treat   Medical Diagnosis from Referral: Chronic pain of right knee, history of knee sprain  Evaluation Date: 5/17/2019  Authorization Period Expiration: 4/11/20  Updated Plan of Care Expiration: 9/30/19   Visit # / Visits authorized: 12 (9/20 authorized visits)    Time In: 10:00 am   Time Out: 10:55 am   Total Billable Time:  45 minutes (TE-3)    Precautions: Standard, falls    Subjective     Pt reports: she is doing well and her knees have not been hurting her. Pt stated therapy has helped "tremendously".  Pt reports she's still been getting really bad heel pain.      She was compliant with home exercise program  Response to previous treatment: no adverse effects   Functional change: improved ability to walk and climb stairs     Pain: 1/10  Location: right knee      Objective     Halley received therapeutic exercises to develop strength, endurance and ROM for 55 minutes including:  Bolded performed today    Sci-fit stepper x 10 min, level 2  Gastroc stretch on slant board: 3 x 30''  Standing heel raises: 2x 10  Standing Hip ABD, 10 reps/LE  Step-ups on 4" step: 10 forward and lateral on each LE      Not performed today:  Posterior pelvic tilt x 20, 3s holds  SAQ, 2 x 10, 2#  Bridge, x 20  SLR x 10/LE  Stair navigation 4 steps reciprocally x3 (minimal pain reported with last round )   Hip ADD squeeze, 2 x 10 reps, seated   Hip ABD in sitting, orange resistance, 2 x 10/LE  Standing toe raises, 2 x 10  Pt amb x 3 laps around perimeter of downstairs gym  Seated gastroc stretch c/ strap: 3 x 30''/LE   Ankle t-band 4 way OTB: x 10 each LE  Sit <> stands with 5# kettlebell: 10 reps with bell at chest, 10 reps with chest press  Standing Marching x 15 B  Standing Hip Ext, 10 reps/LE  Mini squats : " x 15  Seated Hamstring curl, 2 x 12 reps, green band  Seated hamstring stretch, 2 x 30s/LE      Home Exercises Provided and Patient Education Provided     Education provided:   - Progress to date, exercise technique, POC      Written Home Exercises Provided: Patient instructed to cont prior HEP.  Exercises were reviewed and Halley was able to demonstrate them prior to the end of the session.  Halley demonstrated good  understanding of the education provided.     See EMR under Patient Instructions for exercises provided prior visit.    Assessment     Pt was able to complete session with no adverse effects. Pt with no knee pain reported with any activities performed today and is progressing well.     Halley is progressing well towards her goals.   Pt prognosis is Good.     Pt will continue to benefit from skilled outpatient physical therapy to address the deficits listed in the problem list box on initial evaluation, provide pt/family education and to maximize pt's level of independence in the home and community environment.   Pt's spiritual, cultural and educational needs considered and pt agreeable to plan of care and goals.    Anticipated barriers to physical therapy: Chronicity of symptoms, co-morbidities    Goals: Short Term Goals: 4 weeks   1. Pt will tolerate HEP for improved strength, functional mobility, ROM, posture, and endurance. (Goal met. 7/8/19)  2. Pt will demo 5/5 strength in BLE's for improved functional mobility, endurance, and posture. (Goal met with exception of glutes, 7/8/19)  3. Pt will report reduced R knee pain to </= 5/10 at worst for improved functional mobility and ability to participate in work activities/ADL's. (Goal met 7/8/19)  4. Pt will report being able to walk for >/=25min without increase in symptoms for improved functional mobility/community access. (progressing, not met)      Long Term Goals: 8 weeks   1. Pt will be Independent with updated HEP for improved strength, functional  mobility, ROM, posture, and endurance. (Met 8/29)  2. Pt will negotiate 1 flight of stairs reciprocally and without pain for improved functional mobility. (progressing, not met)  3. Pt will report reduced R knee pain to </= 3/10 at worst for improved functional mobility and ability to participate in work activities/ADL's. (progressing, not met)  4. Pt will report being able to tolerate walking during a field trip at work without increase in symptoms for improved functional mobility/community access. (progressing, not met)     New goal (7/8/19):  5.. Pt will demo 5/5 strength in BL gluteal muscles for improved functional mobility, endurance, and posture (progressing, not met)    New goals: (8/29/19):  6. Pt will reduce TUG (timed up and go) time to </= 10 sec for improved safety with community ambulation and decreased falls risk. (progressing, not met)  7. Pt will improve SSWS (self selected walking speed) by >/=1s for improved safety with community ambulation. (progressing, not met)  8. Pt will perform 10 sit <> stands with use of armrests in 30s for improved endurance. (progressing, not met)      Plan     Cont PT POC, extending to 9/30/19.  Emphasize stair training and dynamic standing exercises    Sowmya Soria, PTA

## 2019-10-14 ENCOUNTER — OFFICE VISIT (OUTPATIENT)
Dept: INTERNAL MEDICINE | Facility: CLINIC | Age: 44
End: 2019-10-14
Payer: MEDICAID

## 2019-10-14 VITALS
DIASTOLIC BLOOD PRESSURE: 68 MMHG | SYSTOLIC BLOOD PRESSURE: 108 MMHG | RESPIRATION RATE: 15 BRPM | HEART RATE: 81 BPM | BODY MASS INDEX: 74.92 KG/M2 | OXYGEN SATURATION: 93 % | TEMPERATURE: 97 F | WEIGHT: 293 LBS

## 2019-10-14 DIAGNOSIS — E11.9 DIABETES MELLITUS TYPE 2 WITHOUT RETINOPATHY: ICD-10-CM

## 2019-10-14 DIAGNOSIS — E11.59 HYPERTENSION ASSOCIATED WITH DIABETES: Primary | ICD-10-CM

## 2019-10-14 DIAGNOSIS — I15.2 HYPERTENSION ASSOCIATED WITH DIABETES: Primary | ICD-10-CM

## 2019-10-14 DIAGNOSIS — E66.01 MORBID OBESITY WITH BMI OF 70 AND OVER, ADULT: ICD-10-CM

## 2019-10-14 DIAGNOSIS — G89.29 CHRONIC MIDLINE LOW BACK PAIN WITHOUT SCIATICA: ICD-10-CM

## 2019-10-14 DIAGNOSIS — E55.9 VITAMIN D INSUFFICIENCY: ICD-10-CM

## 2019-10-14 DIAGNOSIS — M54.50 CHRONIC MIDLINE LOW BACK PAIN WITHOUT SCIATICA: ICD-10-CM

## 2019-10-14 DIAGNOSIS — E11.21 CONTROLLED TYPE 2 DIABETES MELLITUS WITH DIABETIC NEPHROPATHY, WITHOUT LONG-TERM CURRENT USE OF INSULIN: Chronic | ICD-10-CM

## 2019-10-14 PROCEDURE — 99999 PR PBB SHADOW E&M-EST. PATIENT-LVL III: CPT | Mod: PBBFAC,,, | Performed by: INTERNAL MEDICINE

## 2019-10-14 PROCEDURE — 99213 OFFICE O/P EST LOW 20 MIN: CPT | Mod: PBBFAC,PO,25 | Performed by: INTERNAL MEDICINE

## 2019-10-14 PROCEDURE — 90686 IIV4 VACC NO PRSV 0.5 ML IM: CPT | Mod: PBBFAC,PO

## 2019-10-14 PROCEDURE — 99214 PR OFFICE/OUTPT VISIT, EST, LEVL IV, 30-39 MIN: ICD-10-PCS | Mod: S$PBB,,, | Performed by: INTERNAL MEDICINE

## 2019-10-14 PROCEDURE — 99214 OFFICE O/P EST MOD 30 MIN: CPT | Mod: S$PBB,,, | Performed by: INTERNAL MEDICINE

## 2019-10-14 PROCEDURE — 99999 PR PBB SHADOW E&M-EST. PATIENT-LVL III: ICD-10-PCS | Mod: PBBFAC,,, | Performed by: INTERNAL MEDICINE

## 2019-10-14 RX ORDER — HYDROCODONE BITARTRATE AND ACETAMINOPHEN 5; 325 MG/1; MG/1
1 TABLET ORAL
Qty: 15 TABLET | Refills: 0 | Status: SHIPPED | OUTPATIENT
Start: 2019-10-14 | End: 2020-01-25 | Stop reason: SDUPTHER

## 2019-10-14 NOTE — PATIENT INSTRUCTIONS
To Do List:     - Labs:  1. Non-fasting labs on 11/14/19 @ 8:30am      - Prescriptions:   1. :      Injections:  Flu shot      Follow-up appointments:    3 month f/u

## 2019-10-14 NOTE — Clinical Note
3 month f/u DM2 Health Maintenance Summary     Topic Due On Due Status Completed On    MAMMOGRAM - BREAST CANCER SCREENING Sep 7, 2019 Not Due Sep 7, 2017    Colorectal Cancer Screening - Colonoscopy Jan 16, 2027 Not Due Jan 16, 2017    Osteoporosis Screening  Completed Nov 14, 2016    Immunization - Pneumococcal Jul 7, 2017 Overdue     Immunization - TDAP Pregnancy  Hidden     Medicare Wellness Visit Oct 5, 2018 Not Due Oct 5, 2017    IMMUNIZATION - DTaP/Tdap/Td Mar 28, 2018 Not Due Mar 28, 2008    Immunization-Influenza  Completed Oct 7, 2017          Patient is due for topics as listed above, she wishes to discuss with provider .

## 2019-10-14 NOTE — PROGRESS NOTES
Subjective:       Patient ID: Halley Moreno is a 44 y.o. female who presents for Follow-up; Diabetes; Hypertension; and Low-back Pain      Diabetes   She presents for her follow-up diabetic visit. She has type 2 diabetes mellitus. There are no hypoglycemic associated symptoms. Pertinent negatives for hypoglycemia include no dizziness, headaches or sweats. There are no diabetic associated symptoms. Pertinent negatives for diabetes include no chest pain, no polydipsia and no weakness. There are no hypoglycemic complications. Symptoms are stable. There are no diabetic complications. Risk factors for coronary artery disease include diabetes mellitus, dyslipidemia, obesity, sedentary lifestyle and hypertension. Current diabetic treatment includes oral agent (monotherapy). She is compliant with treatment all of the time. She is following a generally healthy diet. She participates in exercise intermittently. An ACE inhibitor/angiotensin II receptor blocker is being taken.   Hypertension   This is a chronic problem. The current episode started more than 1 year ago. The problem is unchanged. The problem is controlled. Associated symptoms include peripheral edema. Pertinent negatives include no chest pain, headaches, malaise/fatigue, palpitations, shortness of breath or sweats. There are no associated agents to hypertension. Risk factors for coronary artery disease include diabetes mellitus and dyslipidemia. Past treatments include angiotensin blockers and diuretics. The current treatment provides moderate improvement. Compliance problems include exercise.  There is no history of kidney disease. There is no history of a hypertension causing med.   Low-back Pain   Associated symptoms include congestion and coughing (slight, mild, nonproductive cough in last 1-2 days). Pertinent negatives include no abdominal pain, arthralgias, chest pain, chills, diaphoresis, fever, headaches, myalgias, nausea, rash, sore throat,  vomiting or weakness.      She cooks more at home, eats chicken, turkey and salad, hot dogs on wheat buns. Weight has not changed and is 383 lbs today. But she reports that she gained a few pounds but in the last 3 weeks lost the 8 lbs she gained.     Recently started vit D 2000 IU      Review of Systems   Constitutional: Negative for chills, diaphoresis, fever and malaise/fatigue.   HENT: Positive for congestion and postnasal drip. Negative for ear pain, rhinorrhea, sinus pressure and sore throat.    Eyes: Negative for redness and itching.   Respiratory: Positive for cough (slight, mild, nonproductive cough in last 1-2 days) and wheezing (slight). Negative for chest tightness and shortness of breath.    Cardiovascular: Positive for leg swelling. Negative for chest pain and palpitations.   Gastrointestinal: Positive for abdominal distention (bloating and gas since changed diet and eats more vegetables). Negative for abdominal pain, constipation, diarrhea, nausea and vomiting.   Endocrine: Negative for polydipsia.   Genitourinary: Negative for frequency and urgency.   Musculoskeletal: Positive for back pain. Negative for arthralgias and myalgias.        Right knee pain is better after PT   Skin: Negative for rash and wound.   Neurological: Negative for dizziness, weakness and headaches.       Objective:      Physical Exam   Constitutional: She is oriented to person, place, and time. Vital signs are normal. She appears well-developed and well-nourished. No distress.   HENT:   Head: Normocephalic and atraumatic.   Right Ear: Hearing and external ear normal.   Left Ear: Hearing and external ear normal.   Nose: Nose normal.   Mouth/Throat: Uvula is midline.   Eyes: Conjunctivae and lids are normal.   Cardiovascular: Normal rate, regular rhythm, normal heart sounds and intact distal pulses.   No murmur heard.  Pulmonary/Chest: Effort normal and breath sounds normal. She has no wheezes.   Abdominal: Soft. Bowel sounds are  normal. She exhibits no distension. There is no tenderness.   Musculoskeletal: Normal range of motion. She exhibits no edema.   Neurological: She is alert and oriented to person, place, and time.   Skin: Skin is warm, dry and intact. No rash noted. She is not diaphoretic.   Psychiatric: She has a normal mood and affect.   Vitals reviewed.      Assessment/Plan:       1. Hypertension associated with diabetes  - BP is controlled, continue losartan and and furosemide    2. Controlled type 2 diabetes mellitus with diabetic nephropathy, without long-term current use of insulin  - continue metformin, check labs in 1 month  - Basic metabolic panel; Future  - Hemoglobin A1c; Future    3. Chronic midline low back pain without sciatica  - will give small supply of Norco, will need to consider PT or Ortho for back if it continues  - HYDROcodone-acetaminophen (NORCO) 5-325 mg per tablet; Take 1 tablet by mouth every 24 hours as needed for Pain.  Dispense: 15 tablet; Refill: 0    4. Vitamin D insufficiency  - continue Vitamin D 2000 IU daily    5. Diabetes mellitus type 2 without retinopathy  - advised to see Optometry, overdue    6. Morbid obesity with BMI of 70 and over, adult  - continue to work on increasing physical activity and continue compounded topamax/phentermine/folic acid from weight loss clinic    RTC in 3 months or sooner if needed    Yamila Tejeda MD

## 2019-11-08 ENCOUNTER — OFFICE VISIT (OUTPATIENT)
Dept: URGENT CARE | Facility: CLINIC | Age: 44
End: 2019-11-08
Payer: MEDICAID

## 2019-11-08 VITALS
HEIGHT: 60 IN | RESPIRATION RATE: 20 BRPM | WEIGHT: 293 LBS | OXYGEN SATURATION: 95 % | TEMPERATURE: 97 F | HEART RATE: 74 BPM | DIASTOLIC BLOOD PRESSURE: 82 MMHG | SYSTOLIC BLOOD PRESSURE: 151 MMHG | BODY MASS INDEX: 57.52 KG/M2

## 2019-11-08 DIAGNOSIS — R05.9 COUGH: ICD-10-CM

## 2019-11-08 DIAGNOSIS — J32.9 BACTERIAL SINUSITIS: Primary | ICD-10-CM

## 2019-11-08 DIAGNOSIS — B96.89 BACTERIAL SINUSITIS: Primary | ICD-10-CM

## 2019-11-08 DIAGNOSIS — J02.9 SORE THROAT: ICD-10-CM

## 2019-11-08 LAB
CTP QC/QA: YES
S PYO RRNA THROAT QL PROBE: NEGATIVE

## 2019-11-08 PROCEDURE — 87880 STREP A ASSAY W/OPTIC: CPT | Mod: QW,S$GLB,, | Performed by: PHYSICIAN ASSISTANT

## 2019-11-08 PROCEDURE — 71046 XR CHEST PA AND LATERAL: ICD-10-PCS | Mod: FY,S$GLB,, | Performed by: RADIOLOGY

## 2019-11-08 PROCEDURE — 87880 POCT RAPID STREP A: ICD-10-PCS | Mod: QW,S$GLB,, | Performed by: PHYSICIAN ASSISTANT

## 2019-11-08 PROCEDURE — 99214 PR OFFICE/OUTPT VISIT, EST, LEVL IV, 30-39 MIN: ICD-10-PCS | Mod: S$GLB,,, | Performed by: PHYSICIAN ASSISTANT

## 2019-11-08 PROCEDURE — 71046 X-RAY EXAM CHEST 2 VIEWS: CPT | Mod: FY,S$GLB,, | Performed by: RADIOLOGY

## 2019-11-08 PROCEDURE — 99214 OFFICE O/P EST MOD 30 MIN: CPT | Mod: S$GLB,,, | Performed by: PHYSICIAN ASSISTANT

## 2019-11-08 RX ORDER — AMOXICILLIN AND CLAVULANATE POTASSIUM 875; 125 MG/1; MG/1
1 TABLET, FILM COATED ORAL 2 TIMES DAILY
Qty: 20 TABLET | Refills: 0 | Status: SHIPPED | OUTPATIENT
Start: 2019-11-08 | End: 2019-11-18

## 2019-11-08 NOTE — PROGRESS NOTES
Subjective:       Patient ID: Halley Moreno is a 44 y.o. female.    Vitals:  vitals were not taken for this visit.     Chief Complaint: Cough    HPI  <OUCOOHADULT>    Objective:      Physical Exam      Assessment:       No diagnosis found.    Plan:         There are no diagnoses linked to this encounter.

## 2019-11-08 NOTE — PATIENT INSTRUCTIONS
Sinusitis (Antibiotic Treatment)    The sinuses are air-filled spaces within the bones of the face. They connect to the inside of the nose. Sinusitis is an inflammation of the tissue lining the sinus cavity. Sinus inflammation can occur during a cold. It can also be due to allergies to pollens and other particles in the air. Sinusitis can cause symptoms of sinus congestion and fullness. A sinus infection causes fever, headache and facial pain. There is often green or yellow drainage from the nose or into the back of the throat (post-nasal drip). You have been given antibiotics to treat this condition.  Home care:  · Take the full course of antibiotics as instructed. Do not stop taking them, even if you feel better.  · Drink plenty of water, hot tea, and other liquids. This may help thin mucus. It also may promote sinus drainage.  · Heat may help soothe painful areas of the face. Use a towel soaked in hot water. Or,  the shower and direct the hot spray onto your face. Using a vaporizer along with a menthol rub at night may also help.   · An expectorant containing guaifenesin may help thin the mucus and promote drainage from the sinuses.  · Over-the-counter decongestants may be used unless a similar medicine was prescribed. Nasal sprays work the fastest. Use one that contains phenylephrine or oxymetazoline. First blow the nose gently. Then use the spray. Do not use these medicines more often than directed on the label or symptoms may get worse. You may also use tablets containing pseudoephedrine. Avoid products that combine ingredients, because side effects may be increased. Read labels. You can also ask the pharmacist for help. (NOTE: Persons with high blood pressure should not use decongestants. They can raise blood pressure.)  · Over-the-counter antihistamines may help if allergies contributed to your sinusitis.    · Do not use nasal rinses or irrigation during an acute sinus infection, unless told to by  your health care provider. Rinsing may spread the infection to other sinuses.  · Use acetaminophen or ibuprofen to control pain, unless another pain medicine was prescribed. (If you have chronic liver or kidney disease or ever had a stomach ulcer, talk with your doctor before using these medicines. Aspirin should never be used in anyone under 18 years of age who is ill with a fever. It may cause severe liver damage.)  · Don't smoke. This can worsen symptoms.  Follow-up care  Follow up with your healthcare provider or our staff if you are not improving within the next week.  When to seek medical advice  Call your healthcare provider if any of these occur:  · Facial pain or headache becoming more severe  · Stiff neck  · Unusual drowsiness or confusion  · Swelling of the forehead or eyelids  · Vision problems, including blurred or double vision  · Fever of 100.4ºF (38ºC) or higher, or as directed by your healthcare provider  · Seizure  · Breathing problems  · Symptoms not resolving within 10 days  Date Last Reviewed: 4/13/2015  © 3272-7016 [employer]. [address] [place] [address]. All rights reserved. This information is not intended as a substitute for professional medical care. Always follow your healthcare professional's instructions.      Patient Instructions   -Below are suggestions for symptomatic relief:              -Tylenol every 4 hours OR ibuprofen every 6 hours as needed for pain/fever.              -Salt water gargles to soothe throat pain.              -Chloroseptic spray also helps to numb throat pain.              -Nasal saline spray reduces inflammation and dryness.              -Warm face compresses to help with facial sinus pain/pressure.              -Vicks vapor rub at night.              -Flonase OTC or Nasacort OTC for nasal congestion.              -Simple foods like chicken noodle soup.              -Delsym helps with coughing at night              -Zyrtec/Claritin  during the day & Benadryl at night may help with allergies.                If you DO NOT have Hypertension or any history of palpitations, it is ok to take over the counter Sudafed or Mucinex D or Allegra-D or Claritin-D or Zyrtec-D.  If you do take one of the above, it is ok to combine that with plain over the counter Mucinex or Allegra or Claritin or Zyrtec. If, for example, you are taking Zyrtec -D, you can combine that with Mucinex, but not Mucinex-D.  If you are taking Mucinex-D, you can combine that with plain Allegra or Claritin or Zyrtec.   If you DO have Hypertension or palpitations, it is safe to take Coricidin HBP for relief of sinus symptoms.    Please follow up with your Primary care provider within 2-5 days if your signs and symptoms have not resolved or worsen.     If your condition worsens or fails to improve we recommend that you receive another evaluation at the emergency room immediately or contact your primary medical clinic to discuss your concerns.   You must understand that you have received an Urgent Care treatment only and that you may be released before all of your medical problems are known or treated. You, the patient, will arrange for follow up care as instructed.     RED FLAGS/WARNING SYMPTOMS DISCUSSED WITH PATIENT THAT WOULD WARRANT EMERGENT MEDICAL ATTENTION. PATIENT VERBALIZED UNDERSTANDING.     Elevated Blood Pressure  Your blood pressure was elevated during your visit to the urgent care.  It was not so high that immediate care was needed but it is recommended that you monitor your blood pressure over the next week or two to make sure that it is not staying elevated.  Please have your blood pressure taken 2-3 times daily at different times of the day.  Write all of those blood pressures down and record the time that they were taken.  Keep all that information and take it with you to see your Primary Care Physician.  If your blood pressure is consistently above 140/90 you will need  to follow up with your PCP more quickly

## 2019-11-08 NOTE — PROGRESS NOTES
Subjective:       Patient ID: Halley Moreno is a 44 y.o. female.    Vitals:  height is 5' (1.524 m) and weight is 173.7 kg (383 lb) (abnormal). Her temperature is 97.2 °F (36.2 °C). Her blood pressure is 151/82 (abnormal) and her pulse is 74. Her respiration is 20 and oxygen saturation is 95%.     Chief Complaint: Cough    Cough   This is a new problem. The current episode started in the past 7 days (5 days ). The problem has been gradually worsening. The problem occurs constantly. The cough is productive of sputum. Associated symptoms include nasal congestion and a sore throat. Pertinent negatives include no chills, ear pain, eye redness, fever, headaches, hemoptysis, myalgias, rash, shortness of breath or wheezing. The symptoms are aggravated by lying down. She has tried OTC cough suppressant for the symptoms. The treatment provided mild relief. Her past medical history is significant for bronchitis and pneumonia.   Sinusitis   This is a new problem. Episode onset: 6 days. The problem has been gradually worsening since onset. There has been no fever. Her pain is at a severity of 7/10. The pain is moderate. Associated symptoms include congestion, coughing, sinus pressure, a sore throat and swollen glands. Pertinent negatives include no chills, diaphoresis, ear pain, headaches, hoarse voice, neck pain or shortness of breath. Treatments tried: mucinex. The treatment provided mild relief.       Constitution: Negative for chills, sweating, fatigue and fever.   HENT: Positive for congestion, sinus pain, sinus pressure and sore throat. Negative for ear pain and voice change.    Neck: Negative for neck pain and painful lymph nodes.   Eyes: Negative for eye redness.   Respiratory: Positive for cough. Negative for chest tightness, sputum production, bloody sputum, COPD, shortness of breath, stridor, wheezing and asthma.    Gastrointestinal: Negative for nausea and vomiting.   Musculoskeletal: Negative for muscle ache.    Skin: Negative for rash.   Allergic/Immunologic: Negative for seasonal allergies and asthma.   Neurological: Negative for headaches.   Hematologic/Lymphatic: Negative for swollen lymph nodes.       Objective:      Physical Exam   Constitutional: She is oriented to person, place, and time. She appears well-developed and well-nourished. She is cooperative.  Non-toxic appearance. She does not have a sickly appearance. She does not appear ill. No distress.   HENT:   Head: Normocephalic and atraumatic.   Right Ear: Hearing, tympanic membrane, external ear and ear canal normal.   Left Ear: Hearing, tympanic membrane, external ear and ear canal normal.   Nose: Mucosal edema, purulent discharge and sinus tenderness present. No rhinorrhea or nasal deformity. No epistaxis. Right sinus exhibits maxillary sinus tenderness. Right sinus exhibits no frontal sinus tenderness. Left sinus exhibits maxillary sinus tenderness. Left sinus exhibits no frontal sinus tenderness.   Mouth/Throat: Uvula is midline, oropharynx is clear and moist and mucous membranes are normal. No trismus in the jaw. Normal dentition. No uvula swelling. No oropharyngeal exudate, posterior oropharyngeal edema, posterior oropharyngeal erythema or tonsillar abscesses. Tonsils are 2+ on the right. Tonsils are 2+ on the left. No tonsillar exudate.   Eyes: Conjunctivae and lids are normal. No scleral icterus.   Neck: Trachea normal, full passive range of motion without pain and phonation normal. Neck supple. No neck rigidity. No edema and no erythema present.   Cardiovascular: Normal rate, regular rhythm, normal heart sounds, intact distal pulses and normal pulses.   Pulmonary/Chest: Effort normal. No accessory muscle usage or stridor. No respiratory distress. She has no decreased breath sounds. She has no wheezes. She has no rhonchi. She has no rales. Chest wall is not dull to percussion. She exhibits no tenderness and no bony tenderness.   Abdominal: Normal  appearance.   Musculoskeletal: Normal range of motion. She exhibits no edema or deformity.   Lymphadenopathy:        Head (right side): Submandibular adenopathy present. No submental, no preauricular, no posterior auricular and no occipital adenopathy present.        Head (left side): Submandibular adenopathy present. No submental, no preauricular, no posterior auricular and no occipital adenopathy present.     She has cervical adenopathy.        Right cervical: Superficial cervical adenopathy present. No deep cervical and no posterior cervical adenopathy present.       Left cervical: Superficial cervical adenopathy present. No deep cervical and no posterior cervical adenopathy present.   Neurological: She is alert and oriented to person, place, and time. She exhibits normal muscle tone. Coordination normal.   Skin: Skin is warm, dry, intact, not diaphoretic and not pale.   Psychiatric: She has a normal mood and affect. Her speech is normal and behavior is normal. Judgment and thought content normal. Cognition and memory are normal.   Nursing note and vitals reviewed.      XRAY chest: Heart mediastinum are normal lungs are clear and the bones and bowel gas are noncontributory. Per radiologist    Assessment:       1. Bacterial sinusitis    2. Sore throat    3. Cough        Plan:         Bacterial sinusitis  -     amoxicillin-clavulanate 875-125mg (AUGMENTIN) 875-125 mg per tablet; Take 1 tablet by mouth 2 (two) times daily. for 10 days  Dispense: 20 tablet; Refill: 0    Sore throat  -     POCT rapid strep A    Cough  -     X-Ray Chest PA And Lateral; Future; Expected date: 11/08/2019             Sinusitis (Antibiotic Treatment)    The sinuses are air-filled spaces within the bones of the face. They connect to the inside of the nose. Sinusitis is an inflammation of the tissue lining the sinus cavity. Sinus inflammation can occur during a cold. It can also be due to allergies to pollens and other particles in the air.  Sinusitis can cause symptoms of sinus congestion and fullness. A sinus infection causes fever, headache and facial pain. There is often green or yellow drainage from the nose or into the back of the throat (post-nasal drip). You have been given antibiotics to treat this condition.  Home care:  · Take the full course of antibiotics as instructed. Do not stop taking them, even if you feel better.  · Drink plenty of water, hot tea, and other liquids. This may help thin mucus. It also may promote sinus drainage.  · Heat may help soothe painful areas of the face. Use a towel soaked in hot water. Or,  the shower and direct the hot spray onto your face. Using a vaporizer along with a menthol rub at night may also help.   · An expectorant containing guaifenesin may help thin the mucus and promote drainage from the sinuses.  · Over-the-counter decongestants may be used unless a similar medicine was prescribed. Nasal sprays work the fastest. Use one that contains phenylephrine or oxymetazoline. First blow the nose gently. Then use the spray. Do not use these medicines more often than directed on the label or symptoms may get worse. You may also use tablets containing pseudoephedrine. Avoid products that combine ingredients, because side effects may be increased. Read labels. You can also ask the pharmacist for help. (NOTE: Persons with high blood pressure should not use decongestants. They can raise blood pressure.)  · Over-the-counter antihistamines may help if allergies contributed to your sinusitis.    · Do not use nasal rinses or irrigation during an acute sinus infection, unless told to by your health care provider. Rinsing may spread the infection to other sinuses.  · Use acetaminophen or ibuprofen to control pain, unless another pain medicine was prescribed. (If you have chronic liver or kidney disease or ever had a stomach ulcer, talk with your doctor before using these medicines. Aspirin should never be used  in anyone under 18 years of age who is ill with a fever. It may cause severe liver damage.)  · Don't smoke. This can worsen symptoms.  Follow-up care  Follow up with your healthcare provider or our staff if you are not improving within the next week.  When to seek medical advice  Call your healthcare provider if any of these occur:  · Facial pain or headache becoming more severe  · Stiff neck  · Unusual drowsiness or confusion  · Swelling of the forehead or eyelids  · Vision problems, including blurred or double vision  · Fever of 100.4ºF (38ºC) or higher, or as directed by your healthcare provider  · Seizure  · Breathing problems  · Symptoms not resolving within 10 days  Date Last Reviewed: 4/13/2015 © 2000-2017 [employer]. [address] [place] [address], [place] [address]. All rights reserved. This information is not intended as a substitute for professional medical care. Always follow your healthcare professional's instructions.      Patient Instructions   -Below are suggestions for symptomatic relief:              -Tylenol every 4 hours OR ibuprofen every 6 hours as needed for pain/fever.              -Salt water gargles to soothe throat pain.              -Chloroseptic spray also helps to numb throat pain.              -Nasal saline spray reduces inflammation and dryness.              -Warm face compresses to help with facial sinus pain/pressure.              -Vicks vapor rub at night.              -Flonase OTC or Nasacort OTC for nasal congestion.              -Simple foods like chicken noodle soup.              -Delsym helps with coughing at night              -Zyrtec/Claritin during the day & Benadryl at night may help with allergies.                If you DO NOT have Hypertension or any history of palpitations, it is ok to take over the counter Sudafed or Mucinex D or Allegra-D or Claritin-D or Zyrtec-D.  If you do take one of the above, it is ok to combine that with plain over the counter Mucinex  or Allegra or Claritin or Zyrtec. If, for example, you are taking Zyrtec -D, you can combine that with Mucinex, but not Mucinex-D.  If you are taking Mucinex-D, you can combine that with plain Allegra or Claritin or Zyrtec.   If you DO have Hypertension or palpitations, it is safe to take Coricidin HBP for relief of sinus symptoms.    Please follow up with your Primary care provider within 2-5 days if your signs and symptoms have not resolved or worsen.     If your condition worsens or fails to improve we recommend that you receive another evaluation at the emergency room immediately or contact your primary medical clinic to discuss your concerns.   You must understand that you have received an Urgent Care treatment only and that you may be released before all of your medical problems are known or treated. You, the patient, will arrange for follow up care as instructed.     RED FLAGS/WARNING SYMPTOMS DISCUSSED WITH PATIENT THAT WOULD WARRANT EMERGENT MEDICAL ATTENTION. PATIENT VERBALIZED UNDERSTANDING.     Elevated Blood Pressure  Your blood pressure was elevated during your visit to the urgent care.  It was not so high that immediate care was needed but it is recommended that you monitor your blood pressure over the next week or two to make sure that it is not staying elevated.  Please have your blood pressure taken 2-3 times daily at different times of the day.  Write all of those blood pressures down and record the time that they were taken.  Keep all that information and take it with you to see your Primary Care Physician.  If your blood pressure is consistently above 140/90 you will need to follow up with your PCP more quickly

## 2019-11-15 ENCOUNTER — TELEPHONE (OUTPATIENT)
Dept: INTERNAL MEDICINE | Facility: CLINIC | Age: 44
End: 2019-11-15

## 2019-11-15 NOTE — TELEPHONE ENCOUNTER
----- Message from Bhavani Rodriguez sent at 11/15/2019  3:27 PM CST -----  Contact: Pt self Mobile [phone]  Patient is calling in regards to her saying that she's having pains in the mid right side of her back and its started on yesterday. She said that she would like for you to prescribe her a script that is stronger than the amoxicillin-clavulanate 875-125mg (AUGMENTIN) 875-125 mg per tablet and the Diabetic Tussin and have it sent to..    Patient's Pharmacy: [id number] - [facility] - [place], [place] - [address]  Phone# [phone], Fax# [phone]

## 2019-11-15 NOTE — TELEPHONE ENCOUNTER
Spoke to pt.  She went to UC last week for sinusitis.    Still coughing, productive.    Has a slight pain on her back, right side beneath bra strap.  Thinks she may have pneumonia.    No fever  No chest pain  No SOB    Explained you are out and may not respond until Monday.  She wants to know if she should try mucinex.  States she's drinking plenty of water.    Told her if pain worsens or she has chest pain or trouble breathing to go back to UC or ER.

## 2019-11-20 NOTE — TELEPHONE ENCOUNTER
Pt had not read message.    I called her with Dr Tejeda' message.  Pt verbalized understanding.    States she's feeling much better.  Finished the abx. Just has a cough.  She said she will try the plain mucinex and will drink plenty of water.  Doesn't want the tessalon perls.

## 2020-01-13 ENCOUNTER — TELEPHONE (OUTPATIENT)
Dept: INTERNAL MEDICINE | Facility: CLINIC | Age: 45
End: 2020-01-13

## 2020-01-13 DIAGNOSIS — E55.9 VITAMIN D INSUFFICIENCY: Primary | ICD-10-CM

## 2020-01-13 NOTE — TELEPHONE ENCOUNTER
----- Message from Bertha Oro MA sent at 1/13/2020 11:05 AM CST -----  Contact: Self  [phone]  Please schedule Medicaid pt.  See below for pt preference on day/time.    Thank you!    ----- Message -----  From: Martha Felix  Sent: 1/13/2020   9:52 AM CST  To: Nikhil BOONE Staff    Patient need a 3 mo F/u from the Recall appointment. Medicaid Insurance.  Prefers mornings any day after 9:00 am.

## 2020-01-13 NOTE — TELEPHONE ENCOUNTER
Pt scheduled her 3 month f/u.  Also scheduled a lab.  There was a BMP and A1c order from October.    Any labs you need to add?

## 2020-01-16 ENCOUNTER — TELEPHONE (OUTPATIENT)
Dept: INTERNAL MEDICINE | Facility: CLINIC | Age: 45
End: 2020-01-16

## 2020-01-16 NOTE — TELEPHONE ENCOUNTER
----- Message from Rupa Xiong sent at 1/16/2020 10:18 AM CST -----  Contact: Halley [phone]  Needs Advice    Reason for call:        Pt had an appt for today for her annual but can not make it but wants to reschedule for 1/23 due to having a lab appt for 9:30 and would like to come after that.      Communication Preference: a[facility]lley [phone]    Additional Information:  Mrs. Rowley is requesting a call back to schedule appt.

## 2020-01-16 NOTE — TELEPHONE ENCOUNTER
Spoke to pt.    She wants to reschedule her MMG.  Her annual is on 1/29.    Message sent to scheduling.

## 2020-01-23 ENCOUNTER — LAB VISIT (OUTPATIENT)
Dept: LAB | Facility: HOSPITAL | Age: 45
End: 2020-01-23
Attending: INTERNAL MEDICINE
Payer: MEDICAID

## 2020-01-23 DIAGNOSIS — E55.9 VITAMIN D INSUFFICIENCY: ICD-10-CM

## 2020-01-23 DIAGNOSIS — E11.21 CONTROLLED TYPE 2 DIABETES MELLITUS WITH DIABETIC NEPHROPATHY, WITHOUT LONG-TERM CURRENT USE OF INSULIN: Chronic | ICD-10-CM

## 2020-01-23 LAB
ANION GAP SERPL CALC-SCNC: 8 MMOL/L (ref 8–16)
BUN SERPL-MCNC: 10 MG/DL (ref 6–20)
CALCIUM SERPL-MCNC: 8.9 MG/DL (ref 8.7–10.5)
CHLORIDE SERPL-SCNC: 104 MMOL/L (ref 95–110)
CO2 SERPL-SCNC: 28 MMOL/L (ref 23–29)
CREAT SERPL-MCNC: 0.8 MG/DL (ref 0.5–1.4)
EST. GFR  (AFRICAN AMERICAN): >60 ML/MIN/1.73 M^2
EST. GFR  (NON AFRICAN AMERICAN): >60 ML/MIN/1.73 M^2
ESTIMATED AVG GLUCOSE: 174 MG/DL (ref 68–131)
GLUCOSE SERPL-MCNC: 144 MG/DL (ref 70–110)
HBA1C MFR BLD HPLC: 7.7 % (ref 4–5.6)
POTASSIUM SERPL-SCNC: 4.5 MMOL/L (ref 3.5–5.1)
SODIUM SERPL-SCNC: 140 MMOL/L (ref 136–145)

## 2020-01-23 PROCEDURE — 82306 VITAMIN D 25 HYDROXY: CPT

## 2020-01-23 PROCEDURE — 83036 HEMOGLOBIN GLYCOSYLATED A1C: CPT

## 2020-01-23 PROCEDURE — 80048 BASIC METABOLIC PNL TOTAL CA: CPT

## 2020-01-23 PROCEDURE — 36415 COLL VENOUS BLD VENIPUNCTURE: CPT | Mod: PO

## 2020-01-24 LAB — 25(OH)D3+25(OH)D2 SERPL-MCNC: 22 NG/ML (ref 30–96)

## 2020-01-25 ENCOUNTER — HOSPITAL ENCOUNTER (EMERGENCY)
Facility: HOSPITAL | Age: 45
Discharge: HOME | End: 2020-01-25
Attending: EMERGENCY MEDICINE
Payer: MEDICAID

## 2020-01-25 VITALS
WEIGHT: 293 LBS | SYSTOLIC BLOOD PRESSURE: 159 MMHG | OXYGEN SATURATION: 95 % | TEMPERATURE: 98 F | DIASTOLIC BLOOD PRESSURE: 89 MMHG | BODY MASS INDEX: 51.91 KG/M2 | HEART RATE: 83 BPM | HEIGHT: 63 IN | RESPIRATION RATE: 20 BRPM

## 2020-01-25 DIAGNOSIS — L03.032 PARONYCHIA OF GREAT TOE, LEFT: Primary | ICD-10-CM

## 2020-01-25 DIAGNOSIS — E11.628 TYPE 2 DIABETES MELLITUS WITH OTHER SKIN COMPLICATION, UNSPECIFIED WHETHER LONG TERM INSULIN USE: ICD-10-CM

## 2020-01-25 DIAGNOSIS — M54.50 CHRONIC MIDLINE LOW BACK PAIN WITHOUT SCIATICA: ICD-10-CM

## 2020-01-25 DIAGNOSIS — G89.29 CHRONIC MIDLINE LOW BACK PAIN WITHOUT SCIATICA: ICD-10-CM

## 2020-01-25 LAB
B-HCG UR QL: NEGATIVE
CTP QC/QA: YES

## 2020-01-25 PROCEDURE — 10060 PR DRAIN SKIN ABSCESS SIMPLE: ICD-10-PCS | Mod: ,,, | Performed by: PHYSICIAN ASSISTANT

## 2020-01-25 PROCEDURE — 10060 I&D ABSCESS SIMPLE/SINGLE: CPT | Mod: ,,, | Performed by: PHYSICIAN ASSISTANT

## 2020-01-25 PROCEDURE — 99284 EMERGENCY DEPT VISIT MOD MDM: CPT | Mod: 25,,, | Performed by: EMERGENCY MEDICINE

## 2020-01-25 PROCEDURE — 99284 PR EMERGENCY DEPT VISIT,LEVEL IV: ICD-10-PCS | Mod: 25,,, | Performed by: EMERGENCY MEDICINE

## 2020-01-25 PROCEDURE — 99284 EMERGENCY DEPT VISIT MOD MDM: CPT | Mod: 25

## 2020-01-25 PROCEDURE — 10060 I&D ABSCESS SIMPLE/SINGLE: CPT

## 2020-01-25 PROCEDURE — 81025 URINE PREGNANCY TEST: CPT | Performed by: PHYSICIAN ASSISTANT

## 2020-01-25 PROCEDURE — 25000003 PHARM REV CODE 250: Performed by: PHYSICIAN ASSISTANT

## 2020-01-25 RX ORDER — LIDOCAINE HYDROCHLORIDE 10 MG/ML
5 INJECTION, SOLUTION EPIDURAL; INFILTRATION; INTRACAUDAL; PERINEURAL
Status: COMPLETED | OUTPATIENT
Start: 2020-01-25 | End: 2020-01-25

## 2020-01-25 RX ORDER — ACETAMINOPHEN 325 MG/1
650 TABLET ORAL
Status: COMPLETED | OUTPATIENT
Start: 2020-01-25 | End: 2020-01-25

## 2020-01-25 RX ORDER — HYDROCODONE BITARTRATE AND ACETAMINOPHEN 5; 325 MG/1; MG/1
1 TABLET ORAL EVERY 4 HOURS PRN
Qty: 2 TABLET | Refills: 0 | Status: SHIPPED | OUTPATIENT
Start: 2020-01-25 | End: 2020-07-11

## 2020-01-25 RX ORDER — CIPROFLOXACIN 500 MG/1
500 TABLET ORAL 2 TIMES DAILY
Qty: 20 TABLET | Refills: 0 | Status: SHIPPED | OUTPATIENT
Start: 2020-01-25 | End: 2020-02-04

## 2020-01-25 RX ORDER — HYDROCODONE BITARTRATE AND ACETAMINOPHEN 5; 325 MG/1; MG/1
1 TABLET ORAL
Status: COMPLETED | OUTPATIENT
Start: 2020-01-25 | End: 2020-01-25

## 2020-01-25 RX ADMIN — ACETAMINOPHEN 650 MG: 325 TABLET ORAL at 09:01

## 2020-01-25 RX ADMIN — HYDROCODONE BITARTRATE AND ACETAMINOPHEN 1 TABLET: 5; 325 TABLET ORAL at 09:01

## 2020-01-25 RX ADMIN — LIDOCAINE HYDROCHLORIDE 50 MG: 10 INJECTION, SOLUTION EPIDURAL; INFILTRATION; INTRACAUDAL; PERINEURAL at 08:01

## 2020-01-26 NOTE — ED PROVIDER NOTES
"Encounter Date: 2020       History     Chief Complaint   Patient presents with    Toe Pain     left great toe possible infection x 2-3 days. pt thinks its an ingrown toe nail      Ms Moreno is a 44yoF who presents for toe infection; pertinent PMHx morbid obesity, DM 2, hypertension, HLD, sleep apnea.  Patient presents for 3 days of rapidly worsening left great toe swelling and pain. Initially suspected she had an ingrown toenail.  She clipped her toenails and dug under the toe to try to get it away from the skin.  She then soaked her toe in water.  Since, she has had worsening pain, swelling. She has not seen Podiatry in 2 years.  Not associated with left foreleg skin changes, fever, chills, nausea, vomiting, decreased appetite, left leg pain.  The patients available PMH, PSH, Social History, medications, allergies, and triage vital signs were reviewed just prior to their medical evaluation.  A ten point review of systems was completed and is negative except as documented above.  Patient denies any other acute medical complaint.    Please be advised this text was dictated with [employer] software and may contain errors due to translation.           Review of patient's allergies indicates:  No Known Allergies  Past Medical History:   Diagnosis Date    BMI 70 and over, adult     Depression     Diabetes mellitus     DM (diabetes mellitus) type II controlled with renal manifestation     HTN (hypertension)     Hyperlipidemia     Lumbar disc disease     Morbid obesity     Recurrent nephrolithiasis     Rosacea     Sleep apnea      Past Surgical History:   Procedure Laterality Date     SECTION      x2    DILATION AND CURETTAGE OF UTERUS  2010    AUB "negative path" per patient    ENDOMETRIAL ABLATION  2010     Family History   Problem Relation Age of Onset    Hypertension Mother     Diabetes Father     Cancer Father         cns lymphoma    Heart disease Paternal Grandfather     Colon cancer " Neg Hx     Ovarian cancer Neg Hx     Breast cancer Neg Hx     Amblyopia Neg Hx     Blindness Neg Hx     Cataracts Neg Hx     Glaucoma Neg Hx     Macular degeneration Neg Hx     Retinal detachment Neg Hx     Strabismus Neg Hx     Stroke Neg Hx     Thyroid disease Neg Hx      Social History     Tobacco Use    Smoking status: Never Smoker    Smokeless tobacco: Never Used   Substance Use Topics    Alcohol use: No    Drug use: No     Review of Systems   Constitutional: Negative for chills and fever.   Respiratory: Negative for shortness of breath.    Cardiovascular: Negative for chest pain.   Gastrointestinal: Negative for nausea and vomiting.   Musculoskeletal: Negative for arthralgias, joint swelling and myalgias.   Skin: Positive for color change and wound. Negative for pallor and rash.   Neurological: Negative for weakness and numbness.       Physical Exam     Initial Vitals [01/25/20 1833]   BP Pulse Resp Temp SpO2   (!) 159/89 83 20 98.1 °F (36.7 °C) 95 %      MAP       --         Physical Exam    Vitals reviewed.  Constitutional: She appears well-developed and well-nourished. She is not diaphoretic. No distress.   Morbidly obese   HENT:   Head: Normocephalic and atraumatic.   Right Ear: External ear normal.   Left Ear: External ear normal.   Nose: Nose normal.   Mouth/Throat: Oropharynx is clear and moist.   Eyes: Conjunctivae and EOM are normal. Pupils are equal, round, and reactive to light.   Cardiovascular: Normal rate, regular rhythm and intact distal pulses.   Pulmonary/Chest: Breath sounds normal.   Musculoskeletal: Normal range of motion.   Neurological: She is alert and oriented to person, place, and time. She has normal strength. No cranial nerve deficit.   Skin: Skin is warm and dry. Capillary refill takes less than 2 seconds. No rash noted. No pallor.   Venous stasis changes to BLE    Left hallux is grossly edematous, mildly violaceous with good cap refill.  Area of mild fluctuance and  severe tenderness limited to proximal nail fold    All digits with any onychomycosis changes   Psychiatric: She has a normal mood and affect. Her behavior is normal. Thought content normal.         ED Course   I & D - Incision and Drainage  Date/Time: 1/27/2020 12:05 AM  Performed by: Marleni Willams PA-C  Authorized by: Soledad Galan MD   Pre-operative diagnosis: Paronychia  Post-operative diagnosis: Paronychia  Type: abscess  Body area: lower extremity  Location details: left big toe  Anesthesia: digital block    Anesthesia:  Local anesthesia used: yes  Local Anesthetic: lidocaine 1% without epinephrine  Anesthetic total: 5 mL  Scalpel size: 11  Incision type: single straight  Complexity: simple  Drainage: pus and  bloody  Drainage amount: scant  Wound treatment: incision,  drainage,  expression of material and  wound left open  Complications: No  Estimated blood loss (mL): 2  Specimens: No  Implants: No  Patient tolerance: Patient tolerated the procedure well with no immediate complications        Labs Reviewed   POCT URINE PREGNANCY          Imaging Results    None          Medical Decision Making:   History:   Old Medical Records: I decided to obtain old medical records.  Old Records Summarized: records from clinic visits and records from previous admission(s).  Initial Assessment:   Patient with diabetes presents with left hallux infection + localized paronychia, VSS, afebrile, no signs of ascending infection  Differential Diagnosis:   DDx includes paronychia, onychomycosis. Physical exam and history taking lower clinical suspicion for subungual infection, fat pad infection, sepsis, ischemic ulcer.  ED Management:  Paronychia lanced in ED without immediate complication.  Given close follow-up to Podiatry, as patient should have nail removed by their service.  Given instructions on diabetic foot care.  Due to fresh water exposure, will cover for pseudomonas with Cipro. Patient agreed to plan of care and  voiced understanding. Discharged in stable condition with strict ED return precautions.    Marleni Willams PA-C  01/27/2020    I discussed the following case, diagnosis and plan of care with attending physician.                Attending Attestation:     Physician Attestation Statement for NP/PA:   I have conducted a face to face encounter with this patient in addition to the NP/PA, due to Medical Complexity    Other NP/PA Attestation Additions:    History of Present Illness: 43 y/o F with Lt 1st toe pain and swelling   Physical Exam: Lt toe erythema and swelling, diffusely ttp most over the medial aspect of nailbed, hypertrophic nail   Medical Decision Making: Paronychia- I&D  referral to podiatry   Procedure Note: As the supervising MD I was personally present during the critical portions of the procedure(s) performed by the PA and was immediately available in the ED to provide services and assistance as needed during the entire procedure.              Clinical Impression:       ICD-10-CM ICD-9-CM   1. Paronychia of great toe, left L03.032 681.11   2. Type 2 diabetes mellitus with other skin complication, unspecified whether long term insulin use E11.628 250.80     709.8   3. Chronic midline low back pain without sciatica M54.5 724.2    G89.29 338.29         Disposition:   Disposition: Discharged  Condition: Stable        Marleni Willams PA-C  01/27/20 0007       Soledad Galan MD  01/27/20 0237

## 2020-01-26 NOTE — DISCHARGE INSTRUCTIONS
Take antibiotics To completion, even if symptoms improve.  Follow up with Podiatry as soon as possible.  Return to the emergency department if you develop significant swelling or drainage from wound site or fever.    Our goal in the emergency department is to always give you outstanding care and exceptional service. You may receive a survey by mail or e-mail in the next week regarding your experience in our ED. We would greatly appreciate your completing and returning the survey. Your feedback provides us with a way to recognize our staff who give very good care and it helps us learn how to improve when your experience was below our aspiration of excellence.

## 2020-01-26 NOTE — ED NOTES
Patient identifiers for Halley Moreno 44 y.o. female checked and correct.  Chief Complaint   Patient presents with    Toe Pain     left great toe possible infection x 2-3 days. pt thinks its an ingrown toe nail      Past Medical History:   Diagnosis Date    BMI 70 and over, adult     Depression     Diabetes mellitus     DM (diabetes mellitus) type II controlled with renal manifestation     HTN (hypertension)     Hyperlipidemia     Lumbar disc disease     Morbid obesity     Recurrent nephrolithiasis     Rosacea     Sleep apnea      Allergies reported: Review of patient's allergies indicates:  No Known Allergies      LOC: Patient is awake, alert, and aware of environment with an appropriate affect. Patient is oriented x 4 and speaking appropriately.  APPEARANCE: Patient resting comfortably and in no acute distress. Patient is clean and well groomed, patient's clothing is properly fastened.  SKIN: The skin is warm and dry. Patient has normal skin turgor and moist mucus membranes. Reports left big toe pain; rating 8/10 with ambulating. Skin is red around open wound to left side of nail.  MUSKULOSKELETAL: Patient is moving all extremities well, no obvious deformities noted. Pulses intact.   RESPIRATORY: Airway is open and patent. Respirations are spontaneous and non-labored with normal effort and rate.  CARDIAC: Patient has a normal rate and rhythm. Bilateral lower extremitiy peripheral edema noted to ankles; skin is red/purplish with dry, cracked skin.   ABDOMEN: No distention noted. Soft and non-tender upon palpation.  NEUROLOGICAL: PERRL. Facial expression is symmetrical. Hand grasps are equal bilaterally. Normal sensation in all extremities when touched with finger.

## 2020-01-29 ENCOUNTER — TELEPHONE (OUTPATIENT)
Dept: INTERNAL MEDICINE | Facility: CLINIC | Age: 45
End: 2020-01-29

## 2020-01-29 ENCOUNTER — OFFICE VISIT (OUTPATIENT)
Dept: INTERNAL MEDICINE | Facility: CLINIC | Age: 45
End: 2020-01-29
Payer: MEDICAID

## 2020-01-29 VITALS
TEMPERATURE: 98 F | SYSTOLIC BLOOD PRESSURE: 122 MMHG | HEART RATE: 84 BPM | RESPIRATION RATE: 16 BRPM | DIASTOLIC BLOOD PRESSURE: 62 MMHG | HEIGHT: 63 IN | WEIGHT: 293 LBS | BODY MASS INDEX: 51.91 KG/M2

## 2020-01-29 DIAGNOSIS — I15.2 HYPERTENSION ASSOCIATED WITH DIABETES: ICD-10-CM

## 2020-01-29 DIAGNOSIS — B36.9 FUNGAL INFECTION OF SKIN OF ABDOMEN: ICD-10-CM

## 2020-01-29 DIAGNOSIS — Z12.31 ENCOUNTER FOR SCREENING MAMMOGRAM FOR HIGH-RISK PATIENT: ICD-10-CM

## 2020-01-29 DIAGNOSIS — L03.032 PARONYCHIA OF GREAT TOE OF LEFT FOOT: ICD-10-CM

## 2020-01-29 DIAGNOSIS — Z91.89 CANDIDATE FOR STATIN THERAPY DUE TO RISK OF FUTURE CARDIOVASCULAR EVENT: ICD-10-CM

## 2020-01-29 DIAGNOSIS — R60.0 BILATERAL LEG EDEMA: ICD-10-CM

## 2020-01-29 DIAGNOSIS — E11.59 HYPERTENSION ASSOCIATED WITH DIABETES: ICD-10-CM

## 2020-01-29 DIAGNOSIS — E11.9 DIABETES MELLITUS TYPE 2 WITHOUT RETINOPATHY: Primary | ICD-10-CM

## 2020-01-29 DIAGNOSIS — E66.01 MORBID OBESITY WITH BMI OF 60.0-69.9, ADULT: ICD-10-CM

## 2020-01-29 PROBLEM — S83.241A TEAR OF MEDIAL MENISCUS OF RIGHT KNEE, CURRENT: Status: RESOLVED | Noted: 2017-09-05 | Resolved: 2020-01-29

## 2020-01-29 PROBLEM — Z74.09 DECREASED FUNCTIONAL MOBILITY AND ENDURANCE: Status: RESOLVED | Noted: 2019-08-16 | Resolved: 2020-01-29

## 2020-01-29 PROCEDURE — 99215 OFFICE O/P EST HI 40 MIN: CPT | Mod: PBBFAC,PO | Performed by: INTERNAL MEDICINE

## 2020-01-29 PROCEDURE — 99999 PR PBB SHADOW E&M-EST. PATIENT-LVL V: CPT | Mod: PBBFAC,,, | Performed by: INTERNAL MEDICINE

## 2020-01-29 PROCEDURE — 99214 PR OFFICE/OUTPT VISIT, EST, LEVL IV, 30-39 MIN: ICD-10-PCS | Mod: S$PBB,,, | Performed by: INTERNAL MEDICINE

## 2020-01-29 PROCEDURE — 99999 PR PBB SHADOW E&M-EST. PATIENT-LVL V: ICD-10-PCS | Mod: PBBFAC,,, | Performed by: INTERNAL MEDICINE

## 2020-01-29 PROCEDURE — 99214 OFFICE O/P EST MOD 30 MIN: CPT | Mod: S$PBB,,, | Performed by: INTERNAL MEDICINE

## 2020-01-29 RX ORDER — CLOTRIMAZOLE AND BETAMETHASONE DIPROPIONATE 10; .64 MG/G; MG/G
CREAM TOPICAL 2 TIMES DAILY
Qty: 45 G | Refills: 2 | Status: SHIPPED | OUTPATIENT
Start: 2020-01-29 | End: 2020-07-11

## 2020-01-29 RX ORDER — LOSARTAN POTASSIUM 50 MG/1
50 TABLET ORAL DAILY
Qty: 90 TABLET | Refills: 1 | Status: ON HOLD | OUTPATIENT
Start: 2020-01-29 | End: 2020-11-09 | Stop reason: SDUPTHER

## 2020-01-29 RX ORDER — ATORVASTATIN CALCIUM 10 MG/1
10 TABLET, FILM COATED ORAL DAILY
Qty: 90 TABLET | Refills: 1 | Status: SHIPPED | OUTPATIENT
Start: 2020-01-29 | End: 2021-11-01 | Stop reason: SDUPTHER

## 2020-01-29 RX ORDER — FLUCONAZOLE 100 MG/1
100 TABLET ORAL DAILY
Qty: 5 TABLET | Refills: 0 | Status: SHIPPED | OUTPATIENT
Start: 2020-01-29 | End: 2020-02-03

## 2020-01-29 RX ORDER — FUROSEMIDE 40 MG/1
60 TABLET ORAL DAILY
Qty: 135 TABLET | Refills: 1 | Status: SHIPPED | OUTPATIENT
Start: 2020-01-29 | End: 2020-05-06 | Stop reason: SDUPTHER

## 2020-01-29 NOTE — PROGRESS NOTES
Subjective:       Patient ID: Halley Moreno is a 44 y.o. female who presents for Diabetes (Pt state haven't been checking blood sugars, out of test strips); Medication Refill (Lipitor, Lasix, Losartan); Nail Problem (Left Foot, Great Toe; edema present (ingrown nail), tx Cipro; request referral to Podiatry); and Cellulitis (Abdomen; Pt c/o dry skin and clear drainage across flap of lower abdomen)      Diabetes   She presents for her follow-up diabetic visit. She has type 2 diabetes mellitus. Her disease course has been worsening. There are no hypoglycemic associated symptoms. Pertinent negatives for hypoglycemia include no dizziness, headaches or tremors. Associated symptoms include fatigue. Pertinent negatives for diabetes include no chest pain, no polydipsia, no polyuria, no visual change and no weight loss. There are no hypoglycemic complications. Symptoms are worsening. Diabetic complications include nephropathy. Risk factors for coronary artery disease include hypertension, obesity, sedentary lifestyle, diabetes mellitus, dyslipidemia and family history. Current diabetic treatment includes oral agent (dual therapy) and oral agent (monotherapy). She is compliant with treatment all of the time. Her weight is stable. She is following a generally healthy diet. She rarely participates in exercise. There is no change in her home blood glucose trend. An ACE inhibitor/angiotensin II receptor blocker is being taken.   Toe Pain    The incident occurred more than 1 week ago. The injury mechanism is unknown. The pain is present in the left toes. The quality of the pain is described as aching. Pertinent negatives include no inability to bear weight, loss of motion or tingling. The symptoms are aggravated by palpation. Treatments tried: I&D, started antibiotic and pain medications. The treatment provided moderate relief.   Rash   This is a chronic problem. The current episode started more than 1 month ago. The problem  has been gradually worsening since onset. The affected locations include the abdomen (erythema under pannus and scaly skin on lower abdomen). The rash is characterized by dryness, itchiness, redness and scaling. She was exposed to nothing. Associated symptoms include fatigue. Pertinent negatives include no congestion, cough, diarrhea, fever, shortness of breath, sore throat or vomiting. Past treatments include moisturizer (used Calmoseptine cream). The treatment provided no relief.      Reports toenail pain and worsened after she picked at the toenail. Became more erythematous and painful. She went to ER and was treated with Cipro for paronychia. I&D was done but patient was advised to follow-up with Podiatry. The wound is healing and is much less painful. She has not noticed much drainage.    She continues to try to eat a healthy diet but reports minimal weight loss. She does not keep a food journal. She has not started exercising. She has exercise DVD's at home ([employer]) but she has never watched them regularly. She does enjoy dancing.    Body mass index is 69.63 kg/m².      Review of Systems   Constitutional: Positive for fatigue. Negative for chills, diaphoresis, fever and weight loss.   HENT: Negative for congestion, ear pain, postnasal drip, sinus pressure and sore throat.    Eyes: Negative for discharge and redness.   Respiratory: Negative for cough, chest tightness and shortness of breath.    Cardiovascular: Negative for chest pain and palpitations.   Gastrointestinal: Negative for abdominal pain, constipation, diarrhea, nausea and vomiting.   Endocrine: Negative for polydipsia and polyuria.   Musculoskeletal: Negative for arthralgias and myalgias.        Knee pain has improved   Skin: Positive for color change (lower legs) and rash. Negative for wound (left great toe wound).   Neurological: Negative for dizziness, tingling, tremors and headaches.       Objective:      Physical Exam   Constitutional: She is  oriented to person, place, and time. Vital signs are normal. She appears well-developed and well-nourished. She appears listless. No distress.   HENT:   Head: Normocephalic and atraumatic.   Right Ear: Hearing and external ear normal.   Left Ear: Hearing and external ear normal.   Nose: Nose normal.   Mouth/Throat: Uvula is midline and mucous membranes are normal.   Eyes: Lids are normal.   Cardiovascular: Normal rate, regular rhythm, normal heart sounds and intact distal pulses.   No murmur heard.  Pulmonary/Chest: Effort normal and breath sounds normal. She has no wheezes.   Abdominal: Soft. Bowel sounds are normal. She exhibits no distension. There is no tenderness.   Musculoskeletal: Normal range of motion. She exhibits no edema (BLE).   Neurological: She is oriented to person, place, and time. She appears listless.   Appears drowsy at times   Skin: Skin is warm, dry and intact. Lesion (area of hyperkeratotic skin lower abdomen with scaling and peeling, one tiny area of bleeding) and rash noted. Rash is macular (erythematous macules on lower abdomen under pannus, no skin breakdown, no drainage). She is not diaphoretic. There is erythema (around left great toe, slight serous exudate around nail bed,).   Purple area of skin with hyperpigmentation bilateral lower extremities. Hyperpigmentation overlying medial malleoli   Psychiatric: She has a normal mood and affect.   Vitals reviewed.      Assessment/Plan:       1. Diabetes mellitus type 2 without retinopathy  - HbA1c has increased to 7.7, add Ozempic  - Ambulatory Referral to Podiatry  - semaglutide (OZEMPIC) 0.25 mg or 0.5 mg(2 mg/1.5 mL) PnIj; Inject 0.25 mg into the skin every 7 days.  Dispense: 2 mL; Refill: 0  - blood sugar diagnostic Strp; 1 strip by Misc.(Non-Drug; Combo Route) route 2 (two) times daily.  Dispense: 100 strip; Refill: 5    2. Paronychia of great toe of left foot  - continue Cipro  - Ambulatory Referral to Podiatry    3. Fungal infection of  skin of abdomen  - start lotrisone cream now, keep area under pannus clean and dry, use cotton panties, dry the area with a hair dryer after baths  - clotrimazole-betamethasone 1-0.05% (LOTRISONE) cream; Apply topically 2 (two) times daily.  Dispense: 45 g; Refill: 2  - start Diflucan 100mg daily after Cipro course is complete due to possible interaction    4. Hypertension associated with diabetes  - BP is controlled, continue losartan and lasix  - furosemide (LASIX) 40 MG tablet; Take 1.5 tablets (60 mg total) by mouth once daily.  Dispense: 135 tablet; Refill: 1  - losartan (COZAAR) 50 MG tablet; Take 1 tablet (50 mg total) by mouth once daily.  Dispense: 90 tablet; Refill: 1    5. Bilateral leg edema  - stable, continue Lasix  - furosemide (LASIX) 40 MG tablet; Take 1.5 tablets (60 mg total) by mouth once daily.  Dispense: 135 tablet; Refill: 1    6. Morbid obesity with BMI of 60.0-69.9, adult  - patient agrees to consider referral to Bariatric Medicine, will send to [facility]  - patient agrees to exercise with [employer] or [employer] 3 times every week    7. Candidate for statin therapy due to risk of future cardiovascular event  - atorvastatin (LIPITOR) 10 MG tablet; Take 1 tablet (10 mg total) by mouth once daily.  Dispense: 90 tablet; Refill: 1    8. Encounter for screening mammogram for high-risk patient  - Mammo Digital Screening Bilat w/ Lex; Future    RTC in 1 month for follow-up or sooner if needed    Yamila Tejeda MD

## 2020-01-29 NOTE — TELEPHONE ENCOUNTER
Medicaid Patient    Please schedule her on:    Monday 3/2 at 3:00pm  1 mo DM f/u    Thank you!  Bertha

## 2020-01-29 NOTE — PROGRESS NOTES
Message sent to Jennie Morales to schedule pt on 3/2 at 3pm      Pt notified and verbalized understanding

## 2020-01-31 ENCOUNTER — TELEPHONE (OUTPATIENT)
Dept: INTERNAL MEDICINE | Facility: CLINIC | Age: 45
End: 2020-01-31

## 2020-02-18 NOTE — TELEPHONE ENCOUNTER
REGINALD    Portal message not read.  Called Pt.    She said she's been getting advertisements for CONTRAVE, a pill.    She said she will call her insurance co tomorrow to find out if they cover Victoza or Contrave.     She'll send a portal message with list of covered DM meds.

## 2020-02-19 ENCOUNTER — PATIENT MESSAGE (OUTPATIENT)
Dept: INTERNAL MEDICINE | Facility: CLINIC | Age: 45
End: 2020-02-19

## 2020-03-03 ENCOUNTER — PATIENT OUTREACH (OUTPATIENT)
Dept: ADMINISTRATIVE | Facility: HOSPITAL | Age: 45
End: 2020-03-03

## 2020-03-17 ENCOUNTER — OFFICE VISIT (OUTPATIENT)
Dept: INTERNAL MEDICINE | Facility: CLINIC | Age: 45
End: 2020-03-17
Payer: MEDICAID

## 2020-03-17 VITALS
SYSTOLIC BLOOD PRESSURE: 130 MMHG | BODY MASS INDEX: 51.91 KG/M2 | HEART RATE: 72 BPM | DIASTOLIC BLOOD PRESSURE: 66 MMHG | WEIGHT: 293 LBS | HEIGHT: 63 IN | TEMPERATURE: 98 F

## 2020-03-17 DIAGNOSIS — E11.69 OBESITY, DIABETES, AND HYPERTENSION SYNDROME: ICD-10-CM

## 2020-03-17 DIAGNOSIS — E11.59 OBESITY, DIABETES, AND HYPERTENSION SYNDROME: ICD-10-CM

## 2020-03-17 DIAGNOSIS — I15.2 OBESITY, DIABETES, AND HYPERTENSION SYNDROME: ICD-10-CM

## 2020-03-17 DIAGNOSIS — E66.9 OBESITY, DIABETES, AND HYPERTENSION SYNDROME: ICD-10-CM

## 2020-03-17 DIAGNOSIS — I15.2 HYPERTENSION ASSOCIATED WITH DIABETES: ICD-10-CM

## 2020-03-17 DIAGNOSIS — E11.9 DIABETES MELLITUS TYPE 2 WITHOUT RETINOPATHY: Primary | ICD-10-CM

## 2020-03-17 DIAGNOSIS — E11.59 HYPERTENSION ASSOCIATED WITH DIABETES: ICD-10-CM

## 2020-03-17 PROCEDURE — 99999 PR PBB SHADOW E&M-EST. PATIENT-LVL III: ICD-10-PCS | Mod: PBBFAC,,, | Performed by: INTERNAL MEDICINE

## 2020-03-17 PROCEDURE — 99214 PR OFFICE/OUTPT VISIT, EST, LEVL IV, 30-39 MIN: ICD-10-PCS | Mod: S$PBB,,, | Performed by: INTERNAL MEDICINE

## 2020-03-17 PROCEDURE — 99214 OFFICE O/P EST MOD 30 MIN: CPT | Mod: S$PBB,,, | Performed by: INTERNAL MEDICINE

## 2020-03-17 PROCEDURE — 99213 OFFICE O/P EST LOW 20 MIN: CPT | Mod: PBBFAC,PO | Performed by: INTERNAL MEDICINE

## 2020-03-17 PROCEDURE — 99999 PR PBB SHADOW E&M-EST. PATIENT-LVL III: CPT | Mod: PBBFAC,,, | Performed by: INTERNAL MEDICINE

## 2020-03-17 NOTE — PROGRESS NOTES
Subjective:       Patient ID: Halley Moreno is a 44 y.o. female who presents for Diabetes (follow-up) and Hypertension      Diabetes   She presents for her follow-up diabetic visit. She has type 2 diabetes mellitus. Her disease course has been stable. Hypoglycemia symptoms include sleepiness. Pertinent negatives for hypoglycemia include no dizziness, headaches, nervousness/anxiousness or tremors. Pertinent negatives for diabetes include no chest pain, no fatigue, no polydipsia, no polyuria and no weakness. There are no hypoglycemic complications. Symptoms are stable. Pertinent negatives for diabetic complications include no heart disease. Risk factors for coronary artery disease include family history, diabetes mellitus, hypertension, obesity and sedentary lifestyle. Current diabetic treatment includes oral agent (dual therapy) and oral agent (monotherapy). She is compliant with treatment all of the time. She is following a generally healthy diet. She rarely participates in exercise. An ACE inhibitor/angiotensin II receptor blocker is being taken.   Hypertension   This is a chronic problem. The current episode started more than 1 year ago. The problem is unchanged. The problem is controlled. Associated symptoms include peripheral edema (BLE). Pertinent negatives include no anxiety, chest pain, headaches, palpitations or shortness of breath. There are no associated agents to hypertension. Risk factors for coronary artery disease include diabetes mellitus, dyslipidemia, family history, sedentary lifestyle and obesity. Past treatments include diuretics and angiotensin blockers. The current treatment provides moderate improvement. Compliance problems include exercise.  There is no history of heart failure. There is no history of a hypertension causing med.      Weight has not changed very much despite using an anoretic agent. She reports eating smaller portion sizes but admits to splurging on unhealthy food at  times. She does not exercise even though she agreed to try working out with [employer] workouts. She has not found one yet.       Review of Systems   Constitutional: Negative for chills, diaphoresis, fatigue and fever.   HENT: Negative for congestion, ear pain and sinus pressure.    Eyes: Negative for discharge and redness.   Respiratory: Negative for cough, chest tightness and shortness of breath.    Cardiovascular: Negative for chest pain and palpitations.   Gastrointestinal: Negative for abdominal pain, constipation, diarrhea, nausea and vomiting.   Endocrine: Negative for polydipsia and polyuria.   Genitourinary: Negative for dysuria, hematuria and urgency.   Musculoskeletal: Negative for arthralgias and myalgias.   Skin: Negative for rash and wound.   Neurological: Negative for dizziness, tremors, weakness and headaches.   Psychiatric/Behavioral: The patient is not nervous/anxious.        Objective:      Physical Exam   Constitutional: She is oriented to person, place, and time. Vital signs are normal. She appears well-developed and well-nourished. No distress.   HENT:   Head: Normocephalic and atraumatic.   Right Ear: Hearing and external ear normal.   Left Ear: Hearing and external ear normal.   Nose: Nose normal.   Mouth/Throat: Uvula is midline and mucous membranes are normal.   Eyes: Lids are normal.   Neck: Normal range of motion. Neck supple.   Cardiovascular: Normal rate, regular rhythm, normal heart sounds and intact distal pulses.   No murmur heard.  Pulmonary/Chest: Effort normal and breath sounds normal. She has no wheezes.   Abdominal: Soft. Bowel sounds are normal. She exhibits no distension. There is no tenderness.   Musculoskeletal: Normal range of motion. She exhibits no edema.   Neurological: She is alert and oriented to person, place, and time.   Skin: Skin is warm, dry and intact. No rash noted. She is not diaphoretic.   Psychiatric: She has a normal mood and affect.   Vitals reviewed.       Assessment/Plan:         1. Diabetes mellitus type 2 without retinopathy  - recent hbA1c was 7.7, compliant with glipizide but intolerant of metformin  - tried to order Ozempic but not on formulary  - will try Trulicity to help with BG control and possibly help with weight loss  - liraglutide 0.6 mg/0.1 mL, 18 mg/3 mL, subq PNIJ (VICTOZA 2-YVONNE) 0.6 mg/0.1 mL (18 mg/3 mL) PnIj; Inject 0.6 mg into the skin once daily.  Dispense: 3 mL; Refill: 0    2. Hypertension associated with diabetes  - BP is controlled, continue losartan, furosemide    3. Obesity, diabetes, and hypertension syndrome  - discussed a few options for lifestyle modification: stop skipping meals, eat either a snack or small meal every 3-4 hours, start drinking a protein shake every morning for breakfast, eat only the low-sugar fruits, measure everything she eats including condiments  - will check in with patient periodically to determine if she is compliant  - discussed possibility of bariatric surgery    RTC in 6 weeks or sooner if needed    Yamila Tejeda MD

## 2020-03-17 NOTE — PATIENT INSTRUCTIONS
Eat breakfast every morning (8am)    Try Premier Protein shake for breakfast    100 calorie Snack between Breakfast and Lunch    100 calorie Snack between Lunch and Dinner    Lunch at 12 or 1pm    Dinner at 6pm    Fruit- berries, melons, apples in small portions, small oranges    Measure everything    Do not drink your calories    Week 1-3 - exercise 3 times weekly

## 2020-03-19 ENCOUNTER — TELEPHONE (OUTPATIENT)
Dept: INTERNAL MEDICINE | Facility: CLINIC | Age: 45
End: 2020-03-19

## 2020-03-19 NOTE — TELEPHONE ENCOUNTER
PA denied by [facility].    Called to get alternatives.    Was told to resend the PA with more information.    They want to know why patient stopped metformin.

## 2020-03-24 ENCOUNTER — TELEPHONE (OUTPATIENT)
Dept: INTERNAL MEDICINE | Facility: CLINIC | Age: 45
End: 2020-03-24

## 2020-03-24 NOTE — TELEPHONE ENCOUNTER
----- Message from Bhavani Rodriguez sent at 3/23/2020  1:04 PM CDT -----  Contact: Pt self Mobile [phone]  Requesting an RX refill or new RX.  Is this a refill or new RX:  New  RX name and strength: William  Directions (copy/paste from chart):  N/A  Is this a 30 day or 90 day RX:  30  Local pharmacy or mail order pharmacy:  [facility] [id number] [place] [address] [address]  Pharmacy name and phone #  [phone]  Fax# [phone]   Comments:  Patient said that she would like for you to send a prior authorization to her insurance company [employer] for her new script please.

## 2020-03-25 ENCOUNTER — TELEPHONE (OUTPATIENT)
Dept: INTERNAL MEDICINE | Facility: CLINIC | Age: 45
End: 2020-03-25

## 2020-03-28 ENCOUNTER — PATIENT MESSAGE (OUTPATIENT)
Dept: INTERNAL MEDICINE | Facility: CLINIC | Age: 45
End: 2020-03-28

## 2020-03-31 ENCOUNTER — PATIENT MESSAGE (OUTPATIENT)
Dept: INTERNAL MEDICINE | Facility: CLINIC | Age: 45
End: 2020-03-31

## 2020-03-31 NOTE — TELEPHONE ENCOUNTER
Spoke to pt.    Post nasal drip - not sure, just a little yesterday, none today  Cough this morning, clear mucus  No muscle weakness or pain  No Temp  No CP when coughing  No diarrhea  No n/v  No SOB    No contact

## 2020-03-31 NOTE — TELEPHONE ENCOUNTER
Does she have any other symptoms? Any weakness or muscle pain? Does she notice chest pain with the coughing spells? Has she been in contact with any covid-positive people?

## 2020-03-31 NOTE — TELEPHONE ENCOUNTER
Could be allergies or a mild viral URI.    Use an OTC antihistamine daily like Allegra or Claritin. May also use robitussin DM for the cough.    Call us if she develops fever, muscle pain or SOB.

## 2020-04-06 NOTE — TELEPHONE ENCOUNTER
Letter received from [facility] cancelling the appeal because patient needs to give authorization for us to do an appeal.    I called Ms Rowley,  She has already spent several hours on the phone trying to get the appeal accepted verbally.  They did accept and we're now waiting for answer.

## 2020-04-10 ENCOUNTER — OFFICE VISIT (OUTPATIENT)
Dept: URGENT CARE | Facility: CLINIC | Age: 45
End: 2020-04-10
Payer: MEDICAID

## 2020-04-10 VITALS
DIASTOLIC BLOOD PRESSURE: 78 MMHG | TEMPERATURE: 97 F | SYSTOLIC BLOOD PRESSURE: 140 MMHG | OXYGEN SATURATION: 95 % | HEART RATE: 88 BPM

## 2020-04-10 DIAGNOSIS — M79.672 LEFT FOOT PAIN: ICD-10-CM
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
## 2020-04-10 PROCEDURE — 99214 OFFICE O/P EST MOD 30 MIN: CPT | Mod: S$GLB,,, | Performed by: NURSE PRACTITIONER

## 2020-04-10 PROCEDURE — 99214 PR OFFICE/OUTPT VISIT, EST, LEVL IV, 30-39 MIN: ICD-10-PCS | Mod: S$GLB,,, | Performed by: NURSE PRACTITIONER

## 2020-04-10 PROCEDURE — 73630 X-RAY EXAM OF FOOT: CPT | Mod: FY,LT,S$GLB, | Performed by: RADIOLOGY

## 2020-04-10 PROCEDURE — 73630 XR FOOT COMPLETE 3 VIEW LEFT: ICD-10-PCS | Mod: FY,LT,S$GLB, | Performed by: RADIOLOGY

## 2020-04-10 NOTE — PATIENT INSTRUCTIONS
Rest - Rest the injured area, wear splint for the next week or two as needed for comfort    Ice - Apply ice  to affected area for the first 24-48 hours (DO NOT APPLY ICE DIRECTLY TO THE SKIN.  DO NOT LEAVE ON AFFECTED BODY PART FOR MORE THAN 15 MINUTES AT AT TIME TO AVOID INJURY TO SOFT TISSUE)     Compression - Wear ACE wrap or splint provided for compression and comfort to help reduce pain and swelling    Elevate - Elevated affected area higher than your heart to reduce swelling    -  If you were prescribed an anti-inflammatory, please take as directed as needed for pain and inflammation. If you were not prescribed an anti-inflammatory, you can take Tylenol or ibuprofen over the counter as directed for pain unless you have an allergy to them or medical condition such as stomach ulcers, kidney or liver disease or blood thinners etc for which you should not be taking these type of medications.     Follow up with your PCP in 1 week or as needed if no improvement.      Repeat X ray in 1 week if you still have pain.    If your condition worsens or fails to improve we recommend that you receive another evaluation at the ER immediately or contact your PCP to discuss your concerns or return here.     You must understand that you've received an urgent care treatment only and that you may be released before all your medical problems are known or treated.     You the patient will arrange for followup care as instructed.    Uncertain Causes of Fall  You have had a fall today. But the cause of your fall is not certain. Falls can occur due to slipping, tripping or losing your balance. A fall can also occur from a fainting spell or seizure.  While a fall can happen for a simple reason (tripping over something), falls in elderly people are often caused by a combination of things:  · Age-related decline in function with worsening balance, stability, vision, and muscle strength  · Chronic illness such as heart arrhythmias, heart  valve disease, vascular disease, COPD, diabetes, strokes, arthritis  · Effects or side effects of medicines  · Dehydration.  · Environmental hazards such as uneven or slippery ground, unfamiliar place, obstacles, uneven surfaces, or slippery ground  · Situational factors (related to the activity being done, e.g., rushing to the bathroom)  Because the cause of your fall today is not certain, it is possible that a fainting spell or seizure was the cause. This means that it could happen again, without warning. If you fall again, without a cause, then you should return to this facility promptly to have further tests. Otherwise, follow up with your doctor as explained below.  It is normal to feel sore and tight in your muscles and back the next day, and not just the muscles you initially injured. Remember, all the parts of your body are connected, so while initially one area hurts, the next day another may hurt. Also, when you injure yourself, it causes inflammation, which then causes the muscles to tighten up and hurt more. After the initial worsening, it should gradually improve over the next few days. However, more severe pain should be reported.  Even without a definite head injury, you can still get a concussion. Concussions and even bleeding can still occur, especially if you have had a recent injury or take blood thinner medicine. It is not unusual to have a mild headache and feel tired and even nauseous or dizzy.  Home care  · Rest today and resume your normal activities as soon as you are feeling back to normal. It is best to remain with someone who can check on you for the next 24 hours to watch for another episode of falling.  · If you were injured during the fall, follow the advice from your doctor regarding care of your injury.  ·  If you become light-headed or dizzy, lie down immediately or sit and lean forward with your head down.  · As a precaution, do not drive a car or operate dangerous equipment, do  not take a bath alone (use a shower instead) and do not swim alone until you see your doctor. A condition causing fainting or seizures must be ruled out before resuming these activities.  · You may use acetaminophen or ibuprofen to control pain, unless another pain medicine was prescribed. If you have chronic liver or kidney disease or ever had a stomach ulcer or gastrointestinal bleeding, talk with your doctor before using these medicines.  · Keep your appointments for any further testing that may have been scheduled for you.  Follow-up care  Follow up with your healthcare provider, or as advised.  If X-rays or CT scan were done, you will be notified if there is a change in the reading, especially if it affects treatment.  Call 911  Call 911 if any of these occur:  · Trouble breathing  · Confused or difficulty arousing  · Fainting or loss of consciousness  · Rapid or very slow heart rate  · Seizure  · Difficulty with speech or vision, weakness of an arm or leg  · Difficulty walking or talking, loss of balance, numbness or weakness in one side of your body, facial droop  When to seek medical advice  Call your healthcare provider right away if any of these occur:  · Another unexplained fall  · Dizziness  · Severe headache  · Nausea and vomiting  · Blood in vomit, stools (black or red color)  Date Last Reviewed: 11/5/2015  © 4024-1806 [employer] [address] [place] [address], [place] [address]. All rights reserved. This information is not intended as a substitute for professional medical care. Always follow your healthcare professional's instructions.

## 2020-04-10 NOTE — PROGRESS NOTES
Subjective:       Patient ID: Halley Moreno is a 44 y.o. female.    Vitals:  tympanic temperature is 97.3 °F (36.3 °C). Her blood pressure is 140/78 (abnormal) and her pulse is 88. Her oxygen saturation is 95%.     Chief Complaint: Toe Injury    Toe Injury   This is a new problem. The current episode started 1 to 4 weeks ago (last friday). The problem occurs constantly. The problem has been unchanged. Associated symptoms include arthralgias. Pertinent negatives include no abdominal pain, anorexia, change in bowel habit, chest pain, chills, congestion, coughing, diaphoresis, fatigue, fever, headaches, joint swelling, myalgias, nausea, neck pain, numbness, rash, sore throat, swollen glands, urinary symptoms, vertigo, visual change, vomiting or weakness. The symptoms are aggravated by bending. She has tried acetaminophen for the symptoms. The treatment provided mild relief.       Constitution: Negative for chills, sweating, fatigue and fever.   HENT: Negative for congestion and sore throat.    Neck: Negative for neck pain.   Cardiovascular: Negative for chest pain.   Respiratory: Negative for cough.    Gastrointestinal: Negative for abdominal pain, nausea and vomiting.   Musculoskeletal: Positive for joint pain. Negative for joint swelling and muscle ache.   Skin: Negative for rash.   Neurological: Negative for history of vertigo, headaches and numbness.       Objective:      Physical Exam   Constitutional: She is oriented to person, place, and time. She appears well-developed and well-nourished. She is cooperative.  Non-toxic appearance. She does not appear ill. No distress.   HENT:   Head: Normocephalic and atraumatic. Head is without abrasion, without contusion and without laceration.   Right Ear: Hearing, tympanic membrane, external ear and ear canal normal. No hemotympanum.   Left Ear: Hearing, tympanic membrane, external ear and ear canal normal. No hemotympanum.   Nose: Nose normal. No mucosal edema,  rhinorrhea or nasal deformity. No epistaxis. Right sinus exhibits no maxillary sinus tenderness and no frontal sinus tenderness. Left sinus exhibits no maxillary sinus tenderness and no frontal sinus tenderness.   Mouth/Throat: Uvula is midline, oropharynx is clear and moist and mucous membranes are normal. No trismus in the jaw. Normal dentition. No uvula swelling. No posterior oropharyngeal erythema.   Eyes: Pupils are equal, round, and reactive to light. Conjunctivae, EOM and lids are normal. Right eye exhibits no discharge. Left eye exhibits no discharge. No scleral icterus.   Neck: Trachea normal, normal range of motion, full passive range of motion without pain and phonation normal. Neck supple. No spinous process tenderness and no muscular tenderness present. No neck rigidity. No tracheal deviation present.   Cardiovascular: Normal rate, regular rhythm, normal heart sounds, intact distal pulses and normal pulses.   Pulmonary/Chest: Effort normal and breath sounds normal. No respiratory distress.   Abdominal: Soft. Normal appearance and bowel sounds are normal. She exhibits no distension, no pulsatile midline mass and no mass. There is no tenderness.   Musculoskeletal: Normal range of motion. She exhibits no edema or deformity.        Feet:    Neurological: She is alert and oriented to person, place, and time. She has normal strength. No cranial nerve deficit or sensory deficit. She exhibits normal muscle tone. She displays no seizure activity. Coordination normal. GCS eye subscore is 4. GCS verbal subscore is 5. GCS motor subscore is 6.   Skin: Skin is warm, dry, intact, not diaphoretic and not pale. Capillary refill takes less than 2 seconds. abrasion, burn, bruising and ecchymosis  Psychiatric: She has a normal mood and affect. Her speech is normal and behavior is normal. Judgment and thought content normal. Cognition and memory are normal.   Nursing note and vitals reviewed.      X-ray Foot Complete  Left    Result Date: 4/10/2020  EXAMINATION: XR FOOT COMPLETE 3 VIEW LEFT CLINICAL HISTORY: .  Unspecified fall, initial encounter TECHNIQUE: AP, lateral and oblique views of the left foot were performed. COMPARISON: None FINDINGS: Bones are well mineralized. Overall alignment is within normal limits.  Lisfranc articulation is congruent.  No displaced fracture, dislocation or destructive osseous process.  Mild degenerative change at the 1st MTP joint.  Plantar calcaneal spur noted.  Enthesophyte at the Achilles insertion site. Prominence of the dorsal soft tissues overlying the metatarsals.  No subcutaneous emphysema or radiodense retained foreign body.     Suspected nonspecific dorsal soft tissue swelling overlying the metatarsals, without acute displaced fracture-dislocation identified. Electronically signed by: Marcelo Aguilar MD Date:    04/10/2020 Time:    17:33      Discussed results/diagnosis/plan with patient in clinic. RICE discussed with patient.   Strict precautions given to patient to monitor for worsening signs and symptoms. Advised to follow up with primary.All questions answered. Strict ER precautions given. If your symptoms worsens of fail to improve you should go to the Emergency Room. Patient voiced understanding and in agreement with current treatment plan.          Assessment:       1. Fall, initial encounter    2. Left foot pain        Plan:         Fall, initial encounter  -     X-Ray Foot Complete Left; Future; Expected date: 04/10/2020    Left foot pain  -     X-Ray Foot Complete Left; Future; Expected date: 04/10/2020      Patient Instructions     Rest - Rest the injured area, wear splint for the next week or two as needed for comfort    Ice - Apply ice  to affected area for the first 24-48 hours (DO NOT APPLY ICE DIRECTLY TO THE SKIN.  DO NOT LEAVE ON AFFECTED BODY PART FOR MORE THAN 15 MINUTES AT AT TIME TO AVOID INJURY TO SOFT TISSUE)     Compression - Wear ACE wrap or splint provided for  compression and comfort to help reduce pain and swelling    Elevate - Elevated affected area higher than your heart to reduce swelling    -  If you were prescribed an anti-inflammatory, please take as directed as needed for pain and inflammation. If you were not prescribed an anti-inflammatory, you can take Tylenol or ibuprofen over the counter as directed for pain unless you have an allergy to them or medical condition such as stomach ulcers, kidney or liver disease or blood thinners etc for which you should not be taking these type of medications.     Follow up with your PCP in 1 week or as needed if no improvement.      Repeat X ray in 1 week if you still have pain.    If your condition worsens or fails to improve we recommend that you receive another evaluation at the ER immediately or contact your PCP to discuss your concerns or return here.     You must understand that you've received an urgent care treatment only and that you may be released before all your medical problems are known or treated.     You the patient will arrange for followup care as instructed.    Uncertain Causes of Fall  You have had a fall today. But the cause of your fall is not certain. Falls can occur due to slipping, tripping or losing your balance. A fall can also occur from a fainting spell or seizure.  While a fall can happen for a simple reason (tripping over something), falls in elderly people are often caused by a combination of things:  · Age-related decline in function with worsening balance, stability, vision, and muscle strength  · Chronic illness such as heart arrhythmias, heart valve disease, vascular disease, COPD, diabetes, strokes, arthritis  · Effects or side effects of medicines  · Dehydration.  · Environmental hazards such as uneven or slippery ground, unfamiliar place, obstacles, uneven surfaces, or slippery ground  · Situational factors (related to the activity being done, e.g., rushing to the bathroom)  Because the  cause of your fall today is not certain, it is possible that a fainting spell or seizure was the cause. This means that it could happen again, without warning. If you fall again, without a cause, then you should return to this facility promptly to have further tests. Otherwise, follow up with your doctor as explained below.  It is normal to feel sore and tight in your muscles and back the next day, and not just the muscles you initially injured. Remember, all the parts of your body are connected, so while initially one area hurts, the next day another may hurt. Also, when you injure yourself, it causes inflammation, which then causes the muscles to tighten up and hurt more. After the initial worsening, it should gradually improve over the next few days. However, more severe pain should be reported.  Even without a definite head injury, you can still get a concussion. Concussions and even bleeding can still occur, especially if you have had a recent injury or take blood thinner medicine. It is not unusual to have a mild headache and feel tired and even nauseous or dizzy.  Home care  · Rest today and resume your normal activities as soon as you are feeling back to normal. It is best to remain with someone who can check on you for the next 24 hours to watch for another episode of falling.  · If you were injured during the fall, follow the advice from your doctor regarding care of your injury.  ·  If you become light-headed or dizzy, lie down immediately or sit and lean forward with your head down.  · As a precaution, do not drive a car or operate dangerous equipment, do not take a bath alone (use a shower instead) and do not swim alone until you see your doctor. A condition causing fainting or seizures must be ruled out before resuming these activities.  · You may use acetaminophen or ibuprofen to control pain, unless another pain medicine was prescribed. If you have chronic liver or kidney disease or ever had a  stomach ulcer or gastrointestinal bleeding, talk with your doctor before using these medicines.  · Keep your appointments for any further testing that may have been scheduled for you.  Follow-up care  Follow up with your healthcare provider, or as advised.  If X-rays or CT scan were done, you will be notified if there is a change in the reading, especially if it affects treatment.  Call 911  Call 911 if any of these occur:  · Trouble breathing  · Confused or difficulty arousing  · Fainting or loss of consciousness  · Rapid or very slow heart rate  · Seizure  · Difficulty with speech or vision, weakness of an arm or leg  · Difficulty walking or talking, loss of balance, numbness or weakness in one side of your body, facial droop  When to seek medical advice  Call your healthcare provider right away if any of these occur:  · Another unexplained fall  · Dizziness  · Severe headache  · Nausea and vomiting  · Blood in vomit, stools (black or red color)  Date Last Reviewed: 11/5/2015  © 6472-3844 [employer]. [address] [place] [address], [place] [address]. All rights reserved. This information is not intended as a substitute for professional medical care. Always follow your healthcare professional's instructions.

## 2020-05-06 ENCOUNTER — OFFICE VISIT (OUTPATIENT)
Dept: INTERNAL MEDICINE | Facility: CLINIC | Age: 45
End: 2020-05-06
Payer: MEDICAID

## 2020-05-06 DIAGNOSIS — E11.9 DIABETES MELLITUS TYPE 2 WITHOUT RETINOPATHY: Primary | ICD-10-CM

## 2020-05-06 DIAGNOSIS — I15.2 HYPERTENSION ASSOCIATED WITH DIABETES: ICD-10-CM

## 2020-05-06 DIAGNOSIS — E66.01 MORBID OBESITY WITH BMI OF 70 AND OVER, ADULT: ICD-10-CM

## 2020-05-06 DIAGNOSIS — E11.59 HYPERTENSION ASSOCIATED WITH DIABETES: ICD-10-CM

## 2020-05-06 DIAGNOSIS — R60.0 BILATERAL LEG EDEMA: ICD-10-CM

## 2020-05-06 PROCEDURE — 99213 OFFICE O/P EST LOW 20 MIN: CPT | Mod: 95,,, | Performed by: INTERNAL MEDICINE

## 2020-05-06 PROCEDURE — 99213 PR OFFICE/OUTPT VISIT, EST, LEVL III, 20-29 MIN: ICD-10-PCS | Mod: 95,,, | Performed by: INTERNAL MEDICINE

## 2020-05-06 RX ORDER — FUROSEMIDE 80 MG/1
80 TABLET ORAL DAILY
Qty: 90 TABLET | Refills: 0 | Status: ON HOLD | OUTPATIENT
Start: 2020-05-06 | End: 2020-11-09 | Stop reason: SDUPTHER

## 2020-05-06 NOTE — PROGRESS NOTES
Primary Care Telemedicine Note    The patient location is:  Patient Home   The chief complaint leading to consultation is: diabetes, obesity, leg swelling  Total time spent with patient: 45 minutes      Visit type: Virtual visit with synchronous audio only and video  Each patient to whom he or she provides medical services by telemedicine is:  (1) informed of the relationship between the physician and patient and the respective role of any other health care provider with respect to management of the patient; and (2) notified that he or she may decline to receive medical services by telemedicine and may withdraw from such care at any time.    Subjective:      Patient ID: Halley Moreno is a 44 y.o. female.    Chief Complaint: Diabetes; Obesity; and Leg Swelling    Diabetes   She presents for her follow-up diabetic visit. She has type 2 diabetes mellitus. Her disease course has been stable. Pertinent negatives for hypoglycemia include no dizziness or headaches. There are no diabetic associated symptoms. Pertinent negatives for diabetes include no chest pain, no fatigue, no polydipsia, no polyphagia and no weakness. Symptoms are stable. There are no diabetic complications. Risk factors for coronary artery disease include diabetes mellitus, sedentary lifestyle, hypertension, obesity and family history. Current diabetic treatment includes oral agent (monotherapy) and oral agent (dual therapy). She is compliant with treatment most of the time. She is following a generally unhealthy (diet was unhealthy prior to starting the new eating plan) diet. She rarely participates in exercise. An ACE inhibitor/angiotensin II receptor blocker is being taken.   Edema   This is a chronic problem. The current episode started more than 1 month ago. The problem occurs intermittently. The problem has been gradually worsening. Pertinent negatives include no abdominal pain, arthralgias, chest pain, congestion, coughing, fatigue,  headaches, myalgias, nausea, sore throat, urinary symptoms or weakness. Nothing aggravates the symptoms. She has tried position changes (she also bought compression stockings that she will begin using) for the symptoms.      She has been following a diet with the [employer]andro. Meals involve smaller portion sizes and frequent meals every 2-3 hours. Foods include fresh strawberries, low-fat greek yogurt, 1 oz. tuna on 2 wheat crackers, sugar free pudding, reduced calorie ice cream, 100 calorie nut packs and Kashi cereal. She lost 5 lbs the first week and then 1 lb the second week. She cheated with fast food on 2 days with [employer] and with mexican food on [address]. She does not feel deprived on the plan. She also increased water intake by using a cup that infuses calorie free flavorings in the water.    Before she started the diet, she ate fast food almost daily. She likes [employer] but also selects unhealthy meals from [employer] and [employer]. She has not started exercising. Weight is 406 lbs today.      Review of Systems   Constitutional: Negative for appetite change, fatigue and unexpected weight change (lost 6 lbs but she was hoping to lose more).   HENT: Negative for congestion and sore throat.    Respiratory: Negative for cough and shortness of breath.    Cardiovascular: Positive for leg swelling. Negative for chest pain and palpitations.   Gastrointestinal: Negative for abdominal pain, constipation, diarrhea and nausea.   Endocrine: Negative for polydipsia and polyphagia.   Musculoskeletal: Negative for arthralgias and myalgias.   Neurological: Negative for dizziness, weakness and headaches.       Objective:      Physical Exam   Constitutional: She is oriented to person, place, and time. She appears well-developed and well-nourished. No distress.   HENT:   Head: Normocephalic and atraumatic.   Right Ear: Hearing normal.   Left Ear: Hearing normal.   Eyes: Lids are normal.   Pulmonary/Chest:  Effort normal. No respiratory distress.   Neurological: She is alert and oriented to person, place, and time.   Skin: Skin is dry and intact. She is not diaphoretic. There is erythema.   + facial erythema   Psychiatric: She has a normal mood and affect. Her speech is normal.       Assessment:       1. Diabetes mellitus type 2 without retinopathy    2. Morbid obesity with BMI of 70 and over, adult    3. Bilateral leg edema    4. Hypertension associated with diabetes        Plan:         1. Diabetes mellitus type 2 without retinopathy  - will arrange HbA1c  - Basic metabolic panel; Future  - Hemoglobin A1C; Future  - she has not started Victoza due to multiple attempts to authorize use    2. Morbid obesity with BMI of 70 and over, adult  - continue with current diet regimen  - e-mail me a log for 2-3 days and will see if any other modifications are needed  - patient agrees to start exercising today with one of the "[employer] DVD's  - goal will be to exercise 3x weekly  - she purchased a pool and will begin swimming soon    3. Bilateral leg edema  - increase Lasix to 80mg daily, check potassium in 1-2 weeks  - furosemide (LASIX) 80 MG tablet; Take 1 tablet (80 mg total) by mouth once daily.  Dispense: 90 tablet; Refill: 0  - begin wearing compression stockings as needed    4. Hypertension associated with diabetes  - continue losartan, monitor BP after increase in Lasix dose  - furosemide (LASIX) 80 MG tablet; Take 1 tablet (80 mg total) by mouth once daily.  Dispense: 90 tablet; Refill: 0    RTC in August for annual exam or sooner if needed    Yamila Tejeda MD

## 2020-05-11 ENCOUNTER — PATIENT MESSAGE (OUTPATIENT)
Dept: INTERNAL MEDICINE | Facility: CLINIC | Age: 45
End: 2020-05-11

## 2020-05-12 ENCOUNTER — PATIENT MESSAGE (OUTPATIENT)
Dept: INTERNAL MEDICINE | Facility: CLINIC | Age: 45
End: 2020-05-12

## 2020-05-12 NOTE — TELEPHONE ENCOUNTER
Victoza PA approved.  Spoke to Juan Stone.  Original Rx went through; new Rx not required.    Pt wants to know if she should continue Glipizide when she starts Victoza?    Also she sent a picture of her new meal plan.

## 2020-05-13 ENCOUNTER — PATIENT MESSAGE (OUTPATIENT)
Dept: INTERNAL MEDICINE | Facility: CLINIC | Age: 45
End: 2020-05-13

## 2020-05-13 RX ORDER — BLOOD SUGAR DIAGNOSTIC
1 STRIP MISCELLANEOUS DAILY
COMMUNITY
Start: 2020-05-13 | End: 2020-05-13 | Stop reason: SDUPTHER

## 2020-05-13 RX ORDER — BLOOD SUGAR DIAGNOSTIC
1 STRIP MISCELLANEOUS DAILY
Qty: 90 EACH | Refills: 3 | Status: SHIPPED | OUTPATIENT
Start: 2020-05-13 | End: 2021-03-04 | Stop reason: SDUPTHER

## 2020-05-27 ENCOUNTER — LAB VISIT (OUTPATIENT)
Dept: LAB | Facility: HOSPITAL | Age: 45
End: 2020-05-27
Attending: INTERNAL MEDICINE
Payer: MEDICAID

## 2020-05-27 DIAGNOSIS — E11.9 DIABETES MELLITUS TYPE 2 WITHOUT RETINOPATHY: ICD-10-CM

## 2020-05-27 LAB
ANION GAP SERPL CALC-SCNC: 7 MMOL/L (ref 8–16)
BUN SERPL-MCNC: 12 MG/DL (ref 6–20)
CALCIUM SERPL-MCNC: 9 MG/DL (ref 8.7–10.5)
CHLORIDE SERPL-SCNC: 99 MMOL/L (ref 95–110)
CO2 SERPL-SCNC: 34 MMOL/L (ref 23–29)
CREAT SERPL-MCNC: 0.8 MG/DL (ref 0.5–1.4)
EST. GFR  (AFRICAN AMERICAN): >60 ML/MIN/1.73 M^2
EST. GFR  (NON AFRICAN AMERICAN): >60 ML/MIN/1.73 M^2
ESTIMATED AVG GLUCOSE: 183 MG/DL (ref 68–131)
GLUCOSE SERPL-MCNC: 150 MG/DL (ref 70–110)
HBA1C MFR BLD HPLC: 8 % (ref 4–5.6)
POTASSIUM SERPL-SCNC: 4.5 MMOL/L (ref 3.5–5.1)
SODIUM SERPL-SCNC: 140 MMOL/L (ref 136–145)

## 2020-05-27 PROCEDURE — 36415 COLL VENOUS BLD VENIPUNCTURE: CPT | Mod: PO

## 2020-05-27 PROCEDURE — 83036 HEMOGLOBIN GLYCOSYLATED A1C: CPT

## 2020-05-27 PROCEDURE — 80048 BASIC METABOLIC PNL TOTAL CA: CPT

## 2020-06-09 NOTE — ED NOTES
Loreta Conway is a 78 y.o. male evaluated via telephone on 6/9/2020. Documentation:  I communicated with the patient and/or health care decision maker regarding: The patient has hypertension. Since last visit: changed from valsartan to amlodipine by his request.  He was worried about valsartan causing congestion. He did not notice any changes so restarted it. He reports BP was well controlled until Saturday, 6/6. He reports having several episodes of incontinence and some slight burning burning. That lasted for < 1 hr and resolved. Has not returned. He has had no other symptoms. He went to the ER on 6/7 because his BP was high. He denies ever having any issues with fevers, chills, dizziness, headaches, vision changes, numbness, tingling, chest pain, chest pressure, shortness of breath, abdominal pain, or nausea/vomiting. He did have a brief episode of left-sided abdominal discomfort, lasted for only a few minutes a day or 2 ago, and has not returned. Is not having any dysuria, hematuria, urinary urgency, or urinary frequency. He increased his valsartan to 2 tabs this morning and reports BP is doing better and is more stable. He is feeling better. He is using the TV for distraction during the day or benadryl at night. He reports having some low grade anxiety but nothing worse. Labs: reviewed and discussed with patient. ROS - per HPI      Details of this discussion including any medical advice provided is documented below. Diagnoses and all orders for this visit:    1. Benign essential hypertension- normally well controlled, fluctuating currently, seems to be improved. Most likely anxiety and or pain driven. Reviewed to continue with 2 tabs of valsartan for now, can most likely decrease to 1 in the future. Reviewed ways to reduce stress. Reviewed other things that can cause elevated BP (ie pain).   Will monitor urinary symptoms, but will hold off on a work-up at this Wound wrapped with Tefla and gauze.   time.       The following sections were reviewed and/or updated:  Prior to Admission medications    Medication Sig Start Date End Date Taking? Authorizing Provider   valsartan (DIOVAN) 80 mg tablet TAKE 1 TABLET BY MOUTH DAILY 3/30/20  Yes Sumaya Diop MD   amLODIPine (NORVASC) 5 mg tablet Take 1 Tab by mouth daily. 5/22/20 6/9/20  Sumaya Diop MD   iron 18 mg tab Take 1 Tab by mouth daily. Provider, Historical          Consent:  Dinora Hill, who was seen by synchronous (real-time) audio only technology, and/or his healthcare decision maker, is aware that this patient-initiated, Telehealth encounter on 6/9/2020 is a billable service. He is aware that he may receive a bill for any such additional services and has provided verbal consent to proceed: Yes. Patient identification was verified prior to start of the visit. A caregiver was present when appropriate. Due to this being a TeleHealth encounter (during [place] [address] emergency), evaluation of the following organ systems was limited: VS/Constitutional/EENT/Resp/CV/GI//MS/Neuro/Skin/Heme-Lymph-Imm. Pursuant to the emergency declaration under the [facility] waiver authority and the [employer] and [employer] Act, this Virtual Visit was conducted, with patient's (and/or legal guardian's) consent, to reduce the patient's risk of exposure to COVID-19 and provide necessary medical care. Services were provided through a synchronous discussion virtually to substitute for in-person clinic visit. I was at home. The patient was at home. I affirm this is a Patient Initiated Episode with an Established Patient who has not had a related appointment within my department in the past 7 days or scheduled within the next 24 hours.     Total Time: minutes: 11-20 minutes    Note: not billable if this call serves to triage the patient into an appointment for the relevant concern    Bernice Mccarthy MD

## 2020-06-10 ENCOUNTER — TELEPHONE (OUTPATIENT)
Dept: INTERNAL MEDICINE | Facility: CLINIC | Age: 45
End: 2020-06-10

## 2020-06-10 DIAGNOSIS — Z00.00 ANNUAL PHYSICAL EXAM: Primary | ICD-10-CM

## 2020-06-10 DIAGNOSIS — E55.9 VITAMIN D INSUFFICIENCY: ICD-10-CM

## 2020-06-10 DIAGNOSIS — Z91.89 CANDIDATE FOR STATIN THERAPY DUE TO RISK OF FUTURE CARDIOVASCULAR EVENT: ICD-10-CM

## 2020-06-10 DIAGNOSIS — E11.9 DIABETES MELLITUS TYPE 2 WITHOUT RETINOPATHY: ICD-10-CM

## 2020-06-10 NOTE — TELEPHONE ENCOUNTER
----- Message from Ibeth Calhoun sent at 6/10/2020  2:57 PM CDT -----  Contact: [phone]  Doctor appointment and lab have been scheduled.  Please link lab orders to the lab appointment.  Date of doctor appointment:  8-13-20  Date of lab appointment:  8-6-20  Physical or EP: Physical  Comments:

## 2020-06-18 ENCOUNTER — PATIENT MESSAGE (OUTPATIENT)
Dept: INTERNAL MEDICINE | Facility: CLINIC | Age: 45
End: 2020-06-18

## 2020-06-18 DIAGNOSIS — R60.0 BILATERAL LEG EDEMA: Primary | ICD-10-CM

## 2020-06-18 NOTE — TELEPHONE ENCOUNTER
Spoke with patient.    Changed her diet, unclear if sodium intake for new diet is higher. Victoza might not be a cause but ok to hold right now.    Please schedule labs at 10:30 am on 6/19/20.

## 2020-06-19 ENCOUNTER — LAB VISIT (OUTPATIENT)
Dept: LAB | Facility: HOSPITAL | Age: 45
End: 2020-06-19
Attending: INTERNAL MEDICINE
Payer: MEDICAID

## 2020-06-19 DIAGNOSIS — E11.21 CONTROLLED TYPE 2 DIABETES MELLITUS WITH DIABETIC NEPHROPATHY, WITHOUT LONG-TERM CURRENT USE OF INSULIN: Chronic | ICD-10-CM

## 2020-06-19 DIAGNOSIS — R60.0 BILATERAL LEG EDEMA: ICD-10-CM

## 2020-06-19 LAB
ALBUMIN SERPL BCP-MCNC: 3.6 G/DL (ref 3.5–5.2)
ALP SERPL-CCNC: 97 U/L (ref 55–135)
ALT SERPL W/O P-5'-P-CCNC: 35 U/L (ref 10–44)
ANION GAP SERPL CALC-SCNC: 9 MMOL/L (ref 8–16)
AST SERPL-CCNC: 22 U/L (ref 10–40)
BASOPHILS # BLD AUTO: 0.03 K/UL (ref 0–0.2)
BASOPHILS NFR BLD: 0.3 % (ref 0–1.9)
BILIRUB SERPL-MCNC: 0.4 MG/DL (ref 0.1–1)
BNP SERPL-MCNC: 22 PG/ML (ref 0–99)
BUN SERPL-MCNC: 14 MG/DL (ref 6–20)
CALCIUM SERPL-MCNC: 9.1 MG/DL (ref 8.7–10.5)
CHLORIDE SERPL-SCNC: 102 MMOL/L (ref 95–110)
CO2 SERPL-SCNC: 29 MMOL/L (ref 23–29)
CREAT SERPL-MCNC: 0.9 MG/DL (ref 0.5–1.4)
DIFFERENTIAL METHOD: ABNORMAL
EOSINOPHIL # BLD AUTO: 0.1 K/UL (ref 0–0.5)
EOSINOPHIL NFR BLD: 1 % (ref 0–8)
ERYTHROCYTE [DISTWIDTH] IN BLOOD BY AUTOMATED COUNT: 17.9 % (ref 11.5–14.5)
EST. GFR  (AFRICAN AMERICAN): >60 ML/MIN/1.73 M^2
EST. GFR  (NON AFRICAN AMERICAN): >60 ML/MIN/1.73 M^2
GLUCOSE SERPL-MCNC: 264 MG/DL (ref 70–110)
HCT VFR BLD AUTO: 48.3 % (ref 37–48.5)
HGB BLD-MCNC: 14.4 G/DL (ref 12–16)
IMM GRANULOCYTES # BLD AUTO: 0.06 K/UL (ref 0–0.04)
IMM GRANULOCYTES NFR BLD AUTO: 0.5 % (ref 0–0.5)
LYMPHOCYTES # BLD AUTO: 2.5 K/UL (ref 1–4.8)
LYMPHOCYTES NFR BLD: 22.3 % (ref 18–48)
MCH RBC QN AUTO: 28.2 PG (ref 27–31)
MCHC RBC AUTO-ENTMCNC: 29.8 G/DL (ref 32–36)
MCV RBC AUTO: 95 FL (ref 82–98)
MONOCYTES # BLD AUTO: 0.8 K/UL (ref 0.3–1)
MONOCYTES NFR BLD: 7.2 % (ref 4–15)
NEUTROPHILS # BLD AUTO: 7.5 K/UL (ref 1.8–7.7)
NEUTROPHILS NFR BLD: 68.7 % (ref 38–73)
NRBC BLD-RTO: 0 /100 WBC
PLATELET # BLD AUTO: 218 K/UL (ref 150–350)
PMV BLD AUTO: 10 FL (ref 9.2–12.9)
POTASSIUM SERPL-SCNC: 4.3 MMOL/L (ref 3.5–5.1)
PROT SERPL-MCNC: 7.6 G/DL (ref 6–8.4)
RBC # BLD AUTO: 5.1 M/UL (ref 4–5.4)
SODIUM SERPL-SCNC: 140 MMOL/L (ref 136–145)
WBC # BLD AUTO: 10.97 K/UL (ref 3.9–12.7)

## 2020-06-19 PROCEDURE — 83880 ASSAY OF NATRIURETIC PEPTIDE: CPT

## 2020-06-19 PROCEDURE — 36415 COLL VENOUS BLD VENIPUNCTURE: CPT | Mod: PO

## 2020-06-19 PROCEDURE — 85025 COMPLETE CBC W/AUTO DIFF WBC: CPT

## 2020-06-19 PROCEDURE — 80053 COMPREHEN METABOLIC PANEL: CPT

## 2020-06-19 RX ORDER — GLIPIZIDE 10 MG/1
10 TABLET, FILM COATED, EXTENDED RELEASE ORAL
Qty: 90 TABLET | Refills: 1
Start: 2020-06-19 | End: 2020-07-09 | Stop reason: SDUPTHER

## 2020-07-09 ENCOUNTER — PATIENT MESSAGE (OUTPATIENT)
Dept: INTERNAL MEDICINE | Facility: CLINIC | Age: 45
End: 2020-07-09

## 2020-07-09 ENCOUNTER — TELEPHONE (OUTPATIENT)
Dept: OPTOMETRY | Facility: CLINIC | Age: 45
End: 2020-07-09

## 2020-07-09 DIAGNOSIS — E11.21 CONTROLLED TYPE 2 DIABETES MELLITUS WITH DIABETIC NEPHROPATHY, WITHOUT LONG-TERM CURRENT USE OF INSULIN: Chronic | ICD-10-CM

## 2020-07-09 RX ORDER — GLIPIZIDE 10 MG/1
10 TABLET, FILM COATED, EXTENDED RELEASE ORAL
Qty: 90 TABLET | Refills: 1
Start: 2020-07-09 | End: 2020-07-11 | Stop reason: SDUPTHER

## 2020-07-09 NOTE — TELEPHONE ENCOUNTER
See portal message    Pt needs new Rx for Glipizide 20mg.  Was taking 10mg.    See also, she is now taking 100mg lasix.    Please advise.    LOV:  5/6/20  Next:  8/13/20    Juan Stone

## 2020-07-09 NOTE — TELEPHONE ENCOUNTER
I never ordered the glipizide XR 10mg tablets because she was to take 2 of the 10mg tablets first. Will fill glipizide XR 10mg tablets.    She stopped Victoza because she thought it was causing her to swell. If she stopped it and is still swollen, then the cause was not Victoza. She needs an appointment to evaluate the swelling. Please see if the appointment can be moved to a sooner date.

## 2020-07-09 NOTE — TELEPHONE ENCOUNTER
Spoke to pt.  She stopped the victoza in June.  Also stopped a diet pill and B12 inj.    Pt scheduled to see you tomorrow.

## 2020-07-10 ENCOUNTER — PATIENT MESSAGE (OUTPATIENT)
Dept: INTERNAL MEDICINE | Facility: CLINIC | Age: 45
End: 2020-07-10

## 2020-07-10 ENCOUNTER — TELEPHONE (OUTPATIENT)
Dept: WOUND CARE | Facility: CLINIC | Age: 45
End: 2020-07-10

## 2020-07-10 ENCOUNTER — OFFICE VISIT (OUTPATIENT)
Dept: INTERNAL MEDICINE | Facility: CLINIC | Age: 45
End: 2020-07-10
Payer: MEDICAID

## 2020-07-10 VITALS
OXYGEN SATURATION: 90 % | RESPIRATION RATE: 16 BRPM | TEMPERATURE: 98 F | BODY MASS INDEX: 51.91 KG/M2 | SYSTOLIC BLOOD PRESSURE: 106 MMHG | DIASTOLIC BLOOD PRESSURE: 64 MMHG | HEIGHT: 63 IN | WEIGHT: 293 LBS | HEART RATE: 82 BPM

## 2020-07-10 DIAGNOSIS — E11.21 CONTROLLED TYPE 2 DIABETES MELLITUS WITH DIABETIC NEPHROPATHY, WITHOUT LONG-TERM CURRENT USE OF INSULIN: ICD-10-CM

## 2020-07-10 DIAGNOSIS — R60.0 BILATERAL LEG EDEMA: ICD-10-CM

## 2020-07-10 DIAGNOSIS — B36.9 DERMATITIS FUNGAL: ICD-10-CM

## 2020-07-10 DIAGNOSIS — S81.802A WOUND OF LEFT LOWER EXTREMITY, INITIAL ENCOUNTER: Primary | ICD-10-CM

## 2020-07-10 PROCEDURE — 99215 OFFICE O/P EST HI 40 MIN: CPT | Mod: PBBFAC,PO | Performed by: INTERNAL MEDICINE

## 2020-07-10 PROCEDURE — 99999 PR PBB SHADOW E&M-EST. PATIENT-LVL V: CPT | Mod: PBBFAC,,, | Performed by: INTERNAL MEDICINE

## 2020-07-10 PROCEDURE — 99214 OFFICE O/P EST MOD 30 MIN: CPT | Mod: S$PBB,,, | Performed by: INTERNAL MEDICINE

## 2020-07-10 PROCEDURE — 99214 PR OFFICE/OUTPT VISIT, EST, LEVL IV, 30-39 MIN: ICD-10-PCS | Mod: S$PBB,,, | Performed by: INTERNAL MEDICINE

## 2020-07-10 PROCEDURE — 99999 PR PBB SHADOW E&M-EST. PATIENT-LVL V: ICD-10-PCS | Mod: PBBFAC,,, | Performed by: INTERNAL MEDICINE

## 2020-07-10 NOTE — PROGRESS NOTES
Subjective:       Patient ID: Halley Moreno is a 45 y.o. female who presents for Leg Swelling (bilateral), Diabetes, and Recurrent Skin Infections      Diabetes  She presents for her follow-up diabetic visit. She has type 2 diabetes mellitus. Her disease course has been stable. Pertinent negatives for hypoglycemia include no dizziness, headaches or tremors. There are no diabetic associated symptoms. Pertinent negatives for diabetes include no chest pain, no foot ulcerations, no polydipsia, no polyuria and no weight loss. There are no hypoglycemic complications. There are no diabetic complications. Pertinent negatives for diabetic complications include no CVA or nephropathy. Risk factors for coronary artery disease include diabetes mellitus, hypertension, sedentary lifestyle and obesity. Current diabetic treatment includes oral agent (monotherapy). She is compliant with treatment all of the time. She is following a generally healthy diet. She rarely participates in exercise. Her breakfast blood glucose range is generally 180-200 mg/dl. An ACE inhibitor/angiotensin II receptor blocker is being taken.   Edema  This is a new problem. The current episode started more than 1 month ago. The problem has been unchanged. Associated symptoms include myalgias (reports leg cramps when she stands for too long) and a rash (under pannus). Pertinent negatives include no abdominal pain, arthralgias, chest pain, chills, congestion, coughing, diaphoresis, fever, headaches, nausea, numbness or vomiting. Nothing aggravates the symptoms. She has tried rest and position changes (she increased Lasix dose to 100mg daily) for the symptoms. The treatment provided no relief.   Rash  This is a chronic problem. The current episode started more than 1 month ago. The problem is unchanged. The affected locations include the left lower leg and right lower leg. The rash is characterized by itchiness, scaling, redness and swelling. It is  unknown if there was an exposure to a precipitant. Associated symptoms include shortness of breath (mild). Pertinent negatives include no congestion, cough, diarrhea, facial edema, fever or vomiting. Past treatments include nothing. There is no history of allergies.      She also thought that Victoza might be causing the swelling and she stopped. Will resume it.      Review of Systems   Constitutional: Positive for unexpected weight change (weight increased from 399 lbs to 429 lbs). Negative for chills, diaphoresis, fever and weight loss.   HENT: Negative for congestion, ear discharge, ear pain and sinus pressure.    Eyes: Negative for discharge and redness.   Respiratory: Positive for shortness of breath (mild). Negative for cough and chest tightness.    Cardiovascular: Positive for leg swelling. Negative for chest pain and palpitations.   Gastrointestinal: Negative for abdominal pain, constipation, diarrhea, nausea and vomiting.   Endocrine: Negative for polydipsia and polyuria.   Musculoskeletal: Positive for myalgias (reports leg cramps when she stands for too long). Negative for arthralgias and back pain.   Skin: Positive for color change, rash (under pannus) and wound (left lower leg with abrasions from scratching).   Neurological: Negative for dizziness, tremors, numbness and headaches.   Hematological: Negative for adenopathy.       Objective:      Physical Exam  Vitals signs reviewed.   Constitutional:       General: She is not in acute distress.     Appearance: She is well-developed. She is not diaphoretic.   HENT:      Head: Normocephalic and atraumatic.      Right Ear: Hearing and external ear normal.      Left Ear: Hearing and external ear normal.      Nose: Nose normal.      Mouth/Throat:      Pharynx: Uvula midline.   Eyes:      General: Lids are normal.      Conjunctiva/sclera: Conjunctivae normal.   Cardiovascular:      Rate and Rhythm: Normal rate and regular rhythm.      Heart sounds: Normal heart  sounds. No murmur.   Pulmonary:      Effort: Pulmonary effort is normal.      Breath sounds: Normal breath sounds. No wheezing.   Abdominal:      General: Bowel sounds are normal. There is no distension.      Palpations: Abdomen is soft.      Tenderness: There is no abdominal tenderness.   Musculoskeletal: Normal range of motion.   Skin:     General: Skin is warm and dry.      Findings: No rash.   Neurological:      Mental Status: She is alert and oriented to person, place, and time.                         Assessment/Plan:       1. Wound of left lower extremity, initial encounter  - Ambulatory referral/consult to Wound Clinic; Future  - keep the area clean and dry, do not scratch the aerea    2. Bilateral leg edema  - reviewed recent labs with patient  - CV US Lower Extremity Veins Bilateral Insufficiency; Future  - continue Lasix    3. Dermatitis fungal  - will use TAC and ketoconazole, refer to wound care to examine    4. Controlled type 2 diabetes mellitus with diabetic nephropathy, without long-term current use of insulin  - glipiZIDE (GLUCOTROL) 10 MG TR24; Take 1 tablet (10 mg total) by mouth daily with breakfast.  Dispense: 90 tablet; Refill: 1  - will resume Victoza    Call if no improvement in symptoms    Yamila Tejeda MD

## 2020-07-10 NOTE — TELEPHONE ENCOUNTER
Called and spoke with patient regarding referral from Dr. Tejeda - offered patient appointment for 1030am on 7/14/2020.  Patient accepted appointment date/time and was instructed to report to the 5th floor [facility] location.

## 2020-07-10 NOTE — TELEPHONE ENCOUNTER
----- Message from Marisa Cool sent at 7/10/2020 12:35 PM CDT -----  Regarding: Assist in Scheduling Appointment  Dr. Yamila Tejeda has put in a referral for patient to consult with Wound Clinic. Can you please assist in scheduling an appointment. Thank you     Wound of left lower extremity, initial encounter [G52.535P]

## 2020-07-11 DIAGNOSIS — E11.21 CONTROLLED TYPE 2 DIABETES MELLITUS WITH DIABETIC NEPHROPATHY, WITHOUT LONG-TERM CURRENT USE OF INSULIN: ICD-10-CM

## 2020-07-11 RX ORDER — GLIPIZIDE 10 MG/1
10 TABLET, FILM COATED, EXTENDED RELEASE ORAL
Qty: 90 TABLET | Refills: 1 | Status: SHIPPED | OUTPATIENT
Start: 2020-07-11 | End: 2020-07-12 | Stop reason: SDUPTHER

## 2020-07-12 ENCOUNTER — PATIENT MESSAGE (OUTPATIENT)
Dept: INTERNAL MEDICINE | Facility: CLINIC | Age: 45
End: 2020-07-12

## 2020-07-12 DIAGNOSIS — E11.9 DIABETES MELLITUS TYPE 2 WITHOUT RETINOPATHY: ICD-10-CM

## 2020-07-12 RX ORDER — GLIPIZIDE 10 MG/1
10 TABLET, FILM COATED, EXTENDED RELEASE ORAL
Qty: 90 TABLET | Refills: 0 | Status: SHIPPED | OUTPATIENT
Start: 2020-07-12 | End: 2020-07-12

## 2020-07-12 RX ORDER — KETOCONAZOLE 20 MG/G
CREAM TOPICAL DAILY
Qty: 60 G | Refills: 0 | Status: SHIPPED | OUTPATIENT
Start: 2020-07-12 | End: 2020-09-30

## 2020-07-13 ENCOUNTER — PATIENT MESSAGE (OUTPATIENT)
Dept: INTERNAL MEDICINE | Facility: CLINIC | Age: 45
End: 2020-07-13

## 2020-07-13 ENCOUNTER — HOSPITAL ENCOUNTER (OUTPATIENT)
Dept: RADIOLOGY | Facility: HOSPITAL | Age: 45
Discharge: HOME | End: 2020-07-13
Attending: INTERNAL MEDICINE
Payer: MEDICAID

## 2020-07-13 DIAGNOSIS — Z12.31 ENCOUNTER FOR SCREENING MAMMOGRAM FOR HIGH-RISK PATIENT: ICD-10-CM

## 2020-07-13 PROCEDURE — 77067 MAMMO DIGITAL SCREENING BILAT WITH TOMOSYNTHESIS_CAD: ICD-10-PCS | Mod: 26,,, | Performed by: RADIOLOGY

## 2020-07-13 PROCEDURE — 77063 MAMMO DIGITAL SCREENING BILAT WITH TOMOSYNTHESIS_CAD: ICD-10-PCS | Mod: 26,,, | Performed by: RADIOLOGY

## 2020-07-13 PROCEDURE — 77067 SCR MAMMO BI INCL CAD: CPT | Mod: 26,,, | Performed by: RADIOLOGY

## 2020-07-13 PROCEDURE — 77063 BREAST TOMOSYNTHESIS BI: CPT | Mod: 26,,, | Performed by: RADIOLOGY

## 2020-07-13 PROCEDURE — 77067 SCR MAMMO BI INCL CAD: CPT | Mod: TC,PO

## 2020-07-13 RX ORDER — LIRAGLUTIDE 6 MG/ML
0.6 INJECTION SUBCUTANEOUS DAILY
Qty: 3 ML | Refills: 0 | Status: SHIPPED | OUTPATIENT
Start: 2020-07-13 | End: 2020-08-13 | Stop reason: SDUPTHER

## 2020-07-14 ENCOUNTER — OFFICE VISIT (OUTPATIENT)
Dept: WOUND CARE | Facility: CLINIC | Age: 45
End: 2020-07-14
Payer: MEDICAID

## 2020-07-14 ENCOUNTER — TELEPHONE (OUTPATIENT)
Dept: INTERNAL MEDICINE | Facility: CLINIC | Age: 45
End: 2020-07-14

## 2020-07-14 VITALS
SYSTOLIC BLOOD PRESSURE: 131 MMHG | HEIGHT: 63 IN | WEIGHT: 293 LBS | BODY MASS INDEX: 51.91 KG/M2 | HEART RATE: 93 BPM | DIASTOLIC BLOOD PRESSURE: 77 MMHG | TEMPERATURE: 98 F

## 2020-07-14 DIAGNOSIS — S81.802A WOUND OF LEFT LOWER EXTREMITY, INITIAL ENCOUNTER: Primary | ICD-10-CM

## 2020-07-14 DIAGNOSIS — R60.0 LOWER LEG EDEMA: ICD-10-CM

## 2020-07-14 PROCEDURE — 99215 OFFICE O/P EST HI 40 MIN: CPT | Mod: PBBFAC | Performed by: NURSE PRACTITIONER

## 2020-07-14 PROCEDURE — 99999 PR PBB SHADOW E&M-EST. PATIENT-LVL V: CPT | Mod: PBBFAC,,, | Performed by: NURSE PRACTITIONER

## 2020-07-14 PROCEDURE — 99203 OFFICE O/P NEW LOW 30 MIN: CPT | Mod: S$PBB,,, | Performed by: NURSE PRACTITIONER

## 2020-07-14 PROCEDURE — 99999 PR PBB SHADOW E&M-EST. PATIENT-LVL V: ICD-10-PCS | Mod: PBBFAC,,, | Performed by: NURSE PRACTITIONER

## 2020-07-14 PROCEDURE — 99203 PR OFFICE/OUTPT VISIT, NEW, LEVL III, 30-44 MIN: ICD-10-PCS | Mod: S$PBB,,, | Performed by: NURSE PRACTITIONER

## 2020-07-14 RX ORDER — TRIAMCINOLONE ACETONIDE 1 MG/G
CREAM TOPICAL 2 TIMES DAILY
Qty: 454 G | Refills: 0 | Status: SHIPPED | OUTPATIENT
Start: 2020-07-14 | End: 2020-07-28

## 2020-07-14 RX ORDER — TRIAMCINOLONE ACETONIDE 1 MG/ML
LOTION TOPICAL 2 TIMES DAILY
Qty: 60 ML | Refills: 1 | Status: SHIPPED | OUTPATIENT
Start: 2020-07-14 | End: 2020-07-24

## 2020-07-14 RX ORDER — GLIPIZIDE 10 MG/1
10 TABLET, FILM COATED, EXTENDED RELEASE ORAL
Qty: 90 TABLET | Refills: 0 | Status: SHIPPED | OUTPATIENT
Start: 2020-07-14 | End: 2021-01-26 | Stop reason: SDUPTHER

## 2020-07-14 RX ORDER — GLIPIZIDE 10 MG/1
TABLET ORAL
COMMUNITY
Start: 2020-07-12 | End: 2020-07-14 | Stop reason: SDUPTHER

## 2020-07-14 NOTE — LETTER
July 14, 2020      Yamila Tejeda MD  [address]  [place] [address]           [facility]  [address]  [place] [address]  Phone: [phone]          Patient: Halley Moreno   MR Number: [id number]   YOB: 1975   Date of Visit: 7/14/2020       Dear Dr. Yamila Tejeda:    Thank you for referring Halley Moreno to me for evaluation. Attached you will find relevant portions of my assessment and plan of care.    If you have questions, please do not hesitate to call me. I look forward to following Halley Moreno along with you.    Sincerely,    Tangela Tapia, ANGIE    Enclosure  CC:  No Recipients    If you would like to receive this communication electronically, please contact [email or web address] or [phone] to request more information on [facility] [facility] access.    For providers and/or their staff who would like to refer a patient to [facility], please contact us through our one-stop-shop provider referral line, [facility], at [phone].    If you feel you have received this communication in error or would no longer like to receive these types of communications, please e-mail [email or web address]

## 2020-07-14 NOTE — PROGRESS NOTES
Subjective:       Patient ID: Halley Moreno is a 45 y.o. female.    Chief Complaint: Edema    HPI     44 yo female with history of type 2 diabetes, morbid obesity, HTN presents with rash under the pannus and to lower extremities with open wound to LLE.  C/o rash itching, scaling, and edema. Current treatment is ketoconazole to the rash which has improved some.  States gets wounds to LLE when leg is swollen.  She has cutaneous changes to her lower abdominal wall that has been there for years, states gets worse in summer and better in winter.  Has applied different lotions with little help. Scheduled for venous ultrasound to BLE this afternoon.       Past Medical History:   Diagnosis Date    BMI 70 and over, adult     Depression     Diabetes mellitus     DM (diabetes mellitus) type II controlled with renal manifestation     HTN (hypertension)     Hyperlipidemia     Lumbar disc disease     Morbid obesity     Recurrent nephrolithiasis     Rosacea     Sleep apnea     Tear of medial meniscus of right knee, current 9/5/2017       Review of Systems   Constitutional: Negative for activity change, chills, diaphoresis, fatigue and fever.   Respiratory: Negative for apnea, cough, chest tightness and shortness of breath.    Cardiovascular: Negative for chest pain, palpitations and leg swelling.   Musculoskeletal: Negative for gait problem and joint swelling.   Skin: Positive for color change, rash and wound. Negative for pallor.   Neurological: Negative for syncope, weakness and numbness.   Psychiatric/Behavioral: Negative for agitation. The patient is not nervous/anxious.    All other systems reviewed and are negative.            Objective:      Physical Exam  Vitals signs reviewed.   Constitutional:       General: She is not in acute distress.     Appearance: She is well-developed.   HENT:      Head: Normocephalic and atraumatic.   Eyes:      Pupils: Pupils are equal, round, and reactive to light.   Neck:       Musculoskeletal: Normal range of motion.   Cardiovascular:      Rate and Rhythm: Normal rate.      Pulses: Normal pulses.           Dorsalis pedis pulses are 2+ on the right side and 2+ on the left side.        Posterior tibial pulses are 2+ on the right side and 2+ on the left side.   Pulmonary:      Effort: Pulmonary effort is normal. No respiratory distress.   Musculoskeletal:         General: Swelling present. No tenderness.   Skin:     General: Skin is warm and dry.      Capillary Refill: Capillary refill takes less than 2 seconds.          Neurological:      Mental Status: She is alert and oriented to person, place, and time.   Psychiatric:         Judgment: Judgment normal.         Abdominal wall                  Assessment:       1. Lower leg edema    2. Wound of left lower extremity, initial encounter              Plan:       Clean area with mild soap and water and pat dry  Apply triamcinolone/ ketoconazole to abdominal wall and LLE once daily  Rx: triamcinolone to [facility] Stone  Tubigrip (G) to BLE, instructed to remove at night and replace in morning  Elevate legs as much as possible  Observe for signs and symptoms of infection and report symptoms to clinic  RTC 2 weeks

## 2020-07-14 NOTE — TELEPHONE ENCOUNTER
Fax request from [facility] Stone for glipizide    Glipizide marked out on med list.  Office Visit 7/10 confirms pt is still taking 10mg Glipizide    LOV:  7/10/20  Next:  8/13/20

## 2020-07-14 NOTE — TELEPHONE ENCOUNTER
----- Message from Farnaz White sent at 7/14/2020  2:59 PM CDT -----  Type:  Needs Medical Advice    Who Called: abad       Would the patient rather a call back or a response via [facility]? Call back     Best Call Back Number: [phone]    Additional Information:abad would like to reschedule this appt [facility] CV VENOUS BILATERAL LEG [[id number]].

## 2020-07-14 NOTE — PATIENT INSTRUCTIONS
Leg Swelling in Both Legs    Swelling of the feet, ankles, and legs is called edema. It is caused by excess fluid that has collected in the tissues. Extra fluid in the body settles in the lowest part because of gravity. This is why the legs and feet are most affected.  Some of the causes for edema include:  · Disease of the heart like congestive heart failure  · Standing or sitting for long periods of time  · Infection of the feet or legs  · Blood pooling in the veins of your legs (venous insufficiency)  · Dilated veins in your lower leg (varicose veins)  · Garters or other clothing that is tight on your legs. This will cause blood to pool in your legs because the clothing limits blood flow.  · Some medicines such as hormones like birth control pills, some blood pressure medicines like calcium channel blockers (amlodipine) and steroids, some antidepressants like MAO inhibitors and tricyclics  · Menstrual periods that cause you to retain fluids  · Many types of renal disease  · Liver failure or cirrhosis  · Pregnancy, some swelling is normal, but a sudden increase in leg swelling or weight gain can be a sign of a dangerous complication of pregnancy  · Poor nutrition  · Thyroid disease  Medical treatment will depend on what is causing the swelling in your legs. Your healthcare provider may prescribe water pills (diuretics) to get rid of the extra fluid.  Home care  Follow these guidelines when caring for yourself at home:  · Don't wear clothing like garters that is tight on your legs.  · Keep your legs up while lying or sitting.  · If infection, injury, or recent surgery is causing the swelling, stay off your legs as much as possible until symptoms get better.  · If your healthcare provider says that your leg swelling is caused by venous insufficiency or varicose veins, don't sit or  one place for long periods of time. Take breaks and walk about every few hours. Brisk walking is a good exercise. It helps  circulate the blood that has collected in your leg. Talk with your provider about using support stockings to stop daytime leg swelling.  · If your provider says that heart disease is causing your leg swelling, follow a low-salt diet to stop extra fluid from staying in your body. You may also need medicine.  Follow-up care  Follow up with your healthcare provider, or as advised.  When to seek medical advice  Call your healthcare provider right away if any of these occur:  · New shortness of breath or chest pain  · Shortness of breath or chest pain that gets worse  · Swelling in both legs or ankles that gets worse  · Swelling of the abdomen  · Redness, warmth, or swelling in one leg  · Fever of 100.4ºF (38ºC) or higher, or as directed by your healthcare provider  · Yellow color to your skin or eyes  · Rapid, unexplained weight gain  · Having to sleep upright or use an increased number of pillows  Date Last Reviewed: 3/31/2016  © 8768-6176 [employer]. [address] [place] [address], [place] [address]. All rights reserved. This information is not intended as a substitute for professional medical care. Always follow your healthcare professional's instructions.

## 2020-07-17 ENCOUNTER — CLINICAL SUPPORT (OUTPATIENT)
Dept: CARDIOLOGY | Facility: CLINIC | Age: 45
End: 2020-07-17
Attending: INTERNAL MEDICINE
Payer: MEDICAID

## 2020-07-17 DIAGNOSIS — R60.0 BILATERAL LEG EDEMA: ICD-10-CM

## 2020-07-17 LAB
LEFT GREAT SAPHENOUS DISTAL THIGH DIA: 0.65 CM
LEFT GREAT SAPHENOUS JUNCTION DIA: 0.76 CM
LEFT GREAT SAPHENOUS JUNCTION REFLUX: 2580 MS
LEFT GREAT SAPHENOUS KNEE DIA: 0.53 CM
LEFT GREAT SAPHENOUS MIDDLE THIGH DIA: 0.8 CM
LEFT GREAT SAPHENOUS PROXIMAL CALF DIA: 0.41 CM
LEFT GREAT SAPHENOUS PROXIMAL CALF REFLUX: 536 MS
LEFT SMALL SAPHENOUS KNEE DIA: 0.92 CM
RIGHT GREAT SAPHENOUS DISTAL THIGH DIA: 0.73 CM
RIGHT GREAT SAPHENOUS JUNCTION DIA: 1.1 CM
RIGHT GREAT SAPHENOUS KNEE DIA: 0.5 CM
RIGHT GREAT SAPHENOUS MIDDLE THIGH DIA: 0.94 CM
RIGHT GREAT SAPHENOUS PROXIMAL CALF DIA: 0.44 CM
RIGHT GREAT SAPHENOUS PROXIMAL CALF REFLUX: 953 MS
RIGHT SMALL SAPHENOUS KNEE DIA: 0.84 CM
RIGHT SMALL SAPHENOUS SPJ DIA: 0.29 CM

## 2020-07-17 PROCEDURE — 93970 CV US LOWER VENOUS INSUFFICIENCY BILATERAL (CUPID ONLY): ICD-10-PCS | Mod: 26,S$PBB,, | Performed by: INTERNAL MEDICINE

## 2020-07-17 PROCEDURE — 93970 EXTREMITY STUDY: CPT | Mod: PBBFAC,PO | Performed by: INTERNAL MEDICINE

## 2020-07-20 ENCOUNTER — PATIENT MESSAGE (OUTPATIENT)
Dept: INTERNAL MEDICINE | Facility: CLINIC | Age: 45
End: 2020-07-20

## 2020-07-22 ENCOUNTER — PATIENT MESSAGE (OUTPATIENT)
Dept: INTERNAL MEDICINE | Facility: CLINIC | Age: 45
End: 2020-07-22

## 2020-07-22 DIAGNOSIS — S81.802A WOUND OF LEFT LOWER EXTREMITY, INITIAL ENCOUNTER: Primary | ICD-10-CM

## 2020-07-22 DIAGNOSIS — M79.605 BILATERAL LEG PAIN: ICD-10-CM

## 2020-07-22 DIAGNOSIS — R60.0 BILATERAL LEG EDEMA: ICD-10-CM

## 2020-07-22 DIAGNOSIS — M79.604 BILATERAL LEG PAIN: ICD-10-CM

## 2020-07-23 NOTE — TELEPHONE ENCOUNTER
Would recommend following the instructions by the wound care staff. She does have a wound on the leg. Should wait until further evaluation by the specialists. Also elevate her legs regularly.    She is overdue for a Pap. Can we arrange that?

## 2020-07-23 NOTE — TELEPHONE ENCOUNTER
Pt notified and verbalized understanding.    She wants to know if it's ok to use compression stockings?    Message sent to Referral Coord

## 2020-07-24 ENCOUNTER — TELEPHONE (OUTPATIENT)
Dept: INTERNAL MEDICINE | Facility: CLINIC | Age: 45
End: 2020-07-24

## 2020-07-24 ENCOUNTER — TELEPHONE (OUTPATIENT)
Dept: WOUND CARE | Facility: CLINIC | Age: 45
End: 2020-07-24

## 2020-07-24 DIAGNOSIS — I87.2 VENOUS INSUFFICIENCY OF BOTH LOWER EXTREMITIES: Primary | ICD-10-CM

## 2020-07-24 RX ORDER — CLOTRIMAZOLE AND BETAMETHASONE DIPROPIONATE 10; .5 MG/ML; MG/ML
LOTION TOPICAL 2 TIMES DAILY
Qty: 30 ML | Refills: 0 | Status: SHIPPED | OUTPATIENT
Start: 2020-07-24 | End: 2022-09-13 | Stop reason: ALTCHOICE

## 2020-07-24 NOTE — TELEPHONE ENCOUNTER
Called no answer left voice message asking patient to return call at [phone] to discuss an appointment time for the vascular lab study that was ordered by Mery after her conversation with Dr. Tejeda.  I left a message that I have a 9am vaslab appointment for Tuesday, 7/28/2020 which is the same date of her appointment with wound care for 10am.  I will call the patient before the end of the day to confirm scheduled appointment for 9am with vaslab.

## 2020-07-24 NOTE — TELEPHONE ENCOUNTER
----- Message from Isabel Callejas NP sent at 7/24/2020  9:24 AM CDT -----  Good morning!    Medicaid will reimburse for EVLT procedures with the proper documentation.  The first thing she needs though is a pre-EVLT ultrasound to see if she would qualify.  Once we get those results we can then refer her to vascular.  I will place the order for the ultrasound and ask Margoth to schedule it. I am including Tangela and Margoth on this so that Tangela can keep you in the loop once we find out whether or not she qualifies.    Margoth can you please call the patient and schedule the study?    Thanks so much and have a good weekend!    Mery    ----- Message -----  From: Yamila Tejeda MD  Sent: 7/24/2020   9:01 AM CDT  To: Isabel Callejas NP      Hi,    I was hoping that you would be able to see this (Medicaid) patient for her leg wound with swelling. But she was already scheduled and she was evaluated by Tangela Tapia on 7/14/20.     She has venous insufficiency with reflux in the greater saphenous veins. She has leg pain and skin changes.    I know that you work closely with Vascular Surgery but want to know if a surgical procedure might be helpful based on how her wounds look. If it looks like she needs it, will refer her to Vascular Surgery.    I thought that Wound Care would not see Medicaid patients but my patient was seen. Do you know if Vascular sees Medicaid patients too?    Any information would be very helpful.    Thanks!    Yamila

## 2020-07-24 NOTE — TELEPHONE ENCOUNTER
Spoke to patient.    Ultrasound was scheduled. Will need to see that and if abnormal, will instead refer her to Vascular Surgery. [facility]

## 2020-07-24 NOTE — TELEPHONE ENCOUNTER
----- Message from Farida Reza sent at 7/24/2020  8:45 AM CDT -----  Regarding: Vascular Cardiology  Due to pt's insurance, there is no appt available for her. External?

## 2020-07-28 ENCOUNTER — OFFICE VISIT (OUTPATIENT)
Dept: WOUND CARE | Facility: CLINIC | Age: 45
End: 2020-07-28
Payer: MEDICAID

## 2020-07-28 VITALS
DIASTOLIC BLOOD PRESSURE: 76 MMHG | SYSTOLIC BLOOD PRESSURE: 145 MMHG | BODY MASS INDEX: 51.91 KG/M2 | WEIGHT: 293 LBS | HEIGHT: 63 IN | HEART RATE: 86 BPM | TEMPERATURE: 98 F

## 2020-07-28 DIAGNOSIS — R60.0 LOWER LEG EDEMA: Primary | ICD-10-CM

## 2020-07-28 DIAGNOSIS — I87.2 VENOUS INSUFFICIENCY OF BOTH LOWER EXTREMITIES: ICD-10-CM

## 2020-07-28 DIAGNOSIS — E66.01 MORBID OBESITY: ICD-10-CM

## 2020-07-28 PROCEDURE — 99999 PR PBB SHADOW E&M-EST. PATIENT-LVL V: CPT | Mod: PBBFAC,,, | Performed by: NURSE PRACTITIONER

## 2020-07-28 PROCEDURE — 99213 PR OFFICE/OUTPT VISIT, EST, LEVL III, 20-29 MIN: ICD-10-PCS | Mod: S$PBB,,, | Performed by: NURSE PRACTITIONER

## 2020-07-28 PROCEDURE — 99999 PR PBB SHADOW E&M-EST. PATIENT-LVL V: ICD-10-PCS | Mod: PBBFAC,,, | Performed by: NURSE PRACTITIONER

## 2020-07-28 PROCEDURE — 99215 OFFICE O/P EST HI 40 MIN: CPT | Mod: PBBFAC | Performed by: NURSE PRACTITIONER

## 2020-07-28 PROCEDURE — 99213 OFFICE O/P EST LOW 20 MIN: CPT | Mod: S$PBB,,, | Performed by: NURSE PRACTITIONER

## 2020-07-28 RX ORDER — TRIAMCINOLONE ACETONIDE 1 MG/ML
LOTION TOPICAL
COMMUNITY
Start: 2020-07-14 | End: 2021-03-24 | Stop reason: SDUPTHER

## 2020-07-28 RX ORDER — CLOTRIMAZOLE AND BETAMETHASONE DIPROPIONATE 10; .64 MG/G; MG/G
CREAM TOPICAL
Status: ON HOLD | COMMUNITY
Start: 2020-07-24 | End: 2020-11-09 | Stop reason: HOSPADM

## 2020-07-28 NOTE — PROGRESS NOTES
Subjective:       Patient ID: Halley Moreno is a 45 y.o. female.    Chief Complaint: Wound Check    HPI     44 yo female presents for re evaluation of edema and dermatitis to LLE.  She also has cutaneous changes to her lower abdominal wall that has been there for years, states gets worse in summer and better in winter. She has been applying triamcinolone to LLE and abdomen and the dermatitis has resolved on LLE and improved on abdomen.  Denies wounds to BLE, fever, chills, drainage, warmth, erythema.  Has tubigrip for compression.  She has venous ultrasound to BLE/Pre EVLT on 7/17/20, results reviewed in chart.       Past Medical History:   Diagnosis Date    BMI 70 and over, adult     Depression     Diabetes mellitus     DM (diabetes mellitus) type II controlled with renal manifestation     HTN (hypertension)     Hyperlipidemia     Lumbar disc disease     Morbid obesity     Recurrent nephrolithiasis     Rosacea     Sleep apnea     Tear of medial meniscus of right knee, current 9/5/2017       Review of Systems   Constitutional: Negative for activity change, chills, diaphoresis, fatigue and fever.   Respiratory: Negative for apnea, cough, chest tightness and shortness of breath.    Cardiovascular: Negative for chest pain, palpitations and leg swelling.   Musculoskeletal: Negative for gait problem and joint swelling.   Skin: Positive for color change. Negative for pallor, rash and wound.   Neurological: Negative for syncope, weakness and numbness.   Psychiatric/Behavioral: Negative for agitation. The patient is not nervous/anxious.    All other systems reviewed and are negative.            Objective:      Physical Exam  Vitals signs reviewed.   Constitutional:       General: She is not in acute distress.     Appearance: She is well-developed.   HENT:      Head: Normocephalic and atraumatic.   Eyes:      Pupils: Pupils are equal, round, and reactive to light.   Neck:      Musculoskeletal: Normal range  of motion.   Cardiovascular:      Rate and Rhythm: Normal rate.      Pulses: Normal pulses.           Dorsalis pedis pulses are 2+ on the right side and 2+ on the left side.        Posterior tibial pulses are 2+ on the right side and 2+ on the left side.   Pulmonary:      Effort: Pulmonary effort is normal. No respiratory distress.   Musculoskeletal:         General: No tenderness.      Right lower leg: Edema present.      Left lower leg: Edema present.   Skin:     General: Skin is warm and dry.      Capillary Refill: Capillary refill takes less than 2 seconds.          Neurological:      Mental Status: She is alert and oriented to person, place, and time.   Psychiatric:         Judgment: Judgment normal.         Left leg          Abdominal wall                      Assessment:       1. Lower leg edema    2. Venous insufficiency of both lower extremities              Plan:       Tubigrip (G) to BLE, instructed to remove at night and replace in morning  Elevate legs as much as possible  RTC PRN

## 2020-08-07 ENCOUNTER — LAB VISIT (OUTPATIENT)
Dept: LAB | Facility: HOSPITAL | Age: 45
End: 2020-08-07
Attending: INTERNAL MEDICINE
Payer: MEDICAID

## 2020-08-07 DIAGNOSIS — Z00.00 ANNUAL PHYSICAL EXAM: ICD-10-CM

## 2020-08-07 DIAGNOSIS — E55.9 VITAMIN D INSUFFICIENCY: ICD-10-CM

## 2020-08-07 DIAGNOSIS — Z91.89 CANDIDATE FOR STATIN THERAPY DUE TO RISK OF FUTURE CARDIOVASCULAR EVENT: ICD-10-CM

## 2020-08-07 DIAGNOSIS — E11.9 DIABETES MELLITUS TYPE 2 WITHOUT RETINOPATHY: ICD-10-CM

## 2020-08-07 LAB
25(OH)D3+25(OH)D2 SERPL-MCNC: 24 NG/ML (ref 30–96)
ALBUMIN SERPL BCP-MCNC: 3.4 G/DL (ref 3.5–5.2)
ALP SERPL-CCNC: 88 U/L (ref 55–135)
ALT SERPL W/O P-5'-P-CCNC: 37 U/L (ref 10–44)
ANION GAP SERPL CALC-SCNC: 4 MMOL/L (ref 8–16)
AST SERPL-CCNC: 32 U/L (ref 10–40)
BASOPHILS # BLD AUTO: 0.02 K/UL (ref 0–0.2)
BASOPHILS NFR BLD: 0.2 % (ref 0–1.9)
BILIRUB SERPL-MCNC: 0.7 MG/DL (ref 0.1–1)
BUN SERPL-MCNC: 14 MG/DL (ref 6–20)
CALCIUM SERPL-MCNC: 9.1 MG/DL (ref 8.7–10.5)
CHLORIDE SERPL-SCNC: 98 MMOL/L (ref 95–110)
CHOLEST SERPL-MCNC: 100 MG/DL (ref 120–199)
CHOLEST/HDLC SERPL: 2.6 {RATIO} (ref 2–5)
CO2 SERPL-SCNC: 39 MMOL/L (ref 23–29)
CREAT SERPL-MCNC: 0.8 MG/DL (ref 0.5–1.4)
DIFFERENTIAL METHOD: ABNORMAL
EOSINOPHIL # BLD AUTO: 0.1 K/UL (ref 0–0.5)
EOSINOPHIL NFR BLD: 0.9 % (ref 0–8)
ERYTHROCYTE [DISTWIDTH] IN BLOOD BY AUTOMATED COUNT: 18.8 % (ref 11.5–14.5)
EST. GFR  (AFRICAN AMERICAN): >60 ML/MIN/1.73 M^2
EST. GFR  (NON AFRICAN AMERICAN): >60 ML/MIN/1.73 M^2
ESTIMATED AVG GLUCOSE: 194 MG/DL (ref 68–131)
GLUCOSE SERPL-MCNC: 136 MG/DL (ref 70–110)
HBA1C MFR BLD HPLC: 8.4 % (ref 4–5.6)
HCT VFR BLD AUTO: 50.6 % (ref 37–48.5)
HDLC SERPL-MCNC: 39 MG/DL (ref 40–75)
HDLC SERPL: 39 % (ref 20–50)
HGB BLD-MCNC: 14.6 G/DL (ref 12–16)
IMM GRANULOCYTES # BLD AUTO: 0.05 K/UL (ref 0–0.04)
IMM GRANULOCYTES NFR BLD AUTO: 0.5 % (ref 0–0.5)
LDLC SERPL CALC-MCNC: 47.2 MG/DL (ref 63–159)
LYMPHOCYTES # BLD AUTO: 1.7 K/UL (ref 1–4.8)
LYMPHOCYTES NFR BLD: 17.9 % (ref 18–48)
MCH RBC QN AUTO: 27.2 PG (ref 27–31)
MCHC RBC AUTO-ENTMCNC: 28.9 G/DL (ref 32–36)
MCV RBC AUTO: 94 FL (ref 82–98)
MONOCYTES # BLD AUTO: 0.6 K/UL (ref 0.3–1)
MONOCYTES NFR BLD: 6.2 % (ref 4–15)
NEUTROPHILS # BLD AUTO: 7.2 K/UL (ref 1.8–7.7)
NEUTROPHILS NFR BLD: 74.3 % (ref 38–73)
NONHDLC SERPL-MCNC: 61 MG/DL
NRBC BLD-RTO: 0 /100 WBC
PLATELET # BLD AUTO: 157 K/UL (ref 150–350)
PMV BLD AUTO: 9.8 FL (ref 9.2–12.9)
POTASSIUM SERPL-SCNC: 4.1 MMOL/L (ref 3.5–5.1)
PROT SERPL-MCNC: 6.9 G/DL (ref 6–8.4)
RBC # BLD AUTO: 5.37 M/UL (ref 4–5.4)
SODIUM SERPL-SCNC: 141 MMOL/L (ref 136–145)
TRIGL SERPL-MCNC: 69 MG/DL (ref 30–150)
TSH SERPL DL<=0.005 MIU/L-ACNC: 1.79 UIU/ML (ref 0.4–4)
WBC # BLD AUTO: 9.71 K/UL (ref 3.9–12.7)

## 2020-08-07 PROCEDURE — 84443 ASSAY THYROID STIM HORMONE: CPT

## 2020-08-07 PROCEDURE — 82306 VITAMIN D 25 HYDROXY: CPT

## 2020-08-07 PROCEDURE — 85025 COMPLETE CBC W/AUTO DIFF WBC: CPT

## 2020-08-07 PROCEDURE — 80053 COMPREHEN METABOLIC PANEL: CPT

## 2020-08-07 PROCEDURE — 36415 COLL VENOUS BLD VENIPUNCTURE: CPT | Mod: PO

## 2020-08-07 PROCEDURE — 80061 LIPID PANEL: CPT

## 2020-08-07 PROCEDURE — 83036 HEMOGLOBIN GLYCOSYLATED A1C: CPT

## 2020-08-09 ENCOUNTER — PATIENT OUTREACH (OUTPATIENT)
Dept: ADMINISTRATIVE | Facility: OTHER | Age: 45
End: 2020-08-09

## 2020-08-09 NOTE — PROGRESS NOTES
Care Everywhere: updated  Immunization:   Health Maintenance:   Media Review:   Legacy Review:   Order placed:   Upcoming appts:optometry 8/11/2020

## 2020-08-10 ENCOUNTER — TELEPHONE (OUTPATIENT)
Dept: INTERNAL MEDICINE | Facility: CLINIC | Age: 45
End: 2020-08-10

## 2020-08-10 NOTE — TELEPHONE ENCOUNTER
----- Message from Yamila Tejeda MD sent at 8/9/2020 11:18 PM CDT -----  Message sent via patient portal.

## 2020-08-11 ENCOUNTER — OFFICE VISIT (OUTPATIENT)
Dept: OPTOMETRY | Facility: CLINIC | Age: 45
End: 2020-08-11
Payer: MEDICAID

## 2020-08-11 DIAGNOSIS — E11.59 HYPERTENSION ASSOCIATED WITH DIABETES: ICD-10-CM

## 2020-08-11 DIAGNOSIS — E11.9 DIABETES MELLITUS TYPE 2 WITHOUT RETINOPATHY: Primary | ICD-10-CM

## 2020-08-11 DIAGNOSIS — H40.053 BILATERAL OCULAR HYPERTENSION: ICD-10-CM

## 2020-08-11 DIAGNOSIS — I15.2 HYPERTENSION ASSOCIATED WITH DIABETES: ICD-10-CM

## 2020-08-11 PROCEDURE — 92014 COMPRE OPH EXAM EST PT 1/>: CPT | Mod: S$PBB,,, | Performed by: OPTOMETRIST

## 2020-08-11 PROCEDURE — 99999 PR PBB SHADOW E&M-EST. PATIENT-LVL III: ICD-10-PCS | Mod: PBBFAC,,, | Performed by: OPTOMETRIST

## 2020-08-11 PROCEDURE — 99999 PR PBB SHADOW E&M-EST. PATIENT-LVL III: CPT | Mod: PBBFAC,,, | Performed by: OPTOMETRIST

## 2020-08-11 PROCEDURE — 99213 OFFICE O/P EST LOW 20 MIN: CPT | Mod: PBBFAC,PO | Performed by: OPTOMETRIST

## 2020-08-11 PROCEDURE — 92014 PR EYE EXAM, EST PATIENT,COMPREHESV: ICD-10-PCS | Mod: S$PBB,,, | Performed by: OPTOMETRIST

## 2020-08-11 RX ORDER — LATANOPROST 50 UG/ML
1 SOLUTION/ DROPS OPHTHALMIC NIGHTLY
Qty: 2.5 ML | Refills: 11 | Status: SHIPPED | OUTPATIENT
Start: 2020-08-11 | End: 2021-09-27

## 2020-08-11 NOTE — PROGRESS NOTES
HPI     DLS 02/15/2018  Patient here for routine Diabetic Eye Exam. Patient wears OTC +1.50  Patient states BS  Has been out of control.  AIC 6.5-8.4 Friday Morning.  Patient suggest FLASHES+ When Tired  FLOATERS-  HA-  DIPLOPIA-  PHOTOPHOBIA-  PAIN-  IRRITATION-  Hemoglobin A1C       Date                     Value               Ref Range             Status                08/07/2020               8.4 (H)             4.0 - 5.6 %           Final              Comment:    A       05/27/2020               8.0 (H)             4.0 - 5.6 %           Final              Comment:           01/23/2020               7.7 (H)             4.0 - 5.6 %           Final              Comment:       HgC, etc)do        Last edited by Juan Jose Bolaños on 8/11/2020  9:06 AM. (History)            Assessment /Plan     For exam results, see Encounter Report.    Diabetes mellitus type 2 without retinopathy    Hypertension associated with diabetes    Bilateral ocular hypertension  -     latanoprost 0.005 % ophthalmic solution; Place 1 drop into both eyes every evening.  Dispense: 2.5 mL; Refill: 11      1. No diabetic retinopathy, no csme. Return in 1 year for dilated eye exam.  2. Monitor condition. Patient to report any changes. RTC 1 year recheck.  3. IOP too high for nerve, start Latanaprost qhs ou, no fam hist of glaucoma. RTC 1 month IOP ck, gonio, pachy.

## 2020-08-13 ENCOUNTER — OFFICE VISIT (OUTPATIENT)
Dept: INTERNAL MEDICINE | Facility: CLINIC | Age: 45
End: 2020-08-13
Payer: MEDICAID

## 2020-08-13 ENCOUNTER — LAB VISIT (OUTPATIENT)
Dept: LAB | Facility: HOSPITAL | Age: 45
End: 2020-08-13
Attending: INTERNAL MEDICINE
Payer: MEDICAID

## 2020-08-13 VITALS
WEIGHT: 293 LBS | BODY MASS INDEX: 51.91 KG/M2 | DIASTOLIC BLOOD PRESSURE: 98 MMHG | HEART RATE: 74 BPM | HEIGHT: 63 IN | RESPIRATION RATE: 20 BRPM | TEMPERATURE: 97 F | SYSTOLIC BLOOD PRESSURE: 132 MMHG

## 2020-08-13 DIAGNOSIS — E11.59 HYPERTENSION ASSOCIATED WITH DIABETES: ICD-10-CM

## 2020-08-13 DIAGNOSIS — E11.59 OBESITY, DIABETES, AND HYPERTENSION SYNDROME: ICD-10-CM

## 2020-08-13 DIAGNOSIS — R06.09 DOE (DYSPNEA ON EXERTION): ICD-10-CM

## 2020-08-13 DIAGNOSIS — Z00.00 ANNUAL PHYSICAL EXAM: ICD-10-CM

## 2020-08-13 DIAGNOSIS — G47.30 SLEEP APNEA, UNSPECIFIED TYPE: ICD-10-CM

## 2020-08-13 DIAGNOSIS — E66.01 MORBID OBESITY WITH BMI OF 70 AND OVER, ADULT: ICD-10-CM

## 2020-08-13 DIAGNOSIS — E11.69 OBESITY, DIABETES, AND HYPERTENSION SYNDROME: ICD-10-CM

## 2020-08-13 DIAGNOSIS — R60.0 BILATERAL LEG EDEMA: ICD-10-CM

## 2020-08-13 DIAGNOSIS — I15.2 HYPERTENSION ASSOCIATED WITH DIABETES: ICD-10-CM

## 2020-08-13 DIAGNOSIS — E55.9 VITAMIN D INSUFFICIENCY: ICD-10-CM

## 2020-08-13 DIAGNOSIS — E66.9 OBESITY, DIABETES, AND HYPERTENSION SYNDROME: ICD-10-CM

## 2020-08-13 DIAGNOSIS — I15.2 OBESITY, DIABETES, AND HYPERTENSION SYNDROME: ICD-10-CM

## 2020-08-13 DIAGNOSIS — E11.9 DIABETES MELLITUS TYPE 2 WITHOUT RETINOPATHY: Primary | ICD-10-CM

## 2020-08-13 LAB — BNP SERPL-MCNC: 63 PG/ML (ref 0–99)

## 2020-08-13 PROCEDURE — 99999 PR PBB SHADOW E&M-EST. PATIENT-LVL V: CPT | Mod: PBBFAC,,, | Performed by: INTERNAL MEDICINE

## 2020-08-13 PROCEDURE — 99214 OFFICE O/P EST MOD 30 MIN: CPT | Mod: S$PBB,,, | Performed by: INTERNAL MEDICINE

## 2020-08-13 PROCEDURE — 99215 OFFICE O/P EST HI 40 MIN: CPT | Mod: PBBFAC,PO | Performed by: INTERNAL MEDICINE

## 2020-08-13 PROCEDURE — 36415 COLL VENOUS BLD VENIPUNCTURE: CPT | Mod: PO

## 2020-08-13 PROCEDURE — 99999 PR PBB SHADOW E&M-EST. PATIENT-LVL V: ICD-10-PCS | Mod: PBBFAC,,, | Performed by: INTERNAL MEDICINE

## 2020-08-13 PROCEDURE — 99214 PR OFFICE/OUTPT VISIT, EST, LEVL IV, 30-39 MIN: ICD-10-PCS | Mod: S$PBB,,, | Performed by: INTERNAL MEDICINE

## 2020-08-13 PROCEDURE — 83880 ASSAY OF NATRIURETIC PEPTIDE: CPT

## 2020-08-13 RX ORDER — LIRAGLUTIDE 6 MG/ML
1.2 INJECTION SUBCUTANEOUS DAILY
Qty: 3 ML | Refills: 0
Start: 2020-08-13 | End: 2020-09-10 | Stop reason: SDUPTHER

## 2020-08-13 NOTE — PROGRESS NOTES
Subjective:      Halley Moreno is a 45 y.o. female who presents for annual exam.    HTN- BP is elevated today, compliant with medications. No chest pain, no palpitations, no headaches. + leg swelling.    DM2- last HbA1c increased 8.4, added Victoza but she stopped due to concern that it could cause swelling.  She resumed recently in notices no increase in swelling.  Will work on increasing the dose Victoza as tolerated. Denies excessive appetite or thirst.    Leg edema- chronic episodic leg swelling continues but has improved slightly.  Continues to use Lasix.  Denies significant shortness of breath.  No orthopnea or PND.  No change in urine output.    Family History:  family history includes Cancer in her father; Diabetes in her father; Heart disease in her paternal grandfather; Hypertension in her mother.    Health Maintenance:  Health Maintenance       Date Due Completion Date    HIV Screening 1988 ---    Foot Exam 2018    Cervical Cancer Screening 2020    Override on 2010: Done    Influenza Vaccine (1) 2020 10/14/2019    Hemoglobin A1c 2020    Lipid Panel 2021    Low Dose Statin 2021    Eye Exam 2021    Mammogram 2022    TETANUS VACCINE 2027        Eye exam: 2020  OB/GYN: [facility]  [id number]  Last Pap: 2017, due now  Tetanus: 2017    Exercise: minimal  Body mass index is 76.66 kg/m².     Wt Readings from Last 3 Encounters:   20 (!) 196.3 kg (432 lb 12.2 oz)   20 (!) 196.8 kg (433 lb 13.8 oz)   20 (!) 194.9 kg (429 lb 10.8 oz)     -patient of [facility] and took phendimetramine, phentermine and topiramate with minimal weight loss  - triggers for weight loss include having babies and death in family      Meds:   Current Outpatient Medications:     atorvastatin (LIPITOR) 10 MG tablet, Take 1 tablet (10 mg total) by mouth once daily., Disp:  "90 tablet, Rfl: 1    blood sugar diagnostic Strp, 1 strip by Misc.(Non-Drug; Combo Route) route 2 (two) times daily., Disp: 100 strip, Rfl: 5    cholecalciferol, vitamin D3, (VITAMIN D3) 1,000 unit capsule, Take 2 capsules (2,000 Units total) by mouth once daily., Disp: , Rfl: 0    clotrimazole-betamethasone (LOTRISONE) lotion, Apply topically 2 (two) times daily., Disp: 30 mL, Rfl: 0    clotrimazole-betamethasone 1-0.05% (LOTRISONE) cream, , Disp: , Rfl:     furosemide (LASIX) 80 MG tablet, Take 1 tablet (80 mg total) by mouth once daily., Disp: 90 tablet, Rfl: 0    glipiZIDE (GLUCOTROL) 10 MG TR24, Take 1 tablet (10 mg total) by mouth daily with breakfast., Disp: 90 tablet, Rfl: 0    lancets Misc, 1 lancet by Misc.(Non-Drug; Combo Route) route 2 (two) times daily., Disp: 100 each, Rfl: 5    latanoprost 0.005 % ophthalmic solution, Place 1 drop into both eyes every evening., Disp: 2.5 mL, Rfl: 11    liraglutide 0.6 mg/0.1 mL, 18 mg/3 mL, subq PNIJ (VICTOZA 2-YVONNE) 0.6 mg/0.1 mL (18 mg/3 mL) PnIj pen, Inject 1.2 mg into the skin once daily., Disp: 3 mL, Rfl: 0    losartan (COZAAR) 50 MG tablet, Take 1 tablet (50 mg total) by mouth once daily., Disp: 90 tablet, Rfl: 1    multivitamin (ONE DAILY MULTIVITAMIN) per tablet, Take 1 tablet by mouth once daily., Disp: , Rfl:     pen needle, diabetic 32 gauge x 1/4" Ndle, 1 each by Misc.(Non-Drug; Combo Route) route once daily. Pt uses one pen needle daily for injection of Victoza, Disp: 90 each, Rfl: 3    triamcinolone acetonide 0.1% (KENALOG) 0.1 % Lotn, , Disp: , Rfl:     ketoconazole (NIZORAL) 2 % cream, Apply topically once daily. To feet (Patient not taking: Reported on 8/13/2020), Disp: 60 g, Rfl: 0    PMHx:   Past Medical History:   Diagnosis Date    BMI 70 and over, adult     Depression     Diabetes mellitus     DM (diabetes mellitus) type II controlled with renal manifestation     HTN (hypertension)     Hyperlipidemia     Lumbar disc disease  " "   Morbid obesity     Recurrent nephrolithiasis     Rosacea     Sleep apnea     Tear of medial meniscus of right knee, current 2017       PSHx:  Past Surgical History:   Procedure Laterality Date     SECTION       SECTION  2000    DILATION AND CURETTAGE OF UTERUS      AUB "negative path" per patient    ENDOMETRIAL ABLATION  2010       SocHx:   Social History     Socioeconomic History    Marital status: Single     Spouse name: Not on file    Number of children: Not on file    Years of education: Not on file    Highest education level: Not on file   Occupational History    Not on file   Social Needs    Financial resource strain: Not on file    Food insecurity     Worry: Not on file     Inability: Not on file    Transportation needs     Medical: Not on file     Non-medical: Not on file   Tobacco Use    Smoking status: Never Smoker    Smokeless tobacco: Never Used   Substance and Sexual Activity    Alcohol use: No    Drug use: No    Sexual activity: Never     Partners: Male   Lifestyle    Physical activity     Days per week: Not on file     Minutes per session: Not on file    Stress: Not on file   Relationships    Social connections     Talks on phone: Not on file     Gets together: Not on file     Attends Sabianism service: Not on file     Active member of club or organization: Not on file     Attends meetings of clubs or organizations: Not on file     Relationship status: Not on file   Other Topics Concern    Not on file   Social History Narrative    She is a pharmacy tech, works at [employer] in [place].  Nonsmoker, social etoh.  No exercise.       Review of Systems   Constitutional: Negative for chills, diaphoresis, fatigue and fever.   HENT: Negative for congestion, dental problem, ear pain, postnasal drip, rhinorrhea, sinus pressure, sore throat and trouble swallowing.    Eyes: Negative for discharge, redness and visual disturbance.   Respiratory: " Positive for shortness of breath (with exertion). Negative for cough and chest tightness.    Cardiovascular: Positive for leg swelling. Negative for chest pain and palpitations.   Gastrointestinal: Negative for abdominal pain, blood in stool, constipation, diarrhea, nausea and vomiting.   Endocrine: Negative for polydipsia and polyuria.   Genitourinary: Negative for decreased urine volume, dysuria, frequency and hematuria.   Musculoskeletal: Negative for arthralgias and myalgias.   Skin: Negative for rash and wound (leg wound has healed).   Neurological: Negative for dizziness, weakness, numbness and headaches.   Hematological: Negative for adenopathy.   Psychiatric/Behavioral: Negative for dysphoric mood and sleep disturbance. The patient is not nervous/anxious.        Objective:      Physical Exam  Vitals signs reviewed.   Constitutional:       General: She is not in acute distress.     Appearance: She is well-developed. She is not diaphoretic.   HENT:      Head: Normocephalic and atraumatic.      Right Ear: Hearing, tympanic membrane, ear canal and external ear normal. Tympanic membrane is not erythematous or bulging.      Left Ear: Hearing, tympanic membrane, ear canal and external ear normal. Tympanic membrane is not erythematous or bulging.      Nose: Nose normal.      Mouth/Throat:      Pharynx: Uvula midline. No oropharyngeal exudate or posterior oropharyngeal erythema.   Eyes:      General: Lids are normal. No scleral icterus.     Conjunctiva/sclera: Conjunctivae normal.      Pupils: Pupils are equal, round, and reactive to light.   Neck:      Musculoskeletal: Normal range of motion and neck supple.      Thyroid: No thyroid mass or thyromegaly.   Cardiovascular:      Rate and Rhythm: Normal rate and regular rhythm.      Heart sounds: Normal heart sounds. No murmur.   Pulmonary:      Effort: Pulmonary effort is normal.      Breath sounds: Normal breath sounds. No wheezing.   Abdominal:      General: Bowel  sounds are normal. There is no distension.      Palpations: Abdomen is soft. Abdomen is not rigid.      Tenderness: There is no abdominal tenderness. There is no guarding or rebound.   Musculoskeletal: Normal range of motion.      Right lower le+ Edema present.      Left lower le+ Edema present.   Lymphadenopathy:      Cervical: No cervical adenopathy.      Upper Body:      Right upper body: No supraclavicular adenopathy.      Left upper body: No supraclavicular adenopathy.   Skin:     General: Skin is warm and dry.      Findings: Erythema (lower legs) present. No rash.   Neurological:      Mental Status: She is alert and oriented to person, place, and time.      Coordination: Coordination normal.      Gait: Gait normal.      Deep Tendon Reflexes: Reflexes are normal and symmetric.   Psychiatric:         Attention and Perception: Attention normal.         Mood and Affect: Mood normal.         Assessment:       1. Diabetes mellitus type 2 without retinopathy    2. Hypertension associated with diabetes    3. VELÁZQUEZ (dyspnea on exertion)    4. Bilateral leg edema    5. Sleep apnea, unspecified type    6. Obesity, diabetes, and hypertension syndrome    7. Vitamin D insufficiency    8. Annual physical exam    9. Morbid obesity with BMI of 70 and over, adult        Plan:         1. Diabetes mellitus type 2 without retinopathy  - continue glipizide and increase Victoza  - liraglutide 0.6 mg/0.1 mL, 18 mg/3 mL, subq PNIJ (VICTOZA 2-YVONNE) 0.6 mg/0.1 mL (18 mg/3 mL) PnIj pen; Inject 1.2 mg into the skin once daily.  Dispense: 3 mL; Refill: 0  - will also plan to switch glipizide to SGLT2-I     2. Hypertension associated with diabetes  - BP elevated, continue furosemide, losartan   - monitor BP at home, may need to increase losartan dose    3. VELÁZQUEZ (dyspnea on exertion)  - Brain Natriuretic Peptide; Future  - Echo Color Flow Doppler? Yes; Future    4. Bilateral leg edema  - Echo Color Flow Doppler? Yes; Future    5. Sleep  apnea, unspecified type  - will determine if she needs to return to sleep medicine for further evaluation    6. Obesity, diabetes, and hypertension syndrome  - continue to adjust medications     7. Vitamin D insufficiency  - decreased vitamin-D to 4000 iu daily    8. Annual physical exam  - reviewed recent labs with patient    9. Morbid obesity with BMI of 70 and over, adult  - discussed following current diet but measure serving sizes and reduce fast food    RTC in 1 month for follow-up    Yamila Tejeda MD

## 2020-08-13 NOTE — PATIENT INSTRUCTIONS
Protein 20-30 grams of protein with each meal    Do not drink your calories! Drink water! Drink low-sugar Gold Peak tea!    1500 calories total   g carbohydrate per day  30-40g fat per day    Contact me in 2 weeks to discuss increasing Victoza

## 2020-09-09 ENCOUNTER — PATIENT MESSAGE (OUTPATIENT)
Dept: INTERNAL MEDICINE | Facility: CLINIC | Age: 45
End: 2020-09-09

## 2020-09-09 ENCOUNTER — PATIENT OUTREACH (OUTPATIENT)
Dept: ADMINISTRATIVE | Facility: OTHER | Age: 45
End: 2020-09-09

## 2020-09-09 DIAGNOSIS — E11.9 DIABETES MELLITUS TYPE 2 WITHOUT RETINOPATHY: ICD-10-CM

## 2020-09-10 ENCOUNTER — TELEPHONE (OUTPATIENT)
Dept: OPTOMETRY | Facility: CLINIC | Age: 45
End: 2020-09-10

## 2020-09-10 RX ORDER — LIRAGLUTIDE 6 MG/ML
1.8 INJECTION SUBCUTANEOUS DAILY
Qty: 9 ML | Refills: 2
Start: 2020-09-10 | End: 2020-09-24

## 2020-09-10 NOTE — TELEPHONE ENCOUNTER
Refilled Victoza with dose 1.8mg daily.    [facility]

## 2020-09-15 ENCOUNTER — CLINICAL SUPPORT (OUTPATIENT)
Dept: CARDIOLOGY | Facility: CLINIC | Age: 45
End: 2020-09-15
Attending: INTERNAL MEDICINE
Payer: MEDICAID

## 2020-09-15 VITALS
DIASTOLIC BLOOD PRESSURE: 70 MMHG | SYSTOLIC BLOOD PRESSURE: 116 MMHG | BODY MASS INDEX: 51.91 KG/M2 | HEIGHT: 63 IN | HEART RATE: 81 BPM | WEIGHT: 293 LBS

## 2020-09-15 DIAGNOSIS — R60.0 BILATERAL LEG EDEMA: ICD-10-CM

## 2020-09-15 DIAGNOSIS — R06.09 DOE (DYSPNEA ON EXERTION): ICD-10-CM

## 2020-09-15 DIAGNOSIS — R06.09 DOE (DYSPNEA ON EXERTION): Primary | ICD-10-CM

## 2020-09-15 DIAGNOSIS — R93.1 ABNORMAL FINDING ON ECHOCARDIOGRAM: ICD-10-CM

## 2020-09-15 LAB
ASCENDING AORTA: 3.19 CM
AV INDEX (PROSTH): 0.94
AV MEAN GRADIENT: 8 MMHG
AV PEAK GRADIENT: 12 MMHG
AV VALVE AREA: 3.44 CM2
AV VELOCITY RATIO: 0.86
BSA FOR ECHO PROCEDURE: 2.95 M2
CV ECHO LV RWT: 0.41 CM
DOP CALC AO PEAK VEL: 1.76 M/S
DOP CALC AO VTI: 33.55 CM
DOP CALC LVOT AREA: 3.7 CM2
DOP CALC LVOT DIAMETER: 2.16 CM
DOP CALC LVOT PEAK VEL: 1.51 M/S
DOP CALC LVOT STROKE VOLUME: 115.41 CM3
DOP CALCLVOT PEAK VEL VTI: 31.51 CM
E WAVE DECELERATION TIME: 216.39 MSEC
E/A RATIO: 0.98
E/E' RATIO: 7.5 M/S
ECHO LV POSTERIOR WALL: 0.86 CM (ref 0.6–1.1)
FRACTIONAL SHORTENING: 38 % (ref 28–44)
INTERVENTRICULAR SEPTUM: 0.84 CM (ref 0.6–1.1)
IVRT: 94.2 MSEC
LA MAJOR: 6.02 CM
LA MINOR: 5.82 CM
LA WIDTH: 3.86 CM
LEFT ATRIUM SIZE: 3.69 CM
LEFT ATRIUM VOLUME INDEX: 26.7 ML/M2
LEFT ATRIUM VOLUME: 71.65 CM3
LEFT INTERNAL DIMENSION IN SYSTOLE: 2.6 CM (ref 2.1–4)
LEFT VENTRICLE DIASTOLIC VOLUME INDEX: 28.85 ML/M2
LEFT VENTRICLE DIASTOLIC VOLUME: 77.39 ML
LEFT VENTRICLE MASS INDEX: 40 G/M2
LEFT VENTRICLE SYSTOLIC VOLUME INDEX: 9.2 ML/M2
LEFT VENTRICLE SYSTOLIC VOLUME: 24.72 ML
LEFT VENTRICULAR INTERNAL DIMENSION IN DIASTOLE: 4.17 CM (ref 3.5–6)
LEFT VENTRICULAR MASS: 108.55 G
LV LATERAL E/E' RATIO: 6 M/S
LV SEPTAL E/E' RATIO: 10 M/S
MV PEAK A VEL: 0.92 M/S
MV PEAK E VEL: 0.9 M/S
MV STENOSIS PRESSURE HALF TIME: 62.75 MS
MV VALVE AREA P 1/2 METHOD: 3.51 CM2
PISA TR MAX VEL: 4.77 M/S
PV PEAK VELOCITY: 1.36 CM/S
RA MAJOR: 5.52 CM
RA PRESSURE: 8 MMHG
RA WIDTH: 4.16 CM
RIGHT VENTRICULAR END-DIASTOLIC DIMENSION: 5 CM
SINUS: 3.26 CM
STJ: 2.88 CM
TDI LATERAL: 0.15 M/S
TDI SEPTAL: 0.09 M/S
TDI: 0.12 M/S
TR MAX PG: 91 MMHG
TRICUSPID ANNULAR PLANE SYSTOLIC EXCURSION: 2.31 CM
TV REST PULMONARY ARTERY PRESSURE: 99 MMHG

## 2020-09-15 PROCEDURE — 99212 OFFICE O/P EST SF 10 MIN: CPT | Mod: PBBFAC,PO,25

## 2020-09-15 PROCEDURE — 93306 TTE W/DOPPLER COMPLETE: CPT | Mod: PBBFAC,PO | Performed by: INTERNAL MEDICINE

## 2020-09-15 PROCEDURE — 93306 ECHO (CUPID ONLY): ICD-10-PCS | Mod: 26,S$PBB,, | Performed by: INTERNAL MEDICINE

## 2020-09-15 PROCEDURE — 99999 PR PBB SHADOW E&M-EST. PATIENT-LVL II: CPT | Mod: PBBFAC,,,

## 2020-09-15 PROCEDURE — 99999 PR PBB SHADOW E&M-EST. PATIENT-LVL II: ICD-10-PCS | Mod: PBBFAC,,,

## 2020-09-16 ENCOUNTER — TELEPHONE (OUTPATIENT)
Dept: INTERNAL MEDICINE | Facility: CLINIC | Age: 45
End: 2020-09-16

## 2020-09-16 NOTE — TELEPHONE ENCOUNTER
----- Message from Yamila Tejeda MD sent at 9/15/2020  9:11 PM CDT -----  Message sent via patient portal.    - Cardiology referral

## 2020-09-16 NOTE — TELEPHONE ENCOUNTER
----- Message from Marisa Cool sent at 9/16/2020 11:39 AM CDT -----  Regarding: Test Results  Called patient to schedule Cardiology appointment, Patient would like a call from provider she has some questions about her test that she took. Please call patient, Thank you

## 2020-09-16 NOTE — TELEPHONE ENCOUNTER
Spoke to patient about results and Cardiology evaluation. Will see if a sooner appointment with Cardiology can be scheduled.

## 2020-09-29 ENCOUNTER — OFFICE VISIT (OUTPATIENT)
Dept: INTERNAL MEDICINE | Facility: CLINIC | Age: 45
End: 2020-09-29
Payer: MEDICAID

## 2020-09-29 DIAGNOSIS — E11.59 OBESITY, DIABETES, AND HYPERTENSION SYNDROME: ICD-10-CM

## 2020-09-29 DIAGNOSIS — E66.9 OBESITY, DIABETES, AND HYPERTENSION SYNDROME: ICD-10-CM

## 2020-09-29 DIAGNOSIS — R06.09 DOE (DYSPNEA ON EXERTION): ICD-10-CM

## 2020-09-29 DIAGNOSIS — E11.69 OBESITY, DIABETES, AND HYPERTENSION SYNDROME: ICD-10-CM

## 2020-09-29 DIAGNOSIS — R60.0 BILATERAL LEG EDEMA: ICD-10-CM

## 2020-09-29 DIAGNOSIS — I15.2 HYPERTENSION ASSOCIATED WITH DIABETES: ICD-10-CM

## 2020-09-29 DIAGNOSIS — E11.9 DIABETES MELLITUS TYPE 2 WITHOUT RETINOPATHY: Primary | ICD-10-CM

## 2020-09-29 DIAGNOSIS — E11.59 HYPERTENSION ASSOCIATED WITH DIABETES: ICD-10-CM

## 2020-09-29 DIAGNOSIS — I15.2 OBESITY, DIABETES, AND HYPERTENSION SYNDROME: ICD-10-CM

## 2020-09-29 PROCEDURE — 99214 PR OFFICE/OUTPT VISIT, EST, LEVL IV, 30-39 MIN: ICD-10-PCS | Mod: 95,,, | Performed by: INTERNAL MEDICINE

## 2020-09-29 PROCEDURE — 99214 OFFICE O/P EST MOD 30 MIN: CPT | Mod: 95,,, | Performed by: INTERNAL MEDICINE

## 2020-09-29 NOTE — PATIENT INSTRUCTIONS
Saline nasal spray at night before CPAP    May use Allegra or Zyrtec at night before bed for nasal

## 2020-09-29 NOTE — PROGRESS NOTES
Subjective:       Patient ID: Halley Moreno is a 45 y.o. female who presents for Diabetes, Hypertension, and Shortness of Breath      Diabetes  She presents for her follow-up diabetic visit. She has type 2 diabetes mellitus. Her disease course has been stable. Pertinent negatives for hypoglycemia include no dizziness, headaches, sweats or tremors. Pertinent negatives for diabetes include no chest pain, no polydipsia and no polyuria. There are no hypoglycemic complications. Symptoms are stable. Risk factors for coronary artery disease include obesity, sedentary lifestyle, diabetes mellitus, dyslipidemia, family history and hypertension. Current diabetic treatment includes oral agent (dual therapy). She is compliant with treatment all of the time. Her weight is increasing steadily. She is following a generally healthy diet. She rarely (has been home, ) participates in exercise. Her breakfast blood glucose range is generally 130-140 mg/dl.   Hypertension  This is a chronic problem. The current episode started more than 1 year ago. The problem is unchanged. The problem is controlled. Associated symptoms include peripheral edema and shortness of breath. Pertinent negatives include no chest pain, headaches, malaise/fatigue, palpitations or sweats. There are no associated agents to hypertension. Risk factors for coronary artery disease include diabetes mellitus, dyslipidemia, obesity and sedentary lifestyle. Past treatments include diuretics and angiotensin blockers. The current treatment provides moderate improvement. Compliance problems include exercise and diet.  There is no history of heart failure. There is no history of a hypertension causing med.   Shortness of Breath  This is a new problem. The current episode started more than 1 month ago. The problem occurs every few minutes. The problem has been waxing and waning. Associated symptoms include leg swelling. Pertinent negatives include no chest pain, ear  pain, fever, headaches, sore throat, sputum production or vomiting. Nothing aggravates the symptoms. The patient has no known risk factors for DVT/PE. She has tried rest for the symptoms. There is no history of CAD, a heart failure or a recent surgery.      Weight increased from 431 lbs to 450 lbs in the last 2 weeks. She is considering trying Weight Watchers or meal plan from [employer] meals. Goal has been [id number] robby per day but often eats less than goal.    b- Cornflakes and milk  l- meatballs and spaghetti on hamburger bun  D- red beans and rice and sausage  Snacks- skinny pop,  Drank diet tea and water      Review of Systems   Constitutional: Positive for appetite change (decreased) and unexpected weight change (gained 10+ pounds in the last few weeks). Negative for chills, diaphoresis, fever and malaise/fatigue.   HENT: Negative for congestion, ear pain, sinus pressure and sore throat.         Reports that nasal mucosa burns when wearing cpap   Eyes: Negative for redness and itching.   Respiratory: Positive for shortness of breath. Negative for cough, sputum production and chest tightness.    Cardiovascular: Positive for leg swelling. Negative for chest pain and palpitations.   Gastrointestinal: Negative for blood in stool, diarrhea, nausea and vomiting.   Endocrine: Negative for polydipsia and polyuria.   Musculoskeletal: Negative for arthralgias and myalgias.   Neurological: Negative for dizziness, tremors and headaches.       Objective:      Physical Exam  Constitutional:       General: She is not in acute distress.     Appearance: Normal appearance. She is not ill-appearing or diaphoretic.   HENT:      Head: Normocephalic and atraumatic.      Right Ear: Hearing normal.      Left Ear: Hearing normal.   Eyes:      General: Lids are normal.   Pulmonary:      Effort: No tachypnea or bradypnea.   Musculoskeletal:      Right lower leg: Edema present.      Left lower leg: Edema present.   Neurological:       Mental Status: She is alert and oriented to person, place, and time.   Psychiatric:         Attention and Perception: Attention normal.         Mood and Affect: Mood is depressed.         Assessment/Plan:       1. Diabetes mellitus type 2 without retinopathy  - BG is 135 now, begin monitoring BG regularly, increase Victoza dose to 3mg daily, continue glipizide  - liraglutide 0.6 mg/0.1 mL, 18 mg/3 mL, subq PNIJ (VICTOZA 2-YVONNE) 0.6 mg/0.1 mL (18 mg/3 mL) PnIj pen; Inject 3 mg into the skin once daily.  Dispense: 9 mL; Refill: 2    2. Hypertension associated with diabetes  - stable, continue losartan, furosemide    3. Bilateral leg edema  - begin elevating legs while she sits at home    4. Obesity, diabetes, and hypertension syndrome  - she agrees to walk 5 minutes per day every day  - review the plan for Weight Watchers  - eat every 4 hours, consider a trial of contrave    5. VELÁZQUEZ  - Ambulatory referral/consult to Pulmonology; Future      RTC in 1 month for follow-up      Yamila Tejeda MD

## 2020-09-30 RX ORDER — LIRAGLUTIDE 6 MG/ML
3 INJECTION SUBCUTANEOUS DAILY
Qty: 9 ML | Refills: 2
Start: 2020-09-30 | End: 2020-10-13 | Stop reason: SINTOL

## 2020-10-05 ENCOUNTER — OFFICE VISIT (OUTPATIENT)
Dept: OPTOMETRY | Facility: CLINIC | Age: 45
End: 2020-10-05
Payer: MEDICAID

## 2020-10-05 ENCOUNTER — PATIENT MESSAGE (OUTPATIENT)
Dept: ADMINISTRATIVE | Facility: HOSPITAL | Age: 45
End: 2020-10-05

## 2020-10-05 DIAGNOSIS — H40.053 BILATERAL OCULAR HYPERTENSION: Primary | ICD-10-CM

## 2020-10-05 PROCEDURE — 92012 INTRM OPH EXAM EST PATIENT: CPT | Mod: S$PBB,,, | Performed by: OPTOMETRIST

## 2020-10-05 PROCEDURE — 99213 OFFICE O/P EST LOW 20 MIN: CPT | Mod: PBBFAC,PO | Performed by: OPTOMETRIST

## 2020-10-05 PROCEDURE — 99999 PR PBB SHADOW E&M-EST. PATIENT-LVL III: CPT | Mod: PBBFAC,,, | Performed by: OPTOMETRIST

## 2020-10-05 PROCEDURE — 99999 PR PBB SHADOW E&M-EST. PATIENT-LVL III: ICD-10-PCS | Mod: PBBFAC,,, | Performed by: OPTOMETRIST

## 2020-10-05 PROCEDURE — 92012 PR EYE EXAM, EST PATIENT,INTERMED: ICD-10-PCS | Mod: S$PBB,,, | Performed by: OPTOMETRIST

## 2020-10-05 NOTE — PROGRESS NOTES
ARLYN HURST 08/20  Here for 1 month IOP check.  Using Latanoprost OU Q HS, last   used Saturday night.    Last edited by My Luis on 10/5/2020 11:13 AM. (History)            Assessment /Plan     For exam results, see Encounter Report.    Bilateral ocular hypertension      1. IOP lower on Latanaprost, cont drops, RTC 4 mos IOP ck, HVF(24-2) and oct of rnfl.

## 2020-10-08 ENCOUNTER — TELEPHONE (OUTPATIENT)
Dept: INTERNAL MEDICINE | Facility: CLINIC | Age: 45
End: 2020-10-08

## 2020-10-13 ENCOUNTER — PATIENT MESSAGE (OUTPATIENT)
Dept: INTERNAL MEDICINE | Facility: CLINIC | Age: 45
End: 2020-10-13

## 2020-10-13 DIAGNOSIS — E11.9 DIABETES MELLITUS TYPE 2 WITHOUT RETINOPATHY: Primary | ICD-10-CM

## 2020-10-13 RX ORDER — SEMAGLUTIDE 1.34 MG/ML
0.25 INJECTION, SOLUTION SUBCUTANEOUS
Qty: 2 ML | Refills: 2 | Status: SHIPPED | OUTPATIENT
Start: 2020-10-13 | End: 2020-12-02

## 2020-10-14 ENCOUNTER — PATIENT MESSAGE (OUTPATIENT)
Dept: INTERNAL MEDICINE | Facility: CLINIC | Age: 45
End: 2020-10-14

## 2020-10-15 ENCOUNTER — TELEPHONE (OUTPATIENT)
Dept: INTERNAL MEDICINE | Facility: CLINIC | Age: 45
End: 2020-10-15

## 2020-10-19 ENCOUNTER — TELEPHONE (OUTPATIENT)
Dept: INTERNAL MEDICINE | Facility: CLINIC | Age: 45
End: 2020-10-19

## 2020-10-20 ENCOUNTER — PATIENT MESSAGE (OUTPATIENT)
Dept: INTERNAL MEDICINE | Facility: CLINIC | Age: 45
End: 2020-10-20

## 2020-10-21 ENCOUNTER — TELEPHONE (OUTPATIENT)
Dept: INTERNAL MEDICINE | Facility: CLINIC | Age: 45
End: 2020-10-21

## 2020-10-21 NOTE — TELEPHONE ENCOUNTER
Per Dr Tejeda' request, pt scheduled for nurse visit tomorrow.    Pt said she has a brand new scale that giving her the 477 reading.

## 2020-10-21 NOTE — TELEPHONE ENCOUNTER
"Spoke to patient.    She is doing weight watchers, faithfully and usually doesn't even eat all the "points" she's allowed.    Drinking 5-6 bottles of water daily.    Has been monitoring her weight weekly.    First week was 454  Last week 431  Yesterday 477    She's upset and crying, because she's been doing everything right.  She's said she scared because she's so close to 500 lbs and she's scared to die.    She's having a hard time walking.  States she's always short of breath    Taking 100mg furosemide     Has not started Ozempic yet.  Will  this afternoon.    For the past 3 days, before breakfast reading have been 97, 96, 98.  After eating average 139.  "

## 2020-10-21 NOTE — TELEPHONE ENCOUNTER
----- Message from Ibeth Calhoun sent at 10/21/2020  3:41 PM CDT -----  Contact: [phone]  Patient is requesting a call back from the nurse to discuss her symptoms. Patient states she is feeling miserable. Please call and advise

## 2020-10-22 ENCOUNTER — OFFICE VISIT (OUTPATIENT)
Dept: INTERNAL MEDICINE | Facility: CLINIC | Age: 45
DRG: 291 | End: 2020-10-22
Payer: MEDICAID

## 2020-10-22 ENCOUNTER — CLINICAL SUPPORT (OUTPATIENT)
Dept: INTERNAL MEDICINE | Facility: CLINIC | Age: 45
DRG: 291 | End: 2020-10-22
Payer: MEDICAID

## 2020-10-22 ENCOUNTER — HOSPITAL ENCOUNTER (INPATIENT)
Facility: HOSPITAL | Age: 45
LOS: 18 days | Discharge: OTHER INSTITUTION | DRG: 291 | End: 2020-11-09
Attending: EMERGENCY MEDICINE | Admitting: INTERNAL MEDICINE
Payer: MEDICAID

## 2020-10-22 VITALS
HEART RATE: 67 BPM | OXYGEN SATURATION: 80 % | TEMPERATURE: 97 F | BODY MASS INDEX: 51.91 KG/M2 | WEIGHT: 293 LBS | HEIGHT: 63 IN | SYSTOLIC BLOOD PRESSURE: 120 MMHG | RESPIRATION RATE: 24 BRPM | DIASTOLIC BLOOD PRESSURE: 53 MMHG

## 2020-10-22 DIAGNOSIS — I27.29 RIGHT HEART FAILURE DUE TO PULMONARY HYPERTENSION: ICD-10-CM

## 2020-10-22 DIAGNOSIS — Z74.09 IMPAIRED MOBILITY AND ACTIVITIES OF DAILY LIVING: ICD-10-CM

## 2020-10-22 DIAGNOSIS — N17.9 ACUTE KIDNEY INJURY: ICD-10-CM

## 2020-10-22 DIAGNOSIS — E11.59 HYPERTENSION ASSOCIATED WITH DIABETES: ICD-10-CM

## 2020-10-22 DIAGNOSIS — R93.1 ECHOCARDIOGRAM ABNORMAL: ICD-10-CM

## 2020-10-22 DIAGNOSIS — I15.2 HYPERTENSION ASSOCIATED WITH DIABETES: ICD-10-CM

## 2020-10-22 DIAGNOSIS — E87.70 HYPERVOLEMIA, UNSPECIFIED HYPERVOLEMIA TYPE: ICD-10-CM

## 2020-10-22 DIAGNOSIS — I50.9 ACUTE ON CHRONIC CONGESTIVE HEART FAILURE, UNSPECIFIED HEART FAILURE TYPE: ICD-10-CM

## 2020-10-22 DIAGNOSIS — J96.01 ACUTE RESPIRATORY FAILURE WITH HYPOXIA AND HYPERCAPNIA: ICD-10-CM

## 2020-10-22 DIAGNOSIS — R06.02 SHORTNESS OF BREATH: ICD-10-CM

## 2020-10-22 DIAGNOSIS — R14.0 ABDOMINAL DISTENSION: ICD-10-CM

## 2020-10-22 DIAGNOSIS — I50.810 RIGHT HEART FAILURE DUE TO PULMONARY HYPERTENSION: ICD-10-CM

## 2020-10-22 DIAGNOSIS — R23.8 DUSKY DISCOLORATION OF SKIN: ICD-10-CM

## 2020-10-22 DIAGNOSIS — Z78.9 IMPAIRED MOBILITY AND ACTIVITIES OF DAILY LIVING: ICD-10-CM

## 2020-10-22 DIAGNOSIS — E66.01 MORBID OBESITY: ICD-10-CM

## 2020-10-22 DIAGNOSIS — E11.9 DIABETES MELLITUS TYPE 2 WITHOUT RETINOPATHY: ICD-10-CM

## 2020-10-22 DIAGNOSIS — G47.30 SLEEP APNEA, UNSPECIFIED TYPE: ICD-10-CM

## 2020-10-22 DIAGNOSIS — I50.810 RIGHT HEART FAILURE: ICD-10-CM

## 2020-10-22 DIAGNOSIS — E87.5 HYPERKALEMIA: ICD-10-CM

## 2020-10-22 DIAGNOSIS — J96.02 ACUTE RESPIRATORY FAILURE WITH HYPOXIA AND HYPERCAPNIA: ICD-10-CM

## 2020-10-22 DIAGNOSIS — R63.5 WEIGHT GAIN: Primary | ICD-10-CM

## 2020-10-22 DIAGNOSIS — E66.2 OBESITY HYPOVENTILATION SYNDROME: ICD-10-CM

## 2020-10-22 DIAGNOSIS — R06.02 SOB (SHORTNESS OF BREATH): Primary | ICD-10-CM

## 2020-10-22 DIAGNOSIS — J96.01 ACUTE RESPIRATORY FAILURE WITH HYPOXIA: Primary | ICD-10-CM

## 2020-10-22 DIAGNOSIS — R60.0 BILATERAL LEG EDEMA: ICD-10-CM

## 2020-10-22 LAB
ALBUMIN SERPL BCP-MCNC: 3.1 G/DL (ref 3.5–5.2)
ALBUMIN SERPL BCP-MCNC: 3.3 G/DL (ref 3.5–5.2)
ALLENS TEST: ABNORMAL
ALP SERPL-CCNC: 73 U/L (ref 55–135)
ALP SERPL-CCNC: 75 U/L (ref 55–135)
ALT SERPL W/O P-5'-P-CCNC: 25 U/L (ref 10–44)
ALT SERPL W/O P-5'-P-CCNC: 26 U/L (ref 10–44)
ANION GAP SERPL CALC-SCNC: 10 MMOL/L (ref 8–16)
ANION GAP SERPL CALC-SCNC: 13 MMOL/L (ref 8–16)
AST SERPL-CCNC: 30 U/L (ref 10–40)
AST SERPL-CCNC: 33 U/L (ref 10–40)
BASOPHILS # BLD AUTO: 0.03 K/UL (ref 0–0.2)
BASOPHILS NFR BLD: 0.3 % (ref 0–1.9)
BILIRUB SERPL-MCNC: 1 MG/DL (ref 0.1–1)
BILIRUB SERPL-MCNC: 1.1 MG/DL (ref 0.1–1)
BNP SERPL-MCNC: 645 PG/ML (ref 0–99)
BUN SERPL-MCNC: 43 MG/DL (ref 6–20)
BUN SERPL-MCNC: 43 MG/DL (ref 6–20)
CALCIUM SERPL-MCNC: 8.7 MG/DL (ref 8.7–10.5)
CALCIUM SERPL-MCNC: 8.9 MG/DL (ref 8.7–10.5)
CHLORIDE SERPL-SCNC: 97 MMOL/L (ref 95–110)
CHLORIDE SERPL-SCNC: 99 MMOL/L (ref 95–110)
CO2 SERPL-SCNC: 31 MMOL/L (ref 23–29)
CO2 SERPL-SCNC: 33 MMOL/L (ref 23–29)
CREAT SERPL-MCNC: 1.2 MG/DL (ref 0.5–1.4)
CREAT SERPL-MCNC: 1.3 MG/DL (ref 0.5–1.4)
CTP QC/QA: YES
DIFFERENTIAL METHOD: ABNORMAL
EOSINOPHIL # BLD AUTO: 0.1 K/UL (ref 0–0.5)
EOSINOPHIL NFR BLD: 0.5 % (ref 0–8)
ERYTHROCYTE [DISTWIDTH] IN BLOOD BY AUTOMATED COUNT: 22 % (ref 11.5–14.5)
EST. GFR  (AFRICAN AMERICAN): 56.5 ML/MIN/1.73 M^2
EST. GFR  (AFRICAN AMERICAN): >60 ML/MIN/1.73 M^2
EST. GFR  (NON AFRICAN AMERICAN): 49 ML/MIN/1.73 M^2
EST. GFR  (NON AFRICAN AMERICAN): 53.9 ML/MIN/1.73 M^2
GLUCOSE SERPL-MCNC: 100 MG/DL (ref 70–110)
GLUCOSE SERPL-MCNC: 107 MG/DL (ref 70–110)
HCO3 UR-SCNC: 36.6 MMOL/L (ref 24–28)
HCT VFR BLD AUTO: 52.7 % (ref 37–48.5)
HGB BLD-MCNC: 15.2 G/DL (ref 12–16)
IMM GRANULOCYTES # BLD AUTO: 0.13 K/UL (ref 0–0.04)
IMM GRANULOCYTES NFR BLD AUTO: 1.2 % (ref 0–0.5)
INR PPP: 1.1 (ref 0.8–1.2)
LYMPHOCYTES # BLD AUTO: 1.2 K/UL (ref 1–4.8)
LYMPHOCYTES NFR BLD: 11.3 % (ref 18–48)
MCH RBC QN AUTO: 26.1 PG (ref 27–31)
MCHC RBC AUTO-ENTMCNC: 28.8 G/DL (ref 32–36)
MCV RBC AUTO: 91 FL (ref 82–98)
MONOCYTES # BLD AUTO: 0.9 K/UL (ref 0.3–1)
MONOCYTES NFR BLD: 8.2 % (ref 4–15)
NEUTROPHILS # BLD AUTO: 8.6 K/UL (ref 1.8–7.7)
NEUTROPHILS NFR BLD: 78.5 % (ref 38–73)
NRBC BLD-RTO: 1 /100 WBC
PCO2 BLDA: 82.3 MMHG (ref 35–45)
PH SMN: 7.26 [PH] (ref 7.35–7.45)
PLATELET # BLD AUTO: 196 K/UL (ref 150–350)
PMV BLD AUTO: 11.3 FL (ref 9.2–12.9)
PO2 BLDA: 67 MMHG (ref 40–60)
POC BE: 9 MMOL/L
POC SATURATED O2: 88 % (ref 95–100)
POC TCO2: 39 MMOL/L (ref 24–29)
POCT GLUCOSE: 81 MG/DL (ref 70–110)
POCT GLUCOSE: 98 MG/DL (ref 70–110)
POTASSIUM SERPL-SCNC: 5.3 MMOL/L (ref 3.5–5.1)
POTASSIUM SERPL-SCNC: 5.4 MMOL/L (ref 3.5–5.1)
PROT SERPL-MCNC: 6.7 G/DL (ref 6–8.4)
PROT SERPL-MCNC: 7.1 G/DL (ref 6–8.4)
PROTHROMBIN TIME: 11.8 SEC (ref 9–12.5)
RBC # BLD AUTO: 5.82 M/UL (ref 4–5.4)
SAMPLE: ABNORMAL
SARS-COV-2 RDRP RESP QL NAA+PROBE: NEGATIVE
SITE: ABNORMAL
SODIUM SERPL-SCNC: 140 MMOL/L (ref 136–145)
SODIUM SERPL-SCNC: 143 MMOL/L (ref 136–145)
TROPONIN I SERPL DL<=0.01 NG/ML-MCNC: 0.02 NG/ML (ref 0–0.03)
WBC # BLD AUTO: 10.97 K/UL (ref 3.9–12.7)

## 2020-10-22 PROCEDURE — 63600175 PHARM REV CODE 636 W HCPCS: Performed by: STUDENT IN AN ORGANIZED HEALTH CARE EDUCATION/TRAINING PROGRAM

## 2020-10-22 PROCEDURE — U0002 COVID-19 LAB TEST NON-CDC: HCPCS | Performed by: EMERGENCY MEDICINE

## 2020-10-22 PROCEDURE — 84484 ASSAY OF TROPONIN QUANT: CPT

## 2020-10-22 PROCEDURE — 93005 ELECTROCARDIOGRAM TRACING: CPT

## 2020-10-22 PROCEDURE — 11000001 HC ACUTE MED/SURG PRIVATE ROOM

## 2020-10-22 PROCEDURE — 94761 N-INVAS EAR/PLS OXIMETRY MLT: CPT

## 2020-10-22 PROCEDURE — 93010 EKG 12-LEAD: ICD-10-PCS | Mod: S$PBB,59,, | Performed by: INTERNAL MEDICINE

## 2020-10-22 PROCEDURE — 96374 THER/PROPH/DIAG INJ IV PUSH: CPT

## 2020-10-22 PROCEDURE — 93005 ELECTROCARDIOGRAM TRACING: CPT | Mod: PBBFAC,PO | Performed by: INTERNAL MEDICINE

## 2020-10-22 PROCEDURE — 99211 OFF/OP EST MAY X REQ PHY/QHP: CPT | Mod: PBBFAC,25,27,PO

## 2020-10-22 PROCEDURE — 99291 CRITICAL CARE FIRST HOUR: CPT | Mod: ,,, | Performed by: EMERGENCY MEDICINE

## 2020-10-22 PROCEDURE — 99999 PR PBB SHADOW E&M-EST. PATIENT-LVL III: CPT | Mod: PBBFAC,,, | Performed by: INTERNAL MEDICINE

## 2020-10-22 PROCEDURE — 80053 COMPREHEN METABOLIC PANEL: CPT

## 2020-10-22 PROCEDURE — 99213 OFFICE O/P EST LOW 20 MIN: CPT | Mod: PBBFAC,PO,25 | Performed by: INTERNAL MEDICINE

## 2020-10-22 PROCEDURE — 99223 PR INITIAL HOSPITAL CARE,LEVL III: ICD-10-PCS | Mod: ,,, | Performed by: INTERNAL MEDICINE

## 2020-10-22 PROCEDURE — 99999 PR PBB SHADOW E&M-EST. PATIENT-LVL I: ICD-10-PCS | Mod: PBBFAC,,,

## 2020-10-22 PROCEDURE — 36415 COLL VENOUS BLD VENIPUNCTURE: CPT

## 2020-10-22 PROCEDURE — 99223 1ST HOSP IP/OBS HIGH 75: CPT | Mod: ,,, | Performed by: INTERNAL MEDICINE

## 2020-10-22 PROCEDURE — 80053 COMPREHEN METABOLIC PANEL: CPT | Mod: 91

## 2020-10-22 PROCEDURE — 99900035 HC TECH TIME PER 15 MIN (STAT)

## 2020-10-22 PROCEDURE — 99285 EMERGENCY DEPT VISIT HI MDM: CPT | Mod: 25,27

## 2020-10-22 PROCEDURE — 83880 ASSAY OF NATRIURETIC PEPTIDE: CPT

## 2020-10-22 PROCEDURE — 99999 PR PBB SHADOW E&M-EST. PATIENT-LVL III: ICD-10-PCS | Mod: PBBFAC,,, | Performed by: INTERNAL MEDICINE

## 2020-10-22 PROCEDURE — 27000221 HC OXYGEN, UP TO 24 HOURS

## 2020-10-22 PROCEDURE — 93010 ELECTROCARDIOGRAM REPORT: CPT | Mod: ,,, | Performed by: INTERNAL MEDICINE

## 2020-10-22 PROCEDURE — 93010 ELECTROCARDIOGRAM REPORT: CPT | Mod: S$PBB,59,, | Performed by: INTERNAL MEDICINE

## 2020-10-22 PROCEDURE — 82803 BLOOD GASES ANY COMBINATION: CPT

## 2020-10-22 PROCEDURE — 93010 EKG 12-LEAD: ICD-10-PCS | Mod: ,,, | Performed by: INTERNAL MEDICINE

## 2020-10-22 PROCEDURE — 25000003 PHARM REV CODE 250: Performed by: STUDENT IN AN ORGANIZED HEALTH CARE EDUCATION/TRAINING PROGRAM

## 2020-10-22 PROCEDURE — 99291 PR CRITICAL CARE, E/M 30-74 MINUTES: ICD-10-PCS | Mod: ,,, | Performed by: EMERGENCY MEDICINE

## 2020-10-22 PROCEDURE — 63600175 PHARM REV CODE 636 W HCPCS: Performed by: EMERGENCY MEDICINE

## 2020-10-22 PROCEDURE — 85025 COMPLETE CBC W/AUTO DIFF WBC: CPT

## 2020-10-22 PROCEDURE — 99999 PR PBB SHADOW E&M-EST. PATIENT-LVL I: CPT | Mod: PBBFAC,,,

## 2020-10-22 PROCEDURE — 85610 PROTHROMBIN TIME: CPT

## 2020-10-22 RX ORDER — ENOXAPARIN SODIUM 100 MG/ML
60 INJECTION SUBCUTANEOUS EVERY 12 HOURS
Status: DISCONTINUED | OUTPATIENT
Start: 2020-10-22 | End: 2020-11-09 | Stop reason: HOSPADM

## 2020-10-22 RX ORDER — IBUPROFEN 200 MG
24 TABLET ORAL
Status: DISCONTINUED | OUTPATIENT
Start: 2020-10-22 | End: 2020-10-24

## 2020-10-22 RX ORDER — PROMETHAZINE HYDROCHLORIDE 25 MG/1
25 TABLET ORAL EVERY 6 HOURS PRN
Status: DISCONTINUED | OUTPATIENT
Start: 2020-10-22 | End: 2020-10-23

## 2020-10-22 RX ORDER — FUROSEMIDE 10 MG/ML
80 INJECTION INTRAMUSCULAR; INTRAVENOUS
Status: DISCONTINUED | OUTPATIENT
Start: 2020-10-23 | End: 2020-10-22

## 2020-10-22 RX ORDER — GLUCAGON 1 MG
1 KIT INJECTION
Status: DISCONTINUED | OUTPATIENT
Start: 2020-10-22 | End: 2020-10-24

## 2020-10-22 RX ORDER — SODIUM CHLORIDE 0.9 % (FLUSH) 0.9 %
10 SYRINGE (ML) INJECTION
Status: DISCONTINUED | OUTPATIENT
Start: 2020-10-22 | End: 2020-11-09 | Stop reason: HOSPADM

## 2020-10-22 RX ORDER — FUROSEMIDE 10 MG/ML
120 INJECTION INTRAMUSCULAR; INTRAVENOUS
Status: COMPLETED | OUTPATIENT
Start: 2020-10-22 | End: 2020-10-22

## 2020-10-22 RX ORDER — ENOXAPARIN SODIUM 100 MG/ML
40 INJECTION SUBCUTANEOUS EVERY 24 HOURS
Status: DISCONTINUED | OUTPATIENT
Start: 2020-10-22 | End: 2020-10-22

## 2020-10-22 RX ORDER — ACETAMINOPHEN 325 MG/1
650 TABLET ORAL EVERY 4 HOURS PRN
Status: DISCONTINUED | OUTPATIENT
Start: 2020-10-22 | End: 2020-11-09 | Stop reason: HOSPADM

## 2020-10-22 RX ORDER — POLYETHYLENE GLYCOL 3350 17 G/17G
17 POWDER, FOR SOLUTION ORAL DAILY
Status: DISCONTINUED | OUTPATIENT
Start: 2020-10-22 | End: 2020-11-09 | Stop reason: HOSPADM

## 2020-10-22 RX ORDER — IBUPROFEN 200 MG
16 TABLET ORAL
Status: DISCONTINUED | OUTPATIENT
Start: 2020-10-22 | End: 2020-10-24

## 2020-10-22 RX ORDER — ENOXAPARIN SODIUM 100 MG/ML
40 INJECTION SUBCUTANEOUS EVERY 12 HOURS
Status: DISCONTINUED | OUTPATIENT
Start: 2020-10-22 | End: 2020-10-22

## 2020-10-22 RX ORDER — LOSARTAN POTASSIUM 50 MG/1
50 TABLET ORAL DAILY
Status: DISCONTINUED | OUTPATIENT
Start: 2020-10-22 | End: 2020-10-22

## 2020-10-22 RX ORDER — ATORVASTATIN CALCIUM 10 MG/1
10 TABLET, FILM COATED ORAL DAILY
Status: DISCONTINUED | OUTPATIENT
Start: 2020-10-22 | End: 2020-11-09 | Stop reason: HOSPADM

## 2020-10-22 RX ORDER — FUROSEMIDE 10 MG/ML
40 INJECTION INTRAMUSCULAR; INTRAVENOUS DAILY
Status: DISCONTINUED | OUTPATIENT
Start: 2020-10-22 | End: 2020-10-22

## 2020-10-22 RX ORDER — ONDANSETRON 8 MG/1
8 TABLET, ORALLY DISINTEGRATING ORAL EVERY 8 HOURS PRN
Status: DISCONTINUED | OUTPATIENT
Start: 2020-10-22 | End: 2020-11-09 | Stop reason: HOSPADM

## 2020-10-22 RX ORDER — FUROSEMIDE 10 MG/ML
80 INJECTION INTRAMUSCULAR; INTRAVENOUS 3 TIMES DAILY
Status: DISCONTINUED | OUTPATIENT
Start: 2020-10-22 | End: 2020-10-23

## 2020-10-22 RX ORDER — TALC
6 POWDER (GRAM) TOPICAL NIGHTLY PRN
Status: DISCONTINUED | OUTPATIENT
Start: 2020-10-22 | End: 2020-11-09 | Stop reason: HOSPADM

## 2020-10-22 RX ORDER — INSULIN ASPART 100 [IU]/ML
0-5 INJECTION, SOLUTION INTRAVENOUS; SUBCUTANEOUS
Status: DISCONTINUED | OUTPATIENT
Start: 2020-10-22 | End: 2020-11-09 | Stop reason: HOSPADM

## 2020-10-22 RX ADMIN — FUROSEMIDE 80 MG: 10 INJECTION, SOLUTION INTRAMUSCULAR; INTRAVENOUS at 08:10

## 2020-10-22 RX ADMIN — MELATONIN TAB 3 MG 6 MG: 3 TAB at 08:10

## 2020-10-22 RX ADMIN — ATORVASTATIN CALCIUM 10 MG: 10 TABLET, FILM COATED ORAL at 09:10

## 2020-10-22 RX ADMIN — FUROSEMIDE 120 MG: 10 INJECTION, SOLUTION INTRAMUSCULAR; INTRAVENOUS at 03:10

## 2020-10-22 RX ADMIN — ENOXAPARIN SODIUM 60 MG: 60 INJECTION SUBCUTANEOUS at 08:10

## 2020-10-22 RX ADMIN — FUROSEMIDE 120 MG: 10 INJECTION, SOLUTION INTRAMUSCULAR; INTRAVENOUS at 12:10

## 2020-10-22 NOTE — ASSESSMENT & PLAN NOTE
Plan:  - Low dose Sliding scale insulin  - Diabetic cardiac diet  - Fluid restriction  - Hypoglycemic protocol  - Adjust as needed

## 2020-10-22 NOTE — PROGRESS NOTES
Pt here today for a nurse visit, due to weight gain.  See previous telephone message.    Weight 478  Oxygen level 80    Dr Tejeda requested pt be put on the schedule for regular visit.    Dr Tejeda notified and pt placed on 2lt 02.  With no change after 5 minutes, increased to 3 ltr  At 10:15, pt still at 80%  02 increased to 4ltr.    02 with 4 ltr pedro to 95%    See Office Visit for more information

## 2020-10-22 NOTE — PROGRESS NOTES
Subjective:       Patient ID: Halley Moreno is a 45 y.o. female who presents for Edema, Shortness of Breath, and Fatigue      HPI     46yo F with DM2, HTN, morbid obesity who presents today due to a significant weight gain, worsening SOB and abdominal distension. She increased the dose of Lasix to 100mg daily in the last few days but the weight gain has continued. She reports that 2 weeks ago, her weight was 431 lbs. 1 week later, it was 450 lbs and yesterday, her weight was 477 lbs. She denies chest pain, no chest tightness, no nausea, no confusion.     She had an episode of significant SOB with leg edema in 2018 but echo was normal and she recovered. In the last few months, she reports increase in weight and she attributed it to Victoza. Recent echo showed possible pulmonary hypertension and she was awaiting evaluations by Cardiology and Pulmonology. She has been less able to ambulate comfortably and remains seated for much of the day.     Initial O2 sat was 80% and improved to the low 90's with 4L O2. She appeared uncomfortable with visible SOB with movement. Hands are dusky. Her mother reports that hands have been discolored for days.       Review of Systems   Constitutional: Positive for fatigue and unexpected weight change. Negative for chills, diaphoresis and fever.   HENT: Negative for congestion, ear discharge, ear pain, postnasal drip, rhinorrhea, sinus pressure and sore throat.    Respiratory: Positive for shortness of breath. Negative for cough and chest tightness.    Cardiovascular: Positive for leg swelling. Negative for chest pain and palpitations.   Gastrointestinal: Positive for abdominal distention. Negative for abdominal pain, constipation, diarrhea, nausea and vomiting.   Endocrine: Negative for cold intolerance and heat intolerance.   Genitourinary: Negative for dysuria, frequency and urgency.   Musculoskeletal: Negative for arthralgias and myalgias.   Skin: Positive for color change  (dusky hands). Negative for rash and wound.   Neurological: Negative for dizziness, weakness and headaches.   Hematological: Negative for adenopathy.   Psychiatric/Behavioral: Negative for confusion and sleep disturbance. The patient is nervous/anxious (due to her upcoming cardiology workup).        Objective:      Physical Exam  Vitals signs reviewed.   Constitutional:       General: She is not in acute distress.     Appearance: She is well-developed. She is not diaphoretic.   HENT:      Head: Normocephalic and atraumatic.      Right Ear: Hearing, tympanic membrane, ear canal and external ear normal. Tympanic membrane is not erythematous or bulging.      Left Ear: Hearing, tympanic membrane, ear canal and external ear normal. Tympanic membrane is not erythematous or bulging.      Nose: Nose normal.      Mouth/Throat:      Pharynx: Uvula midline. No oropharyngeal exudate or posterior oropharyngeal erythema.   Eyes:      General: Lids are normal. No scleral icterus.     Conjunctiva/sclera: Conjunctivae normal.      Pupils: Pupils are equal, round, and reactive to light.   Neck:      Musculoskeletal: Normal range of motion and neck supple.   Cardiovascular:      Rate and Rhythm: Normal rate and regular rhythm.      Heart sounds: Normal heart sounds. No murmur.   Pulmonary:      Effort: Pulmonary effort is normal. Tachypnea present.      Breath sounds: Normal breath sounds. No decreased breath sounds or wheezing.      Comments: + crackles at lung bases  Abdominal:      General: Abdomen is protuberant. Bowel sounds are normal. There is distension.      Palpations: Abdomen is soft. Abdomen is not rigid.      Tenderness: There is no abdominal tenderness.   Musculoskeletal: Normal range of motion.   Skin:     General: Skin is cool and dry.      Coloration: Skin is cyanotic (fingers).      Findings: No rash.   Neurological:      Mental Status: She is alert and oriented to person, place, and time.      Gait: Gait normal.    Psychiatric:         Attention and Perception: Attention normal.         Assessment/Plan:         1. SOB (shortness of breath)  - IN OFFICE EKG 12-LEAD (to Muse)  - recent echo abnormal, appointments for Cardiology & Pulmonology scheduled    2. Dusky discoloration of skin  - initial O2 sat was 80%, started O2    3. Abdominal distension  - with significant weight increase and little change in peripheral edema, may have ascites    4. Diabetes mellitus type 2 without retinopathy  - POCT Glucose, Hand-Held Device, normal    5. Echocardiogram abnormal    Discussed patient with ER staff and patient was transported to [facility] [place] heart failure, new onset    Yamila Tejeda MD

## 2020-10-22 NOTE — ASSESSMENT & PLAN NOTE
Plan:  - Hold home medication  - Target /80 unless otherwise indicated  - Lifestyle modifications (DASH diet, Mediterranean diet, weight loss with target BMI 18-25, at least 150 minute moderate intensity exercise/week)  - Risk factor modification (smoking cessation, HbA1C < 6.5, Minimize alcohol intake with no more than 1-2 glasses of beer or wine per day or 10 or less drinks per week)  - Statin therapy as indicated by The ASCVD Risk score (Rosiealaina RIOS Jr., et al., 2013) failed to calculate for the following reasons:    The valid total cholesterol range is 130 to 320 mg/dL  - Target LDL < 130, HDL > 40  - Magnesium > 2, Potassium > 4

## 2020-10-22 NOTE — ASSESSMENT & PLAN NOTE
Patient is a chronic CO2 retainer in setting of obesity hypoventilation syndrome 2/2 morbid obesity. Stepped down from ICU today, currently continuing diuresis with minimal supplemental O2 requirements.     Plan:   - Target SpO2 of >88%; wean supplemental O2 as tolerated   - Continue diuresis -> furosemide gtt @ 20mg/hr   - Approx 26L net negative since admission   - Repeat VBG in AM to assess progress   - Risk factor reduction (encouraged weight loss, use of nightly CPAP consistently)

## 2020-10-22 NOTE — H&P
[facility] Hospital Medicine  History & Physical    Patient Name: Halley Moreno  MRN: [id number]  Admission Date: 10/22/2020  Attending Physician: Adán Muller MD   Primary Care Provider: Yamila Tejeda MD    [facility] Medicine Team: [facility] 1 Yaniv Jorgensen MD     Patient information was obtained from patient, past medical records and ER records.     Subjective:     Principal Problem: Right sided heart failure due to severe pulmonary hypertension    Chief Complaint:   Chief Complaint   Patient presents with    Congestive Heart Failure     pt sent from [facility] for CHF exas. with a 50lb weight gain in the last 2 week.  Pt desats to 80s when talking.  Pt lasix order was switched in April        HPI: Patient is a 45-year-old female who is presenting to us with acute respiratory distress.  She has a recent diagnosis of severe pulmonary hypertension with right-sided heart failure.  Her last echo showed a PA systolic pressure of 99.  She initially presented due to increasing weight gain over the last 7 months.  Patient states that in March she began gaining weight and she was placed on diuretic to trying keep that weight down.  Despite 40 mg of furosemide b.i.d. she continued to gain weight.  She has gained approximately 100 lb since March and 80 lb since May.  She does not have a cardiologist but was scheduled to see 1 in December.  Patient states that at home she sleeps in a elevated position secondary to shortness of breath.  She denies any episodes of waking up in the middle of the night out of breath.  She does not use any home oxygen nor she ever been told explicitly that she has heart failure.  She demonstrates poor understanding of the disease stating that the right heart was backing up into the lungs and cause in the shortness of breath.  She presented to the clinic today for evaluation and was found to be in acute respiratory distress with hypoxia.  She was referred  "to the ED for evaluation and diuresis.    Patient states that she has no known family history of heart failure in the family however she does state that her father had a pacemaker for unknown reasons.  She states that she does not think that anyone her and her family has had pulmonary hypertension.  Patient states that she does not drink, does not smoke, and does not use IV drugs.    Past Medical History:   Diagnosis Date    BMI 70 and over, adult     Depression     Diabetes mellitus     DM (diabetes mellitus) type II controlled with renal manifestation     HTN (hypertension)     Hyperlipidemia     Lumbar disc disease     Morbid obesity     Recurrent nephrolithiasis     Rosacea     Sleep apnea     Tear of medial meniscus of right knee, current 2017       Past Surgical History:   Procedure Laterality Date     SECTION       SECTION  2000    DILATION AND CURETTAGE OF UTERUS      AUB "negative path" per patient    ENDOMETRIAL ABLATION         Review of patient's allergies indicates:   Allergen Reactions    Victoza [liraglutide] Swelling       No current facility-administered medications on file prior to encounter.      Current Outpatient Medications on File Prior to Encounter   Medication Sig    atorvastatin (LIPITOR) 10 MG tablet Take 1 tablet (10 mg total) by mouth once daily.    cholecalciferol, vitamin D3, (VITAMIN D3) 1,000 unit capsule Take 2 capsules (2,000 Units total) by mouth once daily.    furosemide (LASIX) 80 MG tablet Take 1 tablet (80 mg total) by mouth once daily. (Patient taking differently: Take 100 mg by mouth once daily. )    glipiZIDE (GLUCOTROL) 10 MG TR24 Take 1 tablet (10 mg total) by mouth daily with breakfast.    blood sugar diagnostic Strp 1 strip by Misc.(Non-Drug; Combo Route) route 2 (two) times daily.    clotrimazole-betamethasone (LOTRISONE) lotion Apply topically 2 (two) times daily.    clotrimazole-betamethasone 1-0.05% " "(LOTRISONE) cream     lancets Misc 1 lancet by Misc.(Non-Drug; Combo Route) route 2 (two) times daily.    latanoprost 0.005 % ophthalmic solution Place 1 drop into both eyes every evening.    losartan (COZAAR) 50 MG tablet Take 1 tablet (50 mg total) by mouth once daily.    multivitamin (ONE DAILY MULTIVITAMIN) per tablet Take 1 tablet by mouth once daily.    pen needle, diabetic 32 gauge x 1/4" Ndle 1 each by Misc.(Non-Drug; Combo Route) route once daily. Pt uses one pen needle daily for injection of Victoza    semaglutide (OZEMPIC) 0.25 mg or 0.5 mg(2 mg/1.5 mL) PnIj Inject 0.25 mg into the skin every 7 days.    triamcinolone acetonide 0.1% (KENALOG) 0.1 % Lotn      Family History     Problem Relation (Age of Onset)    Cancer Father    Diabetes Father    Heart disease Paternal Grandfather    Hypertension Mother        Tobacco Use    Smoking status: Never Smoker    Smokeless tobacco: Never Used   Substance and Sexual Activity    Alcohol use: No     Frequency: Monthly or less     Drinks per session: 1 or 2     Binge frequency: Never    Drug use: No    Sexual activity: Never     Partners: Male     Review of Systems   Constitutional: Negative for chills and fever.   HENT: Negative for congestion and sore throat.    Eyes: Negative for visual disturbance.   Respiratory: Positive for apnea and shortness of breath. Negative for cough and chest tightness.    Cardiovascular: Positive for leg swelling. Negative for chest pain and palpitations.   Gastrointestinal: Positive for abdominal distention. Negative for abdominal pain, constipation, diarrhea, nausea and vomiting.   Genitourinary: Negative for difficulty urinating and dysuria.   Musculoskeletal: Negative for back pain.   Skin: Negative for rash.   Neurological: Negative for dizziness, weakness and headaches.   Hematological: Does not bruise/bleed easily.     Objective:     Vital Signs (Most Recent):  Temp: 98.4 °F (36.9 °C) (10/22/20 1155)  Pulse: 86 " (10/22/20 1437)  Resp: (!) 25 (10/22/20 1437)  BP: 115/68 (10/22/20 1437)  SpO2: 97 % (10/22/20 1437) Vital Signs (24h Range):  Temp:  [97.2 °F (36.2 °C)-98.4 °F (36.9 °C)] 98.4 °F (36.9 °C)  Pulse:  [67-86] 86  Resp:  [10-25] 25  SpO2:  [80 %-99 %] 97 %  BP: (115-133)/(53-76) 115/68     Weight: (!) 216.8 kg (478 lb)  Body mass index is 84.67 kg/m².    Physical Exam  Vitals signs and nursing note reviewed.   Constitutional:       General: She is in acute distress.      Appearance: Normal appearance. She is obese. She is ill-appearing.   HENT:      Head: Normocephalic and atraumatic.   Eyes:      General: No scleral icterus.     Pupils: Pupils are equal, round, and reactive to light.   Neck:      Comments: Thick neck. Restricted neck movement secondary to swelling.  Cardiovascular:      Rate and Rhythm: Normal rate and regular rhythm.      Pulses: Normal pulses.      Comments: No heart beat heard secondary to extreme body habitus. ECG shows regular rate and rhythm  Pulmonary:      Effort: Respiratory distress present.      Comments: Significant effort needed for breathing. Breath sounds not audible 2/2 body habitus  Abdominal:      General: There is distension.      Palpations: Abdomen is soft.      Tenderness: There is no abdominal tenderness.   Musculoskeletal:         General: Swelling present.      Right lower leg: No edema.      Left lower leg: No edema.   Skin:     General: Skin is warm and dry.      Capillary Refill: Capillary refill takes less than 2 seconds.      Findings: Rash (purpuric rash with hyperkeratotic thickening overlying RLE and LLE ) present.   Neurological:      General: No focal deficit present.      Mental Status: She is alert and oriented to person, place, and time.           CRANIAL NERVES     CN III, IV, VI   Pupils are equal, round, and reactive to light.       Significant Labs: All pertinent labs within the past 24 hours have been reviewed.    Significant Imaging: I have reviewed all  pertinent imaging results/findings within the past 24 hours.    Assessment/Plan:     Right heart failure due to pulmonary hypertension  Plan:  - 120 mg IV furosemide once. Begin 80 mg IV BID tomorrow  - Avoid salt intake > 2 g/day  - Strict I's/O's  - Daily standing weights  - Compression stockings as tolerate  - magnesium > 2, potassium > 4  - Goal directed medical therapy as indicated including:    - Hold Ace/Arb/Entresto, Spironolactone, BB until stable  - Fluid restriction to 1.5 - 2.0 L/day  - Cardiac Rehabilitation referral if NYHA class 2 or 3        Acute respiratory failure with hypoxia and hypercapnia    Plan:  - Risk factor reduction (Smoking cessation/avoidance, management of GERD, pHTN treatment)  - Avoidance of triggers (Air pollution, respiratory infections, pulmonary embolisms)  - Peripheral venous blood gas (CO2 83, pH 7.25)   - Target SpO2 of >88-92% or PaO2 of 60-70 mmHg on ABG  - is not on home oxygen therapy.  - Home therapy- None        Diabetes mellitus type 2 without retinopathy  Plan:  - Low dose Sliding scale insulin  - Diabetic cardiac diet  - Fluid restriction  - Hypoglycemic protocol  - Adjust as needed      Sleep apnea  Plan:   - On CPAP previously      Morbid obesity with BMI of 70 and over, adult  Plan:  - Dry weight of approximately 370 lb, today 478  - BMI 84.67      Hypertension associated with diabetes  Plan:  - Hold home medication  - Target /80 unless otherwise indicated  - Lifestyle modifications (DASH diet, Mediterranean diet, weight loss with target BMI 18-25, at least 150 minute moderate intensity exercise/week)  - Risk factor modification (smoking cessation, HbA1C < 6.5, Minimize alcohol intake with no more than 1-2 glasses of beer or wine per day or 10 or less drinks per week)  - Statin therapy as indicated by The ASCVD Risk score ([place] GABRIEL Jr., et al., 2013) failed to calculate for the following reasons:    The valid total cholesterol range is 130 to 320 mg/dL  -  Target LDL < 130, HDL > 40  - Magnesium > 2, Potassium > 4            VTE Risk Mitigation (From admission, onward)         Ordered     enoxaparin injection 40 mg  Every 12 hours      10/22/20 1414     IP VTE HIGH RISK PATIENT  Once      10/22/20 1346     Place sequential compression device  Until discontinued      10/22/20 1346                   Yaniv Jorgensen MD  Department of Hospital Medicine   [facility]

## 2020-10-22 NOTE — ASSESSMENT & PLAN NOTE
Plan:  - Risk factor reduction (Smoking cessation/avoidance, management of GERD, pHTN treatment)  - Avoidance of triggers (Air pollution, respiratory infections, pulmonary embolisms)  - Peripheral venous blood gas (CO2 83, pH 7.25)   - Target SpO2 of >88-92% or PaO2 of 60-70 mmHg on ABG  - is not on home oxygen therapy.  - Home therapy- None

## 2020-10-22 NOTE — HPI
Patient is a 45-year-old female who is presenting to us with acute respiratory distress.  She has a recent diagnosis of severe pulmonary hypertension with right-sided heart failure.  Her last echo showed a PA systolic pressure of 99.  She initially presented due to increasing weight gain over the last 7 months.  Patient states that in March she began gaining weight and she was placed on diuretic to trying keep that weight down.  Despite 40 mg of furosemide b.i.d. she continued to gain weight.  She has gained approximately 100 lb since March and 80 lb since May.  She does not have a cardiologist but was scheduled to see 1 in December.  Patient states that at home she sleeps in a elevated position secondary to shortness of breath.  She denies any episodes of waking up in the middle of the night out of breath.  She does not use any home oxygen nor she ever been told explicitly that she has heart failure.  She demonstrates poor understanding of the disease stating that the right heart was backing up into the lungs and cause in the shortness of breath.  She presented to the clinic today for evaluation and was found to be in acute respiratory distress with hypoxia.  She was referred to the ED for evaluation and diuresis.    Patient states that she has no known family history of heart failure in the family however she does state that her father had a pacemaker for unknown reasons.  She states that she does not think that anyone her and her family has had pulmonary hypertension.  Patient states that she does not drink, does not smoke, and does not use IV drugs.

## 2020-10-22 NOTE — SUBJECTIVE & OBJECTIVE
"Past Medical History:   Diagnosis Date    BMI 70 and over, adult     Depression     Diabetes mellitus     DM (diabetes mellitus) type II controlled with renal manifestation     HTN (hypertension)     Hyperlipidemia     Lumbar disc disease     Morbid obesity     Recurrent nephrolithiasis     Rosacea     Sleep apnea     Tear of medial meniscus of right knee, current 2017       Past Surgical History:   Procedure Laterality Date     SECTION       SECTION  2000    DILATION AND CURETTAGE OF UTERUS      AUB "negative path" per patient    ENDOMETRIAL ABLATION         Review of patient's allergies indicates:   Allergen Reactions    Victoza [liraglutide] Swelling       No current facility-administered medications on file prior to encounter.      Current Outpatient Medications on File Prior to Encounter   Medication Sig    atorvastatin (LIPITOR) 10 MG tablet Take 1 tablet (10 mg total) by mouth once daily.    cholecalciferol, vitamin D3, (VITAMIN D3) 1,000 unit capsule Take 2 capsules (2,000 Units total) by mouth once daily.    furosemide (LASIX) 80 MG tablet Take 1 tablet (80 mg total) by mouth once daily. (Patient taking differently: Take 100 mg by mouth once daily. )    glipiZIDE (GLUCOTROL) 10 MG TR24 Take 1 tablet (10 mg total) by mouth daily with breakfast.    blood sugar diagnostic Strp 1 strip by Misc.(Non-Drug; Combo Route) route 2 (two) times daily.    clotrimazole-betamethasone (LOTRISONE) lotion Apply topically 2 (two) times daily.    clotrimazole-betamethasone 1-0.05% (LOTRISONE) cream     lancets Misc 1 lancet by Misc.(Non-Drug; Combo Route) route 2 (two) times daily.    latanoprost 0.005 % ophthalmic solution Place 1 drop into both eyes every evening.    losartan (COZAAR) 50 MG tablet Take 1 tablet (50 mg total) by mouth once daily.    multivitamin (ONE DAILY MULTIVITAMIN) per tablet Take 1 tablet by mouth once daily.    pen needle, diabetic 32 " "gauge x 1/4" Ndle 1 each by Misc.(Non-Drug; Combo Route) route once daily. Pt uses one pen needle daily for injection of Victoza    semaglutide (OZEMPIC) 0.25 mg or 0.5 mg(2 mg/1.5 mL) PnIj Inject 0.25 mg into the skin every 7 days.    triamcinolone acetonide 0.1% (KENALOG) 0.1 % Lotn      Family History     Problem Relation (Age of Onset)    Cancer Father    Diabetes Father    Heart disease Paternal Grandfather    Hypertension Mother        Tobacco Use    Smoking status: Never Smoker    Smokeless tobacco: Never Used   Substance and Sexual Activity    Alcohol use: No     Frequency: Monthly or less     Drinks per session: 1 or 2     Binge frequency: Never    Drug use: No    Sexual activity: Never     Partners: Male     Review of Systems   Constitutional: Negative for chills and fever.   HENT: Negative for congestion and sore throat.    Eyes: Negative for visual disturbance.   Respiratory: Positive for apnea and shortness of breath. Negative for cough and chest tightness.    Cardiovascular: Positive for leg swelling. Negative for chest pain and palpitations.   Gastrointestinal: Positive for abdominal distention. Negative for abdominal pain, constipation, diarrhea, nausea and vomiting.   Genitourinary: Negative for difficulty urinating and dysuria.   Musculoskeletal: Negative for back pain.   Skin: Negative for rash.   Neurological: Negative for dizziness, weakness and headaches.   Hematological: Does not bruise/bleed easily.     Objective:     Vital Signs (Most Recent):  Temp: 98.4 °F (36.9 °C) (10/22/20 1155)  Pulse: 86 (10/22/20 1437)  Resp: (!) 25 (10/22/20 1437)  BP: 115/68 (10/22/20 1437)  SpO2: 97 % (10/22/20 1437) Vital Signs (24h Range):  Temp:  [97.2 °F (36.2 °C)-98.4 °F (36.9 °C)] 98.4 °F (36.9 °C)  Pulse:  [67-86] 86  Resp:  [10-25] 25  SpO2:  [80 %-99 %] 97 %  BP: (115-133)/(53-76) 115/68     Weight: (!) 216.8 kg (478 lb)  Body mass index is 84.67 kg/m².    Physical Exam  Vitals signs and nursing " note reviewed.   Constitutional:       General: She is in acute distress.      Appearance: Normal appearance. She is obese. She is ill-appearing.   HENT:      Head: Normocephalic and atraumatic.   Eyes:      General: No scleral icterus.     Pupils: Pupils are equal, round, and reactive to light.   Neck:      Comments: Thick neck. Restricted neck movement secondary to swelling.  Cardiovascular:      Rate and Rhythm: Normal rate and regular rhythm.      Pulses: Normal pulses.      Comments: No heart beat heard secondary to extreme body habitus. ECG shows regular rate and rhythm  Pulmonary:      Effort: Respiratory distress present.      Comments: Significant effort needed for breathing. Breath sounds not audible 2/2 body habitus  Abdominal:      General: There is distension.      Palpations: Abdomen is soft.      Tenderness: There is no abdominal tenderness.   Musculoskeletal:         General: Swelling present.      Right lower leg: No edema.      Left lower leg: No edema.   Skin:     General: Skin is warm and dry.      Capillary Refill: Capillary refill takes less than 2 seconds.      Findings: Rash (purpuric rash with hyperkeratotic thickening overlying RLE and LLE ) present.   Neurological:      General: No focal deficit present.      Mental Status: She is alert and oriented to person, place, and time.           CRANIAL NERVES     CN III, IV, VI   Pupils are equal, round, and reactive to light.       Significant Labs: All pertinent labs within the past 24 hours have been reviewed.    Significant Imaging: I have reviewed all pertinent imaging results/findings within the past 24 hours.

## 2020-10-22 NOTE — PLAN OF CARE
Problem: Fall Injury Risk  Goal: Absence of Fall and Fall-Related Injury  10/22/2020 1617 by Alannah Ahn RN  Outcome: Ongoing, Progressing  10/22/2020 1617 by Alannah Ahn RN  Outcome: Ongoing, Progressing     Problem: Infection  Goal: Infection Symptom Resolution  10/22/2020 1617 by Alannah Ahn RN  Outcome: Ongoing, Progressing  10/22/2020 1617 by Alannah Ahn RN  Outcome: Ongoing, Progressing     Problem: Adult Inpatient Plan of Care  Goal: Plan of Care Review  10/22/2020 1617 by Alannah Ahn RN  Outcome: Ongoing, Progressing  10/22/2020 1617 by Alannah Ahn RN  Outcome: Ongoing, Progressing  Goal: Patient-Specific Goal (Individualization)  10/22/2020 1617 by Alannah Ahn RN  Outcome: Ongoing, Progressing  10/22/2020 1617 by Alannah Ahn RN  Outcome: Ongoing, Progressing  Goal: Absence of Hospital-Acquired Illness or Injury  10/22/2020 1617 by Alannah Ahn RN  Outcome: Ongoing, Progressing  10/22/2020 1617 by Alannah Ahn RN  Outcome: Ongoing, Progressing  Goal: Optimal Comfort and Wellbeing  10/22/2020 1617 by Alannah Ahn RN  Outcome: Ongoing, Progressing  10/22/2020 1617 by Alannah Ahn RN  Outcome: Ongoing, Progressing  Goal: Readiness for Transition of Care  Outcome: Ongoing, Progressing  Goal: Rounds/Family Conference  Outcome: Ongoing, Progressing

## 2020-10-22 NOTE — ED PROVIDER NOTES
"Encounter Date: 10/22/2020       History     Chief Complaint   Patient presents with    Congestive Heart Failure     pt sent from [facility] for CHF exas. with a 50lb weight gain in the last 2 week.  Pt desats to 80s when talking.  Pt lasix order was switched in    Ms. Moreno is a 45 y.o. female with morbid obesity, DM, hypertension, sleep apnea, pulmonary hypertension with right-sided heart failure (not on home O2) here today with shortness of breath and swelling.  Patient reports a 50 lb weight gain over the last 1-2 months.  Has gained 80 lb since May.  Was seen in clinic today and was sent to the ED for hypoxia and need for diuresis.  States she has medication compliance; on Lasix 100 mg daily.  Has decreased urine output.  She does not routinely wear her CPAP device.  Sleeps with her head of bed elevated significantly.  Denies fevers, chills, nausea, vomiting, abdominal pain, chest pain or tightness, dysuria.     Review of patient's allergies indicates:   Allergen Reactions    Victoza [liraglutide] Swelling     Past Medical History:   Diagnosis Date    BMI 70 and over, adult     Depression     Diabetes mellitus     DM (diabetes mellitus) type II controlled with renal manifestation     HTN (hypertension)     Hyperlipidemia     Lumbar disc disease     Morbid obesity     Recurrent nephrolithiasis     Rosacea     Sleep apnea     Tear of medial meniscus of right knee, current 2017     Past Surgical History:   Procedure Laterality Date     SECTION       SECTION  2000    DILATION AND CURETTAGE OF UTERUS  2010    AUB "negative path" per patient    ENDOMETRIAL ABLATION  2010     Family History   Problem Relation Age of Onset    Hypertension Mother     Diabetes Father     Cancer Father         cns lymphoma    Heart disease Paternal Grandfather     Colon cancer Neg Hx     Ovarian cancer Neg Hx     Breast cancer Neg Hx     Amblyopia Neg Hx     " Blindness Neg Hx     Cataracts Neg Hx     Glaucoma Neg Hx     Macular degeneration Neg Hx     Retinal detachment Neg Hx     Strabismus Neg Hx     Stroke Neg Hx     Thyroid disease Neg Hx      Social History     Tobacco Use    Smoking status: Never Smoker    Smokeless tobacco: Never Used   Substance Use Topics    Alcohol use: No     Frequency: Monthly or less     Drinks per session: 1 or 2     Binge frequency: Never    Drug use: No     Review of Systems   Constitutional: Negative for fatigue and fever.   HENT: Negative for sore throat.    Respiratory: Positive for shortness of breath. Negative for cough, choking and chest tightness.    Cardiovascular: Positive for leg swelling. Negative for chest pain.   Gastrointestinal: Positive for abdominal distention. Negative for abdominal pain, anal bleeding, nausea and vomiting.   Genitourinary: Negative for dysuria.   Musculoskeletal: Negative for back pain.   Skin: Negative for rash.   Neurological: Negative for weakness.   Hematological: Does not bruise/bleed easily.       Physical Exam     Initial Vitals [10/22/20 1155]   BP Pulse Resp Temp SpO2   133/72 77 18 98.4 °F (36.9 °C) 99 %      MAP       --         Physical Exam    Nursing note and vitals reviewed.  Constitutional: She appears well-developed and well-nourished. She is not diaphoretic. No distress.   Morbidly obese   HENT:   Head: Normocephalic and atraumatic.   Right Ear: External ear normal.   Left Ear: External ear normal.   Nasal cannula in place   Neck: Neck supple. JVD: Unable to assess for JVD due to habitus.   Cardiovascular: Normal rate, regular rhythm, normal heart sounds and intact distal pulses.   Pulmonary/Chest: Breath sounds normal. No respiratory distress. She has no wheezes. She has no rhonchi. She has no rales.   No rales abrasion on exam but lung sounds significantly diminished and exam difficult due to habitus.   Abdominal: Soft. She exhibits distension ( with pitting edema  present). There is no abdominal tenderness. There is no rebound and no guarding.   Musculoskeletal: Edema ( severe bilateral lower extremities extending above the knees.) present.   Neurological: She is alert and oriented to person, place, and time. GCS score is 15. GCS eye subscore is 4. GCS verbal subscore is 5. GCS motor subscore is 6.   Skin: Skin is warm. Capillary refill takes less than 2 seconds. No rash noted.   Psychiatric: She has a normal mood and affect.         ED Course   Procedures  Labs Reviewed   CBC W/ AUTO DIFFERENTIAL - Abnormal; Notable for the following components:       Result Value    RBC 5.82 (*)     Hematocrit 52.7 (*)     Mean Corpuscular Hemoglobin 26.1 (*)     Mean Corpuscular Hemoglobin Conc 28.8 (*)     RDW 22.0 (*)     Immature Granulocytes 1.2 (*)     Gran # (ANC) 8.6 (*)     Immature Grans (Abs) 0.13 (*)     nRBC 1 (*)     Gran% 78.5 (*)     Lymph% 11.3 (*)     All other components within normal limits   COMPREHENSIVE METABOLIC PANEL - Abnormal; Notable for the following components:    Potassium 5.3 (*)     CO2 33 (*)     BUN, Bld 43 (*)     Albumin 3.3 (*)     Total Bilirubin 1.1 (*)     eGFR if  56.5 (*)     eGFR if non  49.0 (*)     All other components within normal limits   B-TYPE NATRIURETIC PEPTIDE - Abnormal; Notable for the following components:     (*)     All other components within normal limits   TROPONIN I   PROTIME-INR   SARS-COV-2 RDRP GENE    Narrative:     This test utilizes isothermal nucleic acid amplification   technology to detect the SARS-CoV-2 RdRp nucleic acid segment.   The analytical sensitivity (limit of detection) is 125 genome   equivalents/mL.   A POSITIVE result implies infection with the SARS-CoV-2 virus;   the patient is presumed to be contagious.     A NEGATIVE result means that SARS-CoV-2 nucleic acids are not   present above the limit of detection. A NEGATIVE result should be   treated as presumptive. It  does not rule out the possibility of   COVID-19 and should not be the sole basis for treatment decisions.   If COVID-19 is strongly suspected based on clinical and exposure   history, re-testing using an alternate molecular assay should be   considered.   This test is only for use under the Food and Drug   Administration s Emergency Use Authorization (EUA).   Commercial kits are provided by [employer].   Performance characteristics of the EUA have been independently   verified by [facility] Department of   Pathology and Laboratory Medicine.   _________________________________________________________________   The authorized Fact Sheet for Healthcare Providers and the authorized Fact   Sheet for Patients of the ID NOW COVID-19 are available on the FDA   website:     https://www.fda.gov/media/703310/download  https://www.fda.gov/media/539940/download              ECG Results          EKG 12-lead (Final result)  Result time 10/22/20 12:55:04    Final result by Interface, Lab In [facility] (10/22/20 12:55:04)                 Narrative:    Test Reason : R06.02,    Vent. Rate : 075 BPM     Atrial Rate : 075 BPM     P-R Int : 174 ms          QRS Dur : 078 ms      QT Int : 364 ms       P-R-T Axes : 072 -72 073 degrees     QTc Int : 406 ms    Normal sinus rhythm  Left axis deviation  Abnormal ECG  When compared with ECG of 22-OCT-2020 11:01,  Premature atrial complexes are no longer Present  Confirmed by KLAUDIA CAO MD ([id number]) on 10/22/2020 12:54:55 PM    Referred By: [email or web address]   SELF           Confirmed By:KLAUDIA CAO MD                            Imaging Results          X-Ray Chest AP Portable (Final result)  Result time 10/22/20 12:48:56    Final result by Pavel Hernandes MD (10/22/20 12:48:56)                 Impression:      See above      Electronically signed by: Pavel Hernandes MD  Date:    10/22/2020  Time:    12:48             Narrative:    EXAMINATION:  XR CHEST AP  PORTABLE    CLINICAL HISTORY:  CHF;    TECHNIQUE:  Single frontal view of the chest was performed.    COMPARISON:  11/08/2019    FINDINGS:  Cardiac size is mildly enlarged.  Pulmonary vascularity is a little increased.  No obvious pleural effusion is seen on this portable film.                                 Medical Decision Making:   History:   Old Medical Records: I decided to obtain old medical records.  Old Records Summarized: other records.       <> Summary of Records: Sent from PCP today for diuresis and likely admission  Independently Interpreted Test(s):   I have ordered and independently interpreted X-rays - see summary below.       <> Summary of X-Ray Reading(s): CXR with cardiomegaly.  Given portable technique and obesity, difficult to assess lung fields.  Does not appear to have pleural effusion or pulmonary edema on my read.  I have ordered and independently interpreted EKG Reading(s) - see summary below       <> Summary of EKG Reading(s): Normal sinus rhythm rate 75.  Normal intervals.  No ischemic changes.  No STEMI.  Clinical Tests:   Lab Tests: Ordered and Reviewed  Radiological Study: Ordered and Reviewed  Medical Tests: Reviewed and Ordered  ED Management:  Vitals normal.  Afebrile.  However is hypoxic on room air.  Has poor waveform on her pulse ox but does dip whenever she's talking.  She appears exceptionally hypervolemic with pitting edema above her knees bilaterally as well as a significantly distended abdomen with pitting edema as well.  Will need to be admitted for extensive diuresis.  CBC, CMP, BNP, troponin, COVID-19 swab, CXR, EKG obtained.  120 mg IV Lasix given.  Hernandez placed for strict inputs and outputs.  Continuing on 4 L nasal cannula that she came in with for now.    Labs gross within normal limits except creatinine 1.3 (up from baseline of 0.8-0.9), , K 5.3.   No EKG changes.    Patient will need extensive diuresis as an inpatient.  Discussed with hospital medicine who  admitted patient.    Other:   I have discussed this case with another health care provider.       <> Summary of the Discussion: Hospital medicine.              Attending Attestation:         Attending Critical Care:   Critical Care Times:   ==============================================================  · Total Critical Care Time - exclusive of procedural time: 40 minutes.  ==============================================================  Critical care was necessary to treat or prevent imminent or life-threatening deterioration of the following conditions: congestive heart failure and respiratory failure.   The following critical care procedures were done by me (see procedure notes): pulse oximetry.   Critical care was time spent personally by me on the following activities: obtaining history from patient or relative, examination of patient, review of old charts, ordering lab, x-rays, and/or EKG, development of treatment plan with patient or relative, ordering and performing treatments and interventions, evaluation of patient's response to treatment, discussion with consultants, interpretation of cardiac measurements and re-evaluation of patient's conition.   Critical Care Condition: potentially life-threatening                         Clinical Impression:     ICD-10-CM ICD-9-CM   1. Acute respiratory failure with hypoxia  J96.01 518.81   2. Shortness of breath  R06.02 786.05   3. Hypervolemia, unspecified hypervolemia type  E87.70 276.69   4. Acute on chronic congestive heart failure, unspecified heart failure type  I50.9 428.0   5. Hyperkalemia  E87.5 276.7   6. Morbid obesity  E66.01 278.01   7. Acute kidney injury  N17.9 584.9                          ED Disposition Condition    Admit                             Duke Whitmore MD  10/22/20 5217

## 2020-10-22 NOTE — ASSESSMENT & PLAN NOTE
Plan:  - 120 mg IV furosemide once. Begin 80 mg IV BID tomorrow  - Avoid salt intake > 2 g/day  - Strict I's/O's  - Daily standing weights  - Compression stockings as tolerate  - magnesium > 2, potassium > 4  - Goal directed medical therapy as indicated including:    - Hold Ace/Arb/Entresto, Spironolactone, BB until stable  - Fluid restriction to 1.5 - 2.0 L/day  - Cardiac Rehabilitation referral if NYHA class 2 or 3

## 2020-10-22 NOTE — ASSESSMENT & PLAN NOTE
Plan:  - Strict Is/Os  - Daily weights  - Keep K>4, Mg>2  - Diabetic/cardiac low sodium diet (<2 g/day)  - Fluid restriction to 1.5 - 2.0 L/day  - Will obtain repeat echo once patient is closer to euvolemia

## 2020-10-22 NOTE — ED TRIAGE NOTES
Halley Moreno, a 45 y.o. female presents to the ED w/ complaint of 50 lb weight gain in the last 2 weeks on sats in the clinic in the 70s reported.  Pt placed on 4L NC.  Pt denies CP but has increased VELÁZQUEZ with a dry cough.  No covid symptoms at this time.           Triage note:  Chief Complaint   Patient presents with    Congestive Heart Failure     pt sent from [facility] for CHF exas. with a 50lb weight gain in the last 2 week.  Pt desats to 80s when talking.  Pt lasix order was switched in April     Review of patient's allergies indicates:   Allergen Reactions    Victoza [liraglutide] Swelling     Past Medical History:   Diagnosis Date    BMI 70 and over, adult     Depression     Diabetes mellitus     DM (diabetes mellitus) type II controlled with renal manifestation     HTN (hypertension)     Hyperlipidemia     Lumbar disc disease     Morbid obesity     Recurrent nephrolithiasis     Rosacea     Sleep apnea     Tear of medial meniscus of right knee, current 9/5/2017

## 2020-10-22 NOTE — NURSING
"Pt admitted to floor in stable condition. VISI refused at this time pt feels she is already "swollen" and doesn't need another piece of equipment on her. Pt AAOX4, bariatric bed ordered d/t regular bed uncomfortable and too small for patient needs. POCT checked. Dinner ordered. 1500 fluid restriction and strict IO as well as daily weights ordered. Pt is able to pivot from stretcher to bed. Wound care consult, case management consult ordered. Pt requesting flu vaccine at discharge. Bed low and locked and call light in reach, wctm.   "

## 2020-10-22 NOTE — HOSPITAL COURSE
Patient was admitted to Hospital Medicine. Was initially hypoxic and put on nasal cannula with normalization of her oxygen status to approximately 95% and given a bolus of 120mg IV furosemide. Hypoxia and hypercapnia worsened so was stepped up to ICU on 10/23. Was placed on BiPAP and aggressively diuresed with improvement in respiratory status. Weaned to 2L NC and subsequently stepped back down to Hospital Medicine on 10/26. Continued diuresis with IV Lasix gtt, diuril, and aldactone. On 10/27 VBGs noted pCO2 in 90s - 100s and patient was started back on continuous BiPAP with improvement in pCO2 levels. Patient weaned from BiPAP throughout admission with improving oxygenation, wearing BiPAP QHS. Spironolactone and Diuril adjuncts were subsequently stopped as patient's BUN/Cr began to rise. Lasix gtt stopped 11/5 and transitioned to PO Lasix 80mg BID on 11/6. Pt had a 40kg weight loss throughout her hospital stay. Continue lasix 80 BID, and follow up with PCP/Cardiology to resume losartan.

## 2020-10-23 PROBLEM — E66.2 OBESITY HYPOVENTILATION SYNDROME: Status: ACTIVE | Noted: 2020-10-23

## 2020-10-23 PROBLEM — E83.39 HYPERPHOSPHATEMIA: Status: ACTIVE | Noted: 2020-10-23

## 2020-10-23 PROBLEM — I50.33 ACUTE ON CHRONIC HEART FAILURE WITH PRESERVED EJECTION FRACTION: Status: ACTIVE | Noted: 2020-10-23

## 2020-10-23 PROBLEM — E87.5 HYPERKALEMIA: Status: ACTIVE | Noted: 2020-10-23

## 2020-10-23 LAB
ALLENS TEST: ABNORMAL
ANION GAP SERPL CALC-SCNC: 14 MMOL/L (ref 8–16)
ANISOCYTOSIS BLD QL SMEAR: SLIGHT
AV INDEX (PROSTH): 0.82
AV MEAN GRADIENT: 6 MMHG
AV PEAK GRADIENT: 9 MMHG
AV VALVE AREA: 3.13 CM2
AV VELOCITY RATIO: 0.95
BASOPHILS # BLD AUTO: 0.04 K/UL (ref 0–0.2)
BASOPHILS NFR BLD: 0.4 % (ref 0–1.9)
BSA FOR ECHO PROCEDURE: 3.17 M2
BUN SERPL-MCNC: 42 MG/DL (ref 6–20)
CALCIUM SERPL-MCNC: 8.6 MG/DL (ref 8.7–10.5)
CHLORIDE SERPL-SCNC: 98 MMOL/L (ref 95–110)
CO2 SERPL-SCNC: 29 MMOL/L (ref 23–29)
CREAT SERPL-MCNC: 1.2 MG/DL (ref 0.5–1.4)
CV ECHO LV RWT: 0.34 CM
DELSYS: ABNORMAL
DIFFERENTIAL METHOD: ABNORMAL
DOP CALC AO PEAK VEL: 1.46 M/S
DOP CALC AO VTI: 31.67 CM
DOP CALC LVOT AREA: 3.8 CM2
DOP CALC LVOT DIAMETER: 2.21 CM
DOP CALC LVOT PEAK VEL: 1.38 M/S
DOP CALC LVOT STROKE VOLUME: 99.11 CM3
DOP CALCLVOT PEAK VEL VTI: 25.85 CM
E WAVE DECELERATION TIME: 217.82 MSEC
E/A RATIO: 1.49
E/E' RATIO: 7 M/S
ECHO LV POSTERIOR WALL: 0.7 CM (ref 0.6–1.1)
EOSINOPHIL # BLD AUTO: 0.1 K/UL (ref 0–0.5)
EOSINOPHIL NFR BLD: 0.9 % (ref 0–8)
EP: 10
EP: 5
EP: 7
EP: 7
ERYTHROCYTE [DISTWIDTH] IN BLOOD BY AUTOMATED COUNT: 21.7 % (ref 11.5–14.5)
ERYTHROCYTE [SEDIMENTATION RATE] IN BLOOD BY WESTERGREN METHOD: 12 MM/H
ERYTHROCYTE [SEDIMENTATION RATE] IN BLOOD BY WESTERGREN METHOD: 25 MM/H
ERYTHROCYTE [SEDIMENTATION RATE] IN BLOOD BY WESTERGREN METHOD: 28 MM/H
EST. GFR  (AFRICAN AMERICAN): >60 ML/MIN/1.73 M^2
EST. GFR  (NON AFRICAN AMERICAN): 53.9 ML/MIN/1.73 M^2
FIO2: 30
FIO2: 45
FRACTIONAL SHORTENING: 30 % (ref 28–44)
GLUCOSE SERPL-MCNC: 86 MG/DL (ref 70–110)
HCO3 UR-SCNC: 37.4 MMOL/L (ref 24–28)
HCO3 UR-SCNC: 39.2 MMOL/L (ref 24–28)
HCO3 UR-SCNC: 40.7 MMOL/L (ref 24–28)
HCO3 UR-SCNC: 41.1 MMOL/L (ref 24–28)
HCO3 UR-SCNC: 41.8 MMOL/L (ref 24–28)
HCT VFR BLD AUTO: 52.5 % (ref 37–48.5)
HGB BLD-MCNC: 14.4 G/DL (ref 12–16)
IMM GRANULOCYTES # BLD AUTO: 0.13 K/UL (ref 0–0.04)
IMM GRANULOCYTES NFR BLD AUTO: 1.2 % (ref 0–0.5)
INTERVENTRICULAR SEPTUM: 0.96 CM (ref 0.6–1.1)
IP: 16
IP: 16
IP: 20
IP: 23
LEFT ATRIUM SIZE: 3.04 CM
LEFT INTERNAL DIMENSION IN SYSTOLE: 2.92 CM (ref 2.1–4)
LEFT VENTRICLE DIASTOLIC VOLUME INDEX: 27.11 ML/M2
LEFT VENTRICLE DIASTOLIC VOLUME: 77.4 ML
LEFT VENTRICLE MASS INDEX: 37 G/M2
LEFT VENTRICLE SYSTOLIC VOLUME INDEX: 11.5 ML/M2
LEFT VENTRICLE SYSTOLIC VOLUME: 32.78 ML
LEFT VENTRICULAR INTERNAL DIMENSION IN DIASTOLE: 4.17 CM (ref 3.5–6)
LEFT VENTRICULAR MASS: 105.14 G
LV LATERAL E/E' RATIO: 6.5 M/S
LV SEPTAL E/E' RATIO: 7.58 M/S
LYMPHOCYTES # BLD AUTO: 1.6 K/UL (ref 1–4.8)
LYMPHOCYTES NFR BLD: 15.5 % (ref 18–48)
MAGNESIUM SERPL-MCNC: 1.8 MG/DL (ref 1.6–2.6)
MCH RBC QN AUTO: 25.3 PG (ref 27–31)
MCHC RBC AUTO-ENTMCNC: 27.4 G/DL (ref 32–36)
MCV RBC AUTO: 92 FL (ref 82–98)
MIN VOL: 12.5
MIN VOL: 20.7
MODE: ABNORMAL
MONOCYTES # BLD AUTO: 1.1 K/UL (ref 0.3–1)
MONOCYTES NFR BLD: 10 % (ref 4–15)
MV PEAK A VEL: 0.61 M/S
MV PEAK E VEL: 0.91 M/S
MV STENOSIS PRESSURE HALF TIME: 63.17 MS
MV VALVE AREA P 1/2 METHOD: 3.48 CM2
NEUTROPHILS # BLD AUTO: 7.6 K/UL (ref 1.8–7.7)
NEUTROPHILS NFR BLD: 72 % (ref 38–73)
NRBC BLD-RTO: 1 /100 WBC
PCO2 BLDA: 112.7 MMHG (ref 35–45)
PCO2 BLDA: 83.6 MMHG (ref 35–45)
PCO2 BLDA: 86.9 MMHG (ref 35–45)
PCO2 BLDA: 88.2 MMHG (ref 35–45)
PCO2 BLDA: 88.9 MMHG (ref 35–45)
PH SMN: 7.17 [PH] (ref 7.35–7.45)
PH SMN: 7.26 [PH] (ref 7.35–7.45)
PH SMN: 7.26 [PH] (ref 7.35–7.45)
PH SMN: 7.28 [PH] (ref 7.35–7.45)
PH SMN: 7.28 [PH] (ref 7.35–7.45)
PHOSPHATE SERPL-MCNC: 5.4 MG/DL (ref 2.7–4.5)
PISA TR MAX VEL: 4.86 M/S
PLATELET # BLD AUTO: 177 K/UL (ref 150–350)
PLATELET BLD QL SMEAR: ABNORMAL
PMV BLD AUTO: 10 FL (ref 9.2–12.9)
PO2 BLDA: 29 MMHG (ref 40–60)
PO2 BLDA: 31 MMHG (ref 40–60)
PO2 BLDA: 76 MMHG (ref 80–100)
PO2 BLDA: 78 MMHG (ref 80–100)
PO2 BLDA: 85 MMHG (ref 80–100)
POC BE: 10 MMOL/L
POC BE: 12 MMOL/L
POC BE: 12 MMOL/L
POC BE: 14 MMOL/L
POC BE: 15 MMOL/L
POC SATURATED O2: 44 % (ref 95–100)
POC SATURATED O2: 47 % (ref 95–100)
POC SATURATED O2: 91 % (ref 95–100)
POC SATURATED O2: 92 % (ref 95–100)
POC SATURATED O2: 92 % (ref 95–100)
POC TCO2: 40 MMOL/L (ref 23–27)
POC TCO2: 42 MMOL/L (ref 23–27)
POC TCO2: 44 MMOL/L (ref 23–27)
POC TCO2: 44 MMOL/L (ref 24–29)
POC TCO2: 44 MMOL/L (ref 24–29)
POCT GLUCOSE: 107 MG/DL (ref 70–110)
POCT GLUCOSE: 89 MG/DL (ref 70–110)
POCT GLUCOSE: 92 MG/DL (ref 70–110)
POLYCHROMASIA BLD QL SMEAR: ABNORMAL
POTASSIUM SERPL-SCNC: 5.2 MMOL/L (ref 3.5–5.1)
RA PRESSURE: 15 MMHG
RBC # BLD AUTO: 5.7 M/UL (ref 4–5.4)
RIGHT VENTRICULAR END-DIASTOLIC DIMENSION: 5.1 CM
RV TISSUE DOPPLER FREE WALL SYSTOLIC VELOCITY 1 (APICAL 4 CHAMBER VIEW): 12.94 CM/S
SAMPLE: ABNORMAL
SINUS: 3.41 CM
SITE: ABNORMAL
SODIUM SERPL-SCNC: 141 MMOL/L (ref 136–145)
SP02: 96
SPONT RATE: 13
TDI LATERAL: 0.14 M/S
TDI SEPTAL: 0.12 M/S
TDI: 0.13 M/S
TR MAX PG: 94 MMHG
TRICUSPID ANNULAR PLANE SYSTOLIC EXCURSION: 1.37 CM
TV REST PULMONARY ARTERY PRESSURE: 109 MMHG
WBC # BLD AUTO: 10.55 K/UL (ref 3.9–12.7)

## 2020-10-23 PROCEDURE — 83735 ASSAY OF MAGNESIUM: CPT

## 2020-10-23 PROCEDURE — 82803 BLOOD GASES ANY COMBINATION: CPT

## 2020-10-23 PROCEDURE — 63600175 PHARM REV CODE 636 W HCPCS: Performed by: STUDENT IN AN ORGANIZED HEALTH CARE EDUCATION/TRAINING PROGRAM

## 2020-10-23 PROCEDURE — 80048 BASIC METABOLIC PNL TOTAL CA: CPT

## 2020-10-23 PROCEDURE — 25000003 PHARM REV CODE 250: Performed by: STUDENT IN AN ORGANIZED HEALTH CARE EDUCATION/TRAINING PROGRAM

## 2020-10-23 PROCEDURE — 63600175 PHARM REV CODE 636 W HCPCS: Performed by: INTERNAL MEDICINE

## 2020-10-23 PROCEDURE — 99900035 HC TECH TIME PER 15 MIN (STAT)

## 2020-10-23 PROCEDURE — 36600 WITHDRAWAL OF ARTERIAL BLOOD: CPT

## 2020-10-23 PROCEDURE — 97165 OT EVAL LOW COMPLEX 30 MIN: CPT

## 2020-10-23 PROCEDURE — 20000000 HC ICU ROOM

## 2020-10-23 PROCEDURE — 36415 COLL VENOUS BLD VENIPUNCTURE: CPT

## 2020-10-23 PROCEDURE — 27000221 HC OXYGEN, UP TO 24 HOURS

## 2020-10-23 PROCEDURE — 94660 CPAP INITIATION&MGMT: CPT

## 2020-10-23 PROCEDURE — 97535 SELF CARE MNGMENT TRAINING: CPT

## 2020-10-23 PROCEDURE — 97530 THERAPEUTIC ACTIVITIES: CPT

## 2020-10-23 PROCEDURE — 99291 CRITICAL CARE FIRST HOUR: CPT | Mod: ,,, | Performed by: INTERNAL MEDICINE

## 2020-10-23 PROCEDURE — 97161 PT EVAL LOW COMPLEX 20 MIN: CPT

## 2020-10-23 PROCEDURE — 94761 N-INVAS EAR/PLS OXIMETRY MLT: CPT

## 2020-10-23 PROCEDURE — 84100 ASSAY OF PHOSPHORUS: CPT

## 2020-10-23 PROCEDURE — 25000003 PHARM REV CODE 250: Performed by: INTERNAL MEDICINE

## 2020-10-23 PROCEDURE — 27000190 HC CPAP FULL FACE MASK W/VALVE

## 2020-10-23 PROCEDURE — 85025 COMPLETE CBC W/AUTO DIFF WBC: CPT

## 2020-10-23 PROCEDURE — 99291 PR CRITICAL CARE, E/M 30-74 MINUTES: ICD-10-PCS | Mod: ,,, | Performed by: INTERNAL MEDICINE

## 2020-10-23 RX ORDER — MICONAZOLE NITRATE 2 %
POWDER (GRAM) TOPICAL 2 TIMES DAILY
Status: DISCONTINUED | OUTPATIENT
Start: 2020-10-23 | End: 2020-11-09 | Stop reason: HOSPADM

## 2020-10-23 RX ORDER — MAGNESIUM SULFATE HEPTAHYDRATE 40 MG/ML
2 INJECTION, SOLUTION INTRAVENOUS ONCE
Status: COMPLETED | OUTPATIENT
Start: 2020-10-23 | End: 2020-10-23

## 2020-10-23 RX ORDER — FUROSEMIDE 10 MG/ML
80 INJECTION INTRAMUSCULAR; INTRAVENOUS ONCE
Status: COMPLETED | OUTPATIENT
Start: 2020-10-23 | End: 2020-10-23

## 2020-10-23 RX ADMIN — MAGNESIUM SULFATE 2 G: 2 INJECTION INTRAVENOUS at 08:10

## 2020-10-23 RX ADMIN — FUROSEMIDE 80 MG: 10 INJECTION, SOLUTION INTRAMUSCULAR; INTRAVENOUS at 08:10

## 2020-10-23 RX ADMIN — MICONAZOLE NITRATE: 20 POWDER TOPICAL at 09:10

## 2020-10-23 RX ADMIN — ENOXAPARIN SODIUM 60 MG: 60 INJECTION SUBCUTANEOUS at 09:10

## 2020-10-23 RX ADMIN — HUMAN ALBUMIN MICROSPHERES AND PERFLUTREN 0.11 MG: 10; .22 INJECTION, SOLUTION INTRAVENOUS at 10:10

## 2020-10-23 RX ADMIN — FUROSEMIDE 20 MG/HR: 10 INJECTION, SOLUTION INTRAMUSCULAR; INTRAVENOUS at 12:10

## 2020-10-23 RX ADMIN — FUROSEMIDE 80 MG: 10 INJECTION, SOLUTION INTRAMUSCULAR; INTRAVENOUS at 12:10

## 2020-10-23 RX ADMIN — ATORVASTATIN CALCIUM 10 MG: 10 TABLET, FILM COATED ORAL at 08:10

## 2020-10-23 RX ADMIN — ENOXAPARIN SODIUM 60 MG: 60 INJECTION SUBCUTANEOUS at 08:10

## 2020-10-23 NOTE — PLAN OF CARE
Patient was noted has some desaturations overnight with SpO2 use of between 70 and 80.  She improved with increased supplemental oxygen by nasal cannula. On initial assessment in the morning she was difficult to arouse but remained AAO x3.  I discussed with her our efforts to diurese some of the fluid that had built up on her along with our concern over her respiratory status and worsening mentation.  Patient demonstrated understanding and was able to express that should she decompensate and her cardiac or pulmonary status or compromised, that she would want chest compressions and intubation with mechanical ventilation. An ABG was ordered to assess her respiratory status which showed a PaCO2 of 83.  Patient was placed on BiPAP at this time with instructions to repeat an ABG in approximately 1 hour.  Patient was reassessed and found to be increasingly somnolent and difficult to arouse.  Patient's saturation remained above 88% and her blood pressure was above 120.  She had good peripheral pulses and was perfusing her peripheral extremities well.  A repeat ABG showed a PaCO2 of 112 and a rapid response was called based on her increasing somnolence and worsening respiratory status. Critical care medicine was consulted at this time and following their assessment a decision was made to admit the patient to critical care.

## 2020-10-23 NOTE — SUBJECTIVE & OBJECTIVE
"Past Medical History:   Diagnosis Date    BMI 70 and over, adult     Depression     Diabetes mellitus     DM (diabetes mellitus) type II controlled with renal manifestation     HTN (hypertension)     Hyperlipidemia     Lumbar disc disease     Morbid obesity     Recurrent nephrolithiasis     Rosacea     Sleep apnea     Tear of medial meniscus of right knee, current 2017       Past Surgical History:   Procedure Laterality Date     SECTION       SECTION  2000    DILATION AND CURETTAGE OF UTERUS      AUB "negative path" per patient    ENDOMETRIAL ABLATION         Review of patient's allergies indicates:   Allergen Reactions    Victoza [liraglutide] Swelling       Current Facility-Administered Medications   Medication    acetaminophen tablet 650 mg    atorvastatin tablet 10 mg    dextrose 50% injection 12.5 g    dextrose 50% injection 25 g    enoxaparin injection 60 mg    furosemide (LASIX) 500 mg infusion (conc: 10 mg/mL)    furosemide injection 80 mg    glucagon (human recombinant) injection 1 mg    glucose chewable tablet 16 g    glucose chewable tablet 24 g    influenza (QUADRIVALENT PF) vaccine 0.5 mL    insulin aspart U-100 pen 0-5 Units    melatonin tablet 6 mg    ondansetron disintegrating tablet 8 mg    polyethylene glycol packet 17 g    sodium chloride 0.9% flush 10 mL     Family History     Problem Relation (Age of Onset)    Cancer Father    Diabetes Father    Heart disease Paternal Grandfather    Hypertension Mother        Tobacco Use    Smoking status: Never Smoker    Smokeless tobacco: Never Used   Substance and Sexual Activity    Alcohol use: No     Frequency: Monthly or less     Drinks per session: 1 or 2     Binge frequency: Never    Drug use: No    Sexual activity: Never     Partners: Male     Review of Systems   Constitutional: Positive for activity change, appetite change, fatigue and unexpected weight change.   HENT: Negative " for dental problem.    Respiratory: Positive for shortness of breath and wheezing. Negative for cough.    Cardiovascular: Positive for leg swelling. Negative for chest pain.   Gastrointestinal: Negative for abdominal pain, constipation and diarrhea.   Genitourinary: Negative for difficulty urinating.   Musculoskeletal: Negative for arthralgias and back pain.   Neurological: Negative for weakness.   Psychiatric/Behavioral: Negative for agitation.     Objective:     Vital Signs (Most Recent):  Temp: 97.9 °F (36.6 °C) (10/23/20 1137)  Pulse: 77 (10/23/20 1010)  Resp: 18 (10/23/20 0826)  BP: 119/64 (10/23/20 1010)  SpO2: (!) 90 % (10/23/20 0826) Vital Signs (24h Range):  Temp:  [97.9 °F (36.6 °C)-99.1 °F (37.3 °C)] 97.9 °F (36.6 °C)  Pulse:  [70-86] 77  Resp:  [10-25] 18  SpO2:  [90 %-99 %] 90 %  BP: (108-136)/(57-76) 119/64     Patient Vitals for the past 72 hrs (Last 3 readings):   Weight   10/23/20 1010 (!) 226.8 kg (500 lb)   10/23/20 0400 (!) 226 kg (498 lb 3.8 oz)   10/22/20 1541 (!) 220.9 kg (486 lb 15.9 oz)     Body mass index is 88.57 kg/m².      Intake/Output Summary (Last 24 hours) at 10/23/2020 1150  Last data filed at 10/23/2020 0600  Gross per 24 hour   Intake 240 ml   Output 3450 ml   Net -3210 ml       Physical Exam  Vitals signs reviewed.   Constitutional:       General: She is not in acute distress.     Appearance: She is obese.      Comments: Morbidly obese    HENT:      Head: Normocephalic.      Nose: Congestion present.   Eyes:      General:         Right eye: No discharge.         Left eye: No discharge.   Neck:      Musculoskeletal: Normal range of motion.      Vascular: No JVD.   Cardiovascular:      Rate and Rhythm: Normal rate.      Heart sounds: Murmur (distant heart sound) present.   Pulmonary:      Effort: Pulmonary effort is normal. No respiratory distress.      Breath sounds: Normal breath sounds.   Abdominal:      Palpations: Abdomen is soft.   Musculoskeletal: Normal range of motion.          General: Swelling present.      Right lower leg: Edema present.      Left lower leg: Edema present.   Skin:     General: Skin is warm.   Neurological:      Mental Status: She is alert and oriented to person, place, and time.         Significant Labs:  CBC:  Recent Labs   Lab 10/22/20  1224 10/23/20  0325   WBC 10.97 10.55   RBC 5.82* 5.70*   HGB 15.2 14.4   HCT 52.7* 52.5*    177   MCV 91 92   MCH 26.1* 25.3*   MCHC 28.8* 27.4*     BNP:  Recent Labs   Lab 10/22/20  1224   *     CMP:  Recent Labs   Lab 10/22/20  1224 10/22/20  2006 10/23/20  0325    107 86   CALCIUM 8.9 8.7 8.6*   ALBUMIN 3.3* 3.1*  --    PROT 7.1 6.7  --     143 141   K 5.3* 5.4* 5.2*   CO2 33* 31* 29   CL 97 99 98   BUN 43* 43* 42*   CREATININE 1.3 1.2 1.2   ALKPHOS 75 73  --    ALT 26 25  --    AST 33 30  --    BILITOT 1.1* 1.0  --       Coagulation:   Recent Labs   Lab 10/22/20  1224   INR 1.1     LDH:  No results for input(s): LDH in the last 72 hours.  Microbiology:  Microbiology Results (last 7 days)     ** No results found for the last 168 hours. **          I have reviewed all pertinent labs within the past 24 hours.    Diagnostic Results:  I have reviewed all pertinent imaging results/findings within the past 24 hours.  I have reviewed and interpreted all pertinent imaging results/findings within the past 24 hours.

## 2020-10-23 NOTE — RESPIRATORY THERAPY
Rapid Response Respiratory Therapy Note      Code Status: Full Code   : 1975  Bed: 1103/1103 A:   MRN: [id number]  Time page Received: 1308   Time Rapid Response RT at Bedside: 1310  Time Rapid Response RT left Bedside: 1340  Report given to: Ava JIMENEZ     Evaluated patient for: Rapid response for respiratory distress and hypercapnia.    BACKGROUND     Patient has a past medical history of BMI 70 and over, adult, Depression, Diabetes mellitus, DM (diabetes mellitus) type II controlled with renal manifestation, HTN (hypertension), Hyperlipidemia, Lumbar disc disease, Morbid obesity, Recurrent nephrolithiasis, Rosacea, Sleep apnea, and Tear of medial meniscus of right knee, current.    ASSESSMENT/INTERVENTIONS     Upon arrival in room Pt was on BiPAP 18/7 and 45% and lethargic but arousable and answering questions appropriately.     Pulse:  Respiratory rate:  BP: BP: 126/79 SpO2:94  Level of Consciousness: Level of Consciousness (AVPU): alert  Respiratory Effort: Respiratory Effort: Unlabored, Normal  Expansion/Accessory Muscle Usage: Expansion/Accessory Muscles/Retractions: no retractions, no use of accessory muscles, expansion symmetric  All Lung Field Breath Sounds: All Lung Fields Breath Sounds: Anterior:, Posterior:, clear, diminished  THEO Breath Sounds: diminished  LLL Breath Sounds: diminished  RUL Breath Sounds: diminished  RML Breath Sounds: diminished  RLL Breath Sounds: Posterior:, Anterior:, clear, diminished  O2 Device/Concentration: BiPAP  Most recent blood gas:   Recent Labs     10/23/20  1242   PH 7.165*   PCO2 112.7*   PO2 85   HCO3 40.7*   POCSATURATED 92*   BE 12     Current Respiratory Care Orders:   10/23/20 1035  Pulse Oximetry Continuous Continuous      10/23/20 1034   10/23/20 1033  Bipap Continuous Continuous (4 of 22 released)    Release   Question Answer Comment   FiO2% 40    Inspiratory pressure: 15    Expiratory pressure: 8               NIPPV: Yes, Type: BiPAP  Settings: 18/7 45% Surgical airway: No Vent: No  ETCO2 monitored:    Ambu at bedside: Ambu bag with the patient?: Yes, Adult Ambu    RECOMMENDATIONS   ?  We recommend: Getting pt to ICU for higher care and monitor.     ESCALATION      Discussed plan of care with Dr. Dumont and primary RT, Eboni.     FOLLOW-UP     Disposition: Tx in ICU bed 6091.    Please call back the Rapid Response RT, Ayanna Quintanilla, RRT at x [phone] for any questions or concerns.

## 2020-10-23 NOTE — ASSESSMENT & PLAN NOTE
46 y/o with acute hypoxic respiratory failure with hypoxia and hypercapnia in setting of morbid obesity, severe pulmonary HTN ccx by right sided HF, DM, and YARY. Labs notable for hyperkalemia (5.2), hyperphosphatemia (5.4), and ABG (pH 7.165/pCO2 112.7/pO2 85/HCO3 37.4). CXR notable for mild cardiomegaly; no pleural effusion evident. Hemodynamically stable. Nil alteration in mental status. Chest clear to ausculation. Weight however is a significant barrier to exam.       - continue BiPAP; 90%/50 lpm  - goal O2 saturations of > 88-92%  - repeat VBG @ 22:00; evaluate and optimize BiPAP settings  - continue diuresis 80 mg Furosemide q.d   - strict I/O's   - maintain alamo cath  - DVT prophylaxis enoxaparin 40 mg q.d

## 2020-10-23 NOTE — PROGRESS NOTES
Instructed my medical team to call rapid response due to patient's failing respiratory status. Rapid called and charge nurse Serene notified. Patient transferred to room 6091, report given.

## 2020-10-23 NOTE — CONSULTS
Wound care consult received from nursing for assessment of abdomen. 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  Pt with hyperkeratotic thickening skin over BLE and abdomen. No open wounds.  Recommendations:  - Continue with pressure injury prevention interventions with Bariatric SHAJI surface  - Miconazole powder to breast, abdomen and groin skin folds to reduce risk of fungal rash  Wound care team to sign off.  Please reconsult for any other concerns [phone]     Abdomen- hyperkeratotic thickening skin and moist skin folds.

## 2020-10-23 NOTE — PT/OT/SLP EVAL
Occupational Therapy   Co-Evaluation/Treatment    Name: Halley Moreno  MRN: [id number]  Admitting Diagnosis:  Congestive Heart Failure      Recommendations:     Discharge Recommendations: rehabilitation facility  Discharge Equipment Recommendations:  (TBD)  Barriers to discharge:  Decreased caregiver support    Assessment:     Halley Moreno is a 45 y.o. female with a medical diagnosis of Congestive Heart Failure.  She presents with impaired ADL and functional mobility performance. Pt is a fall risk. Performance deficits affecting function: weakness, impaired endurance, impaired self care skills, impaired functional mobilty, decreased upper extremity function, decreased lower extremity function, impaired skin, edema.  -Co-tx with PT performed due to need for education and assistance from two skilled therapy disciplines at pt's current functional level.     Rehab Prognosis: Fair; patient would benefit from acute skilled OT services to address these deficits and reach maximum level of function.       Plan:     Patient to be seen 3 x/week to address the above listed problems via self-care/home management, therapeutic activities, therapeutic exercises  · Plan of Care Expires: 11/22/20  · Plan of Care Reviewed with: patient    Subjective   Pt agreeable to session.  Chief Complaint: abdominal swelling  Patient/Family Comments/goals: be strong enough to go home    Occupational Profile:  Living Environment: Pt lives with mother, daughter, son, and son's girlfriend in a [address] with [address] [place]. Pt states she uses her jameson bathroom for toileting and her mother's bathroom for showering. Jameson bathroom has raised toilet. Mother's bathroom has walk-in shower with built in bench and grab bars to help support when sitting on bench. Pt states that prior to admit, she needed assistance with dressing (able to perform upper body dressing without assist), toileting, showering, and ambulating outside of the house.  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 Pt enjoyed bowling but stopped due to progressive weight gain and BLE weakness. Upon discharge, pt will have assistance from family but states she "wants to get stronger because her family all has jobs." Pt does not currently drive but intends to if start driving again if she "gets better." Pt is R handed.  Equipment Used at Home:  raised toilet, grab bar, power chair (broken), rollator, BIPAP, built-in shower chair      Pain/Comfort:  · Pain Rating 1: 0/10  · Pain Rating Post-Intervention 1: 0/10    Patients cultural, spiritual, Amish conflicts given the current situation: no    Objective:     Communicated with: nursing prior to session.  Patient found supine with alamo catheter, oxygen, peripheral IV, telemetry upon OT entry to room. Pt's daughter present during session.    General Precautions: Standard, fall   Orthopedic Precautions:N/A   Braces: N/A     Occupational Performance:    Bed Mobility:    · Patient completed Rolling/Turning to Left with  minimum assistance and x2 persons  · Patient completed Supine to Sit with moderate assistance x2 persons  · Patient completed Sit to Supine with maximal assistance and x2 persons    Functional Mobility/Transfers:  · Patient completed Sit <> Stand Transfer from EOB, couch, and BSC each with minimum assistance x2 persons  with  no assistive device   · Patient completed Bed <> Chair Transfer using Stand Pivot technique with contact guard assistance x2 persons with no assistive device  · Patient completed Toilet Transfer Stand Pivot technique with contact guard assistance x2 persons with  no AD. Pt sat quickly without supporting herself and leaned posteriorly on BSC requiring min assist to correct safely.     Activities of Daily Living:  · Upper Body Dressing: minimum assistance to ihsan gown seated EOB  · Toileting: maximal assistance     Cognitive/Visual " Perceptual:  Cognitive/Psychosocial Skills:     -       Oriented to: Person, Place, Time and Situation   -       Follows Commands/attention:Follows multistep  commands  -       Communication: clear/fluent  -       Memory: No Deficits noted  -       Safety awareness/insight to disability: impaired and but with poor insight to ability when sitting. Pt sits quickly and without supporting herself    -       Mood/Affect/Coping skills/emotional control: Appropriate to situation    Physical Exam:  Postural examination/scapula alignment:    -       No postural abnormalities identified  Skin integrity: Dry and red skin BLE below knee level  Edema:  Severe abdominal and BLE  Sensation:    -       Intact  Upper Extremity Range of Motion:     -       Right Upper Extremity: WFL  -       Left Upper Extremity: WFL  Upper Extremity Strength:    -       Right Upper Extremity: WFL  -       Left Upper Extremity: WFL   Strength:    -       Right Upper Extremity: WFL  -       Left Upper Extremity: WFL  Fine Motor Coordination:    -       Intact  Gross motor coordination:   WFL    AMPAC 6 Click ADL:  AMPAC Total Score: 15    Treatment & Education:  Pt edu on role of OT, POC, safety when performing self care tasks , benefit of performing OOB activity, and safety when performing functional transfers and mobility.  - White board updated  - Self care tasks completed-- as noted above     Pt educated to call for assistance for OOB activities.  Education:    Patient left supine with all lines intact, call button in reach, nursing notified and daughter present    GOALS:   Multidisciplinary Problems     Occupational Therapy Goals        Problem: Occupational Therapy Goal    Goal Priority Disciplines Outcome Interventions   Occupational Therapy Goal     OT, PT/OT Ongoing, Progressing    Description: Goals to be met by: 11/6/2020    Patient will increase functional independence with ADLs by performing:    UE Dressing with Stand-by  "Assistance.  LE Dressing with Moderate Assistance.  Grooming while EOB with Modified Hughes.  Toileting from bedside commode with Minimal Assistance for hygiene and clothing management.   Rolling to Bilateral with Stand-by Assistance.   Supine to sit with Contact Guard Assistance.  Stand pivot transfers with Supervision.  Toilet transfer to bedside commode with Supervision.                     History:     Past Medical History:   Diagnosis Date    BMI 70 and over, adult     Depression     Diabetes mellitus     DM (diabetes mellitus) type II controlled with renal manifestation     HTN (hypertension)     Hyperlipidemia     Lumbar disc disease     Morbid obesity     Recurrent nephrolithiasis     Rosacea     Sleep apnea     Tear of medial meniscus of right knee, current 2017       Past Surgical History:   Procedure Laterality Date     SECTION       SECTION  2000    DILATION AND CURETTAGE OF UTERUS      AUB "negative path" per patient    ENDOMETRIAL ABLATION         Time Tracking:     OT Date of Treatment: 10/23/20  OT Start Time: 920  OT Stop Time: 1004  OT Total Time (min): 44 min    Billable Minutes:Evaluation 10  Self Care/Home Management 17  Therapeutic Activity 17    LINSEY Orantes  10/23/2020    "

## 2020-10-23 NOTE — H&P
"[facility]  Critical Care Medicine  History & Physical    Patient Name: Halley Moreno  MRN: [id number]  Admission Date: 10/22/2020  Hospital Length of Stay: 1 days  Code Status: Full Code  Attending Physician: Kingston Matamoros*   Primary Care Provider: Yamila Tejeda MD   Principal Problem: Acute respiratory failure with hypoxia and hypercapnia    Subjective:     HPI:  44 y/o morbidly obese F presents for SOB on PMHx of pulmonary HTN with right-sided HF, YARY, DM, HTN, and depression. History obtained via patient and EMR.     Patient presented to clinic on 10/22/2020 for increasing weight gain refractory to diuretics (Lasix 40 mg b.i.d). Weight increased over 7 month period; approximately 100 lbs since 2020 and 80 lbs since May 2020. Patient has gained approximately 50 lbs over 2 week period. Patient was subsequently transferred to [facility] for acute hypoxic respiratory failure. At baseline sleeps on 3 pillows at home in an adjustable bed. She is not on home oxygen. Tolerates walking a block before being SOB. Patient non-compliant with CPAP. Has concomitant skin changes to hands bilaterally. Last echo notable for PA systolic pressure of 99. Admitted to ICU on 10/23/2020 for higher level of care.    ROS as noted below.    Hospital/ICU Course:  No notes on file     Past Medical History:   Diagnosis Date    BMI 70 and over, adult     Depression     Diabetes mellitus     DM (diabetes mellitus) type II controlled with renal manifestation     HTN (hypertension)     Hyperlipidemia     Lumbar disc disease     Morbid obesity     Recurrent nephrolithiasis     Rosacea     Sleep apnea     Tear of medial meniscus of right knee, current 2017       Past Surgical History:   Procedure Laterality Date     SECTION       SECTION  2000    DILATION AND CURETTAGE OF UTERUS  2010    AUB "negative path" per patient    ENDOMETRIAL ABLATION  2010       Review of " patient's allergies indicates:   Allergen Reactions    Victoza [liraglutide] Swelling       Family History     Problem Relation (Age of Onset)    Cancer Father    Diabetes Father    Heart disease Paternal Grandfather    Hypertension Mother        Tobacco Use    Smoking status: Never Smoker    Smokeless tobacco: Never Used   Substance and Sexual Activity    Alcohol use: No     Frequency: Monthly or less     Drinks per session: 1 or 2     Binge frequency: Never    Drug use: No    Sexual activity: Never     Partners: Male      Review of Systems   Constitutional: Negative for chills, diaphoresis and fever.   HENT: Negative for sore throat.    Eyes: Negative for visual disturbance.   Respiratory: Positive for shortness of breath. Negative for cough, chest tightness and wheezing.    Cardiovascular: Positive for leg swelling. Negative for chest pain.   Gastrointestinal: Positive for abdominal distention. Negative for constipation, diarrhea and nausea.   Genitourinary: Negative for dysuria.   Musculoskeletal: Negative for arthralgias, back pain and myalgias.   Neurological: Negative for light-headedness.   Psychiatric/Behavioral: Negative for agitation and confusion.     Objective:     Vital Signs (Most Recent):  Temp: 97.9 °F (36.6 °C) (10/23/20 1255)  Pulse: 80 (10/23/20 1310)  Resp: (!) 25 (10/23/20 1310)  BP: 126/79 (10/23/20 1305)  SpO2: (!) 94 % (10/23/20 1310) Vital Signs (24h Range):  Temp:  [97.9 °F (36.6 °C)-99.1 °F (37.3 °C)] 97.9 °F (36.6 °C)  Pulse:  [70-86] 80  Resp:  [16-25] 25  SpO2:  [90 %-99 %] 94 %  BP: (108-138)/(57-87) 126/79   Weight: (!) 226.8 kg (500 lb)  Body mass index is 88.57 kg/m².      Intake/Output Summary (Last 24 hours) at 10/23/2020 1427  Last data filed at 10/23/2020 0600  Gross per 24 hour   Intake 240 ml   Output 3450 ml   Net -3210 ml       Physical Exam  Vitals signs and nursing note reviewed.   Constitutional:       General: She is in acute distress.      Appearance: She is  morbidly obese. She is ill-appearing. She is not toxic-appearing or diaphoretic.      Interventions: Face mask in place.      Comments: Morbidly obese   HENT:      Head: Normocephalic and atraumatic.      Right Ear: External ear normal.      Left Ear: External ear normal.      Nose: Nose normal.      Mouth/Throat:      Comments: Unable to assess; biPAP mask obstructing view  Eyes:      General:         Right eye: No discharge.         Left eye: No discharge.      Extraocular Movements: Extraocular movements intact.      Conjunctiva/sclera: Conjunctivae normal.      Pupils: Pupils are equal, round, and reactive to light.   Neck:      Musculoskeletal: Normal range of motion.   Cardiovascular:      Rate and Rhythm: Normal rate and regular rhythm.      Pulses: Normal pulses.      Heart sounds: Normal heart sounds.   Pulmonary:      Effort: Tachypnea present.      Breath sounds: No stridor. Decreased breath sounds present. No wheezing or rales.      Comments: Chest CTAB; decreased breath sounds due to body habitus  Abdominal:      General: Bowel sounds are normal. There is distension.      Palpations: Abdomen is soft.      Tenderness: There is no abdominal tenderness. There is no guarding.   Musculoskeletal:      Right lower leg: Edema present.      Left lower leg: Edema present.      Comments: Edema to knees bilaterally   Skin:     General: Skin is warm and dry.      Coloration: Skin is not jaundiced.      Comments: Hemosiderin skin deposition of the bilateral lower limbs extending to ankles   Neurological:      General: No focal deficit present.      Mental Status: She is oriented to person, place, and time and easily aroused.   Psychiatric:         Mood and Affect: Mood normal.         Behavior: Behavior normal.         Vents:  Oxygen Concentration (%): 50 (10/23/20 1310)  Lines/Drains/Airways     Drain                 Urethral Catheter 10/22/20 1234 16 Fr. 1 day          Peripheral Intravenous Line                  Peripheral IV - Single Lumen 10/23/20 22 G Right Shoulder less than 1 day              Significant Labs:    CBC/Anemia Profile:  Recent Labs   Lab 10/22/20  1224 10/23/20  0325   WBC 10.97 10.55   HGB 15.2 14.4   HCT 52.7* 52.5*    177   MCV 91 92   RDW 22.0* 21.7*        Chemistries:  Recent Labs   Lab 10/22/20  1224 10/22/20  2006 10/23/20  0325    143 141   K 5.3* 5.4* 5.2*   CL 97 99 98   CO2 33* 31* 29   BUN 43* 43* 42*   CREATININE 1.3 1.2 1.2   CALCIUM 8.9 8.7 8.6*   ALBUMIN 3.3* 3.1*  --    PROT 7.1 6.7  --    BILITOT 1.1* 1.0  --    ALKPHOS 75 73  --    ALT 26 25  --    AST 33 30  --    MG  --   --  1.8   PHOS  --   --  5.4*       All pertinent labs within the past 24 hours have been reviewed.    Significant Imaging: I have reviewed all pertinent imaging results/findings within the past 24 hours.    Assessment/Plan:     Pulmonary  * Acute respiratory failure with hypoxia and hypercapnia  44 y/o with acute hypoxic respiratory failure with hypoxia and hypercapnia in setting of morbid obesity, severe pulmonary HTN ccx by right sided HF, DM, and YARY. Labs notable for hyperkalemia (5.2), hyperphosphatemia (5.4), and ABG (pH 7.165/pCO2 112.7/pO2 85/HCO3 37.4). CXR notable for mild cardiomegaly; no pleural effusion evident. Hemodynamically stable. Nil alteration in mental status. Chest clear to ausculation. Weight however is a significant barrier to exam.       - continue BiPAP; 90%/50 lpm  - goal O2 saturations of > 88-92%  - repeat VBG @ 22:00; evaluate and optimize BiPAP settings  - continue diuresis 80 mg Furosemide q.d   - strict I/O's   - maintain alamo cath  - DVT prophylaxis enoxaparin 40 mg q.d        Right heart failure due to pulmonary hypertension  - continue diuretic   - avoid salt intake < 2 g/day  - hold antihypertensives until stable  - fluid restriction    Cardiac/Vascular  Hypertension associated with diabetes  Normotensive.       - MAP > 65  - hold blood pressure medications  -  continue home statin    Renal/  Hyperphosphatemia  5.4      - monitor q.d    Hyperkalemia  5.2       - monitor q.d    Other  Sleep apnea  - continue biPAP overnight        Critical Care Daily Checklist:    A: Awake: RASS Goal/Actual Goal: RASS Goal: 0-->alert and calm  Actual: Narayanan Agitation Sedation Scale (RASS): Alert and calm   B: Spontaneous Breathing Trial Performed?     C: SAT & SBT Coordinated?  N/A                      D: Delirium: CAM-ICU Overall CAM-ICU: Negative   E: Early Mobility Performed? No   F: Feeding Goal:    Status:     Current Diet Order   Procedures    Diet diabetic [facility]; 2000 Calorie; Cardiac (Low Na/Chol); Fluid - 1500mL     Order Specific Question:   Indicate patient location for additional diet options:     Answer:   [facility]     Order Specific Question:   Total calories:     Answer:   2000 Calorie     Order Specific Question:   Additional Diet Options:     Answer:   Cardiac (Low Na/Chol)     Order Specific Question:   Fluid restriction:     Answer:   Fluid - 1500mL      AS: Analgesia/Sedation No   T: Thromboembolic Prophylaxis Yes   H: HOB > 300 Yes   U: Stress Ulcer Prophylaxis (if needed) Yes   G: Glucose Control Yes   B: Bowel Function     I: Indwelling Catheter (Lines & Hernandez) Necessity Yes   D: De-escalation of Antimicrobials/Pharmacotherapies N/A    Plan for the day/ETD Yes    Code Status:  Family/Goals of Care: Full Code         Critical secondary to Patient has a condition that poses threat to life and bodily function: Severe Respiratory Distress     Critical care was time spent personally by me on the following activities: development of treatment plan with patient or surrogate and bedside caregivers, discussions with consultants, evaluation of patient's response to treatment, examination of patient, ordering and performing treatments and interventions, ordering and review of laboratory studies, ordering and review of radiographic studies, pulse  oximetry, re-evaluation of patient's condition. This critical care time did not overlap with that of any other provider or involve time for any procedures.     Miky Goode MD  Critical Care Medicine  [facility]

## 2020-10-23 NOTE — HPI
46 y/o morbidly obese F presents for SOB on PMHx of pulmonary HTN with right-sided HF, YARY, DM, HTN, and depression. History obtained via patient and EMR.     Patient presented to clinic on 10/22/2020 for increasing weight gain refractory to diuretics (Lasix 40 mg b.i.d). Weight increased over 7 month period; approximately 100 lbs since March 2020 and 80 lbs since May 2020. Patient has gained approximately 50 lbs over 2 week period. Patient was subsequently transferred to [facility] for acute hypoxic respiratory failure. At baseline sleeps on 3 pillows at home in an adjustable bed. She is not on home oxygen. Tolerates walking a block before being SOB. Patient non-compliant with CPAP. Has concomitant skin changes to hands bilaterally. Last echo notable for PA systolic pressure of 99. Admitted to ICU on 10/23/2020 for higher level of care. Managed with aggressive diuresis and BiPAP. Stepped down on 10/25/2020.

## 2020-10-23 NOTE — CONSULTS
Midline placed  in left cephalic, size 53Sn13ft Lot# [id number] Removal date on or before 11/21/20. Needle advanced into vessel with real time ultrasound guidance. Image recorded and saved.

## 2020-10-23 NOTE — PLAN OF CARE
Problem: Physical Therapy Goal  Goal: Physical Therapy Goal  Description: Goals to be met by: 2020    Patient will increase functional independence with mobility by performin. Supine to sit with MInimal Assistance of 2 persons.  2. Sit to supine with Moderate Assistance of 2 persons.  3. Sit to stand transfer with Minimal Assistance of 1 person.  4. Bed to chair stand pivot transfer with Contact Guard Assistance of 1 person using Rolling Walker.  5. Gait  x 40 feet with Contact Guard Assistance of 1 person using Rolling Walker.   6. Lower extremity exercise program x20 reps per handout, with assistance as needed.    10/23/2020 1449 by Jenny Kitchen, SPT  Outcome: Ongoing, Progressing  10/23/2020 1415 by Jenny Kitchen, SPT  Outcome: Ongoing, Progressing

## 2020-10-23 NOTE — PLAN OF CARE
CM met with patient and mom (Ann) at the bedside to discuss D/C POC needs. Patient AAO x's 3 and able to verify demographics in the chart are correct. CM name and contact number listed on the patient's white board.  CM provided explanation of discharge plan process. CM left dc planning booklet at the bedside with explanation of qualification for placement and facility resources. Patient/family expressed understanding. CM remains available for any further patient needs or concerns.   Mother states that she helps care for patient at home.  Lives in a single level home, no steps to enter.      Yamila Tejeda MD  [address] / [place] [address]      [id number] - [place], [place] - [address] [facility]  [address] [facility] [address]  Phone: [phone] Fax: [phone]    Extended Emergency Contact Information  Primary Emergency Contact: Ann Aragon  Address: [address] [place] [id number] [facility]  Home Phone: [phone]  Relation: Mother    Future Appointments   Date Time Provider Department Center   11/2/2020  2:00 PM Yamila Tejeda MD [facility] IM [place]   12/15/2020  1:30 PM Trey Rose MD [facility] CARDIO [facility]   2/8/2021  9:45 AM [place] OPHTHAL [place]   2/8/2021 10:00 AM Saroj Archibald OD [facility] OPTOMTY [place]         Payor: MEDICAID / Plan: [facility] ([place] MEDICAID) / Product Type: Managed Medicaid /     Morbid obesity [E66.01]  Shortness of breath [R06.02]  Hyperkalemia [E87.5]  Acute respiratory failure with hypoxia [J96.01]  Right heart failure due to pulmonary hypertension [I27.29, I50.810]  Acute kidney injury [N17.9]  Hypervolemia, unspecified hypervolemia type [E87.70]  Acute on chronic congestive heart failure, unspecified heart failure type [I50.9]  Acute respiratory failure with hypoxia [J96.01]  Acute respiratory failure with hypoxia [J96.01]  Acute respiratory failure  with hypoxia [J96.01]  Acute respiratory failure with hypoxia [J96.01]         10/23/20 1425   Discharge Assessment   Assessment Type Discharge Planning Assessment   Confirmed/corrected address and phone number on facesheet? Yes   Assessment information obtained from? Caregiver;Patient   Prior to hospitilization cognitive status: Alert/Oriented   Prior to hospitalization functional status: Assistive Equipment;Needs Assistance   Current cognitive status: Alert/Oriented   Current Functional Status: Needs Assistance;Partially Dependent   Facility Arrived From: ED   Lives With parent(s)   Able to Return to Prior Arrangements other (see comments)  (TBD)   Is patient able to care for self after discharge? Unable to determine at this time (comments)   Who are your caregiver(s) and their phone number(s)? Ann Moreno ([facility]) [phone]   Readmission Within the Last 30 Days no previous admission in last 30 days   Patient currently being followed by outpatient case management? No   Patient currently receives any other outside agency services? No   Equipment Currently Used at Home grab bar;power chair;rollator;BIPAP;raised toilet;shower chair   Do you have any problems affording any of your prescribed medications? No   Is the patient taking medications as prescribed? yes   Does the patient have transportation home? Yes   Transportation Anticipated family or friend will provide   Does the patient receive services at the [facility]? No   Discharge Plan A Home with family;Home Health   Discharge Plan B Skilled Nursing Facility   DME Needed Upon Discharge  other (see comments)  (TBD)       Krish De Oliveira MSN, RN-BC  Critical Care-   Ext. [phone]

## 2020-10-23 NOTE — CONSULTS
"[facility]  Heart Transplant  Consult Note    Patient Name: Halley Moreno  MRN: [id number]  Admission Date: 10/22/2020  Hospital Length of Stay: 1 days  Attending Physician: Adán Muller MD  Primary Care Provider: Yamila Tejeda MD   Principal Problem:<principal problem not specified>    Inpatient consult to Heart Transplant  Consult performed by: Juanpablo Willis MD  Consult ordered by: Miky Beltran MD        Subjective:     History of Present Illness:  Ms. Halley Moreno, 45 y.o. 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 Moderately reduced RV pressure. She presented to ED on 10/22/2020 from Internal Medicine clinic Dr. Yamila Tejeda MD with symptoms of worsening dyspnea on exertion and increasing in her weight gain > 50 lb in the last two weeks. She was only on lasix 80 mg daily. She is very limited in her ambulation given her very elevated BMI. This symptoms of worsening dyspnea on exertion was associated with orthopnea and weight gain. She denies chest pain, palpitation or loss of consciousness. Since admission, she was on diuresis with good response to lasix. \[facility] [place] [facility]\"" consulted for recommendation on HF management.     Past Medical History:   Diagnosis Date    BMI 70 and over, adult     Depression     Diabetes mellitus     DM (diabetes mellitus) type II controlled with renal manifestation     HTN (hypertension)     Hyperlipidemia     Lumbar disc disease     Morbid obesity     Recurrent nephrolithiasis     Rosacea     Sleep apnea     Tear of medial meniscus of right knee, current 2017       Past Surgical History:   Procedure Laterality Date     SECTION       SECTION  2000    DILATION AND CURETTAGE OF UTERUS  2010    AUB "negative path" per patient    ENDOMETRIAL ABLATION         Review of patient's allergies indicates:   Allergen Reactions    Victoza [liraglutide] " Swelling       Current Facility-Administered Medications   Medication    acetaminophen tablet 650 mg    atorvastatin tablet 10 mg    dextrose 50% injection 12.5 g    dextrose 50% injection 25 g    enoxaparin injection 60 mg    furosemide (LASIX) 500 mg infusion (conc: 10 mg/mL)    furosemide injection 80 mg    glucagon (human recombinant) injection 1 mg    glucose chewable tablet 16 g    glucose chewable tablet 24 g    influenza (QUADRIVALENT PF) vaccine 0.5 mL    insulin aspart U-100 pen 0-5 Units    melatonin tablet 6 mg    ondansetron disintegrating tablet 8 mg    polyethylene glycol packet 17 g    sodium chloride 0.9% flush 10 mL     Family History     Problem Relation (Age of Onset)    Cancer Father    Diabetes Father    Heart disease Paternal Grandfather    Hypertension Mother        Tobacco Use    Smoking status: Never Smoker    Smokeless tobacco: Never Used   Substance and Sexual Activity    Alcohol use: No     Frequency: Monthly or less     Drinks per session: 1 or 2     Binge frequency: Never    Drug use: No    Sexual activity: Never     Partners: Male     Review of Systems   Constitutional: Positive for activity change, appetite change, fatigue and unexpected weight change.   HENT: Negative for dental problem.    Respiratory: Positive for shortness of breath and wheezing. Negative for cough.    Cardiovascular: Positive for leg swelling. Negative for chest pain.   Gastrointestinal: Negative for abdominal pain, constipation and diarrhea.   Genitourinary: Negative for difficulty urinating.   Musculoskeletal: Negative for arthralgias and back pain.   Neurological: Negative for weakness.   Psychiatric/Behavioral: Negative for agitation.     Objective:     Vital Signs (Most Recent):  Temp: 97.9 °F (36.6 °C) (10/23/20 1137)  Pulse: 77 (10/23/20 1010)  Resp: 18 (10/23/20 0826)  BP: 119/64 (10/23/20 1010)  SpO2: (!) 90 % (10/23/20 0826) Vital Signs (24h Range):  Temp:  [97.9 °F (36.6 °C)-99.1  °F (37.3 °C)] 97.9 °F (36.6 °C)  Pulse:  [70-86] 77  Resp:  [10-25] 18  SpO2:  [90 %-99 %] 90 %  BP: (108-136)/(57-76) 119/64     Patient Vitals for the past 72 hrs (Last 3 readings):   Weight   10/23/20 1010 (!) 226.8 kg (500 lb)   10/23/20 0400 (!) 226 kg (498 lb 3.8 oz)   10/22/20 1541 (!) 220.9 kg (486 lb 15.9 oz)     Body mass index is 88.57 kg/m².      Intake/Output Summary (Last 24 hours) at 10/23/2020 1150  Last data filed at 10/23/2020 0600  Gross per 24 hour   Intake 240 ml   Output 3450 ml   Net -3210 ml       Physical Exam  Vitals signs reviewed.   Constitutional:       General: She is not in acute distress.     Appearance: She is obese.      Comments: Morbidly obese    HENT:      Head: Normocephalic.      Nose: Congestion present.   Eyes:      General:         Right eye: No discharge.         Left eye: No discharge.   Neck:      Musculoskeletal: Normal range of motion.      Vascular: No JVD.   Cardiovascular:      Rate and Rhythm: Normal rate.      Heart sounds: Murmur (distant heart sound) present.   Pulmonary:      Effort: Pulmonary effort is normal. No respiratory distress.      Breath sounds: Normal breath sounds.   Abdominal:      Palpations: Abdomen is soft.   Musculoskeletal: Normal range of motion.         General: Swelling present.      Right lower leg: Edema present.      Left lower leg: Edema present.   Skin:     General: Skin is warm.   Neurological:      Mental Status: She is alert and oriented to person, place, and time.         Significant Labs:  CBC:  Recent Labs   Lab 10/22/20  1224 10/23/20  0325   WBC 10.97 10.55   RBC 5.82* 5.70*   HGB 15.2 14.4   HCT 52.7* 52.5*    177   MCV 91 92   MCH 26.1* 25.3*   MCHC 28.8* 27.4*     BNP:  Recent Labs   Lab 10/22/20  1224   *     CMP:  Recent Labs   Lab 10/22/20  1224 10/22/20  2006 10/23/20  0325    107 86   CALCIUM 8.9 8.7 8.6*   ALBUMIN 3.3* 3.1*  --    PROT 7.1 6.7  --     143 141   K 5.3* 5.4* 5.2*   CO2 33* 31*  29   CL 97 99 98   BUN 43* 43* 42*   CREATININE 1.3 1.2 1.2   ALKPHOS 75 73  --    ALT 26 25  --    AST 33 30  --    BILITOT 1.1* 1.0  --       Coagulation:   Recent Labs   Lab 10/22/20  1224   INR 1.1     LDH:  No results for input(s): LDH in the last 72 hours.  Microbiology:  Microbiology Results (last 7 days)     ** No results found for the last 168 hours. **          I have reviewed all pertinent labs within the past 24 hours.    Diagnostic Results:  I have reviewed all pertinent imaging results/findings within the past 24 hours.  I have reviewed and interpreted all pertinent imaging results/findings within the past 24 hours.    Assessment/Plan:     Acute on chronic heart failure with preserved ejection fraction  - Acute exacerbation of heart failure could be from non-compliance with medication or diet restriction, less likely to be in ischemia or arrhythmia in the absence of symptoms and ECG changes.  - Based on TTE dated 9/2020 showed EF is preserved with severely elevated PASP (coued % with no diastolic dysfunction.  - This is most likely Group III WHO Pulmonary Hypertension given her history of YARY and possible obesity hypoventilation syndrome.   - Continue avoiding worsening of her pressure by control her YARY (CPAP) and weight loss referral   - NYHA functional classification III, ACCF/AHA Stage of Heart Failure is C.  - Agree with aggressive diuresis:   - Consider changing the IV push to IV continuous infusion (I.e., 80 mg IV lasix x 1 followed by lasix 20 mg/hr continue infusion)   - Twice daily electrolytes to ensure stability   - Continue strict I/Os and adjust lasix gtt when she is close to be euvolemic     Thank you for your consult. I will sign off. Please contact us if you have any additional questions.    Juanpablo Willis MD  Heart Transplant  [facility]

## 2020-10-23 NOTE — SUBJECTIVE & OBJECTIVE
"Past Medical History:   Diagnosis Date    BMI 70 and over, adult     Depression     Diabetes mellitus     DM (diabetes mellitus) type II controlled with renal manifestation     HTN (hypertension)     Hyperlipidemia     Lumbar disc disease     Morbid obesity     Recurrent nephrolithiasis     Rosacea     Sleep apnea     Tear of medial meniscus of right knee, current 2017       Past Surgical History:   Procedure Laterality Date     SECTION       SECTION  2000    DILATION AND CURETTAGE OF UTERUS      AUB "negative path" per patient    ENDOMETRIAL ABLATION         Review of patient's allergies indicates:   Allergen Reactions    Victoza [liraglutide] Swelling       Family History     Problem Relation (Age of Onset)    Cancer Father    Diabetes Father    Heart disease Paternal Grandfather    Hypertension Mother        Tobacco Use    Smoking status: Never Smoker    Smokeless tobacco: Never Used   Substance and Sexual Activity    Alcohol use: No     Frequency: Monthly or less     Drinks per session: 1 or 2     Binge frequency: Never    Drug use: No    Sexual activity: Never     Partners: Male      Review of Systems   Constitutional: Negative for chills, diaphoresis and fever.   HENT: Negative for sore throat.    Eyes: Negative for visual disturbance.   Respiratory: Positive for shortness of breath. Negative for cough, chest tightness and wheezing.    Cardiovascular: Positive for leg swelling. Negative for chest pain.   Gastrointestinal: Positive for abdominal distention. Negative for constipation, diarrhea and nausea.   Genitourinary: Negative for dysuria.   Musculoskeletal: Negative for arthralgias, back pain and myalgias.   Neurological: Negative for light-headedness.   Psychiatric/Behavioral: Negative for agitation and confusion.     Objective:     Vital Signs (Most Recent):  Temp: 97.9 °F (36.6 °C) (10/23/20 1255)  Pulse: 80 (10/23/20 1310)  Resp: (!) 25 (10/23/20 " 1310)  BP: 126/79 (10/23/20 1305)  SpO2: (!) 94 % (10/23/20 1310) Vital Signs (24h Range):  Temp:  [97.9 °F (36.6 °C)-99.1 °F (37.3 °C)] 97.9 °F (36.6 °C)  Pulse:  [70-86] 80  Resp:  [16-25] 25  SpO2:  [90 %-99 %] 94 %  BP: (108-138)/(57-87) 126/79   Weight: (!) 226.8 kg (500 lb)  Body mass index is 88.57 kg/m².      Intake/Output Summary (Last 24 hours) at 10/23/2020 1427  Last data filed at 10/23/2020 0600  Gross per 24 hour   Intake 240 ml   Output 3450 ml   Net -3210 ml       Physical Exam  Vitals signs and nursing note reviewed.   Constitutional:       General: She is in acute distress.      Appearance: She is morbidly obese. She is ill-appearing. She is not toxic-appearing or diaphoretic.      Interventions: Face mask in place.      Comments: Morbidly obese   HENT:      Head: Normocephalic and atraumatic.      Right Ear: External ear normal.      Left Ear: External ear normal.      Nose: Nose normal.      Mouth/Throat:      Comments: Unable to assess; biPAP mask obstructing view  Eyes:      General:         Right eye: No discharge.         Left eye: No discharge.      Extraocular Movements: Extraocular movements intact.      Conjunctiva/sclera: Conjunctivae normal.      Pupils: Pupils are equal, round, and reactive to light.   Neck:      Musculoskeletal: Normal range of motion.   Cardiovascular:      Rate and Rhythm: Normal rate and regular rhythm.      Pulses: Normal pulses.      Heart sounds: Normal heart sounds.   Pulmonary:      Effort: Tachypnea present.      Breath sounds: No stridor. Decreased breath sounds present. No wheezing or rales.      Comments: Chest CTAB; decreased breath sounds due to body habitus  Abdominal:      General: Bowel sounds are normal. There is distension.      Palpations: Abdomen is soft.      Tenderness: There is no abdominal tenderness. There is no guarding.   Musculoskeletal:      Right lower leg: Edema present.      Left lower leg: Edema present.      Comments: Edema to knees  bilaterally   Skin:     General: Skin is warm and dry.      Coloration: Skin is not jaundiced.      Comments: Hemosiderin skin deposition of the bilateral lower limbs extending to ankles   Neurological:      General: No focal deficit present.      Mental Status: She is oriented to person, place, and time and easily aroused.   Psychiatric:         Mood and Affect: Mood normal.         Behavior: Behavior normal.         Vents:  Oxygen Concentration (%): 50 (10/23/20 1310)  Lines/Drains/Airways     Drain                 Urethral Catheter 10/22/20 1234 16 Fr. 1 day          Peripheral Intravenous Line                 Peripheral IV - Single Lumen 10/23/20 22 G Right Shoulder less than 1 day              Significant Labs:    CBC/Anemia Profile:  Recent Labs   Lab 10/22/20  1224 10/23/20  0325   WBC 10.97 10.55   HGB 15.2 14.4   HCT 52.7* 52.5*    177   MCV 91 92   RDW 22.0* 21.7*        Chemistries:  Recent Labs   Lab 10/22/20  1224 10/22/20  2006 10/23/20  0325    143 141   K 5.3* 5.4* 5.2*   CL 97 99 98   CO2 33* 31* 29   BUN 43* 43* 42*   CREATININE 1.3 1.2 1.2   CALCIUM 8.9 8.7 8.6*   ALBUMIN 3.3* 3.1*  --    PROT 7.1 6.7  --    BILITOT 1.1* 1.0  --    ALKPHOS 75 73  --    ALT 26 25  --    AST 33 30  --    MG  --   --  1.8   PHOS  --   --  5.4*       All pertinent labs within the past 24 hours have been reviewed.    Significant Imaging: I have reviewed all pertinent imaging results/findings within the past 24 hours.

## 2020-10-23 NOTE — CODE/ RAPID DOCUMENTATION
"RAPID RESPONSE NURSE NOTE     Admit Date: 10/22/2020  LOS: 1  Code Status: Full Code   Date of Consult: 10/23/2020  : 1975  Age: 45 y.o.  Weight:   Wt Readings from Last 1 Encounters:   10/23/20 (!) 226.8 kg (500 lb)     Sex: female  Race: White   Bed: [address] [place] [address] A:   MRN: [id number]  Time Rapid Response Team page Received: 1250  Time Rapid Response Team at Bedside: 1251  Time Rapid Response Team left Bedside: 1325  Was the patient discharged from an ICU this admission?   no  Was the patient discharged from a PACU within last 24 hours?  no  Did the patient receive conscious sedation/general anesthesia within last 24 hours?  no  Was the patient in the ED within the past 24 hours?  no  Was the patient started on NIPPV within the past 24 hours?  yes  Did this progress into an ARC or CPA:  no  Attending Physician: Kingston Matamoros*  Primary Service: [facility]  Consult Requested By: Kingston Matamoros*     SITUATION     Notified by code pager.  Reason for alert: Hypoxia, Hypercapnia  Called to evaluate the patient for Respiratory    BACKGROUND     Why is the patient in the hospital?: <principal problem not specified>    Patient has a past medical history of BMI 70 and over, adult, Depression, Diabetes mellitus, DM (diabetes mellitus) type II controlled with renal manifestation, HTN (hypertension), Hyperlipidemia, Lumbar disc disease, Morbid obesity, Recurrent nephrolithiasis, Rosacea, Sleep apnea, and Tear of medial meniscus of right knee, current.    ASSESSMENT/INTERVENTIONS     /79   Pulse 80   Temp 97.9 °F (36.6 °C)   Resp (!) 25   Ht 5' 3" (1.6 m)   Wt (!) 226.8 kg (500 lb)   LMP  (LMP Unknown)   SpO2 (!) 94%   Breastfeeding No   BMI 88.57 kg/m²     What did you find:     RRT RNs called to bedside R/T hypoxia/hypercapnia.  Upon arrival to room, pt in bed.   HOB increased to 45 degrees.   Pt AAOx4.   Opens eyes spontaneously.   Following all commands.   Nods/gestures " appropriately.   ABG obtained.   See Results Review for details.   Pt on BiPAP, FiO2: 45%, 10/5.  FANNY Jorgensen MD at bedside.   CCM called to bedside.   22g IV obtained per RRT RN.   Labs collected.  Plan of care reviewed with pt.   Pts mother to be contacted.   Pt transferred to ICU 6091.      RECOMMENDATIONS     We recommend: transfer to ICU 6091.     FOLLOW-UP/CONTINGENCY PLAN     Call the Rapid Response Nurse, Nery Suárez RN at x [phone] for additional questions or concerns.    PHYSICIAN ESCALATION     Orders received and case discussed with Dr. FANNY Dumont.    Disposition: Tx in ICU bed 6091.

## 2020-10-23 NOTE — HPI
Ms. Halley Moreno, 45 y.o. 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 Moderately reduced RV pressure. She presented to ED on 10/22/2020 from Internal Medicine clinic Dr. Yamila Tejeda MD with symptoms of worsening dyspnea on exertion and increasing in her weight gain > 50 lb in the last two weeks. She was only on lasix 80 mg daily. She is very limited in her ambulation given her very elevated BMI. This symptoms of worsening dyspnea on exertion was associated with orthopnea and weight gain. She denies chest pain, palpitation or loss of consciousness. Since admission, she was on diuresis with good response to lasix. [facility] consulted for recommendation on HF management.

## 2020-10-23 NOTE — NURSING
Informed IM1 that patient was desating into the 85-89% range when she was falling asleep.  Patient is refusing cpap but states she will have family bring her home bipap up to her.  Will continue to monitor.

## 2020-10-23 NOTE — ASSESSMENT & PLAN NOTE
- Acute exacerbation of heart failure could be from non-compliance with medication or diet restriction, less likely to be in ischemia or arrhythmia in the absence of symptoms and ECG changes.  - Based on TTE dated 9/2020 showed EF is preserved with severely elevated PASP (coued % with no diastolic dysfunction.  - NYHA functional classification III, ACCF/AHA Stage of Heart Failure is C.  - Agree with aggressive diuresis:   - Consider changing the IV push to IV continuous infusion (I.e., 80 mg IV lasix x 1 followed by lasix 20 mg/hr continue infusion)   - Twice daily electrolytes to ensure stability   - Continue strict I/Os and adjust lasix gtt when she is close to be euvolemic

## 2020-10-23 NOTE — PLAN OF CARE
Problem: Occupational Therapy Goal  Goal: Occupational Therapy Goal  Description: Goals to be met by: 11/6/2020    Patient will increase functional independence with ADLs by performing:    UE Dressing with Stand-by Assistance.  LE Dressing with Moderate Assistance.  Grooming while EOB with Modified [place].  Toileting from bedside commode with Minimal Assistance for hygiene and clothing management.   Rolling to Bilateral with Stand-by Assistance.   Supine to sit with Contact Guard Assistance.  Stand pivot transfers with Supervision.  Toilet transfer to bedside commode with Supervision.    Outcome: Ongoing, Progressing   Pt evaluated and POC established.

## 2020-10-23 NOTE — CONSULTS
Consult received and appreciated. Patient evaluated and will be admitted to the MICU service for further care. Thank you.    Jerrod Sandoval MD  Critical Care Medicine

## 2020-10-23 NOTE — PLAN OF CARE
Problem: Physical Therapy Goal  Goal: Physical Therapy Goal  Description: Goals to be met by: 2020    Patient will increase functional independence with mobility by performin. Supine to sit with MInimal Assistance.  2. Sit to supine with Moderate Assistance.  3. Sit to stand transfer with Contact Guard Assistance.  4. Bed to chair transfer with Stand-by Assistance using Rolling Walker.  5. Gait  x 40 feet with Contact Guard Assistance using Rolling Walker.   6. Lower extremity exercise program x20 reps per handout, with assistance as needed.    Outcome: Ongoing, Progressing   PT goals established.     Jenny Kitchen, SPT

## 2020-10-23 NOTE — PT/OT/SLP EVAL
"Physical Therapy Evaluation    Patient Name:  Halley Moreno   MRN:  [id number]    Recommendations:     Discharge Recommendations:  rehabilitation facility   Discharge Equipment Recommendations: (TBD pending pt progress)   Barriers to discharge: Decreased caregiver support    Assessment:     Halley Moreno is a 45 y.o. female admitted with a medical diagnosis of Acute on chronic heart failure with preserved ejection fraction.    Upon PT evaluation, pt presents with Morbid Obesity with BMI 88.57, Diabetes Mellitus, Depression. She presents with the following impairments/functional limitations:  weakness, impaired endurance, impaired self care skills, impaired functional mobilty, gait instability, impaired balance, decreased lower extremity function, decreased upper extremity function, decreased ROM, edema, impaired cardiopulmonary response to activity. Pt able to perform stand-pivot transfer with CGA x 2 persons. Pt is unsafe with functional mobility and requires assistance for safety with bed mobility and transfers. Pt reports feeling weaker and more short-winded than usual, and is motivated to improve her strength and endurance to promote independence.    Rehab Prognosis: Good; patient would benefit from acute skilled PT services to address these deficits and reach maximum level of function.    Recent Surgery: * No surgery found *      Plan:     During this hospitalization, patient to be seen 3 x/week to address the identified rehab impairments via gait training, therapeutic activities, therapeutic exercises, neuromuscular re-education and progress toward the following goals:    · Plan of Care Expires:  11/21/20    Subjective     Chief Complaint: Weakness and decreased endurance  Patient/Family Comments/goals: "I'm feeling alive."  Pain/Comfort:  · Pain Rating 1: 0/10    Patients cultural, spiritual, Restoration conflicts given the current situation: no    Living Environment:  Pt [facility] various family " members [facility]     Prior to admission, patients level of function was independent with short distance ambulation in the home, i.e. from her chair to her bed. She needs assistance with showering, toileting, and dressing. Pt does not leave the home. Pt states that she is limited primarily by her SOB and feelings of weakness. Equipment used at home: grab bar, rollator, BIPAP, raised toilet, shower chair. Pt states her power chair recently broke. DME owned (not currently used): recently purchased a bariatric rollator.  Upon discharge, patient will have assistance from family.    Objective:     Communicated with nursing prior to session.  Patient found supine with peripheral IV, oxygen, alamo catheter, telemetry(VISI)  upon PT entry to room.    General Precautions: Standard, fall   Orthopedic Precautions:N/A   Braces: N/A     Exams:  · Cognitive Exam:  Patient is oriented to Person, Place, Time and Situation  · Fine Motor Coordination:    · -       Intact  · Gross Motor Coordination:  WFL  · Postural Exam:  Patient presented with the following abnormalities:    · -       No postural abnormalities identified  · Sensation:    · -       Intact  · Skin Integrity/Edema:      · -      Hemosiderin staining BLE.  · -       Edema: Moderate due to weight gain and fluid retention.  · RUE ROM: limited shoulder flexion  · RUE Strength: WFL  · LUE ROM: limited shoulder flexion  · LUE Strength: WFL  · RLE ROM: WFL  · RLE Strength: WFL  · LLE ROM: WFL  · LLE Strength: WFL    Functional Mobility:  · Bed Mobility:     · Rolling Left:  moderate assistance of 1 person  · Scooting: contact guard assistance to HOB with use of UE on bedrails  · Supine to Sit: moderate assistance of 2 persons  · Sit to Supine: maximal assistance of 2 persons    · Transfers:     · Sit to Stand:  minimal assistance of 2 persons with no AD, from bed, chair, and BSC  · Bed to Couch: contact guard assistance of 2 persons with  no AD  using  Stand  Pivot  · Couch to BSC: contact guard assistance of 2 persons with  no AD  using  Stand Pivot  · BSC to Bed: contact guard assistance of 2 persons with  no AD  using  Stand Pivot    · Balance: CGA dynamic standing. Required blocking of B knee while EOB secondary to anterior sliding on bariatric bed.    Therapeutic Activities and Exercises:   Whiteboard updated.    Pt educated on avoidance of prolonged sitting in order to prevent loss of circulation or occlusion of nerves.     Nurse present to tend to IV secondary to pt complaints of itching and swelling.    Toileting with maxA in stance.    AM-PAC 6 CLICK MOBILITY  Total Score:15     Patient left supine with all lines intact, call button in reach and nurse notified and daughter present.    GOALS:   Multidisciplinary Problems     Physical Therapy Goals        Problem: Physical Therapy Goal    Goal Priority Disciplines Outcome Goal Variances Interventions   Physical Therapy Goal     PT, PT/OT Ongoing, Progressing     Description: Goals to be met by: 2020    Patient will increase functional independence with mobility by performin. Supine to sit with MInimal Assistance of 2 persons.  2. Sit to supine with Moderate Assistance of 2 persons.  3. Sit to stand transfer with Minimal Assistance of 1 person.  4. Bed to chair stand pivot transfer with Contact Guard Assistance of 1 person using Rolling Walker.  5. Gait  x 40 feet with Contact Guard Assistance of 1 person using Rolling Walker.   6. Lower extremity exercise program x20 reps per handout, with assistance as needed.                     History:     Past Medical History:   Diagnosis Date    BMI 70 and over, adult     Depression     Diabetes mellitus     DM (diabetes mellitus) type II controlled with renal manifestation     HTN (hypertension)     Hyperlipidemia     Lumbar disc disease     Morbid obesity     Recurrent nephrolithiasis     Rosacea     Sleep apnea     Tear of medial meniscus of right  "knee, current 2017       Past Surgical History:   Procedure Laterality Date     SECTION       SECTION  2000    DILATION AND CURETTAGE OF UTERUS      AUB "negative path" per patient    ENDOMETRIAL ABLATION         Time Tracking:     PT Received On: 10/23/20  PT Start Time: 920     PT Stop Time: 1008  PT Total Time (min): 48 min     Billable Minutes: Evaluation 10 and Therapeutic Activity 38    Co-treat with OT due to medical complexity of pt and need for skilled hands for safe intervention.    Jenny Kitchen, SPT  10/23/2020  "

## 2020-10-24 LAB
ALLENS TEST: ABNORMAL
ANION GAP SERPL CALC-SCNC: 10 MMOL/L (ref 8–16)
ANION GAP SERPL CALC-SCNC: 11 MMOL/L (ref 8–16)
ANION GAP SERPL CALC-SCNC: 13 MMOL/L (ref 8–16)
BASOPHILS # BLD AUTO: 0.03 K/UL (ref 0–0.2)
BASOPHILS NFR BLD: 0.4 % (ref 0–1.9)
BUN SERPL-MCNC: 27 MG/DL (ref 6–20)
BUN SERPL-MCNC: 34 MG/DL (ref 6–20)
BUN SERPL-MCNC: 35 MG/DL (ref 6–20)
CALCIUM SERPL-MCNC: 8.4 MG/DL (ref 8.7–10.5)
CALCIUM SERPL-MCNC: 8.5 MG/DL (ref 8.7–10.5)
CALCIUM SERPL-MCNC: 8.5 MG/DL (ref 8.7–10.5)
CHLORIDE SERPL-SCNC: 93 MMOL/L (ref 95–110)
CHLORIDE SERPL-SCNC: 96 MMOL/L (ref 95–110)
CHLORIDE SERPL-SCNC: 96 MMOL/L (ref 95–110)
CO2 SERPL-SCNC: 36 MMOL/L (ref 23–29)
CO2 SERPL-SCNC: 40 MMOL/L (ref 23–29)
CO2 SERPL-SCNC: 41 MMOL/L (ref 23–29)
CREAT SERPL-MCNC: 0.8 MG/DL (ref 0.5–1.4)
CREAT SERPL-MCNC: 0.8 MG/DL (ref 0.5–1.4)
CREAT SERPL-MCNC: 0.9 MG/DL (ref 0.5–1.4)
DELSYS: ABNORMAL
DIFFERENTIAL METHOD: ABNORMAL
EOSINOPHIL # BLD AUTO: 0.1 K/UL (ref 0–0.5)
EOSINOPHIL NFR BLD: 0.9 % (ref 0–8)
EP: 5
ERYTHROCYTE [DISTWIDTH] IN BLOOD BY AUTOMATED COUNT: 21.2 % (ref 11.5–14.5)
ERYTHROCYTE [SEDIMENTATION RATE] IN BLOOD BY WESTERGREN METHOD: 28 MM/H
EST. GFR  (AFRICAN AMERICAN): >60 ML/MIN/1.73 M^2
EST. GFR  (NON AFRICAN AMERICAN): >60 ML/MIN/1.73 M^2
FIO2: 35
GLUCOSE SERPL-MCNC: 106 MG/DL (ref 70–110)
GLUCOSE SERPL-MCNC: 68 MG/DL (ref 70–110)
GLUCOSE SERPL-MCNC: 76 MG/DL (ref 70–110)
HCO3 UR-SCNC: 43.5 MMOL/L (ref 24–28)
HCT VFR BLD AUTO: 50.2 % (ref 37–48.5)
HGB BLD-MCNC: 14.1 G/DL (ref 12–16)
IMM GRANULOCYTES # BLD AUTO: 0.07 K/UL (ref 0–0.04)
IMM GRANULOCYTES NFR BLD AUTO: 0.8 % (ref 0–0.5)
IP: 20
LYMPHOCYTES # BLD AUTO: 1.1 K/UL (ref 1–4.8)
LYMPHOCYTES NFR BLD: 12.5 % (ref 18–48)
MAGNESIUM SERPL-MCNC: 1.5 MG/DL (ref 1.6–2.6)
MAGNESIUM SERPL-MCNC: 1.6 MG/DL (ref 1.6–2.6)
MCH RBC QN AUTO: 25.5 PG (ref 27–31)
MCHC RBC AUTO-ENTMCNC: 28.1 G/DL (ref 32–36)
MCV RBC AUTO: 91 FL (ref 82–98)
MIN VOL: 11.6
MODE: ABNORMAL
MONOCYTES # BLD AUTO: 0.7 K/UL (ref 0.3–1)
MONOCYTES NFR BLD: 8.1 % (ref 4–15)
NEUTROPHILS # BLD AUTO: 6.5 K/UL (ref 1.8–7.7)
NEUTROPHILS NFR BLD: 77.3 % (ref 38–73)
NRBC BLD-RTO: 0 /100 WBC
PCO2 BLDA: 72.6 MMHG (ref 35–45)
PH SMN: 7.39 [PH] (ref 7.35–7.45)
PHOSPHATE SERPL-MCNC: 2.9 MG/DL (ref 2.7–4.5)
PHOSPHATE SERPL-MCNC: 4.2 MG/DL (ref 2.7–4.5)
PLATELET # BLD AUTO: 148 K/UL (ref 150–350)
PMV BLD AUTO: 9.2 FL (ref 9.2–12.9)
PO2 BLDA: 45 MMHG (ref 40–60)
POC BE: 18 MMOL/L
POC SATURATED O2: 77 % (ref 95–100)
POC TCO2: 46 MMOL/L (ref 24–29)
POCT GLUCOSE: 74 MG/DL (ref 70–110)
POCT GLUCOSE: 82 MG/DL (ref 70–110)
POTASSIUM SERPL-SCNC: 3.5 MMOL/L (ref 3.5–5.1)
POTASSIUM SERPL-SCNC: 3.8 MMOL/L (ref 3.5–5.1)
POTASSIUM SERPL-SCNC: 4.2 MMOL/L (ref 3.5–5.1)
RBC # BLD AUTO: 5.53 M/UL (ref 4–5.4)
SAMPLE: ABNORMAL
SITE: ABNORMAL
SODIUM SERPL-SCNC: 145 MMOL/L (ref 136–145)
SODIUM SERPL-SCNC: 145 MMOL/L (ref 136–145)
SODIUM SERPL-SCNC: 146 MMOL/L (ref 136–145)
SP02: 92
SPONT RATE: 28
WBC # BLD AUTO: 8.43 K/UL (ref 3.9–12.7)

## 2020-10-24 PROCEDURE — 80048 BASIC METABOLIC PNL TOTAL CA: CPT

## 2020-10-24 PROCEDURE — 99233 SBSQ HOSP IP/OBS HIGH 50: CPT | Mod: ,,, | Performed by: INTERNAL MEDICINE

## 2020-10-24 PROCEDURE — 80048 BASIC METABOLIC PNL TOTAL CA: CPT | Mod: 91

## 2020-10-24 PROCEDURE — 25000003 PHARM REV CODE 250: Performed by: INTERNAL MEDICINE

## 2020-10-24 PROCEDURE — 63600175 PHARM REV CODE 636 W HCPCS: Performed by: STUDENT IN AN ORGANIZED HEALTH CARE EDUCATION/TRAINING PROGRAM

## 2020-10-24 PROCEDURE — 25000003 PHARM REV CODE 250: Performed by: STUDENT IN AN ORGANIZED HEALTH CARE EDUCATION/TRAINING PROGRAM

## 2020-10-24 PROCEDURE — 99900035 HC TECH TIME PER 15 MIN (STAT)

## 2020-10-24 PROCEDURE — 83735 ASSAY OF MAGNESIUM: CPT | Mod: 91

## 2020-10-24 PROCEDURE — 25000003 PHARM REV CODE 250: Performed by: NURSE PRACTITIONER

## 2020-10-24 PROCEDURE — 85025 COMPLETE CBC W/AUTO DIFF WBC: CPT

## 2020-10-24 PROCEDURE — 94761 N-INVAS EAR/PLS OXIMETRY MLT: CPT

## 2020-10-24 PROCEDURE — 82803 BLOOD GASES ANY COMBINATION: CPT

## 2020-10-24 PROCEDURE — 99233 PR SUBSEQUENT HOSPITAL CARE,LEVL III: ICD-10-PCS | Mod: ,,, | Performed by: INTERNAL MEDICINE

## 2020-10-24 PROCEDURE — 20000000 HC ICU ROOM

## 2020-10-24 PROCEDURE — 83735 ASSAY OF MAGNESIUM: CPT

## 2020-10-24 PROCEDURE — 27000221 HC OXYGEN, UP TO 24 HOURS

## 2020-10-24 PROCEDURE — 94660 CPAP INITIATION&MGMT: CPT

## 2020-10-24 PROCEDURE — 84100 ASSAY OF PHOSPHORUS: CPT

## 2020-10-24 PROCEDURE — 84100 ASSAY OF PHOSPHORUS: CPT | Mod: 91

## 2020-10-24 RX ORDER — LATANOPROST 50 UG/ML
1 SOLUTION/ DROPS OPHTHALMIC NIGHTLY
Status: DISCONTINUED | OUTPATIENT
Start: 2020-10-24 | End: 2020-11-09 | Stop reason: HOSPADM

## 2020-10-24 RX ORDER — LORAZEPAM/0.9% SODIUM CHLORIDE 100MG/0.1L
2 PLASTIC BAG, INJECTION (ML) INTRAVENOUS ONCE
Status: COMPLETED | OUTPATIENT
Start: 2020-10-24 | End: 2020-10-24

## 2020-10-24 RX ORDER — FUROSEMIDE 10 MG/ML
80 INJECTION INTRAMUSCULAR; INTRAVENOUS ONCE
Status: DISCONTINUED | OUTPATIENT
Start: 2020-10-24 | End: 2020-10-24

## 2020-10-24 RX ADMIN — MAGNESIUM SULFATE IN WATER 2 G: 40 INJECTION, SOLUTION INTRAVENOUS at 10:10

## 2020-10-24 RX ADMIN — ENOXAPARIN SODIUM 60 MG: 60 INJECTION SUBCUTANEOUS at 08:10

## 2020-10-24 RX ADMIN — MICONAZOLE NITRATE: 20 POWDER TOPICAL at 09:10

## 2020-10-24 RX ADMIN — DEXTROSE MONOHYDRATE 12.5 G: 25 INJECTION, SOLUTION INTRAVENOUS at 10:10

## 2020-10-24 RX ADMIN — ENOXAPARIN SODIUM 60 MG: 60 INJECTION SUBCUTANEOUS at 09:10

## 2020-10-24 RX ADMIN — ATORVASTATIN CALCIUM 10 MG: 10 TABLET, FILM COATED ORAL at 08:10

## 2020-10-24 RX ADMIN — FUROSEMIDE 20 MG/HR: 10 INJECTION, SOLUTION INTRAMUSCULAR; INTRAVENOUS at 09:10

## 2020-10-24 RX ADMIN — LATANOPROST 1 DROP: 50 SOLUTION OPHTHALMIC at 09:10

## 2020-10-24 RX ADMIN — MICONAZOLE NITRATE: 20 POWDER TOPICAL at 08:10

## 2020-10-24 NOTE — PROGRESS NOTES
[facility]  Critical Care Medicine  Progress Note    Patient Name: Halley Moreno  MRN: [id number]  Admission Date: 10/22/2020  Hospital Length of Stay: 2 days  Code Status: Full Code  Attending Provider: Kingston Matamoros*  Primary Care Provider: Yamila Tejeda MD   Principal Problem: Acute respiratory failure with hypoxia and hypercapnia    Subjective:     HPI:  46 y/o morbidly obese F presents for SOB on PMHx of pulmonary HTN with right-sided HF, YARY, DM, HTN, and depression. History obtained via patient and EMR.     Patient presented to clinic on 10/22/2020 for increasing weight gain refractory to diuretics (Lasix 40 mg b.i.d). Weight increased over 7 month period; approximately 100 lbs since March 2020 and 80 lbs since May 2020. Patient has gained approximately 50 lbs over 2 week period. Patient was subsequently transferred to [facility] for acute hypoxic respiratory failure. At baseline sleeps on 3 pillows at home in an adjustable bed. She is not on home oxygen. Tolerates walking a block before being SOB. Patient non-compliant with CPAP. Has concomitant skin changes to hands bilaterally. Last echo notable for PA systolic pressure of 99. Admitted to ICU on 10/23/2020 for higher level of care.        Hospital/ICU Course:  No notes on file    Interval History/Significant Events: No concerns noted from overnight staff. Patient alert and comfortable. Transitioned to NC 2L in morning. No other questions per patient or mother at this time.     Review of Systems   Constitutional: Negative for diaphoresis and fever.   HENT: Negative for sore throat.    Eyes: Negative for visual disturbance.   Respiratory: Negative for cough, choking, chest tightness and stridor.    Cardiovascular: Positive for leg swelling. Negative for chest pain.   Gastrointestinal: Negative for abdominal pain, constipation, diarrhea and vomiting.   Genitourinary: Negative for dysuria.   Musculoskeletal: Negative for back  pain.   Neurological: Negative for speech difficulty.   Psychiatric/Behavioral: Negative for agitation and confusion.     Objective:     Vital Signs (Most Recent):  Temp: 98.5 °F (36.9 °C) (10/24/20 0300)  Pulse: 73 (10/24/20 0857)  Resp: 19 (10/24/20 0857)  BP: 133/69 (10/24/20 0600)  SpO2: (!) 92 % (10/24/20 0857) Vital Signs (24h Range):  Temp:  [97.9 °F (36.6 °C)-98.5 °F (36.9 °C)] 98.5 °F (36.9 °C)  Pulse:  [68-85] 73  Resp:  [7-30] 19  SpO2:  [85 %-99 %] 92 %  BP: (106-160)/(58-87) 133/69   Weight: (!) 226.8 kg (500 lb)  Body mass index is 88.57 kg/m².      Intake/Output Summary (Last 24 hours) at 10/24/2020 1032  Last data filed at 10/24/2020 0600  Gross per 24 hour   Intake 254 ml   Output 7875 ml   Net -7621 ml       Physical Exam  Vitals signs and nursing note reviewed.   Constitutional:       General: She is not in acute distress.     Appearance: She is morbidly obese. She is not toxic-appearing or diaphoretic.      Interventions: Face mask in place.   HENT:      Head: Normocephalic and atraumatic.      Right Ear: External ear normal.      Left Ear: External ear normal.      Nose: Nose normal.      Mouth/Throat:      Mouth: Mucous membranes are dry.   Eyes:      General:         Right eye: No discharge.         Left eye: No discharge.      Extraocular Movements: Extraocular movements intact.      Conjunctiva/sclera: Conjunctivae normal.      Pupils: Pupils are equal, round, and reactive to light.   Neck:      Musculoskeletal: Normal range of motion.   Cardiovascular:      Rate and Rhythm: Normal rate and regular rhythm.      Pulses: Normal pulses.      Heart sounds: Normal heart sounds.   Pulmonary:      Effort: Pulmonary effort is normal. No respiratory distress or retractions.      Breath sounds: Normal breath sounds. No stridor. No wheezing or rhonchi.   Abdominal:      General: There is distension.      Palpations: Abdomen is soft.      Tenderness: There is no abdominal tenderness.   Musculoskeletal:       Right lower leg: Edema present.      Left lower leg: Edema present.   Skin:     General: Skin is warm and dry.      Coloration: Skin is not jaundiced.   Neurological:      General: No focal deficit present.      Mental Status: She is alert and oriented to person, place, and time.   Psychiatric:         Mood and Affect: Mood normal.         Behavior: Behavior normal.         Vents:  Oxygen Concentration (%): 40 (10/24/20 0721)  Lines/Drains/Airways     Drain                 Urethral Catheter 10/22/20 1234 16 Fr. 1 day          Peripheral Intravenous Line                 Peripheral IV - Single Lumen 10/23/20 22 G Right Shoulder 1 day         Midline Catheter Insertion/Assessment  - Single Lumen 10/23/20 1500 Left basilic vein (medial side of arm) less than 1 day         Peripheral IV - Single Lumen 10/23/20 1500 20 G Left;Posterior Hand less than 1 day              Significant Labs:    CBC/Anemia Profile:  Recent Labs   Lab 10/22/20  1224 10/23/20  0325 10/24/20  0410   WBC 10.97 10.55 8.43   HGB 15.2 14.4 14.1   HCT 52.7* 52.5* 50.2*    177 148*   MCV 91 92 91   RDW 22.0* 21.7* 21.2*        Chemistries:  Recent Labs   Lab 10/22/20  1224 10/22/20  2006 10/23/20  0325 10/24/20  0410 10/24/20  0839    143 141 145 146*   K 5.3* 5.4* 5.2* 4.2 3.8   CL 97 99 98 96 96   CO2 33* 31* 29 36* 40*   BUN 43* 43* 42* 35* 34*   CREATININE 1.3 1.2 1.2 0.9 0.8   CALCIUM 8.9 8.7 8.6* 8.5* 8.5*   ALBUMIN 3.3* 3.1*  --   --   --    PROT 7.1 6.7  --   --   --    BILITOT 1.1* 1.0  --   --   --    ALKPHOS 75 73  --   --   --    ALT 26 25  --   --   --    AST 33 30  --   --   --    MG  --   --  1.8 1.6  --    PHOS  --   --  5.4* 4.2  --        All pertinent labs within the past 24 hours have been reviewed.    Significant Imaging:  I have reviewed all pertinent imaging results/findings within the past 24 hours.      ABG  Recent Labs   Lab 10/24/20  0857   PH 7.386   PO2 45   PCO2 72.6*   HCO3 43.5*   BE 18      Assessment/Plan:     Pulmonary  * Acute respiratory failure with hypoxia and hypercapnia  46 y/o with acute hypoxic respiratory failure with hypoxia and hypercapnia in setting of morbid obesity, severe pulmonary HTN ccx by right sided HF, DM, and YARY. Labs notable for hyperkalemia (5.2), hyperphosphatemia (5.4), and ABG (pH 7.165/pCO2 112.7/pO2 85/HCO3 37.4). CXR notable for mild cardiomegaly; no pleural effusion evident. Hemodynamically stable. Nil alteration in mental status. Chest clear to ausculation. Weight however is a significant barrier to exam. Repeat VBG (10/24/2020) pH7.386/pCO2 72.6/pO2 45/pHCO3 43.5. Transitioned to NC 2L, O2 sats 88-92%.     - continue NC 2L; transition to CPAP/BiPAP overnight  - goal O2 saturations of 88-92%  - continue diuresis 80 mg Furosemide q.d   - strict I/O's   - maintain alamo cath  - DVT prophylaxis enoxaparin 40 mg q.d  - start clear liquid diet; progress as tolerated        Right heart failure due to pulmonary hypertension  ECHO (10/23/2020) notable for LV EF 75%, elevated central venous pressure (15 mmHg), and PA systolic pressure of 109 mmHg.    - continue diuretic   - avoid salt intake < 2 g/day  - hold antihypertensives until stable  - fluid restriction    Cardiac/Vascular  Hypertension associated with diabetes  Normotensive.     - MAP > 65  - hold blood pressure medications  - continue home statin    Renal/  Hyperphosphatemia  - monitor q.d    Hyperkalemia  - monitor q.d    Endocrine  Diabetes mellitus type 2 without retinopathy  HbA1C 8.4      - monitor glucose    Other  Obesity hypoventilation syndrome  - supplemental oxygen    Acute on chronic heart failure with preserved ejection fraction  - see Right heart failure due to pulmonary hypertension    Sleep apnea  - continue CPAP overnight       Critical Care Daily Checklist:    A: Awake: RASS Goal/Actual Goal: RASS Goal: 0-->alert and calm  Actual: Narayanan Agitation Sedation Scale (RASS): Alert and calm   B:  Spontaneous Breathing Trial Performed?     C: SAT & SBT Coordinated?  Yes                      D: Delirium: CAM-ICU Overall CAM-ICU: Negative   E: Early Mobility Performed? Yes   F: Feeding Goal:    Status:     Current Diet Order   Procedures    Diet clear liquid      AS: Analgesia/Sedation No   T: Thromboembolic Prophylaxis Yes   H: HOB > 300 Yes   U: Stress Ulcer Prophylaxis (if needed) Yes   G: Glucose Control Yes   B: Bowel Function     I: Indwelling Catheter (Lines & Hernandez) Necessity Yes   D: De-escalation of Antimicrobials/Pharmacotherapies N/A     Plan for the day/ETD Yes    Code Status:  Family/Goals of Care: Full Code         Critical secondary to Patient has a condition that poses threat to life and bodily function: Severe Respiratory Distress      Critical care was time spent personally by me on the following activities: development of treatment plan with patient or surrogate and bedside caregivers, discussions with consultants, evaluation of patient's response to treatment, examination of patient, ordering and performing treatments and interventions, ordering and review of laboratory studies, ordering and review of radiographic studies, pulse oximetry, re-evaluation of patient's condition. This critical care time did not overlap with that of any other provider or involve time for any procedures.     Miky Goode MD  Critical Care Medicine  [facility]

## 2020-10-24 NOTE — ASSESSMENT & PLAN NOTE
44 y/o with acute hypoxic respiratory failure with hypoxia and hypercapnia in setting of morbid obesity, severe pulmonary HTN ccx by right sided HF, DM, and YARY. Labs notable for hyperkalemia (5.2), hyperphosphatemia (5.4), and ABG (pH 7.165/pCO2 112.7/pO2 85/HCO3 37.4). CXR notable for mild cardiomegaly; no pleural effusion evident. Hemodynamically stable. Nil alteration in mental status. Chest clear to ausculation. Weight however is a significant barrier to exam. Repeat VBG (10/24/2020) pH7.386/pCO2 72.6/pO2 45/pHCO3 43.5. Transitioned to NC 2L, O2 sats 88-92%.     - continue NC 2L; transition to CPAP/BiPAP overnight  - goal O2 saturations of 88-92%  - continue diuresis 80 mg Furosemide q.d   - strict I/O's   - maintain alamo cath  - DVT prophylaxis enoxaparin 40 mg q.d  - start clear liquid diet; progress as tolerated

## 2020-10-24 NOTE — PLAN OF CARE
CMICU DAILY GOALS       A: Awake    RASS: Goal - RASS Goal: 0-->alert and calm  Actual - RASS (Narayanan Agitation-Sedation Scale): 0-->alert and calm   Restraint necessity:    B: Breath   SBT: {SBT pass/fail:69914:::0}   C: Coordinate A & B, analgesics/sedatives   Pain: {Managed/ Uncontrolled:68558:::0}    SAT: {SBT pass/fail:40073:::0}  D: Delirium   CAM-ICU: Overall CAM-ICU: Negative  E: Early(intubated/ Progressive (non-intubated) Mobility   MOVE Screen: {Pass/Fail:55986:::0}   Activity: Activity Management: rolling - L1  FAS: Feeding/Nutrition   Diet order: Diet/Nutrition Received: clear liquid,   Fluid restriction:    T: Thrombus   DVT prophylaxis: VTE Required Core Measure: Pharmacological prophylaxis initiated/maintained  H: HOB Elevation   Head of Bed (HOB): HOB at 30-45 degrees  U: Ulcer Prophylaxis   GI: {YES/NO:76028}  G: Glucose control   {Managed/ Uncontrolled:79584:::0}    S: Skin   Bundle compliance: {YES/NO:85387}   Bathing/Skin Care: bath, complete, linen changed Date: {Bath Date:33199:::0}  B: Bowel Function   {bowel:04342:::0}   I: Indwelling Catheters   Hernandez necessity:      Urethral Catheter 10/22/20 1234 16 Fr.-Reason for Continuing Urinary Catheterization: Critically ill in ICU requiring intensive monitoring   CVC necessity: {YES:99142}   IPAD offered: {Ipad:80226:::0}  D: De-escalation Antibx   {YES:14577}  Plan for the day   ***  Family/Goals of care/Code Status   Code Status: Full Code     No acute events throughout day, VS and assessment per flow sheet, patient progressing towards goals as tolerated, plan of care reviewed with Halley Moreno and family, all concerns addressed, will continue to monitor.

## 2020-10-24 NOTE — SUBJECTIVE & OBJECTIVE
Interval History/Significant Events: No concerns noted from overnight staff. Patient alert and comfortable. Transitioned to NC 2L in morning. No other questions per patient or mother at this time.     Review of Systems   Constitutional: Negative for diaphoresis and fever.   HENT: Negative for sore throat.    Eyes: Negative for visual disturbance.   Respiratory: Negative for cough, choking, chest tightness and stridor.    Cardiovascular: Positive for leg swelling. Negative for chest pain.   Gastrointestinal: Negative for abdominal pain, constipation, diarrhea and vomiting.   Genitourinary: Negative for dysuria.   Musculoskeletal: Negative for back pain.   Neurological: Negative for speech difficulty.   Psychiatric/Behavioral: Negative for agitation and confusion.     Objective:     Vital Signs (Most Recent):  Temp: 98.5 °F (36.9 °C) (10/24/20 0300)  Pulse: 73 (10/24/20 0857)  Resp: 19 (10/24/20 0857)  BP: 133/69 (10/24/20 0600)  SpO2: (!) 92 % (10/24/20 0857) Vital Signs (24h Range):  Temp:  [97.9 °F (36.6 °C)-98.5 °F (36.9 °C)] 98.5 °F (36.9 °C)  Pulse:  [68-85] 73  Resp:  [7-30] 19  SpO2:  [85 %-99 %] 92 %  BP: (106-160)/(58-87) 133/69   Weight: (!) 226.8 kg (500 lb)  Body mass index is 88.57 kg/m².      Intake/Output Summary (Last 24 hours) at 10/24/2020 1032  Last data filed at 10/24/2020 0600  Gross per 24 hour   Intake 254 ml   Output 7875 ml   Net -7621 ml       Physical Exam  Vitals signs and nursing note reviewed.   Constitutional:       General: She is not in acute distress.     Appearance: She is morbidly obese. She is not toxic-appearing or diaphoretic.      Interventions: Face mask in place.   HENT:      Head: Normocephalic and atraumatic.      Right Ear: External ear normal.      Left Ear: External ear normal.      Nose: Nose normal.      Mouth/Throat:      Mouth: Mucous membranes are dry.   Eyes:      General:         Right eye: No discharge.         Left eye: No discharge.      Extraocular Movements:  Extraocular movements intact.      Conjunctiva/sclera: Conjunctivae normal.      Pupils: Pupils are equal, round, and reactive to light.   Neck:      Musculoskeletal: Normal range of motion.   Cardiovascular:      Rate and Rhythm: Normal rate and regular rhythm.      Pulses: Normal pulses.      Heart sounds: Normal heart sounds.   Pulmonary:      Effort: Pulmonary effort is normal. No respiratory distress or retractions.      Breath sounds: Normal breath sounds. No stridor. No wheezing or rhonchi.   Abdominal:      General: There is distension.      Palpations: Abdomen is soft.      Tenderness: There is no abdominal tenderness.   Musculoskeletal:      Right lower leg: Edema present.      Left lower leg: Edema present.   Skin:     General: Skin is warm and dry.      Coloration: Skin is not jaundiced.   Neurological:      General: No focal deficit present.      Mental Status: She is alert and oriented to person, place, and time.   Psychiatric:         Mood and Affect: Mood normal.         Behavior: Behavior normal.         Vents:  Oxygen Concentration (%): 40 (10/24/20 0721)  Lines/Drains/Airways     Drain                 Urethral Catheter 10/22/20 1234 16 Fr. 1 day          Peripheral Intravenous Line                 Peripheral IV - Single Lumen 10/23/20 22 G Right Shoulder 1 day         Midline Catheter Insertion/Assessment  - Single Lumen 10/23/20 1500 Left basilic vein (medial side of arm) less than 1 day         Peripheral IV - Single Lumen 10/23/20 1500 20 G Left;Posterior Hand less than 1 day              Significant Labs:    CBC/Anemia Profile:  Recent Labs   Lab 10/22/20  1224 10/23/20  0325 10/24/20  0410   WBC 10.97 10.55 8.43   HGB 15.2 14.4 14.1   HCT 52.7* 52.5* 50.2*    177 148*   MCV 91 92 91   RDW 22.0* 21.7* 21.2*        Chemistries:  Recent Labs   Lab 10/22/20  1224 10/22/20  2006 10/23/20  0325 10/24/20  0410 10/24/20  0839    143 141 145 146*   K 5.3* 5.4* 5.2* 4.2 3.8   CL 97 99 98  96 96   CO2 33* 31* 29 36* 40*   BUN 43* 43* 42* 35* 34*   CREATININE 1.3 1.2 1.2 0.9 0.8   CALCIUM 8.9 8.7 8.6* 8.5* 8.5*   ALBUMIN 3.3* 3.1*  --   --   --    PROT 7.1 6.7  --   --   --    BILITOT 1.1* 1.0  --   --   --    ALKPHOS 75 73  --   --   --    ALT 26 25  --   --   --    AST 33 30  --   --   --    MG  --   --  1.8 1.6  --    PHOS  --   --  5.4* 4.2  --        All pertinent labs within the past 24 hours have been reviewed.    Significant Imaging:  I have reviewed all pertinent imaging results/findings within the past 24 hours.

## 2020-10-24 NOTE — ASSESSMENT & PLAN NOTE
ECHO (10/23/2020) notable for LV EF 75%, elevated central venous pressure (15 mmHg), and PA systolic pressure of 109 mmHg.    - continue diuretic   - avoid salt intake < 2 g/day  - hold antihypertensives until stable  - fluid restriction

## 2020-10-25 PROBLEM — K59.00 CONSTIPATION: Status: ACTIVE | Noted: 2020-10-25

## 2020-10-25 LAB
ANION GAP SERPL CALC-SCNC: 11 MMOL/L (ref 8–16)
ANION GAP SERPL CALC-SCNC: 13 MMOL/L (ref 8–16)
ANION GAP SERPL CALC-SCNC: 8 MMOL/L (ref 8–16)
BASOPHILS # BLD AUTO: 0.02 K/UL (ref 0–0.2)
BASOPHILS NFR BLD: 0.3 % (ref 0–1.9)
BUN SERPL-MCNC: 18 MG/DL (ref 6–20)
BUN SERPL-MCNC: 22 MG/DL (ref 6–20)
BUN SERPL-MCNC: 23 MG/DL (ref 6–20)
CALCIUM SERPL-MCNC: 7.8 MG/DL (ref 8.7–10.5)
CALCIUM SERPL-MCNC: 8.2 MG/DL (ref 8.7–10.5)
CALCIUM SERPL-MCNC: 8.3 MG/DL (ref 8.7–10.5)
CHLORIDE SERPL-SCNC: 90 MMOL/L (ref 95–110)
CHLORIDE SERPL-SCNC: 90 MMOL/L (ref 95–110)
CHLORIDE SERPL-SCNC: 91 MMOL/L (ref 95–110)
CO2 SERPL-SCNC: 42 MMOL/L (ref 23–29)
CO2 SERPL-SCNC: 43 MMOL/L (ref 23–29)
CO2 SERPL-SCNC: 46 MMOL/L (ref 23–29)
CREAT SERPL-MCNC: 0.7 MG/DL (ref 0.5–1.4)
CREAT SERPL-MCNC: 0.7 MG/DL (ref 0.5–1.4)
CREAT SERPL-MCNC: 0.8 MG/DL (ref 0.5–1.4)
DIFFERENTIAL METHOD: ABNORMAL
EOSINOPHIL # BLD AUTO: 0.1 K/UL (ref 0–0.5)
EOSINOPHIL NFR BLD: 1 % (ref 0–8)
ERYTHROCYTE [DISTWIDTH] IN BLOOD BY AUTOMATED COUNT: 20.7 % (ref 11.5–14.5)
EST. GFR  (AFRICAN AMERICAN): >60 ML/MIN/1.73 M^2
EST. GFR  (NON AFRICAN AMERICAN): >60 ML/MIN/1.73 M^2
GLUCOSE SERPL-MCNC: 108 MG/DL (ref 70–110)
GLUCOSE SERPL-MCNC: 80 MG/DL (ref 70–110)
GLUCOSE SERPL-MCNC: 84 MG/DL (ref 70–110)
HCT VFR BLD AUTO: 47.6 % (ref 37–48.5)
HGB BLD-MCNC: 13.6 G/DL (ref 12–16)
IMM GRANULOCYTES # BLD AUTO: 0.06 K/UL (ref 0–0.04)
IMM GRANULOCYTES NFR BLD AUTO: 0.8 % (ref 0–0.5)
LYMPHOCYTES # BLD AUTO: 1.3 K/UL (ref 1–4.8)
LYMPHOCYTES NFR BLD: 17.3 % (ref 18–48)
MAGNESIUM SERPL-MCNC: 1.3 MG/DL (ref 1.6–2.6)
MAGNESIUM SERPL-MCNC: 1.5 MG/DL (ref 1.6–2.6)
MCH RBC QN AUTO: 25.6 PG (ref 27–31)
MCHC RBC AUTO-ENTMCNC: 28.6 G/DL (ref 32–36)
MCV RBC AUTO: 90 FL (ref 82–98)
MONOCYTES # BLD AUTO: 0.8 K/UL (ref 0.3–1)
MONOCYTES NFR BLD: 9.8 % (ref 4–15)
NEUTROPHILS # BLD AUTO: 5.5 K/UL (ref 1.8–7.7)
NEUTROPHILS NFR BLD: 70.8 % (ref 38–73)
NRBC BLD-RTO: 0 /100 WBC
PHOSPHATE SERPL-MCNC: 3.2 MG/DL (ref 2.7–4.5)
PLATELET # BLD AUTO: 138 K/UL (ref 150–350)
PMV BLD AUTO: 8.9 FL (ref 9.2–12.9)
POCT GLUCOSE: 109 MG/DL (ref 70–110)
POCT GLUCOSE: 85 MG/DL (ref 70–110)
POTASSIUM SERPL-SCNC: 3.2 MMOL/L (ref 3.5–5.1)
POTASSIUM SERPL-SCNC: 3.3 MMOL/L (ref 3.5–5.1)
POTASSIUM SERPL-SCNC: 3.4 MMOL/L (ref 3.5–5.1)
RBC # BLD AUTO: 5.31 M/UL (ref 4–5.4)
SODIUM SERPL-SCNC: 144 MMOL/L (ref 136–145)
SODIUM SERPL-SCNC: 144 MMOL/L (ref 136–145)
SODIUM SERPL-SCNC: 146 MMOL/L (ref 136–145)
WBC # BLD AUTO: 7.73 K/UL (ref 3.9–12.7)

## 2020-10-25 PROCEDURE — 99900035 HC TECH TIME PER 15 MIN (STAT)

## 2020-10-25 PROCEDURE — 25000003 PHARM REV CODE 250: Performed by: STUDENT IN AN ORGANIZED HEALTH CARE EDUCATION/TRAINING PROGRAM

## 2020-10-25 PROCEDURE — 25000003 PHARM REV CODE 250: Performed by: INTERNAL MEDICINE

## 2020-10-25 PROCEDURE — 99233 SBSQ HOSP IP/OBS HIGH 50: CPT | Mod: ,,, | Performed by: INTERNAL MEDICINE

## 2020-10-25 PROCEDURE — 63600175 PHARM REV CODE 636 W HCPCS: Performed by: STUDENT IN AN ORGANIZED HEALTH CARE EDUCATION/TRAINING PROGRAM

## 2020-10-25 PROCEDURE — 85025 COMPLETE CBC W/AUTO DIFF WBC: CPT

## 2020-10-25 PROCEDURE — 80048 BASIC METABOLIC PNL TOTAL CA: CPT

## 2020-10-25 PROCEDURE — 83735 ASSAY OF MAGNESIUM: CPT | Mod: 91

## 2020-10-25 PROCEDURE — 27000221 HC OXYGEN, UP TO 24 HOURS

## 2020-10-25 PROCEDURE — 83735 ASSAY OF MAGNESIUM: CPT

## 2020-10-25 PROCEDURE — 94761 N-INVAS EAR/PLS OXIMETRY MLT: CPT

## 2020-10-25 PROCEDURE — 94660 CPAP INITIATION&MGMT: CPT

## 2020-10-25 PROCEDURE — 82803 BLOOD GASES ANY COMBINATION: CPT

## 2020-10-25 PROCEDURE — 20600001 HC STEP DOWN PRIVATE ROOM

## 2020-10-25 PROCEDURE — 80048 BASIC METABOLIC PNL TOTAL CA: CPT | Mod: 91

## 2020-10-25 PROCEDURE — 63600175 PHARM REV CODE 636 W HCPCS: Performed by: INTERNAL MEDICINE

## 2020-10-25 PROCEDURE — 84100 ASSAY OF PHOSPHORUS: CPT

## 2020-10-25 PROCEDURE — 99233 PR SUBSEQUENT HOSPITAL CARE,LEVL III: ICD-10-PCS | Mod: ,,, | Performed by: INTERNAL MEDICINE

## 2020-10-25 RX ORDER — POTASSIUM CHLORIDE 20 MEQ/1
40 TABLET, EXTENDED RELEASE ORAL ONCE
Status: COMPLETED | OUTPATIENT
Start: 2020-10-25 | End: 2020-10-25

## 2020-10-25 RX ORDER — MAGNESIUM SULFATE HEPTAHYDRATE 40 MG/ML
2 INJECTION, SOLUTION INTRAVENOUS ONCE
Status: COMPLETED | OUTPATIENT
Start: 2020-10-25 | End: 2020-10-25

## 2020-10-25 RX ORDER — AMOXICILLIN 250 MG
2 CAPSULE ORAL 2 TIMES DAILY
Status: DISCONTINUED | OUTPATIENT
Start: 2020-10-25 | End: 2020-11-09 | Stop reason: HOSPADM

## 2020-10-25 RX ADMIN — MICONAZOLE NITRATE: 20 POWDER TOPICAL at 08:10

## 2020-10-25 RX ADMIN — DOCUSATE SODIUM 50MG AND SENNOSIDES 8.6MG 2 TABLET: 8.6; 5 TABLET, FILM COATED ORAL at 08:10

## 2020-10-25 RX ADMIN — POLYETHYLENE GLYCOL 3350 17 G: 17 POWDER, FOR SOLUTION ORAL at 08:10

## 2020-10-25 RX ADMIN — POTASSIUM CHLORIDE 40 MEQ: 1500 TABLET, EXTENDED RELEASE ORAL at 08:10

## 2020-10-25 RX ADMIN — ENOXAPARIN SODIUM 60 MG: 60 INJECTION SUBCUTANEOUS at 08:10

## 2020-10-25 RX ADMIN — ATORVASTATIN CALCIUM 10 MG: 10 TABLET, FILM COATED ORAL at 08:10

## 2020-10-25 RX ADMIN — MAGNESIUM SULFATE IN WATER 2 G: 40 INJECTION, SOLUTION INTRAVENOUS at 08:10

## 2020-10-25 RX ADMIN — ACETAMINOPHEN 650 MG: 325 TABLET ORAL at 02:10

## 2020-10-25 RX ADMIN — LATANOPROST 1 DROP: 50 SOLUTION OPHTHALMIC at 08:10

## 2020-10-25 NOTE — PLAN OF CARE
CMICU DAILY GOALS       A: Awake    RASS: Goal - RASS Goal: 0-->alert and calm  Actual - RASS (Narayanan Agitation-Sedation Scale): 0-->alert and calm   Restraint necessity:    B: Breath   SBT: Not intubated   C: Coordinate A & B, analgesics/sedatives   Pain: managed    SAT: Pass  D: Delirium   CAM-ICU: Overall CAM-ICU: Negative  E: Early(intubated/ Progressive (non-intubated) Mobility   MOVE Screen: Pass   Activity: Activity Management: rolling - L1  FAS: Feeding/Nutrition   Diet order: Diet/Nutrition Received: clear liquid,   Fluid restriction:    T: Thrombus   DVT prophylaxis: VTE Required Core Measure: Pharmacological prophylaxis initiated/maintained  H: HOB Elevation   Head of Bed (HOB): HOB at 30-45 degrees  U: Ulcer Prophylaxis   GI: yes  G: Glucose control   managed    S: Skin   Bundle compliance: yes   Bathing/Skin Care: bath, complete, linen changed Date: 10/25/20  B: Bowel Function   no issues   I: Indwelling Catheters   Hernandez necessity:      Urethral Catheter 10/22/20 1234 16 Fr.-Reason for Continuing Urinary Catheterization: Critically ill in ICU requiring intensive monitoring   CVC necessity: No   IPAD offered: No  D: De-escalation Antibx   Yes  Plan for the day   No acute events throughout the shift. Patients VS stable and plans are being made to step patient down off of unit.   Family/Goals of care/Code Status   Code Status: Full Code     No acute events throughout day, VS and assessment per flow sheet, patient progressing towards goals as tolerated, plan of care reviewed with Halley Moreno and family, all concerns addressed, will continue to monitor.

## 2020-10-25 NOTE — SUBJECTIVE & OBJECTIVE
Interval History/Significant Events: No concerns noted by overnight staff. Patient comfortable in bed with no complaints. Tolerated BiPAP overnight.  Transitioned to NC 2L in morning.    Review of Systems   Constitutional: Negative for diaphoresis and fever.   HENT: Negative for sore throat.    Eyes: Negative for visual disturbance.   Respiratory: Negative for cough, choking, chest tightness and stridor.    Cardiovascular: Positive for leg swelling. Negative for chest pain.   Gastrointestinal: Negative for abdominal pain, constipation, diarrhea and vomiting.   Genitourinary: Negative for dysuria.   Musculoskeletal: Negative for back pain.   Neurological: Negative for speech difficulty.   Psychiatric/Behavioral: Negative for agitation and confusion.     Objective:     Vital Signs (Most Recent):  Temp: 99 °F (37.2 °C) (10/25/20 1500)  Pulse: 72 (10/25/20 1500)  Resp: (!) 22 (10/25/20 1500)  BP: 130/66 (10/25/20 1500)  SpO2: (!) 92 % (10/25/20 1500) Vital Signs (24h Range):  Temp:  [98.5 °F (36.9 °C)-99.2 °F (37.3 °C)] 99 °F (37.2 °C)  Pulse:  [68-77] 72  Resp:  [15-40] 22  SpO2:  [91 %-95 %] 92 %  BP: (119-142)/(57-79) 130/66   Weight: (!) 226.8 kg (500 lb)  Body mass index is 88.57 kg/m².      Intake/Output Summary (Last 24 hours) at 10/25/2020 1610  Last data filed at 10/25/2020 1500  Gross per 24 hour   Intake 522 ml   Output 7740 ml   Net -7218 ml       Physical Exam  Vitals signs and nursing note reviewed.   Constitutional:       General: She is not in acute distress.     Appearance: She is morbidly obese. She is not toxic-appearing or diaphoretic.      Interventions: Face mask in place.   HENT:      Head: Normocephalic and atraumatic.      Right Ear: External ear normal.      Left Ear: External ear normal.      Nose: Nose normal.      Mouth/Throat:      Mouth: Mucous membranes are dry.   Eyes:      General:         Right eye: No discharge.         Left eye: No discharge.      Extraocular Movements: Extraocular  movements intact.      Conjunctiva/sclera: Conjunctivae normal.      Pupils: Pupils are equal, round, and reactive to light.   Neck:      Musculoskeletal: Normal range of motion.   Cardiovascular:      Rate and Rhythm: Normal rate and regular rhythm.      Pulses: Normal pulses.      Heart sounds: Normal heart sounds.   Pulmonary:      Effort: Pulmonary effort is normal. No respiratory distress or retractions.      Breath sounds: Normal breath sounds. No stridor. No wheezing or rhonchi.   Abdominal:      General: There is distension.      Palpations: Abdomen is soft.      Tenderness: There is no abdominal tenderness.   Musculoskeletal:      Right lower leg: Edema present.      Left lower leg: Edema present.   Skin:     General: Skin is warm and dry.      Coloration: Skin is not jaundiced.   Neurological:      General: No focal deficit present.      Mental Status: She is alert and oriented to person, place, and time.   Psychiatric:         Mood and Affect: Mood normal.         Behavior: Behavior normal.         Vents:  Oxygen Concentration (%): 30 (10/25/20 0700)  Lines/Drains/Airways     Drain                 Urethral Catheter 10/22/20 1234 16 Fr. 3 days          Peripheral Intravenous Line                 Midline Catheter Insertion/Assessment  - Single Lumen 10/23/20 1500 Left basilic vein (medial side of arm) 2 days         Peripheral IV - Single Lumen 10/23/20 1500 20 G Left;Posterior Hand 2 days         Peripheral IV - Single Lumen 10/23/20 22 G Right Shoulder 2 days              Significant Labs:    CBC/Anemia Profile:  Recent Labs   Lab 10/24/20  0410 10/25/20  0825   WBC 8.43 7.73   HGB 14.1 13.6   HCT 50.2* 47.6   * 138*   MCV 91 90   RDW 21.2* 20.7*        Chemistries:  Recent Labs   Lab 10/24/20  0410  10/24/20  1847 10/25/20  0251 10/25/20  0825      < > 145 146* 144   K 4.2   < > 3.5 3.4* 3.3*   CL 96   < > 93* 91* 90*   CO2 36*   < > 41* 42* 43*   BUN 35*   < > 27* 23* 22*   CREATININE 0.9    < > 0.8 0.8 0.7   CALCIUM 8.5*   < > 8.4* 8.3* 8.2*   MG 1.6  --  1.5* 1.5*  --    PHOS 4.2  --  2.9 3.2  --     < > = values in this interval not displayed.       All pertinent labs within the past 24 hours have been reviewed.    Significant Imaging:  I have reviewed all pertinent imaging results/findings within the past 24 hours.

## 2020-10-25 NOTE — PROGRESS NOTES
[facility]  Critical Care Medicine  Progress Note    Patient Name: Halley Moreno  MRN: [id number]  Admission Date: 10/22/2020  Hospital Length of Stay: 3 days  Code Status: Full Code  Attending Provider: Kingston Matamoros*  Primary Care Provider: Yamila Tejeda MD   Principal Problem: Acute respiratory failure with hypoxia and hypercapnia    Subjective:     HPI:  44 y/o morbidly obese F presents for SOB on PMHx of pulmonary HTN with right-sided HF, YARY, DM, HTN, and depression. History obtained via patient and EMR.     Patient presented to clinic on 10/22/2020 for increasing weight gain refractory to diuretics (Lasix 40 mg b.i.d). Weight increased over 7 month period; approximately 100 lbs since March 2020 and 80 lbs since May 2020. Patient has gained approximately 50 lbs over 2 week period. Patient was subsequently transferred to [facility] for acute hypoxic respiratory failure. At baseline sleeps on 3 pillows at home in an adjustable bed. She is not on home oxygen. Tolerates walking a block before being SOB. Patient non-compliant with CPAP. Has concomitant skin changes to hands bilaterally. Last echo notable for PA systolic pressure of 99. Admitted to ICU on 10/23/2020 for higher level of care. Managed with aggressive diuresis and BiPAP. Stepped down on 10/25/2020.         Hospital/ICU Course:  No notes on file    Interval History/Significant Events: No concerns noted by overnight staff. Patient comfortable in bed with no complaints. Tolerated BiPAP overnight.  Transitioned to NC 2L in morning.    Review of Systems   Constitutional: Negative for diaphoresis and fever.   HENT: Negative for sore throat.    Eyes: Negative for visual disturbance.   Respiratory: Negative for cough, choking, chest tightness and stridor.    Cardiovascular: Positive for leg swelling. Negative for chest pain.   Gastrointestinal: Negative for abdominal pain, constipation, diarrhea and vomiting.   Genitourinary:  Negative for dysuria.   Musculoskeletal: Negative for back pain.   Neurological: Negative for speech difficulty.   Psychiatric/Behavioral: Negative for agitation and confusion.     Objective:     Vital Signs (Most Recent):  Temp: 99 °F (37.2 °C) (10/25/20 1500)  Pulse: 72 (10/25/20 1500)  Resp: (!) 22 (10/25/20 1500)  BP: 130/66 (10/25/20 1500)  SpO2: (!) 92 % (10/25/20 1500) Vital Signs (24h Range):  Temp:  [98.5 °F (36.9 °C)-99.2 °F (37.3 °C)] 99 °F (37.2 °C)  Pulse:  [68-77] 72  Resp:  [15-40] 22  SpO2:  [91 %-95 %] 92 %  BP: (119-142)/(57-79) 130/66   Weight: (!) 226.8 kg (500 lb)  Body mass index is 88.57 kg/m².      Intake/Output Summary (Last 24 hours) at 10/25/2020 1610  Last data filed at 10/25/2020 1500  Gross per 24 hour   Intake 522 ml   Output 7740 ml   Net -7218 ml       Physical Exam  Vitals signs and nursing note reviewed.   Constitutional:       General: She is not in acute distress.     Appearance: She is morbidly obese. She is not toxic-appearing or diaphoretic.      Interventions: Face mask in place.   HENT:      Head: Normocephalic and atraumatic.      Right Ear: External ear normal.      Left Ear: External ear normal.      Nose: Nose normal.      Mouth/Throat:      Mouth: Mucous membranes are dry.   Eyes:      General:         Right eye: No discharge.         Left eye: No discharge.      Extraocular Movements: Extraocular movements intact.      Conjunctiva/sclera: Conjunctivae normal.      Pupils: Pupils are equal, round, and reactive to light.   Neck:      Musculoskeletal: Normal range of motion.   Cardiovascular:      Rate and Rhythm: Normal rate and regular rhythm.      Pulses: Normal pulses.      Heart sounds: Normal heart sounds.   Pulmonary:      Effort: Pulmonary effort is normal. No respiratory distress or retractions.      Breath sounds: Normal breath sounds. No stridor. No wheezing or rhonchi.   Abdominal:      General: There is distension.      Palpations: Abdomen is soft.       Tenderness: There is no abdominal tenderness.   Musculoskeletal:      Right lower leg: Edema present.      Left lower leg: Edema present.   Skin:     General: Skin is warm and dry.      Coloration: Skin is not jaundiced.   Neurological:      General: No focal deficit present.      Mental Status: She is alert and oriented to person, place, and time.   Psychiatric:         Mood and Affect: Mood normal.         Behavior: Behavior normal.         Vents:  Oxygen Concentration (%): 30 (10/25/20 0700)  Lines/Drains/Airways     Drain                 Urethral Catheter 10/22/20 1234 16 Fr. 3 days          Peripheral Intravenous Line                 Midline Catheter Insertion/Assessment  - Single Lumen 10/23/20 1500 Left basilic vein (medial side of arm) 2 days         Peripheral IV - Single Lumen 10/23/20 1500 20 G Left;Posterior Hand 2 days         Peripheral IV - Single Lumen 10/23/20 22 G Right Shoulder 2 days              Significant Labs:    CBC/Anemia Profile:  Recent Labs   Lab 10/24/20  0410 10/25/20  0825   WBC 8.43 7.73   HGB 14.1 13.6   HCT 50.2* 47.6   * 138*   MCV 91 90   RDW 21.2* 20.7*        Chemistries:  Recent Labs   Lab 10/24/20  0410  10/24/20  1847 10/25/20  0251 10/25/20  0825      < > 145 146* 144   K 4.2   < > 3.5 3.4* 3.3*   CL 96   < > 93* 91* 90*   CO2 36*   < > 41* 42* 43*   BUN 35*   < > 27* 23* 22*   CREATININE 0.9   < > 0.8 0.8 0.7   CALCIUM 8.5*   < > 8.4* 8.3* 8.2*   MG 1.6  --  1.5* 1.5*  --    PHOS 4.2  --  2.9 3.2  --     < > = values in this interval not displayed.       All pertinent labs within the past 24 hours have been reviewed.    Significant Imaging:  I have reviewed all pertinent imaging results/findings within the past 24 hours.      ABG  Recent Labs   Lab 10/24/20  0857   PH 7.386   PO2 45   PCO2 72.6*   HCO3 43.5*   BE 18     Assessment/Plan:     Pulmonary  * Acute respiratory failure with hypoxia and hypercapnia  46 y/o with acute hypoxic respiratory failure with  hypoxia and hypercapnia in setting of morbid obesity, severe pulmonary HTN ccx by right sided HF, DM, and YARY. Labs notable for hyperkalemia (5.2), hyperphosphatemia (5.4), and ABG (pH 7.165/pCO2 112.7/pO2 85/HCO3 37.4). CXR notable for mild cardiomegaly; no pleural effusion evident. Hemodynamically stable. Nil alteration in mental status. Chest clear to ausculation. Weight however is a significant barrier to exam. Repeat VBG (10/24/2020) pH7.386/pCO2 72.6/pO2 45/pHCO3 43.5. Transitioned to NC 2L, O2 sats 88-92%. ABG (10/25/2020) pH 7.4 w/ hypercapnia (chronic retainer). Meets criteria for step down to hospital floor at this time.    - continue NC 2L; transition to CPAP/BiPAP overnight  - goal O2 saturations of 88-92%  - continue diuresis 80 mg Furosemide q.d   - strict I/O's   - maintain alamo cath  - DVT prophylaxis enoxaparin 40 mg q.d  - start clear liquid diet; progress as tolerated        Right heart failure due to pulmonary hypertension  ECHO (10/23/2020) notable for LV EF 75%, elevated central venous pressure (15 mmHg), and PA systolic pressure of 109 mmHg.    - continue diuretic   - avoid salt intake < 2 g/day  - hold antihypertensives until stable  - fluid restriction    Cardiac/Vascular  Hypertension associated with diabetes  Normotensive.     - MAP > 65  - hold blood pressure medications  - continue home statin    Renal/  Hyperphosphatemia  - monitor q.d    Hyperkalemia  - monitor q.d    Endocrine  Diabetes mellitus type 2 without retinopathy  HbA1C 8.4    - monitor glucose    GI  Constipation  - senna docusate b.i.d    Other  Obesity hypoventilation syndrome  - supplemental oxygen    Acute on chronic heart failure with preserved ejection fraction  - see Right heart failure due to pulmonary hypertension    Sleep apnea  - continue BiPAP overnight       Critical Care Daily Checklist:    A: Awake: RASS Goal/Actual Goal: RASS Goal: 0-->alert and calm  Actual: Narayanan Agitation Sedation Scale (RASS): Alert  and calm   B: Spontaneous Breathing Trial Performed?     C: SAT & SBT Coordinated?  N/A                      D: Delirium: CAM-ICU Overall CAM-ICU: Negative   E: Early Mobility Performed? Yes   F: Feeding Goal:    Status:     Current Diet Order   Procedures    Diet clear liquid      AS: Analgesia/Sedation N/A   T: Thromboembolic Prophylaxis Yes   H: HOB > 300 Yes   U: Stress Ulcer Prophylaxis (if needed) Yes   G: Glucose Control Yes   B: Bowel Function  Bowel regimen started   I: Indwelling Catheter (Lines & Hernandez) Necessity Yes   D: De-escalation of Antimicrobials/Pharmacotherapies Yes    Plan for the day/ETD Yes    Code Status:  Family/Goals of Care: Full Code         Critical secondary to Patient has a condition that poses threat to life and bodily function: Severe Respiratory Distress      Critical care was time spent personally by me on the following activities: development of treatment plan with patient or surrogate and bedside caregivers, discussions with consultants, evaluation of patient's response to treatment, examination of patient, ordering and performing treatments and interventions, ordering and review of laboratory studies, ordering and review of radiographic studies, pulse oximetry, re-evaluation of patient's condition. This critical care time did not overlap with that of any other provider or involve time for any procedures.     Miky Goode MD  Critical Care Medicine  [facility]

## 2020-10-25 NOTE — ASSESSMENT & PLAN NOTE
44 y/o with acute hypoxic respiratory failure with hypoxia and hypercapnia in setting of morbid obesity, severe pulmonary HTN ccx by right sided HF, DM, and YARY. Labs notable for hyperkalemia (5.2), hyperphosphatemia (5.4), and ABG (pH 7.165/pCO2 112.7/pO2 85/HCO3 37.4). CXR notable for mild cardiomegaly; no pleural effusion evident. Hemodynamically stable. Nil alteration in mental status. Chest clear to ausculation. Weight however is a significant barrier to exam. Repeat VBG (10/24/2020) pH7.386/pCO2 72.6/pO2 45/pHCO3 43.5. Transitioned to NC 2L, O2 sats 88-92%. ABG (10/25/2020) pH 7.4 w/ hypercapnia (chronic retainer). Meets criteria for step down to hospital floor at this time.    - continue NC 2L; transition to CPAP/BiPAP overnight  - goal O2 saturations of 88-92%  - continue diuresis 80 mg Furosemide q.d   - strict I/O's   - maintain alamo cath  - DVT prophylaxis enoxaparin 40 mg q.d  - start clear liquid diet; progress as tolerated

## 2020-10-25 NOTE — PLAN OF CARE
Handoff     Primary Team: Networked reference to record [facility]  Room Number: 6091/6091 A     Patient Name: Halley Moreno MRN: [id number]     Date of Birth: [id number] Allergies: Victoza [liraglutide]     Age: 45 y.o. Admit Date: 10/22/2020     Sex: female  BMI: Body mass index is 88.57 kg/m².     Code Status: Full Code        Illness Level (current clinical status): Watcher - No    Reason for Admission: Acute respiratory failure with hypoxia and hypercapnia    Brief HPI (pertinent PMH and diagnosis or differential diagnosis): 46 y/o morbidly obese (BMI 88) F admitted to ICU for acute hypoxic respiratory failure with hypercapnia refractory to BiPAP in setting of RV failure 2/2 pulmonary hypertension ccx by volume overload (approx 50 lbs gained in 2 weeks). Currently stable on 2L NC during day and BiPAP at night; managed w/ aggresive diuresis (20L in 2 days) and continuous BiPAP. Recent VBG pH 7.4 with hypercapnia likely 2/2 obesity hypoventilation syndrome (chronic retainer).    Hospital Course (updated, brief assessment by system or problem, significant events): Monitored on BiPAP overnight on 10/23/2020. Improving pH on VBG's. Diuresed approximately 22 L in 3 days.     Tasks (specific, using if-then statements): If patient becomes hypoxic; then transition from NC to BiPAP     Contingency Plan (special circumstances anticipated and plan): If patient decompensates contact critical care team.     Estimated Discharge Date:     Discharge Disposition: Home or Self Care

## 2020-10-26 LAB
ALLENS TEST: ABNORMAL
ALLENS TEST: ABNORMAL
ANION GAP SERPL CALC-SCNC: 12 MMOL/L (ref 8–16)
ANION GAP SERPL CALC-SCNC: 9 MMOL/L (ref 8–16)
BASOPHILS # BLD AUTO: 0.02 K/UL (ref 0–0.2)
BASOPHILS NFR BLD: 0.2 % (ref 0–1.9)
BUN SERPL-MCNC: 16 MG/DL (ref 6–20)
BUN SERPL-MCNC: 16 MG/DL (ref 6–20)
CALCIUM SERPL-MCNC: 8.5 MG/DL (ref 8.7–10.5)
CALCIUM SERPL-MCNC: 8.6 MG/DL (ref 8.7–10.5)
CHLORIDE SERPL-SCNC: 85 MMOL/L (ref 95–110)
CHLORIDE SERPL-SCNC: 87 MMOL/L (ref 95–110)
CO2 SERPL-SCNC: 46 MMOL/L (ref 23–29)
CO2 SERPL-SCNC: 48 MMOL/L (ref 23–29)
CREAT SERPL-MCNC: 0.8 MG/DL (ref 0.5–1.4)
CREAT SERPL-MCNC: 0.8 MG/DL (ref 0.5–1.4)
DELSYS: ABNORMAL
DELSYS: ABNORMAL
DIFFERENTIAL METHOD: ABNORMAL
EOSINOPHIL # BLD AUTO: 0.1 K/UL (ref 0–0.5)
EOSINOPHIL NFR BLD: 1.1 % (ref 0–8)
ERYTHROCYTE [DISTWIDTH] IN BLOOD BY AUTOMATED COUNT: 20.6 % (ref 11.5–14.5)
EST. GFR  (AFRICAN AMERICAN): >60 ML/MIN/1.73 M^2
EST. GFR  (AFRICAN AMERICAN): >60 ML/MIN/1.73 M^2
EST. GFR  (NON AFRICAN AMERICAN): >60 ML/MIN/1.73 M^2
EST. GFR  (NON AFRICAN AMERICAN): >60 ML/MIN/1.73 M^2
FLOW: 3
GLUCOSE SERPL-MCNC: 106 MG/DL (ref 70–110)
GLUCOSE SERPL-MCNC: 86 MG/DL (ref 70–110)
HCO3 UR-SCNC: 50.3 MMOL/L (ref 24–28)
HCO3 UR-SCNC: 50.9 MMOL/L (ref 24–28)
HCT VFR BLD AUTO: 50 % (ref 37–48.5)
HGB BLD-MCNC: 14.1 G/DL (ref 12–16)
IMM GRANULOCYTES # BLD AUTO: 0.05 K/UL (ref 0–0.04)
IMM GRANULOCYTES NFR BLD AUTO: 0.6 % (ref 0–0.5)
LYMPHOCYTES # BLD AUTO: 1.2 K/UL (ref 1–4.8)
LYMPHOCYTES NFR BLD: 14.5 % (ref 18–48)
MAGNESIUM SERPL-MCNC: 1.5 MG/DL (ref 1.6–2.6)
MAGNESIUM SERPL-MCNC: 2 MG/DL (ref 1.6–2.6)
MCH RBC QN AUTO: 25.6 PG (ref 27–31)
MCHC RBC AUTO-ENTMCNC: 28.2 G/DL (ref 32–36)
MCV RBC AUTO: 91 FL (ref 82–98)
MODE: ABNORMAL
MODE: ABNORMAL
MONOCYTES # BLD AUTO: 0.9 K/UL (ref 0.3–1)
MONOCYTES NFR BLD: 10.4 % (ref 4–15)
NEUTROPHILS # BLD AUTO: 6 K/UL (ref 1.8–7.7)
NEUTROPHILS NFR BLD: 73.2 % (ref 38–73)
NRBC BLD-RTO: 0 /100 WBC
PCO2 BLDA: 77.1 MMHG (ref 35–45)
PCO2 BLDA: 83.3 MMHG (ref 35–45)
PH SMN: 7.39 [PH] (ref 7.35–7.45)
PH SMN: 7.43 [PH] (ref 7.35–7.45)
PHOSPHATE SERPL-MCNC: 3 MG/DL (ref 2.7–4.5)
PHOSPHATE SERPL-MCNC: 3.1 MG/DL (ref 2.7–4.5)
PLATELET # BLD AUTO: 152 K/UL (ref 150–350)
PMV BLD AUTO: 9.7 FL (ref 9.2–12.9)
PO2 BLDA: 38 MMHG (ref 40–60)
PO2 BLDA: 57 MMHG (ref 40–60)
POC BE: 25 MMOL/L
POC BE: 27 MMOL/L
POC SATURATED O2: 70 % (ref 95–100)
POC SATURATED O2: 87 % (ref 95–100)
POC TCO2: >50 MMOL/L (ref 24–29)
POC TCO2: >50 MMOL/L (ref 24–29)
POTASSIUM SERPL-SCNC: 3.8 MMOL/L (ref 3.5–5.1)
POTASSIUM SERPL-SCNC: 3.8 MMOL/L (ref 3.5–5.1)
RBC # BLD AUTO: 5.51 M/UL (ref 4–5.4)
SAMPLE: ABNORMAL
SAMPLE: ABNORMAL
SITE: ABNORMAL
SITE: ABNORMAL
SODIUM SERPL-SCNC: 142 MMOL/L (ref 136–145)
SODIUM SERPL-SCNC: 145 MMOL/L (ref 136–145)
WBC # BLD AUTO: 8.16 K/UL (ref 3.9–12.7)

## 2020-10-26 PROCEDURE — 94660 CPAP INITIATION&MGMT: CPT

## 2020-10-26 PROCEDURE — 97535 SELF CARE MNGMENT TRAINING: CPT

## 2020-10-26 PROCEDURE — 63600175 PHARM REV CODE 636 W HCPCS: Performed by: STUDENT IN AN ORGANIZED HEALTH CARE EDUCATION/TRAINING PROGRAM

## 2020-10-26 PROCEDURE — 84100 ASSAY OF PHOSPHORUS: CPT

## 2020-10-26 PROCEDURE — 25000003 PHARM REV CODE 250: Performed by: STUDENT IN AN ORGANIZED HEALTH CARE EDUCATION/TRAINING PROGRAM

## 2020-10-26 PROCEDURE — 94761 N-INVAS EAR/PLS OXIMETRY MLT: CPT

## 2020-10-26 PROCEDURE — 85025 COMPLETE CBC W/AUTO DIFF WBC: CPT

## 2020-10-26 PROCEDURE — 27000221 HC OXYGEN, UP TO 24 HOURS

## 2020-10-26 PROCEDURE — 99233 SBSQ HOSP IP/OBS HIGH 50: CPT | Mod: ,,, | Performed by: INTERNAL MEDICINE

## 2020-10-26 PROCEDURE — 36415 COLL VENOUS BLD VENIPUNCTURE: CPT

## 2020-10-26 PROCEDURE — 99233 PR SUBSEQUENT HOSPITAL CARE,LEVL III: ICD-10-PCS | Mod: ,,, | Performed by: INTERNAL MEDICINE

## 2020-10-26 PROCEDURE — 20600001 HC STEP DOWN PRIVATE ROOM

## 2020-10-26 PROCEDURE — 97116 GAIT TRAINING THERAPY: CPT

## 2020-10-26 PROCEDURE — 99900035 HC TECH TIME PER 15 MIN (STAT)

## 2020-10-26 PROCEDURE — 25000003 PHARM REV CODE 250: Performed by: INTERNAL MEDICINE

## 2020-10-26 PROCEDURE — 80048 BASIC METABOLIC PNL TOTAL CA: CPT | Mod: 91

## 2020-10-26 PROCEDURE — 97530 THERAPEUTIC ACTIVITIES: CPT

## 2020-10-26 PROCEDURE — 83735 ASSAY OF MAGNESIUM: CPT | Mod: 91

## 2020-10-26 RX ORDER — SIMETHICONE 80 MG
1 TABLET,CHEWABLE ORAL 3 TIMES DAILY PRN
Status: DISCONTINUED | OUTPATIENT
Start: 2020-10-26 | End: 2020-11-09 | Stop reason: HOSPADM

## 2020-10-26 RX ORDER — MAGNESIUM SULFATE HEPTAHYDRATE 40 MG/ML
2 INJECTION, SOLUTION INTRAVENOUS
Status: COMPLETED | OUTPATIENT
Start: 2020-10-26 | End: 2020-10-26

## 2020-10-26 RX ADMIN — ATORVASTATIN CALCIUM 10 MG: 10 TABLET, FILM COATED ORAL at 07:10

## 2020-10-26 RX ADMIN — MICONAZOLE NITRATE: 20 POWDER TOPICAL at 09:10

## 2020-10-26 RX ADMIN — MAGNESIUM SULFATE 2 G: 2 INJECTION INTRAVENOUS at 12:10

## 2020-10-26 RX ADMIN — LATANOPROST 1 DROP: 50 SOLUTION OPHTHALMIC at 09:10

## 2020-10-26 RX ADMIN — ACETAMINOPHEN 650 MG: 325 TABLET ORAL at 07:10

## 2020-10-26 RX ADMIN — ENOXAPARIN SODIUM 60 MG: 60 INJECTION SUBCUTANEOUS at 09:10

## 2020-10-26 RX ADMIN — ENOXAPARIN SODIUM 60 MG: 60 INJECTION SUBCUTANEOUS at 07:10

## 2020-10-26 RX ADMIN — MELATONIN TAB 3 MG 6 MG: 3 TAB at 10:10

## 2020-10-26 RX ADMIN — POLYETHYLENE GLYCOL 3350 17 G: 17 POWDER, FOR SOLUTION ORAL at 07:10

## 2020-10-26 RX ADMIN — MICONAZOLE NITRATE: 20 POWDER TOPICAL at 07:10

## 2020-10-26 RX ADMIN — POTASSIUM BICARBONATE 25 MEQ: 978 TABLET, EFFERVESCENT ORAL at 09:10

## 2020-10-26 RX ADMIN — FUROSEMIDE 20 MG/HR: 10 INJECTION, SOLUTION INTRAMUSCULAR; INTRAVENOUS at 07:10

## 2020-10-26 RX ADMIN — MAGNESIUM SULFATE 2 G: 2 INJECTION INTRAVENOUS at 09:10

## 2020-10-26 NOTE — SUBJECTIVE & OBJECTIVE
Interval History: Stepped down from ICU this AM. Remained afebrile and hemodynamically stable overnight. Reports improvement in SOB with continued diuresis.     Review of Systems   Constitutional: Negative for diaphoresis and fever.   HENT: Negative for congestion and sore throat.    Eyes: Negative for redness and visual disturbance.   Respiratory: Negative for cough and stridor.    Cardiovascular: Positive for leg swelling. Negative for chest pain.   Gastrointestinal: Positive for abdominal distention. Negative for abdominal pain, constipation, diarrhea and vomiting.   Genitourinary: Negative for dysuria and hematuria.   Musculoskeletal: Negative for back pain.   Skin: Negative for color change and pallor.   Neurological: Negative for speech difficulty and headaches.   Psychiatric/Behavioral: Negative for agitation and confusion.     Objective:     Vital Signs (Most Recent):  Temp: 97.6 °F (36.4 °C) (10/26/20 1638)  Pulse: 73 (10/26/20 1638)  Resp: 18 (10/26/20 1638)  BP: 131/67 (10/26/20 1638)  SpO2: (!) 90 % (10/26/20 1638) Vital Signs (24h Range):  Temp:  [97.6 °F (36.4 °C)-98.6 °F (37 °C)] 97.6 °F (36.4 °C)  Pulse:  [70-79] 73  Resp:  [17-24] 18  SpO2:  [90 %-97 %] 90 %  BP: (125-146)/(59-73) 131/67     Weight: (!) 226.8 kg (500 lb)  Body mass index is 88.57 kg/m².    Intake/Output Summary (Last 24 hours) at 10/26/2020 1817  Last data filed at 10/26/2020 1700  Gross per 24 hour   Intake 1650 ml   Output 92648 ml   Net -9450 ml      Physical Exam  Vitals signs and nursing note reviewed.   Constitutional:       General: She is awake. She is not in acute distress.     Appearance: She is morbidly obese. She is not diaphoretic.      Interventions: Nasal cannula in place.   HENT:      Head: Normocephalic and atraumatic.      Nose: Nose normal.      Mouth/Throat:      Mouth: Mucous membranes are moist.      Pharynx: Oropharynx is clear.   Eyes:      Extraocular Movements: Extraocular movements intact.       Conjunctiva/sclera: Conjunctivae normal.      Pupils: Pupils are equal, round, and reactive to light.   Neck:      Musculoskeletal: Normal range of motion.   Cardiovascular:      Rate and Rhythm: Normal rate and regular rhythm.      Pulses: Normal pulses.      Heart sounds: Normal heart sounds.   Pulmonary:      Effort: Pulmonary effort is normal. No respiratory distress.      Breath sounds: Normal breath sounds. No wheezing or rhonchi.   Abdominal:      General: Abdomen is protuberant. There is distension.      Palpations: Abdomen is soft.      Tenderness: There is no abdominal tenderness.   Musculoskeletal:      Right lower leg: Edema present.      Left lower leg: Edema present.   Skin:     General: Skin is warm and dry.      Coloration: Skin is not jaundiced.      Findings: Erythema and rash present. Rash is scaling.      Comments: Chronic venous stasis changes present on BLE.   Neurological:      General: No focal deficit present.      Mental Status: She is alert and oriented to person, place, and time.   Psychiatric:         Mood and Affect: Mood normal.         Behavior: Behavior normal.       Significant Labs:   BMP:   Recent Labs   Lab 10/26/20  0539 10/26/20  0807   GLU  --  86   NA  --  145   K  --  3.8   CL  --  87*   CO2  --  46*   BUN  --  16   CREATININE  --  0.8   CALCIUM  --  8.5*   MG 1.5*  --      CBC:   Recent Labs   Lab 10/25/20  0825 10/26/20  0539   WBC 7.73 8.16   HGB 13.6 14.1   HCT 47.6 50.0*   * 152     Magnesium:   Recent Labs   Lab 10/25/20  0251 10/25/20  1822 10/26/20  0539   MG 1.5* 1.3* 1.5*       Significant Imaging: No new imaging to review in previous 24 hours.

## 2020-10-26 NOTE — PLAN OF CARE
Patient alert and oriented x4, no acute distress noted during this nurses shift. Lasix drip conts at 2ml/hr, magnesium via Iv replaced this am, patient has had over 4L of urine output. OT worked with PT and patient tolerated well, small BM noted today, and bedbath, linen change done by this nurse at [facility]. Resp even and labored at times with movement. Patient conts on 4L n/c and tele,NSR. Ongoing assessment being made, call light within reach, side rails upx2.

## 2020-10-26 NOTE — CONSULTS
"Pulm/CC Fellow Consult Note    Attending Physician: Adán Muller MD      Date of Admit: 10/22/2020    Reason for Consult: PH    History of Present Illness:  Halley Moreno is a 45 y.o.  female with a PMHx pulm HTN (group III), cor pulmonale, YARY with poor adherence to CPAP, DM, HTN and depression who was admitted 10/25 with increasing weight over the last few weeks that has been a trend for the last several months. She initially was at the clinic and found to be hypoxic so sent to the hospital. The patient notes she is very dyspneic with ambulation, even one block.     She has been admitted and initially in IV push lasix, no on IV diuresis with lasix with significant UO (-8.0L in last day). Echo was done 10/23 which showed preserved EF, but hyperdynamic LV, PAP 109mmHg with systolic flattening of IV septum, CVP 15mmHg.       Past Medical History:  Past Medical History:   Diagnosis Date    BMI 70 and over, adult     Depression     Diabetes mellitus     DM (diabetes mellitus) type II controlled with renal manifestation     HTN (hypertension)     Hyperlipidemia     Lumbar disc disease     Morbid obesity     Recurrent nephrolithiasis     Rosacea     Sleep apnea     Tear of medial meniscus of right knee, current 2017       Past Surgical History:  Past Surgical History:   Procedure Laterality Date     SECTION       SECTION  2000    DILATION AND CURETTAGE OF UTERUS  2010    AUB "negative path" per patient    ENDOMETRIAL ABLATION  2010       Allergies:  Review of patient's allergies indicates:   Allergen Reactions    Victoza [liraglutide] Swelling         Family History:  Family History   Problem Relation Age of Onset    Hypertension Mother     Diabetes Father     Cancer Father         cns lymphoma    Heart disease Paternal Grandfather     Colon cancer Neg Hx     Ovarian cancer Neg Hx     Breast cancer Neg Hx     Amblyopia Neg Hx     Blindness Neg Hx     " Cataracts Neg Hx     Glaucoma Neg Hx     Macular degeneration Neg Hx     Retinal detachment Neg Hx     Strabismus Neg Hx     Stroke Neg Hx     Thyroid disease Neg Hx        Social History:  Social History     Tobacco Use    Smoking status: Never Smoker    Smokeless tobacco: Never Used   Substance Use Topics    Alcohol use: No     Frequency: Monthly or less     Drinks per session: 1 or 2     Binge frequency: Never    Drug use: No       Review of Systems:  Review of Systems   Constitutional: Negative for chills, fever and weight loss.   Respiratory: Positive for shortness of breath. Negative for cough, sputum production and stridor.    Cardiovascular: Positive for leg swelling.   Gastrointestinal: Negative for abdominal pain, diarrhea, heartburn, nausea and vomiting.   Genitourinary: Negative.    Musculoskeletal: Negative.    Skin: Positive for rash.   Neurological: Negative.    Endo/Heme/Allergies: Negative.    Psychiatric/Behavioral: Negative.           Objective:   Last 24 Hour Vital Signs:  BP  Min: 119/62  Max: 153/76  Temp  Av.2 °F (36.8 °C)  Min: 97.7 °F (36.5 °C)  Max: 98.6 °F (37 °C)  Pulse  Av.5  Min: 70  Max: 79  Resp  Av.1  Min: 17  Max: 33  SpO2  Av %  Min: 92 %  Max: 97 %  Body mass index is 88.57 kg/m².  I/O last 3 completed shifts:  In: 1332 [P.O.:1182; I.V.:150]  Out: 21918 [Urine:58512]    Physical Exam:  General: Alert and awake in NAD, morbidly obese  HENT:  NCAT; anicteric sclera; (+)nasal cannula on  Cardio:  Regular rate and rhythm, but very difficult to auscultate lung sounds  Resp:  Clear in anterior lung fields but very difficult to auscultate lung sounds  Abdom:  Soft, NTND  Extrem:  WWP with no clubbing, cyanosis, (+)lymphedema with chronic LE skin changes - hyperpigmentation  Pulses: 2+ and symmetric distally  Neuro:  AAOx3; cooperative and pleasant with no focal deficits         Assessment & Plan:   44 yo F w/ PMHx of pulm HTN (group III), cor pulmonale, YARY  with poor adherence to CPAP, DM, HTN and depression who was admitted 10/25 with acute hypoxic resp failure secondary to worsening RH in the setting of likely inadequate diuresis at home and lack of adherence to PAP with additional OHS.  - recommend continuing diuresis and repeating echo when patient is closer to euvolemia to see if PAP is improved  - we did have an extensive discussion with the patient today in regards to her weight and the need for weight loss to help her overall clinical as well as the strain on her heart  - encourage adherence to PAP at night     Please contact pulmonary medicine with any further questions.     Maribell Bower  Fellow  Pulmonary & CCM

## 2020-10-26 NOTE — PROGRESS NOTES
[facility] Hospital Medicine  Progress Note    Patient Name: Halley Moreno  MRN: [id number]  Patient Class: IP- Inpatient   Admission Date: 10/22/2020  Length of Stay: 4 days  Attending Physician: Adán Muller MD  Primary Care Provider: Yamila Tejeda MD    [facility] Medicine Team: [facility] [place], [place] HOSP MED 1 - Rao Szymanski MD    Subjective:     Principal Problem:Acute respiratory failure with hypoxia and hypercapnia    HPI:  Patient is a 45-year-old female who is presenting to us with acute respiratory distress.  She has a recent diagnosis of severe pulmonary hypertension with right-sided heart failure.  Her last echo showed a PA systolic pressure of 99.  She initially presented due to increasing weight gain over the last 7 months.  Patient states that in March she began gaining weight and she was placed on diuretic to trying keep that weight down.  Despite 40 mg of furosemide b.i.d. she continued to gain weight.  She has gained approximately 100 lb since March and 80 lb since May.  She does not have a cardiologist but was scheduled to see 1 in December.  Patient states that at home she sleeps in a elevated position secondary to shortness of breath.  She denies any episodes of waking up in the middle of the night out of breath.  She does not use any home oxygen nor she ever been told explicitly that she has heart failure.  She demonstrates poor understanding of the disease stating that the right heart was backing up into the lungs and cause in the shortness of breath.  She presented to the clinic today for evaluation and was found to be in acute respiratory distress with hypoxia.  She was referred to the ED for evaluation and diuresis.    Patient states that she has no known family history of heart failure in the family however she does state that her father had a pacemaker for unknown reasons.  She states that she does not think that anyone her and her family has had pulmonary  hypertension.  Patient states that she does not drink, does not smoke, and does not use IV drugs.    Overview/Hospital Course:  Patient was admitted to Hospital Medicine. Was initially hypoxic and put on nasal cannula with normalization of her oxygen status to approximately 95%. She was demonstrating initial respiratory distress and was given a bolus of 120mg IV furosemide. Hypoxia and hypercapnia worsened so was stepped up to ICU on 10/23. Was placed on BiPAP and aggressively diuresed (approx 20L net negative) with improvement in respiratory status. Weaned to 2L NC and subsequently stepped back down to Hospital Medicine on 10/26.     Interval History: Stepped down from ICU this AM. Remained afebrile and hemodynamically stable overnight. Reports improvement in SOB with continued diuresis.     Review of Systems   Constitutional: Negative for diaphoresis and fever.   HENT: Negative for congestion and sore throat.    Eyes: Negative for redness and visual disturbance.   Respiratory: Negative for cough and stridor.    Cardiovascular: Positive for leg swelling. Negative for chest pain.   Gastrointestinal: Positive for abdominal distention. Negative for abdominal pain, constipation, diarrhea and vomiting.   Genitourinary: Negative for dysuria and hematuria.   Musculoskeletal: Negative for back pain.   Skin: Negative for color change and pallor.   Neurological: Negative for speech difficulty and headaches.   Psychiatric/Behavioral: Negative for agitation and confusion.     Objective:     Vital Signs (Most Recent):  Temp: 97.6 °F (36.4 °C) (10/26/20 1638)  Pulse: 73 (10/26/20 1638)  Resp: 18 (10/26/20 1638)  BP: 131/67 (10/26/20 1638)  SpO2: (!) 90 % (10/26/20 1638) Vital Signs (24h Range):  Temp:  [97.6 °F (36.4 °C)-98.6 °F (37 °C)] 97.6 °F (36.4 °C)  Pulse:  [70-79] 73  Resp:  [17-24] 18  SpO2:  [90 %-97 %] 90 %  BP: (125-146)/(59-73) 131/67     Weight: (!) 226.8 kg (500 lb)  Body mass index is 88.57  kg/m².    Intake/Output Summary (Last 24 hours) at 10/26/2020 1817  Last data filed at 10/26/2020 1700  Gross per 24 hour   Intake 1650 ml   Output 16437 ml   Net -9450 ml      Physical Exam  Vitals signs and nursing note reviewed.   Constitutional:       General: She is awake. She is not in acute distress.     Appearance: She is morbidly obese. She is not diaphoretic.      Interventions: Nasal cannula in place.   HENT:      Head: Normocephalic and atraumatic.      Nose: Nose normal.      Mouth/Throat:      Mouth: Mucous membranes are moist.      Pharynx: Oropharynx is clear.   Eyes:      Extraocular Movements: Extraocular movements intact.      Conjunctiva/sclera: Conjunctivae normal.      Pupils: Pupils are equal, round, and reactive to light.   Neck:      Musculoskeletal: Normal range of motion.   Cardiovascular:      Rate and Rhythm: Normal rate and regular rhythm.      Pulses: Normal pulses.      Heart sounds: Normal heart sounds.   Pulmonary:      Effort: Pulmonary effort is normal. No respiratory distress.      Breath sounds: Normal breath sounds. No wheezing or rhonchi.   Abdominal:      General: Abdomen is protuberant. There is distension.      Palpations: Abdomen is soft.      Tenderness: There is no abdominal tenderness.   Musculoskeletal:      Right lower leg: Edema present.      Left lower leg: Edema present.   Skin:     General: Skin is warm and dry.      Coloration: Skin is not jaundiced.      Findings: Erythema and rash present. Rash is scaling.      Comments: Chronic venous stasis changes present on BLE.   Neurological:      General: No focal deficit present.      Mental Status: She is alert and oriented to person, place, and time.   Psychiatric:         Mood and Affect: Mood normal.         Behavior: Behavior normal.       Significant Labs:   BMP:   Recent Labs   Lab 10/26/20  0539 10/26/20  0807   GLU  --  86   NA  --  145   K  --  3.8   CL  --  87*   CO2  --  46*   BUN  --  16   CREATININE  --   0.8   CALCIUM  --  8.5*   MG 1.5*  --      CBC:   Recent Labs   Lab 10/25/20  0825 10/26/20  0539   WBC 7.73 8.16   HGB 13.6 14.1   HCT 47.6 50.0*   * 152     Magnesium:   Recent Labs   Lab 10/25/20  0251 10/25/20  1822 10/26/20  0539   MG 1.5* 1.3* 1.5*       Significant Imaging: No new imaging to review in previous 24 hours.       Assessment/Plan:      * Acute respiratory failure with hypoxia and hypercapnia  Patient is a chronic CO2 retainer in setting of obesity hypoventilation syndrome 2/2 morbid obesity. Stepped down from ICU today, currently continuing diuresis with minimal supplemental O2 requirements.     Plan:   - Target SpO2 of >88%; wean supplemental O2 as tolerated   - Continue diuresis -> furosemide gtt @ 20mg/hr   - Approx 26L net negative since admission   - Repeat VBG in AM to assess progress   - Risk factor reduction (encouraged weight loss, use of nightly CPAP consistently)    Right heart failure due to pulmonary hypertension  Plan:  - Strict Is/Os  - Daily weights  - Keep K>4, Mg>2  - Diabetic/cardiac low sodium diet (<2 g/day)  - Fluid restriction to 1.5 - 2.0 L/day  - Will obtain repeat echo once patient is closer to euvolemia    Diabetes mellitus type 2 without retinopathy  Plan:   - Hold home oral anti-hyperglycemics   - POCT BG qAC/HS while eating   - LD SSI   - Diabetic cardiac diet   - Fluid restriction   - Will provide endocrinology referral at discharge    Sleep apnea  Patient's pulmonary HTN and RV dysfunction likely result of obesity hypoventilation syndrome with non-compliance with at-home CPAP.    Plan:    - NIPPV nightly   - Will consult Pulmonology for assistance; appreciate recommendations    Morbid obesity with BMI of 70 and over, adult  Plan:  - Dry weight of approximately 370 lb up to 500 pounds  - BMI 88.5  - Diuresing as noted above    Hypertension associated with diabetes  Plan:   - On losartan at home; holding in acute setting   - Continue atorvastatin 10mg  nightly      VTE Risk Mitigation (From admission, onward)         Ordered     enoxaparin injection 60 mg  Every 12 hours      10/22/20 1518     IP VTE HIGH RISK PATIENT  Once      10/22/20 1346     Place sequential compression device  Until discontinued      10/22/20 1346                Discharge Planning   JOSE RAUL: 10/30/2020     Code Status: Full Code   Is the patient medically ready for discharge?: No    Reason for patient still in hospital: Patient trending condition, Treatment, PT / OT recommendations and Pending disposition  Discharge Plan A: Home with family, Home Health          Rao Szymanski MD, PGY-1  Department of Hospital Medicine   [facility]

## 2020-10-26 NOTE — PLAN OF CARE
Problem: Occupational Therapy Goal  Goal: Occupational Therapy Goal  Description: Goals to be met by: 11/6/2020    Patient will increase functional independence with ADLs by performing:    UE Dressing with Stand-by Assistance.  LE Dressing with Moderate Assistance.  Grooming while EOB with Modified [place].  Toileting from bedside commode with Minimal Assistance for hygiene and clothing management.   Rolling to Bilateral with Stand-by Assistance.   Supine to sit with Contact Guard Assistance.  Stand pivot transfers with Supervision.  Toilet transfer to bedside commode with Supervision.    Outcome: Ongoing, Progressing     OT goals remain appropriate.    Marcelo Catherine   10/26/2020

## 2020-10-26 NOTE — ASSESSMENT & PLAN NOTE
Plan:   - Hold home oral anti-hyperglycemics   - POCT BG qAC/HS while eating   - LD SSI   - Diabetic cardiac diet   - Fluid restriction   - Will provide endocrinology referral at discharge

## 2020-10-26 NOTE — ASSESSMENT & PLAN NOTE
Plan:  - Dry weight of approximately 370 lb up to 500 pounds  - BMI 88.5  - Diuresing as noted above

## 2020-10-26 NOTE — PT/OT/SLP PROGRESS
Occupational Therapy   Treatment    Name: Halley Moreno  MRN: [id number]  Admitting Diagnosis:  Acute respiratory failure with hypoxia and hypercapnia      Co-treat with PT due to pt's decreased activity tolerance and increased level of skilled assistance required at her current level of function    Recommendations:     Discharge Recommendations: rehabilitation facility  Discharge Equipment Recommendations:  other (see comments)(TBD)  Barriers to discharge:  Decreased caregiver support, Other (Comment)(increased level of skilled assistance required at current level of function)    Assessment:     Halley Moreno is a 45 y.o. female with a medical diagnosis of Acute respiratory failure with hypoxia and hypercapnia.  Pt with good participation during session.  She performed most bed mobility with Max A of 2 people, but she was able to bridge/scoot herself to HOB with supervision.  Pt performed functional transfers and ambulation with Min A of 2 people with HHA.  She is motivated and remains a good candidate for inpatient rehabilitation facility to maximize her gains in functional independence and to ensure her safety upon returning home.   She presents with the following deficits. Performance deficits affecting function are weakness, impaired endurance, impaired self care skills, impaired functional mobilty, gait instability, impaired balance, decreased lower extremity function, decreased upper extremity function, decreased safety awareness, pain, decreased ROM, impaired skin, edema, impaired cardiopulmonary response to activity.     Rehab Prognosis:  Good; patient would benefit from acute skilled OT services to address these deficits and reach maximum level of function.       Plan:     Patient to be seen 3 x/week to address the above listed problems via self-care/home management, therapeutic exercises, therapeutic activities  · Plan of Care Expires: 11/22/20  · Plan of Care Reviewed with: patient    Subjective  "  "I'm feeling good.  I have been moving a lot by myself in the bed because I don't want to move my strength.  I want to get out of the bed."  Pain/Comfort:  · Pain Rating 1: other (see comments)(Only while ambulating.  Pt did not rate.)  · Location - Side 1: Right  · Location 1: ankle  · Pain Addressed 1: Cessation of Activity, Distraction, Reposition    Objective:     Communicated with: RN and PT prior to session.  Patient found HOB elevated with telemetry, alamo catheter, oxygen, peripheral IV upon OT entry to room.    General Precautions: Standard, fall   Orthopedic Precautions:N/A   Braces: N/A     Occupational Performance:     Bed Mobility:    · Patient completed Rolling/Turning to Right with maximal assistance, requiring cues to reach across her body for the bed rail with her L hand  · Patient completed Scooting/Bridging toward HOB while supine with supervision; Pt Scooted posteriorly toward center of bed while sitting with Max A of 2 people with HHA.  · Patient completed Supine to Sit with maximal assistance and 2 persons for BLE and trunk management  · Patient completed Sit to Supine with maximal assistance and 2 persons for BLE and trunk management    Functional Mobility/Transfers:  · Patient completed Sit <> Stand Transfer from EOB x 2 trials with minimum assistance and of 2 persons  with  hand-held assist   · Functional Mobility: Pt took ~5 steps forward and ~5 steps backward, as well as ~ 2 steps to the L and ~3 steps toward HOB to the R with Min A of 2 people with HHA.  Pt with no LOB but was unsteady and anxious.     Activities of Daily Living:  · Upper Body Dressing: moderate assistance to thread her RUE through gown sleeve while supine with HOB elevated.  Pt also required Mod A to doff/re-ihsan LUE through gown sleeve due to IV being caught inside gown (also performed while supine with HOB).  · Toileting: Max A to manage Alamo catheter during functional mobility.  Pt verbalized she wants a bedside " commode because the bed pan is too difficult for her to use. Bedside commode had been ordered for pt's room, per RN.      [facility] 6 Click ADL: 14    Treatment & Education:  -Pt with difficulty maintaining comfortable sitting balance on the bed due to bed's increased height (couldn't be adjusted any lower) and due to difficulty with the mattress.  Pt was close to EOB and began leaning back, which caused her hips to slide forward.  To prevent pt from sliding off the bed, therapists educated pt on leaning forward and scooting her hips back toward center of bed.     - Pt edu on role of OT, POC, safety when performing bed mobility, benefit of performing OOB activity, and safety when performing functional transfers and mobility.  - White board updated  - Self care tasks completed-- as noted above       Patient left with bed in chair position with all lines intact, call button in reach and RN notifiedEducation:      GOALS:   Multidisciplinary Problems     Occupational Therapy Goals        Problem: Occupational Therapy Goal    Goal Priority Disciplines Outcome Interventions   Occupational Therapy Goal     OT, PT/OT Ongoing, Progressing    Description: Goals to be met by: 11/6/2020    Patient will increase functional independence with ADLs by performing:    UE Dressing with Stand-by Assistance.  LE Dressing with Moderate Assistance.  Grooming while EOB with Modified [place].  Toileting from bedside commode with Minimal Assistance for hygiene and clothing management.   Rolling to Bilateral with Stand-by Assistance.   Supine to sit with Contact Guard Assistance.  Stand pivot transfers with Supervision.  Toilet transfer to bedside commode with Supervision.                     Time Tracking:     OT Date of Treatment: 10/26/20  OT Start Time: 1430  OT Stop Time: 1457  OT Total Time (min): 27 min    Billable Minutes:Self Care/Home Management 8 min.  Therapeutic Activity 19 min.    Marcelo Catherine, OT  10/26/2020

## 2020-10-26 NOTE — PT/OT/SLP PROGRESS
Physical Therapy Treatment    Patient Name:  Halley Moreno   MRN:  [id number]    Recommendations:     Discharge Recommendations:  rehabilitation facility   Discharge Equipment Recommendations: (TBD pending pt progress)   Barriers to discharge: Inaccessible home and Decreased caregiver support    Assessment:     Halley Moreno is a 45 y.o. female admitted with a medical diagnosis of Acute respiratory failure with hypoxia and hypercapnia.  She presents with the following impairments/functional limitations:  weakness, impaired endurance, impaired self care skills, impaired functional mobilty, gait instability, impaired balance, decreased upper extremity function, decreased lower extremity function, decreased safety awareness, pain, decreased ROM, impaired skin, edema, impaired cardiopulmonary response to activity Co treat with OT due to patient's increased level of required skilled assistance and decreased tolerance to activity. Patient pleasant and willing to participate in therapy, demonstrated good progress with functional mobility on this date. MaxA bed mobility, Zbigniew x 2 for sit to stand and gait within room. This patient is an excellent candidate for inpatient rehabilitation, has a qualifying diagnosis, shows increasing activity tolerance,  is motivated to participate in treatment, and has a supportive family willing to assist the patient upon discharge.  Pt would benefit from continued skilled PT services to maximize safety and [place] in functional mobility.      Rehab Prognosis: Good; patient would benefit from acute skilled PT services to address these deficits and reach maximum level of function.    Recent Surgery: * No surgery found *      Plan:     During this hospitalization, patient to be seen 3 x/week to address the identified rehab impairments via gait training, therapeutic activities, therapeutic exercises, neuromuscular re-education and progress toward the following goals:    · Plan of  "Care Expires:  11/21/20    Subjective     Chief Complaint: ankle pain in standing  Patient/Family Comments/goals: "High five! That was a team effort" "I have been moving in bed a lot, I don't want to get weak"  Pain/Comfort:  · Pain Rating 1: 0/10  · Location - Side 1: Right  · Location 1: ankle  · Pain Addressed 1: Cessation of Activity, Reposition, Distraction  · Pain Rating Post-Intervention 1: other (see comments)(did not rate, complained of pain in standing)      Objective:     Communicated with nurse prior to session.  Patient found supine with alamo catheter, telemetry, oxygen upon PT entry to room.     General Precautions: Standard, fall   Orthopedic Precautions:N/A   Braces:       Functional Mobility:  · Bed Mobility:     · Rolling Left:  maximal assistance  · Rolling Right: maximal assistance  · Bridging and scooting: supervision, patient able to bridge and scoot up in bed with use of UE on bedrail  · Supine to Sit: maximal assistance  · Sit to Supine: maximal assistance and of 2 persons  · Transfers:     · Sit to Stand:  minimum assistance and of 2 persons with hand-held assist  · Gait: 5 steps < >, 3 steps L and 5 steps R with Zbigniew, HHA. Patient complained of R ankle pain and stiffness.       AM-PAC 6 CLICK MOBILITY  Turning over in bed (including adjusting bedclothes, sheets and blankets)?: 3  Sitting down on and standing up from a chair with arms (e.g., wheelchair, bedside commode, etc.): 3  Moving from lying on back to sitting on the side of the bed?: 2  Moving to and from a bed to a chair (including a wheelchair)?: 3  Need to walk in hospital room?: 3  Climbing 3-5 steps with a railing?: 2  Basic Mobility Total Score: 16       Therapeutic Activities and Exercises:  Pt educated on plan and goals with physical therapy.   Pt educated on importance of OOB activity to decrease the risks associated with bed rest.  Instruction provided for safety and technique for bed mobility, gait and transfers.  Bridges- " patient demonstrated ability bridge and scoot in bed, performed 6 addition glute bridges with PT. PT educated patient on benefit of bridging exercise, to strengthen LE required for sit to stand transitions. Educated to perform 10x, 3-4x/day.   All questions and concerns addressed within PT scope of practice.     Patient left HOB elevated with all lines intact and call button in reach..    GOALS:   Multidisciplinary Problems     Physical Therapy Goals        Problem: Physical Therapy Goal    Goal Priority Disciplines Outcome Goal Variances Interventions   Physical Therapy Goal     PT, PT/OT Ongoing, Progressing     Description: Goals to be met by: 2020    Patient will increase functional independence with mobility by performin. Supine to sit with MInimal Assistance of 2 persons.  2. Sit to supine with Moderate Assistance of 2 persons.  3. Sit to stand transfer with Minimal Assistance of 1 person.  4. Bed to chair stand pivot transfer with Contact Guard Assistance of 1 person using Rolling Walker.  5. Gait  x 40 feet with Contact Guard Assistance of 1 person using Rolling Walker.   6. Lower extremity exercise program x20 reps per handout, with assistance as needed.                     Time Tracking:     PT Received On: 10/26/20  PT Start Time: 1432     PT Stop Time: 1459  PT Total Time (min): 27 min     Billable Minutes: Gait Training 12 and Therapeutic Activity 15    Treatment Type: Treatment  PT/PTA: PT     PTA Visit Number: 0     Jacqui Nolasco PT  10/26/2020

## 2020-10-26 NOTE — NURSING TRANSFER
Nursing Transfer Note      10/25/2020     Transfer To: [id number] from [id number]    Transfer via bed    Transfer with 2L O2, cardiac monitoring    Transported by RN x2    Medicines sent: eye drops, lasix gtt with extra bag and fungal powder    Chart send with patient: Yes    Notified: mother via phone    Patient reassessed at: RN in room at time of transfer    Upon arrival to floor: patient oriented to room, call bell in reach and bed in lowest position    Pt has personally belonging in green [employer] bag, phone in hand

## 2020-10-26 NOTE — ASSESSMENT & PLAN NOTE
Patient's pulmonary HTN and RV dysfunction likely result of obesity hypoventilation syndrome with non-compliance with at-home CPAP.    Plan:    - NIPPV nightly   - Will consult Pulmonology for assistance; appreciate recommendations

## 2020-10-27 LAB
ALLENS TEST: ABNORMAL
ANION GAP SERPL CALC-SCNC: 10 MMOL/L (ref 8–16)
ANION GAP SERPL CALC-SCNC: 7 MMOL/L (ref 8–16)
BASOPHILS # BLD AUTO: 0.01 K/UL (ref 0–0.2)
BASOPHILS NFR BLD: 0.2 % (ref 0–1.9)
BUN SERPL-MCNC: 14 MG/DL (ref 6–20)
BUN SERPL-MCNC: 14 MG/DL (ref 6–20)
CALCIUM SERPL-MCNC: 8.6 MG/DL (ref 8.7–10.5)
CALCIUM SERPL-MCNC: 8.9 MG/DL (ref 8.7–10.5)
CHLORIDE SERPL-SCNC: 83 MMOL/L (ref 95–110)
CHLORIDE SERPL-SCNC: 85 MMOL/L (ref 95–110)
CO2 SERPL-SCNC: 47 MMOL/L (ref 23–29)
CO2 SERPL-SCNC: 50 MMOL/L (ref 23–29)
CREAT SERPL-MCNC: 0.7 MG/DL (ref 0.5–1.4)
CREAT SERPL-MCNC: 0.8 MG/DL (ref 0.5–1.4)
DELSYS: ABNORMAL
DIFFERENTIAL METHOD: ABNORMAL
EOSINOPHIL # BLD AUTO: 0.1 K/UL (ref 0–0.5)
EOSINOPHIL NFR BLD: 1.4 % (ref 0–8)
ERYTHROCYTE [DISTWIDTH] IN BLOOD BY AUTOMATED COUNT: 20.4 % (ref 11.5–14.5)
ERYTHROCYTE [SEDIMENTATION RATE] IN BLOOD BY WESTERGREN METHOD: 19 MM/H
EST. GFR  (AFRICAN AMERICAN): >60 ML/MIN/1.73 M^2
EST. GFR  (AFRICAN AMERICAN): >60 ML/MIN/1.73 M^2
EST. GFR  (NON AFRICAN AMERICAN): >60 ML/MIN/1.73 M^2
EST. GFR  (NON AFRICAN AMERICAN): >60 ML/MIN/1.73 M^2
FIO2: 36
FLOW: 4
GLUCOSE SERPL-MCNC: 120 MG/DL (ref 70–110)
GLUCOSE SERPL-MCNC: 150 MG/DL (ref 70–110)
HCO3 UR-SCNC: 56.5 MMOL/L (ref 24–28)
HCT VFR BLD AUTO: 52.3 % (ref 37–48.5)
HGB BLD-MCNC: 14.7 G/DL (ref 12–16)
IMM GRANULOCYTES # BLD AUTO: 0.02 K/UL (ref 0–0.04)
IMM GRANULOCYTES NFR BLD AUTO: 0.3 % (ref 0–0.5)
LYMPHOCYTES # BLD AUTO: 1.1 K/UL (ref 1–4.8)
LYMPHOCYTES NFR BLD: 16.8 % (ref 18–48)
MAGNESIUM SERPL-MCNC: 2 MG/DL (ref 1.6–2.6)
MAGNESIUM SERPL-MCNC: 2 MG/DL (ref 1.6–2.6)
MCH RBC QN AUTO: 25.2 PG (ref 27–31)
MCHC RBC AUTO-ENTMCNC: 28.1 G/DL (ref 32–36)
MCV RBC AUTO: 90 FL (ref 82–98)
MODE: ABNORMAL
MONOCYTES # BLD AUTO: 0.8 K/UL (ref 0.3–1)
MONOCYTES NFR BLD: 11.3 % (ref 4–15)
NEUTROPHILS # BLD AUTO: 4.7 K/UL (ref 1.8–7.7)
NEUTROPHILS NFR BLD: 70 % (ref 38–73)
NRBC BLD-RTO: 0 /100 WBC
PCO2 BLDA: 91 MMHG (ref 35–45)
PH SMN: 7.4 [PH] (ref 7.35–7.45)
PHOSPHATE SERPL-MCNC: 3.7 MG/DL (ref 2.7–4.5)
PHOSPHATE SERPL-MCNC: 3.8 MG/DL (ref 2.7–4.5)
PLATELET # BLD AUTO: 131 K/UL (ref 150–350)
PMV BLD AUTO: 9.7 FL (ref 9.2–12.9)
PO2 BLDA: 23 MMHG (ref 40–60)
POC BE: >30 MMOL/L
POC SATURATED O2: 35 % (ref 95–100)
POC TCO2: >50 MMOL/L (ref 24–29)
POCT GLUCOSE: 183 MG/DL (ref 70–110)
POTASSIUM SERPL-SCNC: 4 MMOL/L (ref 3.5–5.1)
POTASSIUM SERPL-SCNC: 4.2 MMOL/L (ref 3.5–5.1)
RBC # BLD AUTO: 5.84 M/UL (ref 4–5.4)
SAMPLE: ABNORMAL
SITE: ABNORMAL
SODIUM SERPL-SCNC: 140 MMOL/L (ref 136–145)
SODIUM SERPL-SCNC: 142 MMOL/L (ref 136–145)
SP02: 94
WBC # BLD AUTO: 6.66 K/UL (ref 3.9–12.7)

## 2020-10-27 PROCEDURE — 80048 BASIC METABOLIC PNL TOTAL CA: CPT | Mod: 91

## 2020-10-27 PROCEDURE — 99900035 HC TECH TIME PER 15 MIN (STAT)

## 2020-10-27 PROCEDURE — 25000003 PHARM REV CODE 250: Performed by: INTERNAL MEDICINE

## 2020-10-27 PROCEDURE — 27000190 HC CPAP FULL FACE MASK W/VALVE

## 2020-10-27 PROCEDURE — 82803 BLOOD GASES ANY COMBINATION: CPT

## 2020-10-27 PROCEDURE — 85025 COMPLETE CBC W/AUTO DIFF WBC: CPT

## 2020-10-27 PROCEDURE — 99233 PR SUBSEQUENT HOSPITAL CARE,LEVL III: ICD-10-PCS | Mod: ,,, | Performed by: INTERNAL MEDICINE

## 2020-10-27 PROCEDURE — 27000221 HC OXYGEN, UP TO 24 HOURS

## 2020-10-27 PROCEDURE — 84100 ASSAY OF PHOSPHORUS: CPT

## 2020-10-27 PROCEDURE — 94660 CPAP INITIATION&MGMT: CPT

## 2020-10-27 PROCEDURE — 25000003 PHARM REV CODE 250: Performed by: STUDENT IN AN ORGANIZED HEALTH CARE EDUCATION/TRAINING PROGRAM

## 2020-10-27 PROCEDURE — 63600175 PHARM REV CODE 636 W HCPCS: Performed by: INTERNAL MEDICINE

## 2020-10-27 PROCEDURE — 99233 SBSQ HOSP IP/OBS HIGH 50: CPT | Mod: ,,, | Performed by: INTERNAL MEDICINE

## 2020-10-27 PROCEDURE — 94761 N-INVAS EAR/PLS OXIMETRY MLT: CPT

## 2020-10-27 PROCEDURE — 63600175 PHARM REV CODE 636 W HCPCS: Performed by: STUDENT IN AN ORGANIZED HEALTH CARE EDUCATION/TRAINING PROGRAM

## 2020-10-27 PROCEDURE — 20600001 HC STEP DOWN PRIVATE ROOM

## 2020-10-27 PROCEDURE — 83735 ASSAY OF MAGNESIUM: CPT

## 2020-10-27 PROCEDURE — 36415 COLL VENOUS BLD VENIPUNCTURE: CPT

## 2020-10-27 RX ADMIN — ATORVASTATIN CALCIUM 10 MG: 10 TABLET, FILM COATED ORAL at 08:10

## 2020-10-27 RX ADMIN — FUROSEMIDE 10 MG/HR: 10 INJECTION, SOLUTION INTRAMUSCULAR; INTRAVENOUS at 06:10

## 2020-10-27 RX ADMIN — MICONAZOLE NITRATE: 20 POWDER TOPICAL at 09:10

## 2020-10-27 RX ADMIN — LATANOPROST 1 DROP: 50 SOLUTION OPHTHALMIC at 09:10

## 2020-10-27 RX ADMIN — ENOXAPARIN SODIUM 60 MG: 60 INJECTION SUBCUTANEOUS at 08:10

## 2020-10-27 RX ADMIN — POLYETHYLENE GLYCOL 3350 17 G: 17 POWDER, FOR SOLUTION ORAL at 08:10

## 2020-10-27 RX ADMIN — MICONAZOLE NITRATE: 20 POWDER TOPICAL at 08:10

## 2020-10-27 RX ADMIN — CHLOROTHIAZIDE SODIUM 500 MG: 500 INJECTION INTRAVENOUS at 06:10

## 2020-10-27 RX ADMIN — ENOXAPARIN SODIUM 60 MG: 60 INJECTION SUBCUTANEOUS at 09:10

## 2020-10-27 NOTE — ASSESSMENT & PLAN NOTE
Previously on glipizide 10mg daily and semaglutide 0.25mg weekly. Glucose well controlled this admission, not requiring SSI at this time.    Plan:   - Holding home oral and IM anti-hyperglycemics   - POCT BG qAC/HS while eating   - LD SSI   - Diabetic cardiac diet   - Fluid restriction 1.5L    - Will provide endocrinology referral at discharge

## 2020-10-27 NOTE — PHYSICIAN QUERY
PT Name: Halley Moreno  MR #: [id number]     ELECTROLYTE CLARIFICATION      CDS: Kamron Kelley RN CCDS               Contact information: [email or web address]   This form is a permanent document in the medical record.    Query Date: October 27, 2020    By submitting this query, we are merely seeking further clarification of documentation to reflect the severity of illness of your patient. Please utilize your independent clinical judgment when addressing the question(s) below.    The Medical Record reflects the following:     Indicators   Supporting Clinical Findings Location in Medical Record     x Lab Value(s)  10/25/2020 02:51 10/25/2020 08:25 10/25/2020 18:22   Potassium 3.4 (L) 3.3 (L) 3.2 (L)      10/24/2020 18:47 10/25/2020 02:51 10/25/2020 18:22 10/26/2020 05:39   Magnesium 1.5 (L) 1.5 (L) 1.3 (L) 1.5 (L)    Lab values     x Treatment/Medication magnesium sulfate 2 g IVPB daily  potassium chloride SA CR 40 mEq PO x 2  magnesium sulfate 2 g IVPB x 2   potassium bicarbonate disintegrating tablet 25 mEq PO 10/23-10/25/2020 Medication  10/25/2020 Medication  10/26/2020 Medications    Other       Provider, please specify the diagnosis or diagnoses that correspond(s) to the above indicators. Eugene all that apply:    [  XXX ] Hypokalemia   [ XXX  ] Hypomagnesemia   [   ] Other electrolyte disturbance (please specify): __________   [   ]  Clinically Undetermined       Please document in your progress notes daily for the duration of treatment until resolved, and include in your discharge summary.

## 2020-10-27 NOTE — PHYSICIAN QUERY
PT Name: Halley Moreno  MR #: [id number]    Hypertensive Condition Clarification      CDS: Kamron Kelley RN CCDS               Contact information: [email or web address]     This form is a permanent document in the medical record.     Query Date: October 27, 2020    By submitting this query, we are merely seeking further clarification of documentation. Please utilize your independent clinical judgment when addressing the question(s) below.    The Medical Record contains the following:   Indicators   Supporting Clinical Findings Location in Medical Record     x Hypertension associated with diabetes documented Hypertension associated with diabetes 10/22/2020 H&P Yaniv Jorgensen MD/Adán Muller MD     x Chronic condition(s) Acute on chronic heart failure with preserved ejection fraction    Morbid obesity with BMI of 70 and over, adult  Diabetes mellitus type 2  PMHx pulm HTN (group III), cor pulmonale, 10/24/2020 Critical Care progress notes Miky Goode MD/[place]ston Matamoros MD  10/22/2020 H&P Yaniv Jorgensen MD/Adán Muller MD  10/26/2020 Pulmonology consult Maribell Bower MD/Divya Leija MD     x Vital signs BPs- 133/72, 128/63, 115/68, 108/57, 136/73 10/22/2020 Vitals     x Treatment/Medication Hold home medication  Lifestyle modifications (DASH diet, Mediterranean diet, weight loss with target BMI 18-25, at least 150 minute moderate intensity exercise/week)  Risk factor modification (smoking cessation, HbA1C < 6.5, Minimize alcohol intake with no more than 1-2 glasses of beer or wine per day or 10 or less drinks per week)  On losartan at home; holding in acute setting  Continue atorvastatin 10mg nightly 10/22/2020 H&P Yaniv Jorgensen MD/Adán Muller MD            10/26/2020 Hospital Medicine progress notes Rao Szymanski MD    Other         Provider, please clarify the relationship between the Hypertension and Diabetes Mellitus    [   ] Hypertension is a manifestation of  Diabetes Mellitus (Secondary Hypertension)   [  XXX ]  Hypertension is not a manifestation of Diabetes Mellitus (Essential Hypertension)   [   ] Other Clarification (please specify): ____________   [  ] Clinically Undetermined       Please document in your progress notes daily for the duration of treatment, until resolved, and include in your discharge summary.

## 2020-10-27 NOTE — PLAN OF CARE
10/27/20 1542   Discharge Reassessment   Assessment Type Discharge Planning Reassessment   Discharge Plan A Rehab   Discharge Plan B Home with family   DME Needed Upon Discharge  other (see comments)  (TBD)   Anticipated Discharge Disposition Rehab   Post-Acute Status   Post-Acute Authorization Placement   Post-Acute Placement Status Awaiting Internal Medical Clearance

## 2020-10-27 NOTE — ASSESSMENT & PLAN NOTE
Patient on losartan 50mg daily at home. Remains normotensive at this time.    Plan:   - Holding home anti-hypertensive in acute setting   - Continue atorvastatin 10mg nightly

## 2020-10-27 NOTE — MEDICAL/APP STUDENT
"[facility]  Progress Note    Patient Name: Halley Moreno  MRN: [id number]  Patient Class: IP- Inpatient   Admission Date: 10/22/2020  Length of Stay: 5 days  Attending Physician: Adán Muller MD  Primary Care Provider: Yamila Tejeda MD    Subjective:      Mrs. Halley Moreno is a 46 yo F with PMHx of morbid obesity, R sided heart failure 2/2 pulmonary HTN, and YARY/OHS being treated for acute respiratory failure with hypoxia and hypercapnia.     HPI: per 10/26 Hospital Medicine note by Dr Rao Lu  "Patient is a 45-year-old female who is presenting to us with acute respiratory distress.  She has a recent diagnosis of severe pulmonary hypertension with right-sided heart failure.  Her last echo showed a PA systolic pressure of 99.  She initially presented due to increasing weight gain over the last 7 months. Patient states that in March she began gaining weight and she was placed on diuretic to trying keep that weight down.  Despite 40 mg of furosemide b.i.d. she continued to gain weight.  She has gained approximately 100 lb since March and 80 lb since May.  She does not have a cardiologist but was scheduled to see 1 in December.  Patient states that at home she sleeps in a elevated position secondary to shortness of breath.  She denies any episodes of waking up in the middle of the night out of breath.  She does not use any home oxygen nor she ever been told explicitly that she has heart failure.  She demonstrates poor understanding of the disease stating that the right heart was backing up into the lungs and cause in the shortness of breath.  She presented to the clinic today for evaluation and was found to be in acute respiratory distress with hypoxia.  She was referred to the ED for evaluation and diuresis.  Overview/Hospital Course:Patient was admitted to Hospital Medicine. Was initially hypoxic and put on nasal cannula with normalization of her oxygen status to " "approximately 95%. She was demonstrating initial respiratory distress and was given a bolus of 120mg IV furosemide. Hypoxia and hypercapnia worsened so was stepped up to ICU on 10/23. Was placed on BiPAP and aggressively diuresed (approx 20L net negative) with improvement in respiratory status. Weaned to 2L NC and subsequently stepped back down to Hospital Medicine on 10/26. Remained afebrile and hemodynamically stable in step down unit. Reports improvement in SOB with continued diuresis."    Interval History: Pt IV furosemide stopped overnight. Catheter removed due to nursing communication error at 3AM. Overwise no complaints and SOB resolved. Seen by OT who recommend further follow up and Pulmonary team who are content to stop follow up.    Current Facility-Administered Medications   Medication    acetaminophen tablet 650 mg    atorvastatin tablet 10 mg    enoxaparin injection 60 mg    influenza (QUADRIVALENT PF) vaccine 0.5 mL    insulin aspart U-100 pen 0-5 Units    latanoprost 0.005 % ophthalmic solution 1 drop    melatonin tablet 6 mg    miconazole NITRATE 2 % top powder    ondansetron disintegrating tablet 8 mg    polyethylene glycol packet 17 g    senna-docusate 8.6-50 mg per tablet 2 tablet    simethicone chewable tablet 80 mg    sodium chloride 0.9% flush 10 mL     Review of Systems   Constitutional: Negative for chills and fever.   Respiratory: Positive for cough. Negative for sputum production and shortness of breath.    Cardiovascular: Positive for leg swelling. Negative for chest pain, palpitations, orthopnea and PND.   Gastrointestinal: Negative for abdominal pain, diarrhea, nausea and vomiting.   Genitourinary: Negative for dysuria and flank pain.   Skin: Positive for rash (Scaling on legs).       Objective:     Vital Signs (Most Recent):  Temp: 98.2 °F (36.8 °C) (10/27/20 1121)  Pulse: 75 (10/27/20 1121)  Resp: 20 (10/27/20 1121)  BP: 124/64 (10/27/20 1121)  SpO2: (!) 94 % (10/27/20 " 1121) Vital Signs (24h Range):  Temp:  [97.6 °F (36.4 °C)-98.3 °F (36.8 °C)] 98.2 °F (36.8 °C)  Pulse:  [73-79] 75  Resp:  [14-23] 20  SpO2:  [87 %-94 %] 94 %  BP: (124-133)/(62-78) 124/64     Weight: (!) 226.8 kg (500 lb) (10/23/20 1010)  Body mass index is 88.57 kg/m².      Intake/Output Summary (Last 24 hours) at 10/27/2020 1240  Last data filed at 10/26/2020 2350  Gross per 24 hour   Intake 280 ml   Output 6625 ml   Net -6345 ml     Physical Exam  Constitutional:       General: She is not in acute distress.     Appearance: She is obese. She is not ill-appearing.   Eyes:      Extraocular Movements: Extraocular movements intact.      Pupils: Pupils are equal, round, and reactive to light.   Cardiovascular:      Rate and Rhythm: Normal rate.      Heart sounds: Heart sounds are distant.      Comments: Heart ausculation limited by body habitus  Pulmonary:      Effort: Pulmonary effort is normal.      Breath sounds: Rales (L lung base) present.   Abdominal:      General: There is distension.      Palpations: Abdomen is soft.      Tenderness: There is abdominal tenderness (mild suprapubic tenderness).   Musculoskeletal:      Right lower le+ Edema present.      Left lower le+ Edema present.   Skin:     General: Skin is warm and dry.      Capillary Refill: Capillary refill takes less than 2 seconds.          Neurological:      Mental Status: She is alert.   Psychiatric:         Mood and Affect: Mood normal.       Significant Labs:  CBC:   Recent Labs   Lab 10/27/20  0702   WBC 6.66   RBC 5.84*   HGB 14.7   HCT 52.3*   *     CMP:   Recent Labs   Lab 10/22/20  2006  10/26/20  1726      < > 106   CALCIUM 8.7   < > 8.6*   ALBUMIN 3.1*  --   --    PROT 6.7  --   --       < > 142   K 5.4*   < > 3.8   CO2 31*   < > 48*   CL 99   < > 85*   BUN 43*   < > 16   CREATININE 1.2   < > 0.8   ALKPHOS 73  --   --    ALT 25  --   --    AST 30  --   --    BILITOT 1.0  --   --     < > = values in this interval  not displayed.     ABGs:   Recent Labs   Lab 10/25/20  1428   PH 7.427   PCO2 77.1*   HCO3 50.9*   POCSATURATED 70*   BE 27     Significant Imaging:  No new imaging    Assessment/Plan:      Mrs. Halley Moreno is a 44 yo F with PMHx of morbid obesity, R sided heart failure 2/2 pulmonary HTN, and YARY/OHS being treated for acute respiratory failure with hypoxia and hypercapnia.     Active Diagnoses:    Diagnosis Date Noted POA    PRINCIPAL PROBLEM:  Acute respiratory failure with hypoxia and hypercapnia [J96.01, J96.02] 10/22/2020 Yes    Constipation [K59.00] 10/25/2020 Yes    Acute on chronic heart failure with preserved ejection fraction [I50.33] 10/23/2020 Yes    Obesity hypoventilation syndrome [E66.2] 10/23/2020 Yes    Hyperkalemia [E87.5] 10/23/2020 Yes    Hyperphosphatemia [E83.39] 10/23/2020 Yes    Right heart failure due to pulmonary hypertension [I27.29, I50.810] 10/22/2020 Yes    Diabetes mellitus type 2 without retinopathy [E11.9] 10/14/2019 Yes    Hypertension associated with diabetes [E11.59, I10]  Yes    Sleep apnea [G47.30]  Yes    Morbid obesity with BMI of 70 and over, adult [E66.01, Z68.45]  Not Applicable      Problems Resolved During this Admission:     Acute respiratory Failure with hypoxia and hypercapnia  Patient desaturated to 87% this AM but returned to baseline without intervention. Breathing easily on 2L NC. SOB resolved. ~27L net fluid loss over admission.  - target >80% SpO2  - 20mg/hr furosemide drip stopped temporarily to allow resolution of third spacing and stabilize bicarb levels  - BMP reordered  - Patient education on OHS and benefit of weight loss  - repeat ABG this AM    Right sided HFpEF  - Strict I/O  - fluid restriction of 1.5L/day  - restart daily weights   Last weight 10/23, required for management   Last weight 500lbs, dry weight 370lbs per pt  - low salt diet and education on home fluid restriction  - see respiratory failure plan    Hypokalemia and  hypomagnesemia  Resolved yesterday. Now at K: 3.8, M.0    DM type 2  - stopped home meds  - short acting insulin 0-5 units premeal  - LD SSI  - diabetic cardiac diet  - Accu checks AC/evening    OHS  Pulmonology consulted and feel no further follow up needed  - on CPAP nightly  - 2L O2 by nasal canula    Morbid obesity  BMI 88.5. Gained >100lbs in last 7 months 2/2 fluid retension. Dry weight of 370lbs  - pt expresses interest inlosing weight, dietary/bariatric medicine outpatient follow up warranted    HTN  - On losartan 10mg at home, continued inpatient    VTE Risk Mitigation (From admission, onward)         Ordered     enoxaparin injection 60 mg  Every 12 hours      10/22/20 1518     IP VTE HIGH RISK PATIENT  Once      10/22/20 1346     Place sequential compression device  Until discontinued      10/22/20 1346                   Yogesh Nunez  [facility]

## 2020-10-27 NOTE — ASSESSMENT & PLAN NOTE
Plan:  - Dry weight of approximately 370 lb, up to 500 pounds this admission  - BMI 88.5  - Will obtain new weight today  - Diuresis as noted above

## 2020-10-27 NOTE — ASSESSMENT & PLAN NOTE
Patient's pulmonary HTN and RV dysfunction likely result of obesity hypoventilation syndrome with non-compliance with at-home CPAP. Evaluated by Pulmonology, who recommended continued use of NIPPV and encouraged weight loss.     Plan:    - NIPPV nightly

## 2020-10-27 NOTE — SUBJECTIVE & OBJECTIVE
Interval History: Remained afebrile and hemodynamically stable overnight. Lasix gtt held overnight with rising bicarb. Hernandez catheter removed overnight per order of unit charge nurse. SpO2 in low 90s on 2-4L NC. Reports significant improvement in respiratory status after diuresis and feels much better overall today.     Review of Systems   Constitutional: Negative for diaphoresis and fever.   HENT: Negative for congestion and sore throat.    Eyes: Negative for redness and visual disturbance.   Respiratory: Negative for cough and stridor.    Cardiovascular: Positive for leg swelling. Negative for chest pain.   Gastrointestinal: Positive for abdominal distention. Negative for abdominal pain, constipation, diarrhea and vomiting.   Genitourinary: Negative for dysuria and hematuria.   Musculoskeletal: Negative for back pain.   Skin: Negative for color change and pallor.   Neurological: Negative for speech difficulty and headaches.   Psychiatric/Behavioral: Negative for agitation and confusion.     Objective:     Vital Signs (Most Recent):  Temp: 98.3 °F (36.8 °C) (10/27/20 0345)  Pulse: 73 (10/27/20 0826)  Resp: 20 (10/27/20 0826)  BP: 128/62 (10/27/20 0826)  SpO2: (!) 90 % (10/27/20 0826) Vital Signs (24h Range):  Temp:  [97.6 °F (36.4 °C)-98.3 °F (36.8 °C)] 98.3 °F (36.8 °C)  Pulse:  [73-81] 73  Resp:  [14-23] 20  SpO2:  [87 %-94 %] 90 %  BP: (128-133)/(62-78) 128/62     Weight: (!) 226.8 kg (500 lb)  Body mass index is 88.57 kg/m².    Intake/Output Summary (Last 24 hours) at 10/27/2020 0841  Last data filed at 10/26/2020 2350  Gross per 24 hour   Intake 840 ml   Output 8225 ml   Net -7385 ml      Physical Exam  Vitals signs and nursing note reviewed.   Constitutional:       General: She is awake. She is not in acute distress.     Appearance: She is morbidly obese. She is not diaphoretic.      Interventions: Nasal cannula in place.   HENT:      Head: Normocephalic and atraumatic.      Nose: Nose normal.       Mouth/Throat:      Mouth: Mucous membranes are moist.      Pharynx: Oropharynx is clear.   Eyes:      Extraocular Movements: Extraocular movements intact.      Conjunctiva/sclera: Conjunctivae normal.      Pupils: Pupils are equal, round, and reactive to light.   Neck:      Musculoskeletal: Normal range of motion.   Cardiovascular:      Rate and Rhythm: Normal rate and regular rhythm.      Pulses: Normal pulses.      Heart sounds: Normal heart sounds.   Pulmonary:      Effort: Pulmonary effort is normal. No respiratory distress.      Breath sounds: Normal breath sounds. No wheezing or rhonchi.   Abdominal:      General: Abdomen is protuberant. There is distension.      Palpations: Abdomen is soft.      Tenderness: There is no abdominal tenderness.   Musculoskeletal:      Right lower leg: Edema present.      Left lower leg: Edema present.   Skin:     General: Skin is warm and dry.      Coloration: Skin is not jaundiced.      Findings: Erythema and rash present. Rash is scaling.      Comments: Chronic venous stasis changes present on BLE.   Neurological:      General: No focal deficit present.      Mental Status: She is alert and oriented to person, place, and time.   Psychiatric:         Mood and Affect: Mood normal.         Behavior: Behavior normal.       Significant Labs:   CBC:   Recent Labs   Lab 10/26/20  0539 10/27/20  0702   WBC 8.16 6.66   HGB 14.1 14.7   HCT 50.0* 52.3*    131*     CMP:   Recent Labs   Lab 10/25/20  1822 10/26/20  0807 10/26/20  1726    145 142   K 3.2* 3.8 3.8   CL 90* 87* 85*   CO2 46* 46* 48*    86 106   BUN 18 16 16   CREATININE 0.7 0.8 0.8   CALCIUM 7.8* 8.5* 8.6*   ANIONGAP 8 12 9   EGFRNONAA >60.0 >60.0 >60.0     POCT Glucose:   Recent Labs   Lab 10/25/20  2205   POCTGLUCOSE 109       Significant Imaging: No new imaging to review this AM.

## 2020-10-27 NOTE — ASSESSMENT & PLAN NOTE
Patient is a chronic CO2 retainer in setting of obesity hypoventilation syndrome 2/2 morbid obesity. Stepped down from ICU today, currently continuing diuresis with minimal supplemental O2 requirements.     Plan:   - Target SpO2 of >88%; wean supplemental O2 as tolerated   - VBG this AM with pH 7.401, pCO2 91, HCO3 56.5   - Lasix gtt held overnight -> will continue to hold today with elevated HCO3   - Approx 34L net negative since admission   - Risk factor reduction (encouraged weight loss, use of nightly CPAP consistently)

## 2020-10-27 NOTE — PROGRESS NOTES
[facility] Hospital Medicine  Progress Note    Patient Name: Halley Moreno  MRN: [id number]  Patient Class: IP- Inpatient   Admission Date: 10/22/2020  Length of Stay: 5 days  Attending Physician: Adán Muller MD  Primary Care Provider: Yamila Tejeda MD    [facility] Medicine Team: [facility] - Rao Szymanski MD    Subjective:     Principal Problem:Acute respiratory failure with hypoxia and hypercapnia    HPI:  Patient is a 45-year-old female who is presenting to us with acute respiratory distress.  She has a recent diagnosis of severe pulmonary hypertension with right-sided heart failure.  Her last echo showed a PA systolic pressure of 99.  She initially presented due to increasing weight gain over the last 7 months.  Patient states that in March she began gaining weight and she was placed on diuretic to trying keep that weight down.  Despite 40 mg of furosemide b.i.d. she continued to gain weight.  She has gained approximately 100 lb since March and 80 lb since May.  She does not have a cardiologist but was scheduled to see 1 in December.  Patient states that at home she sleeps in a elevated position secondary to shortness of breath.  She denies any episodes of waking up in the middle of the night out of breath.  She does not use any home oxygen nor she ever been told explicitly that she has heart failure.  She demonstrates poor understanding of the disease stating that the right heart was backing up into the lungs and cause in the shortness of breath.  She presented to the clinic today for evaluation and was found to be in acute respiratory distress with hypoxia.  She was referred to the ED for evaluation and diuresis.    Patient states that she has no known family history of heart failure in the family however she does state that her father had a pacemaker for unknown reasons.  She states that she does not think that anyone her and her family has had pulmonary  hypertension.  Patient states that she does not drink, does not smoke, and does not use IV drugs.    Overview/Hospital Course:  Patient was admitted to Hospital Medicine. Was initially hypoxic and put on nasal cannula with normalization of her oxygen status to approximately 95%. She was demonstrating initial respiratory distress and was given a bolus of 120mg IV furosemide. Hypoxia and hypercapnia worsened so was stepped up to ICU on 10/23. Was placed on BiPAP and aggressively diuresed (approx 20L net negative) with improvement in respiratory status. Weaned to 2L NC and subsequently stepped back down to Hospital Medicine on 10/26. Continued diuresis with IV Lasix gtt.     Interval History: Remained afebrile and hemodynamically stable overnight. Lasix gtt held overnight with rising bicarb. Hernandez catheter removed overnight per order of unit charge nurse. SpO2 in low 90s on 2-4L NC. Reports significant improvement in respiratory status after diuresis and feels much better overall today.     Review of Systems   Constitutional: Negative for diaphoresis and fever.   HENT: Negative for congestion and sore throat.    Eyes: Negative for redness and visual disturbance.   Respiratory: Negative for cough and stridor.    Cardiovascular: Positive for leg swelling. Negative for chest pain.   Gastrointestinal: Positive for abdominal distention. Negative for abdominal pain, constipation, diarrhea and vomiting.   Genitourinary: Negative for dysuria and hematuria.   Musculoskeletal: Negative for back pain.   Skin: Negative for color change and pallor.   Neurological: Negative for speech difficulty and headaches.   Psychiatric/Behavioral: Negative for agitation and confusion.     Objective:     Vital Signs (Most Recent):  Temp: 98.3 °F (36.8 °C) (10/27/20 0345)  Pulse: 73 (10/27/20 0826)  Resp: 20 (10/27/20 0826)  BP: 128/62 (10/27/20 0826)  SpO2: (!) 90 % (10/27/20 0826) Vital Signs (24h Range):  Temp:  [97.6 °F (36.4 °C)-98.3 °F  (36.8 °C)] 98.3 °F (36.8 °C)  Pulse:  [73-81] 73  Resp:  [14-23] 20  SpO2:  [87 %-94 %] 90 %  BP: (128-133)/(62-78) 128/62     Weight: (!) 226.8 kg (500 lb)  Body mass index is 88.57 kg/m².    Intake/Output Summary (Last 24 hours) at 10/27/2020 0841  Last data filed at 10/26/2020 2350  Gross per 24 hour   Intake 840 ml   Output 8225 ml   Net -7385 ml      Physical Exam  Vitals signs and nursing note reviewed.   Constitutional:       General: She is awake. She is not in acute distress.     Appearance: She is morbidly obese. She is not diaphoretic.      Interventions: Nasal cannula in place.   HENT:      Head: Normocephalic and atraumatic.      Nose: Nose normal.      Mouth/Throat:      Mouth: Mucous membranes are moist.      Pharynx: Oropharynx is clear.   Eyes:      Extraocular Movements: Extraocular movements intact.      Conjunctiva/sclera: Conjunctivae normal.      Pupils: Pupils are equal, round, and reactive to light.   Neck:      Musculoskeletal: Normal range of motion.   Cardiovascular:      Rate and Rhythm: Normal rate and regular rhythm.      Pulses: Normal pulses.      Heart sounds: Normal heart sounds.   Pulmonary:      Effort: Pulmonary effort is normal. No respiratory distress.      Breath sounds: Normal breath sounds. No wheezing or rhonchi.   Abdominal:      General: Abdomen is protuberant. There is distension.      Palpations: Abdomen is soft.      Tenderness: There is no abdominal tenderness.   Musculoskeletal:      Right lower leg: Edema present.      Left lower leg: Edema present.   Skin:     General: Skin is warm and dry.      Coloration: Skin is not jaundiced.      Findings: Erythema and rash present. Rash is scaling.      Comments: Chronic venous stasis changes present on BLE.   Neurological:      General: No focal deficit present.      Mental Status: She is alert and oriented to person, place, and time.   Psychiatric:         Mood and Affect: Mood normal.         Behavior: Behavior normal.        Significant Labs:   CBC:   Recent Labs   Lab 10/26/20  0539 10/27/20  0702   WBC 8.16 6.66   HGB 14.1 14.7   HCT 50.0* 52.3*    131*     CMP:   Recent Labs   Lab 10/25/20  1822 10/26/20  0807 10/26/20  1726    145 142   K 3.2* 3.8 3.8   CL 90* 87* 85*   CO2 46* 46* 48*    86 106   BUN 18 16 16   CREATININE 0.7 0.8 0.8   CALCIUM 7.8* 8.5* 8.6*   ANIONGAP 8 12 9   EGFRNONAA >60.0 >60.0 >60.0     POCT Glucose:   Recent Labs   Lab 10/25/20  2205   POCTGLUCOSE 109       Significant Imaging: No new imaging to review this AM.      Assessment/Plan:      * Acute respiratory failure with hypoxia and hypercapnia  Patient is a chronic CO2 retainer in setting of obesity hypoventilation syndrome 2/2 morbid obesity. Stepped down from ICU today, currently continuing diuresis with minimal supplemental O2 requirements.     Plan:   - Target SpO2 of >88%; wean supplemental O2 as tolerated   - VBG this AM with pH 7.401, pCO2 91, HCO3 56.5   - Lasix gtt held overnight -> will continue to hold today with elevated HCO3   - Approx 34L net negative since admission   - Risk factor reduction (encouraged weight loss, use of nightly CPAP consistently)    Right heart failure due to pulmonary hypertension  Plan:  - Strict Is/Os  - Daily weights  - Keep K>4, Mg>2  - Diabetic/cardiac low sodium diet (<2 g/day)  - Fluid restriction to 1.5 - 2.0 L/day  - Will obtain repeat echo once patient is closer to euvolemia    Diabetes mellitus type 2 without retinopathy  Previously on glipizide 10mg daily and semaglutide 0.25mg weekly. Glucose well controlled this admission, not requiring SSI at this time.    Plan:   - Holding home oral and IM anti-hyperglycemics   - POCT BG qAC/HS while eating   - LD SSI   - Diabetic cardiac diet   - Fluid restriction 1.5L    - Will provide endocrinology referral at discharge    Sleep apnea  Patient's pulmonary HTN and RV dysfunction likely result of obesity hypoventilation syndrome with  non-compliance with at-home CPAP. Evaluated by Pulmonology, who recommended continued use of NIPPV and encouraged weight loss.     Plan:    - NIPPV nightly    Morbid obesity with BMI of 70 and over, adult  Plan:  - Dry weight of approximately 370 lb, up to 500 pounds this admission  - BMI 88.5  - Will obtain new weight today  - Diuresis as noted above    Hypertension associated with diabetes  Patient on losartan 50mg daily at home. Remains normotensive at this time.    Plan:   - Holding home anti-hypertensive in acute setting   - Continue atorvastatin 10mg nightly    VTE Risk Mitigation (From admission, onward)         Ordered     enoxaparin injection 60 mg  Every 12 hours      10/22/20 1518     IP VTE HIGH RISK PATIENT  Once      10/22/20 1346     Place sequential compression device  Until discontinued      10/22/20 1346                Discharge Planning   JOSE RAUL: 10/30/2020     Code Status: Full Code   Is the patient medically ready for discharge?: No    Reason for patient still in hospital: Patient trending condition, Treatment, Consult recommendations and Pending disposition  Discharge Plan A: Home with family, Home Health        Rao Szymanski MD, PGY-1  Department of Hospital Medicine   [facility]

## 2020-10-28 LAB
ALLENS TEST: ABNORMAL
ANION GAP SERPL CALC-SCNC: 15 MMOL/L (ref 8–16)
ANISOCYTOSIS BLD QL SMEAR: SLIGHT
BASOPHILS # BLD AUTO: 0.02 K/UL (ref 0–0.2)
BASOPHILS NFR BLD: 0.3 % (ref 0–1.9)
BUN SERPL-MCNC: 14 MG/DL (ref 6–20)
CALCIUM SERPL-MCNC: 9.1 MG/DL (ref 8.7–10.5)
CHLORIDE SERPL-SCNC: 85 MMOL/L (ref 95–110)
CO2 SERPL-SCNC: 41 MMOL/L (ref 23–29)
CREAT SERPL-MCNC: 0.8 MG/DL (ref 0.5–1.4)
DELSYS: ABNORMAL
DIFFERENTIAL METHOD: ABNORMAL
EOSINOPHIL # BLD AUTO: 0.1 K/UL (ref 0–0.5)
EOSINOPHIL NFR BLD: 1.7 % (ref 0–8)
EP: 6
EP: 8
EP: 8
ERYTHROCYTE [DISTWIDTH] IN BLOOD BY AUTOMATED COUNT: 20.6 % (ref 11.5–14.5)
ERYTHROCYTE [SEDIMENTATION RATE] IN BLOOD BY WESTERGREN METHOD: 28 MM/H
EST. GFR  (AFRICAN AMERICAN): >60 ML/MIN/1.73 M^2
EST. GFR  (NON AFRICAN AMERICAN): >60 ML/MIN/1.73 M^2
FIO2: 100
GLUCOSE SERPL-MCNC: 106 MG/DL (ref 70–110)
HCO3 UR-SCNC: 50.8 MMOL/L (ref 24–28)
HCO3 UR-SCNC: 52.2 MMOL/L (ref 24–28)
HCO3 UR-SCNC: 53.7 MMOL/L (ref 24–28)
HCO3 UR-SCNC: 55.5 MMOL/L (ref 24–28)
HCO3 UR-SCNC: 58.4 MMOL/L (ref 24–28)
HCT VFR BLD AUTO: 54.1 % (ref 37–48.5)
HGB BLD-MCNC: 15.1 G/DL (ref 12–16)
HYPOCHROMIA BLD QL SMEAR: ABNORMAL
IMM GRANULOCYTES # BLD AUTO: 0.02 K/UL (ref 0–0.04)
IMM GRANULOCYTES NFR BLD AUTO: 0.3 % (ref 0–0.5)
IP: 12
IP: 16
IP: 20
IP: 20
LYMPHOCYTES # BLD AUTO: 1 K/UL (ref 1–4.8)
LYMPHOCYTES NFR BLD: 13.4 % (ref 18–48)
MAGNESIUM SERPL-MCNC: 1.7 MG/DL (ref 1.6–2.6)
MAGNESIUM SERPL-MCNC: 1.9 MG/DL (ref 1.6–2.6)
MCH RBC QN AUTO: 25.5 PG (ref 27–31)
MCHC RBC AUTO-ENTMCNC: 27.9 G/DL (ref 32–36)
MCV RBC AUTO: 91 FL (ref 82–98)
MIN VOL: 14.2
MIN VOL: 5.4
MODE: ABNORMAL
MONOCYTES # BLD AUTO: 0.8 K/UL (ref 0.3–1)
MONOCYTES NFR BLD: 11.7 % (ref 4–15)
NEUTROPHILS # BLD AUTO: 5.2 K/UL (ref 1.8–7.7)
NEUTROPHILS NFR BLD: 72.6 % (ref 38–73)
NRBC BLD-RTO: 0 /100 WBC
PCO2 BLDA: 107.3 MMHG (ref 35–45)
PCO2 BLDA: 109.8 MMHG (ref 35–45)
PCO2 BLDA: 93 MMHG (ref 35–45)
PCO2 BLDA: 93.4 MMHG (ref 35–45)
PCO2 BLDA: 94.2 MMHG (ref 35–45)
PH SMN: 7.29 [PH] (ref 7.35–7.45)
PH SMN: 7.31 [PH] (ref 7.35–7.45)
PH SMN: 7.34 [PH] (ref 7.35–7.45)
PH SMN: 7.37 [PH] (ref 7.35–7.45)
PH SMN: 7.4 [PH] (ref 7.35–7.45)
PHOSPHATE SERPL-MCNC: 2.9 MG/DL (ref 2.7–4.5)
PHOSPHATE SERPL-MCNC: 3.9 MG/DL (ref 2.7–4.5)
PLATELET # BLD AUTO: 133 K/UL (ref 150–350)
PLATELET BLD QL SMEAR: ABNORMAL
PMV BLD AUTO: 10.1 FL (ref 9.2–12.9)
PO2 BLDA: 35 MMHG (ref 40–60)
PO2 BLDA: 39 MMHG (ref 40–60)
PO2 BLDA: 39 MMHG (ref 40–60)
PO2 BLDA: 76 MMHG (ref 40–60)
PO2 BLDA: 80 MMHG (ref 40–60)
POC BE: 25 MMOL/L
POC BE: 26 MMOL/L
POC BE: 28 MMOL/L
POC BE: 29 MMOL/L
POC BE: >30 MMOL/L
POC SATURATED O2: 59 % (ref 95–100)
POC SATURATED O2: 66 % (ref 95–100)
POC SATURATED O2: 68 % (ref 95–100)
POC SATURATED O2: 92 % (ref 95–100)
POC SATURATED O2: 93 % (ref 95–100)
POC TCO2: >50 MMOL/L (ref 24–29)
POCT GLUCOSE: 146 MG/DL (ref 70–110)
POLYCHROMASIA BLD QL SMEAR: ABNORMAL
POTASSIUM SERPL-SCNC: 4 MMOL/L (ref 3.5–5.1)
RBC # BLD AUTO: 5.93 M/UL (ref 4–5.4)
SAMPLE: ABNORMAL
SITE: ABNORMAL
SODIUM SERPL-SCNC: 141 MMOL/L (ref 136–145)
SP02: 100
SP02: 97
SPONT RATE: 28
SPONT RATE: 28
TARGETS BLD QL SMEAR: ABNORMAL
TRIGL SERPL-MCNC: 96 MG/DL (ref 30–150)
WBC # BLD AUTO: 7.16 K/UL (ref 3.9–12.7)

## 2020-10-28 PROCEDURE — 36415 COLL VENOUS BLD VENIPUNCTURE: CPT

## 2020-10-28 PROCEDURE — 97110 THERAPEUTIC EXERCISES: CPT | Mod: CQ

## 2020-10-28 PROCEDURE — 99233 PR SUBSEQUENT HOSPITAL CARE,LEVL III: ICD-10-PCS | Mod: ,,, | Performed by: INTERNAL MEDICINE

## 2020-10-28 PROCEDURE — 82803 BLOOD GASES ANY COMBINATION: CPT

## 2020-10-28 PROCEDURE — 84100 ASSAY OF PHOSPHORUS: CPT

## 2020-10-28 PROCEDURE — 99900035 HC TECH TIME PER 15 MIN (STAT)

## 2020-10-28 PROCEDURE — 94761 N-INVAS EAR/PLS OXIMETRY MLT: CPT

## 2020-10-28 PROCEDURE — 84478 ASSAY OF TRIGLYCERIDES: CPT

## 2020-10-28 PROCEDURE — 25000003 PHARM REV CODE 250: Performed by: INTERNAL MEDICINE

## 2020-10-28 PROCEDURE — 97110 THERAPEUTIC EXERCISES: CPT

## 2020-10-28 PROCEDURE — 80048 BASIC METABOLIC PNL TOTAL CA: CPT

## 2020-10-28 PROCEDURE — 27000221 HC OXYGEN, UP TO 24 HOURS

## 2020-10-28 PROCEDURE — 63600175 PHARM REV CODE 636 W HCPCS: Performed by: STUDENT IN AN ORGANIZED HEALTH CARE EDUCATION/TRAINING PROGRAM

## 2020-10-28 PROCEDURE — 25000003 PHARM REV CODE 250: Performed by: STUDENT IN AN ORGANIZED HEALTH CARE EDUCATION/TRAINING PROGRAM

## 2020-10-28 PROCEDURE — 63600175 PHARM REV CODE 636 W HCPCS: Performed by: INTERNAL MEDICINE

## 2020-10-28 PROCEDURE — 83735 ASSAY OF MAGNESIUM: CPT | Mod: 91

## 2020-10-28 PROCEDURE — 20600001 HC STEP DOWN PRIVATE ROOM

## 2020-10-28 PROCEDURE — 85025 COMPLETE CBC W/AUTO DIFF WBC: CPT

## 2020-10-28 PROCEDURE — 99233 SBSQ HOSP IP/OBS HIGH 50: CPT | Mod: ,,, | Performed by: INTERNAL MEDICINE

## 2020-10-28 PROCEDURE — 94660 CPAP INITIATION&MGMT: CPT

## 2020-10-28 RX ORDER — DIPHENHYDRAMINE HCL 25 MG
25 CAPSULE ORAL
Status: COMPLETED | OUTPATIENT
Start: 2020-10-28 | End: 2020-10-28

## 2020-10-28 RX ADMIN — MELATONIN TAB 3 MG 6 MG: 3 TAB at 11:10

## 2020-10-28 RX ADMIN — MICONAZOLE NITRATE: 20 POWDER TOPICAL at 01:10

## 2020-10-28 RX ADMIN — ENOXAPARIN SODIUM 60 MG: 60 INJECTION SUBCUTANEOUS at 09:10

## 2020-10-28 RX ADMIN — LATANOPROST 1 DROP: 50 SOLUTION OPHTHALMIC at 09:10

## 2020-10-28 RX ADMIN — DIPHENHYDRAMINE HYDROCHLORIDE 25 MG: 25 CAPSULE ORAL at 09:10

## 2020-10-28 RX ADMIN — CHLOROTHIAZIDE SODIUM 500 MG: 500 INJECTION INTRAVENOUS at 01:10

## 2020-10-28 RX ADMIN — MICONAZOLE NITRATE: 20 POWDER TOPICAL at 09:10

## 2020-10-28 RX ADMIN — ENOXAPARIN SODIUM 60 MG: 60 INJECTION SUBCUTANEOUS at 10:10

## 2020-10-28 NOTE — PLAN OF CARE
Problem: Occupational Therapy Goal  Goal: Occupational Therapy Goal  Description: Goals to be met by: 11/6/2020    Patient will increase functional independence with ADLs by performing:    UE Dressing with Stand-by Assistance.  LE Dressing with Moderate Assistance.  Grooming while EOB with Modified [place].  Toileting from bedside commode with Minimal Assistance for hygiene and clothing management.   Rolling to Bilateral with Stand-by Assistance.   Supine to sit with Contact Guard Assistance.  Stand pivot transfers with Supervision.  Toilet transfer to bedside commode with Supervision.    Outcome: Ongoing, Progressing    Goals remain appropriate

## 2020-10-28 NOTE — ASSESSMENT & PLAN NOTE
Plan:  - Dry weight of approximately 370 lb, up to 500 pounds (BMI 88) this admission  - Weight today 432, BMI 76.54  - Continuing diuresis as noted above

## 2020-10-28 NOTE — PT/OT/SLP PROGRESS
Physical Therapy Treatment    Patient Name:  Halley Moreno   MRN:  [id number]    Recommendations:     Discharge Recommendations:  rehabilitation facility   Discharge Equipment Recommendations: (TBD pending pt progress)   Barriers to discharge: Inaccessible home and Decreased caregiver support    Assessment:     Halley Moreno is a 45 y.o. female admitted with a medical diagnosis of Acute respiratory failure with hypoxia and hypercapnia.  She presents with the following impairments/functional limitations:  weakness, impaired endurance, impaired self care skills, impaired functional mobilty, impaired balance, decreased upper extremity function, decreased lower extremity function. Pt treatment session limited on this date due to RN request that only in bed activities as pt on Bipap. Pt tolerated BLE therex well, and will continue to benefit from PT services at this time. Continue with PT POC as indicated.  Rehab Prognosis: Good; patient would benefit from acute skilled PT services to address these deficits and reach maximum level of function.    Recent Surgery: * No surgery found *      Plan:     During this hospitalization, patient to be seen 3 x/week to address the identified rehab impairments via gait training, therapeutic activities, therapeutic exercises and progress toward the following goals:    · Plan of Care Expires:  11/21/20    Subjective     Chief Complaint: none stated  Patient/Family Comments/goals: none stated  Pain/Comfort:  · Pain Rating 1: 0/10  · Pain Rating Post-Intervention 1: 0/10      Objective:     Communicated with nursing prior to session.  Patient found HOB elevated with (all lines intact) upon PT entry to room.     General Precautions: Standard, fall   Orthopedic Precautions:N/A   Braces:       Functional Mobility:  · Not       AM-PAC 6 CLICK MOBILITY  Turning over in bed (including adjusting bedclothes, sheets and blankets)?: 3  Sitting down on and standing up from a chair with arms  (e.g., wheelchair, bedside commode, etc.): 3  Moving from lying on back to sitting on the side of the bed?: 2  Moving to and from a bed to a chair (including a wheelchair)?: 3  Need to walk in hospital room?: 3  Climbing 3-5 steps with a railing?: 2  Basic Mobility Total Score: 16       Therapeutic Activities and Exercises:   -BLE therex 2x15 reps:   -AP   -Heel Slides   -hip abd/add   -QS   -GS     Patient left HOB elevated with all lines intact, call button in reach and nursing notified..    GOALS:   Multidisciplinary Problems     Physical Therapy Goals        Problem: Physical Therapy Goal    Goal Priority Disciplines Outcome Goal Variances Interventions   Physical Therapy Goal     PT, PT/OT Ongoing, Progressing     Description: Goals to be met by: 2020    Patient will increase functional independence with mobility by performin. Supine to sit with MInimal Assistance of 2 persons.  2. Sit to supine with Moderate Assistance of 2 persons.  3. Sit to stand transfer with Minimal Assistance of 1 person.  4. Bed to chair stand pivot transfer with Contact Guard Assistance of 1 person using Rolling Walker.  5. Gait  x 40 feet with Contact Guard Assistance of 1 person using Rolling Walker.   6. Lower extremity exercise program x20 reps per handout, with assistance as needed.                     Time Tracking:     PT Received On: 10/28/20  PT Start Time:   1550  PT Stop Time:    1602  PT Total Time (min):       Billable Minutes: Therapeutic Exercise 12    Treatment Type: Treatment  PT/PTA: PTA     PTA Visit Number: 1     Jennie Bañuelos, PTA  10/28/2020

## 2020-10-28 NOTE — SUBJECTIVE & OBJECTIVE
Interval History: Patient remained on BiPAP overnight at 20/8, 100% O2. PCO2 on VBG remains 90s, likely patient baseline.     Review of Systems   Constitutional: Negative for diaphoresis and fever.   HENT: Negative for congestion and sore throat.    Eyes: Negative for redness and visual disturbance.   Respiratory: Negative for cough and stridor.    Cardiovascular: Positive for leg swelling. Negative for chest pain.   Gastrointestinal: Positive for abdominal distention. Negative for abdominal pain, constipation, diarrhea and vomiting.   Genitourinary: Negative for dysuria and hematuria.   Musculoskeletal: Negative for back pain.   Skin: Negative for color change and pallor.   Neurological: Negative for speech difficulty and headaches.   Psychiatric/Behavioral: Negative for agitation and confusion.     Objective:     Vital Signs (Most Recent):  Temp: 98.3 °F (36.8 °C) (10/28/20 1125)  Pulse: 72 (10/28/20 1119)  Resp: (!) 23 (10/28/20 0840)  BP: 124/69 (10/28/20 1125)  SpO2: 98 % (10/28/20 1119) Vital Signs (24h Range):  Temp:  [98.3 °F (36.8 °C)-98.9 °F (37.2 °C)] 98.3 °F (36.8 °C)  Pulse:  [71-86] 72  Resp:  [17-30] 23  SpO2:  [89 %-98 %] 98 %  BP: (101-125)/(56-69) 124/69     Weight: (!) 196 kg (432 lb 1.6 oz)  Body mass index is 76.54 kg/m².    Intake/Output Summary (Last 24 hours) at 10/28/2020 1230  Last data filed at 10/28/2020 1100  Gross per 24 hour   Intake 360 ml   Output 5150 ml   Net -4790 ml      Physical Exam  Vitals signs and nursing note reviewed.   Constitutional:       General: She is awake. She is not in acute distress.     Appearance: She is morbidly obese. She is not diaphoretic.      Interventions: Nasal cannula in place.   HENT:      Head: Normocephalic and atraumatic.      Nose: Nose normal.      Mouth/Throat:      Mouth: Mucous membranes are moist.      Pharynx: Oropharynx is clear.   Eyes:      Extraocular Movements: Extraocular movements intact.      Conjunctiva/sclera: Conjunctivae normal.       Pupils: Pupils are equal, round, and reactive to light.   Neck:      Musculoskeletal: Normal range of motion.   Cardiovascular:      Rate and Rhythm: Normal rate and regular rhythm.      Pulses: Normal pulses.      Heart sounds: Normal heart sounds.   Pulmonary:      Effort: Pulmonary effort is normal. No respiratory distress.      Breath sounds: Normal breath sounds. No wheezing or rhonchi.   Abdominal:      General: Abdomen is protuberant. There is distension.      Palpations: Abdomen is soft.      Tenderness: There is no abdominal tenderness.   Musculoskeletal:      Right lower leg: Edema present.      Left lower leg: Edema present.   Skin:     General: Skin is warm and dry.      Coloration: Skin is not jaundiced.      Findings: Erythema and rash present. Rash is scaling.      Comments: Chronic venous stasis changes present on BLE.   Neurological:      General: No focal deficit present.      Mental Status: She is alert and oriented to person, place, and time.   Psychiatric:         Mood and Affect: Mood normal.         Behavior: Behavior normal.       Significant Labs:   CBC:   Recent Labs   Lab 10/27/20  0702 10/28/20  0533   WBC 6.66 7.16   HGB 14.7 15.1   HCT 52.3* 54.1*   * 133*     CMP:   Recent Labs   Lab 10/27/20  1108 10/27/20  1835 10/28/20  0533    140 141   K 4.0 4.2 4.0   CL 85* 83* 85*   CO2 47* 50* 41*   * 150* 106   BUN 14 14 14   CREATININE 0.7 0.8 0.8   CALCIUM 8.6* 8.9 9.1   ANIONGAP 10 7* 15   EGFRNONAA >60.0 >60.0 >60.0       Significant Imaging: No new imaging today.

## 2020-10-28 NOTE — PLAN OF CARE
"Patient alert and oriented x4, patient with increase "tiredness" today. Patient with critical co2 throughtout and Dr. Szymanski and Dr. Muller made aware.This nurse came onto shift and alamo was taken d/c and nightshift said "per charge nurse nightshift" to take out alamo because it was not indicated. MD made aware of what happened on nightshift and per MD. Dr. Szymanski and Dr. Muller do not take alamo out due to critical intake and output. Patient started back on lasix drip at 1ml/hr. Alamo inserted. Ongoing assessment being made. Cont bipap applied settings changed throughout shift. Calllight within reach, resp even and unlabored. Charge nurse made aware of all patient updates and Dr. Rao Szymanski and Dr. Muller.   "

## 2020-10-28 NOTE — ASSESSMENT & PLAN NOTE
Previously on glipizide 10mg daily and semaglutide 0.25mg weekly. Glucose well controlled this admission, not requiring SSI at this time.    Plan:   - Holding home anti-hyperglycemics   - POCT BG qAC/HS while eating   - LD SSI   - Diabetic cardiac diet, fluid restriction 1.5L    - Will provide endocrinology referral at discharge

## 2020-10-28 NOTE — PROGRESS NOTES
[facility] Hospital Medicine  Progress Note    Patient Name: Halley Moreno  MRN: [id number]  Patient Class: IP- Inpatient   Admission Date: 10/22/2020  Length of Stay: 6 days  Attending Physician: Adná Muller MD  Primary Care Provider: Yamila Tejeda MD    [facility] Medicine Team: [facility] HOSP MED 1 - Rao Szymanski MD    Subjective:     Principal Problem:Acute respiratory failure with hypoxia and hypercapnia    HPI:  Patient is a 45-year-old female who is presenting to us with acute respiratory distress.  She has a recent diagnosis of severe pulmonary hypertension with right-sided heart failure.  Her last echo showed a PA systolic pressure of 99.  She initially presented due to increasing weight gain over the last 7 months.  Patient states that in March she began gaining weight and she was placed on diuretic to trying keep that weight down.  Despite 40 mg of furosemide b.i.d. she continued to gain weight.  She has gained approximately 100 lb since March and 80 lb since May.  She does not have a cardiologist but was scheduled to see 1 in December.  Patient states that at home she sleeps in a elevated position secondary to shortness of breath.  She denies any episodes of waking up in the middle of the night out of breath.  She does not use any home oxygen nor she ever been told explicitly that she has heart failure.  She demonstrates poor understanding of the disease stating that the right heart was backing up into the lungs and cause in the shortness of breath.  She presented to the clinic today for evaluation and was found to be in acute respiratory distress with hypoxia.  She was referred to the ED for evaluation and diuresis.    Patient states that she has no known family history of heart failure in the family however she does state that her father had a pacemaker for unknown reasons.  She states that she does not think that anyone her and her family has had pulmonary  hypertension.  Patient states that she does not drink, does not smoke, and does not use IV drugs.    Overview/Hospital Course:  Patient was admitted to Hospital Medicine. Was initially hypoxic and put on nasal cannula with normalization of her oxygen status to approximately 95%. She was demonstrating initial respiratory distress and was given a bolus of 120mg IV furosemide. Hypoxia and hypercapnia worsened so was stepped up to ICU on 10/23. Was placed on BiPAP and aggressively diuresed (approx 20L net negative) with improvement in respiratory status. Weaned to 2L NC and subsequently stepped back down to Hospital Medicine on 10/26. Continued diuresis with IV Lasix gtt. On 10/27 VBGs noted pCO2 in 90s - 100s and patient was started back on continuous BiPAP with improvement in pCO2 levels.    Interval History: Patient remained on BiPAP overnight at 20/8, 100% O2. PCO2 on VBG remains 90s, likely patient baseline.     Review of Systems   Constitutional: Negative for diaphoresis and fever.   HENT: Negative for congestion and sore throat.    Eyes: Negative for redness and visual disturbance.   Respiratory: Negative for cough and stridor.    Cardiovascular: Positive for leg swelling. Negative for chest pain.   Gastrointestinal: Positive for abdominal distention. Negative for abdominal pain, constipation, diarrhea and vomiting.   Genitourinary: Negative for dysuria and hematuria.   Musculoskeletal: Negative for back pain.   Skin: Negative for color change and pallor.   Neurological: Negative for speech difficulty and headaches.   Psychiatric/Behavioral: Negative for agitation and confusion.     Objective:     Vital Signs (Most Recent):  Temp: 98.3 °F (36.8 °C) (10/28/20 1125)  Pulse: 72 (10/28/20 1119)  Resp: (!) 23 (10/28/20 0840)  BP: 124/69 (10/28/20 1125)  SpO2: 98 % (10/28/20 1119) Vital Signs (24h Range):  Temp:  [98.3 °F (36.8 °C)-98.9 °F (37.2 °C)] 98.3 °F (36.8 °C)  Pulse:  [71-86] 72  Resp:  [17-30] 23  SpO2:  [89  %-98 %] 98 %  BP: (101-125)/(56-69) 124/69     Weight: (!) 196 kg (432 lb 1.6 oz)  Body mass index is 76.54 kg/m².    Intake/Output Summary (Last 24 hours) at 10/28/2020 1230  Last data filed at 10/28/2020 1100  Gross per 24 hour   Intake 360 ml   Output 5150 ml   Net -4790 ml      Physical Exam  Vitals signs and nursing note reviewed.   Constitutional:       General: She is awake. She is not in acute distress.     Appearance: She is morbidly obese. She is not diaphoretic.      Interventions: Nasal cannula in place.   HENT:      Head: Normocephalic and atraumatic.      Nose: Nose normal.      Mouth/Throat:      Mouth: Mucous membranes are moist.      Pharynx: Oropharynx is clear.   Eyes:      Extraocular Movements: Extraocular movements intact.      Conjunctiva/sclera: Conjunctivae normal.      Pupils: Pupils are equal, round, and reactive to light.   Neck:      Musculoskeletal: Normal range of motion.   Cardiovascular:      Rate and Rhythm: Normal rate and regular rhythm.      Pulses: Normal pulses.      Heart sounds: Normal heart sounds.   Pulmonary:      Effort: Pulmonary effort is normal. No respiratory distress.      Breath sounds: Normal breath sounds. No wheezing or rhonchi.   Abdominal:      General: Abdomen is protuberant. There is distension.      Palpations: Abdomen is soft.      Tenderness: There is no abdominal tenderness.   Musculoskeletal:      Right lower leg: Edema present.      Left lower leg: Edema present.   Skin:     General: Skin is warm and dry.      Coloration: Skin is not jaundiced.      Findings: Erythema and rash present. Rash is scaling.      Comments: Chronic venous stasis changes present on BLE.   Neurological:      General: No focal deficit present.      Mental Status: She is alert and oriented to person, place, and time.   Psychiatric:         Mood and Affect: Mood normal.         Behavior: Behavior normal.       Significant Labs:   CBC:   Recent Labs   Lab 10/27/20  0702  10/28/20  0533   WBC 6.66 7.16   HGB 14.7 15.1   HCT 52.3* 54.1*   * 133*     CMP:   Recent Labs   Lab 10/27/20  1108 10/27/20  1835 10/28/20  0533    140 141   K 4.0 4.2 4.0   CL 85* 83* 85*   CO2 47* 50* 41*   * 150* 106   BUN 14 14 14   CREATININE 0.7 0.8 0.8   CALCIUM 8.6* 8.9 9.1   ANIONGAP 10 7* 15   EGFRNONAA >60.0 >60.0 >60.0       Significant Imaging: No new imaging today.      Assessment/Plan:      * Acute respiratory failure with hypoxia and hypercapnia  Patient is a chronic CO2 retainer in setting of obesity hypoventilation syndrome 2/2 morbid obesity. Stepped down from ICU today, currently continuing diuresis with minimal supplemental O2 requirements.     Plan:   - Target SpO2 of >88%; wean supplemental O2 as tolerated   - VBG this AM with pH 7.4, pCO2 91    - Continue Lasix gtt at 10mg/hr   - Diuril 500mg today   - Approx 38L net negative since admission   - Risk factor reduction (encouraged weight loss, use of nightly CPAP consistently)    Right heart failure due to pulmonary hypertension  Plan:  - Strict Is/Os  - Daily weights  - Keep K>4, Mg>2  - Diabetic/cardiac low sodium diet (<2 g/day)  - Fluid restriction to 1.5 - 2.0 L/day  - Will obtain repeat echo once patient is closer to euvolemia    Diabetes mellitus type 2 without retinopathy  Previously on glipizide 10mg daily and semaglutide 0.25mg weekly. Glucose well controlled this admission, not requiring SSI at this time.    Plan:   - Holding home anti-hyperglycemics   - POCT BG qAC/HS while eating   - LD SSI   - Diabetic cardiac diet, fluid restriction 1.5L    - Will provide endocrinology referral at discharge    Sleep apnea  Patient's pulmonary HTN and RV dysfunction likely result of obesity hypoventilation syndrome with non-compliance with at-home CPAP. Evaluated by Pulmonology, who recommended continued use of NIPPV and encouraged weight loss.     Plan:    - NIPPV nightly    Morbid obesity with BMI of 70 and over,  adult  Plan:  - Dry weight of approximately 370 lb, up to 500 pounds (BMI 88) this admission  - Weight today 432, BMI 76.54  - Continuing diuresis as noted above    Hypertension associated with diabetes  Patient on losartan 50mg daily at home. Remains normotensive at this time.    Plan:   - Holding home anti-hypertensive in acute setting   - Continue atorvastatin 10mg nightly      VTE Risk Mitigation (From admission, onward)         Ordered     enoxaparin injection 60 mg  Every 12 hours      10/22/20 1518     IP VTE HIGH RISK PATIENT  Once      10/22/20 1346     Place sequential compression device  Until discontinued      10/22/20 1346                Discharge Planning   JOSE RAUL: 11/2/2020     Code Status: Full Code   Is the patient medically ready for discharge?: No    Reason for patient still in hospital: Patient trending condition, Treatment and Pending disposition  Discharge Plan A: Rehab        Rao Szymanski MD, PGY-1   Department of Hospital Medicine   [facility]

## 2020-10-28 NOTE — MEDICAL/APP STUDENT
"[facility]  Progress Note    Patient Name: Halley Moreno  MRN: [id number]  Patient Class: IP- Inpatient   Admission Date: 10/22/2020  Length of Stay: 6 days  Attending Physician: Adán Muller MD  Primary Care Provider: Yamila Tejeda MD    Subjective:       Mrs. Halley Moreno is a 44 yo F with PMHx of morbid obesity, R sided heart failure 2/2 pulmonary HTN, and YARY/OHS being treated for acute respiratory failure with hypoxia and hypercapnia and volume overload.      HPI: per 10/26 Hospital Medicine note by Dr Rao Lu  "Patient is a 45-year-old female who is presenting to us with acute respiratory distress.  She has a recent diagnosis of severe pulmonary hypertension with right-sided heart failure.  Her last echo showed a PA systolic pressure of 99.  She initially presented due to increasing weight gain over the last 7 months. Patient states that in March she began gaining weight and she was placed on diuretic to trying keep that weight down.  Despite 40 mg of furosemide b.i.d. she continued to gain weight.  She has gained approximately 100 lb since March and 80 lb since May.  She does not have a cardiologist but was scheduled to see 1 in December.  Patient states that at home she sleeps in a elevated position secondary to shortness of breath.  She denies any episodes of waking up in the middle of the night out of breath.  She does not use any home oxygen nor she ever been told explicitly that she has heart failure.  She demonstrates poor understanding of the disease stating that the right heart was backing up into the lungs and cause in the shortness of breath.  She presented to the clinic today for evaluation and was found to be in acute respiratory distress with hypoxia.  She was referred to the ED for evaluation and diuresis.  Overview/Hospital Course:Patient was admitted to Hospital Medicine. Was initially hypoxic and put on nasal cannula with normalization of her " "oxygen status to approximately 95%. She was demonstrating initial respiratory distress and was given a bolus of 120mg IV furosemide. Hypoxia and hypercapnia worsened so was stepped up to ICU on 10/23. Was placed on BiPAP and aggressively diuresed (approx 20L net negative) with improvement in respiratory status. Weaned to 2L NC and subsequently stepped back down to Hospital Medicine on 10/26. Remained afebrile and hemodynamically stable in step down unit. Reports improvement in SOB with continued diuresis." Pt IV furosemide stopped overnight 10/26.  No complaints and SOB resolved on 10/27. VBG returned with elevated ppCO2 (109 at peak), decreased ppO2 (23), elevated bicarb (56.5), and pH 7.41. Started on continuous Bipap at 12/6 cmH2O before increasing to 20/8 cmH2O. Given chlorothiazide 500mg drip. Lasix 500mg restarted at 10mg/hr.     Interval History: VBG improved this AM with pH 7.4, ppCO2 94, ppO2 39, Bicarb 41. Lasix continuing at 10mg/hr with good effect (net negative 4.1L yesterday, 700mL seen in cath this morning). No complaints of SOB, mild fatigue. Pt not alert, but was recently woken up.    Current Facility-Administered Medications   Medication    acetaminophen tablet 650 mg    atorvastatin tablet 10 mg    chlorothiazide (DIURIL) 500 mg in dextrose 5 % 50 mL IVPB    enoxaparin injection 60 mg    furosemide (LASIX) 500 mg infusion (conc: 10 mg/mL)    influenza (QUADRIVALENT PF) vaccine 0.5 mL    insulin aspart U-100 pen 0-5 Units    latanoprost 0.005 % ophthalmic solution 1 drop    melatonin tablet 6 mg    miconazole NITRATE 2 % top powder    ondansetron disintegrating tablet 8 mg    polyethylene glycol packet 17 g    senna-docusate 8.6-50 mg per tablet 2 tablet    simethicone chewable tablet 80 mg    sodium chloride 0.9% flush 10 mL     Review of Systems   Constitutional: Positive for malaise/fatigue (Generalized fatigue). Negative for fever.   Respiratory: Negative for cough and shortness " of breath.    Cardiovascular: Positive for leg swelling. Negative for chest pain.   Gastrointestinal: Negative for abdominal pain, nausea and vomiting.   Neurological: Positive for weakness. Negative for dizziness and headaches.     Objective:     Vital Signs (Most Recent):  Temp: 98.6 °F (37 °C) (10/28/20 0300)  Pulse: 74 (10/28/20 0840)  Resp: (!) 23 (10/28/20 0840)  BP: 114/63 (10/28/20 0300)  SpO2: 96 % (10/28/20 0840) Vital Signs (24h Range):  Temp:  [98.2 °F (36.8 °C)-98.9 °F (37.2 °C)] 98.6 °F (37 °C)  Pulse:  [71-86] 74  Resp:  [17-30] 23  SpO2:  [89 %-98 %] 96 %  BP: (101-125)/(56-65) 114/63     Weight: (!) 196 kg (432 lb 1.6 oz) (10/28/20 0640)  Body mass index is 76.54 kg/m².   Dry weight is 370 lbs.      Intake/Output Summary (Last 24 hours) at 10/28/2020 1058  Last data filed at 10/28/2020 0640  Gross per 24 hour   Intake 360 ml   Output 4550 ml   Net -4190 ml     Physical Exam  Constitutional:       Appearance: She is obese.      Comments: Tired on exam, just woken up   HENT:      Head: Normocephalic.      Comments: Bipap mask in place  Eyes:      Extraocular Movements: Extraocular movements intact.   Cardiovascular:      Rate and Rhythm: Normal rate and regular rhythm.      Pulses: Normal pulses.      Heart sounds: Heart sounds are distant (2/2 body habitus).   Pulmonary:      Breath sounds: Examination of the left-lower field reveals rales. Rales present.      Comments: Difficult to auscultate due to Bipap noise transmittion  Abdominal:      General: There is distension.      Palpations: Abdomen is soft.      Tenderness: There is abdominal tenderness (mild) in the right upper quadrant and left lower quadrant.   Musculoskeletal:      Right lower le+ Pitting Edema present.      Left lower le+ Pitting Edema present.   Skin:     Capillary Refill: Capillary refill takes less than 2 seconds.      Comments: Venous stasis bilateral legs with scaling       Significant Labs:  CBC:   Recent Labs   Lab  10/28/20  0533   WBC 7.16   RBC 5.93*   HGB 15.1   HCT 54.1*   *     CMP:   Recent Labs   Lab 10/22/20  2006  10/28/20  0533      < > 106   CALCIUM 8.7   < > 9.1   ALBUMIN 3.1*  --   --    PROT 6.7  --   --       < > 141   K 5.4*   < > 4.0   CO2 31*   < > 41*   CL 99   < > 85*   BUN 43*   < > 14   CREATININE 1.2   < > 0.8   ALKPHOS 73  --   --    ALT 25  --   --    AST 30  --   --    BILITOT 1.0  --   --     < > = values in this interval not displayed.     Venous Blood Gas result:  pO2 39; pCO2 94; pH 7.4;  HCO3 41; %O2 Sat 98.    Significant Imaging:  No new imaging    Assessment/Plan:      Mrs. Halley Moreno is a 46 yo F with PMHx of morbid obesity, R sided heart failure 2/2 pulmonary HTN, and YARY/OHS being treated for acute respiratory failure with hypoxia and hypercapnia.     Active Diagnoses:    Diagnosis Date Noted POA    PRINCIPAL PROBLEM:  Acute respiratory failure with hypoxia and hypercapnia [J96.01, J96.02] 10/22/2020 Yes    Constipation [K59.00] 10/25/2020 Yes    Acute on chronic heart failure with preserved ejection fraction [I50.33] 10/23/2020 Yes    Obesity hypoventilation syndrome [E66.2] 10/23/2020 Yes    Hyperkalemia [E87.5] 10/23/2020 Yes    Hyperphosphatemia [E83.39] 10/23/2020 Yes    Right heart failure due to pulmonary hypertension [I27.29, I50.810] 10/22/2020 Yes    Diabetes mellitus type 2 without retinopathy [E11.9] 10/14/2019 Yes    Hypertension associated with diabetes [E11.59, I10]  Yes    Sleep apnea [G47.30]  Yes    Morbid obesity with BMI of 70 and over, adult [E66.01, Z68.45]  Not Applicable      Problems Resolved During this Admission:     Acute respiratory Failure with hypoxia and hypercapnia  Patient desaturated to 87% this AM but returned to baseline without intervention. Breathing with continuous Bipap. SOB resolved. ~33L net fluid loss over admission.  - target >88% SpO2  - 10mg/hr furosemide drip  - Chlorthiazide if bicarb remains elevated above  45  - Patient education on OHS and benefit of weight loss  - repeat VBG this AM and again in evening if poor improvement     Right sided HFpEF  - Strict I/O  - fluid restriction of 1.5L/day  - daily weights              Latest weight 10/28, 432lbs. Previous weight 500lbs on 10/23.    Dry weight 370lbs per pt  - low salt diet and education on home fluid restriction  - see respiratory failure plan       DM type 2  - stopped home meds  - short acting insulin 0-5 units premeal  - LD SSI  - diabetic cardiac diet  - Accu checks AC/evening     OHS  Pulmonology consulted and feel no further follow up needed  - on Bipap continuous @ 20/8cmH2O  - 100% O2     Morbid obesity  BMI 76.54 Gained >100lbs in last 7 months 2/2 fluid retension. Dry weight of 370lbs  - pt expresses interest inlosing weight, dietary/bariatric medicine outpatient follow up warranted     HTN  - On losartan 10mg at home, held    VTE Risk Mitigation (From admission, onward)         Ordered     enoxaparin injection 60 mg  Every 12 hours      10/22/20 1518     IP VTE HIGH RISK PATIENT  Once      10/22/20 1346     Place sequential compression device  Until discontinued      10/22/20 1346                   Yogesh Nunez  [facility]

## 2020-10-28 NOTE — PLAN OF CARE
POC reviewed w/ pt, questions and concerns addressed. NAD overnight. Pt Aox4. VSS. PT on cont bipap, c/o it being uncomfortable and wanted to keep it off. Pt educated on necessity of bipap. Lasix gtt maintained at 1 ML/HR. Strict I&O's maintained. Pt diuresed >2L.  Safety maintained. Mobilized pt to highest level of functioning. WCTM.

## 2020-10-28 NOTE — ASSESSMENT & PLAN NOTE
Patient is a chronic CO2 retainer in setting of obesity hypoventilation syndrome 2/2 morbid obesity. Stepped down from ICU today, currently continuing diuresis with minimal supplemental O2 requirements.     Plan:   - Target SpO2 of >88%; wean supplemental O2 as tolerated   - VBG this AM with pH 7.4, pCO2 91    - Continue Lasix gtt at 10mg/hr   - Diuril 500mg today   - Approx 38L net negative since admission   - Risk factor reduction (encouraged weight loss, use of nightly CPAP consistently)

## 2020-10-28 NOTE — RESPIRATORY THERAPY
Rapid Response Respiratory Therapy Proactive Rounding Note      Time of visit: 0835       Code Status: Full Code   : 1975  Bed: 8076/8076 A:   MRN: [id number]    SITUATION     Evaluated patient for: Non-Invasive Positive Pressure Ventilation Compliance     BACKGROUND     Patient has a past medical history of BMI 70 and over, adult, Depression, Diabetes mellitus, DM (diabetes mellitus) type II controlled with renal manifestation, HTN (hypertension), Hyperlipidemia, Lumbar disc disease, Morbid obesity, Recurrent nephrolithiasis, Rosacea, Sleep apnea, and Tear of medial meniscus of right knee, current.    ASSESSMENT/INTERVENTIONS     Upon arrival in room, pt slightly tachypneic however, respiratory effort appears unlabored. Pt currently on BiPap 20/8 100% and tolerating well at this time. Primary RT at bedside. Scheduled VBG done as ordered. Pt compensating with pH 7.40, PCO2 94.2, and HCO3 58.4. Plan to take occasional breaks from BiPap per MD request. Primary RT, Pavel, aware of plan.     Pulse: 73  Respiratory rate: 23 SpO2: 96%   Level of Consciousness: Level of Consciousness (AVPU): alert  Respiratory Effort: Respiratory Effort: Unlabored Expansion/Accessory Muscle Usage: Expansion/Accessory Muscles/Retractions: expansion symmetric, no retractions, no use of accessory muscles  All Lung Field Breath Sounds: All Lung Fields Breath Sounds: Anterior:, Lateral:, diminished, equal bilaterally  THEO Breath Sounds: diminished  LLL Breath Sounds: diminished  RUL Breath Sounds: diminished  RML Breath Sounds: diminished  RLL Breath Sounds: diminished  O2 Device/Concentration: Bipap 100% FiO2  Most recent blood gas:   Recent Labs     10/27/20  2343   PH 7.344*   PCO2 93.4*   PO2 39*   HCO3 50.8*   POCSATURATED 66*   BE 25     NIPPV: Yes, Type: Bipap Settings: 20/8 100%   Ambu at bedside: Ambu bag with the patient?: Yes, Adult Ambu    Current Respiratory Care Orders:   Start   Ordered   10/28/20 0900  POCT Venous  Blood Gas Q12H Every 12 hours (2 of 8 released)    Release   Comments: This test should be used for VBGs.  If using this order for other tests (K, creatinine, HCT, PT/INR, lactate etc)  ONLY do so in the case of an emergency or rapid response.   Notify Physician if: see parameters below.   Question: Component: Answer: Blood Gas    10/28/20 0750   10/27/20 1955  Bipap Continuous Continuous (8 of 38 released)    Release   Question Answer Comment   FiO2% 100    Inspiratory pressure: 20    Expiratory pressure: 8        10/27/20 1739   10/23/20 1035  Pulse Oximetry Continuous Continuous      10/23/20 1034         RECOMMENDATIONS     We recommend: Continue current POC. Pt to take breaks from Bipap throughout shift per MD. If any acute issues occur, contact primary team or rapid response team. No new orders at this time.    ESCALATION      Physician Escalation (Yes/No) No     Discussed plan of care primary RTPavel    FOLLOW-UP     Please call back the Rapid Response RT, Elizabeth Sky, RRT at x [phone] for any questions or concerns.

## 2020-10-28 NOTE — PT/OT/SLP PROGRESS
Occupational Therapy   Treatment    Name: Halley Moreno  MRN: [id number]  Admitting Diagnosis:  Acute respiratory failure with hypoxia and hypercapnia       Recommendations:     Discharge Recommendations: rehabilitation facility  Discharge Equipment Recommendations:  none  Barriers to discharge:  Decreased caregiver support    Assessment:     Halley Moreno is a 45 y.o. female with a medical diagnosis of Acute respiratory failure with hypoxia and hypercapnia.  She presents with limited session due to RN request that only in bed activities as pt on Bipap.  Pt with good participation in therex on this date. Performance deficits affecting function are weakness, impaired endurance, impaired self care skills, impaired functional mobilty, impaired balance, decreased upper extremity function, decreased lower extremity function, decreased safety awareness, impaired cardiopulmonary response to activity.     Rehab Prognosis:  Good; patient would benefit from acute skilled OT services to address these deficits and reach maximum level of function.       Plan:     Patient to be seen 3 x/week to address the above listed problems via self-care/home management, therapeutic activities, therapeutic exercises  · Plan of Care Expires: 11/22/20  · Plan of Care Reviewed with: patient    Subjective     Pain/Comfort:  · Pain Rating 1: 0/10  · Pain Rating Post-Intervention 1: 0/10  · Pain Rating Post-Intervention 2: 0/10    Objective:     Communicated with: RN prior to session.  Patient found supine with telemetry, BIPAP, peripheral IV, oxygen upon OT entry to room. No family present.    General Precautions: Standard, fall   Orthopedic Precautions:N/A   Braces: N/A     Occupational Performance:       [facility] 6 Click ADL: 15    Treatment & Education:  No EOB/OOB performed due to RN hold on mobility outside of therex.  Pt performed AROM exercises with BUE in 4 planes x 10 reps x 3 sets each while supine. Discussed theraband exercises &  to provide blue/green theraband with HEP to pt upon next session for strength training.  Pt had no further questions & when asked whether there were any concerns pt reported none.      Patient left supine with all lines intact, call button in reach and RN notifiedEducation:      GOALS:   Multidisciplinary Problems     Occupational Therapy Goals        Problem: Occupational Therapy Goal    Goal Priority Disciplines Outcome Interventions   Occupational Therapy Goal     OT, PT/OT Ongoing, Progressing    Description: Goals to be met by: 11/6/2020    Patient will increase functional independence with ADLs by performing:    UE Dressing with Stand-by Assistance.  LE Dressing with Moderate Assistance.  Grooming while EOB with Modified [place].  Toileting from bedside commode with Minimal Assistance for hygiene and clothing management.   Rolling to Bilateral with Stand-by Assistance.   Supine to sit with Contact Guard Assistance.  Stand pivot transfers with Supervision.  Toilet transfer to bedside commode with Supervision.                     Time Tracking:     OT Date of Treatment: 10/28/20  OT Start Time: 1104  OT Stop Time: 1117  OT Total Time (min): 13 min    Billable Minutes:Therapeutic Exercise 13    SHABBIR Carvalho  10/28/2020

## 2020-10-29 PROBLEM — Z78.9 IMPAIRED MOBILITY AND ACTIVITIES OF DAILY LIVING: Status: ACTIVE | Noted: 2020-10-29

## 2020-10-29 PROBLEM — E87.6 HYPOKALEMIA: Status: ACTIVE | Noted: 2020-10-29

## 2020-10-29 PROBLEM — Z74.09 IMPAIRED MOBILITY AND ACTIVITIES OF DAILY LIVING: Status: ACTIVE | Noted: 2020-10-29

## 2020-10-29 PROBLEM — E87.5 HYPERKALEMIA: Status: RESOLVED | Noted: 2020-10-23 | Resolved: 2020-10-29

## 2020-10-29 LAB
ALBUMIN SERPL BCP-MCNC: 3.2 G/DL (ref 3.5–5.2)
ALLENS TEST: ABNORMAL
ALLENS TEST: ABNORMAL
ALP SERPL-CCNC: 61 U/L (ref 55–135)
ALT SERPL W/O P-5'-P-CCNC: 34 U/L (ref 10–44)
ANION GAP SERPL CALC-SCNC: 19 MMOL/L (ref 8–16)
AST SERPL-CCNC: 40 U/L (ref 10–40)
BASOPHILS # BLD AUTO: 0.03 K/UL (ref 0–0.2)
BASOPHILS NFR BLD: 0.5 % (ref 0–1.9)
BILIRUB SERPL-MCNC: 1.5 MG/DL (ref 0.1–1)
BUN SERPL-MCNC: 18 MG/DL (ref 6–20)
CALCIUM SERPL-MCNC: 9.4 MG/DL (ref 8.7–10.5)
CHLORIDE SERPL-SCNC: 80 MMOL/L (ref 95–110)
CO2 SERPL-SCNC: 43 MMOL/L (ref 23–29)
CREAT SERPL-MCNC: 0.9 MG/DL (ref 0.5–1.4)
DELSYS: ABNORMAL
DELSYS: ABNORMAL
DIFFERENTIAL METHOD: ABNORMAL
EOSINOPHIL # BLD AUTO: 0.1 K/UL (ref 0–0.5)
EOSINOPHIL NFR BLD: 2.2 % (ref 0–8)
EP: 8
ERYTHROCYTE [DISTWIDTH] IN BLOOD BY AUTOMATED COUNT: 20.2 % (ref 11.5–14.5)
ERYTHROCYTE [SEDIMENTATION RATE] IN BLOOD BY WESTERGREN METHOD: 28 MM/H
EST. GFR  (AFRICAN AMERICAN): >60 ML/MIN/1.73 M^2
EST. GFR  (NON AFRICAN AMERICAN): >60 ML/MIN/1.73 M^2
FIO2: 90
FLOW: 5
GLUCOSE SERPL-MCNC: 112 MG/DL (ref 70–110)
HCO3 UR-SCNC: 53.8 MMOL/L (ref 24–28)
HCO3 UR-SCNC: 54 MMOL/L (ref 24–28)
HCT VFR BLD AUTO: 53.7 % (ref 37–48.5)
HGB BLD-MCNC: 15.4 G/DL (ref 12–16)
IMM GRANULOCYTES # BLD AUTO: 0.02 K/UL (ref 0–0.04)
IMM GRANULOCYTES NFR BLD AUTO: 0.3 % (ref 0–0.5)
IP: 20
LYMPHOCYTES # BLD AUTO: 1 K/UL (ref 1–4.8)
LYMPHOCYTES NFR BLD: 15.8 % (ref 18–48)
MAGNESIUM SERPL-MCNC: 1.7 MG/DL (ref 1.6–2.6)
MCH RBC QN AUTO: 26.1 PG (ref 27–31)
MCHC RBC AUTO-ENTMCNC: 28.7 G/DL (ref 32–36)
MCV RBC AUTO: 91 FL (ref 82–98)
MODE: ABNORMAL
MODE: ABNORMAL
MONOCYTES # BLD AUTO: 0.7 K/UL (ref 0.3–1)
MONOCYTES NFR BLD: 10.2 % (ref 4–15)
NEUTROPHILS # BLD AUTO: 4.6 K/UL (ref 1.8–7.7)
NEUTROPHILS NFR BLD: 71 % (ref 38–73)
NRBC BLD-RTO: 0 /100 WBC
PCO2 BLDA: 90.4 MMHG (ref 35–45)
PCO2 BLDA: 98.3 MMHG (ref 35–45)
PH SMN: 7.35 [PH] (ref 7.35–7.45)
PH SMN: 7.38 [PH] (ref 7.35–7.45)
PHOSPHATE SERPL-MCNC: 3.8 MG/DL (ref 2.7–4.5)
PLATELET # BLD AUTO: 129 K/UL (ref 150–350)
PMV BLD AUTO: 9.7 FL (ref 9.2–12.9)
PO2 BLDA: 29 MMHG (ref 40–60)
PO2 BLDA: 34 MMHG (ref 40–60)
POC BE: 28 MMOL/L
POC BE: 29 MMOL/L
POC SATURATED O2: 48 % (ref 95–100)
POC SATURATED O2: 55 % (ref 95–100)
POC TCO2: >50 MMOL/L (ref 24–29)
POC TCO2: >50 MMOL/L (ref 24–29)
POCT GLUCOSE: 112 MG/DL (ref 70–110)
POCT GLUCOSE: 130 MG/DL (ref 70–110)
POCT GLUCOSE: 164 MG/DL (ref 70–110)
POCT GLUCOSE: 171 MG/DL (ref 70–110)
POTASSIUM SERPL-SCNC: 3.2 MMOL/L (ref 3.5–5.1)
PROT SERPL-MCNC: 7.1 G/DL (ref 6–8.4)
RBC # BLD AUTO: 5.91 M/UL (ref 4–5.4)
SAMPLE: ABNORMAL
SAMPLE: ABNORMAL
SITE: ABNORMAL
SITE: ABNORMAL
SODIUM SERPL-SCNC: 142 MMOL/L (ref 136–145)
WBC # BLD AUTO: 6.4 K/UL (ref 3.9–12.7)

## 2020-10-29 PROCEDURE — 94660 CPAP INITIATION&MGMT: CPT

## 2020-10-29 PROCEDURE — 63600175 PHARM REV CODE 636 W HCPCS: Performed by: STUDENT IN AN ORGANIZED HEALTH CARE EDUCATION/TRAINING PROGRAM

## 2020-10-29 PROCEDURE — 99222 PR INITIAL HOSPITAL CARE,LEVL II: ICD-10-PCS | Mod: ,,, | Performed by: NURSE PRACTITIONER

## 2020-10-29 PROCEDURE — 25000003 PHARM REV CODE 250: Performed by: STUDENT IN AN ORGANIZED HEALTH CARE EDUCATION/TRAINING PROGRAM

## 2020-10-29 PROCEDURE — A4216 STERILE WATER/SALINE, 10 ML: HCPCS | Performed by: STUDENT IN AN ORGANIZED HEALTH CARE EDUCATION/TRAINING PROGRAM

## 2020-10-29 PROCEDURE — 84100 ASSAY OF PHOSPHORUS: CPT

## 2020-10-29 PROCEDURE — 83735 ASSAY OF MAGNESIUM: CPT

## 2020-10-29 PROCEDURE — 99233 PR SUBSEQUENT HOSPITAL CARE,LEVL III: ICD-10-PCS | Mod: ,,, | Performed by: HOSPITALIST

## 2020-10-29 PROCEDURE — 82803 BLOOD GASES ANY COMBINATION: CPT

## 2020-10-29 PROCEDURE — 20600001 HC STEP DOWN PRIVATE ROOM

## 2020-10-29 PROCEDURE — 85025 COMPLETE CBC W/AUTO DIFF WBC: CPT

## 2020-10-29 PROCEDURE — 99222 1ST HOSP IP/OBS MODERATE 55: CPT | Mod: ,,, | Performed by: NURSE PRACTITIONER

## 2020-10-29 PROCEDURE — 63600175 PHARM REV CODE 636 W HCPCS: Performed by: INTERNAL MEDICINE

## 2020-10-29 PROCEDURE — 27000221 HC OXYGEN, UP TO 24 HOURS

## 2020-10-29 PROCEDURE — 99900035 HC TECH TIME PER 15 MIN (STAT)

## 2020-10-29 PROCEDURE — 80053 COMPREHEN METABOLIC PANEL: CPT

## 2020-10-29 PROCEDURE — 99233 SBSQ HOSP IP/OBS HIGH 50: CPT | Mod: ,,, | Performed by: HOSPITALIST

## 2020-10-29 PROCEDURE — 94761 N-INVAS EAR/PLS OXIMETRY MLT: CPT

## 2020-10-29 RX ORDER — MAGNESIUM SULFATE HEPTAHYDRATE 40 MG/ML
2 INJECTION, SOLUTION INTRAVENOUS ONCE
Status: COMPLETED | OUTPATIENT
Start: 2020-10-29 | End: 2020-10-29

## 2020-10-29 RX ADMIN — ENOXAPARIN SODIUM 60 MG: 60 INJECTION SUBCUTANEOUS at 08:10

## 2020-10-29 RX ADMIN — DOCUSATE SODIUM 50MG AND SENNOSIDES 8.6MG 2 TABLET: 8.6; 5 TABLET, FILM COATED ORAL at 08:10

## 2020-10-29 RX ADMIN — ATORVASTATIN CALCIUM 10 MG: 10 TABLET, FILM COATED ORAL at 08:10

## 2020-10-29 RX ADMIN — MAGNESIUM SULFATE IN WATER 2 G: 40 INJECTION, SOLUTION INTRAVENOUS at 08:10

## 2020-10-29 RX ADMIN — FUROSEMIDE 10 MG/HR: 10 INJECTION, SOLUTION INTRAMUSCULAR; INTRAVENOUS at 01:10

## 2020-10-29 RX ADMIN — MICONAZOLE NITRATE: 20 POWDER TOPICAL at 01:10

## 2020-10-29 RX ADMIN — Medication 10 ML: at 09:10

## 2020-10-29 RX ADMIN — MELATONIN TAB 3 MG 6 MG: 3 TAB at 09:10

## 2020-10-29 RX ADMIN — LATANOPROST 1 DROP: 50 SOLUTION OPHTHALMIC at 09:10

## 2020-10-29 RX ADMIN — POLYETHYLENE GLYCOL 3350 17 G: 17 POWDER, FOR SOLUTION ORAL at 08:10

## 2020-10-29 RX ADMIN — CHLOROTHIAZIDE SODIUM 500 MG: 500 INJECTION INTRAVENOUS at 01:10

## 2020-10-29 RX ADMIN — POTASSIUM BICARBONATE 50 MEQ: 978 TABLET, EFFERVESCENT ORAL at 08:10

## 2020-10-29 RX ADMIN — MICONAZOLE NITRATE: 20 POWDER TOPICAL at 09:10

## 2020-10-29 RX ADMIN — DOCUSATE SODIUM 50MG AND SENNOSIDES 8.6MG 2 TABLET: 8.6; 5 TABLET, FILM COATED ORAL at 09:10

## 2020-10-29 RX ADMIN — ENOXAPARIN SODIUM 60 MG: 60 INJECTION SUBCUTANEOUS at 09:10

## 2020-10-29 RX ADMIN — POTASSIUM BICARBONATE 50 MEQ: 978 TABLET, EFFERVESCENT ORAL at 10:10

## 2020-10-29 NOTE — PROGRESS NOTES
[facility] Hospital Medicine  Progress Note    Patient Name: Halley Moreno  MRN: [id number]  Patient Class: IP- Inpatient   Admission Date: 10/22/2020  Length of Stay: 7 days  Attending Physician: Juno Jones MD  Primary Care Provider: Yamila Tejeda MD    [facility] Medicine Team: [facility] HOSP MED 1 - Rao Szymanski MD    Subjective:     Principal Problem:Right heart failure due to pulmonary hypertension    HPI:  Patient is a 45-year-old female who is presenting to us with acute respiratory distress.  She has a recent diagnosis of severe pulmonary hypertension with right-sided heart failure.  Her last echo showed a PA systolic pressure of 99.  She initially presented due to increasing weight gain over the last 7 months.  Patient states that in March she began gaining weight and she was placed on diuretic to trying keep that weight down.  Despite 40 mg of furosemide b.i.d. she continued to gain weight.  She has gained approximately 100 lb since March and 80 lb since May.  She does not have a cardiologist but was scheduled to see 1 in December.  Patient states that at home she sleeps in a elevated position secondary to shortness of breath.  She denies any episodes of waking up in the middle of the night out of breath.  She does not use any home oxygen nor she ever been told explicitly that she has heart failure.  She demonstrates poor understanding of the disease stating that the right heart was backing up into the lungs and cause in the shortness of breath.  She presented to the clinic today for evaluation and was found to be in acute respiratory distress with hypoxia.  She was referred to the ED for evaluation and diuresis.    Patient states that she has no known family history of heart failure in the family however she does state that her father had a pacemaker for unknown reasons.  She states that she does not think that anyone her and her family has had pulmonary hypertension.   Patient states that she does not drink, does not smoke, and does not use IV drugs.    Overview/Hospital Course:  Patient was admitted to Hospital Medicine. Was initially hypoxic and put on nasal cannula with normalization of her oxygen status to approximately 95%. She was demonstrating initial respiratory distress and was given a bolus of 120mg IV furosemide. Hypoxia and hypercapnia worsened so was stepped up to ICU on 10/23. Was placed on BiPAP and aggressively diuresed (approx 20L net negative) with improvement in respiratory status. Weaned to 2L NC and subsequently stepped back down to Hospital Medicine on 10/26. Continued diuresis with IV Lasix gtt. On 10/27 VBGs noted pCO2 in 90s - 100s and patient was started back on continuous BiPAP with improvement in pCO2 levels.    Interval History: Had one episode overnight of facial flushing and itching which resolved with one dose of Benadryl. Otherwise, remained afebrile and hemodynamically stable. Compliant with BiPAP overnight without issue. Continued diuresis with approx. 4L UOP yesterday.    Review of Systems   Constitutional: Negative for diaphoresis and fever.   HENT: Negative for congestion and sore throat.    Eyes: Negative for redness and visual disturbance.   Respiratory: Negative for cough and stridor.    Cardiovascular: Positive for leg swelling. Negative for chest pain.   Gastrointestinal: Positive for abdominal distention. Negative for abdominal pain, constipation, diarrhea and vomiting.   Genitourinary: Negative for dysuria and hematuria.   Musculoskeletal: Negative for back pain.   Skin: Negative for color change and pallor.   Neurological: Negative for speech difficulty and headaches.   Psychiatric/Behavioral: Negative for agitation and confusion.     Objective:     Vital Signs (Most Recent):  Temp: 98.3 °F (36.8 °C) (10/29/20 1500)  Pulse: 80 (10/29/20 1151)  Resp: (!) 24 (10/29/20 1100)  BP: (!) 148/84 (10/29/20 1500)  SpO2: 97 % (10/29/20 1558)  Vital Signs (24h Range):  Temp:  [97.6 °F (36.4 °C)-98.9 °F (37.2 °C)] 98.3 °F (36.8 °C)  Pulse:  [73-84] 80  Resp:  [18-33] 24  SpO2:  [88 %-100 %] 97 %  BP: (128-148)/(69-84) 148/84     Weight: (!) 194 kg (427 lb 11.1 oz)  Body mass index is 75.76 kg/m².    Intake/Output Summary (Last 24 hours) at 10/29/2020 1610  Last data filed at 10/29/2020 1305  Gross per 24 hour   Intake --   Output 3850 ml   Net -3850 ml      Physical Exam  Vitals signs and nursing note reviewed.   Constitutional:       General: She is awake. She is not in acute distress.     Appearance: She is morbidly obese. She is not diaphoretic.      Interventions: Nasal cannula in place.   HENT:      Head: Normocephalic and atraumatic.      Nose: Nose normal.      Mouth/Throat:      Mouth: Mucous membranes are moist.      Pharynx: Oropharynx is clear.   Eyes:      Extraocular Movements: Extraocular movements intact.      Conjunctiva/sclera: Conjunctivae normal.      Pupils: Pupils are equal, round, and reactive to light.   Neck:      Musculoskeletal: Normal range of motion.   Cardiovascular:      Rate and Rhythm: Normal rate and regular rhythm.      Pulses: Normal pulses.      Heart sounds: Normal heart sounds.   Pulmonary:      Effort: Pulmonary effort is normal. No respiratory distress.      Breath sounds: Normal breath sounds. No wheezing or rhonchi.   Abdominal:      General: Abdomen is protuberant. There is distension.      Palpations: Abdomen is soft.      Tenderness: There is no abdominal tenderness.   Musculoskeletal:      Right lower leg: Edema present.      Left lower leg: Edema present.   Skin:     General: Skin is warm and dry.      Coloration: Skin is not jaundiced.      Findings: Erythema and rash present. Rash is scaling.      Comments: Chronic venous stasis changes present on BLE.   Neurological:      General: No focal deficit present.      Mental Status: She is alert and oriented to person, place, and time.   Psychiatric:         Mood  and Affect: Mood normal.         Behavior: Behavior normal.       Significant Labs:   VBG:   Recent Labs   Lab 10/29/20  0457   PH 7.346*   PCO2 98.3*   HCO3 53.8*   POCSATURATED 55*   BE 28     CBC:   Recent Labs   Lab 10/28/20  0533 10/29/20  0448   WBC 7.16 6.40   HGB 15.1 15.4   HCT 54.1* 53.7*   * 129*     CMP:   Recent Labs   Lab 10/27/20  1835 10/28/20  0533 10/29/20  0448    141 142   K 4.2 4.0 3.2*   CL 83* 85* 80*   CO2 50* 41* 43*   * 106 112*   BUN 14 14 18   CREATININE 0.8 0.8 0.9   CALCIUM 8.9 9.1 9.4   PROT  --   --  7.1   ALBUMIN  --   --  3.2*   BILITOT  --   --  1.5*   ALKPHOS  --   --  61   AST  --   --  40   ALT  --   --  34   ANIONGAP 7* 15 19*   EGFRNONAA >60.0 >60.0 >60.0     Magnesium:   Recent Labs   Lab 10/28/20  0533 10/28/20  1827 10/29/20  0448   MG 1.9 1.7 1.7     Significant Imaging: No new imaging to review.       Assessment/Plan:      * Right heart failure due to pulmonary hypertension  Plan:  - Strict Is/Os  - Daily weights  - Keep K>4, Mg>2  - Diabetic/cardiac low sodium diet (<2 g/day)  - Fluid restriction to 1.5 - 2.0 L/day  - Will obtain repeat echo once patient is closer to euvolemia    Acute respiratory failure with hypoxia and hypercapnia  Patient is a chronic CO2 retainer in setting of obesity hypoventilation syndrome 2/2 morbid obesity. Stepped down from ICU 10/26, currently continuing diuresis with alternating BiPAP/NC breaks for meals.     Plan:   - Target SpO2 of >88%. Continue BiPAP throughout day with large breaks (2-3 hours) for meals. Wean FiO2 as tolerated.   - VBG this AM with pH 7.34, pCO2 98    - Continue Lasix gtt at 10mg/hr   - Diuril 500mg today   - Approx 42L net negative since admission   - Risk factor reduction (encouraged weight loss, use of nightly CPAP consistently)    Diabetes mellitus type 2 without retinopathy  Previously on glipizide 10mg daily and semaglutide 0.25mg weekly. Glucose well controlled this admission, not requiring  SSI at this time.    Plan:   - Holding home anti-hyperglycemics   - POCT BG qAC/HS while eating   - LD SSI   - Diabetic cardiac diet, fluid restriction 1.5L    - Will provide endocrinology referral at discharge    Sleep apnea  Patient's pulmonary HTN and RV dysfunction likely result of obesity hypoventilation syndrome with non-compliance with at-home CPAP. Evaluated by Pulmonology, who recommended continued use of NIPPV and encouraged weight loss.     Plan:    - NIPPV nightly    Morbid obesity with BMI of 70 and over, adult  Plan:  - Dry weight of approximately 370 lb, up to 500 pounds (BMI 88) this admission  - Weight today 427, BMI 75  - Continuing diuresis as noted above    Hypertension associated with diabetes  Patient on losartan 50mg daily at home. Remains normotensive at this time.    Plan:   - Holding home anti-hypertensive in acute setting   - Continue atorvastatin 10mg nightly      VTE Risk Mitigation (From admission, onward)         Ordered     enoxaparin injection 60 mg  Every 12 hours      10/22/20 1518     IP VTE HIGH RISK PATIENT  Once      10/22/20 1346     Place sequential compression device  Until discontinued      10/22/20 1346                Discharge Planning   JOSE RAUL: 11/2/2020     Code Status: Full Code   Is the patient medically ready for discharge?: No    Reason for patient still in hospital: Patient trending condition, Treatment and Pending disposition  Discharge Plan A: Rehab        Rao Szymanski MD, PGY-1  Department of Hospital Medicine   [facility]

## 2020-10-29 NOTE — SUBJECTIVE & OBJECTIVE
"Past Medical History:   Diagnosis Date    BMI 70 and over, adult     Depression     Diabetes mellitus     DM (diabetes mellitus) type II controlled with renal manifestation     HTN (hypertension)     Hyperlipidemia     Lumbar disc disease     Morbid obesity     Recurrent nephrolithiasis     Rosacea     Sleep apnea     Tear of medial meniscus of right knee, current 2017     Past Surgical History:   Procedure Laterality Date     SECTION       SECTION  2000    DILATION AND CURETTAGE OF UTERUS      AUB "negative path" per patient    ENDOMETRIAL ABLATION       Review of patient's allergies indicates:   Allergen Reactions    Victoza [liraglutide] Swelling       Scheduled Medications:    atorvastatin  10 mg Oral Daily    enoxaparin  60 mg Subcutaneous Q12H    latanoprost  1 drop Both Eyes QHS    miconazole NITRATE 2 %   Topical (Top) BID    polyethylene glycol  17 g Oral Daily    potassium bicarbonate  50 mEq Oral Q2H    senna-docusate 8.6-50 mg  2 tablet Oral BID       PRN Medications: acetaminophen, influenza, insulin aspart U-100, melatonin, ondansetron, simethicone, sodium chloride 0.9%    Family History     Problem Relation (Age of Onset)    Cancer Father    Diabetes Father    Heart disease Paternal Grandfather    Hypertension Mother        Tobacco Use    Smoking status: Never Smoker    Smokeless tobacco: Never Used   Substance and Sexual Activity    Alcohol use: No     Frequency: Monthly or less     Drinks per session: 1 or 2     Binge frequency: Never    Drug use: No    Sexual activity: Never     Partners: Male     Review of Systems   Constitutional: Positive for activity change. Negative for fatigue and fever.   HENT: Negative for sore throat and trouble swallowing.    Eyes: Negative for visual disturbance.   Respiratory: Negative for cough and shortness of breath.    Cardiovascular: Negative for chest pain and leg swelling.   Gastrointestinal: " Negative for abdominal distention and abdominal pain.   Genitourinary: Negative for difficulty urinating.   Musculoskeletal: Positive for gait problem. Negative for back pain.   Skin: Negative for color change.   Neurological: Positive for weakness. Negative for dizziness, light-headedness and headaches.   Psychiatric/Behavioral: Negative for agitation, behavioral problems and confusion.     Objective:     Vital Signs (Most Recent):  Temp: 98.2 °F (36.8 °C) (10/29/20 0745)  Pulse: 75 (10/29/20 0745)  Resp: (!) 24 (10/29/20 0745)  BP: 135/70 (10/29/20 0745)  SpO2: (!) 88 % (10/29/20 0745)    Vital Signs (24h Range):  Temp:  [98 °F (36.7 °C)-98.9 °F (37.2 °C)] 98.2 °F (36.8 °C)  Pulse:  [72-84] 75  Resp:  [18-33] 24  SpO2:  [88 %-100 %] 88 %  BP: (115-135)/(65-81) 135/70     Body mass index is 75.76 kg/m².    Physical Exam  Vitals signs and nursing note reviewed.   Constitutional:       Appearance: She is well-developed.      Comments: Obesity    Eyes:      Extraocular Movements: Extraocular movements intact.      Pupils: Pupils are equal, round, and reactive to light.   Neck:      Musculoskeletal: Normal range of motion and neck supple.   Pulmonary:      Effort: No respiratory distress.      Comments: NC O2  Musculoskeletal:      Comments: Decreased muscle strength to BUE and BLE present    Skin:     General: Skin is warm and dry.   Neurological:      Mental Status: She is alert and oriented to person, place, and time. Mental status is at baseline.   Psychiatric:         Mood and Affect: Mood normal.         Behavior: Behavior normal.         Thought Content: Thought content normal.       NEUROLOGICAL EXAMINATION:     MENTAL STATUS   Oriented to person, place, and time.     CRANIAL NERVES     CN III, IV, VI   Pupils are equal, round, and reactive to light.      Diagnostic Results: Labs: Reviewed  ECG: Reviewed  CT: Reviewed

## 2020-10-29 NOTE — MEDICAL/APP STUDENT
[facility]  Progress Note    Patient Name: Halley Moreno  MRN: [id number]  Patient Class: IP- Inpatient   Admission Date: 10/22/2020  Length of Stay: 7 midnights  Attending Physician: Juno Jones MD  Primary Care Provider: Yamila Tejeda MD    Hospital Day 8    Subjective:     Source: patient, medical records    Principle Complaint: Weight gain of 100+ lbs over 7 months and associated SOB. Morning cough with blood streaks starting day 6.     HPI:  Patient is a 45-year-old female with morbid obesity, YARY, OHS, and cor pulmonale who presented with progressive dyspnea.     Additional medical problems include diabetes type 2, HTN, and depression. A1C on 8/7 was 8.4, BMI was 88 on admission, Most recent echo showed PASP 109, TAPSE 1.37cm on 10/23.    She initially presented due to increasing weight gain over the last 7 months. Patient states that in March she began gaining weight and she was placed on diuretic to trying keep that weight down. Despite 40 mg of furosemide b.i.d. she continued to gain weight.  She has gained approximately 100 lb since March and 80 lb since May.  She does not have a cardiologist but was scheduled to see 1 in December.  Patient states that at home she sleeps in a elevated position secondary to shortness of breath.  She denies any episodes of waking up in the middle of the night out of breath.  She does not use any home oxygen nor she ever been told explicitly that she has heart failure.  She demonstrates poor understanding of the disease stating that the right heart was backing up into the lungs and cause in the shortness of breath. She presented to the clinic today for evaluation and was found to be in acute respiratory distress with hypoxia.  She was referred to the ED.    ED Course: Initially hypoxic and put on nasal cannula with normalization of her oxygen status to approximately 95%. She was demonstrating initial respiratory distress and was given a  "bolus of 120mg IV furosemide.    Hospital Course: Patient was admitted to Hospital Medicine. On first day pt became somnolent and hypoxia and hypercapnia worsened requiring step up to ICU on 10/23. Was placed on BiPAP and aggressively diuresed (approx 20L net negative) with improvement in respiratory status. Weaned to 2L NC and subsequently stepped back down to Hospital Medicine on 10/26. VBG returned with elevated ppCO2 (109 at peak), decreased ppO2 (23), elevated bicarb (56.5), and pH 7.41 on 10/27. Started on continuous Bipap up to 20/8 cmH2O with VBG improved on 10/28 (pH 7.4, ppCO2 94, ppO2 39, Bicarb 41).    Interval History: Pt slept well, only overnight problem was an allergic rash of unknown origin treated with benadryl. Pt also reports 3 days of blood streaked sputum believed to be "from the mask drying out my throat" and only seen in the mornings. Seen by PT/Ot yesterday afternoon. VBG repeated last night and this morning.    Current Facility-Administered Medications   Medication    acetaminophen tablet 650 mg    atorvastatin tablet 10 mg    chlorothiazide (DIURIL) 500 mg in dextrose 5 % 50 mL IVPB    enoxaparin injection 60 mg    furosemide (LASIX) 500 mg infusion (conc: 10 mg/mL)    influenza (QUADRIVALENT PF) vaccine 0.5 mL    insulin aspart U-100 pen 0-5 Units    latanoprost 0.005 % ophthalmic solution 1 drop    melatonin tablet 6 mg    miconazole NITRATE 2 % top powder    ondansetron disintegrating tablet 8 mg    polyethylene glycol packet 17 g    senna-docusate 8.6-50 mg per tablet 2 tablet    simethicone chewable tablet 80 mg    sodium chloride 0.9% flush 10 mL     Review of Systems   Constitutional: Negative for chills and fever.   Respiratory: Positive for cough and hemoptysis. Negative for shortness of breath.    Cardiovascular: Negative for chest pain.   Gastrointestinal: Negative for abdominal pain, diarrhea, nausea and vomiting.   Skin: Negative for rash.   Neurological: " Negative for dizziness and headaches.     Objective:     Vital Signs (Most Recent):  Temp: 97.6 °F (36.4 °C) (10/29/20 1100)  Pulse: 80 (10/29/20 1151)  Resp: (!) 24 (10/29/20 1100)  BP: 138/70 (10/29/20 1100)  SpO2: (!) 90 % (10/29/20 1151) Vital Signs (24h Range):  Temp:  [97.6 °F (36.4 °C)-98.9 °F (37.2 °C)] 97.6 °F (36.4 °C)  Pulse:  [73-84] 80  Resp:  [18-33] 24  SpO2:  [88 %-100 %] 90 %  BP: (115-138)/(65-81) 138/70     Weight: (!) 194 kg (427 lb 11.1 oz) (10/29/20 0400) down 5lbs from yesterday.  Body mass index is 75.76 kg/m².  Dry weight of 370 lbs    Physical Exam  Constitutional:       General: She is not in acute distress.     Appearance: She is obese.   HENT:      Mouth/Throat:      Mouth: Mucous membranes are moist.      Pharynx: Oropharynx is clear.   Eyes:      Extraocular Movements: Extraocular movements intact.   Cardiovascular:      Rate and Rhythm: Normal rate and regular rhythm.      Pulses: Normal pulses.      Heart sounds: Heart sounds are distant. No murmur.   Pulmonary:      Breath sounds: Examination of the left-lower field reveals rales. Decreased breath sounds and rales present.   Abdominal:      General: Bowel sounds are normal. There is distension.      Palpations: Abdomen is soft.      Tenderness: There is abdominal tenderness in the left lower quadrant. There is no guarding or rebound.   Musculoskeletal:      Right lower le+ Pitting Edema present.      Left lower le+ Pitting Edema present.   Skin:     General: Skin is warm and dry.      Findings: Rash present. Rash is scaling (Over lower legs. Consistent with venous statsis).   Neurological:      Mental Status: She is alert. Mental status is at baseline.       Significant Labs:  CBC:   Recent Labs   Lab 10/29/20  0448   WBC 6.40   RBC 5.91*   HGB 15.4   HCT 53.7*   *     CMP:   Recent Labs   Lab 10/29/20  0448   *   CALCIUM 9.4   ALBUMIN 3.2*   PROT 7.1      K 3.2*   CO2 43*   CL 80*   BUN 18   CREATININE  0.9   ALKPHOS 61   ALT 34   AST 40   BILITOT 1.5*     VBGs:   Recent Labs     10/28/20  0832 10/28/20  1502 10/29/20  0457   PH 7.401 7.384 7.346*   PCO2 94.2* 90.4* 98.3*   PO2 39* 29* 34*   HCO3 58.4* 54.0* 53.8*   POCSATURATED 68* 48* 55*   BE >30* 29 28     Significant Imaging:  No new imaging    Assessment/Plan:      Mrs Halley Moreno is a 44 yo F being treated for acute respiratory failure with hypoxia and hypercapnia and volume overload.    Active Diagnoses:    Diagnosis Date Noted POA    PRINCIPAL PROBLEM:  Right heart failure due to pulmonary hypertension [I27.29, I50.810] 10/22/2020 Yes    Impaired mobility and activities of daily living [Z74.09, Z78.9] 10/29/2020 Unknown    Acute respiratory failure with hypoxia [J96.01]  Unknown    Constipation [K59.00] 10/25/2020 Yes    Acute on chronic heart failure with preserved ejection fraction [I50.33] 10/23/2020 Yes    Obesity hypoventilation syndrome [E66.2] 10/23/2020 Yes    Hyperkalemia [E87.5] 10/23/2020 Yes    Hyperphosphatemia [E83.39] 10/23/2020 Yes    Acute respiratory failure with hypoxia and hypercapnia [J96.01, J96.02] 10/22/2020 Yes    Diabetes mellitus type 2 without retinopathy [E11.9] 10/14/2019 Yes    Hypertension associated with diabetes [E11.59, I10]  Yes    Sleep apnea [G47.30]  Yes    Morbid obesity with BMI of 70 and over, adult [E66.01, Z68.45]  Not Applicable      Problems Resolved During this Admission:     Acute Respiratory Failure with hypoxia and hypercapnia  On 10/23 respiratory team alerted for SpO2 of 90% and lethargy. On arrival ABG showed pH 7.165, pCO2 112.7, pO2 85. Started on Bipap continuosly at 15/8 with FiO2 45%.  - target >88% SpO2  - 10mg/hr furosemide drip  - Chlorthiazide 500mg morning infusion  - Patient education on OHS and benefit of weight loss  - Continuous Bipap at 20/8 and FiO2 100% overnight and between meals  - 5L O2 by NC during meals for 2 hours  - humidify nasal canula and Bipap  - repeat VBG in  AM    Cor pulmonale causing right sided HFpEF  New diagnosis. PASP of 109 with TAPSE 1.37cm in R ventricle on echo indicates Cor Pulmonale 2/2 YARY/OHS from morbid obesity (BMI 75.76).  - Strict I/O  - fluid restriction of 1.5L/day  - daily weights              Latest weight 10/29, 427lbs. Previous weight 432lbs on 10/28.               Dry weight 370lbs per pt  - low salt diet and education on home fluid restriction  - see respiratory failure plan    DM type 2  - stopped home meds  - short acting insulin 0-5 units premeal  - LD SSI  - diabetic cardiac diet  - Accu checks AC/evening    HTN  - On losartan 10mg at home, held    OHS/YARY  pCo2 >45, BMI >30, no other identifiable cause for hypoventilation.  - NIPPV nightly  - see obesity    Obesity  BMI 75.76. Gained >100lbs in last 7 months 2/2 fluid retension. Dry weight of 370lbs  - pt expresses interest inlosing weight, dietary/bariatric medicine outpatient follow up warranted    VTE Risk Mitigation (From admission, onward)         Ordered     enoxaparin injection 60 mg  Every 12 hours      10/22/20 1518     IP VTE HIGH RISK PATIENT  Once      10/22/20 1346     Place sequential compression device  Until discontinued      10/22/20 1346              Yogesh Nunez  [facility]

## 2020-10-29 NOTE — PLAN OF CARE
POC reviewed w/ pt, questions and concerns addressed. NAD overnight. Pt Aox4. VSS. Pt's reddness and itching resolved after one time dose of benadryl. Pt wore bipap willingly to sleep.  Safety maintained. Mobilized pt to highest level of functioning. WCTM.

## 2020-10-29 NOTE — CARE UPDATE
Rapid Response Respiratory Therapy Proactive Rounding Note      Time of visit:0704     Code Status: Full Code   : 1975  Bed: 8076/8076 A:   MRN: [id number]    SITUATION     Evaluated patient for: Non-Invasive Positive Pressure Ventilation Compliance     BACKGROUND     Patient has a past medical history of BMI 70 and over, adult, Depression, Diabetes mellitus, DM (diabetes mellitus) type II controlled with renal manifestation, HTN (hypertension), Hyperlipidemia, Lumbar disc disease, Morbid obesity, Recurrent nephrolithiasis, Rosacea, Sleep apnea, and Tear of medial meniscus of right knee, current.    ASSESSMENT/INTERVENTIONS     Upon arrival in room patient found on 5L NC with BiPAP on standby. Patient resting comfortably and complains of no respiratory issues at this time.    Pulse: 77 Respiratory rate: 18 SpO2: 95   Level of Consciousness: Level of Consciousness (AVPU): alert  Respiratory Effort: Respiratory Effort: Unlabored, Normal Expansion/Accessory Muscle Usage: Expansion/Accessory Muscles/Retractions: no use of accessory muscles, no retractions, expansion symmetric  All Lung Field Breath Sounds: All Lung Fields Breath Sounds: Lateral:, Anterior:, diminished  THEO Breath Sounds: diminished  LLL Breath Sounds: diminished  RUL Breath Sounds: diminished  RML Breath Sounds: diminished  RLL Breath Sounds: diminished  O2 Device/Concentration: 5LNC  Most recent blood gas:   Recent Labs     10/29/20  0457   PH 7.346*   PCO2 98.3*   PO2 34*   HCO3 53.8*   POCSATURATED 55*   BE 28     NIPPV: No   Ambu at bedside: Ambu bag with the patient?: Yes, Adult Ambu    Current Respiratory Care Orders:   Start   Ordered   10/29/20 0739  POCT Venous Blood Gas Q12H Every 12 hours (2 of 8 released)    Release   Comments: This test should be used for VBGs.  If using this order for other tests (K, creatinine, HCT, PT/INR, lactate etc)  ONLY do so in the case of an emergency or rapid response.   Notify Physician if: see  parameters below.   Question: Component: Answer: Blood Gas    10/29/20 0738   10/27/20 1955  Bipap Continuous Continuous (14 of 38 released)    Release   Question Answer Comment   FiO2% 100    Inspiratory pressure: 20    Expiratory pressure: 8        10/27/20 1739   10/23/20 1035  Pulse Oximetry Continuous Continuous      10/23/20 1034         RECOMMENDATIONS     We recommend: Continual monitoring of patient's oxygenation and respiratory status. Escalation to HFNC for higher oxygenation needs if needed. Change of BiPAP orders to QHS/PRN. Escalation of care if appropriate.    ESCALATION      Physician Escalation (Yes/No) No   Care discussed with primary RTKAREY RRT     FOLLOW-UP     Please call back the Rapid Response RT, Joni Fry, RRT at x [phone] for any questions or concerns.

## 2020-10-29 NOTE — NURSING
Upon entering the pt's room, pt stated she was extremely hot and itchy. Pt skin was very red and inflamed and hot. Dr. Heather Rojas ordered Benadryl and she stated she will come to bedside. Will continue to monitor.

## 2020-10-29 NOTE — PLAN OF CARE
Problem: Adult Inpatient Plan of Care  Goal: Plan of Care Review  Outcome: Ongoing, Progressing  Goal: Patient-Specific Goal (Individualization)  Outcome: Ongoing, Progressing  Flowsheets (Taken 10/29/2020 1658)  Individualized Care Needs: assist with turning  Anxieties, Fears or Concerns: having to wear BiPAP  Patient-Specific Goals (Include Timeframe): discharge  Goal: Optimal Comfort and Wellbeing  Outcome: Ongoing, Progressing  Intervention: Provide Person-Centered Care  Flowsheets (Taken 10/29/2020 1658)  Trust Relationship/Rapport:   care explained   choices provided   emotional support provided   empathic listening provided   questions answered   questions encouraged   reassurance provided   thoughts/feelings acknowledged     Problem: Diabetes Comorbidity  Goal: Blood Glucose Level Within Desired Range  Outcome: Ongoing, Progressing  Intervention: Maintain Glycemic Control  Flowsheets (Taken 10/29/2020 1658)  Glycemic Management: blood glucose monitoring     Pt AAOx4, VSS, denies pain. No acute changes today. Pt off of BiPAP this AM and placed back on at 1340. Pt tolerating well. SPO2 WNL on BiPAP. CO2 still elevated. Excellent urine output noted. Plan of care reviewed with pt.

## 2020-10-29 NOTE — HOSPITAL COURSE
10/26/20: Participated w/ OT. Bed mobility maxA- maxA x 2 ppl. Sit to stand Anuja. Took  5 steps forward and 5 steps backward, 2 steps to the L and 3 steps toward HOB to the R Anuja x 2 ppl. UBD modA. Toileting maxA.   10/27/20: Participated w/ PT. Bed mobility maxA. Sit to stand Anuja x 2 ppl. Ambulated 13 steps Anuja- HHA.   11/3/20: Participated w/ PT. Bed mobility SV- modA. Sit to stand Anuja x 2 ppl. Ambulated 2 steps CGA.   11/4/20: Particiapated with PT and OT.  BM SBA.  Sit to stand totalA (Anuja x 2).  Ambulated ~8 steps forward and backward and laterally totalA (Anuja x 2).  Grooming set-up assist, UBD Anuja, LBD totalA.  11/5/20: Particiapted with PT and OT.  BM SBA.  EOB SV.  Sit to stand and transfers totalA (Anuja x 2).  Ambulated 5 steps CGA with RW.  Grooming set-up assist, UBD Anuja, and toileting totalA.

## 2020-10-29 NOTE — ASSESSMENT & PLAN NOTE
- Placed on BiPAP and Lasix gtt continued.   - Now on NC during day and BiPAP at night, pCO2 improving.

## 2020-10-29 NOTE — ASSESSMENT & PLAN NOTE
Plan:  - Dry weight of approximately 370 lb, up to 500 pounds (BMI 88) this admission  - Weight today 427, BMI 75  - Continuing diuresis as noted above

## 2020-10-29 NOTE — CONSULTS
[facility]  Physical Medicine & Rehab  Consult Note    Patient Name: Halley Moreno  MRN: [id number]  Admission Date: 10/22/2020  Hospital Length of Stay: 7 days  Attending Physician: Juno Jones MD     Inpatient consult to Physical Medicine & Rehabilitation  Consult performed by: Dixie Hawkins NP  Consult requested by:  Juno Jones MD    Collaborating Physician: Sylvia Graves MD  Reason for Consult:  Assess rehabilitation needs     Consults  Subjective:     Principal Problem: Right heart failure due to pulmonary hypertension    HPI: Halley Moreno is a 45-year-old female with PMHx of sleep apnea, DM, HTN, and recent diagnosis of severe pulmonary hypertension with right-sided heart failure. Patient presented to [facility] on 10/22 for resp failure. Placed on BiPAP and Lasix gtt continued. Now on NC during day and BiPAP at night, pCO2 improving. Hospital course complicated by R heart failure (fluid restriction and repeat Echo pending).     Functional History: Patient lives with mother, daughter, son, and son's girlfriend in a [facility] with 0 [place]. Prior to admission, I. DME: power chair and able to walk short distances at home.    Hospital Course:   10/26/20: Participated w/ OT. Bed mobility maxA- maxA x 2 ppl. Sit to stand Anuja. Took  5 steps forward and 5 steps backward, 2 steps to the L and 3 steps toward HOB to the R Anuja x 2 ppl. UBD modA. Toileting maxA.     Past Medical History:   Diagnosis Date    BMI 70 and over, adult     Depression     Diabetes mellitus     DM (diabetes mellitus) type II controlled with renal manifestation     HTN (hypertension)     Hyperlipidemia     Lumbar disc disease     Morbid obesity     Recurrent nephrolithiasis     Rosacea     Sleep apnea     Tear of medial meniscus of right knee, current 2017     Past Surgical History:   Procedure Laterality Date     SECTION       SECTION  2000    DILATION AND CURETTAGE OF  "UTERUS  2010    AUB "negative path" per patient    ENDOMETRIAL ABLATION  2010     Review of patient's allergies indicates:   Allergen Reactions    Victoza [liraglutide] Swelling       Scheduled Medications:    atorvastatin  10 mg Oral Daily    enoxaparin  60 mg Subcutaneous Q12H    latanoprost  1 drop Both Eyes QHS    miconazole NITRATE 2 %   Topical (Top) BID    polyethylene glycol  17 g Oral Daily    potassium bicarbonate  50 mEq Oral Q2H    senna-docusate 8.6-50 mg  2 tablet Oral BID       PRN Medications: acetaminophen, influenza, insulin aspart U-100, melatonin, ondansetron, simethicone, sodium chloride 0.9%    Family History     Problem Relation (Age of Onset)    Cancer Father    Diabetes Father    Heart disease Paternal Grandfather    Hypertension Mother        Tobacco Use    Smoking status: Never Smoker    Smokeless tobacco: Never Used   Substance and Sexual Activity    Alcohol use: No     Frequency: Monthly or less     Drinks per session: 1 or 2     Binge frequency: Never    Drug use: No    Sexual activity: Never     Partners: Male     Review of Systems   Constitutional: Positive for activity change. Negative for fatigue and fever.   HENT: Negative for sore throat and trouble swallowing.    Eyes: Negative for visual disturbance.   Respiratory: Negative for cough and shortness of breath.    Cardiovascular: Negative for chest pain and leg swelling.   Gastrointestinal: Negative for abdominal distention and abdominal pain.   Genitourinary: Negative for difficulty urinating.   Musculoskeletal: Positive for gait problem. Negative for back pain.   Skin: Negative for color change.   Neurological: Positive for weakness. Negative for dizziness, light-headedness and headaches.   Psychiatric/Behavioral: Negative for agitation, behavioral problems and confusion.     Objective:     Vital Signs (Most Recent):  Temp: 98.2 °F (36.8 °C) (10/29/20 0745)  Pulse: 75 (10/29/20 0745)  Resp: (!) 24 (10/29/20 " 0745)  BP: 135/70 (10/29/20 0745)  SpO2: (!) 88 % (10/29/20 0745)    Vital Signs (24h Range):  Temp:  [98 °F (36.7 °C)-98.9 °F (37.2 °C)] 98.2 °F (36.8 °C)  Pulse:  [72-84] 75  Resp:  [18-33] 24  SpO2:  [88 %-100 %] 88 %  BP: (115-135)/(65-81) 135/70     Body mass index is 75.76 kg/m².    Physical Exam  Vitals signs and nursing note reviewed.   Constitutional:       Appearance: She is well-developed.      Comments: Obesity    Eyes:      Extraocular Movements: Extraocular movements intact.      Pupils: Pupils are equal, round, and reactive to light.   Neck:      Musculoskeletal: Normal range of motion and neck supple.   Pulmonary:      Effort: No respiratory distress.      Comments: NC O2  Musculoskeletal:      Comments: Decreased muscle strength to BUE and BLE present    Skin:     General: Skin is warm and dry.   Neurological:      Mental Status: She is alert and oriented to person, place, and time. Mental status is at baseline.   Psychiatric:         Mood and Affect: Mood normal.         Behavior: Behavior normal.         Thought Content: Thought content normal.       Diagnostic Results:   Labs: Reviewed  ECG: Reviewed  CT: Reviewed    Assessment/Plan:     * Right heart failure due to pulmonary hypertension  - fluid restriction and repeat Echo pending    Impaired mobility and activities of daily living  - Related to prolonged/acute hospital course.     Recommendations  -  Encourage mobility, OOB in chair at least 3 hours per day, and early ambulation as appropriate  -  PT/OT evaluate and treat  -  Pain management  -  Monitor for and prevent skin breakdown and pressure ulcers  · Early mobility, repositioning/weight shifting every 20-30 minutes when sitting, turn patient every 2 hours, proper mattress/overlay and chair cushioning, pressure relief/heel protector boots  -  DVT prophylaxis    -  Reviewed discharge options (IP rehab, SNF, HH therapy, and OP therapy)    Acute respiratory failure with hypoxia and  hypercapnia  - Placed on BiPAP and Lasix gtt continued.   - Now on NC during day and BiPAP at night, pCO2 improving.     Will follow progress with therapy.     Thank you for your consult.     Dixie Hawkins NP  Department of Physical Medicine & Rehab  [facility]

## 2020-10-29 NOTE — CONSULTS
Inpatient consult to Physical Medicine Rehab  Consult performed by: Dixie Hawkins NP  Consult ordered by: Adán Muller MD  Reason for consult: Debility       Reviewed patient history and current admission.  Rehab team following.  Full consult to follow.    JOSE R Perera, FNP-C  Physical Medicine & Rehabilitation   10/29/2020  Spectralink: [id number]

## 2020-10-29 NOTE — HPI
Halley Moreno is a 45-year-old female with PMHx of sleep apnea, DM, HTN, and recent diagnosis of severe pulmonary hypertension with right-sided heart failure. Patient presented to [facility] on 10/22 for resp failure. Placed on BiPAP and Lasix gtt continued. Now on NC during day and BiPAP at night, pCO2 improving. Hospital course complicated by R heart failure (fluid restriction and repeat Echo pending).     Functional History: Patient lives with mother, daughter, son, and son's girlfriend in a [facility] with 0 [place]. Prior to admission, I. DME: power chair and able to walk short distances at home.

## 2020-10-30 PROBLEM — Z02.9 DISCHARGE PLANNING ISSUES: Status: ACTIVE | Noted: 2020-10-30

## 2020-10-30 PROBLEM — Z75.8 DISCHARGE PLANNING ISSUES: Status: ACTIVE | Noted: 2020-10-30

## 2020-10-30 LAB
ALBUMIN SERPL BCP-MCNC: 3.1 G/DL (ref 3.5–5.2)
ALLENS TEST: ABNORMAL
ALLENS TEST: ABNORMAL
ALP SERPL-CCNC: 57 U/L (ref 55–135)
ALT SERPL W/O P-5'-P-CCNC: 35 U/L (ref 10–44)
ANION GAP SERPL CALC-SCNC: 18 MMOL/L (ref 8–16)
AST SERPL-CCNC: 39 U/L (ref 10–40)
BASOPHILS # BLD AUTO: 0.02 K/UL (ref 0–0.2)
BASOPHILS NFR BLD: 0.3 % (ref 0–1.9)
BILIRUB SERPL-MCNC: 1.3 MG/DL (ref 0.1–1)
BUN SERPL-MCNC: 24 MG/DL (ref 6–20)
CALCIUM SERPL-MCNC: 10 MG/DL (ref 8.7–10.5)
CHLORIDE SERPL-SCNC: 80 MMOL/L (ref 95–110)
CO2 SERPL-SCNC: 43 MMOL/L (ref 23–29)
CREAT SERPL-MCNC: 0.9 MG/DL (ref 0.5–1.4)
DELSYS: ABNORMAL
DELSYS: ABNORMAL
DIFFERENTIAL METHOD: ABNORMAL
EOSINOPHIL # BLD AUTO: 0.1 K/UL (ref 0–0.5)
EOSINOPHIL NFR BLD: 1.7 % (ref 0–8)
EP: 8
EP: 8
ERYTHROCYTE [DISTWIDTH] IN BLOOD BY AUTOMATED COUNT: 20.3 % (ref 11.5–14.5)
ERYTHROCYTE [SEDIMENTATION RATE] IN BLOOD BY WESTERGREN METHOD: 28 MM/H
ERYTHROCYTE [SEDIMENTATION RATE] IN BLOOD BY WESTERGREN METHOD: 28 MM/H
EST. GFR  (AFRICAN AMERICAN): >60 ML/MIN/1.73 M^2
EST. GFR  (NON AFRICAN AMERICAN): >60 ML/MIN/1.73 M^2
FIO2: 80
FIO2: 80
GLUCOSE SERPL-MCNC: 131 MG/DL (ref 70–110)
HCO3 UR-SCNC: 53.3 MMOL/L (ref 24–28)
HCO3 UR-SCNC: 54.3 MMOL/L (ref 24–28)
HCT VFR BLD AUTO: 53.4 % (ref 37–48.5)
HGB BLD-MCNC: 15.4 G/DL (ref 12–16)
IMM GRANULOCYTES # BLD AUTO: 0.02 K/UL (ref 0–0.04)
IMM GRANULOCYTES NFR BLD AUTO: 0.3 % (ref 0–0.5)
IP: 20
IP: 20
LYMPHOCYTES # BLD AUTO: 1.3 K/UL (ref 1–4.8)
LYMPHOCYTES NFR BLD: 20.2 % (ref 18–48)
MAGNESIUM SERPL-MCNC: 1.9 MG/DL (ref 1.6–2.6)
MCH RBC QN AUTO: 25.7 PG (ref 27–31)
MCHC RBC AUTO-ENTMCNC: 28.8 G/DL (ref 32–36)
MCV RBC AUTO: 89 FL (ref 82–98)
MIN VOL: 21
MODE: ABNORMAL
MODE: ABNORMAL
MONOCYTES # BLD AUTO: 0.8 K/UL (ref 0.3–1)
MONOCYTES NFR BLD: 11.8 % (ref 4–15)
NEUTROPHILS # BLD AUTO: 4.2 K/UL (ref 1.8–7.7)
NEUTROPHILS NFR BLD: 65.7 % (ref 38–73)
NRBC BLD-RTO: 0 /100 WBC
PCO2 BLDA: 87.4 MMHG (ref 35–45)
PCO2 BLDA: 91.3 MMHG (ref 35–45)
PH SMN: 7.38 [PH] (ref 7.35–7.45)
PH SMN: 7.39 [PH] (ref 7.35–7.45)
PHOSPHATE SERPL-MCNC: 4.1 MG/DL (ref 2.7–4.5)
PLATELET # BLD AUTO: 135 K/UL (ref 150–350)
PMV BLD AUTO: 9.8 FL (ref 9.2–12.9)
PO2 BLDA: 32 MMHG (ref 40–60)
PO2 BLDA: 35 MMHG (ref 40–60)
POC BE: 28 MMOL/L
POC BE: 29 MMOL/L
POC SATURATED O2: 56 % (ref 95–100)
POC SATURATED O2: 61 % (ref 95–100)
POC TCO2: >50 MMOL/L (ref 24–29)
POC TCO2: >50 MMOL/L (ref 24–29)
POCT GLUCOSE: 113 MG/DL (ref 70–110)
POCT GLUCOSE: 145 MG/DL (ref 70–110)
POCT GLUCOSE: 152 MG/DL (ref 70–110)
POCT GLUCOSE: 175 MG/DL (ref 70–110)
POTASSIUM SERPL-SCNC: 3.7 MMOL/L (ref 3.5–5.1)
PROT SERPL-MCNC: 7.2 G/DL (ref 6–8.4)
RBC # BLD AUTO: 5.99 M/UL (ref 4–5.4)
SAMPLE: ABNORMAL
SAMPLE: ABNORMAL
SITE: ABNORMAL
SITE: ABNORMAL
SODIUM SERPL-SCNC: 141 MMOL/L (ref 136–145)
SP02: 94
SP02: 99
SPONT RATE: 28
WBC # BLD AUTO: 6.34 K/UL (ref 3.9–12.7)

## 2020-10-30 PROCEDURE — 63600175 PHARM REV CODE 636 W HCPCS: Performed by: INTERNAL MEDICINE

## 2020-10-30 PROCEDURE — 25000003 PHARM REV CODE 250: Performed by: STUDENT IN AN ORGANIZED HEALTH CARE EDUCATION/TRAINING PROGRAM

## 2020-10-30 PROCEDURE — 99232 PR SUBSEQUENT HOSPITAL CARE,LEVL II: ICD-10-PCS | Mod: ,,, | Performed by: NURSE PRACTITIONER

## 2020-10-30 PROCEDURE — 82803 BLOOD GASES ANY COMBINATION: CPT

## 2020-10-30 PROCEDURE — 99233 PR SUBSEQUENT HOSPITAL CARE,LEVL III: ICD-10-PCS | Mod: ,,, | Performed by: HOSPITALIST

## 2020-10-30 PROCEDURE — 99232 SBSQ HOSP IP/OBS MODERATE 35: CPT | Mod: ,,, | Performed by: NURSE PRACTITIONER

## 2020-10-30 PROCEDURE — 99900035 HC TECH TIME PER 15 MIN (STAT)

## 2020-10-30 PROCEDURE — 97802 MEDICAL NUTRITION INDIV IN: CPT

## 2020-10-30 PROCEDURE — 94761 N-INVAS EAR/PLS OXIMETRY MLT: CPT

## 2020-10-30 PROCEDURE — 97535 SELF CARE MNGMENT TRAINING: CPT

## 2020-10-30 PROCEDURE — 94660 CPAP INITIATION&MGMT: CPT

## 2020-10-30 PROCEDURE — 63600175 PHARM REV CODE 636 W HCPCS: Performed by: STUDENT IN AN ORGANIZED HEALTH CARE EDUCATION/TRAINING PROGRAM

## 2020-10-30 PROCEDURE — 80053 COMPREHEN METABOLIC PANEL: CPT

## 2020-10-30 PROCEDURE — 99233 SBSQ HOSP IP/OBS HIGH 50: CPT | Mod: ,,, | Performed by: HOSPITALIST

## 2020-10-30 PROCEDURE — 97530 THERAPEUTIC ACTIVITIES: CPT

## 2020-10-30 PROCEDURE — 83735 ASSAY OF MAGNESIUM: CPT

## 2020-10-30 PROCEDURE — A4216 STERILE WATER/SALINE, 10 ML: HCPCS | Performed by: STUDENT IN AN ORGANIZED HEALTH CARE EDUCATION/TRAINING PROGRAM

## 2020-10-30 PROCEDURE — 27000221 HC OXYGEN, UP TO 24 HOURS

## 2020-10-30 PROCEDURE — 97110 THERAPEUTIC EXERCISES: CPT

## 2020-10-30 PROCEDURE — 84100 ASSAY OF PHOSPHORUS: CPT

## 2020-10-30 PROCEDURE — 85025 COMPLETE CBC W/AUTO DIFF WBC: CPT

## 2020-10-30 PROCEDURE — 20600001 HC STEP DOWN PRIVATE ROOM

## 2020-10-30 PROCEDURE — 27100171 HC OXYGEN HIGH FLOW UP TO 24 HOURS

## 2020-10-30 RX ORDER — MAGNESIUM SULFATE HEPTAHYDRATE 40 MG/ML
2 INJECTION, SOLUTION INTRAVENOUS ONCE
Status: COMPLETED | OUTPATIENT
Start: 2020-10-30 | End: 2020-10-30

## 2020-10-30 RX ORDER — SPIRONOLACTONE 25 MG/1
25 TABLET ORAL DAILY
Status: DISCONTINUED | OUTPATIENT
Start: 2020-10-30 | End: 2020-10-31

## 2020-10-30 RX ADMIN — LATANOPROST 1 DROP: 50 SOLUTION OPHTHALMIC at 08:10

## 2020-10-30 RX ADMIN — MICONAZOLE NITRATE: 20 POWDER TOPICAL at 08:10

## 2020-10-30 RX ADMIN — POLYETHYLENE GLYCOL 3350 17 G: 17 POWDER, FOR SOLUTION ORAL at 08:10

## 2020-10-30 RX ADMIN — CHLOROTHIAZIDE SODIUM 500 MG: 500 INJECTION INTRAVENOUS at 09:10

## 2020-10-30 RX ADMIN — ATORVASTATIN CALCIUM 10 MG: 10 TABLET, FILM COATED ORAL at 08:10

## 2020-10-30 RX ADMIN — ENOXAPARIN SODIUM 60 MG: 60 INJECTION SUBCUTANEOUS at 08:10

## 2020-10-30 RX ADMIN — POTASSIUM BICARBONATE 25 MEQ: 978 TABLET, EFFERVESCENT ORAL at 12:10

## 2020-10-30 RX ADMIN — POTASSIUM BICARBONATE 25 MEQ: 978 TABLET, EFFERVESCENT ORAL at 09:10

## 2020-10-30 RX ADMIN — Medication 10 ML: at 08:10

## 2020-10-30 RX ADMIN — MELATONIN TAB 3 MG 6 MG: 3 TAB at 08:10

## 2020-10-30 RX ADMIN — SPIRONOLACTONE 25 MG: 25 TABLET ORAL at 02:10

## 2020-10-30 RX ADMIN — DOCUSATE SODIUM 50MG AND SENNOSIDES 8.6MG 2 TABLET: 8.6; 5 TABLET, FILM COATED ORAL at 08:10

## 2020-10-30 RX ADMIN — FUROSEMIDE 10 MG/HR: 10 INJECTION, SOLUTION INTRAMUSCULAR; INTRAVENOUS at 05:10

## 2020-10-30 RX ADMIN — MAGNESIUM SULFATE 2 G: 2 INJECTION INTRAVENOUS at 02:10

## 2020-10-30 NOTE — PLAN OF CARE
Problem: Adult Inpatient Plan of Care  Goal: Plan of Care Review  Outcome: Ongoing, Progressing  Goal: Patient-Specific Goal (Individualization)  Outcome: Ongoing, Progressing  Flowsheets (Taken 10/30/2020 1619)  Individualized Care Needs: assist with turning  Anxieties, Fears or Concerns: CO2 level being high  Patient-Specific Goals (Include Timeframe): discharge  Goal: Optimal Comfort and Wellbeing  Outcome: Ongoing, Progressing  Intervention: Provide Person-Centered Care  Flowsheets (Taken 10/30/2020 1622)  Trust Relationship/Rapport:   care explained   choices provided   emotional support provided   empathic listening provided   questions answered   questions encouraged   reassurance provided   thoughts/feelings acknowledged     Pt AAOx4, VSS , denies pain. No acute changes today. Pt on 5L NC throughout the day, SPO2 WNL. Lasix gtt still infusing at 10mg/hr. Pt has adequate urine output. CO2 trending down. Family at bedside. Plan of care reviewed with pt and family.

## 2020-10-30 NOTE — PLAN OF CARE
10/30/20 1849   Discharge Reassessment   Assessment Type Discharge Planning Reassessment   Provided patient/caregiver education on the expected discharge date and the discharge plan Yes   Discharge Plan A Rehab   Discharge Plan B Home with family   DME Needed Upon Discharge  other (see comments)  (TBD)   Anticipated Discharge Disposition Rehab   Post-Acute Status   Post-Acute Authorization Placement   Post-Acute Placement Status Awaiting Internal Medical Clearance

## 2020-10-30 NOTE — ASSESSMENT & PLAN NOTE
Plan:  - Dry weight of approximately 370 lb, up to 500 pounds (BMI 88) this admission  - Weight today 418  - Continuing diuresis as noted above

## 2020-10-30 NOTE — SUBJECTIVE & OBJECTIVE
Interval History: NAEON. Patient reports she wore the BiPAP overnight but prefers to wear the NC during the day. She also noticed a new bruise and bump on her LUE that is tender with palpation. She has no other complaints at this time. She notices she is still diuresing well.     Review of Systems   Constitutional: Positive for fatigue. Negative for diaphoresis and fever.   HENT: Negative for congestion and sore throat.    Eyes: Negative for redness and visual disturbance.   Respiratory: Negative for cough and stridor.    Cardiovascular: Positive for leg swelling. Negative for chest pain.   Gastrointestinal: Positive for abdominal distention. Negative for abdominal pain, constipation, diarrhea and vomiting.   Genitourinary: Negative for dysuria and hematuria.   Musculoskeletal: Negative for back pain.   Skin: Negative for color change and pallor.   Neurological: Negative for speech difficulty and headaches.   Psychiatric/Behavioral: Negative for agitation and confusion.     Objective:     Vital Signs (Most Recent):  Temp: 98.3 °F (36.8 °C) (10/30/20 1112)  Pulse: 80 (10/30/20 1117)  Resp: 13 (10/30/20 1057)  BP: 112/80 (10/30/20 0815)  SpO2: (!) 93 % (10/30/20 1057) Vital Signs (24h Range):  Temp:  [97.6 °F (36.4 °C)-98.4 °F (36.9 °C)] 98.3 °F (36.8 °C)  Pulse:  [72-83] 80  Resp:  [9-23] 13  SpO2:  [88 %-98 %] 93 %  BP: (112-148)/(61-84) 112/80     Weight: (!) 190 kg (418 lb 14 oz)  Body mass index is 74.2 kg/m².    Intake/Output Summary (Last 24 hours) at 10/30/2020 1257  Last data filed at 10/30/2020 1100  Gross per 24 hour   Intake 490 ml   Output 5200 ml   Net -4710 ml      Physical Exam  Vitals signs and nursing note reviewed.   Constitutional:       General: She is awake. She is not in acute distress.     Appearance: She is morbidly obese. She is not diaphoretic.      Interventions: Nasal cannula in place.   HENT:      Head: Normocephalic and atraumatic.      Nose: Nose normal.      Mouth/Throat:      Mouth:  Mucous membranes are moist.      Pharynx: Oropharynx is clear.   Eyes:      Extraocular Movements: Extraocular movements intact.      Conjunctiva/sclera: Conjunctivae normal.      Pupils: Pupils are equal, round, and reactive to light.   Neck:      Musculoskeletal: Normal range of motion.   Cardiovascular:      Rate and Rhythm: Normal rate and regular rhythm.      Pulses: Normal pulses.      Heart sounds: Normal heart sounds.   Pulmonary:      Effort: Pulmonary effort is normal. No respiratory distress.      Breath sounds: Normal breath sounds. No wheezing or rhonchi.   Abdominal:      General: Abdomen is protuberant. There is distension.      Palpations: Abdomen is soft.      Tenderness: There is no abdominal tenderness.   Musculoskeletal:         General: Swelling and tenderness present.      Right lower leg: Edema present.      Left lower leg: Edema present.      Comments: Patient has hematoma on LUE tender to palpation surrounded by bruising. She also ha bruising on bilat axillae.   Skin:     General: Skin is warm and dry.      Coloration: Skin is not jaundiced.      Findings: Erythema and rash present. Rash is scaling.      Comments: Chronic venous stasis changes present on BLE.   Neurological:      General: No focal deficit present.      Mental Status: She is alert and oriented to person, place, and time.   Psychiatric:         Mood and Affect: Mood normal.         Behavior: Behavior normal.         Significant Labs:   ABGs:   Recent Labs   Lab 10/30/20  0543   PH 7.393   PCO2 87.4*   HCO3 53.3*   POCSATURATED 61*   BE 28     CBC:   Recent Labs   Lab 10/29/20  0448 10/30/20  0554   WBC 6.40 6.34   HGB 15.4 15.4   HCT 53.7* 53.4*   * 135*     CMP:   Recent Labs   Lab 10/29/20  0448 10/30/20  0554    141   K 3.2* 3.7   CL 80* 80*   CO2 43* 43*   * 131*   BUN 18 24*   CREATININE 0.9 0.9   CALCIUM 9.4 10.0   PROT 7.1 7.2   ALBUMIN 3.2* 3.1*   BILITOT 1.5* 1.3*   ALKPHOS 61 57   AST 40 39   ALT  34 35   ANIONGAP 19* 18*   EGFRNONAA >60.0 >60.0     Magnesium:   Recent Labs   Lab 10/28/20  1827 10/29/20  0448 10/30/20  0554   MG 1.7 1.7 1.9     POCT Glucose:   Recent Labs   Lab 10/29/20  2118 10/30/20  0811 10/30/20  1232   POCTGLUCOSE 171* 113* 152*       Significant Imaging: I have reviewed all pertinent imaging results/findings within the past 24 hours.

## 2020-10-30 NOTE — PLAN OF CARE
Recommendations     1. Continue current Diabetic, Cardiac diet (fluid restriction per MD). Discontinue ONS, pt consuming 100% of meals.   2. RD to monitor & follow-up.     Goals: Meet % EEN, EPN by RD f/u date  Nutrition Goal Status: new  Communication of RD Recs: reviewed with RN

## 2020-10-30 NOTE — PROGRESS NOTES
"  [facility]  Adult Nutrition  Consult Note    SUMMARY     Recommendations    1. Continue current Diabetic, Cardiac diet (fluid restriction per MD). Discontinue ONS, pt consuming 100% of meals.   2. RD to monitor & follow-up.    Goals: Meet % EEN, EPN by RD f/u date  Nutrition Goal Status: new  Communication of RD Recs: reviewed with RN    Reason for Assessment    Reason For Assessment: length of stay  Diagnosis: other (see comments)(HF)  Relevant Medical History: HTN, DM  Interdisciplinary Rounds: did not attend    General Information Comments: Pt w/ good appetite, tolerating diet w/ 100% PO intake. Good appetite, weight gain PTA. Pt w/ no indicators of malnutrition. Low sodium, Diabetic diet education complete - paperwork provided; pt verbalized understanding.  Nutrition Discharge Planning: Adequate PO intake    Nutrition/Diet History    Patient Reported Diet/Restrictions/Preferences: general  Spiritual, Cultural Beliefs, Baptist Practices, Values that Affect Care: no  Factors Affecting Nutritional Intake: None identified at this time    Anthropometrics    Temp: 98.2 °F (36.8 °C)  Height Method: Measured  Height: 5' 3" (160 cm)  Height (inches): 63 in  Weight Method: Bed Scale  Weight: (!) 190 kg (418 lb 14 oz)  Weight (lb): (!) 418.88 lb  Ideal Body Weight (IBW), Female: 115 lb  % Ideal Body Weight, Female (lb): 364.24 %  BMI (Calculated): 74.2  BMI Grade: greater than 40 - morbid obesity    Lab/Procedures/Meds    Pertinent Labs Reviewed: reviewed  Pertinent Labs Comments: A1C 8.4  Pertinent Medications Reviewed: reviewed  Pertinent Medications Comments: Lasix    Estimated/Assessed Needs    Weight Used For Calorie Calculations: (!) 190 kg (418 lb 14 oz)     Energy Calorie Requirements (kcal): 2504 kcal/d  Energy Need Method: [place]-St Jeor(1.0 PAL)     Protein Requirements: 152 g/d (.8 g/kg)  Weight Used For Protein Calculations: (!) 190 kg (418 lb 14 oz)     Estimated Fluid " Requirement Method: other (see comments)(Per MD or 1 mL/kcal)  RDA Method (mL): 2504    Nutrition Prescription Ordered    Current Diet Order: Diabetic, Cardiac  Nutrition Order Comments: 1500 mL FR  Oral Nutrition Supplement: Boost Glucose Control ONS    Evaluation of Received Nutrient/Fluid Intake    Comments: LBM: 10/27    Tolerance: tolerating    Nutrition Risk    Level of Risk/Frequency of Follow-up: (1x/week)     Assessment and Plan    Nutrition Problem  Excessive energy intake    Related to (etiology):   Food and nutrition related knowledge deficit     Signs and Symptoms (as evidenced by):   BMI 74    Interventions(treatment strategy):  Collaboration of nutrition care w/ other providers     Nutrition Diagnosis Status:   New     Monitor and Evaluation    Food and Nutrient Intake: energy intake, food and beverage intake  Food and Nutrient Adminstration: diet order  Physical Activity and Function: nutrition-related ADLs and IADLs  Anthropometric Measurements: weight, weight change  Biochemical Data, Medical Tests and Procedures: inflammatory profile, lipid profile, gastrointestinal profile, glucose/endocrine profile, electrolyte and renal panel  Nutrition-Focused Physical Findings: overall appearance     Nutrition Follow-Up    RD Follow-up?: Yes

## 2020-10-30 NOTE — PT/OT/SLP PROGRESS
Occupational Therapy   Treatment    Name: Halley Moreno  MRN: [id number]  Admitting Diagnosis:  Right heart failure due to pulmonary hypertension       Recommendations:     Discharge Recommendations: rehabilitation facility  Discharge Equipment Recommendations:  bath bench  Barriers to discharge:  Decreased caregiver support    Assessment:     Halley Moreno is a 45 y.o. female with a medical diagnosis of Right heart failure due to pulmonary hypertension.  Performance deficits affecting function are impaired endurance, impaired self care skills, impaired functional mobilty, impaired balance, decreased upper extremity function, decreased lower extremity function, decreased ROM, impaired cardiopulmonary response to activity.     Rehab Prognosis:  Good; patient would benefit from acute skilled OT services to address these deficits and reach maximum level of function.       Plan:     Patient to be seen 3 x/week to address the above listed problems via self-care/home management, therapeutic activities, therapeutic exercises, neuromuscular re-education  · Plan of Care Expires: 11/22/20  · Plan of Care Reviewed with: patient    Subjective     Pain/Comfort:  · Pain Rating 1: 0/10    Objective:     Communicated with: RN prior to session.  Patient found supine with blood pressure cuff, alamo catheter, peripheral IV, telemetry upon OT entry to room.    General Precautions: Standard, aspiration, fall(aspiration precautions 2* full supine primary engagement level.)   Orthopedic Precautions:N/A   Braces: N/A     Occupational Performance:     Bed Mobility:    · Patient completed Rolling/Turning to Left with  maximal assistance  · Patient completed Rolling/Turning to Right with maximal assistance       Activities of Daily Living:  · Feeding:  stand by assistance standard aspiration precaution edu undertaken. Patient states understanding. f/u recommended.   · Grooming: moderate assistance in bed HOB elevated  · Upper Body  Dressing: maximal assistance    · Lower Body Dressing: total assistance    · Toileting: total assistance bowel management this date.       [facility] 6 Click ADL: 13    Treatment & Education:   SKILLED EDUCATION WAS PROVIDED TO PATIENT FOR:  *Patient education provided re: role of OT, and OTreatment rationales / protocols  *Importance of volitional activities daily in conjunction w/ edu related to importance of performing BUE AROM exercises even while in bed.  *Patient agreeable to therapy session.  *Lights on / shade open for session   *Basic self-care tasks and rudimentary functional mobility undertaken -- assist levels noted above, Safe swallow ensured, positioned on high fowlers.  *General in room safety and Call button use reviewed  *Communication board up to date, questions/concerns within OT scope addressed  *Patient engaged in there ex to BUE  x 1 set 10 reps for AROM all joints/all planes as indicated for recovery of effective respiration in functional tasks, and to bolster proper circulation.  FOCUSED TRAINING: Intro deep relaxed breathing tech as needed for non Rx pain management/MM relaxation.  Patient engaged cheerfully in open kinetic chain exercises to redevelop UE and trunk strength, overall ADL task initiation and task tolerance levels,  As well as flexibility to increase functional mobility.  Instruction provided throughout to bolster proper technique, energy conservation and fluid progression of exercises.     Patient left HOB elevated with all lines intact, call button in reach and RN notifiedEducation:      GOALS:   Multidisciplinary Problems     Occupational Therapy Goals        Problem: Occupational Therapy Goal    Goal Priority Disciplines Outcome Interventions   Occupational Therapy Goal     OT, PT/OT Ongoing, Progressing    Description: Goals to be met by: 11/6/2020    Patient will increase functional independence with ADLs by performing:    UE Dressing with Stand-by Assistance.  LE Dressing  with Moderate Assistance.  Grooming while EOB with Modified [place].  Toileting from bedside commode with Minimal Assistance for hygiene and clothing management.   Rolling to Bilateral with Stand-by Assistance.   Supine to sit with Contact Guard Assistance.  Stand pivot transfers with Supervision.  Toilet transfer to bedside commode with Supervision.                     Time Tracking:     OT Date of Treatment: 10/30/20  OT Start Time: 1035  OT Stop Time: 1104  OT Total Time (min): 29 min    Billable Minutes:Self Care/Home Management 14  Therapeutic Exercise 15    SHABBIR Ibarra  10/30/2020

## 2020-10-30 NOTE — ASSESSMENT & PLAN NOTE
PT/OT recommend rehab  - Patient needs to be closer to dry weight ~380 lbs  - consider 6 min walk test to assess need for oxygen prior to discharge in setting of heart failure

## 2020-10-30 NOTE — SUBJECTIVE & OBJECTIVE
Interval History 10/30/2020:  Patient is seen for follow-up PM&R evaluation and recommendations: Participating with therapy. Lasix gtt continued.     HPI, Past Medical, Family, and Social History remains the same as documented in the initial encounter.    Scheduled Medications:    atorvastatin  10 mg Oral Daily    enoxaparin  60 mg Subcutaneous Q12H    latanoprost  1 drop Both Eyes QHS    miconazole NITRATE 2 %   Topical (Top) BID    polyethylene glycol  17 g Oral Daily    potassium bicarbonate  25 mEq Oral Q2H    senna-docusate 8.6-50 mg  2 tablet Oral BID       Diagnostic Results: Labs: Reviewed  ECG: Reviewed  CT: Reviewed    PRN Medications: acetaminophen, influenza, insulin aspart U-100, melatonin, ondansetron, simethicone, sodium chloride 0.9%    Review of Systems   Constitutional: Positive for activity change. Negative for fatigue and fever.   HENT: Negative for sore throat and trouble swallowing.    Eyes: Negative for visual disturbance.   Respiratory: Negative for cough and shortness of breath.    Cardiovascular: Negative for chest pain and leg swelling.   Gastrointestinal: Negative for abdominal distention and abdominal pain.   Genitourinary: Negative for difficulty urinating.   Musculoskeletal: Positive for gait problem. Negative for back pain.   Skin: Negative for color change.   Neurological: Positive for weakness. Negative for dizziness, light-headedness and headaches.   Psychiatric/Behavioral: Negative for agitation, behavioral problems and confusion.     Objective:     Vital Signs (Most Recent):  Temp: 98.3 °F (36.8 °C) (10/30/20 1112)  Pulse: 80 (10/30/20 1117)  Resp: 13 (10/30/20 1057)  BP: 112/80 (10/30/20 0815)  SpO2: (!) 93 % (10/30/20 1057)    Vital Signs (24h Range):  Temp:  [97.6 °F (36.4 °C)-98.4 °F (36.9 °C)] 98.3 °F (36.8 °C)  Pulse:  [72-83] 80  Resp:  [9-23] 13  SpO2:  [88 %-98 %] 93 %  BP: (112-148)/(61-84) 112/80     Physical Exam  Vitals signs and nursing note reviewed.    Constitutional:       Appearance: She is well-developed.      Comments: Obesity    Eyes:      Extraocular Movements: Extraocular movements intact.      Pupils: Pupils are equal, round, and reactive to light.   Neck:      Musculoskeletal: Normal range of motion and neck supple.   Pulmonary:      Effort: No respiratory distress.      Comments: NC O2  Musculoskeletal:      Comments: Decreased muscle strength to BUE and BLE present    Skin:     General: Skin is warm and dry.   Neurological:      Mental Status: She is alert and oriented to person, place, and time. Mental status is at baseline.   Psychiatric:         Mood and Affect: Mood normal.         Behavior: Behavior normal.         Thought Content: Thought content normal.       NEUROLOGICAL EXAMINATION:     MENTAL STATUS   Oriented to person, place, and time.     CRANIAL NERVES     CN III, IV, VI   Pupils are equal, round, and reactive to light.

## 2020-10-30 NOTE — ASSESSMENT & PLAN NOTE
Patient is a chronic CO2 retainer in setting of obesity hypoventilation syndrome 2/2 morbid obesity. Stepped down from ICU 10/26, currently continuing diuresis with alternating BiPAP/NC breaks for meals.     Plan:   - Target SpO2 of >88%. Continue BiPAP QHS. Patient on 5L NC during the day, wean FiO2 as tolerated.   - VBGs improving, ordered QD in AM.   - Continue Lasix gtt at 10mg/hr   - Diuril 500mg today   - Approx 42L net negative since admission   - Risk factor reduction (encouraged weight loss, use of nightly CPAP consistently)

## 2020-10-30 NOTE — PROGRESS NOTES
[facility] Hospital Medicine  Progress Note    Patient Name: Halley Moreno  MRN: [id number]  Patient Class: IP- Inpatient   Admission Date: 10/22/2020  Length of Stay: 8 days  Attending Physician: Juno Jones MD  Primary Care Provider: Yamila Tejeda MD    [facility] Medicine Team: [facility] HOSP MED 1 Olimpia Goode MD    Subjective:     Principal Problem:Right heart failure due to pulmonary hypertension        HPI:  Patient is a 45-year-old female who is presenting to us with acute respiratory distress.  She has a recent diagnosis of severe pulmonary hypertension with right-sided heart failure.  Her last echo showed a PA systolic pressure of 99.  She initially presented due to increasing weight gain over the last 7 months.  Patient states that in March she began gaining weight and she was placed on diuretic to trying keep that weight down.  Despite 40 mg of furosemide b.i.d. she continued to gain weight.  She has gained approximately 100 lb since March and 80 lb since May.  She does not have a cardiologist but was scheduled to see 1 in December.  Patient states that at home she sleeps in a elevated position secondary to shortness of breath.  She denies any episodes of waking up in the middle of the night out of breath.  She does not use any home oxygen nor she ever been told explicitly that she has heart failure.  She demonstrates poor understanding of the disease stating that the right heart was backing up into the lungs and cause in the shortness of breath.  She presented to the clinic today for evaluation and was found to be in acute respiratory distress with hypoxia.  She was referred to the ED for evaluation and diuresis.    Patient states that she has no known family history of heart failure in the family however she does state that her father had a pacemaker for unknown reasons.  She states that she does not think that anyone her and her family has had pulmonary hypertension.   Patient states that she does not drink, does not smoke, and does not use IV drugs.    Overview/Hospital Course:  Patient was admitted to Hospital Medicine. Was initially hypoxic and put on nasal cannula with normalization of her oxygen status to approximately 95%. She was demonstrating initial respiratory distress and was given a bolus of 120mg IV furosemide. Hypoxia and hypercapnia worsened so was stepped up to ICU on 10/23. Was placed on BiPAP and aggressively diuresed (approx 20L net negative) with improvement in respiratory status. Weaned to 2L NC and subsequently stepped back down to Hospital Medicine on 10/26. Continued diuresis with IV Lasix gtt, diuril, and aldactone. On 10/27 VBGs noted pCO2 in 90s - 100s and patient was started back on continuous BiPAP with improvement in pCO2 levels. Patient weaned from BiPAP throughout admission with improving oxygenation.    Interval History: NAEON. Patient reports she wore the BiPAP overnight but prefers to wear the NC during the day. She also noticed a new bruise and bump on her LUE that is tender with palpation. She has no other complaints at this time. She notices she is still diuresing well.     Review of Systems   Constitutional: Positive for fatigue. Negative for diaphoresis and fever.   HENT: Negative for congestion and sore throat.    Eyes: Negative for redness and visual disturbance.   Respiratory: Negative for cough and stridor.    Cardiovascular: Positive for leg swelling. Negative for chest pain.   Gastrointestinal: Positive for abdominal distention. Negative for abdominal pain, constipation, diarrhea and vomiting.   Genitourinary: Negative for dysuria and hematuria.   Musculoskeletal: Negative for back pain.   Skin: Negative for color change and pallor.   Neurological: Negative for speech difficulty and headaches.   Psychiatric/Behavioral: Negative for agitation and confusion.     Objective:     Vital Signs (Most Recent):  Temp: 98.3 °F (36.8 °C) (10/30/20  1112)  Pulse: 80 (10/30/20 1117)  Resp: 13 (10/30/20 1057)  BP: 112/80 (10/30/20 0815)  SpO2: (!) 93 % (10/30/20 1057) Vital Signs (24h Range):  Temp:  [97.6 °F (36.4 °C)-98.4 °F (36.9 °C)] 98.3 °F (36.8 °C)  Pulse:  [72-83] 80  Resp:  [9-23] 13  SpO2:  [88 %-98 %] 93 %  BP: (112-148)/(61-84) 112/80     Weight: (!) 190 kg (418 lb 14 oz)  Body mass index is 74.2 kg/m².    Intake/Output Summary (Last 24 hours) at 10/30/2020 1257  Last data filed at 10/30/2020 1100  Gross per 24 hour   Intake 490 ml   Output 5200 ml   Net -4710 ml      Physical Exam  Vitals signs and nursing note reviewed.   Constitutional:       General: She is awake. She is not in acute distress.     Appearance: She is morbidly obese. She is not diaphoretic.      Interventions: Nasal cannula in place.   HENT:      Head: Normocephalic and atraumatic.      Nose: Nose normal.      Mouth/Throat:      Mouth: Mucous membranes are moist.      Pharynx: Oropharynx is clear.   Eyes:      Extraocular Movements: Extraocular movements intact.      Conjunctiva/sclera: Conjunctivae normal.      Pupils: Pupils are equal, round, and reactive to light.   Neck:      Musculoskeletal: Normal range of motion.   Cardiovascular:      Rate and Rhythm: Normal rate and regular rhythm.      Pulses: Normal pulses.      Heart sounds: Normal heart sounds.   Pulmonary:      Effort: Pulmonary effort is normal. No respiratory distress.      Breath sounds: Normal breath sounds. No wheezing or rhonchi.   Abdominal:      General: Abdomen is protuberant. There is distension.      Palpations: Abdomen is soft.      Tenderness: There is no abdominal tenderness.   Musculoskeletal:         General: Swelling and tenderness present.      Right lower leg: Edema present.      Left lower leg: Edema present.      Comments: Patient has hematoma on LUE tender to palpation surrounded by bruising. She also ha bruising on bilat axillae.   Skin:     General: Skin is warm and dry.      Coloration: Skin is  not jaundiced.      Findings: Erythema and rash present. Rash is scaling.      Comments: Chronic venous stasis changes present on BLE.   Neurological:      General: No focal deficit present.      Mental Status: She is alert and oriented to person, place, and time.   Psychiatric:         Mood and Affect: Mood normal.         Behavior: Behavior normal.         Significant Labs:   ABGs:   Recent Labs   Lab 10/30/20  0543   PH 7.393   PCO2 87.4*   HCO3 53.3*   POCSATURATED 61*   BE 28     CBC:   Recent Labs   Lab 10/29/20  0448 10/30/20  0554   WBC 6.40 6.34   HGB 15.4 15.4   HCT 53.7* 53.4*   * 135*     CMP:   Recent Labs   Lab 10/29/20  0448 10/30/20  0554    141   K 3.2* 3.7   CL 80* 80*   CO2 43* 43*   * 131*   BUN 18 24*   CREATININE 0.9 0.9   CALCIUM 9.4 10.0   PROT 7.1 7.2   ALBUMIN 3.2* 3.1*   BILITOT 1.5* 1.3*   ALKPHOS 61 57   AST 40 39   ALT 34 35   ANIONGAP 19* 18*   EGFRNONAA >60.0 >60.0     Magnesium:   Recent Labs   Lab 10/28/20  1827 10/29/20  0448 10/30/20  0554   MG 1.7 1.7 1.9     POCT Glucose:   Recent Labs   Lab 10/29/20  2118 10/30/20  0811 10/30/20  1232   POCTGLUCOSE 171* 113* 152*       Significant Imaging: I have reviewed all pertinent imaging results/findings within the past 24 hours.      Assessment/Plan:      * Right heart failure due to pulmonary hypertension  Plan:  - Strict Is/Os  - Daily weights  - Keep K>4, Mg>2  - Diabetic/cardiac low sodium diet (<2 g/day)  - Fluid restriction to 1.5 - 2.0 L/day  - Will obtain repeat echo once patient is closer to euvolemia    Discharge planning issues  PT/OT recommend rehab  - Patient needs to be closer to dry weight ~380 lbs  - consider 6 min walk test to assess need for oxygen prior to discharge in setting of heart failure      Hypokalemia  Replete as needed  - K>4      Impaired mobility and activities of daily living  PT/OT consulted, recommend rehab      Obesity hypoventilation syndrome  See acute resp failure assessment and  plan      Acute respiratory failure with hypoxia and hypercapnia  Patient is a chronic CO2 retainer in setting of obesity hypoventilation syndrome 2/2 morbid obesity. Stepped down from ICU 10/26, currently continuing diuresis with alternating BiPAP/NC breaks for meals.     Plan:   - Target SpO2 of >88%. Continue BiPAP QHS. Patient on 5L NC during the day, wean FiO2 as tolerated.   - VBGs improving, ordered QD in AM.   - Continue Lasix gtt at 10mg/hr   - Diuril 500mg today   - Approx 42L net negative since admission   - Risk factor reduction (encouraged weight loss, use of nightly CPAP consistently)    Diabetes mellitus type 2 without retinopathy  Previously on glipizide 10mg daily and semaglutide 0.25mg weekly. Glucose well controlled this admission, not requiring SSI at this time.    Plan:   - Holding home anti-hyperglycemics   - POCT BG qAC/HS while eating   - LD SSI   - Diabetic cardiac diet, fluid restriction 1.5L    - Will provide endocrinology referral at discharge    Sleep apnea  Patient's pulmonary HTN and RV dysfunction likely result of obesity hypoventilation syndrome with non-compliance with at-home CPAP. Evaluated by Pulmonology, who recommended continued use of NIPPV and encouraged weight loss.     Plan:    - NIPPV nightly    Morbid obesity with BMI of 70 and over, adult  Plan:  - Dry weight of approximately 370 lb, up to 500 pounds (BMI 88) this admission  - Weight today 418  - Continuing diuresis as noted above    Hypertension associated with diabetes  Patient on losartan 50mg daily at home. Remains normotensive at this time.    Plan:   - Holding home anti-hypertensive in acute setting   - Continue atorvastatin 10mg nightly      VTE Risk Mitigation (From admission, onward)         Ordered     enoxaparin injection 60 mg  Every 12 hours      10/22/20 1518     IP VTE HIGH RISK PATIENT  Once      10/22/20 1346     Place sequential compression device  Until discontinued      10/22/20 1346                 Discharge Planning   JOSE RAUL: 11/2/2020     Code Status: Full Code   Is the patient medically ready for discharge?: No    Reason for patient still in hospital (select all that apply): Treatment  Discharge Plan A: Rehab                  Olimpia Goode MD  Department of Hospital Medicine   [facility]

## 2020-10-30 NOTE — PT/OT/SLP PROGRESS
Physical Therapy      Patient Name:  Halley Moreno   MRN:  [id number]    Patient not seen today secondary to Nursing care. Chart reviewed and pt remains appropriate for PT services at this time. Unable to return for additional attempts. Will follow-up 11/2/20.    Linnette Grider, MAYURI

## 2020-10-30 NOTE — PLAN OF CARE
Problem: Occupational Therapy Goal  Goal: Occupational Therapy Goal  Description: Goals to be met by: 11/6/2020    Patient will increase functional independence with ADLs by performing:    UE Dressing with Stand-by Assistance.  LE Dressing with Moderate Assistance.  Grooming while EOB with Modified [place].  Toileting from bedside commode with Minimal Assistance for hygiene and clothing management.   Rolling to Bilateral with Stand-by Assistance.   Supine to sit with Contact Guard Assistance.  Stand pivot transfers with Supervision.  Toilet transfer to bedside commode with Supervision.    Outcome: Ongoing, Progressing

## 2020-10-30 NOTE — PLAN OF CARE
Problem: Fall Injury Risk  Goal: Absence of Fall and Fall-Related Injury  Outcome: Ongoing, Progressing     Problem: Infection  Goal: Infection Symptom Resolution  Outcome: Ongoing, Progressing     Problem: Adult Inpatient Plan of Care  Goal: Plan of Care Review  Outcome: Ongoing, Progressing  Goal: Patient-Specific Goal (Individualization)  Outcome: Ongoing, Progressing  Goal: Absence of Hospital-Acquired Illness or Injury  Outcome: Ongoing, Progressing  Goal: Optimal Comfort and Wellbeing  Outcome: Ongoing, Progressing  Goal: Readiness for Transition of Care  Outcome: Ongoing, Progressing  Goal: Rounds/Family Conference  Outcome: Ongoing, Progressing     Problem: Bariatric Environmental Safety  Goal: Safety Maintained with Care  Outcome: Ongoing, Progressing     Problem: Diabetes Comorbidity  Goal: Blood Glucose Level Within Desired Range  Outcome: Ongoing, Progressing     Problem: Wound  Goal: Optimal Wound Healing  Outcome: Ongoing, Progressing     Problem: Skin Injury Risk Increased  Goal: Skin Health and Integrity  Outcome: Ongoing, Progressing     POC reviewed with patient, Concerns addressed  Pt in bed resting, VSS, tolerated IV gtt, Sub-Q medication, and oral medication well, no adverse reaction, no s/s of distress noted, Visi is in place, denies pain or discomfort at this time, call light within reach, I's/O's closely monitored. Pt. C/o ADRIANA [place], Dr. Rojas notified at 0797 10/29/20 and came to assess, MD said that it is nothing to be concerned with. Pt. Was told to inform lead attending MD regarding bump to left upper arm. Lasix gtt infusing at 1ml/hr and adequate UOP is noted. [facility]

## 2020-10-30 NOTE — PROGRESS NOTES
[facility]  Physical Medicine & Rehab  Progress Note    Patient Name: Halley Moreno  MRN: [id number]  Admission Date: 10/22/2020  Length of Stay: 8 days  Attending Physician: Juno Jones MD    Subjective:     Principal Problem:Right heart failure due to pulmonary hypertension    Hospital Course:   10/26/20: Participated w/ OT. Bed mobility maxA- maxA x 2 ppl. Sit to stand Anuja. Took  5 steps forward and 5 steps backward, 2 steps to the L and 3 steps toward HOB to the R Anuja x 2 ppl. UBD modA. Toileting maxA.   10/27/20: Participated w/ PT. Bed mobility maxA. Sit to stand Anuja x 2 ppl. Ambulated 13 steps Anuja- HHA    Interval History 10/30/2020:  Patient is seen for follow-up PM&R evaluation and recommendations: Participating with therapy. Lasix gtt continued.     HPI, Past Medical, Family, and Social History remains the same as documented in the initial encounter.    Scheduled Medications:    atorvastatin  10 mg Oral Daily    enoxaparin  60 mg Subcutaneous Q12H    latanoprost  1 drop Both Eyes QHS    miconazole NITRATE 2 %   Topical (Top) BID    polyethylene glycol  17 g Oral Daily    potassium bicarbonate  25 mEq Oral Q2H    senna-docusate 8.6-50 mg  2 tablet Oral BID       Diagnostic Results: Labs: Reviewed  ECG: Reviewed  CT: Reviewed    PRN Medications: acetaminophen, influenza, insulin aspart U-100, melatonin, ondansetron, simethicone, sodium chloride 0.9%    Review of Systems   Constitutional: Positive for activity change. Negative for fatigue and fever.   HENT: Negative for sore throat and trouble swallowing.    Eyes: Negative for visual disturbance.   Respiratory: Negative for cough and shortness of breath.    Cardiovascular: Negative for chest pain and leg swelling.   Gastrointestinal: Negative for abdominal distention and abdominal pain.   Genitourinary: Negative for difficulty urinating.   Musculoskeletal: Positive for gait problem. Negative for back pain.   Skin:  Negative for color change.   Neurological: Positive for weakness. Negative for dizziness, light-headedness and headaches.   Psychiatric/Behavioral: Negative for agitation, behavioral problems and confusion.     Objective:     Vital Signs (Most Recent):  Temp: 98.3 °F (36.8 °C) (10/30/20 1112)  Pulse: 80 (10/30/20 1117)  Resp: 13 (10/30/20 1057)  BP: 112/80 (10/30/20 0815)  SpO2: (!) 93 % (10/30/20 1057)    Vital Signs (24h Range):  Temp:  [97.6 °F (36.4 °C)-98.4 °F (36.9 °C)] 98.3 °F (36.8 °C)  Pulse:  [72-83] 80  Resp:  [9-23] 13  SpO2:  [88 %-98 %] 93 %  BP: (112-148)/(61-84) 112/80     Physical Exam  Vitals signs and nursing note reviewed.   Constitutional:       Appearance: She is well-developed.      Comments: Obesity    Eyes:      Extraocular Movements: Extraocular movements intact.      Pupils: Pupils are equal, round, and reactive to light.   Neck:      Musculoskeletal: Normal range of motion and neck supple.   Pulmonary:      Effort: No respiratory distress.      Comments: NC O2  Musculoskeletal:      Comments: Decreased muscle strength to BUE and BLE present    Skin:     General: Skin is warm and dry.   Neurological:      Mental Status: She is alert and oriented to person, place, and time. Mental status is at baseline.   Psychiatric:         Mood and Affect: Mood normal.         Behavior: Behavior normal.         Thought Content: Thought content normal.       Assessment/Plan:      * Right heart failure due to pulmonary hypertension  - fluid restriction     Impaired mobility and activities of daily living  - Related to prolonged/acute hospital course.     Recommendations  -  Encourage mobility, OOB in chair at least 3 hours per day, and early ambulation as appropriate  -  PT/OT evaluate and treat  -  Pain management  -  Monitor for and prevent skin breakdown and pressure ulcers  · Early mobility, repositioning/weight shifting every 20-30 minutes when sitting, turn patient every 2 hours, proper  mattress/overlay and chair cushioning, pressure relief/heel protector boots  -  DVT prophylaxis    -  Reviewed discharge options (IP rehab, SNF, HH therapy, and OP therapy)    Acute respiratory failure with hypoxia and hypercapnia  - Placed on BiPAP and Lasix gtt continued.   - Now on NC during day and BiPAP at night, pCO2 improving.     Recommend Inpatient Rehab.      Dixie Hawkins NP  Department of Physical Medicine & Rehab   [facility]

## 2020-10-31 PROBLEM — E83.39 HYPERPHOSPHATEMIA: Status: RESOLVED | Noted: 2020-10-23 | Resolved: 2020-10-31

## 2020-10-31 LAB
ALBUMIN SERPL BCP-MCNC: 3.5 G/DL (ref 3.5–5.2)
ALP SERPL-CCNC: 57 U/L (ref 55–135)
ALT SERPL W/O P-5'-P-CCNC: 39 U/L (ref 10–44)
ANION GAP SERPL CALC-SCNC: 13 MMOL/L (ref 8–16)
AST SERPL-CCNC: 45 U/L (ref 10–40)
BASOPHILS # BLD AUTO: 0.02 K/UL (ref 0–0.2)
BASOPHILS NFR BLD: 0.3 % (ref 0–1.9)
BILIRUB SERPL-MCNC: 1.2 MG/DL (ref 0.1–1)
BUN SERPL-MCNC: 26 MG/DL (ref 6–20)
CALCIUM SERPL-MCNC: 10 MG/DL (ref 8.7–10.5)
CHLORIDE SERPL-SCNC: 80 MMOL/L (ref 95–110)
CO2 SERPL-SCNC: 47 MMOL/L (ref 23–29)
CREAT SERPL-MCNC: 1 MG/DL (ref 0.5–1.4)
DIFFERENTIAL METHOD: ABNORMAL
EOSINOPHIL # BLD AUTO: 0.2 K/UL (ref 0–0.5)
EOSINOPHIL NFR BLD: 2.3 % (ref 0–8)
ERYTHROCYTE [DISTWIDTH] IN BLOOD BY AUTOMATED COUNT: 20.1 % (ref 11.5–14.5)
EST. GFR  (AFRICAN AMERICAN): >60 ML/MIN/1.73 M^2
EST. GFR  (NON AFRICAN AMERICAN): >60 ML/MIN/1.73 M^2
GLUCOSE SERPL-MCNC: 165 MG/DL (ref 70–110)
HCT VFR BLD AUTO: 54.6 % (ref 37–48.5)
HGB BLD-MCNC: 15.7 G/DL (ref 12–16)
IMM GRANULOCYTES # BLD AUTO: 0.02 K/UL (ref 0–0.04)
IMM GRANULOCYTES NFR BLD AUTO: 0.3 % (ref 0–0.5)
LYMPHOCYTES # BLD AUTO: 1.3 K/UL (ref 1–4.8)
LYMPHOCYTES NFR BLD: 19.2 % (ref 18–48)
MAGNESIUM SERPL-MCNC: 2.1 MG/DL (ref 1.6–2.6)
MCH RBC QN AUTO: 25.7 PG (ref 27–31)
MCHC RBC AUTO-ENTMCNC: 28.8 G/DL (ref 32–36)
MCV RBC AUTO: 90 FL (ref 82–98)
MONOCYTES # BLD AUTO: 0.8 K/UL (ref 0.3–1)
MONOCYTES NFR BLD: 11.4 % (ref 4–15)
NEUTROPHILS # BLD AUTO: 4.4 K/UL (ref 1.8–7.7)
NEUTROPHILS NFR BLD: 66.5 % (ref 38–73)
NRBC BLD-RTO: 0 /100 WBC
PHOSPHATE SERPL-MCNC: 5.3 MG/DL (ref 2.7–4.5)
PLATELET # BLD AUTO: 140 K/UL (ref 150–350)
PMV BLD AUTO: 9.7 FL (ref 9.2–12.9)
POCT GLUCOSE: 130 MG/DL (ref 70–110)
POCT GLUCOSE: 163 MG/DL (ref 70–110)
POCT GLUCOSE: 170 MG/DL (ref 70–110)
POCT GLUCOSE: 198 MG/DL (ref 70–110)
POTASSIUM SERPL-SCNC: 3.4 MMOL/L (ref 3.5–5.1)
PROT SERPL-MCNC: 7.6 G/DL (ref 6–8.4)
RBC # BLD AUTO: 6.1 M/UL (ref 4–5.4)
SODIUM SERPL-SCNC: 140 MMOL/L (ref 136–145)
WBC # BLD AUTO: 6.6 K/UL (ref 3.9–12.7)

## 2020-10-31 PROCEDURE — 80053 COMPREHEN METABOLIC PANEL: CPT

## 2020-10-31 PROCEDURE — 63600175 PHARM REV CODE 636 W HCPCS: Performed by: STUDENT IN AN ORGANIZED HEALTH CARE EDUCATION/TRAINING PROGRAM

## 2020-10-31 PROCEDURE — 83735 ASSAY OF MAGNESIUM: CPT

## 2020-10-31 PROCEDURE — 27000221 HC OXYGEN, UP TO 24 HOURS

## 2020-10-31 PROCEDURE — 99233 SBSQ HOSP IP/OBS HIGH 50: CPT | Mod: ,,, | Performed by: HOSPITALIST

## 2020-10-31 PROCEDURE — 27000190 HC CPAP FULL FACE MASK W/VALVE

## 2020-10-31 PROCEDURE — 94761 N-INVAS EAR/PLS OXIMETRY MLT: CPT

## 2020-10-31 PROCEDURE — 99900035 HC TECH TIME PER 15 MIN (STAT)

## 2020-10-31 PROCEDURE — 63600175 PHARM REV CODE 636 W HCPCS: Performed by: INTERNAL MEDICINE

## 2020-10-31 PROCEDURE — 25000003 PHARM REV CODE 250: Performed by: STUDENT IN AN ORGANIZED HEALTH CARE EDUCATION/TRAINING PROGRAM

## 2020-10-31 PROCEDURE — 82803 BLOOD GASES ANY COMBINATION: CPT

## 2020-10-31 PROCEDURE — 20600001 HC STEP DOWN PRIVATE ROOM

## 2020-10-31 PROCEDURE — 85025 COMPLETE CBC W/AUTO DIFF WBC: CPT

## 2020-10-31 PROCEDURE — 99233 PR SUBSEQUENT HOSPITAL CARE,LEVL III: ICD-10-PCS | Mod: ,,, | Performed by: HOSPITALIST

## 2020-10-31 PROCEDURE — 84100 ASSAY OF PHOSPHORUS: CPT

## 2020-10-31 PROCEDURE — 94660 CPAP INITIATION&MGMT: CPT

## 2020-10-31 RX ORDER — SPIRONOLACTONE 25 MG/1
25 TABLET ORAL ONCE
Status: COMPLETED | OUTPATIENT
Start: 2020-10-31 | End: 2020-10-31

## 2020-10-31 RX ORDER — SPIRONOLACTONE 25 MG/1
50 TABLET ORAL DAILY
Status: DISCONTINUED | OUTPATIENT
Start: 2020-11-01 | End: 2020-11-01

## 2020-10-31 RX ADMIN — LATANOPROST 1 DROP: 50 SOLUTION OPHTHALMIC at 08:10

## 2020-10-31 RX ADMIN — POTASSIUM BICARBONATE 50 MEQ: 978 TABLET, EFFERVESCENT ORAL at 12:10

## 2020-10-31 RX ADMIN — CHLOROTHIAZIDE SODIUM 500 MG: 500 INJECTION INTRAVENOUS at 12:10

## 2020-10-31 RX ADMIN — FUROSEMIDE 10 MG/HR: 10 INJECTION, SOLUTION INTRAMUSCULAR; INTRAVENOUS at 03:10

## 2020-10-31 RX ADMIN — SPIRONOLACTONE 25 MG: 25 TABLET ORAL at 12:10

## 2020-10-31 RX ADMIN — MELATONIN TAB 3 MG 6 MG: 3 TAB at 08:10

## 2020-10-31 RX ADMIN — MICONAZOLE NITRATE: 20 POWDER TOPICAL at 08:10

## 2020-10-31 RX ADMIN — ATORVASTATIN CALCIUM 10 MG: 10 TABLET, FILM COATED ORAL at 08:10

## 2020-10-31 RX ADMIN — SPIRONOLACTONE 25 MG: 25 TABLET ORAL at 08:10

## 2020-10-31 RX ADMIN — ENOXAPARIN SODIUM 60 MG: 60 INJECTION SUBCUTANEOUS at 08:10

## 2020-10-31 RX ADMIN — POTASSIUM BICARBONATE 50 MEQ: 978 TABLET, EFFERVESCENT ORAL at 03:10

## 2020-10-31 NOTE — SUBJECTIVE & OBJECTIVE
Interval History: No acute events overnight, compliant with BiPAP throughout night. Remained afebrile and hemodynamically stable. Approx 3500cc UOP yesterday. Denies any new complaints or concerns this AM.    Review of Systems   Constitutional: Negative for diaphoresis and fever.   HENT: Negative for congestion and sore throat.    Eyes: Negative for redness and visual disturbance.   Respiratory: Negative for cough and stridor.    Cardiovascular: Positive for leg swelling. Negative for chest pain.   Gastrointestinal: Positive for abdominal distention. Negative for abdominal pain, constipation, diarrhea and vomiting.   Genitourinary: Negative for dysuria and hematuria.   Musculoskeletal: Negative for back pain.   Skin: Negative for color change and pallor.   Neurological: Negative for speech difficulty and headaches.   Psychiatric/Behavioral: Negative for agitation and confusion.     Objective:     Vital Signs (Most Recent):  Temp: 97.6 °F (36.4 °C) (10/31/20 1204)  Pulse: 75 (10/31/20 1100)  Resp: (!) 21 (10/31/20 1100)  BP: 119/77 (10/31/20 1100)  SpO2: (!) 92 % (10/31/20 1100) Vital Signs (24h Range):  Temp:  [97.6 °F (36.4 °C)-98.5 °F (36.9 °C)] 97.6 °F (36.4 °C)  Pulse:  [72-97] 75  Resp:  [12-22] 21  SpO2:  [89 %-97 %] 92 %  BP: (119-142)/(67-97) 119/77     Weight: (!) 189 kg (416 lb 10.7 oz)  Body mass index is 73.81 kg/m².    Intake/Output Summary (Last 24 hours) at 10/31/2020 1333  Last data filed at 10/31/2020 1211  Gross per 24 hour   Intake 10 ml   Output 2785 ml   Net -2775 ml      Physical Exam  Vitals signs and nursing note reviewed.   Constitutional:       General: She is awake. She is not in acute distress.     Appearance: She is morbidly obese. She is not diaphoretic.      Interventions: Nasal cannula in place.   HENT:      Head: Normocephalic and atraumatic.      Nose: Nose normal.      Mouth/Throat:      Mouth: Mucous membranes are moist.      Pharynx: Oropharynx is clear.   Eyes:      Extraocular  Movements: Extraocular movements intact.      Conjunctiva/sclera: Conjunctivae normal.      Pupils: Pupils are equal, round, and reactive to light.   Neck:      Musculoskeletal: Normal range of motion.   Cardiovascular:      Rate and Rhythm: Normal rate and regular rhythm.      Pulses: Normal pulses.      Heart sounds: Normal heart sounds.   Pulmonary:      Effort: Pulmonary effort is normal. No respiratory distress.      Breath sounds: Normal breath sounds. No wheezing or rhonchi.   Abdominal:      General: Abdomen is protuberant. There is distension.      Palpations: Abdomen is soft.      Tenderness: There is no abdominal tenderness.   Musculoskeletal:         General: Swelling and tenderness present.      Right lower leg: Edema present.      Left lower leg: Edema present.      Comments: Hematoma on LUE, mildly TTP with surrounding ecchymosis.   Skin:     General: Skin is warm and dry.      Coloration: Skin is not jaundiced.      Findings: Erythema and rash present. Rash is scaling.      Comments: Chronic venous stasis changes present on BLE.   Neurological:      General: No focal deficit present.      Mental Status: She is alert and oriented to person, place, and time.   Psychiatric:         Mood and Affect: Mood normal.         Behavior: Behavior normal.       Significant Labs:   CBC:   Recent Labs   Lab 10/30/20  0554 10/31/20  0529   WBC 6.34 6.60   HGB 15.4 15.7   HCT 53.4* 54.6*   * 140*     CMP:   Recent Labs   Lab 10/30/20  0554 10/31/20  0529    140   K 3.7 3.4*   CL 80* 80*   CO2 43* 47*   * 165*   BUN 24* 26*   CREATININE 0.9 1.0   CALCIUM 10.0 10.0   PROT 7.2 7.6   ALBUMIN 3.1* 3.5   BILITOT 1.3* 1.2*   ALKPHOS 57 57   AST 39 45*   ALT 35 39   ANIONGAP 18* 13   EGFRNONAA >60.0 >60.0     Magnesium:   Recent Labs   Lab 10/30/20  0554 10/31/20  0529   MG 1.9 2.1       Significant Imaging: No new imaging to review.

## 2020-10-31 NOTE — PROGRESS NOTES
[facility] Hospital Medicine  Progress Note    Patient Name: Halley Moreno  MRN: [id number]  Patient Class: IP- Inpatient   Admission Date: 10/22/2020  Length of Stay: 9 days  Attending Physician: Juno Jones MD  Primary Care Provider: Yamila Tejeda MD    [facility] Medicine Team: [facility] HOSP MED 1 - Rao Szymanski MD    Subjective:     Principal Problem:Right heart failure due to pulmonary hypertension    HPI:  Patient is a 45-year-old female who is presenting to us with acute respiratory distress.  She has a recent diagnosis of severe pulmonary hypertension with right-sided heart failure.  Her last echo showed a PA systolic pressure of 99.  She initially presented due to increasing weight gain over the last 7 months.  Patient states that in March she began gaining weight and she was placed on diuretic to trying keep that weight down.  Despite 40 mg of furosemide b.i.d. she continued to gain weight.  She has gained approximately 100 lb since March and 80 lb since May.  She does not have a cardiologist but was scheduled to see 1 in December.  Patient states that at home she sleeps in a elevated position secondary to shortness of breath.  She denies any episodes of waking up in the middle of the night out of breath.  She does not use any home oxygen nor she ever been told explicitly that she has heart failure.  She demonstrates poor understanding of the disease stating that the right heart was backing up into the lungs and cause in the shortness of breath.  She presented to the clinic today for evaluation and was found to be in acute respiratory distress with hypoxia.  She was referred to the ED for evaluation and diuresis.    Patient states that she has no known family history of heart failure in the family however she does state that her father had a pacemaker for unknown reasons.  She states that she does not think that anyone her and her family has had pulmonary hypertension.   Patient states that she does not drink, does not smoke, and does not use IV drugs.    Overview/Hospital Course:  Patient was admitted to Hospital Medicine. Was initially hypoxic and put on nasal cannula with normalization of her oxygen status to approximately 95%. She was demonstrating initial respiratory distress and was given a bolus of 120mg IV furosemide. Hypoxia and hypercapnia worsened so was stepped up to ICU on 10/23. Was placed on BiPAP and aggressively diuresed (approx 20L net negative) with improvement in respiratory status. Weaned to 2L NC and subsequently stepped back down to Hospital Medicine on 10/26. Continued diuresis with IV Lasix gtt, diuril, and aldactone. On 10/27 VBGs noted pCO2 in 90s - 100s and patient was started back on continuous BiPAP with improvement in pCO2 levels. Patient weaned from BiPAP throughout admission with improving oxygenation.    Interval History: No acute events overnight, compliant with BiPAP throughout night. Remained afebrile and hemodynamically stable. Approx 3500cc UOP yesterday. Denies any new complaints or concerns this AM.    Review of Systems   Constitutional: Negative for diaphoresis and fever.   HENT: Negative for congestion and sore throat.    Eyes: Negative for redness and visual disturbance.   Respiratory: Negative for cough and stridor.    Cardiovascular: Positive for leg swelling. Negative for chest pain.   Gastrointestinal: Positive for abdominal distention. Negative for abdominal pain, constipation, diarrhea and vomiting.   Genitourinary: Negative for dysuria and hematuria.   Musculoskeletal: Negative for back pain.   Skin: Negative for color change and pallor.   Neurological: Negative for speech difficulty and headaches.   Psychiatric/Behavioral: Negative for agitation and confusion.     Objective:     Vital Signs (Most Recent):  Temp: 97.6 °F (36.4 °C) (10/31/20 1204)  Pulse: 75 (10/31/20 1100)  Resp: (!) 21 (10/31/20 1100)  BP: 119/77 (10/31/20  1100)  SpO2: (!) 92 % (10/31/20 1100) Vital Signs (24h Range):  Temp:  [97.6 °F (36.4 °C)-98.5 °F (36.9 °C)] 97.6 °F (36.4 °C)  Pulse:  [72-97] 75  Resp:  [12-22] 21  SpO2:  [89 %-97 %] 92 %  BP: (119-142)/(67-97) 119/77     Weight: (!) 189 kg (416 lb 10.7 oz)  Body mass index is 73.81 kg/m².    Intake/Output Summary (Last 24 hours) at 10/31/2020 1333  Last data filed at 10/31/2020 1211  Gross per 24 hour   Intake 10 ml   Output 2785 ml   Net -2775 ml      Physical Exam  Vitals signs and nursing note reviewed.   Constitutional:       General: She is awake. She is not in acute distress.     Appearance: She is morbidly obese. She is not diaphoretic.      Interventions: Nasal cannula in place.   HENT:      Head: Normocephalic and atraumatic.      Nose: Nose normal.      Mouth/Throat:      Mouth: Mucous membranes are moist.      Pharynx: Oropharynx is clear.   Eyes:      Extraocular Movements: Extraocular movements intact.      Conjunctiva/sclera: Conjunctivae normal.      Pupils: Pupils are equal, round, and reactive to light.   Neck:      Musculoskeletal: Normal range of motion.   Cardiovascular:      Rate and Rhythm: Normal rate and regular rhythm.      Pulses: Normal pulses.      Heart sounds: Normal heart sounds.   Pulmonary:      Effort: Pulmonary effort is normal. No respiratory distress.      Breath sounds: Normal breath sounds. No wheezing or rhonchi.   Abdominal:      General: Abdomen is protuberant. There is distension.      Palpations: Abdomen is soft.      Tenderness: There is no abdominal tenderness.   Musculoskeletal:         General: Swelling and tenderness present.      Right lower leg: Edema present.      Left lower leg: Edema present.      Comments: Hematoma on LUE, mildly TTP with surrounding ecchymosis.   Skin:     General: Skin is warm and dry.      Coloration: Skin is not jaundiced.      Findings: Erythema and rash present. Rash is scaling.      Comments: Chronic venous stasis changes present on  BLE.   Neurological:      General: No focal deficit present.      Mental Status: She is alert and oriented to person, place, and time.   Psychiatric:         Mood and Affect: Mood normal.         Behavior: Behavior normal.       Significant Labs:   CBC:   Recent Labs   Lab 10/30/20  0554 10/31/20  0529   WBC 6.34 6.60   HGB 15.4 15.7   HCT 53.4* 54.6*   * 140*     CMP:   Recent Labs   Lab 10/30/20  0554 10/31/20  0529    140   K 3.7 3.4*   CL 80* 80*   CO2 43* 47*   * 165*   BUN 24* 26*   CREATININE 0.9 1.0   CALCIUM 10.0 10.0   PROT 7.2 7.6   ALBUMIN 3.1* 3.5   BILITOT 1.3* 1.2*   ALKPHOS 57 57   AST 39 45*   ALT 35 39   ANIONGAP 18* 13   EGFRNONAA >60.0 >60.0     Magnesium:   Recent Labs   Lab 10/30/20  0554 10/31/20  0529   MG 1.9 2.1       Significant Imaging: No new imaging to review.      Assessment/Plan:      * Right heart failure due to pulmonary hypertension  Patient with right heart failure due to fluid overload. TTE 10/23 with LVEF 75%, reduced RV systolic function, RV enlargement, TR, PASP 109. Continuing diuresis and will obtain repeat echo once closer to euvolemia.    Plan:  - Strict Is/Os  - Daily weights  - Keep K>4, Mg>2  - Diabetic/cardiac low sodium diet (<2 g/day)  - Fluid restriction to 1.5 - 2.0 L/day    Acute respiratory failure with hypoxia and hypercapnia  Patient is a chronic CO2 retainer in setting of obesity hypoventilation syndrome 2/2 morbid obesity. Stepped down from ICU 10/26, currently continuing diuresis with alternating BiPAP/NC breaks for meals.     Plan:   - Target SpO2 of >88%. Continue BiPAP nightly. Patient on 5LNC during the day, wean FiO2 as tolerated.   - Continue furosemide gtt at 10mg/hr   - Continue chlorthalidone 500mg daily   - Spironolactone 25 mg daily -> increase to 50mg today   - Approx 50L net negative since admission   - Risk factor reduction (encouraged weight loss, use of nightly CPAP consistently)    Diabetes mellitus type 2 without  retinopathy  Previously on glipizide 10mg daily and semaglutide 0.25mg weekly. Glucose well controlled this admission, not requiring SSI at this time.    Plan:   - Holding home anti-hyperglycemics   - POCT BG qAC/HS while eating   - LD SSI   - Diabetic cardiac diet, fluid restriction 1.5L    - Will provide endocrinology referral at discharge    Morbid obesity with BMI of 70 and over, adult  Plan:  - Dry weight of approximately 370 lb, up to 500 pounds (BMI 88) this admission  - Weight today 416 (BMI 73.8)  - Continuing diuresis as noted above    Hypertension associated with diabetes  Patient on losartan 50mg daily at home. Remains normotensive at this time.    Plan:   - Holding home anti-hypertensive in acute setting   - Continue atorvastatin 10mg nightly    Discharge planning issues  PT/OT recommend rehab  - Patient needs to be closer to dry weight ~380 lbs  - Consider 6 min walk test to assess need for oxygen prior to discharge in setting of heart failure    Hypokalemia  Patient with mild recurrent hypokalemia, most likely due to consistent diuresis. K today 3.2    Plan:   - CMP daily   - Replete PRN with goal K>4    Impaired mobility and activities of daily living  PT/OT consulted, recommending rehab at discharge. PM&R consulted and following.    Plan:   - Continue PT/OT while inpatient    Constipation  Plan:   - Continue with polyethylene glycol and/or senna-docusate PRN    Obesity hypoventilation syndrome  See Acute respiratory failure with hypoxia and hypercapnia above    Sleep apnea  Patient's pulmonary HTN and RV dysfunction likely result of obesity hypoventilation syndrome with non-compliance with at-home CPAP. Evaluated by Pulmonology, who recommended continued use of NIPPV and encouraged weight loss.     Plan:    - NIPPV nightly    VTE Risk Mitigation (From admission, onward)         Ordered     enoxaparin injection 60 mg  Every 12 hours      10/22/20 1518     IP VTE HIGH RISK PATIENT  Once      10/22/20  1346     Place sequential compression device  Until discontinued      10/22/20 1346                Discharge Planning   JOSE RAUL: 11/2/2020     Code Status: Full Code   Is the patient medically ready for discharge?: No    Reason for patient still in hospital: Patient trending condition, Treatment and Pending disposition  Discharge Plan A: Rehab        Rao Szymanski MD, PGY-1  Department of Hospital Medicine   [facility]

## 2020-10-31 NOTE — PLAN OF CARE
Problem: Adult Inpatient Plan of Care  Goal: Plan of Care Review  Outcome: Ongoing, Progressing   POC reviewed with pt. VSS. AAOx4. Lasix gtt per order. Output per flowsheet. No acute changes. Safety maintained. Will continue to monitor.

## 2020-10-31 NOTE — ASSESSMENT & PLAN NOTE
PT/OT consulted, recommending rehab at discharge. PM&R consulted and following.    Plan:   - Continue PT/OT while inpatient

## 2020-10-31 NOTE — ASSESSMENT & PLAN NOTE
Plan:  - Dry weight of approximately 370 lb, up to 500 pounds (BMI 88) this admission  - Weight today 416 (BMI 73.8)  - Continuing diuresis as noted above

## 2020-10-31 NOTE — ASSESSMENT & PLAN NOTE
Patient with mild recurrent hypokalemia, most likely due to consistent diuresis. K today 3.2    Plan:   - CMP daily   - Replete PRN with goal K>4

## 2020-10-31 NOTE — ASSESSMENT & PLAN NOTE
Patient with right heart failure due to fluid overload. TTE 10/23 with LVEF 75%, reduced RV systolic function, RV enlargement, TR, PASP 109. Continuing diuresis and will obtain repeat echo once closer to euvolemia.    Plan:  - Strict Is/Os  - Daily weights  - Keep K>4, Mg>2  - Diabetic/cardiac low sodium diet (<2 g/day)  - Fluid restriction to 1.5 - 2.0 L/day

## 2020-10-31 NOTE — ASSESSMENT & PLAN NOTE
Patient is a chronic CO2 retainer in setting of obesity hypoventilation syndrome 2/2 morbid obesity. Stepped down from ICU 10/26, currently continuing diuresis with alternating BiPAP/NC breaks for meals.     Plan:   - Target SpO2 of >88%. Continue BiPAP nightly. Patient on 5LNC during the day, wean FiO2 as tolerated.   - Continue furosemide gtt at 10mg/hr   - Continue chlorthalidone 500mg daily   - Spironolactone 25 mg daily -> increase to 50mg today   - Approx 50L net negative since admission   - Risk factor reduction (encouraged weight loss, use of nightly CPAP consistently)

## 2020-10-31 NOTE — PLAN OF CARE
Problem: Fall Injury Risk  Goal: Absence of Fall and Fall-Related Injury  Outcome: Ongoing, Progressing     Problem: Infection  Goal: Infection Symptom Resolution  Outcome: Ongoing, Progressing     Problem: Adult Inpatient Plan of Care  Goal: Plan of Care Review  Outcome: Ongoing, Progressing  Goal: Patient-Specific Goal (Individualization)  Outcome: Ongoing, Progressing  Goal: Absence of Hospital-Acquired Illness or Injury  Outcome: Ongoing, Progressing  Goal: Optimal Comfort and Wellbeing  Outcome: Ongoing, Progressing  Goal: Readiness for Transition of Care  Outcome: Ongoing, Progressing  Goal: Rounds/Family Conference  Outcome: Ongoing, Progressing     Problem: Bariatric Environmental Safety  Goal: Safety Maintained with Care  Outcome: Ongoing, Progressing     Problem: Diabetes Comorbidity  Goal: Blood Glucose Level Within Desired Range  Outcome: Ongoing, Progressing     Problem: Wound  Goal: Optimal Wound Healing  Outcome: Ongoing, Progressing     Problem: Skin Injury Risk Increased  Goal: Skin Health and Integrity  Outcome: Ongoing, Progressing    POC reviewed with patient, Concerns addressed  Pt in bed resting, VSS, tolerated IV gtt, Sub-Q medication, and oral medication well, no adverse reaction, no s/s of distress noted, Visi is in place, denies pain or discomfort at this time, call light within reach, I's/O's closely monitored. VBG & labs completed. Co2 has increased to 97 this morning and pt. Volunteered to go from 5L BNC back to BiPap; FiO2: 65% Lasix gtt infusing at 10mg/hr and UOP is noted. WCTM

## 2020-11-01 LAB
ALBUMIN SERPL BCP-MCNC: 3.6 G/DL (ref 3.5–5.2)
ALP SERPL-CCNC: 62 U/L (ref 55–135)
ALT SERPL W/O P-5'-P-CCNC: 41 U/L (ref 10–44)
ANION GAP SERPL CALC-SCNC: 12 MMOL/L (ref 8–16)
AST SERPL-CCNC: 45 U/L (ref 10–40)
BASOPHILS # BLD AUTO: 0.02 K/UL (ref 0–0.2)
BASOPHILS NFR BLD: 0.3 % (ref 0–1.9)
BILIRUB SERPL-MCNC: 1 MG/DL (ref 0.1–1)
BUN SERPL-MCNC: 34 MG/DL (ref 6–20)
CALCIUM SERPL-MCNC: 10.2 MG/DL (ref 8.7–10.5)
CHLORIDE SERPL-SCNC: 81 MMOL/L (ref 95–110)
CO2 SERPL-SCNC: 48 MMOL/L (ref 23–29)
CREAT SERPL-MCNC: 1.1 MG/DL (ref 0.5–1.4)
DIFFERENTIAL METHOD: ABNORMAL
EOSINOPHIL # BLD AUTO: 0.1 K/UL (ref 0–0.5)
EOSINOPHIL NFR BLD: 1.8 % (ref 0–8)
ERYTHROCYTE [DISTWIDTH] IN BLOOD BY AUTOMATED COUNT: 20.3 % (ref 11.5–14.5)
EST. GFR  (AFRICAN AMERICAN): >60 ML/MIN/1.73 M^2
EST. GFR  (NON AFRICAN AMERICAN): 59.9 ML/MIN/1.73 M^2
GLUCOSE SERPL-MCNC: 182 MG/DL (ref 70–110)
HCT VFR BLD AUTO: 53.8 % (ref 37–48.5)
HGB BLD-MCNC: 15.3 G/DL (ref 12–16)
IMM GRANULOCYTES # BLD AUTO: 0.02 K/UL (ref 0–0.04)
IMM GRANULOCYTES NFR BLD AUTO: 0.3 % (ref 0–0.5)
LYMPHOCYTES # BLD AUTO: 1.5 K/UL (ref 1–4.8)
LYMPHOCYTES NFR BLD: 24.3 % (ref 18–48)
MAGNESIUM SERPL-MCNC: 2.1 MG/DL (ref 1.6–2.6)
MCH RBC QN AUTO: 25.2 PG (ref 27–31)
MCHC RBC AUTO-ENTMCNC: 28.4 G/DL (ref 32–36)
MCV RBC AUTO: 89 FL (ref 82–98)
MONOCYTES # BLD AUTO: 0.7 K/UL (ref 0.3–1)
MONOCYTES NFR BLD: 11.2 % (ref 4–15)
NEUTROPHILS # BLD AUTO: 3.7 K/UL (ref 1.8–7.7)
NEUTROPHILS NFR BLD: 62.1 % (ref 38–73)
NRBC BLD-RTO: 0 /100 WBC
PHOSPHATE SERPL-MCNC: 5.4 MG/DL (ref 2.7–4.5)
PLATELET # BLD AUTO: 148 K/UL (ref 150–350)
PMV BLD AUTO: 10.4 FL (ref 9.2–12.9)
POCT GLUCOSE: 149 MG/DL (ref 70–110)
POCT GLUCOSE: 176 MG/DL (ref 70–110)
POCT GLUCOSE: 182 MG/DL (ref 70–110)
POCT GLUCOSE: 192 MG/DL (ref 70–110)
POTASSIUM SERPL-SCNC: 3.8 MMOL/L (ref 3.5–5.1)
PROT SERPL-MCNC: 7.5 G/DL (ref 6–8.4)
RBC # BLD AUTO: 6.08 M/UL (ref 4–5.4)
SODIUM SERPL-SCNC: 141 MMOL/L (ref 136–145)
WBC # BLD AUTO: 5.97 K/UL (ref 3.9–12.7)

## 2020-11-01 PROCEDURE — 99233 PR SUBSEQUENT HOSPITAL CARE,LEVL III: ICD-10-PCS | Mod: ,,, | Performed by: HOSPITALIST

## 2020-11-01 PROCEDURE — 63600175 PHARM REV CODE 636 W HCPCS: Performed by: STUDENT IN AN ORGANIZED HEALTH CARE EDUCATION/TRAINING PROGRAM

## 2020-11-01 PROCEDURE — 84100 ASSAY OF PHOSPHORUS: CPT

## 2020-11-01 PROCEDURE — 85025 COMPLETE CBC W/AUTO DIFF WBC: CPT

## 2020-11-01 PROCEDURE — 83735 ASSAY OF MAGNESIUM: CPT

## 2020-11-01 PROCEDURE — 20600001 HC STEP DOWN PRIVATE ROOM

## 2020-11-01 PROCEDURE — 94660 CPAP INITIATION&MGMT: CPT

## 2020-11-01 PROCEDURE — 25000003 PHARM REV CODE 250: Performed by: STUDENT IN AN ORGANIZED HEALTH CARE EDUCATION/TRAINING PROGRAM

## 2020-11-01 PROCEDURE — 99233 SBSQ HOSP IP/OBS HIGH 50: CPT | Mod: ,,, | Performed by: HOSPITALIST

## 2020-11-01 PROCEDURE — 99900035 HC TECH TIME PER 15 MIN (STAT)

## 2020-11-01 PROCEDURE — 94761 N-INVAS EAR/PLS OXIMETRY MLT: CPT

## 2020-11-01 PROCEDURE — 80053 COMPREHEN METABOLIC PANEL: CPT

## 2020-11-01 PROCEDURE — 27000221 HC OXYGEN, UP TO 24 HOURS

## 2020-11-01 RX ADMIN — MICONAZOLE NITRATE: 20 POWDER TOPICAL at 08:11

## 2020-11-01 RX ADMIN — SPIRONOLACTONE 50 MG: 25 TABLET ORAL at 08:11

## 2020-11-01 RX ADMIN — CHLOROTHIAZIDE SODIUM 500 MG: 500 INJECTION INTRAVENOUS at 08:11

## 2020-11-01 RX ADMIN — MELATONIN TAB 3 MG 6 MG: 3 TAB at 08:11

## 2020-11-01 RX ADMIN — POTASSIUM BICARBONATE 50 MEQ: 978 TABLET, EFFERVESCENT ORAL at 08:11

## 2020-11-01 RX ADMIN — ENOXAPARIN SODIUM 60 MG: 60 INJECTION SUBCUTANEOUS at 08:11

## 2020-11-01 RX ADMIN — DOCUSATE SODIUM 50MG AND SENNOSIDES 8.6MG 2 TABLET: 8.6; 5 TABLET, FILM COATED ORAL at 08:11

## 2020-11-01 RX ADMIN — ATORVASTATIN CALCIUM 10 MG: 10 TABLET, FILM COATED ORAL at 08:11

## 2020-11-01 RX ADMIN — POLYETHYLENE GLYCOL 3350 17 G: 17 POWDER, FOR SOLUTION ORAL at 08:11

## 2020-11-01 RX ADMIN — LATANOPROST 1 DROP: 50 SOLUTION OPHTHALMIC at 08:11

## 2020-11-01 NOTE — PLAN OF CARE
Problem: Adult Inpatient Plan of Care  Goal: Plan of Care Review  Outcome: Ongoing, Progressing   POC reviewed with pt. VSS. Continues on 5L NC. Lasix gtt at 10mg/hr. No acute changes. Safety maintained. Will continue to monitor.

## 2020-11-01 NOTE — PLAN OF CARE
[facility]  [address]  [facility] [address]  Phone: [phone]    Patient Name: Halley Moreno  Patient : [id number]  Admission Date: 10/22/2020  Today's Date: 2020    To Whom It May Concern:    Halley Moreno  was admitted to [facility] on 10/22/2020 and is expected to remain hospitalized beyond 2020. This patient is under my direct care.      Olimpia Goode MD

## 2020-11-01 NOTE — ASSESSMENT & PLAN NOTE
Plan:  - Per patient, dry weight of approximately 370 lb -> up to 500 pounds (BMI 88) this admission  - Weight today 412 (BMI 73)  - Continuing diuresis as noted above

## 2020-11-01 NOTE — SUBJECTIVE & OBJECTIVE
Interval History: No acute events overnight. Continued diuresis with approx. 4.5L UOP in previous 24 hours. Tolerated 5LNC throughout day yesterday, compliant with BiPAP overnight.     Review of Systems   Constitutional: Negative for diaphoresis and fever.   HENT: Negative for congestion and sore throat.    Eyes: Negative for redness and visual disturbance.   Respiratory: Negative for cough and stridor.    Cardiovascular: Positive for leg swelling. Negative for chest pain.   Gastrointestinal: Positive for abdominal distention. Negative for abdominal pain, constipation, diarrhea and vomiting.   Genitourinary: Negative for dysuria and hematuria.   Musculoskeletal: Negative for back pain.   Skin: Negative for color change and pallor.   Neurological: Negative for speech difficulty and headaches.   Psychiatric/Behavioral: Negative for agitation and confusion.     Objective:     Vital Signs (Most Recent):  Temp: 97.6 °F (36.4 °C) (11/01/20 0400)  Pulse: 72 (11/01/20 0430)  Resp: 17 (11/01/20 0430)  BP: (!) 135/92 (11/01/20 0430)  SpO2: (!) 93 % (11/01/20 0430) Vital Signs (24h Range):  Temp:  [97.6 °F (36.4 °C)-98.8 °F (37.1 °C)] 97.6 °F (36.4 °C)  Pulse:  [70-85] 72  Resp:  [13-28] 17  SpO2:  [84 %-97 %] 93 %  BP: (119-146)/(77-98) 135/92     Weight: (!) 187 kg (412 lb 4.2 oz)  Body mass index is 73.03 kg/m².    Intake/Output Summary (Last 24 hours) at 11/1/2020 0627  Last data filed at 11/1/2020 0332  Gross per 24 hour   Intake 240 ml   Output 4575 ml   Net -4335 ml      Physical Exam  Vitals signs and nursing note reviewed.   Constitutional:       General: She is awake. She is not in acute distress.     Appearance: She is morbidly obese. She is not diaphoretic.      Interventions: Nasal cannula in place.   HENT:      Head: Normocephalic and atraumatic.      Comments: Dry skin with mild peeling noted to cheeks and nasal bridge.     Nose: Nose normal.      Mouth/Throat:      Mouth: Mucous membranes are moist.       Pharynx: Oropharynx is clear.   Eyes:      Extraocular Movements: Extraocular movements intact.      Conjunctiva/sclera: Conjunctivae normal.      Pupils: Pupils are equal, round, and reactive to light.   Neck:      Musculoskeletal: Normal range of motion.   Cardiovascular:      Rate and Rhythm: Normal rate and regular rhythm.      Pulses: Normal pulses.      Heart sounds: Normal heart sounds.   Pulmonary:      Effort: Pulmonary effort is normal. No respiratory distress.      Breath sounds: Normal breath sounds. No wheezing or rhonchi.   Abdominal:      General: Abdomen is protuberant. There is distension.      Palpations: Abdomen is soft.      Tenderness: There is no abdominal tenderness.   Musculoskeletal:         General: Swelling and tenderness present.      Right lower leg: Edema present.      Left lower leg: Edema present.      Comments: Hematoma on LUE, mildly TTP with surrounding ecchymosis.   Skin:     General: Skin is warm and dry.      Coloration: Skin is not jaundiced.      Findings: Erythema and rash present. Rash is scaling.      Comments: Chronic venous stasis changes present on BLE.   Neurological:      General: No focal deficit present.      Mental Status: She is alert and oriented to person, place, and time.   Psychiatric:         Mood and Affect: Mood normal.         Behavior: Behavior normal.       Significant Labs:   CBC:   Recent Labs   Lab 10/31/20  0529 11/01/20  0327   WBC 6.60 5.97   HGB 15.7 15.3   HCT 54.6* 53.8*   * 148*     CMP:   Recent Labs   Lab 10/31/20  0529 11/01/20  0327    141   K 3.4* 3.8   CL 80* 81*   CO2 47* 48*   * 182*   BUN 26* 34*   CREATININE 1.0 1.1   CALCIUM 10.0 10.2   PROT 7.6 7.5   ALBUMIN 3.5 3.6   BILITOT 1.2* 1.0   ALKPHOS 57 62   AST 45* 45*   ALT 39 41   ANIONGAP 13 12   EGFRNONAA >60.0 59.9*     Magnesium:   Recent Labs   Lab 10/31/20  0529 11/01/20  0327   MG 2.1 2.1       Significant Imaging: No new imaging to review.

## 2020-11-01 NOTE — ASSESSMENT & PLAN NOTE
Patient's phosphorus level has been steadily increasing throughout hospital stay, up to 5.4 today. Likely secondary to aggressive diuresis, but patient has remained asymptomatic.    Plan:   - Monitor with daily CMP

## 2020-11-01 NOTE — NURSING
Notified Dr. Almonte (Med. Team #1) of patient's elevated CO2 from AM VBG of 97 and per CMP 48. Pt. Is currently on BiPap with FiO2: 65%, sleeping, and SpO2: 93% but ranging as high as 97%. Dr. Almonte noted that patient's CO2 has been elevated throughout the patient's time here. WCTM.

## 2020-11-01 NOTE — PROGRESS NOTES
[facility] Hospital Medicine  Progress Note    Patient Name: Halley Moreno  MRN: [id number]  Patient Class: IP- Inpatient   Admission Date: 10/22/2020  Length of Stay: 10 days  Attending Physician: Juno Jones MD  Primary Care Provider: Yamila Tejeda MD    [facility] Medicine Team: [facility] - Rao Szymanski MD    Subjective:     Principal Problem:Right heart failure due to pulmonary hypertension    HPI:  Patient is a 45-year-old female who is presenting to us with acute respiratory distress.  She has a recent diagnosis of severe pulmonary hypertension with right-sided heart failure.  Her last echo showed a PA systolic pressure of 99.  She initially presented due to increasing weight gain over the last 7 months.  Patient states that in March she began gaining weight and she was placed on diuretic to trying keep that weight down.  Despite 40 mg of furosemide b.i.d. she continued to gain weight.  She has gained approximately 100 lb since March and 80 lb since May.  She does not have a cardiologist but was scheduled to see 1 in December.  Patient states that at home she sleeps in a elevated position secondary to shortness of breath.  She denies any episodes of waking up in the middle of the night out of breath.  She does not use any home oxygen nor she ever been told explicitly that she has heart failure.  She demonstrates poor understanding of the disease stating that the right heart was backing up into the lungs and cause in the shortness of breath.  She presented to the clinic today for evaluation and was found to be in acute respiratory distress with hypoxia.  She was referred to the ED for evaluation and diuresis.    Patient states that she has no known family history of heart failure in the family however she does state that her father had a pacemaker for unknown reasons.  She states that she does not think that anyone her and her family has had pulmonary hypertension.   Patient states that she does not drink, does not smoke, and does not use IV drugs.    Overview/Hospital Course:  Patient was admitted to Hospital Medicine. Was initially hypoxic and put on nasal cannula with normalization of her oxygen status to approximately 95%. She was demonstrating initial respiratory distress and was given a bolus of 120mg IV furosemide. Hypoxia and hypercapnia worsened so was stepped up to ICU on 10/23. Was placed on BiPAP and aggressively diuresed (approx 20L net negative) with improvement in respiratory status. Weaned to 2L NC and subsequently stepped back down to Hospital Medicine on 10/26. Continued diuresis with IV Lasix gtt, diuril, and aldactone. On 10/27 VBGs noted pCO2 in 90s - 100s and patient was started back on continuous BiPAP with improvement in pCO2 levels. Patient weaned from BiPAP throughout admission with improving oxygenation.    Interval History: No acute events overnight. Continued diuresis with approx. 4.5L UOP in previous 24 hours. Tolerated 5LNC throughout day yesterday, compliant with BiPAP overnight.     Review of Systems   Constitutional: Negative for diaphoresis and fever.   HENT: Negative for congestion and sore throat.    Eyes: Negative for redness and visual disturbance.   Respiratory: Negative for cough and stridor.    Cardiovascular: Positive for leg swelling. Negative for chest pain.   Gastrointestinal: Positive for abdominal distention. Negative for abdominal pain, constipation, diarrhea and vomiting.   Genitourinary: Negative for dysuria and hematuria.   Musculoskeletal: Negative for back pain.   Skin: Negative for color change and pallor.   Neurological: Negative for speech difficulty and headaches.   Psychiatric/Behavioral: Negative for agitation and confusion.     Objective:     Vital Signs (Most Recent):  Temp: 97.6 °F (36.4 °C) (11/01/20 0400)  Pulse: 72 (11/01/20 0430)  Resp: 17 (11/01/20 0430)  BP: (!) 135/92 (11/01/20 0430)  SpO2: (!) 93 % (11/01/20  0430) Vital Signs (24h Range):  Temp:  [97.6 °F (36.4 °C)-98.8 °F (37.1 °C)] 97.6 °F (36.4 °C)  Pulse:  [70-85] 72  Resp:  [13-28] 17  SpO2:  [84 %-97 %] 93 %  BP: (119-146)/(77-98) 135/92     Weight: (!) 187 kg (412 lb 4.2 oz)  Body mass index is 73.03 kg/m².    Intake/Output Summary (Last 24 hours) at 11/1/2020 0671  Last data filed at 11/1/2020 0332  Gross per 24 hour   Intake 240 ml   Output 4575 ml   Net -4335 ml      Physical Exam  Vitals signs and nursing note reviewed.   Constitutional:       General: She is awake. She is not in acute distress.     Appearance: She is morbidly obese. She is not diaphoretic.      Interventions: Nasal cannula in place.   HENT:      Head: Normocephalic and atraumatic.      Comments: Dry skin with mild peeling noted to cheeks and nasal bridge.     Nose: Nose normal.      Mouth/Throat:      Mouth: Mucous membranes are moist.      Pharynx: Oropharynx is clear.   Eyes:      Extraocular Movements: Extraocular movements intact.      Conjunctiva/sclera: Conjunctivae normal.      Pupils: Pupils are equal, round, and reactive to light.   Neck:      Musculoskeletal: Normal range of motion.   Cardiovascular:      Rate and Rhythm: Normal rate and regular rhythm.      Pulses: Normal pulses.      Heart sounds: Normal heart sounds.   Pulmonary:      Effort: Pulmonary effort is normal. No respiratory distress.      Breath sounds: Normal breath sounds. No wheezing or rhonchi.   Abdominal:      General: Abdomen is protuberant. There is distension.      Palpations: Abdomen is soft.      Tenderness: There is no abdominal tenderness.   Musculoskeletal:         General: Swelling and tenderness present.      Right lower leg: Edema present.      Left lower leg: Edema present.      Comments: Hematoma on LUE, mildly TTP with surrounding ecchymosis.   Skin:     General: Skin is warm and dry.      Coloration: Skin is not jaundiced.      Findings: Erythema and rash present. Rash is scaling.      Comments:  Chronic venous stasis changes present on BLE.   Neurological:      General: No focal deficit present.      Mental Status: She is alert and oriented to person, place, and time.   Psychiatric:         Mood and Affect: Mood normal.         Behavior: Behavior normal.       Significant Labs:   CBC:   Recent Labs   Lab 10/31/20  0529 11/01/20  0327   WBC 6.60 5.97   HGB 15.7 15.3   HCT 54.6* 53.8*   * 148*     CMP:   Recent Labs   Lab 10/31/20  0529 11/01/20  0327    141   K 3.4* 3.8   CL 80* 81*   CO2 47* 48*   * 182*   BUN 26* 34*   CREATININE 1.0 1.1   CALCIUM 10.0 10.2   PROT 7.6 7.5   ALBUMIN 3.5 3.6   BILITOT 1.2* 1.0   ALKPHOS 57 62   AST 45* 45*   ALT 39 41   ANIONGAP 13 12   EGFRNONAA >60.0 59.9*     Magnesium:   Recent Labs   Lab 10/31/20  0529 11/01/20  0327   MG 2.1 2.1       Significant Imaging: No new imaging to review.    Assessment/Plan:      * Right heart failure due to pulmonary hypertension  Patient with right heart failure due to fluid overload. TTE 10/23 with LVEF 75%, reduced RV systolic function, RV enlargement, TR, PASP 109. Continuing diuresis and will obtain repeat echo once closer to euvolemia.    Plan:  - Strict Is/Os  - Daily weights  - Keep K>4, Mg>2  - Diabetic/cardiac low sodium diet (<2 g/day)  - Fluid restriction to 1.5 L/day    Acute respiratory failure with hypoxia and hypercapnia  Patient is a chronic CO2 retainer in setting of obesity hypoventilation syndrome 2/2 morbid obesity. Stepped down from ICU 10/26, currently continuing diuresis. De-escalated to NC during the day with BiPAP nightly on 10/31, which patient tolerated well.      Plan:   - Target SpO2 of >88%. Continue BiPAP nightly. Patient on 5LNC during the day, wean FiO2 as tolerated.   - Continue furosemide gtt at 10mg/hr   - Continue chlorthalidone 500mg daily   - Spironolactone 50mg daily -> will discontinue today with increasing BUN/Creatinine to prevent DIANA from over-diuresis   - Approx 54L net  negative since admission    Diabetes mellitus type 2 without retinopathy  Previously on glipizide 10mg daily and semaglutide 0.25mg weekly. Glucose well controlled this admission, not requiring SSI at this time.    Plan:   - Holding home anti-hyperglycemics   - POCT BG qAC/HS while eating   - LD SSI   - Diabetic cardiac diet, fluid restriction 1.5L    - Will provide endocrinology referral at discharge    Morbid obesity with BMI of 70 and over, adult  Plan:  - Per patient, dry weight of approximately 370 lb -> up to 500 pounds (BMI 88) this admission  - Weight today 412 (BMI 73)  - Continuing diuresis as noted above    Hypertension associated with diabetes  Patient on losartan 50mg daily at home. Remains normotensive at this time.    Plan:   - Holding home anti-hypertensive in acute setting   - Continue atorvastatin 10mg nightly    Discharge planning issues  PT/OT recommend rehab  - Patient needs to be closer to dry weight ~380 lbs  - Consider 6 min walk test to assess need for oxygen prior to discharge in setting of heart failure    Hypokalemia  Patient with mild recurrent hypokalemia, most likely due to consistent diuresis. K today 3.8.    Plan:   - CMP daily   - Replete PRN with goal K>4   - Potassium bicarbonate tab 50mEq today    Impaired mobility and activities of daily living  PT/OT consulted, recommending rehab at discharge. PM&R consulted and following.    Plan:   - Continue PT/OT while inpatient    Constipation  Plan:   - Continue with polyethylene glycol and/or senna-docusate PRN    Hyperphosphatemia  Patient's phosphorus level has been steadily increasing throughout hospital stay, up to 5.4 today. Likely secondary to aggressive diuresis, but patient has remained asymptomatic.    Plan:   - Monitor with daily CMP    Obesity hypoventilation syndrome  See Acute respiratory failure with hypoxia and hypercapnia above    Sleep apnea  Patient's pulmonary HTN and RV dysfunction likely result of obesity  hypoventilation syndrome with non-compliance with at-home CPAP. Evaluated by Pulmonology, who recommended continued use of NIPPV and encouraged weight loss.     Plan:    - NIPPV nightly    VTE Risk Mitigation (From admission, onward)         Ordered     enoxaparin injection 60 mg  Every 12 hours      10/22/20 1518     IP VTE HIGH RISK PATIENT  Once      10/22/20 1346     Place sequential compression device  Until discontinued      10/22/20 1346              Discharge Planning   JOSE RAUL: 11/4/2020     Code Status: Full Code   Is the patient medically ready for discharge?: No    Reason for patient still in hospital: Patient trending condition, Treatment and Pending disposition  Discharge Plan A: Rehab         Rao Szymanski MD, PGY-1  Department of Hospital Medicine   [facility]

## 2020-11-01 NOTE — MEDICAL/APP STUDENT
[facility]  Progress Note    Patient Name: Halley Moreno  MRN: [id number]  Patient Class: IP- Inpatient   Admission Date: 10/22/2020  Length of Stay: 10 days  Attending Physician: Juno Jones MD  Primary Care Provider: Yamila Tejeda MD    Hospital Day 11    Subjective:     Source: patient, medical records     Principle Complaint: Weight gain of 100+ lbs over 7 months and associated SOB. Morning cough with blood streaks starting day 6.     HPI:  Patient is a 45-year-old female with morbid obesity, YARY, OHS, and cor pulmonale who presented with progressive dyspnea. Additional medical problems include diabetes type 2, HTN, and depression. A1C on 8/7 was 8.4, BMI was 88 on admission, Most recent echo showed PASP 109, TAPSE 1.37cm on 10/23.  She initially presented due to increasing weight gain over the last 7 months. Patient states that in March she began gaining weight and she was placed on diuretic to trying keep that weight down. Despite 40 mg of furosemide b.i.d. she continued to gain weight.  She has gained approximately 100 lb since March and 80 lb since May.  She does not have a cardiologist but was scheduled to see 1 in December.  Patient states that at home she sleeps in a elevated position secondary to shortness of breath.  She denies any episodes of waking up in the middle of the night out of breath.  She does not use any home oxygen nor she ever been told explicitly that she has heart failure.  She demonstrates poor understanding of the disease stating that the right heart was backing up into the lungs and cause in the shortness of breath. She presented to the clinic today for evaluation and was found to be in acute respiratory distress with hypoxia.  She was referred to the ED.     ED Course: Initially hypoxic and put on nasal cannula with normalization of her oxygen status to approximately 95%. She was demonstrating initial respiratory distress and was given a bolus of  "120mg IV furosemide.     Hospital Course: Patient was admitted to Hospital Medicine. On first day pt became somnolent and hypoxia and hypercapnia worsened requiring step up to ICU on 10/23. Was placed on BiPAP and aggressively diuresed (approx 20L net negative) with improvement in respiratory status. Weaned to 2L NC and subsequently stepped back down to Hospital Medicine on 10/26. VBG returned with elevated ppCO2 (109 at peak), decreased ppO2 (23), elevated bicarb (56.5), and pH 7.41 on 10/27. Started on continuous Bipap up to 20/8 cmH2O with VBG improved on 10/28 (pH 7.4, ppCO2 94, ppO2 39, Bicarb 41). Reports blood streaked sputum believed to be "from the mask drying out my throat"     Interval History: Pt slept well, no overnight problems. Hemoptysis improving with humidification of O2.  Pt following PT/OT therapy wiothout issue. No SOB on Bipap overnight at FiO2 65% and 5L O2 by nasal canula this morning. VBG repeated this morning despite no orders (pCO2 97).    Current Facility-Administered Medications   Medication    acetaminophen tablet 650 mg    atorvastatin tablet 10 mg    chlorothiazide (DIURIL) 500 mg in dextrose 5 % 50 mL IVPB    enoxaparin injection 60 mg    furosemide (LASIX) 500 mg infusion (conc: 10 mg/mL)    influenza (QUADRIVALENT PF) vaccine 0.5 mL    insulin aspart U-100 pen 0-5 Units    latanoprost 0.005 % ophthalmic solution 1 drop    melatonin tablet 6 mg    miconazole NITRATE 2 % top powder    mineral oil-hydrophil petrolat ointment    ondansetron disintegrating tablet 8 mg    polyethylene glycol packet 17 g    senna-docusate 8.6-50 mg per tablet 2 tablet    simethicone chewable tablet 80 mg    sodium chloride 0.9% flush 10 mL     Review of Systems   Constitutional: Negative for chills and fever.   Respiratory: Positive for cough and hemoptysis. Negative for shortness of breath.    Cardiovascular: Negative for chest pain.   Gastrointestinal: Negative for abdominal pain, " diarrhea, nausea and vomiting.   Skin: Negative for rash.   Neurological: Negative for headaches.     Objective:     Vital Signs (Most Recent):  Temp: 97.1 °F (36.2 °C) (11/01/20 0726)  Pulse: 75 (11/01/20 0726)  Resp: 17 (11/01/20 0726)  BP: (!) 144/97 (11/01/20 0726)  SpO2: (!) 91 % (11/01/20 0726) Vital Signs (24h Range):  Temp:  [97.1 °F (36.2 °C)-98.8 °F (37.1 °C)] 97.1 °F (36.2 °C)  Pulse:  [70-85] 75  Resp:  [14-28] 17  SpO2:  [84 %-97 %] 91 %  BP: (132-146)/(82-98) 144/97     Weight: (!) 187 kg (412 lb 4.2 oz) (11/01/20 0400) from 416 previous day.  Body mass index is 73.03 kg/m².   Dry Weight is 370-390 lbs.      Intake/Output Summary (Last 24 hours) at 11/1/2020 1118  Last data filed at 11/1/2020 1000  Gross per 24 hour   Intake 240 ml   Output 5525 ml   Net -5285 ml     Physical Exam  Constitutional:       General: She is not in acute distress.     Appearance: She is obese.   Eyes:      Extraocular Movements: Extraocular movements intact.   Cardiovascular:      Rate and Rhythm: Normal rate and regular rhythm.      Heart sounds: Normal heart sounds.   Pulmonary:      Effort: Pulmonary effort is normal.      Breath sounds: Normal breath sounds. No wheezing or rales.   Abdominal:      General: Bowel sounds are normal. There is distension.      Palpations: Abdomen is soft.      Tenderness: There is abdominal tenderness (LLQ).   Musculoskeletal:         General: Swelling (L calf 1-2cm > R calf) and tenderness (left calf) present.      Right lower leg: Edema present.      Left lower leg: Edema present.      Comments: No erythema of LE. Pain in L calf only with deep palpation. Sonia's sign not present.   Skin:     General: Skin is warm and dry.      Findings: Rash (around face where Bipap mask rests. consitent with dry skin) present.   Neurological:      Mental Status: She is alert.       Significant Labs:  CBC:   Recent Labs   Lab 11/01/20  0327   WBC 5.97   RBC 6.08*   HGB 15.3   HCT 53.8*   *     CMP:    Recent Labs   Lab 11/01/20  0327   *   CALCIUM 10.2   ALBUMIN 3.6   PROT 7.5      K 3.8   CO2 48*   CL 81*   BUN 34*   CREATININE 1.1   ALKPHOS 62   ALT 41   AST 45*   BILITOT 1.0     ABGs:   Recent Labs   Lab 10/30/20  0543   PH 7.393   PCO2 87.4*   HCO3 53.3*   POCSATURATED 61*   BE 28     Significant Imaging:  No new imaging    Assessment/Plan:      Mrs Halley Moreno is a 44 yo F being treated for acute respiratory failure with hypoxia and hypercapnia and volume overload.    Active Diagnoses:    Diagnosis Date Noted POA    PRINCIPAL PROBLEM:  Right heart failure due to pulmonary hypertension [I27.29, I50.810] 10/22/2020 Yes    Discharge planning issues [Z02.9] 10/30/2020 Not Applicable    Impaired mobility and activities of daily living [Z74.09, Z78.9] 10/29/2020 Yes    Hypokalemia [E87.6] 10/29/2020 Yes    Constipation [K59.00] 10/25/2020 Yes    Obesity hypoventilation syndrome [E66.2] 10/23/2020 Yes    Acute respiratory failure with hypoxia and hypercapnia [J96.01, J96.02] 10/22/2020 Yes    Diabetes mellitus type 2 without retinopathy [E11.9] 10/14/2019 Yes    Hypertension associated with diabetes [E11.59, I10]  Yes    Sleep apnea [G47.30]  Yes    Morbid obesity with BMI of 70 and over, adult [E66.01, Z68.45]  Not Applicable      Problems Resolved During this Admission:    Diagnosis Date Noted Date Resolved POA    Hyperkalemia [E87.5] 10/23/2020 10/29/2020 Yes    Hyperphosphatemia [E83.39] 10/23/2020 10/31/2020 Yes     Acute Respiratory Failure with hypoxia and hypercapnia  On 10/23 respiratory team alerted for SpO2 of 90% and lethargy. On arrival ABG showed pH 7.165, pCO2 112.7, pO2 85. Started on Bipap continuosly at 15/8 with FiO2 45%.  - target >88% SpO2  - 10mg/hr furosemide drip  - Chlorthiazide 500mg morning infusion  - Spironolactone 50mg overnight  - Patient education on OHS and benefit of weight loss  - Continuous Bipap at 20/8 and FiO2 65% overnight  - 5L O2 by NC during  day  - humidify nasal canula and Bipap    Cor pulmonale causing right sided HFpEF  New diagnosis. PASP of 109 with TAPSE 1.37cm in R ventricle on echo indicates Cor Pulmonale 2/2 YARY/OHS from morbid obesity (BMI 75.76).  - Strict I/O  - fluid restriction of 1.5L/day  - daily weights              Latest weight 11/1, 412lbs. Previous weight 416lbs on 10/31.               Dry weight 390lbs per pt  - low salt diet and education on home fluid restriction  - see respiratory failure plan     Potential prerenal DIANA  Pt BUN has increased over last 3 days from 24>26>34. Cr lvl 1.1 today. Hyperphosphatemia at 5.4. No change in urine output.  - Reduce spironolactone to 25mg daily  - switch to low phosphate meal if no improvement tomorrow    DM type 2  - stopped home meds  - short acting insulin 0-5 units premeal  - LD SSI  - diabetic cardiac diet  - Accu checks AC/evening     HTN  - On losartan 10mg at home     OHS/YARY  pCo2 >45, BMI >30, no other identifiable cause for hypoventilation.  - NIPPV nightly  - see obesity     Obesity  BMI 75.76. Gained >100lbs in last 7 months 2/2 fluid retension. Dry weight of 370lbs  - pt expresses interest inlosing weight, dietary/bariatric medicine outpatient follow up warranted    VTE Risk Mitigation (From admission, onward)         Ordered     enoxaparin injection 60 mg  Every 12 hours      10/22/20 1518     IP VTE HIGH RISK PATIENT  Once      10/22/20 1346     Place sequential compression device  Until discontinued      10/22/20 1346                   Yogesh Nunez  [facility]

## 2020-11-01 NOTE — NURSING
POC reviewed with patient, Concerns addressed  Pt in bed resting, VSS, tolerated continuous Lasix gtt @ 10mg/hr, Sub-Q medication, and oral medication well, no adverse reaction, no s/s of distress noted, Visi is in place & calibrated for dayshift, denies pain or discomfort at this time, call light within reach, I's/O's closely monitored. VBG & labs completed. CO2 is 97 this morning and pt. Is on BiPap; FiO2: 65% WCTM

## 2020-11-01 NOTE — PLAN OF CARE
Problem: Fall Injury Risk  Goal: Absence of Fall and Fall-Related Injury  Outcome: Ongoing, Progressing     Problem: Infection  Goal: Infection Symptom Resolution  Outcome: Ongoing, Progressing     Problem: Adult Inpatient Plan of Care  Goal: Plan of Care Review  Outcome: Ongoing, Progressing  Goal: Patient-Specific Goal (Individualization)  Outcome: Ongoing, Progressing  Goal: Absence of Hospital-Acquired Illness or Injury  Outcome: Ongoing, Progressing  Goal: Optimal Comfort and Wellbeing  Outcome: Ongoing, Progressing  Goal: Readiness for Transition of Care  Outcome: Ongoing, Progressing  Goal: Rounds/Family Conference  Outcome: Ongoing, Progressing     Problem: Bariatric Environmental Safety  Goal: Safety Maintained with Care  Outcome: Ongoing, Progressing     Problem: Diabetes Comorbidity  Goal: Blood Glucose Level Within Desired Range  Outcome: Ongoing, Progressing     Problem: Wound  Goal: Optimal Wound Healing  Outcome: Ongoing, Progressing     Problem: Skin Injury Risk Increased  Goal: Skin Health and Integrity  Outcome: Ongoing, Progressing    POC reviewed with patient, Concerns addressed  Pt in bed resting, VSS, tolerated continuous Lasix gtt @ 10mg/hr, Sub-Q medication, and oral medication well, no adverse reaction, no s/s of distress noted, Visi is in place & calibrated for dayshift, denies pain or discomfort at this time, call light within reach, I's/O's closely monitored. VBG & labs completed. CO2 is 97 this morning and pt. Is on BiPap; FiO2: 65% WCTM

## 2020-11-01 NOTE — ASSESSMENT & PLAN NOTE
Patient is a chronic CO2 retainer in setting of obesity hypoventilation syndrome 2/2 morbid obesity. Stepped down from ICU 10/26, currently continuing diuresis. De-escalated to NC during the day with BiPAP nightly on 10/31, which patient tolerated well.      Plan:   - Target SpO2 of >88%. Continue BiPAP nightly. Patient on 5LNC during the day, wean FiO2 as tolerated.   - Continue furosemide gtt at 10mg/hr   - Continue chlorthalidone 500mg daily   - Spironolactone 50mg daily -> will discontinue today with increasing BUN/Creatinine to prevent DIANA from over-diuresis   - Approx 54L net negative since admission

## 2020-11-01 NOTE — ASSESSMENT & PLAN NOTE
Patient with right heart failure due to fluid overload. TTE 10/23 with LVEF 75%, reduced RV systolic function, RV enlargement, TR, PASP 109. Continuing diuresis and will obtain repeat echo once closer to euvolemia.    Plan:  - Strict Is/Os  - Daily weights  - Keep K>4, Mg>2  - Diabetic/cardiac low sodium diet (<2 g/day)  - Fluid restriction to 1.5 L/day

## 2020-11-01 NOTE — ASSESSMENT & PLAN NOTE
Patient with mild recurrent hypokalemia, most likely due to consistent diuresis. K today 3.8.    Plan:   - CMP daily   - Replete PRN with goal K>4   - Potassium bicarbonate tab 50mEq today

## 2020-11-02 LAB
ALBUMIN SERPL BCP-MCNC: 3.8 G/DL (ref 3.5–5.2)
ALLENS TEST: ABNORMAL
ALLENS TEST: ABNORMAL
ALP SERPL-CCNC: 62 U/L (ref 55–135)
ALT SERPL W/O P-5'-P-CCNC: 50 U/L (ref 10–44)
ANION GAP SERPL CALC-SCNC: 14 MMOL/L (ref 8–16)
AST SERPL-CCNC: 56 U/L (ref 10–40)
BASOPHILS # BLD AUTO: 0.02 K/UL (ref 0–0.2)
BASOPHILS NFR BLD: 0.3 % (ref 0–1.9)
BILIRUB SERPL-MCNC: 1.1 MG/DL (ref 0.1–1)
BUN SERPL-MCNC: 40 MG/DL (ref 6–20)
CALCIUM SERPL-MCNC: 10.5 MG/DL (ref 8.7–10.5)
CHLORIDE SERPL-SCNC: 83 MMOL/L (ref 95–110)
CO2 SERPL-SCNC: 40 MMOL/L (ref 23–29)
CREAT SERPL-MCNC: 1.1 MG/DL (ref 0.5–1.4)
DELSYS: ABNORMAL
DELSYS: ABNORMAL
DIFFERENTIAL METHOD: ABNORMAL
EOSINOPHIL # BLD AUTO: 0.1 K/UL (ref 0–0.5)
EOSINOPHIL NFR BLD: 1.8 % (ref 0–8)
ERYTHROCYTE [DISTWIDTH] IN BLOOD BY AUTOMATED COUNT: 20.1 % (ref 11.5–14.5)
EST. GFR  (AFRICAN AMERICAN): >60 ML/MIN/1.73 M^2
EST. GFR  (NON AFRICAN AMERICAN): 59.9 ML/MIN/1.73 M^2
GLUCOSE SERPL-MCNC: 191 MG/DL (ref 70–110)
HCO3 UR-SCNC: 53.3 MMOL/L (ref 24–28)
HCO3 UR-SCNC: 56.2 MMOL/L (ref 24–28)
HCT VFR BLD AUTO: 54.3 % (ref 37–48.5)
HGB BLD-MCNC: 16.3 G/DL (ref 12–16)
IMM GRANULOCYTES # BLD AUTO: 0.05 K/UL (ref 0–0.04)
IMM GRANULOCYTES NFR BLD AUTO: 0.7 % (ref 0–0.5)
IP: 20
LYMPHOCYTES # BLD AUTO: 1.6 K/UL (ref 1–4.8)
LYMPHOCYTES NFR BLD: 22.1 % (ref 18–48)
MAGNESIUM SERPL-MCNC: 1.9 MG/DL (ref 1.6–2.6)
MCH RBC QN AUTO: 25.8 PG (ref 27–31)
MCHC RBC AUTO-ENTMCNC: 30 G/DL (ref 32–36)
MCV RBC AUTO: 86 FL (ref 82–98)
MODE: ABNORMAL
MONOCYTES # BLD AUTO: 1 K/UL (ref 0.3–1)
MONOCYTES NFR BLD: 13.2 % (ref 4–15)
NEUTROPHILS # BLD AUTO: 4.4 K/UL (ref 1.8–7.7)
NEUTROPHILS NFR BLD: 61.9 % (ref 38–73)
NRBC BLD-RTO: 0 /100 WBC
PCO2 BLDA: 95.1 MMHG (ref 35–45)
PCO2 BLDA: 96.3 MMHG (ref 35–45)
PH SMN: 7.35 [PH] (ref 7.35–7.45)
PH SMN: 7.38 [PH] (ref 7.35–7.45)
PHOSPHATE SERPL-MCNC: 5.4 MG/DL (ref 2.7–4.5)
PLATELET # BLD AUTO: 148 K/UL (ref 150–350)
PMV BLD AUTO: 10.1 FL (ref 9.2–12.9)
PO2 BLDA: 35 MMHG (ref 40–60)
PO2 BLDA: 36 MMHG (ref 40–60)
POC BE: 28 MMOL/L
POC BE: >30 MMOL/L
POC SATURATED O2: 58 % (ref 95–100)
POC SATURATED O2: 62 % (ref 95–100)
POC TCO2: >50 MMOL/L (ref 24–29)
POC TCO2: >50 MMOL/L (ref 24–29)
POCT GLUCOSE: 193 MG/DL (ref 70–110)
POCT GLUCOSE: 214 MG/DL (ref 70–110)
POCT GLUCOSE: 236 MG/DL (ref 70–110)
POCT GLUCOSE: 237 MG/DL (ref 70–110)
POTASSIUM SERPL-SCNC: 4.8 MMOL/L (ref 3.5–5.1)
PROT SERPL-MCNC: 8 G/DL (ref 6–8.4)
RBC # BLD AUTO: 6.32 M/UL (ref 4–5.4)
SAMPLE: ABNORMAL
SAMPLE: ABNORMAL
SITE: ABNORMAL
SITE: ABNORMAL
SODIUM SERPL-SCNC: 137 MMOL/L (ref 136–145)
WBC # BLD AUTO: 7.18 K/UL (ref 3.9–12.7)

## 2020-11-02 PROCEDURE — 97110 THERAPEUTIC EXERCISES: CPT

## 2020-11-02 PROCEDURE — 97535 SELF CARE MNGMENT TRAINING: CPT

## 2020-11-02 PROCEDURE — 20600001 HC STEP DOWN PRIVATE ROOM

## 2020-11-02 PROCEDURE — 27000221 HC OXYGEN, UP TO 24 HOURS

## 2020-11-02 PROCEDURE — 80053 COMPREHEN METABOLIC PANEL: CPT

## 2020-11-02 PROCEDURE — 25000003 PHARM REV CODE 250: Performed by: STUDENT IN AN ORGANIZED HEALTH CARE EDUCATION/TRAINING PROGRAM

## 2020-11-02 PROCEDURE — 63600175 PHARM REV CODE 636 W HCPCS: Performed by: STUDENT IN AN ORGANIZED HEALTH CARE EDUCATION/TRAINING PROGRAM

## 2020-11-02 PROCEDURE — 84100 ASSAY OF PHOSPHORUS: CPT

## 2020-11-02 PROCEDURE — 63600175 PHARM REV CODE 636 W HCPCS: Performed by: INTERNAL MEDICINE

## 2020-11-02 PROCEDURE — 85025 COMPLETE CBC W/AUTO DIFF WBC: CPT

## 2020-11-02 PROCEDURE — 97530 THERAPEUTIC ACTIVITIES: CPT

## 2020-11-02 PROCEDURE — 36415 COLL VENOUS BLD VENIPUNCTURE: CPT

## 2020-11-02 PROCEDURE — 99233 SBSQ HOSP IP/OBS HIGH 50: CPT | Mod: ,,, | Performed by: HOSPITALIST

## 2020-11-02 PROCEDURE — 83735 ASSAY OF MAGNESIUM: CPT

## 2020-11-02 PROCEDURE — 99233 PR SUBSEQUENT HOSPITAL CARE,LEVL III: ICD-10-PCS | Mod: ,,, | Performed by: HOSPITALIST

## 2020-11-02 PROCEDURE — 99900035 HC TECH TIME PER 15 MIN (STAT)

## 2020-11-02 PROCEDURE — C9399 UNCLASSIFIED DRUGS OR BIOLOG: HCPCS | Performed by: STUDENT IN AN ORGANIZED HEALTH CARE EDUCATION/TRAINING PROGRAM

## 2020-11-02 PROCEDURE — 94761 N-INVAS EAR/PLS OXIMETRY MLT: CPT

## 2020-11-02 PROCEDURE — 94660 CPAP INITIATION&MGMT: CPT

## 2020-11-02 RX ADMIN — POLYETHYLENE GLYCOL 3350 17 G: 17 POWDER, FOR SOLUTION ORAL at 09:11

## 2020-11-02 RX ADMIN — INSULIN ASPART 1 UNITS: 100 INJECTION, SOLUTION INTRAVENOUS; SUBCUTANEOUS at 09:11

## 2020-11-02 RX ADMIN — CHLOROTHIAZIDE SODIUM 500 MG: 500 INJECTION INTRAVENOUS at 09:11

## 2020-11-02 RX ADMIN — ENOXAPARIN SODIUM 60 MG: 60 INJECTION SUBCUTANEOUS at 09:11

## 2020-11-02 RX ADMIN — MELATONIN TAB 3 MG 6 MG: 3 TAB at 09:11

## 2020-11-02 RX ADMIN — MICONAZOLE NITRATE: 20 POWDER TOPICAL at 09:11

## 2020-11-02 RX ADMIN — INSULIN ASPART 2 UNITS: 100 INJECTION, SOLUTION INTRAVENOUS; SUBCUTANEOUS at 04:11

## 2020-11-02 RX ADMIN — DOCUSATE SODIUM 50MG AND SENNOSIDES 8.6MG 2 TABLET: 8.6; 5 TABLET, FILM COATED ORAL at 09:11

## 2020-11-02 RX ADMIN — FUROSEMIDE 10 MG/HR: 10 INJECTION, SOLUTION INTRAMUSCULAR; INTRAVENOUS at 05:11

## 2020-11-02 RX ADMIN — LATANOPROST 1 DROP: 50 SOLUTION OPHTHALMIC at 09:11

## 2020-11-02 RX ADMIN — INSULIN DETEMIR 5 UNITS: 100 INJECTION, SOLUTION SUBCUTANEOUS at 09:11

## 2020-11-02 RX ADMIN — INSULIN ASPART 2 UNITS: 100 INJECTION, SOLUTION INTRAVENOUS; SUBCUTANEOUS at 12:11

## 2020-11-02 RX ADMIN — ATORVASTATIN CALCIUM 10 MG: 10 TABLET, FILM COATED ORAL at 09:11

## 2020-11-02 NOTE — ASSESSMENT & PLAN NOTE
Previously on glipizide 10mg daily and semaglutide 0.25mg weekly. Glucose starting to trend up over last few days, averaging 180s-190s.    Plan:   - Holding home anti-hyperglycemics   - POCT BG qAC/HS while eating   - Goal -180   - Will start insulin detemir 5 units nightly    - LD SSI for correction   - Diabetic cardiac diet, fluid restriction 1.5L    - Will provide endocrinology referral at discharge

## 2020-11-02 NOTE — ASSESSMENT & PLAN NOTE
Patient is a chronic CO2 retainer in setting of obesity hypoventilation syndrome 2/2 morbid obesity. Stepped down from ICU 10/26, currently continuing diuresis. De-escalated to NC during the day with BiPAP nightly on 10/31, which patient tolerated well.      Plan:   - Target SpO2 of >88%. Continue BiPAP nightly. Patient on 5LNC during the day, wean FiO2 as tolerated.   - Continue furosemide gtt at 10mg/hr   - Continue chlorthalidone 500mg daily   - Holding spironolactone for now to prevent over-diuresis with slightly rising BUN/creatinine   - Approx 61L net negative since admission

## 2020-11-02 NOTE — PLAN OF CARE
Problem: Occupational Therapy Goal  Goal: Occupational Therapy Goal  Description: Goals to be met by: 11/6/2020    Patient will increase functional independence with ADLs by performing:    UE Dressing with Stand-by Assistance.  LE Dressing with Moderate Assistance.  Grooming while EOB with Modified [place].  Toileting from bedside commode with Minimal Assistance for hygiene and clothing management.   Rolling to Bilateral with Stand-by Assistance.   Supine to sit with Contact Guard Assistance.  Stand pivot transfers with Supervision.  Toilet transfer to bedside commode with Supervision.    Goals remain appropriate. Continue OT as tolerated.  Tangela Green OT  11/2/2020    Outcome: Ongoing, Progressing

## 2020-11-02 NOTE — SUBJECTIVE & OBJECTIVE
Interval History: Patient seen and examined in room this morning. No acute events or significant changes overnight. Has remained afebrile and hemodynamically stable. Approximately 6800cc UOP in previous 24 hours. Continuing to diurese well and is compliant with BiPAP at night.     Review of Systems   Constitutional: Negative for diaphoresis and fever.   HENT: Negative for congestion and sore throat.    Eyes: Negative for redness and visual disturbance.   Respiratory: Negative for cough and stridor.    Cardiovascular: Positive for leg swelling. Negative for chest pain.   Gastrointestinal: Positive for abdominal distention. Negative for abdominal pain, constipation, diarrhea and vomiting.   Genitourinary: Negative for dysuria and hematuria.   Musculoskeletal: Negative for back pain.   Skin: Negative for color change and pallor.   Neurological: Negative for speech difficulty and headaches.   Psychiatric/Behavioral: Negative for agitation and confusion.     Objective:     Vital Signs (Most Recent):  Temp: 97.5 °F (36.4 °C) (11/02/20 1100)  Pulse: 79 (11/02/20 1233)  Resp: (!) 21 (11/02/20 1233)  BP: 115/74 (11/02/20 1233)  SpO2: (!) 91 % (11/02/20 1233) Vital Signs (24h Range):  Temp:  [97.5 °F (36.4 °C)-98.7 °F (37.1 °C)] 97.5 °F (36.4 °C)  Pulse:  [74-83] 79  Resp:  [16-28] 21  SpO2:  [89 %-98 %] 91 %  BP: (115-140)/(69-92) 115/74     Weight: (!) 186.5 kg (411 lb 2.5 oz)  Body mass index is 72.83 kg/m².    Intake/Output Summary (Last 24 hours) at 11/2/2020 1337  Last data filed at 11/2/2020 1137  Gross per 24 hour   Intake 720 ml   Output 5500 ml   Net -4780 ml      Physical Exam  Vitals signs and nursing note reviewed.   Constitutional:       General: She is awake. She is not in acute distress.     Appearance: She is morbidly obese. She is not diaphoretic.      Interventions: Nasal cannula in place.   HENT:      Head: Normocephalic and atraumatic.      Nose: Nose normal.      Mouth/Throat:      Mouth: Mucous  membranes are moist.      Pharynx: Oropharynx is clear.   Eyes:      Extraocular Movements: Extraocular movements intact.      Conjunctiva/sclera: Conjunctivae normal.      Pupils: Pupils are equal, round, and reactive to light.   Neck:      Musculoskeletal: Normal range of motion.   Cardiovascular:      Rate and Rhythm: Normal rate and regular rhythm.      Pulses: Normal pulses.      Heart sounds: Normal heart sounds.   Pulmonary:      Effort: Pulmonary effort is normal. No respiratory distress.      Breath sounds: Normal breath sounds. No wheezing or rhonchi.   Abdominal:      General: Abdomen is protuberant. There is distension.      Palpations: Abdomen is soft.      Tenderness: There is no abdominal tenderness.   Musculoskeletal:         General: Swelling and tenderness present.      Right lower leg: Edema present.      Left lower leg: Edema present.      Comments: Hematoma on LUE, mildly TTP with surrounding ecchymosis.   Skin:     General: Skin is warm and dry.      Coloration: Skin is not jaundiced.      Findings: Erythema and rash present. Rash is scaling.      Comments: Chronic venous stasis changes present on BLE.   Neurological:      General: No focal deficit present.      Mental Status: She is alert and oriented to person, place, and time.   Psychiatric:         Mood and Affect: Mood normal.         Behavior: Behavior normal.       Significant Labs:   CBC:   Recent Labs   Lab 11/01/20 0327 11/02/20  0537   WBC 5.97 7.18   HGB 15.3 16.3*   HCT 53.8* 54.3*   * 148*     CMP:   Recent Labs   Lab 11/01/20 0327 11/02/20  0537    137   K 3.8 4.8   CL 81* 83*   CO2 48* 40*   * 191*   BUN 34* 40*   CREATININE 1.1 1.1   CALCIUM 10.2 10.5   PROT 7.5 8.0   ALBUMIN 3.6 3.8   BILITOT 1.0 1.1*   ALKPHOS 62 62   AST 45* 56*   ALT 41 50*   ANIONGAP 12 14   EGFRNONAA 59.9* 59.9*     Magnesium:   Recent Labs   Lab 11/01/20 0327 11/02/20  0537   MG 2.1 1.9       Significant Imaging: No new imaging to  review this AM.

## 2020-11-02 NOTE — ASSESSMENT & PLAN NOTE
Plan:  - Per patient, dry weight of approximately 370 lb -> up to 500 pounds (BMI 88) this admission  - Weight today 411 (BMI 73)  - Continuing diuresis as noted above

## 2020-11-02 NOTE — PT/OT/SLP PROGRESS
"Physical Therapy Treatment    Patient Name:  Halley Moreno   MRN:  [id number]    Recommendations:     Discharge Recommendations:  rehabilitation facility   Discharge Equipment Recommendations: (TBD pending pt progress)   Barriers to discharge: Inaccessible home and Decreased caregiver support    Assessment:     Halley Moreno is a 45 y.o. female admitted with a medical diagnosis of Right heart failure due to pulmonary hypertension.  She presents with the following impairments/functional limitations:  weakness, impaired endurance, impaired self care skills, impaired functional mobilty, gait instability, impaired balance, decreased safety awareness, decreased upper extremity function, decreased lower extremity function, pain, decreased ROM, impaired cardiopulmonary response to activity Co treat with OT due to patient's increased level of required skilled assistance and decreased tolerance to activity. Patient tolerated treatment fairly well today with mobility primarily limited by dizziness in standing.  Continue per POC. This patient is an excellent candidate for inpatient rehabilitation, has a qualifying diagnosis, shows increasing activity tolerance,  is motivated to participate in treatment, and has a supportive family willing to assist the patient upon discharge.     Rehab Prognosis: Good; patient would benefit from acute skilled PT services to address these deficits and reach maximum level of function.    Recent Surgery: * No surgery found *      Plan:     During this hospitalization, patient to be seen 3 x/week to address the identified rehab impairments via gait training, therapeutic activities, therapeutic exercises, neuromuscular re-education and progress toward the following goals:    · Plan of Care Expires:  11/21/20    Subjective     Chief Complaint: dizziness in standign  Patient/Family Comments/goals: "I feel bad I couldn't walk further, was that good enough?"  Pain/Comfort:  · Pain Rating 1: " 0/10  · Pain Rating Post-Intervention 1: 0/10      Objective:     Communicated with nurse prior to session.  Patient found HOB elevated with pulse ox (continuous), telemetry, alamo catheter upon PT entry to room.     General Precautions: Standard, fall   Orthopedic Precautions:N/A   Braces:       Functional Mobility:  · Bed Mobility:     · Rolling Right: moderate assistance  · Scooting: supervision  · Bridging: supervision  · Supine to Sit: moderate assistance  · Sit to Supine: maximal assistance and of 2 persons  · Transfers:     · Sit to Stand:  minimum assistance and of 2 persons with hand-held assist  · Gait: 2 steps with contact guard assistance, patient returned to bed due to dizziness      AM-PAC 6 CLICK MOBILITY  Turning over in bed (including adjusting bedclothes, sheets and blankets)?: 2  Sitting down on and standing up from a chair with arms (e.g., wheelchair, bedside commode, etc.): 3  Moving from lying on back to sitting on the side of the bed?: 2  Moving to and from a bed to a chair (including a wheelchair)?: 3  Need to walk in hospital room?: 3  Climbing 3-5 steps with a railing?: 1  Basic Mobility Total Score: 14       Therapeutic Activities and Exercises:   Pt educated on plan and goals with physical therapy.   Pt educated on importance of OOB activity to decrease the risks associated with bed rest.   Educated on pursed lip and diaphragmatic breathing  Patient performed bridging and scooting to adjust self in bed  Sat EOB ~12 minutes  All questions and concerns addressed within PT scope of practice.       Patient left HOB elevated with all lines intact, call button in reach and nurse notified..    GOALS:   Multidisciplinary Problems     Physical Therapy Goals        Problem: Physical Therapy Goal    Goal Priority Disciplines Outcome Goal Variances Interventions   Physical Therapy Goal     PT, PT/OT Ongoing, Progressing     Description: Goals to be met by: 11/5/2020    Patient will increase  functional independence with mobility by performin. Supine to sit with MInimal Assistance of 2 persons.  2. Sit to supine with Moderate Assistance of 2 persons.  3. Sit to stand transfer with Minimal Assistance of 1 person.  4. Bed to chair stand pivot transfer with Contact Guard Assistance of 1 person using Rolling Walker.  5. Gait  x 40 feet with Contact Guard Assistance of 1 person using Rolling Walker.   6. Lower extremity exercise program x20 reps per handout, with assistance as needed.                     Time Tracking:     PT Received On: 20  PT Start Time: 1130     PT Stop Time: 1154  PT Total Time (min): 24 min     Billable Minutes: Therapeutic Activity 12 and Therapeutic Exercise 12    Treatment Type: Treatment  PT/PTA: PT     PTA Visit Number: 0     Jacqui Nolasco, PT  2020

## 2020-11-02 NOTE — ASSESSMENT & PLAN NOTE
Patient with mild recurrent hypokalemia, most likely due to consistent diuresis. K today 4.8.    Plan:   - CMP daily   - Replete PRN with goal K>4

## 2020-11-02 NOTE — PROGRESS NOTES
[facility] Hospital Medicine  Progress Note    Patient Name: Halley Moreno  MRN: [id number]  Patient Class: IP- Inpatient   Admission Date: 10/22/2020  Length of Stay: 11 days  Attending Physician: Juno Jones MD  Primary Care Provider: Yamila Tejeda MD    [facility] Medicine Team: [facility] HOSP MED 1 Rao Szymanski MD    Subjective:     Principal Problem:Right heart failure due to pulmonary hypertension        HPI:  Patient is a 45-year-old female who is presenting to us with acute respiratory distress.  She has a recent diagnosis of severe pulmonary hypertension with right-sided heart failure.  Her last echo showed a PA systolic pressure of 99.  She initially presented due to increasing weight gain over the last 7 months.  Patient states that in March she began gaining weight and she was placed on diuretic to trying keep that weight down.  Despite 40 mg of furosemide b.i.d. she continued to gain weight.  She has gained approximately 100 lb since March and 80 lb since May.  She does not have a cardiologist but was scheduled to see 1 in December.  Patient states that at home she sleeps in a elevated position secondary to shortness of breath.  She denies any episodes of waking up in the middle of the night out of breath.  She does not use any home oxygen nor she ever been told explicitly that she has heart failure.  She demonstrates poor understanding of the disease stating that the right heart was backing up into the lungs and cause in the shortness of breath.  She presented to the clinic today for evaluation and was found to be in acute respiratory distress with hypoxia.  She was referred to the ED for evaluation and diuresis.    Patient states that she has no known family history of heart failure in the family however she does state that her father had a pacemaker for unknown reasons.  She states that she does not think that anyone her and her family has had pulmonary hypertension.   Patient states that she does not drink, does not smoke, and does not use IV drugs.    Overview/Hospital Course:  Patient was admitted to Hospital Medicine. Was initially hypoxic and put on nasal cannula with normalization of her oxygen status to approximately 95%. She was demonstrating initial respiratory distress and was given a bolus of 120mg IV furosemide. Hypoxia and hypercapnia worsened so was stepped up to ICU on 10/23. Was placed on BiPAP and aggressively diuresed (approx 20L net negative) with improvement in respiratory status. Weaned to 2L NC and subsequently stepped back down to Hospital Medicine on 10/26. Continued diuresis with IV Lasix gtt, diuril, and aldactone. On 10/27 VBGs noted pCO2 in 90s - 100s and patient was started back on continuous BiPAP with improvement in pCO2 levels. Patient weaned from BiPAP throughout admission with improving oxygenation.    Interval History: Patient seen and examined in room this morning. No acute events or significant changes overnight. Has remained afebrile and hemodynamically stable. Approximately 6800cc UOP in previous 24 hours. Continuing to diurese well and is compliant with BiPAP at night.     Review of Systems   Constitutional: Negative for diaphoresis and fever.   HENT: Negative for congestion and sore throat.    Eyes: Negative for redness and visual disturbance.   Respiratory: Negative for cough and stridor.    Cardiovascular: Positive for leg swelling. Negative for chest pain.   Gastrointestinal: Positive for abdominal distention. Negative for abdominal pain, constipation, diarrhea and vomiting.   Genitourinary: Negative for dysuria and hematuria.   Musculoskeletal: Negative for back pain.   Skin: Negative for color change and pallor.   Neurological: Negative for speech difficulty and headaches.   Psychiatric/Behavioral: Negative for agitation and confusion.     Objective:     Vital Signs (Most Recent):  Temp: 97.5 °F (36.4 °C) (11/02/20 1100)  Pulse: 79  (11/02/20 1233)  Resp: (!) 21 (11/02/20 1233)  BP: 115/74 (11/02/20 1233)  SpO2: (!) 91 % (11/02/20 1233) Vital Signs (24h Range):  Temp:  [97.5 °F (36.4 °C)-98.7 °F (37.1 °C)] 97.5 °F (36.4 °C)  Pulse:  [74-83] 79  Resp:  [16-28] 21  SpO2:  [89 %-98 %] 91 %  BP: (115-140)/(69-92) 115/74     Weight: (!) 186.5 kg (411 lb 2.5 oz)  Body mass index is 72.83 kg/m².    Intake/Output Summary (Last 24 hours) at 11/2/2020 1337  Last data filed at 11/2/2020 1137  Gross per 24 hour   Intake 720 ml   Output 5500 ml   Net -4780 ml      Physical Exam  Vitals signs and nursing note reviewed.   Constitutional:       General: She is awake. She is not in acute distress.     Appearance: She is morbidly obese. She is not diaphoretic.      Interventions: Nasal cannula in place.   HENT:      Head: Normocephalic and atraumatic.      Nose: Nose normal.      Mouth/Throat:      Mouth: Mucous membranes are moist.      Pharynx: Oropharynx is clear.   Eyes:      Extraocular Movements: Extraocular movements intact.      Conjunctiva/sclera: Conjunctivae normal.      Pupils: Pupils are equal, round, and reactive to light.   Neck:      Musculoskeletal: Normal range of motion.   Cardiovascular:      Rate and Rhythm: Normal rate and regular rhythm.      Pulses: Normal pulses.      Heart sounds: Normal heart sounds.   Pulmonary:      Effort: Pulmonary effort is normal. No respiratory distress.      Breath sounds: Normal breath sounds. No wheezing or rhonchi.   Abdominal:      General: Abdomen is protuberant. There is distension.      Palpations: Abdomen is soft.      Tenderness: There is no abdominal tenderness.   Musculoskeletal:         General: Swelling and tenderness present.      Right lower leg: Edema present.      Left lower leg: Edema present.      Comments: Hematoma on LUE, mildly TTP with surrounding ecchymosis.   Skin:     General: Skin is warm and dry.      Coloration: Skin is not jaundiced.      Findings: Erythema and rash present. Rash  is scaling.      Comments: Chronic venous stasis changes present on BLE.   Neurological:      General: No focal deficit present.      Mental Status: She is alert and oriented to person, place, and time.   Psychiatric:         Mood and Affect: Mood normal.         Behavior: Behavior normal.       Significant Labs:   CBC:   Recent Labs   Lab 11/01/20 0327 11/02/20  0537   WBC 5.97 7.18   HGB 15.3 16.3*   HCT 53.8* 54.3*   * 148*     CMP:   Recent Labs   Lab 11/01/20 0327 11/02/20  0537    137   K 3.8 4.8   CL 81* 83*   CO2 48* 40*   * 191*   BUN 34* 40*   CREATININE 1.1 1.1   CALCIUM 10.2 10.5   PROT 7.5 8.0   ALBUMIN 3.6 3.8   BILITOT 1.0 1.1*   ALKPHOS 62 62   AST 45* 56*   ALT 41 50*   ANIONGAP 12 14   EGFRNONAA 59.9* 59.9*     Magnesium:   Recent Labs   Lab 11/01/20 0327 11/02/20  0537   MG 2.1 1.9       Significant Imaging: No new imaging to review this AM.      Assessment/Plan:      * Right heart failure due to pulmonary hypertension  Patient with right heart failure due to fluid overload. TTE 10/23 with LVEF 75%, reduced RV systolic function, RV enlargement, TR, PASP 109. Continuing diuresis and will obtain repeat echo once closer to euvolemia.    Plan:  - Strict Is/Os  - Daily weights  - Keep K>4, Mg>2  - Diabetic/cardiac low sodium diet (<2 g/day)  - Fluid restriction to 1.5 L/day    Acute respiratory failure with hypoxia and hypercapnia  Patient is a chronic CO2 retainer in setting of obesity hypoventilation syndrome 2/2 morbid obesity. Stepped down from ICU 10/26, currently continuing diuresis. De-escalated to NC during the day with BiPAP nightly on 10/31, which patient tolerated well.      Plan:   - Target SpO2 of >88%. Continue BiPAP nightly. Patient on 5LNC during the day, wean FiO2 as tolerated.   - Continue furosemide gtt at 10mg/hr   - Continue chlorthalidone 500mg daily   - Holding spironolactone for now to prevent over-diuresis with slightly rising BUN/creatinine   - Approx  61L net negative since admission    Diabetes mellitus type 2 without retinopathy  Previously on glipizide 10mg daily and semaglutide 0.25mg weekly. Glucose starting to trend up over last few days, averaging 180s-190s.    Plan:   - Holding home anti-hyperglycemics   - POCT BG qAC/HS while eating   - Goal -180   - Will start insulin detemir 5 units nightly    - LD SSI for correction   - Diabetic cardiac diet, fluid restriction 1.5L    - Will provide endocrinology referral at discharge    Morbid obesity with BMI of 70 and over, adult  Plan:  - Per patient, dry weight of approximately 370 lb -> up to 500 pounds (BMI 88) this admission  - Weight today 411 (BMI 73)  - Continuing diuresis as noted above    Hypertension associated with diabetes  Patient on losartan 50mg daily at home. Remains normotensive at this time.    Plan:   - Holding home anti-hypertensive in acute setting   - Continue atorvastatin 10mg nightly    Sleep apnea  Patient's pulmonary HTN and RV dysfunction likely result of obesity hypoventilation syndrome with non-compliance with at-home CPAP. Evaluated by Pulmonology, who recommended continued use of NIPPV and encouraged weight loss.     Plan:    - NIPPV nightly    Obesity hypoventilation syndrome  See Acute respiratory failure with hypoxia and hypercapnia above    Hypokalemia  Patient with mild recurrent hypokalemia, most likely due to consistent diuresis. K today 4.8.    Plan:   - CMP daily   - Replete PRN with goal K>4    Hyperphosphatemia  Patient's phosphorus level has been steadily increasing throughout hospital stay, up to 5.4 today. Likely secondary to aggressive diuresis, but patient has remained asymptomatic.    Plan:   - Monitor with daily CMP    Constipation  Plan:   - Continue with polyethylene glycol and/or senna-docusate PRN    Impaired mobility and activities of daily living  PT/OT consulted, recommending rehab at discharge. PM&R consulted and following.    Plan:   - Continue PT/OT  while inpatient    Discharge planning issues  PT/OT recommend rehab  - Patient needs to be closer to dry weight ~380 lbs  - Consider 6 min walk test to assess need for oxygen prior to discharge in setting of heart failure    VTE Risk Mitigation (From admission, onward)         Ordered     enoxaparin injection 60 mg  Every 12 hours      10/22/20 1518     IP VTE HIGH RISK PATIENT  Once      10/22/20 1346     Place sequential compression device  Until discontinued      10/22/20 1346                Discharge Planning   JOSE RAUL: 11/4/2020     Code Status: Full Code   Is the patient medically ready for discharge?: No    Reason for patient still in hospital: Patient trending condition, Treatment and Pending disposition  Discharge Plan A: Rehab        Rao Szymanski MD, PGY-1  Department of Hospital Medicine   [facility]

## 2020-11-02 NOTE — CARE UPDATE
"RAPID RESPONSE NURSE PROACTIVE ROUNDING NOTE     Time of Visit:     Admit Date: 10/22/2020  LOS: 10  Code Status: Full Code   Date of Visit: 2020  : 1975  Age: 45 y.o.  Sex: female  Race: White  Bed: 8076/8076 A:   MRN: [id number]  Was the patient discharged from an ICU this admission? yes   Was the patient discharged from a PACU within last 24 hours?  no  Did the patient receive conscious sedation/general anesthesia in last 24 hours?  no  Was the patient in the ED within the past 24 hours?  no  Was the patient started on NIPPV within the past 24 hours?  no  Attending Physician: Juno Jones MD  Primary Service: [facility] HOSP MED 1    ASSESSMENT     Notified by telemetry via phone call.  Reason for alert: SpO2 70s    Diagnosis: Right heart failure due to pulmonary hypertension    Abnormal Vital Signs: BP (!) 140/87   Pulse 82   Temp 98.4 °F (36.9 °C) (Oral)   Resp (!) 21   Ht 5' 3" (1.6 m)   Wt (!) 187 kg (412 lb 4.2 oz)   LMP  (LMP Unknown)   SpO2 (!) 92%   Breastfeeding No   BMI 73.03 kg/m²      Clinical Issues: Respiratory    Patient  has a past medical history of BMI 70 and over, adult, Depression, Diabetes mellitus, DM (diabetes mellitus) type II controlled with renal manifestation, HTN (hypertension), Hyperlipidemia, Lumbar disc disease, Morbid obesity, Recurrent nephrolithiasis, Rosacea, Sleep apnea, and Tear of medial meniscus of right knee, current.      HR 81, RR 17, SpO2 91 on 5L NC, /85 (110).      Pt remains AAOx4.  Pt lethargic, but arouses easily to voice.  Apneic episodes noted when asleep with SpO2 in 70s, but returns to 92% once aroused.  Denies SOB.  Pt refused BiPAP earlier in night per bedside RNKristine.  I explained necessity of BiPAP to patient considering lethargy and apneic episodes.  Pt agreeable at this time.     INTERVENTIONS/ RECOMMENDATIONS      Respiratory therapist contacted to place patient on BiPAP.  Continue close monitoring of VS, respiratory " status, and neuro status.  Call primary team with any acute changes.      Discussed plan of care with RNKristine.    PHYSICIAN ESCALATION     Yes/No  no    Disposition: Remain in room 0004.    FOLLOW-UP     Call back the Rapid Response Nurse, Esther Lopez RN at [phone] for additional questions or concerns.

## 2020-11-02 NOTE — PLAN OF CARE
Problem: Adult Inpatient Plan of Care  Goal: Plan of Care Review  Outcome: Ongoing, Progressing   POC reviewed with pt. VSS. Remains on 5L NC. Remains on lasix gtt at 10mg/hr. Pt desatted while napping. Pt states she is tired today due to not getting much sleep last night. Explained to pt that if she continues to nap, we will need to place bipap. Pt verbalizes understanding. No other changes. Safety maintained. Will continue to monitor.   none

## 2020-11-02 NOTE — PT/OT/SLP PROGRESS
Occupational Therapy   Co-Treatment with PT for pt activity tolerance    Name: Halley Moreno  MRN: [id number]  Admitting Diagnosis:  Right heart failure due to pulmonary hypertension       Recommendations:     Discharge Recommendations: rehabilitation facility  Discharge Equipment Recommendations:  bath bench  Barriers to discharge:  Decreased caregiver support    Assessment:     Halley Moreno is a 45 y.o. female with a medical diagnosis of Right heart failure due to pulmonary hypertension.  She presents with impaired ADL and mobility performance deficits. Pt very motivated to participate in OT/PT co treatment. Pt participated well this date with dizziness as overall limiting factor impeding further mobility. She participated in bed mobility with mod (A)-max (A) and stood with min (A)X2 at bedside. It is important to note that bed height and pt's body habitus causes pt increased exertion and difficulty with cardiovascular performance. Pt remains eager to mobilize and is not at her baseline. Pt would benefit from continued OT skilled services 4x/wk to improve daily living skills to optimize QOL. Pt is recommended to discharge to Rehab at this time.    Performance deficits affecting function are weakness, impaired self care skills, impaired balance, impaired endurance, impaired functional mobilty, gait instability, edema, decreased lower extremity function, decreased upper extremity function, impaired cardiopulmonary response to activity.     Rehab Prognosis:  Good; patient would benefit from acute skilled OT services to address these deficits and reach maximum level of function.       Plan:     Patient to be seen 3 x/week to address the above listed problems via self-care/home management, therapeutic activities, therapeutic exercises, neuromuscular re-education  · Plan of Care Expires: 11/22/20  · Plan of Care Reviewed with: patient    Subjective     Pain/Comfort:  · Pain Rating 1: 0/10  · Pain Rating  Post-Intervention 1: 0/10    Objective:     Communicated with: RN prior to session.  Patient found HOB elevated with pulse ox (continuous), telemetry, alamo catheter upon OT entry to room.    General Precautions: Standard, fall   Orthopedic Precautions:N/A   Braces: N/A     Occupational Performance:     Bed Mobility:    · Patient completed Rolling/Turning to Right with moderate assistance  · Patient completed Scooting/Bridging with moderate assistance (pt able to bridge and scoot self however needed A 2/2 dizziness)  · Patient completed Supine to Sit with moderate assistance  · Patient completed Sit to Supine with maximal assistance     Functional Mobility/Transfers:  · Patient completed Sit <> Stand Transfer with minimum assistance and of 2 persons  with  hand-held assist   · Functional Mobility: Pt stood at bedside ~1 min and took ~2 steps forward with min (A)x2. Pt reported unresolved dizziness in standing so safely returned to bedside.    Activities of Daily Living:  · Feeding:  setup (A) of lunch meal with HOB elevated  · Upper Body Dressing: moderate assistance donning gown around back at EOB       [facility] 6 Click ADL: 14    Treatment & Education:  Pt educated on role of occupational therapy, POC, and safety during ADLs and functional mobility. Pt and OT discussed importance of safe, continued mobility to optimize daily living skills. Pt verbalized understanding.   Pt completed the following during session: bed mobility, UB dressing, sit<stand t/f, linen change, pursed lip breathing education, safe reposition in bed and setup for meal. White board updated during session. Pt given instruction to call for medical staff/nurse for assistance.       Patient left HOB elevated with all lines intact, call button in reach and RN notifiedEducation:      GOALS:   Multidisciplinary Problems     Occupational Therapy Goals        Problem: Occupational Therapy Goal    Goal Priority Disciplines Outcome Interventions    Occupational Therapy Goal     OT, PT/OT Ongoing, Progressing    Description: Goals to be met by: 11/6/2020    Patient will increase functional independence with ADLs by performing:    UE Dressing with Stand-by Assistance.  LE Dressing with Moderate Assistance.  Grooming while EOB with Modified [place].  Toileting from bedside commode with Minimal Assistance for hygiene and clothing management.   Rolling to Bilateral with Stand-by Assistance.   Supine to sit with Contact Guard Assistance.  Stand pivot transfers with Supervision.  Toilet transfer to bedside commode with Supervision.                     Time Tracking:     OT Date of Treatment: 11/02/20  OT Start Time: 1128  OT Stop Time: 1153  OT Total Time (min): 25 min    Billable Minutes:Self Care/Home Management 15 min  Therapeutic Activity 10 min    Tangela Green OT  11/2/2020

## 2020-11-03 LAB
ALBUMIN SERPL BCP-MCNC: 3.5 G/DL (ref 3.5–5.2)
ALP SERPL-CCNC: 59 U/L (ref 55–135)
ALT SERPL W/O P-5'-P-CCNC: 51 U/L (ref 10–44)
ANION GAP SERPL CALC-SCNC: 13 MMOL/L (ref 8–16)
AST SERPL-CCNC: 47 U/L (ref 10–40)
BASOPHILS # BLD AUTO: 0.02 K/UL (ref 0–0.2)
BASOPHILS NFR BLD: 0.3 % (ref 0–1.9)
BILIRUB SERPL-MCNC: 1.1 MG/DL (ref 0.1–1)
BUN SERPL-MCNC: 44 MG/DL (ref 6–20)
CALCIUM SERPL-MCNC: 10.2 MG/DL (ref 8.7–10.5)
CHLORIDE SERPL-SCNC: 83 MMOL/L (ref 95–110)
CO2 SERPL-SCNC: 44 MMOL/L (ref 23–29)
CREAT SERPL-MCNC: 1.1 MG/DL (ref 0.5–1.4)
DIFFERENTIAL METHOD: ABNORMAL
EOSINOPHIL # BLD AUTO: 0.1 K/UL (ref 0–0.5)
EOSINOPHIL NFR BLD: 2 % (ref 0–8)
ERYTHROCYTE [DISTWIDTH] IN BLOOD BY AUTOMATED COUNT: 19.9 % (ref 11.5–14.5)
EST. GFR  (AFRICAN AMERICAN): >60 ML/MIN/1.73 M^2
EST. GFR  (NON AFRICAN AMERICAN): 59.9 ML/MIN/1.73 M^2
GLUCOSE SERPL-MCNC: 219 MG/DL (ref 70–110)
HCT VFR BLD AUTO: 52.2 % (ref 37–48.5)
HGB BLD-MCNC: 15.2 G/DL (ref 12–16)
IMM GRANULOCYTES # BLD AUTO: 0.01 K/UL (ref 0–0.04)
IMM GRANULOCYTES NFR BLD AUTO: 0.1 % (ref 0–0.5)
LYMPHOCYTES # BLD AUTO: 1.5 K/UL (ref 1–4.8)
LYMPHOCYTES NFR BLD: 21 % (ref 18–48)
MAGNESIUM SERPL-MCNC: 1.9 MG/DL (ref 1.6–2.6)
MCH RBC QN AUTO: 25.9 PG (ref 27–31)
MCHC RBC AUTO-ENTMCNC: 29.1 G/DL (ref 32–36)
MCV RBC AUTO: 89 FL (ref 82–98)
MONOCYTES # BLD AUTO: 0.9 K/UL (ref 0.3–1)
MONOCYTES NFR BLD: 12.9 % (ref 4–15)
NEUTROPHILS # BLD AUTO: 4.5 K/UL (ref 1.8–7.7)
NEUTROPHILS NFR BLD: 63.7 % (ref 38–73)
NRBC BLD-RTO: 0 /100 WBC
PHOSPHATE SERPL-MCNC: 5 MG/DL (ref 2.7–4.5)
PLATELET # BLD AUTO: 160 K/UL (ref 150–350)
PMV BLD AUTO: 11.3 FL (ref 9.2–12.9)
POCT GLUCOSE: 180 MG/DL (ref 70–110)
POCT GLUCOSE: 218 MG/DL (ref 70–110)
POCT GLUCOSE: 223 MG/DL (ref 70–110)
POCT GLUCOSE: 239 MG/DL (ref 70–110)
POCT GLUCOSE: 247 MG/DL (ref 70–110)
POTASSIUM SERPL-SCNC: 3.4 MMOL/L (ref 3.5–5.1)
PROT SERPL-MCNC: 7.7 G/DL (ref 6–8.4)
RBC # BLD AUTO: 5.88 M/UL (ref 4–5.4)
SODIUM SERPL-SCNC: 140 MMOL/L (ref 136–145)
WBC # BLD AUTO: 7.06 K/UL (ref 3.9–12.7)

## 2020-11-03 PROCEDURE — 97116 GAIT TRAINING THERAPY: CPT | Mod: CQ

## 2020-11-03 PROCEDURE — 97535 SELF CARE MNGMENT TRAINING: CPT

## 2020-11-03 PROCEDURE — 94761 N-INVAS EAR/PLS OXIMETRY MLT: CPT

## 2020-11-03 PROCEDURE — 27000221 HC OXYGEN, UP TO 24 HOURS

## 2020-11-03 PROCEDURE — 83735 ASSAY OF MAGNESIUM: CPT

## 2020-11-03 PROCEDURE — 97530 THERAPEUTIC ACTIVITIES: CPT

## 2020-11-03 PROCEDURE — 84100 ASSAY OF PHOSPHORUS: CPT

## 2020-11-03 PROCEDURE — 63600175 PHARM REV CODE 636 W HCPCS: Performed by: STUDENT IN AN ORGANIZED HEALTH CARE EDUCATION/TRAINING PROGRAM

## 2020-11-03 PROCEDURE — 80053 COMPREHEN METABOLIC PANEL: CPT

## 2020-11-03 PROCEDURE — 99233 SBSQ HOSP IP/OBS HIGH 50: CPT | Mod: ,,, | Performed by: HOSPITALIST

## 2020-11-03 PROCEDURE — 25000003 PHARM REV CODE 250: Performed by: STUDENT IN AN ORGANIZED HEALTH CARE EDUCATION/TRAINING PROGRAM

## 2020-11-03 PROCEDURE — 94660 CPAP INITIATION&MGMT: CPT

## 2020-11-03 PROCEDURE — 20600001 HC STEP DOWN PRIVATE ROOM

## 2020-11-03 PROCEDURE — 99233 PR SUBSEQUENT HOSPITAL CARE,LEVL III: ICD-10-PCS | Mod: ,,, | Performed by: HOSPITALIST

## 2020-11-03 PROCEDURE — 99900035 HC TECH TIME PER 15 MIN (STAT)

## 2020-11-03 PROCEDURE — 97530 THERAPEUTIC ACTIVITIES: CPT | Mod: CQ

## 2020-11-03 PROCEDURE — C9399 UNCLASSIFIED DRUGS OR BIOLOG: HCPCS | Performed by: STUDENT IN AN ORGANIZED HEALTH CARE EDUCATION/TRAINING PROGRAM

## 2020-11-03 PROCEDURE — 63600175 PHARM REV CODE 636 W HCPCS: Performed by: INTERNAL MEDICINE

## 2020-11-03 PROCEDURE — 85025 COMPLETE CBC W/AUTO DIFF WBC: CPT

## 2020-11-03 PROCEDURE — 36415 COLL VENOUS BLD VENIPUNCTURE: CPT

## 2020-11-03 RX ORDER — INSULIN ASPART 100 [IU]/ML
2 INJECTION, SOLUTION INTRAVENOUS; SUBCUTANEOUS
Status: DISCONTINUED | OUTPATIENT
Start: 2020-11-03 | End: 2020-11-04

## 2020-11-03 RX ORDER — MAGNESIUM SULFATE HEPTAHYDRATE 40 MG/ML
2 INJECTION, SOLUTION INTRAVENOUS ONCE
Status: COMPLETED | OUTPATIENT
Start: 2020-11-03 | End: 2020-11-03

## 2020-11-03 RX ADMIN — INSULIN ASPART 2 UNITS: 100 INJECTION, SOLUTION INTRAVENOUS; SUBCUTANEOUS at 04:11

## 2020-11-03 RX ADMIN — INSULIN ASPART 2 UNITS: 100 INJECTION, SOLUTION INTRAVENOUS; SUBCUTANEOUS at 12:11

## 2020-11-03 RX ADMIN — CHLOROTHIAZIDE SODIUM 500 MG: 500 INJECTION INTRAVENOUS at 01:11

## 2020-11-03 RX ADMIN — MELATONIN TAB 3 MG 6 MG: 3 TAB at 09:11

## 2020-11-03 RX ADMIN — INSULIN ASPART 1 UNITS: 100 INJECTION, SOLUTION INTRAVENOUS; SUBCUTANEOUS at 06:11

## 2020-11-03 RX ADMIN — INSULIN DETEMIR 6 UNITS: 100 INJECTION, SOLUTION SUBCUTANEOUS at 09:11

## 2020-11-03 RX ADMIN — DOCUSATE SODIUM 50MG AND SENNOSIDES 8.6MG 2 TABLET: 8.6; 5 TABLET, FILM COATED ORAL at 08:11

## 2020-11-03 RX ADMIN — MICONAZOLE NITRATE: 20 POWDER TOPICAL at 09:11

## 2020-11-03 RX ADMIN — ENOXAPARIN SODIUM 60 MG: 60 INJECTION SUBCUTANEOUS at 09:11

## 2020-11-03 RX ADMIN — ATORVASTATIN CALCIUM 10 MG: 10 TABLET, FILM COATED ORAL at 08:11

## 2020-11-03 RX ADMIN — LATANOPROST 1 DROP: 50 SOLUTION OPHTHALMIC at 09:11

## 2020-11-03 RX ADMIN — ENOXAPARIN SODIUM 60 MG: 60 INJECTION SUBCUTANEOUS at 08:11

## 2020-11-03 RX ADMIN — DOCUSATE SODIUM 50MG AND SENNOSIDES 8.6MG 2 TABLET: 8.6; 5 TABLET, FILM COATED ORAL at 09:11

## 2020-11-03 RX ADMIN — POTASSIUM BICARBONATE 50 MEQ: 978 TABLET, EFFERVESCENT ORAL at 07:11

## 2020-11-03 RX ADMIN — MICONAZOLE NITRATE: 20 POWDER TOPICAL at 08:11

## 2020-11-03 RX ADMIN — POLYETHYLENE GLYCOL 3350 17 G: 17 POWDER, FOR SOLUTION ORAL at 08:11

## 2020-11-03 RX ADMIN — MAGNESIUM SULFATE 2 G: 2 INJECTION INTRAVENOUS at 07:11

## 2020-11-03 RX ADMIN — INSULIN ASPART 1 UNITS: 100 INJECTION, SOLUTION INTRAVENOUS; SUBCUTANEOUS at 09:11

## 2020-11-03 RX ADMIN — POTASSIUM BICARBONATE 50 MEQ: 978 TABLET, EFFERVESCENT ORAL at 11:11

## 2020-11-03 RX ADMIN — FUROSEMIDE 10 MG/HR: 10 INJECTION, SOLUTION INTRAMUSCULAR; INTRAVENOUS at 10:11

## 2020-11-03 NOTE — ASSESSMENT & PLAN NOTE
Previously on glipizide 10mg daily and semaglutide 0.25mg weekly. Glucose starting to trend up over last few days, averaging 190s to low 200s    Plan:   - Holding home anti-hyperglycemics   - POCT BG qAC/HS while eating   - Goal -180   - Insulin detemir 5 units nightly -> increase to 6 units nightly   - Add insulin aspart 2 units TIDWM today   - LD SSI for correction   - Diabetic cardiac diet, fluid restriction 1.5L    - Will provide endocrinology referral at discharge

## 2020-11-03 NOTE — PT/OT/SLP PROGRESS
Occupational Therapy   Co-Treatment with PT for pt's activity tolerance and for maximum safety    Name: Halley Moreno  MRN: [id number]  Admitting Diagnosis:  Right heart failure due to pulmonary hypertension       Recommendations:     Discharge Recommendations: rehabilitation facility  Discharge Equipment Recommendations:  bath bench, other (see comments)(bariatric RW)  Barriers to discharge:  Decreased caregiver support    Assessment:     Halley Moreno is a 45 y.o. female with a medical diagnosis of Right heart failure due to pulmonary hypertension. Pt presents with decreased endurance and impaired mobility performance as limited by cardiovascular status and generalized weakness.  .Pt continues to show excellent motivation and participation in therapy. Session today focused on bed mobility, sit<stand t/f, and mobility to bedside chair. Pt required increased time for all mobility this date to alleviate chances of lightheadedness and evident in yesterday's therapy session. Pt required SBA-CGA for bed mobility and completed sit<stand with min (A)x2. Bed deflated versus autofirm which showed evident improvements in pt mobility in bed as body habitus causing this task to be difficult to date. At this time, pt remains a high fall risk and is not safe to return home. Pt would benefit from continued OT skilled services 4x/wk to improve daily living skills to optimize QOL.  Pt is recommended to discharge to Rehab at this time.    Writing OT placed order for bariatric BSC and RW to be delivered to room.    Performance deficits affecting function are weakness, impaired self care skills, impaired balance, decreased coordination, impaired cardiopulmonary response to activity, edema, decreased lower extremity function, decreased upper extremity function, gait instability, impaired endurance, impaired functional mobilty.     Rehab Prognosis:  Good; patient would benefit from acute skilled OT services to address these  deficits and reach maximum level of function.       Plan:     Patient to be seen 4 x/week to address the above listed problems via self-care/home management, therapeutic activities, therapeutic exercises, neuromuscular re-education  · Plan of Care Expires: 11/22/20  · Plan of Care Reviewed with: patient    Subjective     Pain/Comfort:  · Pain Rating 1: 0/10  · Pain Rating Post-Intervention 1: 0/10    Objective:     Communicated with: RN prior to session.  Patient found HOB elevated with pulse ox (continuous), telemetry, laamo catheter upon OT entry to room.    General Precautions: Standard, fall   Orthopedic Precautions:N/A   Braces: N/A     Occupational Performance:     Bed Mobility:    · Patient completed Rolling/Turning to Right with stand by assistance  · Patient completed Scooting/Bridging with contact guard assistance  · Patient completed Supine to Sit with contact guard assistance     Functional Mobility/Transfers:  · Patient completed Sit <> Stand Transfer with minimum assistance and of 2 persons  with  hand-held assist   · Patient completed Bed <> Chair Transfer using Step Transfer technique with minimum assistance and of 2 persons with hand-held assist  · Functional Mobility: Pt took ~5 steps to pivot to bedside chair with min (A)x2 using HHA. Pt required increased time. Sp02 ~88-90s throughout while on 02.    Activities of Daily Living:  · Grooming: setup (A) for pt to wash face and brush hair       [facility] 6 Click ADL: 14    Treatment & Education:  Pt educated on role of occupational therapy, POC, and safety during ADLs and functional mobility. Pt and OT discussed importance of safe, continued mobility to optimize daily living skills. Pt verbalized understanding.   Pt completed the following during session: bed mobility, grooming tasks, sit<Stand t/f, mobility to bedside chair.  White board updated during session. Pt given instruction to call for medical staff/nurse for assistance.       Patient left up in  chair with all lines intact, call button in reach and RN notifiedEducation:      GOALS:   Multidisciplinary Problems     Occupational Therapy Goals        Problem: Occupational Therapy Goal    Goal Priority Disciplines Outcome Interventions   Occupational Therapy Goal     OT, PT/OT Ongoing, Progressing    Description: Goals to be met by: 11/6/2020    Patient will increase functional independence with ADLs by performing:    UE Dressing with Stand-by Assistance.  LE Dressing with Moderate Assistance.  Grooming while EOB with Modified [place].  Toileting from bedside commode with Minimal Assistance for hygiene and clothing management.   Rolling to Bilateral with Stand-by Assistance.   Supine to sit with Contact Guard Assistance.  Stand pivot transfers with Supervision.  Toilet transfer to bedside commode with Supervision.                     Time Tracking:     OT Date of Treatment: 11/03/20  OT Start Time: 1020  OT Stop Time: 1058  OT Total Time (min): 38 min    Billable Minutes:Self Care/Home Management 10 min  Therapeutic Activity 28 min    Tangela Green OT  11/3/2020

## 2020-11-03 NOTE — ASSESSMENT & PLAN NOTE
Patient's phosphorus level has been steadily increasing throughout hospital stay, at 5.0 today. Likely secondary to aggressive diuresis, but patient has remained asymptomatic.    Plan:   - Monitor with daily CMP

## 2020-11-03 NOTE — ASSESSMENT & PLAN NOTE
Plan:  - Per patient, dry weight of approximately 370 lb -> up to 500 pounds (BMI 88) this admission  - Weight today 407 (BMI 73)  - Continuing diuresis as noted above

## 2020-11-03 NOTE — PLAN OF CARE
Problem: Occupational Therapy Goal  Goal: Occupational Therapy Goal  Description: Goals to be met by: 11/6/2020    Patient will increase functional independence with ADLs by performing:    UE Dressing with Stand-by Assistance.  LE Dressing with Moderate Assistance.  Grooming while EOB with Modified [place].  Toileting from bedside commode with Minimal Assistance for hygiene and clothing management.   Rolling to Bilateral with Stand-by Assistance.   Supine to sit with Contact Guard Assistance.  Stand pivot transfers with Supervision.  Toilet transfer to bedside commode with Supervision.    Goals remain appropriate. Continue OT as tolerated.  Tangela Green OT  11/3/2020    Outcome: Ongoing, Progressing

## 2020-11-03 NOTE — MEDICAL/APP STUDENT
[facility]  Progress Note    Patient Name: Halley Moreno  MRN: [id number]  Patient Class: IP- Inpatient   Admission Date: 10/22/2020  Length of Stay: 12 days  Attending Physician: Juno Jones MD  Primary Care Provider: Yamila Tejeda MD    Hospital Day 13    Subjective:      Source: patient, medical records     Principle Complaint: Weight gain of 100+ lbs over 7 months and associated SOB. Morning cough with blood streaks starting day 6.     HPI:  Patient is a 45-year-old female with morbid obesity, YARY, OHS, and cor pulmonale who presented with progressive dyspnea. Additional medical problems include diabetes type 2, HTN, and depression. A1C on 8/7 was 8.4, BMI was 88 on admission, Most recent echo showed PASP 109, TAPSE 1.37cm on 10/23.  She initially presented due to increasing weight gain over the last 7 months. Patient states that in March she began gaining weight and she was placed on diuretic to trying keep that weight down. Despite 40 mg of furosemide b.i.d. she continued to gain weight.  She has gained approximately 100 lb since March and 80 lb since May.  She does not have a cardiologist but was scheduled to see 1 in December.  Patient states that at home she sleeps in a elevated position secondary to shortness of breath.  She denies any episodes of waking up in the middle of the night out of breath.  She does not use any home oxygen nor she ever been told explicitly that she has heart failure.  She demonstrates poor understanding of the disease stating that the right heart was backing up into the lungs and cause in the shortness of breath. She presented to the clinic today for evaluation and was found to be in acute respiratory distress with hypoxia.  She was referred to the ED.     ED Course: Initially hypoxic and put on nasal cannula with normalization of her oxygen status to approximately 95%. She was demonstrating initial respiratory distress and was given a bolus of  "120mg IV furosemide.     Hospital Course: Patient was admitted to Hospital Medicine. On first day pt became somnolent and hypoxia and hypercapnia worsened requiring step up to ICU on 10/23. Was placed on BiPAP and aggressively diuresed (approx 20L net negative) with improvement in respiratory status. Weaned to 2L NC and subsequently stepped back down to Hospital Medicine on 10/26. VBG returned with elevated ppCO2 (109 at peak), decreased ppO2 (23), elevated bicarb (56.5), and pH 7.41 on 10/27. Started on continuous Bipap up to 20/8 cmH2O with VBG improved on 10/28 (pH 7.4, ppCO2 94, ppO2 39, Bicarb 41). Reports blood streaked sputum believed to be "from the mask drying out my throat" now improving with humidification of O2.     Interval History: Pt slept well, no overnight problems. No SOB on Bipap overnight at FiO2 100% per order and 4L O2 by nasal canula this morning. Rash not worsening.    Current Facility-Administered Medications   Medication    acetaminophen tablet 650 mg    atorvastatin tablet 10 mg    chlorothiazide (DIURIL) 500 mg in dextrose 5 % 50 mL IVPB    enoxaparin injection 60 mg    furosemide (LASIX) 500 mg infusion (conc: 10 mg/mL)    influenza (QUADRIVALENT PF) vaccine 0.5 mL    insulin aspart U-100 pen 0-5 Units    insulin aspart U-100 pen 2 Units    insulin detemir U-100 pen 6 Units    latanoprost 0.005 % ophthalmic solution 1 drop    melatonin tablet 6 mg    miconazole NITRATE 2 % top powder    mineral oil-hydrophil petrolat ointment    ondansetron disintegrating tablet 8 mg    polyethylene glycol packet 17 g    senna-docusate 8.6-50 mg per tablet 2 tablet    simethicone chewable tablet 80 mg    sodium chloride 0.9% flush 10 mL     Review of Systems   Constitutional: Negative for chills and fever.   Respiratory: Negative for cough and shortness of breath.    Cardiovascular: Negative for chest pain.   Gastrointestinal: Negative for abdominal pain, diarrhea, nausea and vomiting. "   Musculoskeletal: Negative for myalgias.   Neurological: Negative for dizziness and headaches.     Objective:     Vital Signs (Most Recent):  Temp: 97.8 °F (36.6 °C) (11/03/20 1150)  Pulse: 81 (11/03/20 1517)  Resp: 17 (11/03/20 1150)  BP: 130/60 (11/03/20 1150)  SpO2: (!) 91 % (11/03/20 1150) Vital Signs (24h Range):  Temp:  [97 °F (36.1 °C)-98.4 °F (36.9 °C)] 97.8 °F (36.6 °C)  Pulse:  [68-82] 81  Resp:  [13-27] 17  SpO2:  [80 %-96 %] 91 %  BP: (101-133)/(60-91) 130/60     Weight: (!) 185 kg (407 lb 13.6 oz) (11/03/20 0400) from 411 11/2.  Body mass index is 72.25 kg/m². Dry weight: 390 lbs.      Intake/Output Summary (Last 24 hours) at 11/3/2020 1545  Last data filed at 11/3/2020 1250  Gross per 24 hour   Intake --   Output 2710 ml   Net -2710 ml     Physical Exam  Constitutional:       General: She is not in acute distress.     Appearance: She is obese.   Eyes:      Extraocular Movements: Extraocular movements intact.   Cardiovascular:      Rate and Rhythm: Normal rate and regular rhythm.      Heart sounds: Normal heart sounds.   Pulmonary:      Effort: Pulmonary effort is normal.      Breath sounds: Normal breath sounds.   Abdominal:      General: Bowel sounds are normal. There is distension.      Palpations: Abdomen is soft.      Tenderness: There is no abdominal tenderness.   Skin:     Capillary Refill: Capillary refill takes less than 2 seconds.      Findings: Rash (dry skin around nose/mouth) present.   Neurological:      General: No focal deficit present.      Mental Status: She is alert.   Psychiatric:         Mood and Affect: Mood normal.         Behavior: Behavior normal.       Significant Labs:  CBC:   Recent Labs   Lab 11/03/20 0337   WBC 7.06   RBC 5.88*   HGB 15.2   HCT 52.2*        CMP:   Recent Labs   Lab 11/03/20 0337   *   CALCIUM 10.2   ALBUMIN 3.5   PROT 7.7      K 3.4*   CO2 44*   CL 83*   BUN 44*   CREATININE 1.1   ALKPHOS 59   ALT 51*   AST 47*   BILITOT 1.1*      Significant Imaging:  No new imaging    Assessment/Plan:         Mrs Halley Moreno is a 44 yo F being treated for acute respiratory failure with hypoxia and hypercapnia and volume overload. New prerenal DIANA.    Active Diagnoses:    Diagnosis Date Noted POA    PRINCIPAL PROBLEM:  Right heart failure due to pulmonary hypertension [I27.29, I50.810] 10/22/2020 Yes    Discharge planning issues [Z02.9] 10/30/2020 Not Applicable    Impaired mobility and activities of daily living [Z74.09, Z78.9] 10/29/2020 Yes    Hypokalemia [E87.6] 10/29/2020 Yes    Constipation [K59.00] 10/25/2020 Yes    Obesity hypoventilation syndrome [E66.2] 10/23/2020 Yes    Hyperphosphatemia [E83.39] 10/23/2020 No    Acute respiratory failure with hypoxia and hypercapnia [J96.01, J96.02] 10/22/2020 Yes    Diabetes mellitus type 2 without retinopathy [E11.9] 10/14/2019 Yes    Hypertension associated with diabetes [E11.59, I10]  Yes    Sleep apnea [G47.30]  Yes    Morbid obesity with BMI of 70 and over, adult [E66.01, Z68.45]  Not Applicable      Problems Resolved During this Admission:    Diagnosis Date Noted Date Resolved POA    Hyperkalemia [E87.5] 10/23/2020 10/29/2020 Yes     Acute Respiratory Failure with hypoxia and hypercapnia  On 10/23 respiratory team alerted for SpO2 of 90% and lethargy. On arrival ABG showed pH 7.165, pCO2 112.7, pO2 85. Started on Bipap continuosly at 15/8 with FiO2 45%.  - target >88% SpO2  - 10mg/hr furosemide drip  - Chlorthiazide 500mg morning infusion stopped per BUN inc  - Patient education on OHS and benefit of weight loss  - Continuous Bipap at 20/8 and FiO2 100% overnight per resp note  - 4L O2 by NC during day, continue weaning as tolerated  - humidify nasal canula and Bipap     Cor pulmonale causing right sided HFpEF  New diagnosis. PASP of 109 with TAPSE 1.37cm in R ventricle on echo indicates Cor Pulmonale 2/2 YARY/OHS from morbid obesity (BMI 75.76).  - Strict I/O  - fluid restriction of  1.5L/day  - daily weights              Latest weight 11/2, 411bs. Previous weight 412bs on 11/1. Do not increase diuretics unless urine output decreases with no other explanation (ie third spacing).              Dry weight 390lbs per pt  - low salt diet and education on home fluid restriction  - see respiratory failure plan     Prerenal DIANA  Pt BUN has increased over last 5 days from 24>26>34>40>44. Cr lvl 1.1 (unchanged) today. Hyperphosphatemia at 5.0 (decreased from 5.4). No change in urine output.  - stop Chlorthiazide 500mg today     Hypokalemia  K+ down to 3.4 from 4.8 previously  - 2x doses of 50mEqu KOH  - recheck K+ in AM    DM type 2  Glu 219 this AM. Consider increasing SSI  - stopped home meds  - short acting insulin 0-5 units premeal  - Correction insulin (2 units, short acting) premeal  - 6 unit Detemir bolus in evening  - diabetic cardiac diet  - Accu checks AC/evening     HTN  - On losartan 10mg at home. Held     OHS/YARY  pCo2 >45, BMI >30, no other identifiable cause for hypoventilation.  - NIPPV nightly  - see obesity     Obesity  BMI 75.76. Gained >100lbs in last 7 months 2/2 fluid retension. Dry weight of 370lbs  - pt expresses interest inlosing weight, dietary/bariatric medicine outpatient follow up warranted    VTE Risk Mitigation (From admission, onward)         Ordered     enoxaparin injection 60 mg  Every 12 hours      10/22/20 1518     IP VTE HIGH RISK PATIENT  Once      10/22/20 1346     Place sequential compression device  Until discontinued      10/22/20 1346                   Yogesh Nunez  [facility]

## 2020-11-03 NOTE — PROGRESS NOTES
[facility] Hospital Medicine  Progress Note    Patient Name: Halley Moreno  MRN: [id number]  Patient Class: IP- Inpatient   Admission Date: 10/22/2020  Length of Stay: 12 days  Attending Physician: Juno Jones MD  Primary Care Provider: Yamila Tejeda MD    [facility] Medicine Team: [facility]OS[facility] MED 1 - Rao Szymanski MD    Subjective:     Principal Problem:Right heart failure due to pulmonary hypertension    HPI:  Patient is a 45-year-old female who is presenting to us with acute respiratory distress.  She has a recent diagnosis of severe pulmonary hypertension with right-sided heart failure.  Her last echo showed a PA systolic pressure of 99.  She initially presented due to increasing weight gain over the last 7 months.  Patient states that in March she began gaining weight and she was placed on diuretic to trying keep that weight down.  Despite 40 mg of furosemide b.i.d. she continued to gain weight.  She has gained approximately 100 lb since March and 80 lb since May.  She does not have a cardiologist but was scheduled to see 1 in December.  Patient states that at home she sleeps in a elevated position secondary to shortness of breath.  She denies any episodes of waking up in the middle of the night out of breath.  She does not use any home oxygen nor she ever been told explicitly that she has heart failure.  She demonstrates poor understanding of the disease stating that the right heart was backing up into the lungs and cause in the shortness of breath.  She presented to the clinic today for evaluation and was found to be in acute respiratory distress with hypoxia.  She was referred to the ED for evaluation and diuresis.    Patient states that she has no known family history of heart failure in the family however she does state that her father had a pacemaker for unknown reasons.  She states that she does not think that anyone her and her family has had pulmonary hypertension.   Patient states that she does not drink, does not smoke, and does not use IV drugs.    Overview/Hospital Course:  Patient was admitted to Hospital Medicine. Was initially hypoxic and put on nasal cannula with normalization of her oxygen status to approximately 95%. She was demonstrating initial respiratory distress and was given a bolus of 120mg IV furosemide. Hypoxia and hypercapnia worsened so was stepped up to ICU on 10/23. Was placed on BiPAP and aggressively diuresed (approx 20L net negative) with improvement in respiratory status. Weaned to 2L NC and subsequently stepped back down to Hospital Medicine on 10/26. Continued diuresis with IV Lasix gtt, diuril, and aldactone. On 10/27 VBGs noted pCO2 in 90s - 100s and patient was started back on continuous BiPAP with improvement in pCO2 levels. Patient weaned from BiPAP throughout admission with improving oxygenation.    Interval History: Patient seen and examined this morning. No acute events or significant changes overnight. Compliant with BiPAP overnight. Continuing diuresis. She denies new complaints this AM.     Review of Systems   Constitutional: Negative for diaphoresis and fever.   HENT: Negative for congestion and sore throat.    Eyes: Negative for redness and visual disturbance.   Respiratory: Negative for cough and stridor.    Cardiovascular: Positive for leg swelling. Negative for chest pain.   Gastrointestinal: Positive for abdominal distention. Negative for abdominal pain, constipation, diarrhea and vomiting.   Genitourinary: Negative for dysuria and hematuria.   Musculoskeletal: Negative for back pain.   Skin: Negative for color change and pallor.   Neurological: Negative for speech difficulty and headaches.   Psychiatric/Behavioral: Negative for agitation and confusion.     Objective:     Vital Signs (Most Recent):  Temp: 97.8 °F (36.6 °C) (11/03/20 1150)  Pulse: 81 (11/03/20 1517)  Resp: 17 (11/03/20 1150)  BP: 130/60 (11/03/20 1150)  SpO2: (!) 91 %  (11/03/20 1150) Vital Signs (24h Range):  Temp:  [97 °F (36.1 °C)-98.4 °F (36.9 °C)] 97.8 °F (36.6 °C)  Pulse:  [68-82] 81  Resp:  [13-27] 17  SpO2:  [80 %-96 %] 91 %  BP: (101-133)/(60-91) 130/60     Weight: (!) 185 kg (407 lb 13.6 oz)  Body mass index is 72.25 kg/m².    Intake/Output Summary (Last 24 hours) at 11/3/2020 1632  Last data filed at 11/3/2020 1250  Gross per 24 hour   Intake --   Output 2710 ml   Net -2710 ml      Physical Exam  Vitals signs and nursing note reviewed.   Constitutional:       General: She is awake. She is not in acute distress.     Appearance: She is morbidly obese. She is not diaphoretic.      Interventions: Nasal cannula in place.   HENT:      Head: Normocephalic and atraumatic.      Nose: Nose normal.      Mouth/Throat:      Mouth: Mucous membranes are moist.      Pharynx: Oropharynx is clear.   Eyes:      Extraocular Movements: Extraocular movements intact.      Conjunctiva/sclera: Conjunctivae normal.      Pupils: Pupils are equal, round, and reactive to light.   Neck:      Musculoskeletal: Normal range of motion.   Cardiovascular:      Rate and Rhythm: Normal rate and regular rhythm.      Pulses: Normal pulses.      Heart sounds: Normal heart sounds.   Pulmonary:      Effort: Pulmonary effort is normal. No respiratory distress.      Breath sounds: Normal breath sounds. No wheezing or rhonchi.   Abdominal:      General: Abdomen is protuberant. There is distension.      Palpations: Abdomen is soft.      Tenderness: There is no abdominal tenderness.   Musculoskeletal:         General: Swelling and tenderness present.      Right lower leg: Edema present.      Left lower leg: Edema present.      Comments: Hematoma on LUE, mildly TTP with surrounding ecchymosis.   Skin:     General: Skin is warm and dry.      Coloration: Skin is not jaundiced.      Findings: Erythema and rash present. Rash is scaling.      Comments: Chronic venous stasis changes present on BLE.   Neurological:       General: No focal deficit present.      Mental Status: She is alert and oriented to person, place, and time.   Psychiatric:         Mood and Affect: Mood normal.         Behavior: Behavior normal.       Significant Labs:   CBC:   Recent Labs   Lab 11/02/20 0537 11/03/20  0337   WBC 7.18 7.06   HGB 16.3* 15.2   HCT 54.3* 52.2*   * 160     CMP:   Recent Labs   Lab 11/02/20 0537 11/03/20  0337    140   K 4.8 3.4*   CL 83* 83*   CO2 40* 44*   * 219*   BUN 40* 44*   CREATININE 1.1 1.1   CALCIUM 10.5 10.2   PROT 8.0 7.7   ALBUMIN 3.8 3.5   BILITOT 1.1* 1.1*   ALKPHOS 62 59   AST 56* 47*   ALT 50* 51*   ANIONGAP 14 13   EGFRNONAA 59.9* 59.9*       Significant Imaging: No new imaging to review.      Assessment/Plan:      * Right heart failure due to pulmonary hypertension  Patient with right heart failure due to fluid overload. TTE 10/23 with LVEF 75%, reduced RV systolic function, RV enlargement, TR, PASP 109. Continuing diuresis and will obtain repeat echo once closer to euvolemia.    Plan:  - Strict Is/Os  - Daily weights  - Keep K>4, Mg>2  - Diabetic/cardiac low sodium diet (<2 g/day)  - Fluid restriction to 1.5 L/day    Acute respiratory failure with hypoxia and hypercapnia  Patient is a chronic CO2 retainer in setting of obesity hypoventilation syndrome 2/2 morbid obesity. Stepped down from ICU 10/26, currently continuing diuresis. De-escalated to NC during the day with BiPAP nightly on 10/31, which patient tolerated well.      Plan:   - Target SpO2 of >88%. Continue BiPAP nightly. Patient on 5LNC during the day, attempting to wean to 4L today as tolerated.   - Continue furosemide gtt at 10mg/hr   - Continue chlorthalidone 500mg daily -> will stop after today   - Approx 65L net negative since admission    Diabetes mellitus type 2 without retinopathy  Previously on glipizide 10mg daily and semaglutide 0.25mg weekly. Glucose starting to trend up over last few days, averaging 190s to low  200s    Plan:   - Holding home anti-hyperglycemics   - POCT BG qAC/HS while eating   - Goal -180   - Insulin detemir 5 units nightly -> increase to 6 units nightly   - Add insulin aspart 2 units TIDWM today   - LD SSI for correction   - Diabetic cardiac diet, fluid restriction 1.5L    - Will provide endocrinology referral at discharge    Morbid obesity with BMI of 70 and over, adult  Plan:  - Per patient, dry weight of approximately 370 lb -> up to 500 pounds (BMI 88) this admission  - Weight today 407 (BMI 73)  - Continuing diuresis as noted above    Hypertension associated with diabetes  Patient on losartan 50mg daily at home. Remains normotensive at this time.    Plan:   - Holding home anti-hypertensive in acute setting   - Continue atorvastatin 10mg nightly    Sleep apnea  Patient's pulmonary HTN and RV dysfunction likely result of obesity hypoventilation syndrome with non-compliance with at-home CPAP. Evaluated by Pulmonology, who recommended continued use of NIPPV and encouraged weight loss.     Plan:    - NIPPV nightly    Obesity hypoventilation syndrome  See Acute respiratory failure with hypoxia and hypercapnia above    Hypokalemia  Patient with mild recurrent hypokalemia, most likely due to consistent diuresis. K today 3.4.    Plan:   - CMP daily   - Replete PRN with goal K>4   - Potassium bicarbonate 50 mEq x 2 doses today    Hyperphosphatemia  Patient's phosphorus level has been steadily increasing throughout hospital stay, at 5.0 today. Likely secondary to aggressive diuresis, but patient has remained asymptomatic.    Plan:   - Monitor with daily CMP    Constipation  Plan:   - Continue with polyethylene glycol and/or senna-docusate PRN    Impaired mobility and activities of daily living  PT/OT consulted, recommending rehab at discharge. PM&R consulted and following.    Plan:   - Continue PT/OT while inpatient    Discharge planning issues  PT/OT recommend rehab  - Patient needs to be closer to dry  weight ~380 lbs  - Consider 6 min walk test to assess need for oxygen prior to discharge in setting of heart failure      VTE Risk Mitigation (From admission, onward)         Ordered     enoxaparin injection 60 mg  Every 12 hours      10/22/20 1518     IP VTE HIGH RISK PATIENT  Once      10/22/20 1346     Place sequential compression device  Until discontinued      10/22/20 1346                Discharge Planning   JOSE RAUL: 11/4/2020     Code Status: Full Code   Is the patient medically ready for discharge?: No    Reason for patient still in hospital: Patient trending condition, Treatment and Pending disposition  Discharge Plan A: Rehab        Rao Szymanski MD, PGY-1  Department of Hospital Medicine   [facility]

## 2020-11-03 NOTE — PLAN OF CARE
Problem: Adult Inpatient Plan of Care  Goal: Plan of Care Review  Outcome: Ongoing, Progressing     Problem: Adult Inpatient Plan of Care  Goal: Patient-Specific Goal (Individualization)  Outcome: Ongoing, Progressing     Problem: Bariatric Environmental Safety  Goal: Safety Maintained with Care  Outcome: Ongoing, Progressing   Patient received Critical Co2 of 44, physician notified at this time, no new orders given.

## 2020-11-03 NOTE — ASSESSMENT & PLAN NOTE
Patient is a chronic CO2 retainer in setting of obesity hypoventilation syndrome 2/2 morbid obesity. Stepped down from ICU 10/26, currently continuing diuresis. De-escalated to NC during the day with BiPAP nightly on 10/31, which patient tolerated well.      Plan:   - Target SpO2 of >88%. Continue BiPAP nightly. Patient on 5LNC during the day, attempting to wean to 4L today as tolerated.   - Continue furosemide gtt at 10mg/hr   - Continue chlorthalidone 500mg daily -> will stop after today   - Approx 65L net negative since admission

## 2020-11-03 NOTE — SUBJECTIVE & OBJECTIVE
Interval History: Patient seen and examined this morning. No acute events or significant changes overnight. Compliant with BiPAP overnight. Continuing diuresis. She denies new complaints this AM.     Review of Systems   Constitutional: Negative for diaphoresis and fever.   HENT: Negative for congestion and sore throat.    Eyes: Negative for redness and visual disturbance.   Respiratory: Negative for cough and stridor.    Cardiovascular: Positive for leg swelling. Negative for chest pain.   Gastrointestinal: Positive for abdominal distention. Negative for abdominal pain, constipation, diarrhea and vomiting.   Genitourinary: Negative for dysuria and hematuria.   Musculoskeletal: Negative for back pain.   Skin: Negative for color change and pallor.   Neurological: Negative for speech difficulty and headaches.   Psychiatric/Behavioral: Negative for agitation and confusion.     Objective:     Vital Signs (Most Recent):  Temp: 97.8 °F (36.6 °C) (11/03/20 1150)  Pulse: 81 (11/03/20 1517)  Resp: 17 (11/03/20 1150)  BP: 130/60 (11/03/20 1150)  SpO2: (!) 91 % (11/03/20 1150) Vital Signs (24h Range):  Temp:  [97 °F (36.1 °C)-98.4 °F (36.9 °C)] 97.8 °F (36.6 °C)  Pulse:  [68-82] 81  Resp:  [13-27] 17  SpO2:  [80 %-96 %] 91 %  BP: (101-133)/(60-91) 130/60     Weight: (!) 185 kg (407 lb 13.6 oz)  Body mass index is 72.25 kg/m².    Intake/Output Summary (Last 24 hours) at 11/3/2020 1632  Last data filed at 11/3/2020 1250  Gross per 24 hour   Intake --   Output 2710 ml   Net -2710 ml      Physical Exam  Vitals signs and nursing note reviewed.   Constitutional:       General: She is awake. She is not in acute distress.     Appearance: She is morbidly obese. She is not diaphoretic.      Interventions: Nasal cannula in place.   HENT:      Head: Normocephalic and atraumatic.      Nose: Nose normal.      Mouth/Throat:      Mouth: Mucous membranes are moist.      Pharynx: Oropharynx is clear.   Eyes:      Extraocular Movements:  Extraocular movements intact.      Conjunctiva/sclera: Conjunctivae normal.      Pupils: Pupils are equal, round, and reactive to light.   Neck:      Musculoskeletal: Normal range of motion.   Cardiovascular:      Rate and Rhythm: Normal rate and regular rhythm.      Pulses: Normal pulses.      Heart sounds: Normal heart sounds.   Pulmonary:      Effort: Pulmonary effort is normal. No respiratory distress.      Breath sounds: Normal breath sounds. No wheezing or rhonchi.   Abdominal:      General: Abdomen is protuberant. There is distension.      Palpations: Abdomen is soft.      Tenderness: There is no abdominal tenderness.   Musculoskeletal:         General: Swelling and tenderness present.      Right lower leg: Edema present.      Left lower leg: Edema present.      Comments: Hematoma on LUE, mildly TTP with surrounding ecchymosis.   Skin:     General: Skin is warm and dry.      Coloration: Skin is not jaundiced.      Findings: Erythema and rash present. Rash is scaling.      Comments: Chronic venous stasis changes present on BLE.   Neurological:      General: No focal deficit present.      Mental Status: She is alert and oriented to person, place, and time.   Psychiatric:         Mood and Affect: Mood normal.         Behavior: Behavior normal.       Significant Labs:   CBC:   Recent Labs   Lab 11/02/20  0537 11/03/20  0337   WBC 7.18 7.06   HGB 16.3* 15.2   HCT 54.3* 52.2*   * 160     CMP:   Recent Labs   Lab 11/02/20 0537 11/03/20  0337    140   K 4.8 3.4*   CL 83* 83*   CO2 40* 44*   * 219*   BUN 40* 44*   CREATININE 1.1 1.1   CALCIUM 10.5 10.2   PROT 8.0 7.7   ALBUMIN 3.8 3.5   BILITOT 1.1* 1.1*   ALKPHOS 62 59   AST 56* 47*   ALT 50* 51*   ANIONGAP 14 13   EGFRNONAA 59.9* 59.9*       Significant Imaging: No new imaging to review.

## 2020-11-03 NOTE — PT/OT/SLP PROGRESS
"Physical Therapy Treatment    Patient Name:  Halley Moreno   MRN:  [id number]    Recommendations:     Discharge Recommendations:  rehabilitation facility   Discharge Equipment Recommendations: bath bench   Barriers to discharge: Decreased caregiver support    Assessment:     Halley Moreno is a 45 y.o. female admitted with a medical diagnosis of Right heart failure due to pulmonary hypertension.  She presents with the following impairments/functional limitations:  weakness, impaired endurance, impaired self care skills, impaired functional mobilty, gait instability, decreased lower extremity function, impaired cardiopulmonary response to activity.  Patient made excellent progress today as evidenced by her ability to transfer with no assistance to EOB and stand with Min a x 2, also, took steps to chair with Min a x 2. Will continue PT per POC.     Rehab Prognosis: Fair; patient would benefit from acute skilled PT services to address these deficits and reach maximum level of function.    Recent Surgery: * No surgery found *      Plan:     During this hospitalization, patient to be seen 3 x/week to address the identified rehab impairments via gait training, therapeutic activities, therapeutic exercises and progress toward the following goals:    · Plan of Care Expires:  11/21/20    Subjective     Chief Complaint: weakness,fatigue  Patient/Family Comments/goals: "I would love to get out of this bed".   Pain/Comfort:  · Pain Rating 1: 0/10      Objective:     Communicated with nurse prior to session.  Patient found supine with pulse ox (continuous), telemetry, alamo catheter upon PT entry to room.     General Precautions: Standard, fall   Orthopedic Precautions:N/A   Braces: N/A     Functional Mobility:  · Bed Mobility:     · Supine to Sit: stand by assistance  · Transfers:     · Sit to Stand:  minimum assistance and of 2 persons with hand-held assist and 2 people  · Gait: 5 steps to Bedside chair with Min a x 2, " HHA.       AM-PAC 6 CLICK MOBILITY  Turning over in bed (including adjusting bedclothes, sheets and blankets)?: 3  Sitting down on and standing up from a chair with arms (e.g., wheelchair, bedside commode, etc.): 3  Moving from lying on back to sitting on the side of the bed?: 3  Moving to and from a bed to a chair (including a wheelchair)?: 3  Need to walk in hospital room?: 1(very short distances)  Climbing 3-5 steps with a railing?: 1  Basic Mobility Total Score: 14       Therapeutic Activities and Exercises:   -white board updated  -Good progress today  -Educ patient on benefit of OOB activity    Patient left up in chair with all lines intact, call button in reach and nurse notified..    GOALS:   Multidisciplinary Problems     Physical Therapy Goals        Problem: Physical Therapy Goal    Goal Priority Disciplines Outcome Goal Variances Interventions   Physical Therapy Goal     PT, PT/OT Ongoing, Progressing     Description: Goals to be met by: 2020    Patient will increase functional independence with mobility by performin. Supine to sit with MInimal Assistance of 2 persons.  2. Sit to supine with Moderate Assistance of 2 persons.  3. Sit to stand transfer with Minimal Assistance of 1 person.  4. Bed to chair stand pivot transfer with Contact Guard Assistance of 1 person using Rolling Walker.  5. Gait  x 40 feet with Contact Guard Assistance of 1 person using Rolling Walker.   6. Lower extremity exercise program x20 reps per handout, with assistance as needed.                     Time Tracking:     PT Received On: 20  PT Start Time: 1024     PT Stop Time: 1058  PT Total Time (min): 34 min     Billable Minutes: Gait Training 10 and Therapeutic Activity 24     Co tx performed for this visit due to patient need for 2 skilled therapists to ensure patient and staff safety and to accommodate for patient activity tolerance.       Treatment Type: Treatment  PT/PTA: PTA     PTA Visit Number: 1      Christen Lynn, PTA  11/03/2020

## 2020-11-03 NOTE — PLAN OF CARE
11/03/20 1549   Discharge Reassessment   Assessment Type Discharge Planning Reassessment   Discharge Plan A Rehab   Discharge Plan B Home with family   DME Needed Upon Discharge  other (see comments)  (TBD)   Anticipated Discharge Disposition Rehab   Post-Acute Status   Post-Acute Authorization Placement   Post-Acute Placement Status Awaiting Internal Medical Clearance

## 2020-11-04 LAB
ALBUMIN SERPL BCP-MCNC: 3.4 G/DL (ref 3.5–5.2)
ALP SERPL-CCNC: 57 U/L (ref 55–135)
ALT SERPL W/O P-5'-P-CCNC: 45 U/L (ref 10–44)
ANION GAP SERPL CALC-SCNC: 10 MMOL/L (ref 8–16)
AST SERPL-CCNC: 36 U/L (ref 10–40)
BASOPHILS # BLD AUTO: 0.03 K/UL (ref 0–0.2)
BASOPHILS NFR BLD: 0.4 % (ref 0–1.9)
BILIRUB SERPL-MCNC: 1 MG/DL (ref 0.1–1)
BUN SERPL-MCNC: 47 MG/DL (ref 6–20)
CALCIUM SERPL-MCNC: 9.1 MG/DL (ref 8.7–10.5)
CHLORIDE SERPL-SCNC: 86 MMOL/L (ref 95–110)
CO2 SERPL-SCNC: 43 MMOL/L (ref 23–29)
CREAT SERPL-MCNC: 1.1 MG/DL (ref 0.5–1.4)
DIFFERENTIAL METHOD: ABNORMAL
EOSINOPHIL # BLD AUTO: 0.1 K/UL (ref 0–0.5)
EOSINOPHIL NFR BLD: 1.8 % (ref 0–8)
ERYTHROCYTE [DISTWIDTH] IN BLOOD BY AUTOMATED COUNT: 20.1 % (ref 11.5–14.5)
EST. GFR  (AFRICAN AMERICAN): >60 ML/MIN/1.73 M^2
EST. GFR  (NON AFRICAN AMERICAN): 59.9 ML/MIN/1.73 M^2
GLUCOSE SERPL-MCNC: 237 MG/DL (ref 70–110)
HCT VFR BLD AUTO: 52.1 % (ref 37–48.5)
HGB BLD-MCNC: 14.9 G/DL (ref 12–16)
IMM GRANULOCYTES # BLD AUTO: 0.01 K/UL (ref 0–0.04)
IMM GRANULOCYTES NFR BLD AUTO: 0.1 % (ref 0–0.5)
LYMPHOCYTES # BLD AUTO: 1.6 K/UL (ref 1–4.8)
LYMPHOCYTES NFR BLD: 23.1 % (ref 18–48)
MAGNESIUM SERPL-MCNC: 2.2 MG/DL (ref 1.6–2.6)
MCH RBC QN AUTO: 25.7 PG (ref 27–31)
MCHC RBC AUTO-ENTMCNC: 28.6 G/DL (ref 32–36)
MCV RBC AUTO: 90 FL (ref 82–98)
MONOCYTES # BLD AUTO: 0.7 K/UL (ref 0.3–1)
MONOCYTES NFR BLD: 10.9 % (ref 4–15)
NEUTROPHILS # BLD AUTO: 4.3 K/UL (ref 1.8–7.7)
NEUTROPHILS NFR BLD: 63.7 % (ref 38–73)
NRBC BLD-RTO: 0 /100 WBC
PHOSPHATE SERPL-MCNC: 4.1 MG/DL (ref 2.7–4.5)
PLATELET # BLD AUTO: 167 K/UL (ref 150–350)
PMV BLD AUTO: 11.4 FL (ref 9.2–12.9)
POCT GLUCOSE: 215 MG/DL (ref 70–110)
POCT GLUCOSE: 220 MG/DL (ref 70–110)
POCT GLUCOSE: 228 MG/DL (ref 70–110)
POCT GLUCOSE: 242 MG/DL (ref 70–110)
POTASSIUM SERPL-SCNC: 3.2 MMOL/L (ref 3.5–5.1)
PROT SERPL-MCNC: 7.1 G/DL (ref 6–8.4)
RBC # BLD AUTO: 5.79 M/UL (ref 4–5.4)
SODIUM SERPL-SCNC: 139 MMOL/L (ref 136–145)
WBC # BLD AUTO: 6.7 K/UL (ref 3.9–12.7)

## 2020-11-04 PROCEDURE — 97535 SELF CARE MNGMENT TRAINING: CPT

## 2020-11-04 PROCEDURE — 97116 GAIT TRAINING THERAPY: CPT | Mod: CQ

## 2020-11-04 PROCEDURE — 27000221 HC OXYGEN, UP TO 24 HOURS

## 2020-11-04 PROCEDURE — 36415 COLL VENOUS BLD VENIPUNCTURE: CPT

## 2020-11-04 PROCEDURE — 99233 PR SUBSEQUENT HOSPITAL CARE,LEVL III: ICD-10-PCS | Mod: ,,, | Performed by: HOSPITALIST

## 2020-11-04 PROCEDURE — 97530 THERAPEUTIC ACTIVITIES: CPT

## 2020-11-04 PROCEDURE — 25000003 PHARM REV CODE 250: Performed by: STUDENT IN AN ORGANIZED HEALTH CARE EDUCATION/TRAINING PROGRAM

## 2020-11-04 PROCEDURE — 97530 THERAPEUTIC ACTIVITIES: CPT | Mod: CQ

## 2020-11-04 PROCEDURE — 20600001 HC STEP DOWN PRIVATE ROOM

## 2020-11-04 PROCEDURE — 99233 SBSQ HOSP IP/OBS HIGH 50: CPT | Mod: ,,, | Performed by: HOSPITALIST

## 2020-11-04 PROCEDURE — 63600175 PHARM REV CODE 636 W HCPCS: Performed by: STUDENT IN AN ORGANIZED HEALTH CARE EDUCATION/TRAINING PROGRAM

## 2020-11-04 PROCEDURE — 99900035 HC TECH TIME PER 15 MIN (STAT)

## 2020-11-04 PROCEDURE — 84100 ASSAY OF PHOSPHORUS: CPT

## 2020-11-04 PROCEDURE — 83735 ASSAY OF MAGNESIUM: CPT

## 2020-11-04 PROCEDURE — 85025 COMPLETE CBC W/AUTO DIFF WBC: CPT

## 2020-11-04 PROCEDURE — 94761 N-INVAS EAR/PLS OXIMETRY MLT: CPT

## 2020-11-04 PROCEDURE — 80053 COMPREHEN METABOLIC PANEL: CPT

## 2020-11-04 RX ORDER — INSULIN ASPART 100 [IU]/ML
3 INJECTION, SOLUTION INTRAVENOUS; SUBCUTANEOUS
Status: DISCONTINUED | OUTPATIENT
Start: 2020-11-04 | End: 2020-11-05

## 2020-11-04 RX ADMIN — DOCUSATE SODIUM 50MG AND SENNOSIDES 8.6MG 2 TABLET: 8.6; 5 TABLET, FILM COATED ORAL at 09:11

## 2020-11-04 RX ADMIN — LATANOPROST 1 DROP: 50 SOLUTION OPHTHALMIC at 09:11

## 2020-11-04 RX ADMIN — ENOXAPARIN SODIUM 60 MG: 60 INJECTION SUBCUTANEOUS at 08:11

## 2020-11-04 RX ADMIN — INSULIN ASPART 2 UNITS: 100 INJECTION, SOLUTION INTRAVENOUS; SUBCUTANEOUS at 08:11

## 2020-11-04 RX ADMIN — ENOXAPARIN SODIUM 60 MG: 60 INJECTION SUBCUTANEOUS at 09:11

## 2020-11-04 RX ADMIN — MICONAZOLE NITRATE: 20 POWDER TOPICAL at 09:11

## 2020-11-04 RX ADMIN — POLYETHYLENE GLYCOL 3350 17 G: 17 POWDER, FOR SOLUTION ORAL at 08:11

## 2020-11-04 RX ADMIN — INSULIN ASPART 3 UNITS: 100 INJECTION, SOLUTION INTRAVENOUS; SUBCUTANEOUS at 05:11

## 2020-11-04 RX ADMIN — DOCUSATE SODIUM 50MG AND SENNOSIDES 8.6MG 2 TABLET: 8.6; 5 TABLET, FILM COATED ORAL at 08:11

## 2020-11-04 RX ADMIN — POTASSIUM BICARBONATE 50 MEQ: 978 TABLET, EFFERVESCENT ORAL at 12:11

## 2020-11-04 RX ADMIN — INSULIN ASPART 3 UNITS: 100 INJECTION, SOLUTION INTRAVENOUS; SUBCUTANEOUS at 12:11

## 2020-11-04 RX ADMIN — INSULIN ASPART 2 UNITS: 100 INJECTION, SOLUTION INTRAVENOUS; SUBCUTANEOUS at 05:11

## 2020-11-04 RX ADMIN — INSULIN ASPART 2 UNITS: 100 INJECTION, SOLUTION INTRAVENOUS; SUBCUTANEOUS at 12:11

## 2020-11-04 RX ADMIN — ATORVASTATIN CALCIUM 10 MG: 10 TABLET, FILM COATED ORAL at 08:11

## 2020-11-04 RX ADMIN — POTASSIUM BICARBONATE 50 MEQ: 978 TABLET, EFFERVESCENT ORAL at 09:11

## 2020-11-04 RX ADMIN — MELATONIN TAB 3 MG 6 MG: 3 TAB at 09:11

## 2020-11-04 NOTE — PT/OT/SLP PROGRESS
"Physical Therapy Treatment    Patient Name:  Halley Moreno   MRN:  [id number]    Recommendations:     Discharge Recommendations:  rehabilitation facility   Discharge Equipment Recommendations: bath bench(bariatric)   Barriers to discharge: increased assist level for all mobility and self care    Assessment:     Halley Moreno is a 45 y.o. female admitted with a medical diagnosis of Right heart failure due to pulmonary hypertension.  She presents with the following impairments/functional limitations:  weakness, impaired endurance, impaired self care skills, impaired functional mobilty, gait instability, decreased lower extremity function.  Patient is making good progress with therapy as evidenced by ability to transfer to EOB with SBA, sit<>Stand with Min a x 2 and ambulated apprx 4 ft forward and 4 ft back with Min HHA x 2. Very motivated, will continue PT per POC and increased to 4 times per week.     Rehab Prognosis: Good; patient would benefit from acute skilled PT services to address these deficits and reach maximum level of function.    Recent Surgery: * No surgery found *      Plan:     During this hospitalization, patient to be seen 4 x/week(increased as discussed with Margaret Mahajan, PT) to address the identified rehab impairments via gait training, therapeutic activities, therapeutic exercises and progress toward the following goals:    · Plan of Care Expires:  11/21/20    Subjective     Chief Complaint: fatigue  Patient/Family Comments/goals: "I didn't sleep good last night"  Pain/Comfort:  · Pain Rating 1: 0/10      Objective:     Communicated with nurse prior to session.  Patient found supine with telemetry, alamo catheter, pulse ox (continuous) upon PT entry to room.     General Precautions: Standard, fall   Orthopedic Precautions:N/A   Braces: N/A     Functional Mobility:  · Bed Mobility:     · Rolling Right: stand by assistance  · Scooting: stand by assistance and contact guard " assistance  · Supine to Sit: stand by assistance  · Sit to Supine: stand by assistance  · Transfers:     · Sit to Stand:  minimum assistance and of 2 persons with hand-held assist  · Gait: 4 ft forward and backward with Minimal HHA x 2 people. increased lateral sway, decreased stance LLE and decreased foot clearance.       AM-PAC 6 CLICK MOBILITY  Turning over in bed (including adjusting bedclothes, sheets and blankets)?: 3  Sitting down on and standing up from a chair with arms (e.g., wheelchair, bedside commode, etc.): 3  Moving from lying on back to sitting on the side of the bed?: 3  Moving to and from a bed to a chair (including a wheelchair)?: 3  Need to walk in hospital room?: 1(due to decreased endurance)  Climbing 3-5 steps with a railing?: 1  Basic Mobility Total Score: 14       Therapeutic Activities and Exercises:   -white board updated  -educ patient on benefit of OOB activity, unable today, fatigued  -As discussed with KELIN Mahajan, PT patient will benefit from increased frequency.       Patient left supine with all lines intact and call button in reach..    GOALS:   Multidisciplinary Problems     Physical Therapy Goals        Problem: Physical Therapy Goal    Goal Priority Disciplines Outcome Goal Variances Interventions   Physical Therapy Goal     PT, PT/OT Ongoing, Progressing     Description: Goals to be met by: 2020    Patient will increase functional independence with mobility by performin. Supine to sit with MInimal Assistance of 2 persons.  2. Sit to supine with Moderate Assistance of 2 persons.  3. Sit to stand transfer with Minimal Assistance of 1 person.  4. Bed to chair stand pivot transfer with Contact Guard Assistance of 1 person using Rolling Walker.  5. Gait  x 40 feet with Contact Guard Assistance of 1 person using Rolling Walker.   6. Lower extremity exercise program x20 reps per handout, with assistance as needed.                     Time Tracking:     PT Received On:  11/04/20  PT Start Time: 1016     PT Stop Time: 1048  PT Total Time (min): 32 min     Billable Minutes: Gait Training 15 and Therapeutic Activity 17     Co tx performed for this visit due to patient need for 2 skilled therapists to ensure patient and staff safety and to accommodate for patient activity tolerance.       Treatment Type: Treatment  PT/PTA: PTA     PTA Visit Number: 2     Christen Shane, PTA  11/04/2020

## 2020-11-04 NOTE — PLAN OF CARE
Problem: Fall Injury Risk  Goal: Absence of Fall and Fall-Related Injury  Outcome: Ongoing, Progressing     Problem: Infection  Goal: Infection Symptom Resolution  Outcome: Ongoing, Progressing     Problem: Adult Inpatient Plan of Care  Goal: Plan of Care Review  Outcome: Ongoing, Progressing  Flowsheets (Taken 11/3/2020 1844)  Plan of Care Reviewed With: patient  Goal: Patient-Specific Goal (Individualization)  Outcome: Ongoing, Progressing  Flowsheets (Taken 11/3/2020 1844)  Individualized Care Needs: diuresis  Anxieties, Fears or Concerns: concerned about difficulty breathing  Patient-Specific Goals (Include Timeframe): patient will continue to diuresis via IV until patient can be weaned from oxygen  Goal: Absence of Hospital-Acquired Illness or Injury  Outcome: Ongoing, Progressing  Goal: Optimal Comfort and Wellbeing  Outcome: Ongoing, Progressing  Intervention: Provide Person-Centered Care  Flowsheets (Taken 11/3/2020 1844)  Trust Relationship/Rapport:   care explained   choices provided   emotional support provided   empathic listening provided   questions answered   questions encouraged   reassurance provided   thoughts/feelings acknowledged     VS stable, and no acute events during the shift. Patient was able to tolerate sitting up in a chair for 2 hours, and patient was able to ambulate back with minimal assist. Patient had good urine output, and remains on the lasix gtt.

## 2020-11-04 NOTE — PLAN OF CARE
Problem: Fall Injury Risk  Goal: Absence of Fall and Fall-Related Injury  Outcome: Ongoing, Progressing     Problem: Adult Inpatient Plan of Care  Goal: Plan of Care Review  Outcome: Ongoing, Progressing  Flowsheets (Taken 11/4/2020 1737)  Plan of Care Reviewed With: patient  Goal: Patient-Specific Goal (Individualization)  Outcome: Ongoing, Progressing  Flowsheets (Taken 11/4/2020 1737)  Individualized Care Needs: continue diuresing  Anxieties, Fears or Concerns: concerned about difficulty breathing  Patient-Specific Goals (Include Timeframe): Patient will continue with IV lasix gtt and wean from O2 as tolerated tomorrow     VS stable, and patient remains on the lasix gtt. Patient's UO has been adequate. No acute events occurred today.

## 2020-11-04 NOTE — PROGRESS NOTES
[facility] Hospital Medicine  Progress Note    Patient Name: Halley Moreno  MRN: [id number]  Patient Class: IP- Inpatient   Admission Date: 10/22/2020  Length of Stay: 13 days  Attending Physician: Juno Jones MD  Primary Care Provider: Yamila Tejeda MD    [facility] Medicine Team: [facility] [place] HOSP MED 1 Olimpia Goode MD    Subjective:     Principal Problem:Right heart failure due to pulmonary hypertension        HPI:  Patient is a 45-year-old female who is presenting to us with acute respiratory distress.  She has a recent diagnosis of severe pulmonary hypertension with right-sided heart failure.  Her last echo showed a PA systolic pressure of 99.  She initially presented due to increasing weight gain over the last 7 months.  Patient states that in March she began gaining weight and she was placed on diuretic to trying keep that weight down.  Despite 40 mg of furosemide b.i.d. she continued to gain weight.  She has gained approximately 100 lb since March and 80 lb since May.  She does not have a cardiologist but was scheduled to see 1 in December.  Patient states that at home she sleeps in a elevated position secondary to shortness of breath.  She denies any episodes of waking up in the middle of the night out of breath.  She does not use any home oxygen nor she ever been told explicitly that she has heart failure.  She demonstrates poor understanding of the disease stating that the right heart was backing up into the lungs and cause in the shortness of breath.  She presented to the clinic today for evaluation and was found to be in acute respiratory distress with hypoxia.  She was referred to the ED for evaluation and diuresis.    Patient states that she has no known family history of heart failure in the family however she does state that her father had a pacemaker for unknown reasons.  She states that she does not think that anyone her and her family has had pulmonary hypertension.   Patient states that she does not drink, does not smoke, and does not use IV drugs.    Overview/Hospital Course:  Patient was admitted to Hospital Medicine. Was initially hypoxic and put on nasal cannula with normalization of her oxygen status to approximately 95%. She was demonstrating initial respiratory distress and was given a bolus of 120mg IV furosemide. Hypoxia and hypercapnia worsened so was stepped up to ICU on 10/23. Was placed on BiPAP and aggressively diuresed (approx 20L net negative) with improvement in respiratory status. Weaned to 2L NC and subsequently stepped back down to Hospital Medicine on 10/26. Continued diuresis with IV Lasix gtt, diuril, and aldactone. On 10/27 VBGs noted pCO2 in 90s - 100s and patient was started back on continuous BiPAP with improvement in pCO2 levels. Patient weaned from BiPAP throughout admission with improving oxygenation, wearing BiPAP QHS.    Interval History: patient ripped the BiPAP off during the night and reports she is more fatigued this morning. She understands she needs to wear the BiPAP every evening for oxygenation. She agrees to the plan. She is in no pain and is oriented x3 with no questions at this time.     Review of Systems   Constitutional: Negative for diaphoresis and fever.   HENT: Negative for congestion and sore throat.    Eyes: Negative for redness and visual disturbance.   Respiratory: Negative for cough and stridor.    Cardiovascular: Positive for leg swelling. Negative for chest pain.   Gastrointestinal: Positive for abdominal distention. Negative for abdominal pain, constipation, diarrhea and vomiting.   Genitourinary: Negative for dysuria and hematuria.   Musculoskeletal: Negative for back pain.   Skin: Negative for color change and pallor.   Neurological: Negative for speech difficulty and headaches.   Psychiatric/Behavioral: Negative for agitation and confusion.     Objective:     Vital Signs (Most Recent):  Temp: 97.8 °F (36.6 °C) (11/04/20  1108)  Pulse: 80 (11/04/20 1502)  Resp: 17 (11/04/20 1108)  BP: (!) 145/81 (11/04/20 1108)  SpO2: 95 % (11/04/20 1108) Vital Signs (24h Range):  Temp:  [97.7 °F (36.5 °C)-98.7 °F (37.1 °C)] 97.8 °F (36.6 °C)  Pulse:  [70-82] 80  Resp:  [12-28] 17  SpO2:  [90 %-95 %] 95 %  BP: (116-145)/(74-81) 145/81     Weight: (!) 185 kg (407 lb 13.6 oz)  Body mass index is 72.25 kg/m².    Intake/Output Summary (Last 24 hours) at 11/4/2020 1507  Last data filed at 11/4/2020 1400  Gross per 24 hour   Intake --   Output 2560 ml   Net -2560 ml      Physical Exam  Vitals signs and nursing note reviewed.   Constitutional:       General: She is awake. She is not in acute distress.     Appearance: She is morbidly obese. She is not diaphoretic.      Interventions: Nasal cannula in place.   HENT:      Head: Normocephalic and atraumatic.      Nose: Nose normal.      Mouth/Throat:      Mouth: Mucous membranes are moist.      Pharynx: Oropharynx is clear.   Eyes:      Extraocular Movements: Extraocular movements intact.      Conjunctiva/sclera: Conjunctivae normal.      Pupils: Pupils are equal, round, and reactive to light.   Neck:      Musculoskeletal: Normal range of motion.   Cardiovascular:      Rate and Rhythm: Normal rate and regular rhythm.      Pulses: Normal pulses.      Heart sounds: Normal heart sounds.   Pulmonary:      Effort: Pulmonary effort is normal. No respiratory distress.      Breath sounds: Normal breath sounds. No wheezing or rhonchi.   Abdominal:      General: Abdomen is protuberant. There is distension.      Palpations: Abdomen is soft.      Tenderness: There is no abdominal tenderness.   Musculoskeletal:         General: Swelling and tenderness present.      Right lower leg: Edema present.      Left lower leg: Edema present.      Comments: Hematoma on LUE, mildly TTP with surrounding ecchymosis.   Skin:     General: Skin is warm and dry.      Coloration: Skin is not jaundiced.      Findings: Erythema and rash  present. Rash is scaling.      Comments: Chronic venous stasis changes present on BLE.   Neurological:      General: No focal deficit present.      Mental Status: She is alert and oriented to person, place, and time.   Psychiatric:         Mood and Affect: Mood normal.         Behavior: Behavior normal.          Significant Labs:   CBC:   Recent Labs   Lab 11/03/20 0337 11/04/20  0450   WBC 7.06 6.70   HGB 15.2 14.9   HCT 52.2* 52.1*    167     CMP:   Recent Labs   Lab 11/03/20 0337 11/04/20  0450    139   K 3.4* 3.2*   CL 83* 86*   CO2 44* 43*   * 237*   BUN 44* 47*   CREATININE 1.1 1.1   CALCIUM 10.2 9.1   PROT 7.7 7.1   ALBUMIN 3.5 3.4*   BILITOT 1.1* 1.0   ALKPHOS 59 57   AST 47* 36   ALT 51* 45*   ANIONGAP 13 10   EGFRNONAA 59.9* 59.9*     POCT Glucose:   Recent Labs   Lab 11/03/20  1647 11/03/20  2130 11/04/20  0857   POCTGLUCOSE 247* 239* 215*       Significant Imaging: I have reviewed all pertinent imaging results/findings within the past 24 hours.           Assessment/Plan:      * Right heart failure due to pulmonary hypertension  Patient with right heart failure due to fluid overload. TTE 10/23 with LVEF 75%, reduced RV systolic function, RV enlargement, TR, PASP 109. Continuing diuresis and will obtain repeat echo once closer to euvolemia.    Plan:  - Strict Is/Os  - Daily weights  - Keep K>4, Mg>2  - Diabetic/cardiac low sodium diet (<2 g/day)  - Fluid restriction to 1.5 L/day    Discharge planning issues  PT/OT recommend rehab  - Needs follow up with Pulm and Cards with discharge  - Patient needs to be closer to dry weight ~380 lbs  - Consider 6 min walk test to assess need for oxygen prior to discharge in setting of heart failure    Hypokalemia  Patient with mild recurrent hypokalemia, most likely due to consistent diuresis. K today 3.4.    Plan:   - CMP daily   - Replete PRN with goal K>4   - Potassium bicarbonate 50 mEq x 2 doses today    Impaired mobility and activities of  daily living  PT/OT consulted, recommending rehab at discharge. PM&R consulted and following.    Plan:   - Continue PT/OT while inpatient    Constipation  Plan:   - Continue with polyethylene glycol and/or senna-docusate PRN    Hyperphosphatemia  Patient's phosphorus level has been steadily increasing throughout hospital stay, at 5.0 today. Likely secondary to aggressive diuresis, but patient has remained asymptomatic.    Plan:   - Monitor with daily CMP    Obesity hypoventilation syndrome  See Acute respiratory failure with hypoxia and hypercapnia above    Acute respiratory failure with hypoxia and hypercapnia  Patient is a chronic CO2 retainer in setting of obesity hypoventilation syndrome 2/2 morbid obesity. Stepped down from ICU 10/26, currently continuing diuresis. De-escalated to NC during the day with BiPAP nightly on 10/31, which patient tolerated well.      Plan:   - Target SpO2 of >88%. Continue BiPAP nightly. Patient on 4LNC, wean as tolerated.   - Continue furosemide gtt at 10mg/hr   - Approx 65L net negative since admission    Diabetes mellitus type 2 without retinopathy  Previously on glipizide 10mg daily and semaglutide 0.25mg weekly. Glucose starting to trend up over last few days, averaging 190s to low 200s    Plan:   - Holding home anti-hyperglycemics   - POCT BG qAC/HS while eating   - Goal -180   - Insulin detemir 5 units nightly -> increase to 8 units nightly   - Add insulin aspart 3 units TIDWM today   - LD SSI for correction   - Diabetic cardiac diet, fluid restriction 1.5L    - Will provide endocrinology referral at discharge    Sleep apnea  Patient's pulmonary HTN and RV dysfunction likely result of obesity hypoventilation syndrome with non-compliance with at-home CPAP. Evaluated by Pulmonology, who recommended continued use of NIPPV and encouraged weight loss.     Plan:    - NIPPV nightly    Morbid obesity with BMI of 70 and over, adult  Plan:  - Per patient, dry weight of  approximately 370 lb -> up to 500 pounds (BMI 88) this admission  - Weight today 407 (BMI 73)  - Continuing diuresis as noted above    Hypertension associated with diabetes  Patient on losartan 50mg daily at home. Remains normotensive at this time.    Plan:   - Holding home anti-hypertensive in acute setting   - Continue atorvastatin 10mg nightly      VTE Risk Mitigation (From admission, onward)         Ordered     enoxaparin injection 60 mg  Every 12 hours      10/22/20 1518     IP VTE HIGH RISK PATIENT  Once      10/22/20 1346     Place sequential compression device  Until discontinued      10/22/20 1346                Discharge Planning   JOSE RAUL: 11/6/2020     Code Status: Full Code   Is the patient medically ready for discharge?: No    Reason for patient still in hospital (select all that apply): Patient trending condition  Discharge Plan A: Rehab                  Olimpia Goode MD  Department of Hospital Medicine   [facility]

## 2020-11-04 NOTE — PLAN OF CARE
Problem: Physical Therapy Goal  Goal: Physical Therapy Goal  Description: Goals to be met by: 2020    Patient will increase functional independence with mobility by performin. Supine to sit with MInimal Assistance of 2 persons.-met  2. Sit to supine with Moderate Assistance of 2 persons.-met  3. Sit to stand transfer with Minimal Assistance of 1 person.-not met  4. Bed to chair stand pivot transfer with Contact Guard Assistance of 1 person using Rolling Walker.-not met  5. Gait  x 40 feet with Contact Guard Assistance of 1 person using Rolling Walker.-not met   6. Lower extremity exercise program x20 reps per handout, with assistance as needed.-not met    Outcome: Ongoing, Progressing

## 2020-11-04 NOTE — SUBJECTIVE & OBJECTIVE
Interval History: patient ripped the BiPAP off during the night and reports she is more fatigued this morning. She understands she needs to wear the BiPAP every evening for oxygenation. She agrees to the plan. She is in no pain and is oriented x3 with no questions at this time.     Review of Systems   Constitutional: Negative for diaphoresis and fever.   HENT: Negative for congestion and sore throat.    Eyes: Negative for redness and visual disturbance.   Respiratory: Negative for cough and stridor.    Cardiovascular: Positive for leg swelling. Negative for chest pain.   Gastrointestinal: Positive for abdominal distention. Negative for abdominal pain, constipation, diarrhea and vomiting.   Genitourinary: Negative for dysuria and hematuria.   Musculoskeletal: Negative for back pain.   Skin: Negative for color change and pallor.   Neurological: Negative for speech difficulty and headaches.   Psychiatric/Behavioral: Negative for agitation and confusion.     Objective:     Vital Signs (Most Recent):  Temp: 97.8 °F (36.6 °C) (11/04/20 1108)  Pulse: 80 (11/04/20 1502)  Resp: 17 (11/04/20 1108)  BP: (!) 145/81 (11/04/20 1108)  SpO2: 95 % (11/04/20 1108) Vital Signs (24h Range):  Temp:  [97.7 °F (36.5 °C)-98.7 °F (37.1 °C)] 97.8 °F (36.6 °C)  Pulse:  [70-82] 80  Resp:  [12-28] 17  SpO2:  [90 %-95 %] 95 %  BP: (116-145)/(74-81) 145/81     Weight: (!) 185 kg (407 lb 13.6 oz)  Body mass index is 72.25 kg/m².    Intake/Output Summary (Last 24 hours) at 11/4/2020 1507  Last data filed at 11/4/2020 1400  Gross per 24 hour   Intake --   Output 2560 ml   Net -2560 ml      Physical Exam  Vitals signs and nursing note reviewed.   Constitutional:       General: She is awake. She is not in acute distress.     Appearance: She is morbidly obese. She is not diaphoretic.      Interventions: Nasal cannula in place.   HENT:      Head: Normocephalic and atraumatic.      Nose: Nose normal.      Mouth/Throat:      Mouth: Mucous membranes are  moist.      Pharynx: Oropharynx is clear.   Eyes:      Extraocular Movements: Extraocular movements intact.      Conjunctiva/sclera: Conjunctivae normal.      Pupils: Pupils are equal, round, and reactive to light.   Neck:      Musculoskeletal: Normal range of motion.   Cardiovascular:      Rate and Rhythm: Normal rate and regular rhythm.      Pulses: Normal pulses.      Heart sounds: Normal heart sounds.   Pulmonary:      Effort: Pulmonary effort is normal. No respiratory distress.      Breath sounds: Normal breath sounds. No wheezing or rhonchi.   Abdominal:      General: Abdomen is protuberant. There is distension.      Palpations: Abdomen is soft.      Tenderness: There is no abdominal tenderness.   Musculoskeletal:         General: Swelling and tenderness present.      Right lower leg: Edema present.      Left lower leg: Edema present.      Comments: Hematoma on LUE, mildly TTP with surrounding ecchymosis.   Skin:     General: Skin is warm and dry.      Coloration: Skin is not jaundiced.      Findings: Erythema and rash present. Rash is scaling.      Comments: Chronic venous stasis changes present on BLE.   Neurological:      General: No focal deficit present.      Mental Status: She is alert and oriented to person, place, and time.   Psychiatric:         Mood and Affect: Mood normal.         Behavior: Behavior normal.          Significant Labs:   CBC:   Recent Labs   Lab 11/03/20  0337 11/04/20  0450   WBC 7.06 6.70   HGB 15.2 14.9   HCT 52.2* 52.1*    167     CMP:   Recent Labs   Lab 11/03/20  0337 11/04/20  0450    139   K 3.4* 3.2*   CL 83* 86*   CO2 44* 43*   * 237*   BUN 44* 47*   CREATININE 1.1 1.1   CALCIUM 10.2 9.1   PROT 7.7 7.1   ALBUMIN 3.5 3.4*   BILITOT 1.1* 1.0   ALKPHOS 59 57   AST 47* 36   ALT 51* 45*   ANIONGAP 13 10   EGFRNONAA 59.9* 59.9*     POCT Glucose:   Recent Labs   Lab 11/03/20  1647 11/03/20  2130 11/04/20  0857   POCTGLUCOSE 247* 239* 215*       Significant  Imaging: I have reviewed all pertinent imaging results/findings within the past 24 hours.

## 2020-11-04 NOTE — PLAN OF CARE
Problem: Occupational Therapy Goal  Goal: Occupational Therapy Goal  Description: Goals to be met by: 11/6/2020    Patient will increase functional independence with ADLs by performing:    UE Dressing with Stand-by Assistance.  LE Dressing with Moderate Assistance.  Grooming while EOB with Modified [place].  Toileting from bedside commode with Minimal Assistance for hygiene and clothing management.   Rolling to Bilateral with Stand-by Assistance.   Supine to sit with Contact Guard Assistance.  Stand pivot transfers with Supervision.  Toilet transfer to bedside commode with Supervision.    Goals remain appropriate. Continue OT as tolerated.  Tangela Green OT  11/4/2020    Outcome: Ongoing, Progressing

## 2020-11-04 NOTE — MEDICAL/APP STUDENT
[facility]  Progress Note    Patient Name: Halley Moreno  MRN: [id number]  Patient Class: IP- Inpatient   Admission Date: 10/22/2020  Length of Stay: 13 days  Attending Physician: Juno Jones MD  Primary Care Provider: Yamila Tejeda MD    Hospital Day 14    Subjective:     Source: patient, medical records     Principle Complaint: Weight gain of 100+ lbs over 7 months and associated SOB. Morning cough with blood streaks starting day 6.     HPI:  Patient is a 45-year-old female with morbid obesity, YARY, OHS, and cor pulmonale who presented with progressive dyspnea. Additional medical problems include diabetes type 2, HTN, and depression. A1C on 8/7 was 8.4, BMI was 88 on admission, Most recent echo showed PASP 109, TAPSE 1.37cm on 10/23.  She initially presented due to increasing weight gain over the last 7 months. Patient states that in March she began gaining weight and she was placed on diuretic to trying keep that weight down. Despite 40 mg of furosemide b.i.d. she continued to gain weight.  She has gained approximately 100 lb since March and 80 lb since May.  She does not have a cardiologist but was scheduled to see 1 in December.  Patient states that at home she sleeps in a elevated position secondary to shortness of breath.  She denies any episodes of waking up in the middle of the night out of breath.  She does not use any home oxygen nor she ever been told explicitly that she has heart failure.  She demonstrates poor understanding of the disease stating that the right heart was backing up into the lungs and cause in the shortness of breath. She presented to the clinic today for evaluation and was found to be in acute respiratory distress with hypoxia.  She was referred to the ED.     ED Course: Initially hypoxic and put on nasal cannula with normalization of her oxygen status to approximately 95%. She was demonstrating initial respiratory distress and was given a bolus of  "120mg IV furosemide.     Hospital Course: Patient was admitted to Hospital Medicine. On first day pt became somnolent and hypoxia and hypercapnia worsened requiring step up to ICU on 10/23. Was placed on BiPAP and aggressively diuresed (approx 20L net negative) with improvement in respiratory status. Weaned to 2L NC and subsequently stepped back down to Hospital Medicine on 10/26. VBG returned with elevated ppCO2 (109 at peak), decreased ppO2 (23), elevated bicarb (56.5), and pH 7.41 on 10/27. Started on continuous Bipap up to 20/8 cmH2O with VBG improved on 10/28 (pH 7.4, ppCO2 94, ppO2 39, Bicarb 41). Reports blood streaked sputum believed to be "from the mask drying out my throat" now improving with humidification of O2.     Interval History: Pt slept poorly after removing BiPAP mask overnight but no other problems. No SOB on 4L O2 by NC thiis morning. Rash improving.    Current Facility-Administered Medications   Medication    acetaminophen tablet 650 mg    atorvastatin tablet 10 mg    enoxaparin injection 60 mg    furosemide (LASIX) 500 mg infusion (conc: 10 mg/mL)    influenza (QUADRIVALENT PF) vaccine 0.5 mL    insulin aspart U-100 pen 0-5 Units    insulin aspart U-100 pen 3 Units    insulin detemir U-100 pen 7 Units    latanoprost 0.005 % ophthalmic solution 1 drop    melatonin tablet 6 mg    miconazole NITRATE 2 % top powder    mineral oil-hydrophil petrolat ointment    ondansetron disintegrating tablet 8 mg    polyethylene glycol packet 17 g    senna-docusate 8.6-50 mg per tablet 2 tablet    simethicone chewable tablet 80 mg    sodium chloride 0.9% flush 10 mL     Review of Systems   Constitutional: Negative for chills and fever.        Somnolent but rouses to voice   Respiratory: Negative for cough, hemoptysis and shortness of breath.    Cardiovascular: Positive for leg swelling (Improved). Negative for chest pain.   Gastrointestinal: Negative for abdominal pain, diarrhea, nausea and " vomiting.   Skin: Positive for rash.   Neurological: Negative for dizziness and headaches.     Objective:     Vital Signs (Most Recent):  Temp: 97.8 °F (36.6 °C) (11/04/20 1108)  Pulse: 78 (11/04/20 1122)  Resp: 17 (11/04/20 1108)  BP: (!) 145/81 (11/04/20 1108)  SpO2: 95 % (11/04/20 1108) Vital Signs (24h Range):  Temp:  [97.7 °F (36.5 °C)-98.7 °F (37.1 °C)] 97.8 °F (36.6 °C)  Pulse:  [70-82] 78  Resp:  [12-28] 17  SpO2:  [90 %-95 %] 95 %  BP: (116-145)/(74-81) 145/81     Weight: (!) 185 kg (407 lb 13.6 oz) (11/04/20 0400) unchanged from yesterday.  Body mass index is 72.25 kg/m².      Intake/Output Summary (Last 24 hours) at 11/4/2020 1252  Last data filed at 11/4/2020 1200  Gross per 24 hour   Intake --   Output 2925 ml   Net -2925 ml     Physical Exam  Constitutional:       Appearance: She is obese.      Interventions: Nasal cannula in place.   Cardiovascular:      Rate and Rhythm: Normal rate and regular rhythm.      Heart sounds: Normal heart sounds.   Pulmonary:      Effort: Pulmonary effort is normal.      Breath sounds: Normal breath sounds. No wheezing or rales.   Abdominal:      General: Bowel sounds are normal. There is distension.      Palpations: Abdomen is soft.      Tenderness: There is no abdominal tenderness.   Musculoskeletal:      Right lower leg: Edema present.      Left lower leg: Edema present.      Comments: Edema improving   Skin:     General: Skin is warm and dry.      Findings: Rash (Improving around face) present.   Neurological:      Mental Status: She is oriented to person, place, and time and easily aroused. Mental status is at baseline.   Psychiatric:         Mood and Affect: Mood normal.         Significant Labs:  CBC:   Recent Labs   Lab 11/04/20  0450   WBC 6.70   RBC 5.79*   HGB 14.9   HCT 52.1*        CMP:   Recent Labs   Lab 11/04/20 0450   *   CALCIUM 9.1   ALBUMIN 3.4*   PROT 7.1      K 3.2*   CO2 43*   CL 86*   BUN 47*   CREATININE 1.1   ALKPHOS 57   ALT  45*   AST 36   BILITOT 1.0       Significant Imaging:  No new imaging     Assessment/Plan:      No notes on file    Active Diagnoses:    Diagnosis Date Noted POA    PRINCIPAL PROBLEM:  Right heart failure due to pulmonary hypertension [I27.29, I50.810] 10/22/2020 Yes    Discharge planning issues [Z02.9] 10/30/2020 Not Applicable    Impaired mobility and activities of daily living [Z74.09, Z78.9] 10/29/2020 Yes    Hypokalemia [E87.6] 10/29/2020 Yes    Constipation [K59.00] 10/25/2020 Yes    Obesity hypoventilation syndrome [E66.2] 10/23/2020 Yes    Hyperphosphatemia [E83.39] 10/23/2020 No    Acute respiratory failure with hypoxia and hypercapnia [J96.01, J96.02] 10/22/2020 Yes    Diabetes mellitus type 2 without retinopathy [E11.9] 10/14/2019 Yes    Hypertension associated with diabetes [E11.59, I10]  Yes    Sleep apnea [G47.30]  Yes    Morbid obesity with BMI of 70 and over, adult [E66.01, Z68.45]  Not Applicable      Problems Resolved During this Admission:    Diagnosis Date Noted Date Resolved POA    Hyperkalemia [E87.5] 10/23/2020 10/29/2020 Yes     Acute Respiratory Failure with hypoxia and hypercapnia  On 10/23 respiratory team alerted for SpO2 of 90% and lethargy. On arrival ABG showed pH 7.165, pCO2 112.7, pO2 85. Started on Bipap continuosly at 15/8 with FiO2 65%.  - target >88% SpO2  - 10mg/hr furosemide drip  - 1 day since Chlorthiazide 500mg morning infusion stopped per BUN inc  - Patient education on OHS and benefit of weight loss  - Continuous Bipap at 20/8 and FiO2 100% overnight per resp note  - 4L O2 by NC during day, continue weaning as tolerated  - humidify nasal canula and Bipap     Cor pulmonale causing right sided HFpEF  New diagnosis. PASP of 109 with TAPSE 1.37cm in R ventricle on echo indicates Cor Pulmonale 2/2 YARY/OHS from morbid obesity (BMI 72.25).  - Strict I/O  - fluid restriction of 1.5L/day  - daily weights             Dry weight 390lbs per pt  - low salt diet and  education on home fluid restriction  - see respiratory failure plan     Prerenal DIANA  Pt BUN has increased over last 5 days from 24>26>34>40>44>47. Cr lvl 1.1 (unchanged) today. No change in urine output.  - stopped all diuretics except lasix     Hypokalemia  K+ down to 3.2 from 3.4 previously  - 2x doses of 50mEqu KOH  - recheck K+ in AM     DM type 2  Glu 237 from 219 this AM. Increasing insulin bolus and correction dose by 1 unit each  - stopped home meds  - short acting insulin 0-5 units premeal  - Correction insulin (3 units, short acting) premeal  - 7 unit Detemir bolus in evening  - diabetic cardiac diet  - Accu checks AC/evening     HTN  - On losartan 10mg at home. Held     OHS/YARY  pCo2 >45, BMI >30, no other identifiable cause for hypoventilation.  - NIPPV nightly  - see obesity     Obesity  BMI 75.76. Gained >100lbs in last 7 months 2/2 fluid retension. Dry weight of 370lbs  - pt expresses interest inlosing weight, dietary/bariatric medicine outpatient follow up warranted    VTE Risk Mitigation (From admission, onward)         Ordered     enoxaparin injection 60 mg  Every 12 hours      10/22/20 1518     IP VTE HIGH RISK PATIENT  Once      10/22/20 1346     Place sequential compression device  Until discontinued      10/22/20 1346                   Yogesh Nunez  [facility]

## 2020-11-04 NOTE — ASSESSMENT & PLAN NOTE
Previously on glipizide 10mg daily and semaglutide 0.25mg weekly. Glucose starting to trend up over last few days, averaging 190s to low 200s    Plan:   - Holding home anti-hyperglycemics   - POCT BG qAC/HS while eating   - Goal -180   - Insulin detemir 5 units nightly -> increase to 8 units nightly   - Add insulin aspart 3 units TIDWM today   - LD SSI for correction   - Diabetic cardiac diet, fluid restriction 1.5L    - Will provide endocrinology referral at discharge

## 2020-11-04 NOTE — ASSESSMENT & PLAN NOTE
Patient is a chronic CO2 retainer in setting of obesity hypoventilation syndrome 2/2 morbid obesity. Stepped down from ICU 10/26, currently continuing diuresis. De-escalated to NC during the day with BiPAP nightly on 10/31, which patient tolerated well.      Plan:   - Target SpO2 of >88%. Continue BiPAP nightly. Patient on 4LNC, wean as tolerated.   - Continue furosemide gtt at 10mg/hr   - Approx 65L net negative since admission

## 2020-11-05 LAB
ALBUMIN SERPL BCP-MCNC: 3.7 G/DL (ref 3.5–5.2)
ALP SERPL-CCNC: 67 U/L (ref 55–135)
ALT SERPL W/O P-5'-P-CCNC: 46 U/L (ref 10–44)
ANION GAP SERPL CALC-SCNC: 15 MMOL/L (ref 8–16)
ASCENDING AORTA: 3 CM
AST SERPL-CCNC: 35 U/L (ref 10–40)
AV INDEX (PROSTH): 1.02
AV MEAN GRADIENT: 4 MMHG
AV PEAK GRADIENT: 6 MMHG
AV VALVE AREA: 4.4 CM2
AV VELOCITY RATIO: 0.93
BASOPHILS # BLD AUTO: 0.02 K/UL (ref 0–0.2)
BASOPHILS NFR BLD: 0.3 % (ref 0–1.9)
BILIRUB SERPL-MCNC: 1 MG/DL (ref 0.1–1)
BILIRUB UR QL STRIP: NEGATIVE
BSA FOR ECHO PROCEDURE: 2.79 M2
BUN SERPL-MCNC: 55 MG/DL (ref 6–20)
CALCIUM SERPL-MCNC: 10 MG/DL (ref 8.7–10.5)
CHLORIDE SERPL-SCNC: 83 MMOL/L (ref 95–110)
CLARITY UR REFRACT.AUTO: CLEAR
CO2 SERPL-SCNC: 41 MMOL/L (ref 23–29)
COLOR UR AUTO: YELLOW
CREAT SERPL-MCNC: 1.1 MG/DL (ref 0.5–1.4)
CV ECHO LV RWT: 0.57 CM
DIFFERENTIAL METHOD: ABNORMAL
DOP CALC AO PEAK VEL: 1.21 M/S
DOP CALC AO VTI: 21.64 CM
DOP CALC LVOT AREA: 4.3 CM2
DOP CALC LVOT DIAMETER: 2.34 CM
DOP CALC LVOT PEAK VEL: 1.13 M/S
DOP CALC LVOT STROKE VOLUME: 95.29 CM3
DOP CALCLVOT PEAK VEL VTI: 22.17 CM
E WAVE DECELERATION TIME: 232.46 MSEC
E/A RATIO: 1.17
E/E' RATIO: 8.74 M/S
ECHO LV POSTERIOR WALL: 1.1 CM (ref 0.6–1.1)
EOSINOPHIL # BLD AUTO: 0.1 K/UL (ref 0–0.5)
EOSINOPHIL NFR BLD: 1.9 % (ref 0–8)
ERYTHROCYTE [DISTWIDTH] IN BLOOD BY AUTOMATED COUNT: 20.1 % (ref 11.5–14.5)
EST. GFR  (AFRICAN AMERICAN): >60 ML/MIN/1.73 M^2
EST. GFR  (NON AFRICAN AMERICAN): 59.9 ML/MIN/1.73 M^2
FRACTIONAL SHORTENING: 43 % (ref 28–44)
GLUCOSE SERPL-MCNC: 210 MG/DL (ref 70–110)
GLUCOSE UR QL STRIP: NEGATIVE
HCT VFR BLD AUTO: 51.9 % (ref 37–48.5)
HGB BLD-MCNC: 15 G/DL (ref 12–16)
HGB UR QL STRIP: ABNORMAL
IMM GRANULOCYTES # BLD AUTO: 0.02 K/UL (ref 0–0.04)
IMM GRANULOCYTES NFR BLD AUTO: 0.3 % (ref 0–0.5)
INTERVENTRICULAR SEPTUM: 1.24 CM (ref 0.6–1.1)
IVRT: 71.36 MSEC
KETONES UR QL STRIP: NEGATIVE
LA MAJOR: 4.62 CM
LA MINOR: 5.24 CM
LA WIDTH: 3.47 CM
LEFT ATRIUM SIZE: 3.43 CM
LEFT ATRIUM VOLUME INDEX: 19.4 ML/M2
LEFT ATRIUM VOLUME: 49.68 CM3
LEFT INTERNAL DIMENSION IN SYSTOLE: 2.17 CM (ref 2.1–4)
LEFT VENTRICLE DIASTOLIC VOLUME INDEX: 24.73 ML/M2
LEFT VENTRICLE DIASTOLIC VOLUME: 63.3 ML
LEFT VENTRICLE MASS INDEX: 58 G/M2
LEFT VENTRICLE SYSTOLIC VOLUME INDEX: 6.1 ML/M2
LEFT VENTRICLE SYSTOLIC VOLUME: 15.59 ML
LEFT VENTRICULAR INTERNAL DIMENSION IN DIASTOLE: 3.83 CM (ref 3.5–6)
LEFT VENTRICULAR MASS: 149.28 G
LEUKOCYTE ESTERASE UR QL STRIP: ABNORMAL
LV LATERAL E/E' RATIO: 5.93 M/S
LV SEPTAL E/E' RATIO: 16.6 M/S
LYMPHOCYTES # BLD AUTO: 1.8 K/UL (ref 1–4.8)
LYMPHOCYTES NFR BLD: 26 % (ref 18–48)
MAGNESIUM SERPL-MCNC: 2 MG/DL (ref 1.6–2.6)
MCH RBC QN AUTO: 25.2 PG (ref 27–31)
MCHC RBC AUTO-ENTMCNC: 28.9 G/DL (ref 32–36)
MCV RBC AUTO: 87 FL (ref 82–98)
MICROSCOPIC COMMENT: ABNORMAL
MONOCYTES # BLD AUTO: 0.7 K/UL (ref 0.3–1)
MONOCYTES NFR BLD: 10.8 % (ref 4–15)
MV PEAK A VEL: 0.71 M/S
MV PEAK E VEL: 0.83 M/S
MV STENOSIS PRESSURE HALF TIME: 67.41 MS
MV VALVE AREA P 1/2 METHOD: 3.26 CM2
NEUTROPHILS # BLD AUTO: 4.2 K/UL (ref 1.8–7.7)
NEUTROPHILS NFR BLD: 60.7 % (ref 38–73)
NITRITE UR QL STRIP: NEGATIVE
NRBC BLD-RTO: 0 /100 WBC
PH UR STRIP: >8 [PH] (ref 5–8)
PHOSPHATE SERPL-MCNC: 4.4 MG/DL (ref 2.7–4.5)
PLATELET # BLD AUTO: 174 K/UL (ref 150–350)
PMV BLD AUTO: 10.8 FL (ref 9.2–12.9)
POCT GLUCOSE: 190 MG/DL (ref 70–110)
POCT GLUCOSE: 195 MG/DL (ref 70–110)
POCT GLUCOSE: 227 MG/DL (ref 70–110)
POTASSIUM SERPL-SCNC: 3.3 MMOL/L (ref 3.5–5.1)
PROT SERPL-MCNC: 7.7 G/DL (ref 6–8.4)
PROT UR QL STRIP: NEGATIVE
PULM VEIN S/D RATIO: 1.17
PV PEAK D VEL: 0.29 M/S
PV PEAK S VEL: 0.34 M/S
RA MAJOR: 5.38 CM
RA WIDTH: 4.2 CM
RBC # BLD AUTO: 5.96 M/UL (ref 4–5.4)
RBC #/AREA URNS AUTO: >100 /HPF (ref 0–4)
RIGHT VENTRICULAR END-DIASTOLIC DIMENSION: 4.34 CM
RV TISSUE DOPPLER FREE WALL SYSTOLIC VELOCITY 1 (APICAL 4 CHAMBER VIEW): 12.65 CM/S
SINUS: 3.25 CM
SODIUM SERPL-SCNC: 139 MMOL/L (ref 136–145)
SP GR UR STRIP: 1.01 (ref 1–1.03)
SQUAMOUS #/AREA URNS AUTO: 0 /HPF
STJ: 3.15 CM
TDI LATERAL: 0.14 M/S
TDI SEPTAL: 0.05 M/S
TDI: 0.1 M/S
TRICUSPID ANNULAR PLANE SYSTOLIC EXCURSION: 2.1 CM
URN SPEC COLLECT METH UR: ABNORMAL
WBC # BLD AUTO: 6.84 K/UL (ref 3.9–12.7)
WBC #/AREA URNS AUTO: 0 /HPF (ref 0–5)

## 2020-11-05 PROCEDURE — 81001 URINALYSIS AUTO W/SCOPE: CPT

## 2020-11-05 PROCEDURE — 94761 N-INVAS EAR/PLS OXIMETRY MLT: CPT

## 2020-11-05 PROCEDURE — 25000003 PHARM REV CODE 250: Performed by: STUDENT IN AN ORGANIZED HEALTH CARE EDUCATION/TRAINING PROGRAM

## 2020-11-05 PROCEDURE — 36415 COLL VENOUS BLD VENIPUNCTURE: CPT

## 2020-11-05 PROCEDURE — 63600175 PHARM REV CODE 636 W HCPCS: Performed by: STUDENT IN AN ORGANIZED HEALTH CARE EDUCATION/TRAINING PROGRAM

## 2020-11-05 PROCEDURE — 97803 MED NUTRITION INDIV SUBSEQ: CPT

## 2020-11-05 PROCEDURE — 94660 CPAP INITIATION&MGMT: CPT

## 2020-11-05 PROCEDURE — 99233 SBSQ HOSP IP/OBS HIGH 50: CPT | Mod: ,,, | Performed by: INTERNAL MEDICINE

## 2020-11-05 PROCEDURE — 83735 ASSAY OF MAGNESIUM: CPT

## 2020-11-05 PROCEDURE — 99233 PR SUBSEQUENT HOSPITAL CARE,LEVL III: ICD-10-PCS | Mod: ,,, | Performed by: INTERNAL MEDICINE

## 2020-11-05 PROCEDURE — 20600001 HC STEP DOWN PRIVATE ROOM

## 2020-11-05 PROCEDURE — 97535 SELF CARE MNGMENT TRAINING: CPT

## 2020-11-05 PROCEDURE — 99232 SBSQ HOSP IP/OBS MODERATE 35: CPT | Mod: ,,, | Performed by: NURSE PRACTITIONER

## 2020-11-05 PROCEDURE — 97530 THERAPEUTIC ACTIVITIES: CPT

## 2020-11-05 PROCEDURE — 27000221 HC OXYGEN, UP TO 24 HOURS

## 2020-11-05 PROCEDURE — 84100 ASSAY OF PHOSPHORUS: CPT

## 2020-11-05 PROCEDURE — 97110 THERAPEUTIC EXERCISES: CPT

## 2020-11-05 PROCEDURE — 99232 PR SUBSEQUENT HOSPITAL CARE,LEVL II: ICD-10-PCS | Mod: ,,, | Performed by: NURSE PRACTITIONER

## 2020-11-05 PROCEDURE — 97530 THERAPEUTIC ACTIVITIES: CPT | Mod: CQ

## 2020-11-05 PROCEDURE — 99900035 HC TECH TIME PER 15 MIN (STAT)

## 2020-11-05 PROCEDURE — 97116 GAIT TRAINING THERAPY: CPT | Mod: CQ

## 2020-11-05 PROCEDURE — 80053 COMPREHEN METABOLIC PANEL: CPT

## 2020-11-05 PROCEDURE — 85025 COMPLETE CBC W/AUTO DIFF WBC: CPT

## 2020-11-05 RX ORDER — INSULIN ASPART 100 [IU]/ML
5 INJECTION, SOLUTION INTRAVENOUS; SUBCUTANEOUS
Status: DISCONTINUED | OUTPATIENT
Start: 2020-11-05 | End: 2020-11-06

## 2020-11-05 RX ADMIN — INSULIN ASPART 1 UNITS: 100 INJECTION, SOLUTION INTRAVENOUS; SUBCUTANEOUS at 11:11

## 2020-11-05 RX ADMIN — INSULIN ASPART 3 UNITS: 100 INJECTION, SOLUTION INTRAVENOUS; SUBCUTANEOUS at 09:11

## 2020-11-05 RX ADMIN — INSULIN ASPART 2 UNITS: 100 INJECTION, SOLUTION INTRAVENOUS; SUBCUTANEOUS at 12:11

## 2020-11-05 RX ADMIN — DOCUSATE SODIUM 50MG AND SENNOSIDES 8.6MG 2 TABLET: 8.6; 5 TABLET, FILM COATED ORAL at 09:11

## 2020-11-05 RX ADMIN — LATANOPROST 1 DROP: 50 SOLUTION OPHTHALMIC at 09:11

## 2020-11-05 RX ADMIN — ATORVASTATIN CALCIUM 10 MG: 10 TABLET, FILM COATED ORAL at 09:11

## 2020-11-05 RX ADMIN — INSULIN ASPART 5 UNITS: 100 INJECTION, SOLUTION INTRAVENOUS; SUBCUTANEOUS at 05:11

## 2020-11-05 RX ADMIN — MELATONIN TAB 3 MG 6 MG: 3 TAB at 09:11

## 2020-11-05 RX ADMIN — MICONAZOLE NITRATE: 20 POWDER TOPICAL at 09:11

## 2020-11-05 RX ADMIN — ENOXAPARIN SODIUM 60 MG: 60 INJECTION SUBCUTANEOUS at 09:11

## 2020-11-05 RX ADMIN — POTASSIUM BICARBONATE 50 MEQ: 978 TABLET, EFFERVESCENT ORAL at 02:11

## 2020-11-05 RX ADMIN — POLYETHYLENE GLYCOL 3350 17 G: 17 POWDER, FOR SOLUTION ORAL at 09:11

## 2020-11-05 RX ADMIN — POTASSIUM BICARBONATE 50 MEQ: 978 TABLET, EFFERVESCENT ORAL at 12:11

## 2020-11-05 NOTE — PROGRESS NOTES
"  [facility]  Adult Nutrition  Consult Note    SUMMARY     Recommendations    1. Continue current Diabetic, Cardiac diet (fluid restriction per MD).   2. RD to monitor & follow-up.    Goals: Meet % EEN, EPN by RD f/u date  Nutrition Goal Status: goal met  Communication of RD Recs: reviewed with RN    Reason for Assessment    Reason For Assessment: RD follow-up  Diagnosis: other (see comments)(HF)  Relevant Medical History: HTN, DM  Interdisciplinary Rounds: did not attend    General Information Comments: Pt continues w/ good appetite, tolerating diet w/ % PO intake. Good appetite, weight gain PTA. Pt w/ no indicators of malnutrition. Low sodium, Diabetic diet education complete 10/30 - paperwork provided; pt verbalized understanding.  Nutrition Discharge Planning: Adequate PO intake    Nutrition/Diet History    Patient Reported Diet/Restrictions/Preferences: general  Spiritual, Cultural Beliefs, Holiness Practices, Values that Affect Care: no  Factors Affecting Nutritional Intake: None identified at this time    Anthropometrics    Temp: 97.4 °F (36.3 °C)  Height Method: Measured  Height: 5' 3" (160 cm)  Height (inches): 63 in  Weight Method: Bed Scale  Weight: (!) 185 kg (407 lb 13.6 oz)  Weight (lb): (!) 407.86 lb  Ideal Body Weight (IBW), Female: 115 lb  % Ideal Body Weight, Female (lb): 354.66 %  BMI (Calculated): 72.3  BMI Grade: greater than 40 - morbid obesity    Lab/Procedures/Meds    Pertinent Labs Reviewed: reviewed  Pertinent Labs Comments: A1C 8.4  Pertinent Medications Reviewed: reviewed  Pertinent Medications Comments: Lasix    Estimated/Assessed Needs    Weight Used For Calorie Calculations: (!) 185 kg (407 lb 13.6 oz)     Energy Calorie Requirements (kcal): 2454 kcal/d  Energy Need Method: Leary-St Jeor(1.0 PAL)     Protein Requirements: 148 g/d (.8 g/kg)  Weight Used For Protein Calculations: (!) 185 kg (407 lb 13.6 oz)     Estimated Fluid Requirement Method: other " (see comments)(Per MD or 1 mL/kcal)  RDA Method (mL): 2454    Nutrition Prescription Ordered    Current Diet Order: Diabetic, Cardiac  Nutrition Order Comments: 1500 mL FR    Evaluation of Received Nutrient/Fluid Intake    Comments: LBM: 11/4    Tolerance: tolerating    Nutrition Risk    Level of Risk/Frequency of Follow-up: (1x/week)     Assessment and Plan    Nutrition Problem  Excessive energy intake     Related to (etiology):   Food and nutrition related knowledge deficit      Signs and Symptoms (as evidenced by):   BMI 74     Interventions(treatment strategy):  Collaboration of nutrition care w/ other providers      Nutrition Diagnosis Status:   Continues     Monitor and Evaluation    Food and Nutrient Intake: energy intake, food and beverage intake  Food and Nutrient Adminstration: diet order  Physical Activity and Function: nutrition-related ADLs and IADLs  Anthropometric Measurements: weight, weight change  Biochemical Data, Medical Tests and Procedures: inflammatory profile, lipid profile, gastrointestinal profile, glucose/endocrine profile, electrolyte and renal panel  Nutrition-Focused Physical Findings: overall appearance     Nutrition Follow-Up    RD Follow-up?: Yes

## 2020-11-05 NOTE — PT/OT/SLP PROGRESS
Occupational Therapy   Treatment    Name: Halley Moreno  MRN: [id number]  Admitting Diagnosis:  Right heart failure due to pulmonary hypertension       Recommendations:     Discharge Recommendations: rehabilitation facility  Discharge Equipment Recommendations:  bath bench, other (see comments), bedside commode, walker, rolling(bariatric RW and bariatric BSC)  Barriers to discharge:  Decreased caregiver support    Assessment:     Halley Moreno is a 45 y.o. female with a medical diagnosis of Right heart failure due to pulmonary hypertension.  She presents with impaired ADL and mobility performance deficits. Pt continues to show excellent progress and active participation in all therapy sessions with writing therapist. Pt found with PCT requiring total (A) for pericare and linen change. Pt able to assist with rolling with SBA and with donning gown prior to OOB mobility. Pt required SBA with increased (+) for bed mobility using SizeWise bed and mattress max inflated for task. Bed then turned off (to lower height of bed) for pt's feet to reach ground prior to mobility. Pt completed sit<stand t/f this date with min (A)X2 with WESLEY Riddle assist with pt to get standing weight. Pt then transferred to bedside chair with min (A)X2 with good environmental scanning and safe technique. Pt then had UE/LE therapeutic dance exercise sitting upright in chair for overall boost in moral and integration of upper body, lower body, and trunk musculatures. Heavy duty RW delivered to room and BSC to be delivered as well for pt to use while hospitalized. At this time, pt remains an excellent candidate for continued therapy and is not at baseline. She is very motivated to participate. Pt would benefit from continued OT skilled services 4x/wk to improve daily living skills to optimize QOL. Pt is recommended to discharge to Rehab at this time.  Performance deficits affecting function are weakness, impaired self care skills, impaired  balance, decreased coordination, decreased lower extremity function, impaired endurance, gait instability, impaired functional mobilty, impaired cardiopulmonary response to activity.     Rehab Prognosis:  Good; patient would benefit from acute skilled OT services to address these deficits and reach maximum level of function.       Plan:     Patient to be seen 4 x/week to address the above listed problems via self-care/home management, therapeutic activities, therapeutic exercises, neuromuscular re-education  · Plan of Care Expires: 11/22/20  · Plan of Care Reviewed with: patient    Subjective     Pain/Comfort:  · Pain Rating 1: 0/10  · Pain Rating Post-Intervention 1: 0/10  · Pain Rating Post-Intervention 2: 0/10    Objective:     Communicated with: RN prior to session.  Patient found HOB elevated with telemetry, pulse ox (continuous), alamo catheter, oxygen upon OT entry to room.    General Precautions: Standard, fall   Orthopedic Precautions:N/A   Braces: N/A     Occupational Performance:     Bed Mobility:    · Patient completed Rolling/Turning to Right with stand by assistance  · Patient completed Supine to Sit with stand by assistance     Functional Mobility/Transfers:  · Patient completed Sit <> Stand Transfer with minimum assistance and of 2 persons  with  hand-held assist   · Patient completed Bed <> Chair Transfer using Step Transfer technique with minimum assistance and of  2 with hand-held assist  · Functional Mobility: Pt completed x2 trials of sit<Stand t/f from bed (turned off/flat) with min (A)x2. Pt able to step up onto scale with increased time and seated rest break. Pt then mobilized to bedside chair (~5 steps and pivot) with min (A)x2.     Activities of Daily Living:  · Grooming: setup (A) of hand washing items following toileting needs   · Upper Body Dressing: minimum assistance donning clean gown in sidelying   · Toileting: total assistance as pt found soiled   **Bariatric RW/ BSC ordered for pt,  escalated to C&D to follow.    [facility] 6 Click ADL: 14    Treatment & Education:  Pt educated on role of occupational therapy, POC, and safety during ADLs and functional mobility. Pt and OT discussed importance of safe, continued mobility to optimize daily living skills. Pt verbalized understanding.   Pt completed the following during session: bed mobility, toileting with associated linen change, UB/LB dressing, grooming, sit<Stand tf trials, safe mobility to bedside chair.  Pt then completed therapeutic dance sitting upright in chair (with good Sp02 while on 3L 02)--  Leg kicks, marches, ankle dorsiflexion/plantar flexion, chest press, alternating punches, shoulder flexion/ext (Rest breaks between ~3 min song for fatigue). White board updated during session. Pt given instruction to call for medical staff/nurse for assistance.       Patient left up in chair with all lines intact, call button in reach and RN Venkatesh presentEducation:      GOALS:   Multidisciplinary Problems     Occupational Therapy Goals        Problem: Occupational Therapy Goal    Goal Priority Disciplines Outcome Interventions   Occupational Therapy Goal     OT, PT/OT Ongoing, Progressing    Description: Goals to be met by: 11/6/2020    Patient will increase functional independence with ADLs by performing:    UE Dressing with Stand-by Assistance.  LE Dressing with Moderate Assistance.  Grooming while EOB with Modified [place].  Toileting from bedside commode with Minimal Assistance for hygiene and clothing management.   Rolling to Bilateral with Stand-by Assistance.   Supine to sit with Contact Guard Assistance.  Stand pivot transfers with Supervision.  Toilet transfer to bedside commode with Supervision.                     Time Tracking:     OT Date of Treatment: 11/05/20  OT Start Time: 1037  OT Stop Time: 1125  OT Total Time (min): 48 min    Billable Minutes:Self Care/Home Management 12 min  Therapeutic Activity 24 min  Therapeutic Exercise 12  taovn Green, OT  11/5/2020

## 2020-11-05 NOTE — ASSESSMENT & PLAN NOTE
-Body mass index is 72.25 kg/m².  -bariatric equipment as needed  -encourage lifestyle modifications

## 2020-11-05 NOTE — NURSING
Patient's alamo showed blood tinged urine. MD notified. Patient observed to be spotting, and absorbant pad was switched.

## 2020-11-05 NOTE — ASSESSMENT & PLAN NOTE
Patient's phosphorus level has been steadily increasing throughout hospital stay, at 4.4 today. Likely secondary to aggressive diuresis, but patient has remained asymptomatic.    Plan:   - Monitor with daily CMP

## 2020-11-05 NOTE — ASSESSMENT & PLAN NOTE
Patient with mild recurrent hypokalemia, most likely due to consistent diuresis. K today 3.3.    Plan:   - CMP daily   - Replete PRN with goal K>4   - Potassium bicarbonate 50 mEq x 2 doses today

## 2020-11-05 NOTE — PT/OT/SLP PROGRESS
"Physical Therapy Treatment    Patient Name:  Halley Moreno   MRN:  [id number]  Admitting Diagnosis: Right heart failure due to pulmonary hypertension  Recent Surgery: * No surgery found *      Recommendations:     Discharge Recommendations:  rehabilitation facility   Discharge Equipment Recommendations: bath bench(bariatric)   Barriers to discharge: Decreased caregiver support    Plan:     During this hospitalization, patient to be seen 4 x/week to address the above listed problems via gait training, therapeutic activities, therapeutic exercises  · Plan of Care Expires:  11/21/20   Plan of Care Reviewed with: patient    This Plan of care has been discussed with the patient who was involved in its development and understands and is in agreement with the identified goals and treatment plan    Subjective     Communicated with nurse (Venkatesh) prior to session.     Patient comments: "I did this already", however, pt agreeable to session after encouragement  Pain/Comfort:  · Pain Rating 1: 0/10  · Pain Rating Post-Intervention 1: 0/10    Objective:     Patient found with: alamo catheter, oxygen, pulse ox (continuous), telemetry(pressure relief mattress)    Patient found sup in bed with HOB elevated upon PT entry to room, agreeable to treatment.  No family present in the room.    General Precautions: Standard, fall   Orthopedic Precautions:N/A   Braces: N/A       BED MOBILITY (vc's for hand placement sequencing of task):        Rolling to the R:  NT.       Rolling to the L:  NT.        Sup > sit at the EOB:  SBA for safety (HOB and use of rail, exiting on the R side).       Sit > sup:  Not performed 2* pt left seated up in the chair.       Scooting hips to EOB with SBA       (once pt sitting at the EOB, mattress was deflated so pt could reach floor with B feet)                SITTING AT THE EDGE OF THE BED    Assistance Level Required: close sup for safety with B UE support    TRANSFERS  (vc's for hand placement, " sequencing of task and safety)   Patient completed Sit <> Stand Transfer from EOB with min A of 2 for hip elevation with rolling walker x1 trial(s)   Patient completed Stand <> Sit Transfer to BS chair with min/CGA for balance and safety with rolling walker        GAIT: during step transfer to chair   Patient ambulated: 5 steps   Patient required: CGA for balance and safety   Patient used:  Rolling Walker   Gait Pattern observed: full weightbearing   Gait Deviation(s): decreased patricia, increased time in double stance, decreased velocity of limb motion, decreased step length, decreased stride length, decreased toe-to-floor clearance and decreased weight-shifting ability    Comments: vc's for upright posture, placement of AD and safety      THERAPEUTIC ACTIVITIES/EXERCISES  Pt performs B LE AROM ther ex's while seated in chair x15 reps with vc's and rests prn    EDUCATION  Patient provided with daily orientation and goals of this PT session. They were educated to call for assistance and to transfer with hospital staff only.  Also, pt was educated on the effects of prolonged immobility and the importance of performing OOB activity and exercises to promote healing and reduce recovery time      Whiteboard updated with correct mobility information. RN/PCT notified.  Pt safe to transfer OOB/BTB with RN/PCT via step transfer: Use RW with min A.    Patient left up in chair, with  all lines intact, call button in reach and nurse notified    AM-PAC 6 CLICK MOBILITY  Turning over in bed (including adjusting bedclothes, sheets and blankets)?: 3  Sitting down on and standing up from a chair with arms (e.g., wheelchair, bedside commode, etc.): 3  Moving from lying on back to sitting on the side of the bed?: 3  Moving to and from a bed to a chair (including a wheelchair)?: 3  Need to walk in hospital room?: 3  Climbing 3-5 steps with a railing?: 1  Basic Mobility Total Score: 16     Assessment:     Halley Moreno is a 45  y.o. female admitted with a medical diagnosis of Right heart failure due to pulmonary hypertension.  She presents with the following impairments/functional limitations:  weakness, impaired endurance, impaired self care skills, impaired functional mobilty, gait instability, impaired balance, decreased lower extremity function, impaired skin, edema, impaired cardiopulmonary response to activity. requiring assistance and verbal cues for transfers and gait to prevent falls due to weakness and fatigue.   In light of pt's current functional level and deficits, it is anticipated that pt will need to participate in an intense rehab program consisting of PT and OT in order to achieve full rehab potential to return to previous level of function and roles.  Pt remains motivated to participate in PT session and will cont to benefit from skilled PT intervention.      Rehab Prognosis:  Good; patient would benefit from acute skilled PT services to address these deficits and reach maximum level of function.      GOALS:   Multidisciplinary Problems     Physical Therapy Goals        Problem: Physical Therapy Goal    Goal Priority Disciplines Outcome Goal Variances Interventions   Physical Therapy Goal     PT, PT/OT Ongoing, Progressing     Description: Goals to be met by: 2020    Patient will increase functional independence with mobility by performin. Supine to sit with MInimal Assistance of 2 persons.  2. Sit to supine with Moderate Assistance of 2 persons.  3. Sit to stand transfer with Minimal Assistance of 1 person.  4. Bed to chair stand pivot transfer with Contact Guard Assistance of 1 person using Rolling Walker.  5. Gait  x 40 feet with Contact Guard Assistance of 1 person using Rolling Walker.   6. Lower extremity exercise program x20 reps per handout, with assistance as needed.                     Time Tracking:     PT Received On: 20  PT Start Time: 1359     PT Stop Time: 1430  PT Total Time (min): 31  min     Billable Minutes: Gait Training 10 and Therapeutic Activity 20    Treatment Type: Treatment  PT/PTA: PTA     PTA Visit Number: 3       Orly Redmond PTA.  Pager [phone]    11/5/2020    .

## 2020-11-05 NOTE — PLAN OF CARE
Problem: Fall Injury Risk  Goal: Absence of Fall and Fall-Related Injury  Outcome: Ongoing, Progressing     Problem: Infection  Goal: Infection Symptom Resolution  Outcome: Ongoing, Progressing     Problem: Adult Inpatient Plan of Care  Goal: Plan of Care Review  Outcome: Ongoing, Progressing  Flowsheets (Taken 11/5/2020 1740)  Plan of Care Reviewed With: patient  Goal: Patient-Specific Goal (Individualization)  Outcome: Ongoing, Progressing  Flowsheets (Taken 11/5/2020 1740)  Individualized Care Needs: weaning oxygen as tolerated  Anxieties, Fears or Concerns: concerned about going home with oxygen  Patient-Specific Goals (Include Timeframe): Patient will continue to wean from oxygen as tolerated tomorrow  Goal: Optimal Comfort and Wellbeing  Outcome: Ongoing, Progressing  Intervention: Provide Person-Centered Care  Flowsheets (Taken 11/5/2020 1740)  Trust Relationship/Rapport:   care explained   choices provided   emotional support provided   empathic listening provided   questions answered   questions encouraged   reassurance provided   thoughts/feelings acknowledged     VS stable, patient was weaned to 3L NC today, and is tolerating it. Patient worked with PT/OT and got up to the chair twice during this shift. Patient went to echo today, and lasix gtt was discontinued. Patient is observed to be resting in bed, and her blood sugar seems to be better controlled today.

## 2020-11-05 NOTE — ASSESSMENT & PLAN NOTE
-repeat echo on 10/23 showed mildly to moderately reduced right ventricular systolic function, EF improved to 75%  -fluid restriction  -continued on lasix gtt

## 2020-11-05 NOTE — SUBJECTIVE & OBJECTIVE
Interval History: No acute events or significant changes overnight. Remained afebrile and hemodynamically stable, compliant with BiPAP overnight. States she feels good this morning and is ready to work with therapy today.    Review of Systems   Constitutional: Negative for diaphoresis and fever.   HENT: Negative for congestion and sore throat.    Eyes: Negative for redness and visual disturbance.   Respiratory: Negative for cough and stridor.    Cardiovascular: Positive for leg swelling. Negative for chest pain.   Gastrointestinal: Negative for abdominal pain, constipation, diarrhea and vomiting.   Genitourinary: Negative for dysuria and hematuria.   Musculoskeletal: Negative for back pain.   Skin: Negative for color change and pallor.   Neurological: Negative for speech difficulty and headaches.   Psychiatric/Behavioral: Negative for agitation and confusion.     Objective:     Vital Signs (Most Recent):  Temp: 97.9 °F (36.6 °C) (11/05/20 1205)  Pulse: 79 (11/05/20 1205)  Resp: 15 (11/05/20 1205)  BP: 96/61 (11/05/20 1205)  SpO2: 97 % (11/05/20 1205) Vital Signs (24h Range):  Temp:  [97.4 °F (36.3 °C)-97.9 °F (36.6 °C)] 97.9 °F (36.6 °C)  Pulse:  [69-80] 79  Resp:  [15-28] 15  SpO2:  [70 %-97 %] 97 %  BP: ()/(57-71) 96/61     Weight: (!) 175.8 kg (387 lb 9.1 oz)  Body mass index is 68.65 kg/m².    Intake/Output Summary (Last 24 hours) at 11/5/2020 1321  Last data filed at 11/5/2020 1200  Gross per 24 hour   Intake --   Output 2445 ml   Net -2445 ml      Physical Exam  Vitals signs and nursing note reviewed.   Constitutional:       General: She is awake. She is not in acute distress.     Appearance: She is morbidly obese. She is not diaphoretic.      Interventions: Nasal cannula in place.   HENT:      Head: Normocephalic and atraumatic.      Nose: Nose normal.      Mouth/Throat:      Mouth: Mucous membranes are moist.      Pharynx: Oropharynx is clear.   Eyes:      Extraocular Movements: Extraocular movements  intact.      Conjunctiva/sclera: Conjunctivae normal.      Pupils: Pupils are equal, round, and reactive to light.   Neck:      Musculoskeletal: Normal range of motion.   Cardiovascular:      Rate and Rhythm: Normal rate and regular rhythm.      Pulses: Normal pulses.      Heart sounds: Normal heart sounds.   Pulmonary:      Effort: Pulmonary effort is normal. No respiratory distress.      Breath sounds: Normal breath sounds. No wheezing or rhonchi.   Abdominal:      General: Abdomen is protuberant.      Palpations: Abdomen is soft.      Tenderness: There is no abdominal tenderness.   Musculoskeletal:         General: Swelling and tenderness present.      Right lower leg: Edema present.      Left lower leg: Edema present.      Comments: Hematoma on LUE, mildly TTP with surrounding ecchymosis.   Skin:     General: Skin is warm and dry.      Coloration: Skin is not jaundiced.      Findings: Erythema and rash present. Rash is scaling.      Comments: Chronic venous stasis changes present on BLE.   Neurological:      General: No focal deficit present.      Mental Status: She is alert and oriented to person, place, and time.   Psychiatric:         Mood and Affect: Mood normal.         Behavior: Behavior normal.       Significant Labs:   CBC:   Recent Labs   Lab 11/04/20 0450 11/05/20 0339   WBC 6.70 6.84   HGB 14.9 15.0   HCT 52.1* 51.9*    174     CMP:   Recent Labs   Lab 11/04/20 0450 11/05/20 0339    139   K 3.2* 3.3*   CL 86* 83*   CO2 43* 41*   * 210*   BUN 47* 55*   CREATININE 1.1 1.1   CALCIUM 9.1 10.0   PROT 7.1 7.7   ALBUMIN 3.4* 3.7   BILITOT 1.0 1.0   ALKPHOS 57 67   AST 36 35   ALT 45* 46*   ANIONGAP 10 15   EGFRNONAA 59.9* 59.9*     Magnesium:   Recent Labs   Lab 11/04/20 0450 11/05/20 0339   MG 2.2 2.0       Significant Imaging: No new imaging to review.

## 2020-11-05 NOTE — PROGRESS NOTES
[facility]  Physical Medicine & Rehab  Progress Note    Patient Name: Halley Moreno  MRN: [id number]  Admission Date: 10/22/2020  Length of Stay: 14 days  Attending Physician: Adán Muller MD    Subjective:     Principal Problem:Right heart failure due to pulmonary hypertension    Hospital Course:   10/26/20: Participated w/ OT. Bed mobility maxA- maxA x 2 ppl. Sit to stand Anuja. Took  5 steps forward and 5 steps backward, 2 steps to the L and 3 steps toward HOB to the R Anuja x 2 ppl. UBD modA. Toileting maxA.   10/27/20: Participated w/ PT. Bed mobility maxA. Sit to stand Anuja x 2 ppl. Ambulated 13 steps Anuja- HHA.   11/3/20: Participated w/ PT. Bed mobility SV- modA. Sit to stand Anuja x 2 ppl. Ambulated 2 steps CGA.   11/4/20: Particiapated with PT and OT.  BM SBA.  Sit to stand totalA (Anuja x 2).  Ambulated ~8 steps forward and backward and laterally totalA (Anuja x 2).  Grooming set-up assist, UBD Anuja, LBD totalA.    Interval History 11/5/2020:  Patient is seen for follow-up PM&R evaluation and recommendations: Continues to require 4 L oxygen and lasix gtt.  Ongoing participation with therapy, remains a good candidate for IRF once medically ready.      HPI, Past Medical, Family, and Social History remains the same as documented in the initial encounter.    Scheduled Medications:    atorvastatin  10 mg Oral Daily    enoxaparin  60 mg Subcutaneous Q12H    insulin aspart U-100  3 Units Subcutaneous TIDWM    insulin detemir U-100  8 Units Subcutaneous QHS    latanoprost  1 drop Both Eyes QHS    miconazole NITRATE 2 %   Topical (Top) BID    polyethylene glycol  17 g Oral Daily    senna-docusate 8.6-50 mg  2 tablet Oral BID       Diagnostic Results: Labs: Reviewed    PRN Medications: acetaminophen, influenza, insulin aspart U-100, melatonin, mineral oil-hydrophil petrolat, ondansetron, simethicone, sodium chloride 0.9%    Review of Systems   Constitutional: Positive for activity  change. Negative for chills and fatigue.   HENT: Negative for trouble swallowing and voice change.    Eyes: Negative for pain and visual disturbance.   Respiratory: Positive for shortness of breath. Negative for cough.    Cardiovascular: Negative for chest pain and palpitations.   Gastrointestinal: Negative for nausea and vomiting.   Genitourinary: Negative for difficulty urinating and flank pain.   Musculoskeletal: Positive for gait problem. Negative for back pain and neck pain.   Skin: Negative for pallor and wound.   Neurological: Positive for weakness. Negative for dizziness, numbness and headaches.   Psychiatric/Behavioral: Negative for agitation. The patient is not nervous/anxious.      Objective:     Vital Signs (Most Recent):  Temp: 97.4 °F (36.3 °C) (11/05/20 0424)  Pulse: 69 (11/05/20 0738)  Resp: (!) 28 (11/05/20 0500)  BP: (!) 90/57 (11/05/20 0500)  SpO2: 96 % (11/05/20 0535)    Vital Signs (24h Range):  Temp:  [97.4 °F (36.3 °C)-97.8 °F (36.6 °C)] 97.4 °F (36.3 °C)  Pulse:  [69-80] 69  Resp:  [17-28] 28  SpO2:  [70 %-96 %] 96 %  BP: ()/(57-81) 90/57     Physical Exam  Vitals signs reviewed.   Constitutional:       General: She is not in acute distress.     Appearance: She is well-developed. She is obese.      Interventions: Nasal cannula in place.   HENT:      Head: Normocephalic and atraumatic.      Right Ear: External ear normal.      Left Ear: External ear normal.      Nose: Nose normal.   Eyes:      General: No scleral icterus.        Right eye: No discharge.         Left eye: No discharge.   Neck:      Musculoskeletal: Normal range of motion.   Cardiovascular:      Rate and Rhythm: Normal rate.   Pulmonary:      Effort: No respiratory distress.      Comments: Tolerating oxygen via NC  Abdominal:      General: There is no distension.      Palpations: Abdomen is soft.      Tenderness: There is no abdominal tenderness.   Musculoskeletal: Normal range of motion.         General: No tenderness.       Comments: General weakness and deconditioning   Skin:     General: Skin is warm and dry.      Findings: No rash.   Neurological:      Mental Status: She is alert.      Motor: Weakness present.      Gait: Gait abnormal.   Psychiatric:         Mood and Affect: Mood normal.         Behavior: Behavior normal. Behavior is cooperative.       Assessment/Plan:      * Right heart failure due to pulmonary hypertension  -repeat echo on 10/23 showed mildly to moderately reduced right ventricular systolic function, EF improved to 75%  -fluid restriction  -continued on lasix gtt    Impaired mobility and activities of daily living  -baseline function: independent with short distance ambulation in the home, i.e. from her chair to her bed, required assistance with showering, toileting, and dressing  -worsening debility related to acute hospitalization  See hospital course for functional status.    Recommendations  -  Encourage mobility, OOB in chair, and early ambulation as appropriate  -  PT/OT evaluate and treat  -  Pain management  -  DVT prophylaxis if appropriate   -  Monitor for and prevent skin breakdown and pressure ulcers  · Early mobility, repositioning/weight shifting every 20-30 minutes when sitting, turn patient every 2 hours, proper mattress/overlay and chair cushioning, pressure relief/heel protector boots    Obesity hypoventilation syndrome  -see acute respiratory failure    Acute respiratory failure with hypoxia and hypercapnia  -BiPAP QHS  -now on NC during day and BiPAP at night, pCO2 improving  -weaning oxygen as tolerating, on 4 L  -continued on lasix gtt    Sleep apnea  -BiPAP QHS    Morbid obesity with BMI of 70 and over, adult  -Body mass index is 72.25 kg/m².  -bariatric equipment as needed  -encourage lifestyle modifications    Post-acute recommendation: IRF once medically ready     ROC Marcano  Department of Physical Medicine & Rehab   [facility]

## 2020-11-05 NOTE — ASSESSMENT & PLAN NOTE
-baseline function: independent with short distance ambulation in the home, i.e. from her chair to her bed, required assistance with showering, toileting, and dressing  -worsening debility related to acute hospitalization  See hospital course for functional status.    Recommendations  -  Encourage mobility, OOB in chair, and early ambulation as appropriate  -  PT/OT evaluate and treat  -  Pain management  -  DVT prophylaxis if appropriate   -  Monitor for and prevent skin breakdown and pressure ulcers  · Early mobility, repositioning/weight shifting every 20-30 minutes when sitting, turn patient every 2 hours, proper mattress/overlay and chair cushioning, pressure relief/heel protector boots

## 2020-11-05 NOTE — ASSESSMENT & PLAN NOTE
Previously on glipizide 10mg daily and semaglutide 0.25mg weekly. Glucose started to trend upward to low 200s, started on insulin and have titrated up as necessary.    Plan:   - Holding home anti-hyperglycemics   - POCT BG qAC/HS while eating   - Goal -180   - Insulin detemir 8 units nightly -> increase to 10 units nightly   - Insulin aspart 3 units TIDWM  -> increase to 5 units TIDWM today   - LD SSI for correction   - Diabetic cardiac diet, fluid restriction 1.5L    - Will provide endocrinology referral at discharge

## 2020-11-05 NOTE — PLAN OF CARE
Problem: Physical Therapy Goal  Goal: Physical Therapy Goal  Description: Goals to be met by: 2020    Patient will increase functional independence with mobility by performin. Supine to sit with MInimal Assistance of 2 persons.  2. Sit to supine with Moderate Assistance of 2 persons.  3. Sit to stand transfer with Minimal Assistance of 1 person.  4. Bed to chair stand pivot transfer with Contact Guard Assistance of 1 person using Rolling Walker.  5. Gait  x 40 feet with Contact Guard Assistance of 1 person using Rolling Walker.   6. Lower extremity exercise program x20 reps per handout, with assistance as needed.    Outcome: Ongoing, Progressing     Discharge Recommendations: Rehab    Pt safe to transfer OOB/BTB with RN/PCT via step transfer: Use RW with min A.    Goals remain appropriate.     Orly Redmond, PTA.   [phone]   2020

## 2020-11-05 NOTE — PROGRESS NOTES
[facility] Hospital Medicine  Progress Note    Patient Name: Halley Moreno  MRN: [id number]  Patient Class: IP- Inpatient   Admission Date: 10/22/2020  Length of Stay: 14 days  Attending Physician: Adán Muller MD  Primary Care Provider: Yamila Tejeda MD    [facility] Medicine Team: [facility] HOSP MED 1 - Rao Szymanski MD    Subjective:     Principal Problem:Right heart failure due to pulmonary hypertension    HPI:  Patient is a 45-year-old female who is presenting to us with acute respiratory distress.  She has a recent diagnosis of severe pulmonary hypertension with right-sided heart failure.  Her last echo showed a PA systolic pressure of 99.  She initially presented due to increasing weight gain over the last 7 months.  Patient states that in March she began gaining weight and she was placed on diuretic to trying keep that weight down.  Despite 40 mg of furosemide b.i.d. she continued to gain weight.  She has gained approximately 100 lb since March and 80 lb since May.  She does not have a cardiologist but was scheduled to see 1 in December.  Patient states that at home she sleeps in a elevated position secondary to shortness of breath.  She denies any episodes of waking up in the middle of the night out of breath.  She does not use any home oxygen nor she ever been told explicitly that she has heart failure.  She demonstrates poor understanding of the disease stating that the right heart was backing up into the lungs and cause in the shortness of breath.  She presented to the clinic today for evaluation and was found to be in acute respiratory distress with hypoxia.  She was referred to the ED for evaluation and diuresis.    Patient states that she has no known family history of heart failure in the family however she does state that her father had a pacemaker for unknown reasons.  She states that she does not think that anyone her and her family has had pulmonary  hypertension.  Patient states that she does not drink, does not smoke, and does not use IV drugs.    Overview/Hospital Course:  Patient was admitted to Hospital Medicine. Was initially hypoxic and put on nasal cannula with normalization of her oxygen status to approximately 95%. She was demonstrating initial respiratory distress and was given a bolus of 120mg IV furosemide. Hypoxia and hypercapnia worsened so was stepped up to ICU on 10/23. Was placed on BiPAP and aggressively diuresed (approx 20L net negative) with improvement in respiratory status. Weaned to 2L NC and subsequently stepped back down to Hospital Medicine on 10/26. Continued diuresis with IV Lasix gtt, diuril, and aldactone. On 10/27 VBGs noted pCO2 in 90s - 100s and patient was started back on continuous BiPAP with improvement in pCO2 levels. Patient weaned from BiPAP throughout admission with improving oxygenation, wearing BiPAP QHS.    Interval History: No acute events or significant changes overnight. Remained afebrile and hemodynamically stable, compliant with BiPAP overnight. States she feels good this morning and is ready to work with therapy today.    Review of Systems   Constitutional: Negative for diaphoresis and fever.   HENT: Negative for congestion and sore throat.    Eyes: Negative for redness and visual disturbance.   Respiratory: Negative for cough and stridor.    Cardiovascular: Positive for leg swelling. Negative for chest pain.   Gastrointestinal: Negative for abdominal pain, constipation, diarrhea and vomiting.   Genitourinary: Negative for dysuria and hematuria.   Musculoskeletal: Negative for back pain.   Skin: Negative for color change and pallor.   Neurological: Negative for speech difficulty and headaches.   Psychiatric/Behavioral: Negative for agitation and confusion.     Objective:     Vital Signs (Most Recent):  Temp: 97.9 °F (36.6 °C) (11/05/20 1205)  Pulse: 79 (11/05/20 1205)  Resp: 15 (11/05/20 1205)  BP: 96/61  (11/05/20 1205)  SpO2: 97 % (11/05/20 1205) Vital Signs (24h Range):  Temp:  [97.4 °F (36.3 °C)-97.9 °F (36.6 °C)] 97.9 °F (36.6 °C)  Pulse:  [69-80] 79  Resp:  [15-28] 15  SpO2:  [70 %-97 %] 97 %  BP: ()/(57-71) 96/61     Weight: (!) 175.8 kg (387 lb 9.1 oz)  Body mass index is 68.65 kg/m².    Intake/Output Summary (Last 24 hours) at 11/5/2020 1321  Last data filed at 11/5/2020 1200  Gross per 24 hour   Intake --   Output 2445 ml   Net -2445 ml      Physical Exam  Vitals signs and nursing note reviewed.   Constitutional:       General: She is awake. She is not in acute distress.     Appearance: She is morbidly obese. She is not diaphoretic.      Interventions: Nasal cannula in place.   HENT:      Head: Normocephalic and atraumatic.      Nose: Nose normal.      Mouth/Throat:      Mouth: Mucous membranes are moist.      Pharynx: Oropharynx is clear.   Eyes:      Extraocular Movements: Extraocular movements intact.      Conjunctiva/sclera: Conjunctivae normal.      Pupils: Pupils are equal, round, and reactive to light.   Neck:      Musculoskeletal: Normal range of motion.   Cardiovascular:      Rate and Rhythm: Normal rate and regular rhythm.      Pulses: Normal pulses.      Heart sounds: Normal heart sounds.   Pulmonary:      Effort: Pulmonary effort is normal. No respiratory distress.      Breath sounds: Normal breath sounds. No wheezing or rhonchi.   Abdominal:      General: Abdomen is protuberant.      Palpations: Abdomen is soft.      Tenderness: There is no abdominal tenderness.   Musculoskeletal:         General: Swelling and tenderness present.      Right lower leg: Edema present.      Left lower leg: Edema present.      Comments: Hematoma on LUE, mildly TTP with surrounding ecchymosis.   Skin:     General: Skin is warm and dry.      Coloration: Skin is not jaundiced.      Findings: Erythema and rash present. Rash is scaling.      Comments: Chronic venous stasis changes present on BLE.   Neurological:       General: No focal deficit present.      Mental Status: She is alert and oriented to person, place, and time.   Psychiatric:         Mood and Affect: Mood normal.         Behavior: Behavior normal.       Significant Labs:   CBC:   Recent Labs   Lab 11/04/20 0450 11/05/20 0339   WBC 6.70 6.84   HGB 14.9 15.0   HCT 52.1* 51.9*    174     CMP:   Recent Labs   Lab 11/04/20 0450 11/05/20 0339    139   K 3.2* 3.3*   CL 86* 83*   CO2 43* 41*   * 210*   BUN 47* 55*   CREATININE 1.1 1.1   CALCIUM 9.1 10.0   PROT 7.1 7.7   ALBUMIN 3.4* 3.7   BILITOT 1.0 1.0   ALKPHOS 57 67   AST 36 35   ALT 45* 46*   ANIONGAP 10 15   EGFRNONAA 59.9* 59.9*     Magnesium:   Recent Labs   Lab 11/04/20 0450 11/05/20 0339   MG 2.2 2.0       Significant Imaging: No new imaging to review.      Assessment/Plan:      * Right heart failure due to pulmonary hypertension  Patient with right heart failure due to fluid overload. TTE 10/23 with LVEF 75%, reduced RV systolic function, RV enlargement, TR, PASP 109. Has diuresed well (>65L) and is approaching euvolemia/reported dry weight.    Plan:   - Strict Is/Os   - Daily weights   - Keep K>4, Mg>2   - Diabetic/cardiac low sodium diet (<2 g/day)   - Fluid restriction to 1.5 L/day   - Repeat Echo ordered today    Acute respiratory failure with hypoxia and hypercapnia  Patient is a chronic CO2 retainer in setting of obesity hypoventilation syndrome 2/2 morbid obesity. Stepped down from ICU 10/26, currently continuing diuresis. De-escalated to NC during the day with BiPAP nightly on 10/31, which patient tolerated well.      Plan:   - Target SpO2 of >88%. Continue BiPAP nightly. Patient on 4LNC, wean to 3L today as tolerated.   - Furosemide gtt at 10mg/hr -> will STOP diuresis today as patient is back to reported dry weight and showing evidence of contraction   - Approx 70L net negative since admission    Diabetes mellitus type 2 without retinopathy  Previously on glipizide 10mg  daily and semaglutide 0.25mg weekly. Glucose started to trend upward to low 200s, started on insulin and have titrated up as necessary.    Plan:   - Holding home anti-hyperglycemics   - POCT BG qAC/HS while eating   - Goal -180   - Insulin detemir 8 units nightly -> increase to 10 units nightly   - Insulin aspart 3 units TIDWM  -> increase to 5 units TIDWM today   - LD SSI for correction   - Diabetic cardiac diet, fluid restriction 1.5L    - Will provide endocrinology referral at discharge    Morbid obesity  Plan:  - Per patient, dry weight of approximately 370 -380 lb -> up to 500 pounds (BMI 88) this admission  - Weight today 387 (BMI 68.6)  - As patient is nearing reported dry weight, will hold diuresis for now with evidence of contraction    Hypertension associated with diabetes  Patient on losartan 50mg daily at home. Remains normotensive at this time.    Plan:   - Holding home anti-hypertensive in acute setting   - Continue atorvastatin 10mg nightly    Sleep apnea  Patient's pulmonary HTN and RV dysfunction likely result of obesity hypoventilation syndrome with non-compliance with at-home CPAP. Evaluated by Pulmonology, who recommended continued use of NIPPV and encouraged weight loss.     Plan:    - NIPPV nightly    Obesity hypoventilation syndrome  See Acute respiratory failure with hypoxia and hypercapnia above    Hypokalemia  Patient with mild recurrent hypokalemia, most likely due to consistent diuresis. K today 3.3.    Plan:   - CMP daily   - Replete PRN with goal K>4   - Potassium bicarbonate 50 mEq x 2 doses today    Hyperphosphatemia  Patient's phosphorus level has been steadily increasing throughout hospital stay, at 4.4 today. Likely secondary to aggressive diuresis, but patient has remained asymptomatic.    Plan:   - Monitor with daily CMP    Constipation  Plan:   - Continue with polyethylene glycol and/or senna-docusate PRN    Impaired mobility and activities of daily living  PT/OT  consulted, recommending rehab at discharge. PM&R consulted and following.    Plan:   - Continue PT/OT while inpatient    Discharge planning issues  PT/OT recommend rehab -> accepted to [facility] when medically ready for discharge  - Needs follow up with Pulm and Cards with discharge  - Consider 6 min walk test to assess need for oxygen prior to discharge in setting of heart failure      VTE Risk Mitigation (From admission, onward)         Ordered     enoxaparin injection 60 mg  Every 12 hours      10/22/20 1518     IP VTE HIGH RISK PATIENT  Once      10/22/20 1346     Place sequential compression device  Until discontinued      10/22/20 1346                Discharge Planning   JOSE RAUL: 11/10/2020     Code Status: Full Code   Is the patient medically ready for discharge?: No    Reason for patient still in hospital: Patient trending condition, Treatment and Pending disposition  Discharge Plan A: Rehab        Rao Szymanski MD, PGY-1  Department of Hospital Medicine   [facility]

## 2020-11-05 NOTE — ASSESSMENT & PLAN NOTE
Patient is a chronic CO2 retainer in setting of obesity hypoventilation syndrome 2/2 morbid obesity. Stepped down from ICU 10/26, currently continuing diuresis. De-escalated to NC during the day with BiPAP nightly on 10/31, which patient tolerated well.      Plan:   - Target SpO2 of >88%. Continue BiPAP nightly. Patient on 4LNC, wean to 3L today as tolerated.   - Furosemide gtt at 10mg/hr -> will STOP diuresis today as patient is back to reported dry weight and showing evidence of contraction   - Approx 70L net negative since admission

## 2020-11-05 NOTE — SUBJECTIVE & OBJECTIVE
Interval History 11/5/2020:  Patient is seen for follow-up PM&R evaluation and recommendations: Continues to require 4 L oxygen and lasix gtt.  Ongoing participation with therapy, remains a good candidate for IRF once medically ready.      HPI, Past Medical, Family, and Social History remains the same as documented in the initial encounter.    Scheduled Medications:    atorvastatin  10 mg Oral Daily    enoxaparin  60 mg Subcutaneous Q12H    insulin aspart U-100  3 Units Subcutaneous TIDWM    insulin detemir U-100  8 Units Subcutaneous QHS    latanoprost  1 drop Both Eyes QHS    miconazole NITRATE 2 %   Topical (Top) BID    polyethylene glycol  17 g Oral Daily    senna-docusate 8.6-50 mg  2 tablet Oral BID       Diagnostic Results: Labs: Reviewed    PRN Medications: acetaminophen, influenza, insulin aspart U-100, melatonin, mineral oil-hydrophil petrolat, ondansetron, simethicone, sodium chloride 0.9%    Review of Systems   Constitutional: Positive for activity change. Negative for chills and fatigue.   HENT: Negative for trouble swallowing and voice change.    Eyes: Negative for pain and visual disturbance.   Respiratory: Positive for shortness of breath. Negative for cough.    Cardiovascular: Negative for chest pain and palpitations.   Gastrointestinal: Negative for nausea and vomiting.   Genitourinary: Negative for difficulty urinating and flank pain.   Musculoskeletal: Positive for gait problem. Negative for back pain and neck pain.   Skin: Negative for pallor and wound.   Neurological: Positive for weakness. Negative for dizziness, numbness and headaches.   Psychiatric/Behavioral: Negative for agitation. The patient is not nervous/anxious.      Objective:     Vital Signs (Most Recent):  Temp: 97.4 °F (36.3 °C) (11/05/20 0424)  Pulse: 69 (11/05/20 0738)  Resp: (!) 28 (11/05/20 0500)  BP: (!) 90/57 (11/05/20 0500)  SpO2: 96 % (11/05/20 0535)    Vital Signs (24h Range):  Temp:  [97.4 °F (36.3 °C)-97.8 °F  (36.6 °C)] 97.4 °F (36.3 °C)  Pulse:  [69-80] 69  Resp:  [17-28] 28  SpO2:  [70 %-96 %] 96 %  BP: ()/(57-81) 90/57     Physical Exam  Vitals signs reviewed.   Constitutional:       General: She is not in acute distress.     Appearance: She is well-developed. She is obese.      Interventions: Nasal cannula in place.   HENT:      Head: Normocephalic and atraumatic.      Right Ear: External ear normal.      Left Ear: External ear normal.      Nose: Nose normal.   Eyes:      General: No scleral icterus.        Right eye: No discharge.         Left eye: No discharge.   Neck:      Musculoskeletal: Normal range of motion.   Cardiovascular:      Rate and Rhythm: Normal rate.   Pulmonary:      Effort: No respiratory distress.      Comments: Tolerating oxygen via NC  Abdominal:      General: There is no distension.      Palpations: Abdomen is soft.      Tenderness: There is no abdominal tenderness.   Musculoskeletal: Normal range of motion.         General: No tenderness.      Comments: General weakness and deconditioning   Skin:     General: Skin is warm and dry.      Findings: No rash.   Neurological:      Mental Status: She is alert.      Motor: Weakness present.      Gait: Gait abnormal.   Psychiatric:         Mood and Affect: Mood normal.         Behavior: Behavior normal. Behavior is cooperative.

## 2020-11-05 NOTE — ASSESSMENT & PLAN NOTE
Patient with right heart failure due to fluid overload. TTE 10/23 with LVEF 75%, reduced RV systolic function, RV enlargement, TR, PASP 109. Has diuresed well (>65L) and is approaching euvolemia/reported dry weight.    Plan:   - Strict Is/Os   - Daily weights   - Keep K>4, Mg>2   - Diabetic/cardiac low sodium diet (<2 g/day)   - Fluid restriction to 1.5 L/day   - Repeat Echo ordered today

## 2020-11-05 NOTE — ASSESSMENT & PLAN NOTE
-BiPAP QHS  -now on NC during day and BiPAP at night, pCO2 improving  -weaning oxygen as tolerating, on 4 L  -continued on lasix gtt

## 2020-11-05 NOTE — ASSESSMENT & PLAN NOTE
PT/OT recommend rehab -> accepted to [facility] when medically ready for discharge  - Needs follow up with Pulm and Cards with discharge  - Consider 6 min walk test to assess need for oxygen prior to discharge in setting of heart failure

## 2020-11-05 NOTE — ASSESSMENT & PLAN NOTE
Plan:  - Per patient, dry weight of approximately 370 -380 lb -> up to 500 pounds (BMI 88) this admission  - Weight today 387 (BMI 68.6)  - As patient is nearing reported dry weight, will hold diuresis for now with evidence of contraction

## 2020-11-06 PROBLEM — E83.39 HYPERPHOSPHATEMIA: Status: RESOLVED | Noted: 2020-10-23 | Resolved: 2020-11-06

## 2020-11-06 LAB
ALBUMIN SERPL BCP-MCNC: 3.5 G/DL (ref 3.5–5.2)
ALP SERPL-CCNC: 62 U/L (ref 55–135)
ALT SERPL W/O P-5'-P-CCNC: 41 U/L (ref 10–44)
ANION GAP SERPL CALC-SCNC: 10 MMOL/L (ref 8–16)
AST SERPL-CCNC: 30 U/L (ref 10–40)
BASOPHILS # BLD AUTO: 0.02 K/UL (ref 0–0.2)
BASOPHILS NFR BLD: 0.3 % (ref 0–1.9)
BILIRUB SERPL-MCNC: 1 MG/DL (ref 0.1–1)
BUN SERPL-MCNC: 46 MG/DL (ref 6–20)
CALCIUM SERPL-MCNC: 9.7 MG/DL (ref 8.7–10.5)
CHLORIDE SERPL-SCNC: 87 MMOL/L (ref 95–110)
CO2 SERPL-SCNC: 43 MMOL/L (ref 23–29)
CREAT SERPL-MCNC: 0.9 MG/DL (ref 0.5–1.4)
DIFFERENTIAL METHOD: ABNORMAL
EOSINOPHIL # BLD AUTO: 0.1 K/UL (ref 0–0.5)
EOSINOPHIL NFR BLD: 1.3 % (ref 0–8)
ERYTHROCYTE [DISTWIDTH] IN BLOOD BY AUTOMATED COUNT: 20 % (ref 11.5–14.5)
EST. GFR  (AFRICAN AMERICAN): >60 ML/MIN/1.73 M^2
EST. GFR  (NON AFRICAN AMERICAN): >60 ML/MIN/1.73 M^2
GLUCOSE SERPL-MCNC: 196 MG/DL (ref 70–110)
HCT VFR BLD AUTO: 50.3 % (ref 37–48.5)
HGB BLD-MCNC: 14.7 G/DL (ref 12–16)
IMM GRANULOCYTES # BLD AUTO: 0.01 K/UL (ref 0–0.04)
IMM GRANULOCYTES NFR BLD AUTO: 0.1 % (ref 0–0.5)
LYMPHOCYTES # BLD AUTO: 1.8 K/UL (ref 1–4.8)
LYMPHOCYTES NFR BLD: 27.3 % (ref 18–48)
MAGNESIUM SERPL-MCNC: 2 MG/DL (ref 1.6–2.6)
MCH RBC QN AUTO: 25.7 PG (ref 27–31)
MCHC RBC AUTO-ENTMCNC: 29.2 G/DL (ref 32–36)
MCV RBC AUTO: 88 FL (ref 82–98)
MONOCYTES # BLD AUTO: 0.7 K/UL (ref 0.3–1)
MONOCYTES NFR BLD: 11 % (ref 4–15)
NEUTROPHILS # BLD AUTO: 4 K/UL (ref 1.8–7.7)
NEUTROPHILS NFR BLD: 60 % (ref 38–73)
NRBC BLD-RTO: 0 /100 WBC
PHOSPHATE SERPL-MCNC: 4.2 MG/DL (ref 2.7–4.5)
PLATELET # BLD AUTO: 164 K/UL (ref 150–350)
PMV BLD AUTO: 10.5 FL (ref 9.2–12.9)
POCT GLUCOSE: 148 MG/DL (ref 70–110)
POCT GLUCOSE: 191 MG/DL (ref 70–110)
POCT GLUCOSE: 199 MG/DL (ref 70–110)
POCT GLUCOSE: 203 MG/DL (ref 70–110)
POCT GLUCOSE: 213 MG/DL (ref 70–110)
POCT GLUCOSE: 227 MG/DL (ref 70–110)
POTASSIUM SERPL-SCNC: 3.5 MMOL/L (ref 3.5–5.1)
PROT SERPL-MCNC: 7.3 G/DL (ref 6–8.4)
RBC # BLD AUTO: 5.71 M/UL (ref 4–5.4)
SODIUM SERPL-SCNC: 140 MMOL/L (ref 136–145)
WBC # BLD AUTO: 6.73 K/UL (ref 3.9–12.7)

## 2020-11-06 PROCEDURE — 25000003 PHARM REV CODE 250: Performed by: STUDENT IN AN ORGANIZED HEALTH CARE EDUCATION/TRAINING PROGRAM

## 2020-11-06 PROCEDURE — 99233 SBSQ HOSP IP/OBS HIGH 50: CPT | Mod: ,,, | Performed by: INTERNAL MEDICINE

## 2020-11-06 PROCEDURE — 27000221 HC OXYGEN, UP TO 24 HOURS

## 2020-11-06 PROCEDURE — 97530 THERAPEUTIC ACTIVITIES: CPT

## 2020-11-06 PROCEDURE — 36415 COLL VENOUS BLD VENIPUNCTURE: CPT

## 2020-11-06 PROCEDURE — 97535 SELF CARE MNGMENT TRAINING: CPT

## 2020-11-06 PROCEDURE — 97110 THERAPEUTIC EXERCISES: CPT

## 2020-11-06 PROCEDURE — 63600175 PHARM REV CODE 636 W HCPCS: Performed by: STUDENT IN AN ORGANIZED HEALTH CARE EDUCATION/TRAINING PROGRAM

## 2020-11-06 PROCEDURE — 20600001 HC STEP DOWN PRIVATE ROOM

## 2020-11-06 PROCEDURE — 94761 N-INVAS EAR/PLS OXIMETRY MLT: CPT

## 2020-11-06 PROCEDURE — 83735 ASSAY OF MAGNESIUM: CPT

## 2020-11-06 PROCEDURE — 85025 COMPLETE CBC W/AUTO DIFF WBC: CPT

## 2020-11-06 PROCEDURE — 80053 COMPREHEN METABOLIC PANEL: CPT

## 2020-11-06 PROCEDURE — 99900035 HC TECH TIME PER 15 MIN (STAT)

## 2020-11-06 PROCEDURE — 94660 CPAP INITIATION&MGMT: CPT

## 2020-11-06 PROCEDURE — 63600175 PHARM REV CODE 636 W HCPCS: Performed by: INTERNAL MEDICINE

## 2020-11-06 PROCEDURE — 99233 PR SUBSEQUENT HOSPITAL CARE,LEVL III: ICD-10-PCS | Mod: ,,, | Performed by: INTERNAL MEDICINE

## 2020-11-06 PROCEDURE — 84100 ASSAY OF PHOSPHORUS: CPT

## 2020-11-06 RX ORDER — FUROSEMIDE 80 MG/1
80 TABLET ORAL 2 TIMES DAILY
Status: DISCONTINUED | OUTPATIENT
Start: 2020-11-06 | End: 2020-11-09 | Stop reason: HOSPADM

## 2020-11-06 RX ORDER — POTASSIUM CHLORIDE 20 MEQ/1
40 TABLET, EXTENDED RELEASE ORAL ONCE
Status: DISCONTINUED | OUTPATIENT
Start: 2020-11-06 | End: 2020-11-06

## 2020-11-06 RX ORDER — INSULIN ASPART 100 [IU]/ML
6 INJECTION, SOLUTION INTRAVENOUS; SUBCUTANEOUS
Status: CANCELLED | OUTPATIENT
Start: 2020-11-06

## 2020-11-06 RX ORDER — INSULIN ASPART 100 [IU]/ML
6 INJECTION, SOLUTION INTRAVENOUS; SUBCUTANEOUS
Status: DISCONTINUED | OUTPATIENT
Start: 2020-11-06 | End: 2020-11-07

## 2020-11-06 RX ADMIN — MICONAZOLE NITRATE: 20 POWDER TOPICAL at 09:11

## 2020-11-06 RX ADMIN — FUROSEMIDE 80 MG: 80 TABLET ORAL at 05:11

## 2020-11-06 RX ADMIN — ENOXAPARIN SODIUM 60 MG: 60 INJECTION SUBCUTANEOUS at 08:11

## 2020-11-06 RX ADMIN — LATANOPROST 1 DROP: 50 SOLUTION OPHTHALMIC at 09:11

## 2020-11-06 RX ADMIN — DOCUSATE SODIUM 50MG AND SENNOSIDES 8.6MG 2 TABLET: 8.6; 5 TABLET, FILM COATED ORAL at 08:11

## 2020-11-06 RX ADMIN — INSULIN ASPART 6 UNITS: 100 INJECTION, SOLUTION INTRAVENOUS; SUBCUTANEOUS at 05:11

## 2020-11-06 RX ADMIN — INSULIN ASPART 1 UNITS: 100 INJECTION, SOLUTION INTRAVENOUS; SUBCUTANEOUS at 09:11

## 2020-11-06 RX ADMIN — INSULIN ASPART 5 UNITS: 100 INJECTION, SOLUTION INTRAVENOUS; SUBCUTANEOUS at 08:11

## 2020-11-06 RX ADMIN — HUMAN ALBUMIN MICROSPHERES AND PERFLUTREN 0.66 MG: 10; .22 INJECTION, SOLUTION INTRAVENOUS at 04:11

## 2020-11-06 RX ADMIN — ATORVASTATIN CALCIUM 10 MG: 10 TABLET, FILM COATED ORAL at 08:11

## 2020-11-06 RX ADMIN — INSULIN ASPART 2 UNITS: 100 INJECTION, SOLUTION INTRAVENOUS; SUBCUTANEOUS at 12:11

## 2020-11-06 RX ADMIN — INSULIN ASPART 6 UNITS: 100 INJECTION, SOLUTION INTRAVENOUS; SUBCUTANEOUS at 12:11

## 2020-11-06 RX ADMIN — POLYETHYLENE GLYCOL 3350 17 G: 17 POWDER, FOR SOLUTION ORAL at 08:11

## 2020-11-06 RX ADMIN — FUROSEMIDE 80 MG: 80 TABLET ORAL at 11:11

## 2020-11-06 RX ADMIN — MELATONIN TAB 3 MG 6 MG: 3 TAB at 11:11

## 2020-11-06 RX ADMIN — POTASSIUM BICARBONATE 50 MEQ: 978 TABLET, EFFERVESCENT ORAL at 08:11

## 2020-11-06 RX ADMIN — ENOXAPARIN SODIUM 60 MG: 60 INJECTION SUBCUTANEOUS at 09:11

## 2020-11-06 NOTE — ASSESSMENT & PLAN NOTE
Patient with right heart failure due to fluid overload. TTE 10/23 with LVEF 75%, reduced RV systolic function, RV enlargement, TR, PASP 109. Has diuresed well (>70L) and has returned to euvolemia/reported dry weight. Repeat echo at euvolemia with LVEF 65%, mild-moderately reduced RV systolic function as before, but without evaluation of the tricuspid valve.    Plan:   - Strict Is/Os   - Daily weights   - Keep K>4, Mg>2   - Diabetic/cardiac low sodium diet (<2 g/day)   - Fluid restriction to 1.5 L/day

## 2020-11-06 NOTE — ASSESSMENT & PLAN NOTE
Plan:  - Per patient, dry weight of approximately 370 -380 lb -> up to 500 pounds (BMI 88) this admission  - Weight today 385 (BMI 68.3)

## 2020-11-06 NOTE — ASSESSMENT & PLAN NOTE
Previously on glipizide 10mg daily and liraglutide, but did not tolerate the liraglutide well. Was to switch to semaglutide but had not started taking it yet. Glucose started to trend upward to low 200s, started on insulin and have titrated up as necessary.    Plan:   - Holding home anti-hyperglycemics   - POCT BG qAC/HS while eating   - Goal -180   - Insulin detemir 10 units nightly -> increase to 12 today   - Insulin aspart 5 units TIDWM -> increase to 6 today   - LD SSI for correction   - Diabetic cardiac diet, fluid restriction 1.5L    - Will provide endocrinology referral at discharge

## 2020-11-06 NOTE — PLAN OF CARE
Problem: Fall Injury Risk  Goal: Absence of Fall and Fall-Related Injury  Outcome: Ongoing, Progressing     Problem: Infection  Goal: Infection Symptom Resolution  Outcome: Ongoing, Progressing     Problem: Adult Inpatient Plan of Care  Goal: Plan of Care Review  Outcome: Ongoing, Progressing  Flowsheets (Taken 11/6/2020 1728)  Plan of Care Reviewed With: patient  Goal: Patient-Specific Goal (Individualization)  Outcome: Ongoing, Progressing  Flowsheets (Taken 11/6/2020 1728)  Individualized Care Needs: wean oxygen as tolerated  Anxieties, Fears or Concerns: concerned about going home with oxygen  Patient-Specific Goals (Include Timeframe): patient will be on room air by the end of the weekend  Goal: Optimal Comfort and Wellbeing  Outcome: Ongoing, Progressing  Intervention: Provide Person-Centered Care  Flowsheets (Taken 11/6/2020 1728)  Trust Relationship/Rapport:   care explained   choices provided   emotional support provided   empathic listening provided   questions answered   questions encouraged   reassurance provided   thoughts/feelings acknowledged     VS stable. Patient was weaned to 2L NC, and tolerated that well while sitting up. Patient's BS ranged from high 100s to 200s, and pre-meal insulin unit increased today. Patient's alamo was removed at 1243 today, and patient is currently due to void. Patient is expected to be discharge to a rehab facility after the weekend.

## 2020-11-06 NOTE — ASSESSMENT & PLAN NOTE
Patient is a chronic CO2 retainer in setting of obesity hypoventilation syndrome 2/2 morbid obesity. Stepped down from ICU 10/26, currently continuing diuresis. De-escalated to NC during the day with BiPAP nightly on 10/31, which patient tolerated well. On Lasix gtt for diuresis through 11/5.     Plan:   - Target SpO2 of >88%. Continue BiPAP nightly. Patient on 3LNC, will continue to wean as tolerated   - Will start PO Lasix 80mg BID today for maintenance of euvolemia and titrate as needed   - Approx 70L net negative since admission

## 2020-11-06 NOTE — PROGRESS NOTES
[facility] Hospital Medicine  Progress Note    Patient Name: Halley Moreno  MRN: [id number]  Patient Class: IP- Inpatient   Admission Date: 10/22/2020  Length of Stay: 15 days  Attending Physician: Adán Muller MD  Primary Care Provider: Yamila Tejeda MD    [facility] Medicine Team: [facility] - Rao Szymanski MD    Subjective:     Principal Problem:Right heart failure due to pulmonary hypertension    HPI:  Patient is a 45-year-old female who is presenting to us with acute respiratory distress.  She has a recent diagnosis of severe pulmonary hypertension with right-sided heart failure.  Her last echo showed a PA systolic pressure of 99.  She initially presented due to increasing weight gain over the last 7 months.  Patient states that in March she began gaining weight and she was placed on diuretic to trying keep that weight down.  Despite 40 mg of furosemide b.i.d. she continued to gain weight.  She has gained approximately 100 lb since March and 80 lb since May.  She does not have a cardiologist but was scheduled to see 1 in December.  Patient states that at home she sleeps in a elevated position secondary to shortness of breath.  She denies any episodes of waking up in the middle of the night out of breath.  She does not use any home oxygen nor she ever been told explicitly that she has heart failure.  She demonstrates poor understanding of the disease stating that the right heart was backing up into the lungs and cause in the shortness of breath.  She presented to the clinic today for evaluation and was found to be in acute respiratory distress with hypoxia.  She was referred to the ED for evaluation and diuresis.    Patient states that she has no known family history of heart failure in the family however she does state that her father had a pacemaker for unknown reasons.  She states that she does not think that anyone her and her family has had pulmonary  hypertension.  Patient states that she does not drink, does not smoke, and does not use IV drugs.    Overview/Hospital Course:  Patient was admitted to Hospital Medicine. Was initially hypoxic and put on nasal cannula with normalization of her oxygen status to approximately 95%. She was demonstrating initial respiratory distress and was given a bolus of 120mg IV furosemide. Hypoxia and hypercapnia worsened so was stepped up to ICU on 10/23. Was placed on BiPAP and aggressively diuresed (approx 20L net negative) with improvement in respiratory status. Weaned to 2L NC and subsequently stepped back down to Hospital Medicine on 10/26. Continued diuresis with IV Lasix gtt, diuril, and aldactone. On 10/27 VBGs noted pCO2 in 90s - 100s and patient was started back on continuous BiPAP with improvement in pCO2 levels. Patient weaned from BiPAP throughout admission with improving oxygenation, wearing BiPAP QHS.    Interval History: No acute events or significant changes overnight. Remains afebrile and hemodynamically stable. Weaned to 3LNC yesterday, maintaining O2 sats in low 90s. Participated well with PT/OT yesterday, got OOB and in chair for a few hours.    Review of Systems   Constitutional: Negative for diaphoresis and fever.   HENT: Negative for congestion and sore throat.    Eyes: Negative for redness and visual disturbance.   Respiratory: Negative for cough and stridor.    Cardiovascular: Positive for leg swelling. Negative for chest pain.   Gastrointestinal: Negative for abdominal pain, constipation, diarrhea and vomiting.   Genitourinary: Negative for dysuria and hematuria.   Musculoskeletal: Negative for back pain.   Skin: Negative for color change and pallor.   Neurological: Negative for speech difficulty and headaches.   Psychiatric/Behavioral: Negative for agitation and confusion.     Objective:     Vital Signs (Most Recent):  Temp: 97.6 °F (36.4 °C) (11/06/20 0530)  Pulse: 72 (11/06/20 0454)  Resp: 14  (11/06/20 0035)  BP: 137/83 (11/06/20 0035)  SpO2: (!) 89 % (11/06/20 0454) Vital Signs (24h Range):  Temp:  [97.6 °F (36.4 °C)-98.7 °F (37.1 °C)] 97.6 °F (36.4 °C)  Pulse:  [69-83] 72  Resp:  [12-22] 14  SpO2:  [89 %-97 %] 89 %  BP: ()/(61-87) 137/83     Weight: (!) 175 kg (385 lb 12.9 oz)  Body mass index is 68.34 kg/m².    Intake/Output Summary (Last 24 hours) at 11/6/2020 0647  Last data filed at 11/6/2020 0500  Gross per 24 hour   Intake 240 ml   Output 2745 ml   Net -2505 ml      Physical Exam  Vitals signs and nursing note reviewed.   Constitutional:       General: She is awake. She is not in acute distress.     Appearance: She is morbidly obese. She is not diaphoretic.      Interventions: Nasal cannula in place.   HENT:      Head: Normocephalic and atraumatic.      Nose: Nose normal.      Mouth/Throat:      Mouth: Mucous membranes are moist.      Pharynx: Oropharynx is clear.   Eyes:      Extraocular Movements: Extraocular movements intact.      Conjunctiva/sclera: Conjunctivae normal.      Pupils: Pupils are equal, round, and reactive to light.   Neck:      Musculoskeletal: Normal range of motion.   Cardiovascular:      Rate and Rhythm: Normal rate and regular rhythm.      Pulses: Normal pulses.      Heart sounds: Normal heart sounds.   Pulmonary:      Effort: Pulmonary effort is normal. No respiratory distress.      Breath sounds: Normal breath sounds. No wheezing or rhonchi.   Abdominal:      General: Abdomen is protuberant.      Palpations: Abdomen is soft.      Tenderness: There is no abdominal tenderness.   Musculoskeletal:         General: Swelling and tenderness present.      Right lower leg: Edema present.      Left lower leg: Edema present.      Comments: Hematoma on LUE, mildly TTP with surrounding ecchymosis.   Skin:     General: Skin is warm and dry.      Coloration: Skin is not jaundiced.      Findings: Erythema and rash present. Rash is scaling.      Comments: Chronic venous stasis  changes present on BLE.   Neurological:      General: No focal deficit present.      Mental Status: She is alert and oriented to person, place, and time.   Psychiatric:         Mood and Affect: Mood normal.         Behavior: Behavior normal.       Significant Labs:   CBC:   Recent Labs   Lab 11/05/20 0339 11/06/20  0359   WBC 6.84 6.73   HGB 15.0 14.7   HCT 51.9* 50.3*    164     CMP:   Recent Labs   Lab 11/05/20 0339 11/06/20 0359    140   K 3.3* 3.5   CL 83* 87*   CO2 41* 43*   * 196*   BUN 55* 46*   CREATININE 1.1 0.9   CALCIUM 10.0 9.7   PROT 7.7 7.3   ALBUMIN 3.7 3.5   BILITOT 1.0 1.0   ALKPHOS 67 62   AST 35 30   ALT 46* 41   ANIONGAP 15 10   EGFRNONAA 59.9* >60.0     Urine Studies:   Recent Labs   Lab 11/05/20  1508   COLORU Yellow   APPEARANCEUA Clear   PHUR >8.0*   SPECGRAV 1.015   PROTEINUA Negative   GLUCUA Negative   KETONESU Negative   BILIRUBINUA Negative   OCCULTUA 3+*   NITRITE Negative   LEUKOCYTESUR Trace*   RBCUA >100*   WBCUA 0   SQUAMEPITHEL 0       Significant Imaging:     Transthoracic Echocardiogram (11/5/2020):   · The left ventricle is normal in size with normal systolic function. The estimated ejection fraction is 65%.  · Moderate right ventricular enlargement with mildly to moderately reduced right ventricular systolic function.  · Normal left ventricular diastolic function.  · Mild right atrial enlargement.      Assessment/Plan:      * Right heart failure due to pulmonary hypertension  Patient with right heart failure due to fluid overload. TTE 10/23 with LVEF 75%, reduced RV systolic function, RV enlargement, TR, PASP 109. Has diuresed well (>70L) and has returned to euvolemia/reported dry weight. Repeat echo at euvolemia with LVEF 65%, mild-moderately reduced RV systolic function as before, but without evaluation of the tricuspid valve.    Plan:   - Strict Is/Os   - Daily weights   - Keep K>4, Mg>2   - Diabetic/cardiac low sodium diet (<2 g/day)   - Fluid  restriction to 1.5 L/day    Acute respiratory failure with hypoxia and hypercapnia  Patient is a chronic CO2 retainer in setting of obesity hypoventilation syndrome 2/2 morbid obesity. Stepped down from ICU 10/26, currently continuing diuresis. De-escalated to NC during the day with BiPAP nightly on 10/31, which patient tolerated well.      Plan:   - Target SpO2 of >88%. Continue BiPAP nightly. Patient on 3LNC, will continue to wean as tolerated   - Holding diuresis as patient has returned to reported dry weight and showing signs of volume contraction (elevated BUN, dark urine)   - Approx 70L net negative since admission    Diabetes mellitus type 2 without retinopathy  Previously on glipizide 10mg daily and liraglutide, but did not tolerate the liraglutide well. Was to switch to semaglutide but had not started taking it yet. Glucose started to trend upward to low 200s, started on insulin and have titrated up as necessary.    Plan:   - Holding home anti-hyperglycemics   - POCT BG qAC/HS while eating   - Goal -180   - Insulin detemir 10 units nightly    - Insulin aspart 5 units TIDWM    - LD SSI for correction   - Diabetic cardiac diet, fluid restriction 1.5L    - Will provide endocrinology referral at discharge    Morbid obesity  Plan:  - Per patient, dry weight of approximately 370 -380 lb -> up to 500 pounds (BMI 88) this admission  - Weight today 385 (BMI 68.3)  - As patient is nearing reported dry weight, will hold diuresis for now with evidence of contraction    Hypertension associated with diabetes  Patient on losartan 50mg daily at home. Remains normotensive at this time.    Plan:   - Holding home anti-hypertensive in acute setting   - Continue atorvastatin 10mg nightly    Sleep apnea  Patient's pulmonary HTN and RV dysfunction likely result of obesity hypoventilation syndrome with non-compliance with at-home CPAP. Evaluated by Pulmonology, who recommended continued use of NIPPV and encouraged weight  loss.     Plan:    - NIPPV nightly    Obesity hypoventilation syndrome  See Acute respiratory failure with hypoxia and hypercapnia above    Hypokalemia  Patient with mild recurrent hypokalemia, most likely due to consistent diuresis. K today 3.5.    Plan:   - CMP daily   - Replete PRN with goal K>4   - Potassium bicarbonate 50 mEq x 1 dose today    Constipation  Small BM reported 11/4, large BM on 11/5    Plan:   - Continue with polyethylene glycol and/or senna-docusate PRN    Impaired mobility and activities of daily living  PT/OT consulted, recommending rehab at discharge. PM&R consulted and following.    Plan:   - Continue PT/OT while inpatient    Discharge planning issues  PT/OT recommend rehab -> accepted to [facility] when medically ready for discharge  - Needs follow up with Pulmonary and Cardiology at discharge  - Can consider 6 min walk test to assess need for oxygen prior to discharge in setting of heart failure if patient still with O2 requirements      VTE Risk Mitigation (From admission, onward)         Ordered     enoxaparin injection 60 mg  Every 12 hours      10/22/20 1518     IP VTE HIGH RISK PATIENT  Once      10/22/20 1346     Place sequential compression device  Until discontinued      10/22/20 1346                Discharge Planning   JOSE RAUL: 11/10/2020     Code Status: Full Code   Is the patient medically ready for discharge?: No    Reason for patient still in hospital: Patient trending condition and Treatment  Discharge Plan A: Rehab        Rao Szymanski MD, PGY-1  Department of Hospital Medicine   [facility]

## 2020-11-06 NOTE — SUBJECTIVE & OBJECTIVE
Interval History: No acute events or significant changes overnight. Remains afebrile and hemodynamically stable. Weaned to 3LNC yesterday, maintaining O2 sats in low 90s. Participated well with PT/OT yesterday, got OOB and in chair for a few hours.    Review of Systems   Constitutional: Negative for diaphoresis and fever.   HENT: Negative for congestion and sore throat.    Eyes: Negative for redness and visual disturbance.   Respiratory: Negative for cough and stridor.    Cardiovascular: Positive for leg swelling. Negative for chest pain.   Gastrointestinal: Negative for abdominal pain, constipation, diarrhea and vomiting.   Genitourinary: Negative for dysuria and hematuria.   Musculoskeletal: Negative for back pain.   Skin: Negative for color change and pallor.   Neurological: Negative for speech difficulty and headaches.   Psychiatric/Behavioral: Negative for agitation and confusion.     Objective:     Vital Signs (Most Recent):  Temp: 97.6 °F (36.4 °C) (11/06/20 0530)  Pulse: 72 (11/06/20 0454)  Resp: 14 (11/06/20 0035)  BP: 137/83 (11/06/20 0035)  SpO2: (!) 89 % (11/06/20 0454) Vital Signs (24h Range):  Temp:  [97.6 °F (36.4 °C)-98.7 °F (37.1 °C)] 97.6 °F (36.4 °C)  Pulse:  [69-83] 72  Resp:  [12-22] 14  SpO2:  [89 %-97 %] 89 %  BP: ()/(61-87) 137/83     Weight: (!) 175 kg (385 lb 12.9 oz)  Body mass index is 68.34 kg/m².    Intake/Output Summary (Last 24 hours) at 11/6/2020 0647  Last data filed at 11/6/2020 0500  Gross per 24 hour   Intake 240 ml   Output 2745 ml   Net -2505 ml      Physical Exam  Vitals signs and nursing note reviewed.   Constitutional:       General: She is awake. She is not in acute distress.     Appearance: She is morbidly obese. She is not diaphoretic.      Interventions: Nasal cannula in place.   HENT:      Head: Normocephalic and atraumatic.      Nose: Nose normal.      Mouth/Throat:      Mouth: Mucous membranes are moist.      Pharynx: Oropharynx is clear.   Eyes:       Extraocular Movements: Extraocular movements intact.      Conjunctiva/sclera: Conjunctivae normal.      Pupils: Pupils are equal, round, and reactive to light.   Neck:      Musculoskeletal: Normal range of motion.   Cardiovascular:      Rate and Rhythm: Normal rate and regular rhythm.      Pulses: Normal pulses.      Heart sounds: Normal heart sounds.   Pulmonary:      Effort: Pulmonary effort is normal. No respiratory distress.      Breath sounds: Normal breath sounds. No wheezing or rhonchi.   Abdominal:      General: Abdomen is protuberant.      Palpations: Abdomen is soft.      Tenderness: There is no abdominal tenderness.   Musculoskeletal:         General: Swelling and tenderness present.      Right lower leg: Edema present.      Left lower leg: Edema present.      Comments: Hematoma on LUE, mildly TTP with surrounding ecchymosis.   Skin:     General: Skin is warm and dry.      Coloration: Skin is not jaundiced.      Findings: Erythema and rash present. Rash is scaling.      Comments: Chronic venous stasis changes present on BLE.   Neurological:      General: No focal deficit present.      Mental Status: She is alert and oriented to person, place, and time.   Psychiatric:         Mood and Affect: Mood normal.         Behavior: Behavior normal.       Significant Labs:   CBC:   Recent Labs   Lab 11/05/20 0339 11/06/20  0359   WBC 6.84 6.73   HGB 15.0 14.7   HCT 51.9* 50.3*    164     CMP:   Recent Labs   Lab 11/05/20 0339 11/06/20  0359    140   K 3.3* 3.5   CL 83* 87*   CO2 41* 43*   * 196*   BUN 55* 46*   CREATININE 1.1 0.9   CALCIUM 10.0 9.7   PROT 7.7 7.3   ALBUMIN 3.7 3.5   BILITOT 1.0 1.0   ALKPHOS 67 62   AST 35 30   ALT 46* 41   ANIONGAP 15 10   EGFRNONAA 59.9* >60.0     Urine Studies:   Recent Labs   Lab 11/05/20  1508   COLORU Yellow   APPEARANCEUA Clear   PHUR >8.0*   SPECGRAV 1.015   PROTEINUA Negative   GLUCUA Negative   KETONESU Negative   BILIRUBINUA Negative   OCCULTUA 3+*    NITRITE Negative   LEUKOCYTESUR Trace*   RBCUA >100*   WBCUA 0   SQUAMEPITHEL 0       Significant Imaging:     Transthoracic Echocardiogram (11/5/2020):   · The left ventricle is normal in size with normal systolic function. The estimated ejection fraction is 65%.  · Moderate right ventricular enlargement with mildly to moderately reduced right ventricular systolic function.  · Normal left ventricular diastolic function.  · Mild right atrial enlargement.

## 2020-11-06 NOTE — PLAN OF CARE
Per chart review and discussion with PTA, goals reviewed and updated as appropriate, extended to 2020.       Problem: Physical Therapy Goal  Goal: Physical Therapy Goal  Description: Goals to be met by: 2020, reviewed and updated, goals extended to 2020    Patient will increase functional independence with mobility by performin. Supine to sit with MInimal Assistance of 2 persons. -met        A. [place]  2. Sit to supine with Moderate Assistance of 2 persons.        A. [place]  3. Sit to stand transfer with Minimal Assistance of 1 person. -met       A. [place]  4. Bed to chair stand pivot transfer with Contact Guard Assistance of 1 person using Rolling Walker.  5. Gait  x 40 feet with Contact Guard Assistance of 1 person using Rolling Walker.   6. Lower extremity exercise program x20 reps per handout, with assistance as needed.    2020 1231 by Jacqui Nolasco PT  Outcome: Ongoing, Progressing      Jacqui Nolasco, PT, DPT  2020

## 2020-11-06 NOTE — PLAN OF CARE
Problem: Occupational Therapy Goal  Goal: Occupational Therapy Goal  Description: Goals to be met by: 11/16/2020    Patient will increase functional independence with ADLs by performing:    UE Dressing with Stand-by Assistance.  LE Dressing with Moderate Assistance.  Grooming while EOB with Modified [place].  Toileting from bedside commode with Minimal Assistance for hygiene and clothing management.   Rolling to Bilateral with Stand-by Assistance.   Supine to sit with Contact Guard Assistance.  Stand pivot transfers with Supervision.  Toilet transfer to bedside commode with Supervision.    Goals extended and remain appropriate. Continue OT as tolerated.  Tangela Green OT  11/6/2020    Outcome: Ongoing, Progressing

## 2020-11-06 NOTE — ASSESSMENT & PLAN NOTE
Patient with mild recurrent hypokalemia, most likely due to consistent diuresis. K today 3.5.    Plan:   - CMP daily   - Replete PRN with goal K>4   - Potassium bicarbonate 50 mEq x 1 dose today

## 2020-11-06 NOTE — ASSESSMENT & PLAN NOTE
PT/OT recommend rehab -> accepted to [facility] when medically ready for discharge  - Needs follow up with Pulmonary and Cardiology at discharge  - Can consider 6 min walk test to assess need for oxygen prior to discharge in setting of heart failure if patient still with O2 requirements

## 2020-11-06 NOTE — ASSESSMENT & PLAN NOTE
Patient's phosphorus level had been steadily increasing throughout hospital stay, but as diuresis has slowed phosphate has returned to normal - at 4.2 today.     Plan:   - Monitor with daily CMP

## 2020-11-06 NOTE — PT/OT/SLP PROGRESS
Occupational Therapy   Treatment    Name: Halley Moreno  MRN: [id number]  Admitting Diagnosis:  Right heart failure due to pulmonary hypertension       Recommendations:     Discharge Recommendations: rehabilitation facility  Discharge Equipment Recommendations:  bath bench, bedside commode, walker, rolling, other (see comments)(bariatric)  Barriers to discharge:  Decreased caregiver support    Assessment:     Halley Moreno is a 45 y.o. female with a medical diagnosis of Right heart failure due to pulmonary hypertension. Pt presents with decreased endurance and impaired mobility performance as limited by cardiovascular status and generalized weakness. Pt very pleasant and familiar to writing therapist. She was eager to mobilize this morning with notable improvements in balance during therapy using delivered RW. Pt required SBA and increased time with bed mobility. Pt required mod (A) for sit<stand and CGA for ambulation in room using RW. Pt also tolerated ~3 minute therapeutic dance exercise using dynamic balance and LE movements with control of trunk musculature. Pt on 2L 02 this date with Sp02 ~88-90 after mobility requiring pursed lip breathing. Pt would benefit from continued OT skilled services 4x/wk to improve daily living skills to optimize QOL.  Pt is recommended to discharge to Rehab at this time.    Performance deficits affecting function are weakness, impaired self care skills, impaired balance, decreased coordination, impaired functional mobilty, impaired endurance, decreased upper extremity function, decreased lower extremity function, gait instability, impaired cardiopulmonary response to activity.     Rehab Prognosis:  Good; patient would benefit from acute skilled OT services to address these deficits and reach maximum level of function.       Plan:     Patient to be seen 4 x/week to address the above listed problems via self-care/home management, therapeutic activities, therapeutic  exercises, neuromuscular re-education  · Plan of Care Expires: 11/22/20  · Plan of Care Reviewed with: patient    Subjective     Pain/Comfort:  · Pain Rating 1: 0/10  · Pain Rating Post-Intervention 1: 0/10  · Pain Rating Post-Intervention 2: 0/10    Objective:     Communicated with: RN prior to session.  Patient found HOB elevated with telemetry, pulse ox (continuous), oxygen, alamo catheter upon OT entry to room.    General Precautions: Standard, fall   Orthopedic Precautions:N/A   Braces: N/A     Occupational Performance:     Bed Mobility:    · Patient completed Rolling/Turning to Right with stand by assistance  · Patient completed Scooting/Bridging with stand by assistance  · Patient completed Supine to Sit with stand by assistance     Functional Mobility/Transfers:  · Patient completed Sit <> Stand Transfer with moderate assistance  with  rolling walker   · Patient completed Bed <> Chair Transfer using Step Transfer technique with contact guard assistance with rolling walker  · Functional Mobility: Pt completed sit<stand (x2) with mod (A) using bariatric RW. Once in standing, pt able to mobilize using RW with CGA to chair. Pt tolerated standing ~3 min for therapeutic dance activity (standing rest breaks provided for pt fatigue).    Activities of Daily Living:  · Grooming: setup A  to brush hair while in chair   · Upper Body Dressing: minimum assistance adjusting gown fit at EOB      [facility] 6 Click ADL: 15    Treatment & Education:  Pt educated on role of occupational therapy, POC, and safety during ADLs and functional mobility. Pt and OT discussed importance of safe, continued mobility to optimize daily living skills. Pt verbalized understanding.   Pt completed the following during session: bed mobility, UB dressing, sit<Stand t/f, standing dance activity (integrating lateral weight shifting and trunk musculature for swaying to movement, pt also performing marching and kicks to rhymth of tune while holding onto  RW), safe mobility in room and pivot to chair. Pt then completed pursed lip breathing exercises and grooming task. White board updated during session. Pt given instruction to call for medical staff/nurse for assistance.       Patient left up in chair with all lines intact, call button in reach and RN Venkatesh notifiedEducation:      GOALS:   Multidisciplinary Problems     Occupational Therapy Goals        Problem: Occupational Therapy Goal    Goal Priority Disciplines Outcome Interventions   Occupational Therapy Goal     OT, PT/OT Ongoing, Progressing    Description: Goals to be met by: 11/6/2020    Patient will increase functional independence with ADLs by performing:    UE Dressing with Stand-by Assistance.  LE Dressing with Moderate Assistance.  Grooming while EOB with Modified [place].  Toileting from bedside commode with Minimal Assistance for hygiene and clothing management.   Rolling to Bilateral with Stand-by Assistance.   Supine to sit with Contact Guard Assistance.  Stand pivot transfers with Supervision.  Toilet transfer to bedside commode with Supervision.                     Time Tracking:     OT Date of Treatment: 11/06/20  OT Start Time: 1020  OT Stop Time: 1058  OT Total Time (min): 38 min    Billable Minutes:Self Care/Home Management 10 min  Therapeutic Activity 20 min  Therapeutic Exercise 8 min    Tangela Green OT  11/6/2020

## 2020-11-06 NOTE — PLAN OF CARE
The patient is lying in bed soundly asleep. Easily awakened. No s/s of acute distress. The patient is on CPAP at . Hernandez catheter intact and draining carine colored urine. The bed is locked and in low position, call light within reach.

## 2020-11-06 NOTE — ASSESSMENT & PLAN NOTE
Small BM reported 11/4, large BM on 11/5    Plan:   - Continue with polyethylene glycol and/or senna-docusate PRN

## 2020-11-07 LAB
ALBUMIN SERPL BCP-MCNC: 3.5 G/DL (ref 3.5–5.2)
ALP SERPL-CCNC: 66 U/L (ref 55–135)
ALT SERPL W/O P-5'-P-CCNC: 44 U/L (ref 10–44)
ANION GAP SERPL CALC-SCNC: 10 MMOL/L (ref 8–16)
AST SERPL-CCNC: 32 U/L (ref 10–40)
BASOPHILS # BLD AUTO: 0.02 K/UL (ref 0–0.2)
BASOPHILS NFR BLD: 0.3 % (ref 0–1.9)
BILIRUB SERPL-MCNC: 1.1 MG/DL (ref 0.1–1)
BUN SERPL-MCNC: 37 MG/DL (ref 6–20)
CALCIUM SERPL-MCNC: 9.7 MG/DL (ref 8.7–10.5)
CHLORIDE SERPL-SCNC: 88 MMOL/L (ref 95–110)
CO2 SERPL-SCNC: 40 MMOL/L (ref 23–29)
CREAT SERPL-MCNC: 0.9 MG/DL (ref 0.5–1.4)
DIFFERENTIAL METHOD: ABNORMAL
EOSINOPHIL # BLD AUTO: 0.1 K/UL (ref 0–0.5)
EOSINOPHIL NFR BLD: 1.6 % (ref 0–8)
ERYTHROCYTE [DISTWIDTH] IN BLOOD BY AUTOMATED COUNT: 19.8 % (ref 11.5–14.5)
EST. GFR  (AFRICAN AMERICAN): >60 ML/MIN/1.73 M^2
EST. GFR  (NON AFRICAN AMERICAN): >60 ML/MIN/1.73 M^2
ESTIMATED AVG GLUCOSE: 157 MG/DL (ref 68–131)
GLUCOSE SERPL-MCNC: 196 MG/DL (ref 70–110)
HBA1C MFR BLD HPLC: 7.1 % (ref 4–5.6)
HCT VFR BLD AUTO: 48.2 % (ref 37–48.5)
HGB BLD-MCNC: 14.2 G/DL (ref 12–16)
IMM GRANULOCYTES # BLD AUTO: 0.02 K/UL (ref 0–0.04)
IMM GRANULOCYTES NFR BLD AUTO: 0.3 % (ref 0–0.5)
LYMPHOCYTES # BLD AUTO: 1.7 K/UL (ref 1–4.8)
LYMPHOCYTES NFR BLD: 24.6 % (ref 18–48)
MAGNESIUM SERPL-MCNC: 1.8 MG/DL (ref 1.6–2.6)
MCH RBC QN AUTO: 25.3 PG (ref 27–31)
MCHC RBC AUTO-ENTMCNC: 29.5 G/DL (ref 32–36)
MCV RBC AUTO: 86 FL (ref 82–98)
MONOCYTES # BLD AUTO: 0.7 K/UL (ref 0.3–1)
MONOCYTES NFR BLD: 11 % (ref 4–15)
NEUTROPHILS # BLD AUTO: 4.2 K/UL (ref 1.8–7.7)
NEUTROPHILS NFR BLD: 62.2 % (ref 38–73)
NRBC BLD-RTO: 0 /100 WBC
PHOSPHATE SERPL-MCNC: 4.8 MG/DL (ref 2.7–4.5)
PLATELET # BLD AUTO: 185 K/UL (ref 150–350)
PMV BLD AUTO: 10.8 FL (ref 9.2–12.9)
POCT GLUCOSE: 162 MG/DL (ref 70–110)
POCT GLUCOSE: 190 MG/DL (ref 70–110)
POCT GLUCOSE: 191 MG/DL (ref 70–110)
POCT GLUCOSE: 206 MG/DL (ref 70–110)
POCT GLUCOSE: 208 MG/DL (ref 70–110)
POTASSIUM SERPL-SCNC: 3.3 MMOL/L (ref 3.5–5.1)
PROT SERPL-MCNC: 7.3 G/DL (ref 6–8.4)
RBC # BLD AUTO: 5.61 M/UL (ref 4–5.4)
SODIUM SERPL-SCNC: 138 MMOL/L (ref 136–145)
WBC # BLD AUTO: 6.7 K/UL (ref 3.9–12.7)

## 2020-11-07 PROCEDURE — 83735 ASSAY OF MAGNESIUM: CPT

## 2020-11-07 PROCEDURE — 25000003 PHARM REV CODE 250: Performed by: STUDENT IN AN ORGANIZED HEALTH CARE EDUCATION/TRAINING PROGRAM

## 2020-11-07 PROCEDURE — 99900035 HC TECH TIME PER 15 MIN (STAT)

## 2020-11-07 PROCEDURE — 20600001 HC STEP DOWN PRIVATE ROOM

## 2020-11-07 PROCEDURE — 94761 N-INVAS EAR/PLS OXIMETRY MLT: CPT

## 2020-11-07 PROCEDURE — 99233 SBSQ HOSP IP/OBS HIGH 50: CPT | Mod: ,,, | Performed by: INTERNAL MEDICINE

## 2020-11-07 PROCEDURE — 27000221 HC OXYGEN, UP TO 24 HOURS

## 2020-11-07 PROCEDURE — 99233 PR SUBSEQUENT HOSPITAL CARE,LEVL III: ICD-10-PCS | Mod: ,,, | Performed by: INTERNAL MEDICINE

## 2020-11-07 PROCEDURE — 63600175 PHARM REV CODE 636 W HCPCS: Performed by: STUDENT IN AN ORGANIZED HEALTH CARE EDUCATION/TRAINING PROGRAM

## 2020-11-07 PROCEDURE — 83036 HEMOGLOBIN GLYCOSYLATED A1C: CPT

## 2020-11-07 PROCEDURE — 84100 ASSAY OF PHOSPHORUS: CPT

## 2020-11-07 PROCEDURE — 85025 COMPLETE CBC W/AUTO DIFF WBC: CPT

## 2020-11-07 PROCEDURE — 36415 COLL VENOUS BLD VENIPUNCTURE: CPT

## 2020-11-07 PROCEDURE — 80053 COMPREHEN METABOLIC PANEL: CPT

## 2020-11-07 RX ORDER — INSULIN ASPART 100 [IU]/ML
8 INJECTION, SOLUTION INTRAVENOUS; SUBCUTANEOUS
Status: DISCONTINUED | OUTPATIENT
Start: 2020-11-07 | End: 2020-11-08

## 2020-11-07 RX ORDER — POTASSIUM CHLORIDE 20 MEQ/1
40 TABLET, EXTENDED RELEASE ORAL ONCE
Status: COMPLETED | OUTPATIENT
Start: 2020-11-07 | End: 2020-11-07

## 2020-11-07 RX ORDER — POTASSIUM CHLORIDE 1.5 G/1.58G
40 POWDER, FOR SOLUTION ORAL
Status: DISCONTINUED | OUTPATIENT
Start: 2020-11-07 | End: 2020-11-07

## 2020-11-07 RX ORDER — POTASSIUM CHLORIDE 20 MEQ/1
40 TABLET, EXTENDED RELEASE ORAL
Status: DISCONTINUED | OUTPATIENT
Start: 2020-11-07 | End: 2020-11-07

## 2020-11-07 RX ORDER — MAGNESIUM SULFATE HEPTAHYDRATE 40 MG/ML
2 INJECTION, SOLUTION INTRAVENOUS ONCE
Status: COMPLETED | OUTPATIENT
Start: 2020-11-07 | End: 2020-11-07

## 2020-11-07 RX ORDER — INSULIN ASPART 100 [IU]/ML
8 INJECTION, SOLUTION INTRAVENOUS; SUBCUTANEOUS
Status: DISCONTINUED | OUTPATIENT
Start: 2020-11-07 | End: 2020-11-07

## 2020-11-07 RX ADMIN — INSULIN ASPART 8 UNITS: 100 INJECTION, SOLUTION INTRAVENOUS; SUBCUTANEOUS at 05:11

## 2020-11-07 RX ADMIN — FUROSEMIDE 80 MG: 80 TABLET ORAL at 08:11

## 2020-11-07 RX ADMIN — MICONAZOLE NITRATE: 20 POWDER TOPICAL at 09:11

## 2020-11-07 RX ADMIN — ENOXAPARIN SODIUM 60 MG: 60 INJECTION SUBCUTANEOUS at 08:11

## 2020-11-07 RX ADMIN — MICONAZOLE NITRATE: 20 POWDER TOPICAL at 08:11

## 2020-11-07 RX ADMIN — ENOXAPARIN SODIUM 60 MG: 60 INJECTION SUBCUTANEOUS at 09:11

## 2020-11-07 RX ADMIN — DOCUSATE SODIUM 50MG AND SENNOSIDES 8.6MG 2 TABLET: 8.6; 5 TABLET, FILM COATED ORAL at 08:11

## 2020-11-07 RX ADMIN — LATANOPROST 1 DROP: 50 SOLUTION OPHTHALMIC at 09:11

## 2020-11-07 RX ADMIN — MELATONIN TAB 3 MG 6 MG: 3 TAB at 11:11

## 2020-11-07 RX ADMIN — INSULIN ASPART 8 UNITS: 100 INJECTION, SOLUTION INTRAVENOUS; SUBCUTANEOUS at 08:11

## 2020-11-07 RX ADMIN — FUROSEMIDE 80 MG: 80 TABLET ORAL at 05:11

## 2020-11-07 RX ADMIN — ATORVASTATIN CALCIUM 10 MG: 10 TABLET, FILM COATED ORAL at 08:11

## 2020-11-07 RX ADMIN — INSULIN ASPART 2 UNITS: 100 INJECTION, SOLUTION INTRAVENOUS; SUBCUTANEOUS at 05:11

## 2020-11-07 RX ADMIN — INSULIN ASPART 8 UNITS: 100 INJECTION, SOLUTION INTRAVENOUS; SUBCUTANEOUS at 12:11

## 2020-11-07 RX ADMIN — MAGNESIUM SULFATE 2 G: 2 INJECTION INTRAVENOUS at 09:11

## 2020-11-07 RX ADMIN — POTASSIUM CHLORIDE 40 MEQ: 1.5 POWDER, FOR SOLUTION ORAL at 12:11

## 2020-11-07 RX ADMIN — POTASSIUM CHLORIDE 40 MEQ: 1500 TABLET, EXTENDED RELEASE ORAL at 02:11

## 2020-11-07 NOTE — ASSESSMENT & PLAN NOTE
Patient with mild recurrent hypokalemia, most likely due to consistent diuresis. K today 3.3.    Plan:   - CMP daily   - Replete PRN with goal K>4   - Potassium chloride 40 mEq x 3 doses today

## 2020-11-07 NOTE — PT/OT/SLP PROGRESS
"     Physical Therapy  Pt Not Seen    Patient Name:  Halley Moreno   MRN:  [id number]    3:15 pm  Patient not seen today secondary to  Patient fatigue, Patient unwilling to participate("I just feel so tired.  This is the first time in 5 years that I get my period and it's got me feeling so fatigued"). Will follow-up on next scheduled visit.    Orly Redmond, PTA  11/6/2020      "

## 2020-11-07 NOTE — PLAN OF CARE
The patient is lying in bed resting quietly. Easily awakened . No s/s of acute distress. The patient is on CPAP at 65 % O2. The patient is able to make needs known. The patient is able to get up to the bedside commode. PRN pain medication given as ordered. Bed locked and in low position, call light within reach.

## 2020-11-07 NOTE — ASSESSMENT & PLAN NOTE
PT/OT recommend rehab -> accepted to [facility] when medically ready for discharge. As of 11/6, authorization has been submitted and pending.   - Needs follow up with Pulmonary and Cardiology at discharge  - Can consider 6 min walk test to assess need for oxygen prior to discharge in setting of heart failure if patient still with O2 requirements

## 2020-11-07 NOTE — ASSESSMENT & PLAN NOTE
Patient is a chronic CO2 retainer in setting of obesity hypoventilation syndrome 2/2 morbid obesity. Stepped down from ICU 10/26, currently continuing diuresis. De-escalated to NC during the day with BiPAP nightly on 10/31, which patient tolerated well. On Lasix gtt for diuresis through 11/5.     Plan:   - Target SpO2 of >88%. Continue BiPAP nightly. Patient on 3LNC, will continue to wean as tolerated   - Contine PO Lasix 80mg BID   - Approx 72L net negative since admission

## 2020-11-07 NOTE — PROGRESS NOTES
[facility] Hospital Medicine  Progress Note    Patient Name: Halley Moreno  MRN: [id number]  Patient Class: IP- Inpatient   Admission Date: 10/22/2020  Length of Stay: 16 days  Attending Physician: Adán Muller MD  Primary Care Provider: Yamila Tejeda MD    [facility] Medicine Team: [facility] - Rao Szymanski MD    Subjective:     Principal Problem:Right heart failure due to pulmonary hypertension    HPI:  Patient is a 45-year-old female who is presenting to us with acute respiratory distress.  She has a recent diagnosis of severe pulmonary hypertension with right-sided heart failure.  Her last echo showed a PA systolic pressure of 99.  She initially presented due to increasing weight gain over the last 7 months.  Patient states that in March she began gaining weight and she was placed on diuretic to trying keep that weight down.  Despite 40 mg of furosemide b.i.d. she continued to gain weight.  She has gained approximately 100 lb since March and 80 lb since May.  She does not have a cardiologist but was scheduled to see 1 in December.  Patient states that at home she sleeps in a elevated position secondary to shortness of breath.  She denies any episodes of waking up in the middle of the night out of breath.  She does not use any home oxygen nor she ever been told explicitly that she has heart failure.  She demonstrates poor understanding of the disease stating that the right heart was backing up into the lungs and cause in the shortness of breath.  She presented to the clinic today for evaluation and was found to be in acute respiratory distress with hypoxia.  She was referred to the ED for evaluation and diuresis.    Patient states that she has no known family history of heart failure in the family however she does state that her father had a pacemaker for unknown reasons.  She states that she does not think that anyone her and her family has had pulmonary  hypertension.  Patient states that she does not drink, does not smoke, and does not use IV drugs.    Overview/Hospital Course:  Patient was admitted to Hospital Medicine. Was initially hypoxic and put on nasal cannula with normalization of her oxygen status to approximately 95%. She was demonstrating initial respiratory distress and was given a bolus of 120mg IV furosemide. Hypoxia and hypercapnia worsened so was stepped up to ICU on 10/23. Was placed on BiPAP and aggressively diuresed (approx 20L net negative) with improvement in respiratory status. Weaned to 2L NC and subsequently stepped back down to Hospital Medicine on 10/26. Continued diuresis with IV Lasix gtt, diuril, and aldactone. On 10/27 VBGs noted pCO2 in 90s - 100s and patient was started back on continuous BiPAP with improvement in pCO2 levels. Patient weaned from BiPAP throughout admission with improving oxygenation, wearing BiPAP QHS. Spironolactone and Diuril adjuncts were subsequently stopped as patient's BUN/Cr began to rise. Lasix gtt stopped 11/5 and transitioned to PO Lasix 80mg BID on 11/6.     Interval History: Worked with PT/OT well yesterday, OOB and used bedside commode. Hernandez removed yesterday. No acute events overnight, remained hemodynamically stable. Excited about possibility of going to IP Rehab soon. No new complaints this AM.    Review of Systems   Constitutional: Negative for diaphoresis and fever.   HENT: Negative for congestion and sore throat.    Eyes: Negative for redness and visual disturbance.   Respiratory: Negative for cough and stridor.    Cardiovascular: Positive for leg swelling. Negative for chest pain.   Gastrointestinal: Negative for abdominal pain, constipation, diarrhea and vomiting.   Genitourinary: Negative for dysuria and hematuria.   Musculoskeletal: Negative for back pain.   Skin: Negative for color change and pallor.   Neurological: Negative for speech difficulty and headaches.   Psychiatric/Behavioral:  Negative for agitation and confusion.     Objective:     Vital Signs (Most Recent):  Temp: 97.7 °F (36.5 °C) (11/07/20 0500)  Pulse: 63 (11/07/20 0800)  Resp: (!) 27 (11/07/20 0800)  BP: 112/70 (11/07/20 0800)  SpO2: 98 % (11/07/20 0800) Vital Signs (24h Range):  Temp:  [97.7 °F (36.5 °C)-98.6 °F (37 °C)] 97.7 °F (36.5 °C)  Pulse:  [63-79] 63  Resp:  [16-28] 27  SpO2:  [83 %-100 %] 98 %  BP: (112-139)/(67-82) 112/70     Weight: (!) 175 kg (385 lb 12.9 oz)  Body mass index is 68.34 kg/m².    Intake/Output Summary (Last 24 hours) at 11/7/2020 0918  Last data filed at 11/7/2020 0500  Gross per 24 hour   Intake 240 ml   Output 485 ml   Net -245 ml      Physical Exam  Vitals signs and nursing note reviewed.   Constitutional:       General: She is awake. She is not in acute distress.     Appearance: She is morbidly obese. She is not diaphoretic.      Interventions: Nasal cannula in place.   HENT:      Head: Normocephalic and atraumatic.      Nose: Nose normal.      Mouth/Throat:      Mouth: Mucous membranes are moist.      Pharynx: Oropharynx is clear.   Eyes:      Extraocular Movements: Extraocular movements intact.      Conjunctiva/sclera: Conjunctivae normal.      Pupils: Pupils are equal, round, and reactive to light.   Neck:      Musculoskeletal: Normal range of motion.   Cardiovascular:      Rate and Rhythm: Normal rate and regular rhythm.      Pulses: Normal pulses.      Heart sounds: Normal heart sounds.   Pulmonary:      Effort: Pulmonary effort is normal. No respiratory distress.      Breath sounds: Normal breath sounds. No wheezing or rhonchi.   Abdominal:      General: Abdomen is protuberant.      Palpations: Abdomen is soft.      Tenderness: There is no abdominal tenderness.   Musculoskeletal:         General: Swelling present.      Right lower leg: Edema present.      Left lower leg: Edema present.      Comments: Hematoma on LUE, mildly TTP with surrounding ecchymosis.   Skin:     General: Skin is warm and  dry.      Coloration: Skin is not jaundiced.      Findings: Erythema and rash present. Rash is scaling.      Comments: Chronic venous stasis changes present on BLE.   Neurological:      General: No focal deficit present.      Mental Status: She is alert and oriented to person, place, and time.   Psychiatric:         Mood and Affect: Mood normal.         Behavior: Behavior normal.       Significant Labs:   CBC:   Recent Labs   Lab 11/06/20 0359 11/07/20 0446   WBC 6.73 6.70   HGB 14.7 14.2   HCT 50.3* 48.2    185     CMP:   Recent Labs   Lab 11/06/20 0359 11/07/20 0447    138   K 3.5 3.3*   CL 87* 88*   CO2 43* 40*   * 196*   BUN 46* 37*   CREATININE 0.9 0.9   CALCIUM 9.7 9.7   PROT 7.3 7.3   ALBUMIN 3.5 3.5   BILITOT 1.0 1.1*   ALKPHOS 62 66   AST 30 32   ALT 41 44   ANIONGAP 10 10   EGFRNONAA >60.0 >60.0     POCT Glucose:   Recent Labs   Lab 11/06/20 2129 11/07/20 0446 11/07/20  0805   POCTGLUCOSE 203* 206* 190*       Significant Imaging: No new imaging to review this morning.       Assessment/Plan:      * Right heart failure due to pulmonary hypertension  Patient with right heart failure due to fluid overload. TTE 10/23 with LVEF 75%, reduced RV systolic function, RV enlargement, TR, PASP 109. Has diuresed well (>70L) and has returned to euvolemia/reported dry weight. Repeat echo at euvolemia with LVEF 65%, mild-moderately reduced RV systolic function as before, but without evaluation of the tricuspid valve/PA pressures.    Plan:   - Strict Is/Os   - Daily weights   - Keep K>4, Mg>2   - Diabetic/cardiac low sodium diet (<2 g/day)   - Fluid restriction to 1.5 L/day    Acute respiratory failure with hypoxia and hypercapnia  Patient is a chronic CO2 retainer in setting of obesity hypoventilation syndrome 2/2 morbid obesity. Stepped down from ICU 10/26, currently continuing diuresis. De-escalated to NC during the day with BiPAP nightly on 10/31, which patient tolerated well. On Lasix gtt for  diuresis through 11/5.     Plan:   - Target SpO2 of >88%. Continue BiPAP nightly. Patient on 3LNC, will continue to wean as tolerated   - Contine PO Lasix 80mg BID   - Approx 72L net negative since admission    Diabetes mellitus type 2 without retinopathy  Previously on glipizide 10mg daily and liraglutide, but did not tolerate the liraglutide well. Was to switch to semaglutide but had not started taking it yet. Glucose started to trend upward to low 200s, started on insulin and have titrated up as necessary.    Plan:   - Holding home anti-hyperglycemics   - POCT BG qAC/HS while eating   - Goal -180   - Insulin detemir 12 units nightly -> increase to 15 today   - Insulin aspart 6 units TIDWM -> increase to 7 today   - LD SSI for correction   - Diabetic cardiac diet, fluid restriction 1.5L    - Will provide endocrinology referral at discharge    Morbid obesity  Plan:  - Per patient, dry weight of approximately 370 -380 lb -> up to 500 pounds (BMI 88) this admission  - Weight today 385 (BMI 68.3)    Hypertension associated with diabetes  Patient on losartan 50mg daily at home. Remains normotensive at this time.    Plan:   - Holding home anti-hypertensive in acute setting   - Continue atorvastatin 10mg nightly    Sleep apnea  Patient's pulmonary HTN and RV dysfunction likely result of obesity hypoventilation syndrome with non-compliance with at-home CPAP. Evaluated by Pulmonology, who recommended continued use of NIPPV and encouraged weight loss.     Plan:    - NIPPV nightly    Obesity hypoventilation syndrome  See Acute respiratory failure with hypoxia and hypercapnia above    Hypokalemia  Patient with mild recurrent hypokalemia, most likely due to consistent diuresis. K today 3.3.    Plan:   - CMP daily   - Replete PRN with goal K>4   - Potassium chloride 40 mEq x 3 doses today    Constipation  Small BM reported 11/4, large BM on 11/5    Plan:   - Continue with polyethylene glycol and/or senna-docusate  PRN    Impaired mobility and activities of daily living  PT/OT consulted, recommending rehab at discharge. PM&R consulted and following.    Plan:   - Continue PT/OT while inpatient    Discharge planning issues  PT/OT recommend rehab -> accepted to [facility] when medically ready for discharge. As of 11/6, authorization has been submitted and pending.   - Needs follow up with Pulmonary and Cardiology at discharge  - Can consider 6 min walk test to assess need for oxygen prior to discharge in setting of heart failure if patient still with O2 requirements      VTE Risk Mitigation (From admission, onward)         Ordered     enoxaparin injection 60 mg  Every 12 hours      10/22/20 1518     IP VTE HIGH RISK PATIENT  Once      10/22/20 1346     Place sequential compression device  Until discontinued      10/22/20 1346              Discharge Planning   JOSE RAUL: 11/9/2020     Code Status: Full Code   Is the patient medically ready for discharge?: Yes    Reason for patient still in hospital: Pending disposition  Discharge Plan A: Rehab   Discharge Delays: (!) Other(Waiting on insurance approval.)      Rao Szymanski MD, PGY-1  Department of Hospital Medicine   [facility]

## 2020-11-07 NOTE — SUBJECTIVE & OBJECTIVE
Interval History: Worked with PT/OT well yesterday, OOB and used bedside commode. David removed yesterday. No acute events overnight, remained hemodynamically stable. Excited about possibility of going to IP Rehab soon. No new complaints this AM.    Review of Systems   Constitutional: Negative for diaphoresis and fever.   HENT: Negative for congestion and sore throat.    Eyes: Negative for redness and visual disturbance.   Respiratory: Negative for cough and stridor.    Cardiovascular: Positive for leg swelling. Negative for chest pain.   Gastrointestinal: Negative for abdominal pain, constipation, diarrhea and vomiting.   Genitourinary: Negative for dysuria and hematuria.   Musculoskeletal: Negative for back pain.   Skin: Negative for color change and pallor.   Neurological: Negative for speech difficulty and headaches.   Psychiatric/Behavioral: Negative for agitation and confusion.     Objective:     Vital Signs (Most Recent):  Temp: 97.7 °F (36.5 °C) (11/07/20 0500)  Pulse: 63 (11/07/20 0800)  Resp: (!) 27 (11/07/20 0800)  BP: 112/70 (11/07/20 0800)  SpO2: 98 % (11/07/20 0800) Vital Signs (24h Range):  Temp:  [97.7 °F (36.5 °C)-98.6 °F (37 °C)] 97.7 °F (36.5 °C)  Pulse:  [63-79] 63  Resp:  [16-28] 27  SpO2:  [83 %-100 %] 98 %  BP: (112-139)/(67-82) 112/70     Weight: (!) 175 kg (385 lb 12.9 oz)  Body mass index is 68.34 kg/m².    Intake/Output Summary (Last 24 hours) at 11/7/2020 0918  Last data filed at 11/7/2020 0500  Gross per 24 hour   Intake 240 ml   Output 485 ml   Net -245 ml      Physical Exam  Vitals signs and nursing note reviewed.   Constitutional:       General: She is awake. She is not in acute distress.     Appearance: She is morbidly obese. She is not diaphoretic.      Interventions: Nasal cannula in place.   HENT:      Head: Normocephalic and atraumatic.      Nose: Nose normal.      Mouth/Throat:      Mouth: Mucous membranes are moist.      Pharynx: Oropharynx is clear.   Eyes:      Extraocular  Movements: Extraocular movements intact.      Conjunctiva/sclera: Conjunctivae normal.      Pupils: Pupils are equal, round, and reactive to light.   Neck:      Musculoskeletal: Normal range of motion.   Cardiovascular:      Rate and Rhythm: Normal rate and regular rhythm.      Pulses: Normal pulses.      Heart sounds: Normal heart sounds.   Pulmonary:      Effort: Pulmonary effort is normal. No respiratory distress.      Breath sounds: Normal breath sounds. No wheezing or rhonchi.   Abdominal:      General: Abdomen is protuberant.      Palpations: Abdomen is soft.      Tenderness: There is no abdominal tenderness.   Musculoskeletal:         General: Swelling present.      Right lower leg: Edema present.      Left lower leg: Edema present.      Comments: Hematoma on LUE, mildly TTP with surrounding ecchymosis.   Skin:     General: Skin is warm and dry.      Coloration: Skin is not jaundiced.      Findings: Erythema and rash present. Rash is scaling.      Comments: Chronic venous stasis changes present on BLE.   Neurological:      General: No focal deficit present.      Mental Status: She is alert and oriented to person, place, and time.   Psychiatric:         Mood and Affect: Mood normal.         Behavior: Behavior normal.       Significant Labs:   CBC:   Recent Labs   Lab 11/06/20 0359 11/07/20 0446   WBC 6.73 6.70   HGB 14.7 14.2   HCT 50.3* 48.2    185     CMP:   Recent Labs   Lab 11/06/20 0359 11/07/20 0447    138   K 3.5 3.3*   CL 87* 88*   CO2 43* 40*   * 196*   BUN 46* 37*   CREATININE 0.9 0.9   CALCIUM 9.7 9.7   PROT 7.3 7.3   ALBUMIN 3.5 3.5   BILITOT 1.0 1.1*   ALKPHOS 62 66   AST 30 32   ALT 41 44   ANIONGAP 10 10   EGFRNONAA >60.0 >60.0     POCT Glucose:   Recent Labs   Lab 11/06/20 2129 11/07/20  0446 11/07/20  0805   POCTGLUCOSE 203* 206* 190*       Significant Imaging: No new imaging to review this morning.

## 2020-11-07 NOTE — ASSESSMENT & PLAN NOTE
Previously on glipizide 10mg daily and liraglutide, but did not tolerate the liraglutide well. Was to switch to semaglutide but had not started taking it yet. Glucose started to trend upward to low 200s, started on insulin and have titrated up as necessary. HgbA1c 7.1%    Plan:   - Holding home anti-hyperglycemics   - POCT BG qAC/HS while eating   - Goal -180   - Insulin detemir 12 units nightly -> increase to 15 today   - Insulin aspart 6 units TIDWM -> increase to 7 today   - LD SSI for correction   - Diabetic cardiac diet, fluid restriction 1.5L    - Will provide endocrinology referral at discharge

## 2020-11-07 NOTE — ASSESSMENT & PLAN NOTE
Patient with right heart failure due to fluid overload. TTE 10/23 with LVEF 75%, reduced RV systolic function, RV enlargement, TR, PASP 109. Has diuresed well (>70L) and has returned to euvolemia/reported dry weight. Repeat echo at euvolemia with LVEF 65%, mild-moderately reduced RV systolic function as before, but without evaluation of the tricuspid valve/PA pressures.    Plan:   - Strict Is/Os   - Daily weights   - Keep K>4, Mg>2   - Diabetic/cardiac low sodium diet (<2 g/day)   - Fluid restriction to 1.5 L/day

## 2020-11-07 NOTE — PLAN OF CARE
Problem: Adult Inpatient Plan of Care  Goal: Plan of Care Review  Outcome: Ongoing, Progressing  Goal: Patient-Specific Goal (Individualization)  Outcome: Ongoing, Progressing  Flowsheets (Taken 11/7/2020 1555)  Individualized Care Needs: wean 02  Anxieties, Fears or Concerns: needing oxygen still  Patient-Specific Goals (Include Timeframe): to be on room air by tomorrow  Goal: Optimal Comfort and Wellbeing  Outcome: Ongoing, Progressing  Intervention: Provide Person-Centered Care  Flowsheets (Taken 11/7/2020 1555)  Trust Relationship/Rapport:   care explained   choices provided   emotional support provided   empathic listening provided   questions answered   questions encouraged   reassurance provided   thoughts/feelings acknowledged     Problem: Skin Injury Risk Increased  Goal: Skin Health and Integrity  Outcome: Ongoing, Progressing  Intervention: Promote and Optimize Oral Intake  Flowsheets (Taken 11/7/2020 1555)  Oral Nutrition Promotion:   calorie dense foods provided   physical activity promoted   rest periods promoted   safe use of adaptive equipment encouraged     Pt AAOx4, VSS, denies pain. No acute changes today. Pt's 02 weaned to 1L at this time. SPO2 WNL. Pt up to bedside commode with 1 assist. Plan to wean pt to room air and work on discharge planning. Plan of care reviewed with pt.

## 2020-11-07 NOTE — PLAN OF CARE
On call CM requested to see if patient can go to Rehab.  Review of chart shows that patient is accepted to [facility] but we are pending insurance auth.

## 2020-11-08 ENCOUNTER — PATIENT MESSAGE (OUTPATIENT)
Dept: INTERNAL MEDICINE | Facility: CLINIC | Age: 45
End: 2020-11-08

## 2020-11-08 LAB
ALBUMIN SERPL BCP-MCNC: 3.4 G/DL (ref 3.5–5.2)
ALP SERPL-CCNC: 65 U/L (ref 55–135)
ALT SERPL W/O P-5'-P-CCNC: 42 U/L (ref 10–44)
ANION GAP SERPL CALC-SCNC: 10 MMOL/L (ref 8–16)
AST SERPL-CCNC: 31 U/L (ref 10–40)
BASOPHILS # BLD AUTO: 0.03 K/UL (ref 0–0.2)
BASOPHILS NFR BLD: 0.5 % (ref 0–1.9)
BILIRUB SERPL-MCNC: 1 MG/DL (ref 0.1–1)
BUN SERPL-MCNC: 34 MG/DL (ref 6–20)
CALCIUM SERPL-MCNC: 9.8 MG/DL (ref 8.7–10.5)
CHLORIDE SERPL-SCNC: 92 MMOL/L (ref 95–110)
CO2 SERPL-SCNC: 38 MMOL/L (ref 23–29)
CREAT SERPL-MCNC: 0.9 MG/DL (ref 0.5–1.4)
DIFFERENTIAL METHOD: ABNORMAL
EOSINOPHIL # BLD AUTO: 0.1 K/UL (ref 0–0.5)
EOSINOPHIL NFR BLD: 1.6 % (ref 0–8)
ERYTHROCYTE [DISTWIDTH] IN BLOOD BY AUTOMATED COUNT: 19.9 % (ref 11.5–14.5)
EST. GFR  (AFRICAN AMERICAN): >60 ML/MIN/1.73 M^2
EST. GFR  (NON AFRICAN AMERICAN): >60 ML/MIN/1.73 M^2
GLUCOSE SERPL-MCNC: 191 MG/DL (ref 70–110)
HCT VFR BLD AUTO: 48.2 % (ref 37–48.5)
HGB BLD-MCNC: 14.3 G/DL (ref 12–16)
IMM GRANULOCYTES # BLD AUTO: 0.01 K/UL (ref 0–0.04)
IMM GRANULOCYTES NFR BLD AUTO: 0.2 % (ref 0–0.5)
LYMPHOCYTES # BLD AUTO: 1.7 K/UL (ref 1–4.8)
LYMPHOCYTES NFR BLD: 27.2 % (ref 18–48)
MAGNESIUM SERPL-MCNC: 2 MG/DL (ref 1.6–2.6)
MCH RBC QN AUTO: 25.5 PG (ref 27–31)
MCHC RBC AUTO-ENTMCNC: 29.7 G/DL (ref 32–36)
MCV RBC AUTO: 86 FL (ref 82–98)
MONOCYTES # BLD AUTO: 0.6 K/UL (ref 0.3–1)
MONOCYTES NFR BLD: 9.1 % (ref 4–15)
NEUTROPHILS # BLD AUTO: 3.7 K/UL (ref 1.8–7.7)
NEUTROPHILS NFR BLD: 61.4 % (ref 38–73)
NRBC BLD-RTO: 0 /100 WBC
PHOSPHATE SERPL-MCNC: 4.9 MG/DL (ref 2.7–4.5)
PLATELET # BLD AUTO: 203 K/UL (ref 150–350)
PMV BLD AUTO: 10.8 FL (ref 9.2–12.9)
POCT GLUCOSE: 173 MG/DL (ref 70–110)
POCT GLUCOSE: 184 MG/DL (ref 70–110)
POCT GLUCOSE: 187 MG/DL (ref 70–110)
POCT GLUCOSE: 190 MG/DL (ref 70–110)
POTASSIUM SERPL-SCNC: 3.5 MMOL/L (ref 3.5–5.1)
PROT SERPL-MCNC: 7.2 G/DL (ref 6–8.4)
RBC # BLD AUTO: 5.6 M/UL (ref 4–5.4)
SODIUM SERPL-SCNC: 140 MMOL/L (ref 136–145)
WBC # BLD AUTO: 6.07 K/UL (ref 3.9–12.7)

## 2020-11-08 PROCEDURE — 85025 COMPLETE CBC W/AUTO DIFF WBC: CPT

## 2020-11-08 PROCEDURE — 27000221 HC OXYGEN, UP TO 24 HOURS

## 2020-11-08 PROCEDURE — 94761 N-INVAS EAR/PLS OXIMETRY MLT: CPT

## 2020-11-08 PROCEDURE — 25000003 PHARM REV CODE 250: Performed by: STUDENT IN AN ORGANIZED HEALTH CARE EDUCATION/TRAINING PROGRAM

## 2020-11-08 PROCEDURE — 80053 COMPREHEN METABOLIC PANEL: CPT

## 2020-11-08 PROCEDURE — 84100 ASSAY OF PHOSPHORUS: CPT

## 2020-11-08 PROCEDURE — 99900035 HC TECH TIME PER 15 MIN (STAT)

## 2020-11-08 PROCEDURE — 83735 ASSAY OF MAGNESIUM: CPT

## 2020-11-08 PROCEDURE — 94660 CPAP INITIATION&MGMT: CPT

## 2020-11-08 PROCEDURE — 20600001 HC STEP DOWN PRIVATE ROOM

## 2020-11-08 PROCEDURE — 99233 SBSQ HOSP IP/OBS HIGH 50: CPT | Mod: ,,, | Performed by: INTERNAL MEDICINE

## 2020-11-08 PROCEDURE — 99233 PR SUBSEQUENT HOSPITAL CARE,LEVL III: ICD-10-PCS | Mod: ,,, | Performed by: INTERNAL MEDICINE

## 2020-11-08 PROCEDURE — 63600175 PHARM REV CODE 636 W HCPCS: Performed by: STUDENT IN AN ORGANIZED HEALTH CARE EDUCATION/TRAINING PROGRAM

## 2020-11-08 RX ORDER — POTASSIUM CHLORIDE 20 MEQ/1
40 TABLET, EXTENDED RELEASE ORAL
Status: COMPLETED | OUTPATIENT
Start: 2020-11-08 | End: 2020-11-08

## 2020-11-08 RX ORDER — INSULIN ASPART 100 [IU]/ML
9 INJECTION, SOLUTION INTRAVENOUS; SUBCUTANEOUS
Status: DISCONTINUED | OUTPATIENT
Start: 2020-11-08 | End: 2020-11-09

## 2020-11-08 RX ADMIN — FUROSEMIDE 80 MG: 80 TABLET ORAL at 05:11

## 2020-11-08 RX ADMIN — INSULIN ASPART 9 UNITS: 100 INJECTION, SOLUTION INTRAVENOUS; SUBCUTANEOUS at 11:11

## 2020-11-08 RX ADMIN — FUROSEMIDE 80 MG: 80 TABLET ORAL at 08:11

## 2020-11-08 RX ADMIN — MICONAZOLE NITRATE: 20 POWDER TOPICAL at 09:11

## 2020-11-08 RX ADMIN — POTASSIUM CHLORIDE 40 MEQ: 1500 TABLET, EXTENDED RELEASE ORAL at 09:11

## 2020-11-08 RX ADMIN — ENOXAPARIN SODIUM 60 MG: 60 INJECTION SUBCUTANEOUS at 08:11

## 2020-11-08 RX ADMIN — DOCUSATE SODIUM 50MG AND SENNOSIDES 8.6MG 2 TABLET: 8.6; 5 TABLET, FILM COATED ORAL at 08:11

## 2020-11-08 RX ADMIN — LATANOPROST 1 DROP: 50 SOLUTION OPHTHALMIC at 09:11

## 2020-11-08 RX ADMIN — INSULIN ASPART 9 UNITS: 100 INJECTION, SOLUTION INTRAVENOUS; SUBCUTANEOUS at 08:11

## 2020-11-08 RX ADMIN — POTASSIUM CHLORIDE 40 MEQ: 1500 TABLET, EXTENDED RELEASE ORAL at 08:11

## 2020-11-08 RX ADMIN — INSULIN ASPART 9 UNITS: 100 INJECTION, SOLUTION INTRAVENOUS; SUBCUTANEOUS at 05:11

## 2020-11-08 RX ADMIN — ATORVASTATIN CALCIUM 10 MG: 10 TABLET, FILM COATED ORAL at 08:11

## 2020-11-08 NOTE — PROGRESS NOTES
[facility] Hospital Medicine  Progress Note    Patient Name: Halley Moreno  MRN: [id number]  Patient Class: IP- Inpatient   Admission Date: 10/22/2020  Length of Stay: 17 days  Attending Physician: Adán Muller MD  Primary Care Provider: Yamila Tejeda MD    [facility] Medicine Team: [facility] HOSP MED 1 - Roa Szymanski MD    Subjective:     Principal Problem:Right heart failure due to pulmonary hypertension    HPI:  Patient is a 45-year-old female who is presenting to us with acute respiratory distress.  She has a recent diagnosis of severe pulmonary hypertension with right-sided heart failure.  Her last echo showed a PA systolic pressure of 99.  She initially presented due to increasing weight gain over the last 7 months.  Patient states that in March she began gaining weight and she was placed on diuretic to trying keep that weight down.  Despite 40 mg of furosemide b.i.d. she continued to gain weight.  She has gained approximately 100 lb since March and 80 lb since May.  She does not have a cardiologist but was scheduled to see 1 in December.  Patient states that at home she sleeps in a elevated position secondary to shortness of breath.  She denies any episodes of waking up in the middle of the night out of breath.  She does not use any home oxygen nor she ever been told explicitly that she has heart failure.  She demonstrates poor understanding of the disease stating that the right heart was backing up into the lungs and cause in the shortness of breath.  She presented to the clinic today for evaluation and was found to be in acute respiratory distress with hypoxia.  She was referred to the ED for evaluation and diuresis.    Patient states that she has no known family history of heart failure in the family however she does state that her father had a pacemaker for unknown reasons.  She states that she does not think that anyone her and her family has had pulmonary  hypertension.  Patient states that she does not drink, does not smoke, and does not use IV drugs.    Overview/Hospital Course:  Patient was admitted to Hospital Medicine. Was initially hypoxic and put on nasal cannula with normalization of her oxygen status to approximately 95%. She was demonstrating initial respiratory distress and was given a bolus of 120mg IV furosemide. Hypoxia and hypercapnia worsened so was stepped up to ICU on 10/23. Was placed on BiPAP and aggressively diuresed (approx 20L net negative) with improvement in respiratory status. Weaned to 2L NC and subsequently stepped back down to Hospital Medicine on 10/26. Continued diuresis with IV Lasix gtt, diuril, and aldactone. On 10/27 VBGs noted pCO2 in 90s - 100s and patient was started back on continuous BiPAP with improvement in pCO2 levels. Patient weaned from BiPAP throughout admission with improving oxygenation, wearing BiPAP QHS. Spironolactone and Diuril adjuncts were subsequently stopped as patient's BUN/Cr began to rise. Lasix gtt stopped 11/5 and transitioned to PO Lasix 80mg BID on 11/6.     Interval History: No acute events overnight. Patient remained afebrile and hemodynamically stable. Voiding using bedside commode, tolerating well. Titrating insulin regimen. Weaning from supplemental O2. Pending insurance authorization for rehab placement.    Review of Systems   Constitutional: Negative for diaphoresis and fever.   HENT: Negative for congestion and sore throat.    Eyes: Negative for redness and visual disturbance.   Respiratory: Negative for cough and stridor.    Cardiovascular: Positive for leg swelling. Negative for chest pain.   Gastrointestinal: Negative for abdominal pain, constipation, diarrhea and vomiting.   Genitourinary: Negative for dysuria and hematuria.   Musculoskeletal: Negative for back pain.   Skin: Negative for color change and pallor.   Neurological: Negative for speech difficulty and headaches.    Psychiatric/Behavioral: Negative for agitation and confusion.     Objective:     Vital Signs (Most Recent):  Temp: 97.6 °F (36.4 °C) (11/08/20 0700)  Pulse: 74 (11/08/20 1100)  Resp: 17 (11/08/20 0700)  BP: 101/67 (11/08/20 0700)  SpO2: 99 % (11/08/20 0700) Vital Signs (24h Range):  Temp:  [97.6 °F (36.4 °C)-98.7 °F (37.1 °C)] 97.6 °F (36.4 °C)  Pulse:  [63-83] 74  Resp:  [11-28] 17  SpO2:  [83 %-100 %] 99 %  BP: (100-126)/(59-84) 101/67     Weight: (!) 175 kg (385 lb 12.9 oz)  Body mass index is 68.34 kg/m².    Intake/Output Summary (Last 24 hours) at 11/8/2020 1140  Last data filed at 11/8/2020 0800  Gross per 24 hour   Intake 240 ml   Output 1000 ml   Net -760 ml      Physical Exam  Vitals signs and nursing note reviewed.   Constitutional:       General: She is awake. She is not in acute distress.     Appearance: She is morbidly obese. She is not diaphoretic.      Interventions: Nasal cannula in place.   HENT:      Head: Normocephalic and atraumatic.      Nose: Nose normal.      Mouth/Throat:      Mouth: Mucous membranes are moist.      Pharynx: Oropharynx is clear.   Eyes:      Extraocular Movements: Extraocular movements intact.      Conjunctiva/sclera: Conjunctivae normal.      Pupils: Pupils are equal, round, and reactive to light.   Neck:      Musculoskeletal: Normal range of motion.   Cardiovascular:      Rate and Rhythm: Normal rate and regular rhythm.      Pulses: Normal pulses.      Heart sounds: Normal heart sounds.   Pulmonary:      Effort: Pulmonary effort is normal. No respiratory distress.      Breath sounds: Normal breath sounds. No wheezing or rhonchi.      Comments: 1L NC  Abdominal:      General: Abdomen is protuberant.      Palpations: Abdomen is soft.      Tenderness: There is no abdominal tenderness.   Musculoskeletal:         General: Swelling present.      Right lower leg: Edema present.      Left lower leg: Edema present.      Comments: Hematoma on LUE, mildly TTP with surrounding  ecchymosis.   Skin:     General: Skin is warm and dry.      Coloration: Skin is not jaundiced.      Findings: Erythema and rash present. Rash is scaling.      Comments: Chronic venous stasis changes present on BLE.   Neurological:      General: No focal deficit present.      Mental Status: She is alert and oriented to person, place, and time.   Psychiatric:         Mood and Affect: Mood normal.         Behavior: Behavior normal.       Significant Labs:     CBC:   Recent Labs   Lab 11/07/20 0446 11/08/20  0532   WBC 6.70 6.07   HGB 14.2 14.3   HCT 48.2 48.2    203     CMP:   Recent Labs   Lab 11/07/20 0447 11/08/20  0532    140   K 3.3* 3.5   CL 88* 92*   CO2 40* 38*   * 191*   BUN 37* 34*   CREATININE 0.9 0.9   CALCIUM 9.7 9.8   PROT 7.3 7.2   ALBUMIN 3.5 3.4*   BILITOT 1.1* 1.0   ALKPHOS 66 65   AST 32 31   ALT 44 42   ANIONGAP 10 10   EGFRNONAA >60.0 >60.0     Magnesium:   Recent Labs   Lab 11/07/20 0447 11/08/20  0532   MG 1.8 2.0       Significant Imaging: No new imaging to review today.      Assessment/Plan:      * Right heart failure due to pulmonary hypertension  Patient with right heart failure due to fluid overload. TTE 10/23 with LVEF 75%, reduced RV systolic function, RV enlargement, TR, PASP 109. Has diuresed well (>70L) and has returned to euvolemia/reported dry weight. Repeat echo at euvolemia with LVEF 65%, mild-moderately reduced RV systolic function as before, but without evaluation of the tricuspid valve/PA pressures.    Plan:   - Strict Is/Os   - Daily weights   - Keep K>4, Mg>2   - Diabetic/cardiac low sodium diet (<2 g/day)   - Fluid restriction to 1.5 L/day    Acute respiratory failure with hypoxia and hypercapnia  Patient is a chronic CO2 retainer in setting of obesity hypoventilation syndrome 2/2 morbid obesity. Stepped down from ICU 10/26, currently continuing diuresis. De-escalated to NC during the day with BiPAP nightly on 10/31, which patient tolerated well. On  Lasix gtt for diuresis through 11/5.     Plan:   - Target SpO2 of >88%. Continue BiPAP nightly. Patient on 3LNC, will continue to wean as tolerated   - Contine PO Lasix 80mg BID   - Approx 72L net negative since admission    Diabetes mellitus type 2 without retinopathy  Previously on glipizide 10mg daily and liraglutide, but did not tolerate the liraglutide well. Was to switch to semaglutide but had not started taking it yet. Glucose started to trend upward to low 200s, started on insulin and have titrated up as necessary. HgbA1c 7.1%    Plan:   - Holding home anti-hyperglycemics   - POCT BG qAC/HS while eating   - Goal -180   - Insulin detemir 15 units nightly -> increase to 18 today   - Insulin aspart 7 units TIDWM -> increase to 9 today   - LD SSI for correction   - Diabetic cardiac diet, fluid restriction 1.5L    - Will provide endocrinology referral at discharge    Morbid obesity  Plan:  - Per patient, dry weight of approximately 370 -380 lb -> up to 500 pounds (BMI 88) this admission  - Weight today 385 (BMI 68.3)    Hypertension associated with diabetes  Patient on losartan 50mg daily at home. Remains normotensive at this time.    Plan:   - Holding home anti-hypertensive in acute setting   - Continue atorvastatin 10mg nightly    Sleep apnea  Patient's pulmonary HTN and RV dysfunction likely result of obesity hypoventilation syndrome with non-compliance with at-home CPAP. Evaluated by Pulmonology, who recommended continued use of NIPPV and encouraged weight loss.     Plan:    - NIPPV nightly    Obesity hypoventilation syndrome  See Acute respiratory failure with hypoxia and hypercapnia above    Hypokalemia  Patient with mild recurrent hypokalemia, most likely due to consistent diuresis. K today 3.5.    Plan:   - CMP daily   - Replete PRN with goal K>4   - Potassium chloride 40 mEq x 2 doses today    Constipation  Small BM reported 11/4, large BM on 11/5    Plan:   - Continue with polyethylene glycol  and/or senna-docusate PRN    Impaired mobility and activities of daily living  PT/OT consulted, recommending rehab at discharge. PM&R consulted and following.    Plan:   - Continue PT/OT while inpatient    Discharge planning issues  PT/OT recommend rehab -> accepted to [facility] when medically ready for discharge. As of 11/8, authorization has been submitted and still pending.   - Needs follow up with Pulmonary and Cardiology at discharge  - Can consider 6 min walk test to assess need for oxygen prior to discharge in setting of heart failure if patient still with O2 requirements      VTE Risk Mitigation (From admission, onward)         Ordered     enoxaparin injection 60 mg  Every 12 hours      10/22/20 1518     IP VTE HIGH RISK PATIENT  Once      10/22/20 1346     Place sequential compression device  Until discontinued      10/22/20 1346                Discharge Planning   JOSE RAUL: 11/9/2020     Code Status: Full Code   Is the patient medically ready for discharge?: Yes    Reason for patient still in hospital: Pending disposition  Discharge Plan A: Rehab   Discharge Delays: (!) Other(Waiting on insurance approval.)      Rao Szymanski MD, PGY-1  Department of Hospital Medicine   [facility]

## 2020-11-08 NOTE — ASSESSMENT & PLAN NOTE
Previously on glipizide 10mg daily and liraglutide, but did not tolerate the liraglutide well. Was to switch to semaglutide but had not started taking it yet. Glucose started to trend upward to low 200s, started on insulin and have titrated up as necessary. HgbA1c 7.1%    Plan:   - Holding home anti-hyperglycemics   - POCT BG qAC/HS while eating   - Goal -180   - Insulin detemir 15 units nightly -> increase to 18 today   - Insulin aspart 7 units TIDWM -> increase to 9 today   - LD SSI for correction   - Diabetic cardiac diet, fluid restriction 1.5L    - Will provide endocrinology referral at discharge

## 2020-11-08 NOTE — PLAN OF CARE
Problem: Fall Injury Risk  Goal: Absence of Fall and Fall-Related Injury  Outcome: Ongoing, Progressing     Problem: Infection  Goal: Infection Symptom Resolution  Outcome: Ongoing, Progressing     Problem: Adult Inpatient Plan of Care  Goal: Plan of Care Review  Outcome: Ongoing, Progressing  Goal: Patient-Specific Goal (Individualization)  Outcome: Ongoing, Progressing  Goal: Absence of Hospital-Acquired Illness or Injury  Outcome: Ongoing, Progressing  Goal: Optimal Comfort and Wellbeing  Outcome: Ongoing, Progressing  Goal: Readiness for Transition of Care  Outcome: Ongoing, Progressing  Goal: Rounds/Family Conference  Outcome: Ongoing, Progressing     Problem: Bariatric Environmental Safety  Goal: Safety Maintained with Care  Outcome: Ongoing, Progressing     Problem: Diabetes Comorbidity  Goal: Blood Glucose Level Within Desired Range  Outcome: Ongoing, Progressing     Problem: Wound  Goal: Optimal Wound Healing  Outcome: Ongoing, Progressing     Problem: Skin Injury Risk Increased  Goal: Skin Health and Integrity  Outcome: Ongoing, Progressing     POC reviewed with patient. VSS. NAD. Patient's Visi indicates inaccurate reading (false lows vs. The bedside monitor) in regards to the SpO2 readings. Charge Nurse made aware of the false readings. Pt. Remained on 1L until bedtime and then switched to Bipap with O2 @ 65%. Pt. Is up to the bedside commode. Wound care rendered as ordered. Safety precautions maintained. Call light is within reach, bed wheels are locked and bed is in the lowest position.

## 2020-11-08 NOTE — ASSESSMENT & PLAN NOTE
Patient with mild recurrent hypokalemia, most likely due to consistent diuresis. K today 3.5.    Plan:   - CMP daily   - Replete PRN with goal K>4   - Potassium chloride 40 mEq x 2 doses today

## 2020-11-08 NOTE — ASSESSMENT & PLAN NOTE
PT/OT recommend rehab -> accepted to [facility] when medically ready for discharge. As of 11/8, authorization has been submitted and still pending.   - Needs follow up with Pulmonary and Cardiology at discharge  - Can consider 6 min walk test to assess need for oxygen prior to discharge in setting of heart failure if patient still with O2 requirements

## 2020-11-08 NOTE — SUBJECTIVE & OBJECTIVE
Interval History: No acute events overnight. Patient remained afebrile and hemodynamically stable. Voiding using bedside commode, tolerating well. Titrating insulin regimen. Weaning from supplemental O2. Pending insurance authorization for rehab placement.    Review of Systems   Constitutional: Negative for diaphoresis and fever.   HENT: Negative for congestion and sore throat.    Eyes: Negative for redness and visual disturbance.   Respiratory: Negative for cough and stridor.    Cardiovascular: Positive for leg swelling. Negative for chest pain.   Gastrointestinal: Negative for abdominal pain, constipation, diarrhea and vomiting.   Genitourinary: Negative for dysuria and hematuria.   Musculoskeletal: Negative for back pain.   Skin: Negative for color change and pallor.   Neurological: Negative for speech difficulty and headaches.   Psychiatric/Behavioral: Negative for agitation and confusion.     Objective:     Vital Signs (Most Recent):  Temp: 97.6 °F (36.4 °C) (11/08/20 0700)  Pulse: 74 (11/08/20 1100)  Resp: 17 (11/08/20 0700)  BP: 101/67 (11/08/20 0700)  SpO2: 99 % (11/08/20 0700) Vital Signs (24h Range):  Temp:  [97.6 °F (36.4 °C)-98.7 °F (37.1 °C)] 97.6 °F (36.4 °C)  Pulse:  [63-83] 74  Resp:  [11-28] 17  SpO2:  [83 %-100 %] 99 %  BP: (100-126)/(59-84) 101/67     Weight: (!) 175 kg (385 lb 12.9 oz)  Body mass index is 68.34 kg/m².    Intake/Output Summary (Last 24 hours) at 11/8/2020 1140  Last data filed at 11/8/2020 0800  Gross per 24 hour   Intake 240 ml   Output 1000 ml   Net -760 ml      Physical Exam  Vitals signs and nursing note reviewed.   Constitutional:       General: She is awake. She is not in acute distress.     Appearance: She is morbidly obese. She is not diaphoretic.      Interventions: Nasal cannula in place.   HENT:      Head: Normocephalic and atraumatic.      Nose: Nose normal.      Mouth/Throat:      Mouth: Mucous membranes are moist.      Pharynx: Oropharynx is clear.   Eyes:       Extraocular Movements: Extraocular movements intact.      Conjunctiva/sclera: Conjunctivae normal.      Pupils: Pupils are equal, round, and reactive to light.   Neck:      Musculoskeletal: Normal range of motion.   Cardiovascular:      Rate and Rhythm: Normal rate and regular rhythm.      Pulses: Normal pulses.      Heart sounds: Normal heart sounds.   Pulmonary:      Effort: Pulmonary effort is normal. No respiratory distress.      Breath sounds: Normal breath sounds. No wheezing or rhonchi.      Comments: 1L NC  Abdominal:      General: Abdomen is protuberant.      Palpations: Abdomen is soft.      Tenderness: There is no abdominal tenderness.   Musculoskeletal:         General: Swelling present.      Right lower leg: Edema present.      Left lower leg: Edema present.      Comments: Hematoma on LUE, mildly TTP with surrounding ecchymosis.   Skin:     General: Skin is warm and dry.      Coloration: Skin is not jaundiced.      Findings: Erythema and rash present. Rash is scaling.      Comments: Chronic venous stasis changes present on BLE.   Neurological:      General: No focal deficit present.      Mental Status: She is alert and oriented to person, place, and time.   Psychiatric:         Mood and Affect: Mood normal.         Behavior: Behavior normal.       Significant Labs:     CBC:   Recent Labs   Lab 11/07/20 0446 11/08/20  0532   WBC 6.70 6.07   HGB 14.2 14.3   HCT 48.2 48.2    203     CMP:   Recent Labs   Lab 11/07/20 0447 11/08/20  0532    140   K 3.3* 3.5   CL 88* 92*   CO2 40* 38*   * 191*   BUN 37* 34*   CREATININE 0.9 0.9   CALCIUM 9.7 9.8   PROT 7.3 7.2   ALBUMIN 3.5 3.4*   BILITOT 1.1* 1.0   ALKPHOS 66 65   AST 32 31   ALT 44 42   ANIONGAP 10 10   EGFRNONAA >60.0 >60.0     Magnesium:   Recent Labs   Lab 11/07/20 0447 11/08/20  0532   MG 1.8 2.0       Significant Imaging: No new imaging to review today.

## 2020-11-09 VITALS
DIASTOLIC BLOOD PRESSURE: 90 MMHG | TEMPERATURE: 98 F | HEIGHT: 63 IN | SYSTOLIC BLOOD PRESSURE: 134 MMHG | HEART RATE: 80 BPM | WEIGHT: 293 LBS | BODY MASS INDEX: 51.91 KG/M2 | RESPIRATION RATE: 13 BRPM | OXYGEN SATURATION: 87 %

## 2020-11-09 LAB
ALBUMIN SERPL BCP-MCNC: 3.3 G/DL (ref 3.5–5.2)
ALP SERPL-CCNC: 63 U/L (ref 55–135)
ALT SERPL W/O P-5'-P-CCNC: 42 U/L (ref 10–44)
ANION GAP SERPL CALC-SCNC: 13 MMOL/L (ref 8–16)
AST SERPL-CCNC: 28 U/L (ref 10–40)
BASOPHILS # BLD AUTO: 0.02 K/UL (ref 0–0.2)
BASOPHILS NFR BLD: 0.3 % (ref 0–1.9)
BILIRUB SERPL-MCNC: 0.7 MG/DL (ref 0.1–1)
BUN SERPL-MCNC: 31 MG/DL (ref 6–20)
CALCIUM SERPL-MCNC: 9.3 MG/DL (ref 8.7–10.5)
CHLORIDE SERPL-SCNC: 96 MMOL/L (ref 95–110)
CO2 SERPL-SCNC: 30 MMOL/L (ref 23–29)
CREAT SERPL-MCNC: 0.8 MG/DL (ref 0.5–1.4)
DIFFERENTIAL METHOD: ABNORMAL
EOSINOPHIL # BLD AUTO: 0.1 K/UL (ref 0–0.5)
EOSINOPHIL NFR BLD: 1.8 % (ref 0–8)
ERYTHROCYTE [DISTWIDTH] IN BLOOD BY AUTOMATED COUNT: 19.9 % (ref 11.5–14.5)
EST. GFR  (AFRICAN AMERICAN): >60 ML/MIN/1.73 M^2
EST. GFR  (NON AFRICAN AMERICAN): >60 ML/MIN/1.73 M^2
GLUCOSE SERPL-MCNC: 186 MG/DL (ref 70–110)
HCT VFR BLD AUTO: 48.3 % (ref 37–48.5)
HGB BLD-MCNC: 14 G/DL (ref 12–16)
IMM GRANULOCYTES # BLD AUTO: 0.02 K/UL (ref 0–0.04)
IMM GRANULOCYTES NFR BLD AUTO: 0.3 % (ref 0–0.5)
LYMPHOCYTES # BLD AUTO: 1.9 K/UL (ref 1–4.8)
LYMPHOCYTES NFR BLD: 26.1 % (ref 18–48)
MAGNESIUM SERPL-MCNC: 1.7 MG/DL (ref 1.6–2.6)
MCH RBC QN AUTO: 25.5 PG (ref 27–31)
MCHC RBC AUTO-ENTMCNC: 29 G/DL (ref 32–36)
MCV RBC AUTO: 88 FL (ref 82–98)
MONOCYTES # BLD AUTO: 0.7 K/UL (ref 0.3–1)
MONOCYTES NFR BLD: 9.7 % (ref 4–15)
NEUTROPHILS # BLD AUTO: 4.5 K/UL (ref 1.8–7.7)
NEUTROPHILS NFR BLD: 61.8 % (ref 38–73)
NRBC BLD-RTO: 0 /100 WBC
PHOSPHATE SERPL-MCNC: 4.5 MG/DL (ref 2.7–4.5)
PLATELET # BLD AUTO: 198 K/UL (ref 150–350)
PMV BLD AUTO: 11.2 FL (ref 9.2–12.9)
POCT GLUCOSE: 171 MG/DL (ref 70–110)
POCT GLUCOSE: 195 MG/DL (ref 70–110)
POTASSIUM SERPL-SCNC: 3.8 MMOL/L (ref 3.5–5.1)
PROT SERPL-MCNC: 6.8 G/DL (ref 6–8.4)
RBC # BLD AUTO: 5.49 M/UL (ref 4–5.4)
SODIUM SERPL-SCNC: 139 MMOL/L (ref 136–145)
WBC # BLD AUTO: 7.2 K/UL (ref 3.9–12.7)

## 2020-11-09 PROCEDURE — 36415 COLL VENOUS BLD VENIPUNCTURE: CPT

## 2020-11-09 PROCEDURE — 99900035 HC TECH TIME PER 15 MIN (STAT)

## 2020-11-09 PROCEDURE — 80053 COMPREHEN METABOLIC PANEL: CPT

## 2020-11-09 PROCEDURE — 99239 PR HOSPITAL DISCHARGE DAY,>30 MIN: ICD-10-PCS | Mod: ,,, | Performed by: INTERNAL MEDICINE

## 2020-11-09 PROCEDURE — 83735 ASSAY OF MAGNESIUM: CPT

## 2020-11-09 PROCEDURE — 94660 CPAP INITIATION&MGMT: CPT

## 2020-11-09 PROCEDURE — 99239 HOSP IP/OBS DSCHRG MGMT >30: CPT | Mod: ,,, | Performed by: INTERNAL MEDICINE

## 2020-11-09 PROCEDURE — 85025 COMPLETE CBC W/AUTO DIFF WBC: CPT

## 2020-11-09 PROCEDURE — 84100 ASSAY OF PHOSPHORUS: CPT

## 2020-11-09 PROCEDURE — 25000003 PHARM REV CODE 250: Performed by: STUDENT IN AN ORGANIZED HEALTH CARE EDUCATION/TRAINING PROGRAM

## 2020-11-09 RX ORDER — FUROSEMIDE 80 MG/1
80 TABLET ORAL 2 TIMES DAILY
Qty: 60 TABLET | Refills: 3 | Status: SHIPPED | OUTPATIENT
Start: 2020-11-09 | End: 2020-12-02 | Stop reason: SDUPTHER

## 2020-11-09 RX ORDER — MAGNESIUM SULFATE HEPTAHYDRATE 40 MG/ML
2 INJECTION, SOLUTION INTRAVENOUS ONCE
Status: CANCELLED | OUTPATIENT
Start: 2020-11-09 | End: 2020-11-09

## 2020-11-09 RX ORDER — INSULIN ASPART 100 [IU]/ML
10 INJECTION, SOLUTION INTRAVENOUS; SUBCUTANEOUS
Status: DISCONTINUED | OUTPATIENT
Start: 2020-11-09 | End: 2020-11-09 | Stop reason: HOSPADM

## 2020-11-09 RX ORDER — LOSARTAN POTASSIUM 50 MG/1
50 TABLET ORAL DAILY
Qty: 90 TABLET | Refills: 1
Start: 2020-11-09 | End: 2021-02-01 | Stop reason: SDUPTHER

## 2020-11-09 RX ORDER — MICONAZOLE NITRATE 2 %
POWDER (GRAM) TOPICAL 2 TIMES DAILY
Refills: 0
Start: 2020-11-09 | End: 2021-01-11 | Stop reason: SDUPTHER

## 2020-11-09 RX ORDER — POTASSIUM CHLORIDE 20 MEQ/1
20 TABLET, EXTENDED RELEASE ORAL ONCE
Status: CANCELLED | OUTPATIENT
Start: 2020-11-09 | End: 2020-11-09

## 2020-11-09 RX ADMIN — MELATONIN TAB 3 MG 6 MG: 3 TAB at 12:11

## 2020-11-09 RX ADMIN — FUROSEMIDE 80 MG: 80 TABLET ORAL at 08:11

## 2020-11-09 RX ADMIN — INSULIN ASPART 10 UNITS: 100 INJECTION, SOLUTION INTRAVENOUS; SUBCUTANEOUS at 12:11

## 2020-11-09 RX ADMIN — INSULIN ASPART 9 UNITS: 100 INJECTION, SOLUTION INTRAVENOUS; SUBCUTANEOUS at 08:11

## 2020-11-09 RX ADMIN — ATORVASTATIN CALCIUM 10 MG: 10 TABLET, FILM COATED ORAL at 08:11

## 2020-11-09 RX ADMIN — MICONAZOLE NITRATE: 20 POWDER TOPICAL at 09:11

## 2020-11-09 NOTE — PT/OT/SLP PROGRESS
Occupational Therapy      Patient Name:  Halley Moreno   MRN:  [id number]    Patient not seen today secondary to Other (Comment)(Pt politely declined therapy this date as she reported fatigue.). Pt reports she is eager to d/c to rehab this date (d/c orders in place). Will follow-up if pt remains hospitalized and appropriate for OT.    Tangela Green, OT  11/9/2020

## 2020-11-09 NOTE — PLAN OF CARE
Future Appointments   Date Time Provider Department Center   2/8/2021  9:45 AM KRIS GRACE [facility] OPHTH[facility] [place]   2/8/2021 10:00 AM Saroj Archibald OD [facility] OPTOMTY [place]         11/09/20 1601   Final Note   Assessment Type Final Discharge Note   Anticipated Discharge Disposition Rehab   What phone number can be called within the next 1-3 days to see how you are doing after discharge? [phone]   Right Care Referral Info   Post Acute Recommendation IRF   Facility Name [facility]   Post-Acute Status   Post-Acute Authorization Placement   Post-Acute Placement Status Set-up Complete   Discharge Delays None known at this time

## 2020-11-09 NOTE — DISCHARGE SUMMARY
[facility] Hospital Medicine  Discharge Summary      Patient Name: Halley Moreno  MRN: [id number]  Admission Date: 10/22/2020  Hospital Length of Stay: 18 days  Discharge Date and Time:  11/09/2020 12:47 PM  Attending Physician: Adán Muller MD   Discharging Provider: Angela Lemus DO  Primary Care Provider: Yamila Tejeda MD  [facility] Medicine Team: [facility] HOSP MED 1 Angela Lemus DO    HPI:   Patient is a 45-year-old female who is presenting to us with acute respiratory distress.  She has a recent diagnosis of severe pulmonary hypertension with right-sided heart failure.  Her last echo showed a PA systolic pressure of 99.  She initially presented due to increasing weight gain over the last 7 months.  Patient states that in March she began gaining weight and she was placed on diuretic to trying keep that weight down.  Despite 40 mg of furosemide b.i.d. she continued to gain weight.  She has gained approximately 100 lb since March and 80 lb since May.  She does not have a cardiologist but was scheduled to see 1 in December.  Patient states that at home she sleeps in a elevated position secondary to shortness of breath.  She denies any episodes of waking up in the middle of the night out of breath.  She does not use any home oxygen nor she ever been told explicitly that she has heart failure.  She demonstrates poor understanding of the disease stating that the right heart was backing up into the lungs and cause in the shortness of breath.  She presented to the clinic today for evaluation and was found to be in acute respiratory distress with hypoxia.  She was referred to the ED for evaluation and diuresis.    Patient states that she has no known family history of heart failure in the family however she does state that her father had a pacemaker for unknown reasons.  She states that she does not think that anyone her and her family has had pulmonary hypertension.  Patient states  that she does not drink, does not smoke, and does not use IV drugs.    * No surgery found *      Hospital Course:   Patient was admitted to Hospital Medicine. Was initially hypoxic and put on nasal cannula with normalization of her oxygen status to approximately 95% and given a bolus of 120mg IV furosemide. Hypoxia and hypercapnia worsened so was stepped up to ICU on 10/23. Was placed on BiPAP and aggressively diuresed with improvement in respiratory status. Weaned to 2L NC and subsequently stepped back down to Hospital Medicine on 10/26. Continued diuresis with IV Lasix gtt, diuril, and aldactone. On 10/27 VBGs noted pCO2 in 90s - 100s and patient was started back on continuous BiPAP with improvement in pCO2 levels. Patient weaned from BiPAP throughout admission with improving oxygenation, wearing BiPAP QHS. Spironolactone and Diuril adjuncts were subsequently stopped as patient's BUN/Cr began to rise. Lasix gtt stopped 11/5 and transitioned to PO Lasix 80mg BID on 11/6. Pt had a 40kg weight loss throughout her hospital stay. Continue lasix 80 BID, and follow up with PCP/Cardiology to resume losartan.     Consults:   Consults (From admission, onward)        Status Ordering Provider     Inpatient consult to Critical Care Medicine  Once     Provider:  (Not yet assigned)    Completed LAURA STUBBS     Inpatient consult to Heart Transplant  Once     Provider:  (Not yet assigned)    Completed JIN LLAMAS     Inpatient consult to Midline team  Once     Provider:  (Not yet assigned)    Completed WU MIKE     Inpatient consult to Midline team  Once     Provider:  (Not yet assigned)    Completed WU MIKE     Inpatient consult to Midline team  Once     Provider:  (Not yet assigned)    Completed DUNIA BENNETT     Inpatient consult to Physical Medicine Rehab  Once     Provider:  (Not yet assigned)    Completed ALETHEA OSBORNE     Inpatient consult to Pulmonology   Once     Provider:  (Not yet assigned)    Completed EMILIO HICKS consult to case management  Once     Provider:  (Not yet assigned)    Completed INDIAALETHEAChristian        Vitals:    11/09/20 1126   BP: (!) 134/90   Pulse: 80   Resp: 13   Temp: 97.7 °F (36.5 °C)     Physical Exam   Constitutional: She is oriented to person, place, and time and well-developed, well-nourished, and in no distress.   HENT:   Head: Normocephalic and atraumatic.   Eyes: EOM are normal.   Neck: Normal range of motion.   Cardiovascular: Normal rate and regular rhythm.   Pulmonary/Chest: Effort normal. She has wheezes.   Abdominal: Soft. Bowel sounds are normal. There is no abdominal tenderness.   Musculoskeletal: Normal range of motion.         General: No edema.   Neurological: She is alert and oriented to person, place, and time.   Skin: Skin is warm and dry.   Psychiatric: Judgment normal.       No new Assessment & Plan notes have been filed under this hospital service since the last note was generated.  Service: Hospital Medicine    Final Active Diagnoses:    Diagnosis Date Noted POA    PRINCIPAL PROBLEM:  Right heart failure due to pulmonary hypertension [I27.29, I50.810] 10/22/2020 Yes    Discharge planning issues [Z02.9] 10/30/2020 Not Applicable    Impaired mobility and activities of daily living [Z74.09, Z78.9] 10/29/2020 Yes    Hypokalemia [E87.6] 10/29/2020 Yes    Constipation [K59.00] 10/25/2020 Yes    Obesity hypoventilation syndrome [E66.2] 10/23/2020 Yes    Acute respiratory failure with hypoxia and hypercapnia [J96.01, J96.02] 10/22/2020 Yes    Diabetes mellitus type 2 without retinopathy [E11.9] 10/14/2019 Yes    Hypertension associated with diabetes [E11.59, I10]  Yes    Sleep apnea [G47.30]  Yes    Morbid obesity [E66.01]  Yes      Problems Resolved During this Admission:    Diagnosis Date Noted Date Resolved POA    Hyperkalemia [E87.5] 10/23/2020 10/29/2020 Yes    Hyperphosphatemia [E83.39]  10/23/2020 11/06/2020 No       Discharged Condition: stable    Disposition: Rehab Facility    Follow Up:  Follow-up Information     Yamila Tejeda MD In 2 weeks.    Specialty: Internal Medicine  Contact information:  [address] [facility]  [place]  [phone]                 Patient Instructions:      BIPAP FOR HOME USE     Order Specific Question Answer Comments   Length of need (1-99 months): 99    Type (): BiPap    BiPap setting (cmH20) Inspiratory: 20    BiPap setting (cmH20) Expiratory: 10    Humidification (/): Heated    Choose ONE mask type and its corresponding cushions and/or pillows:  Full Face Mask, 1 per 90 days:  Full Face Cushion, (3 per 90 days) per routine   Choose EITHER Heated or Non-Heated Tubjing  Heated Tubing, 1 per 6 months    All other supplies as needed as listed below:  Headgear, 1 per 180 days    All other supplies as needed as listed below:  Chin Strap, 1 per 180 days    All other supplies as needed as listed below:  Disposable Filter, 6 per 90 days    All other supplies as needed as listed below:  Non-Disposable Filter, 1 per 180 days    All other supplies as needed as listed below:  Humidifier Chamber, 1 per 180 days    Vendor: Other (use comments) Pt to Rehab   Expected Date of Delivery: 11/9/2020        Significant Diagnostic Studies: Labs:   CMP   Recent Labs   Lab 11/08/20  0532 11/09/20  0359    139   K 3.5 3.8   CL 92* 96   CO2 38* 30*   * 186*   BUN 34* 31*   CREATININE 0.9 0.8   CALCIUM 9.8 9.3   PROT 7.2 6.8   ALBUMIN 3.4* 3.3*   BILITOT 1.0 0.7   ALKPHOS 65 63   AST 31 28   ALT 42 42   ANIONGAP 10 13   ESTGFRAFRICA >60.0 >60.0   EGFRNONAA >60.0 >60.0     Recent Labs   Lab 11/09/20  0359   WBC 7.20   RBC 5.49*   HGB 14.0   HCT 48.3      MCV 88   MCH 25.5*   MCHC 29.0*       Pending Diagnostic Studies:     None         Medications:  Reconciled Home Medications:      Medication List      START  "taking these medications    miconazole NITRATE 2 % 2 % top powder  Commonly known as: MICOTIN  Apply topically 2 (two) times daily. Apply to skin folds.        CHANGE how you take these medications    clotrimazole-betamethasone lotion  Commonly known as: LOTRISONE  Apply topically 2 (two) times daily.  What changed: Another medication with the same name was removed. Continue taking this medication, and follow the directions you see here.     furosemide 80 MG tablet  Commonly known as: LASIX  Take 1 tablet (80 mg total) by mouth 2 (two) times a day.  What changed: when to take this     losartan 50 MG tablet  Commonly known as: COZAAR  Take 1 tablet (50 mg total) by mouth once daily. HOLD UNTIL TOLD TO RESUME BY PHYSICIAN  What changed: additional instructions        CONTINUE taking these medications    atorvastatin 10 MG tablet  Commonly known as: LIPITOR  Take 1 tablet (10 mg total) by mouth once daily.     blood sugar diagnostic Strp  1 strip by Misc.(Non-Drug; Combo Route) route 2 (two) times daily.     cholecalciferol (vitamin D3) 25 mcg (1,000 unit) capsule  Commonly known as: VITAMIN D3  Take 2 capsules (2,000 Units total) by mouth once daily.     glipiZIDE 10 MG Tr24  Commonly known as: GLUCOTROL  Take 1 tablet (10 mg total) by mouth daily with breakfast.     lancets Misc  1 lancet by Misc.(Non-Drug; Combo Route) route 2 (two) times daily.     latanoprost 0.005 % ophthalmic solution  Place 1 drop into both eyes every evening.     ONE DAILY MULTIVITAMIN per tablet  Generic drug: multivitamin  Take 1 tablet by mouth once daily.     OZEMPIC 0.25 mg or 0.5 mg(2 mg/1.5 mL) Pnij  Generic drug: semaglutide  Inject 0.25 mg into the skin every 7 days.     pen needle, diabetic 32 gauge x 1/4" Ndle  1 each by Misc.(Non-Drug; Combo Route) route once daily. Pt uses one pen needle daily for injection of Victoza     triamcinolone acetonide 0.1% 0.1 % Lotn  Commonly known as: KENALOG            Indwelling Lines/Drains at time " of discharge:   Lines/Drains/Airways     None                 Time spent on the discharge of patient: 35 minutes  Patient was seen and examined on the date of discharge and determined to be suitable for discharge.         Angela Lemus DO  Department of Hospital Medicine  [facility]

## 2020-11-09 NOTE — PLAN OF CARE
Problem: Fall Injury Risk  Goal: Absence of Fall and Fall-Related Injury  Outcome: Ongoing, Progressing     Problem: Infection  Goal: Infection Symptom Resolution  Outcome: Ongoing, Progressing     Problem: Adult Inpatient Plan of Care  Goal: Plan of Care Review  Outcome: Ongoing, Progressing  Goal: Patient-Specific Goal (Individualization)  Outcome: Ongoing, Progressing  Goal: Absence of Hospital-Acquired Illness or Injury  Outcome: Ongoing, Progressing  Goal: Optimal Comfort and Wellbeing  Outcome: Ongoing, Progressing  Goal: Readiness for Transition of Care  Outcome: Ongoing, Progressing  Goal: Rounds/Family Conference  Outcome: Ongoing, Progressing     Problem: Bariatric Environmental Safety  Goal: Safety Maintained with Care  Outcome: Ongoing, Progressing     Problem: Diabetes Comorbidity  Goal: Blood Glucose Level Within Desired Range  Outcome: Ongoing, Progressing     Problem: Wound  Goal: Optimal Wound Healing  Outcome: Ongoing, Progressing     Problem: Skin Injury Risk Increased  Goal: Skin Health and Integrity  Outcome: Ongoing, Progressing     POC reviewed with patient. VSS. NAD.  Pt. Remained on room air until bedtime with SpO2 within goal range >88% and then switched to Bipap with O2 @ 40%. Pt. Is up to the bedside commode. Wound care rendered as ordered. Blood glucose monitor per ac/hs protocol. Safety precautions maintained. Call light is within reach, bed wheels are locked and bed is in the lowest position.

## 2020-11-09 NOTE — PT/OT/SLP PROGRESS
Physical Therapy  Pt Not Seen    Patient Name:  Halley Moreno   MRN:  [id number]    Patient not seen today secondary to  Other (Comment)(Pt discharged from hospital).     Orly Redmond, PTA  11/9/2020

## 2020-11-09 NOTE — PLAN OF CARE
[facility] scheduled d/c transportation via wheelchair to [facility] through [facility]. Pt. is scheduled to be picked up at 2:00 p.m.  HARPER provided pt nurse with number for report [phone].  HARPER in communication with the pt's CM and care team.     11/09/20 1228   Post-Acute Status   Post-Acute Authorization Placement   Post-Acute Placement Status Set-up Complete     Martha Morgan LMSW  PRN-  [facility]

## 2020-11-09 NOTE — PLAN OF CARE
[facility]    FACILITY TRANSFER ORDERS      Patient Name: Halley Moreno  YOB: 1975    PCP: Yamila Tejeda MD   PCP Address: [address] [facility] [place]  PCP Phone Number: [phone]  PCP Fax: [phone]    Encounter Date: 11/09/2020    Admit to: Rehabilitation facility    Vital Signs:  Routine    Diagnoses:   Active Hospital Problems    Diagnosis  POA    *Right heart failure due to pulmonary hypertension [I27.29, I50.810]  Yes    Discharge planning issues [Z02.9]  Not Applicable    Impaired mobility and activities of daily living [Z74.09, Z78.9]  Yes    Hypokalemia [E87.6]  Yes    Constipation [K59.00]  Yes    Obesity hypoventilation syndrome [E66.2]  Yes    Acute respiratory failure with hypoxia and hypercapnia [J96.01, J96.02]  Yes    Diabetes mellitus type 2 without retinopathy [E11.9]  Yes    Hypertension associated with diabetes [E11.59, I10]  Yes    Sleep apnea [G47.30]  Yes    Morbid obesity [E66.01]  Yes      Resolved Hospital Problems    Diagnosis Date Resolved POA    Hyperkalemia [E87.5] 10/29/2020 Yes    Hyperphosphatemia [E83.39] 11/06/2020 No       Allergies:  Review of patient's allergies indicates:   Allergen Reactions    Victoza [liraglutide] Swelling       Diet: cardiac diet    Activities: Activity as tolerated    Nursing: BiPAP QHS     Labs: BMP Daily for 5 days     CONSULTS:    Physical Therapy to evaluate and treat.  and Occupational Therapy to evaluate and treat.    MISCELLANEOUS CARE:  Diabetes Care:   Report CBG < 60 or > 350 to physician.    WOUND CARE ORDERS  None    Medications: Review discharge medications with patient and family and provide education.      Current Discharge Medication List      START taking these medications    Details   miconazole NITRATE 2 % (MICOTIN) 2 % top powder Apply topically 2 (two) times daily. Apply to skin folds.  Refills: 0         CONTINUE these medications which have CHANGED    Details  "  furosemide (LASIX) 80 MG tablet Take 1 tablet (80 mg total) by mouth 2 (two) times a day.  Qty: 60 tablet, Refills: 3    Associated Diagnoses: Bilateral leg edema; Hypertension associated with diabetes      losartan (COZAAR) 50 MG tablet Take 1 tablet (50 mg total) by mouth once daily. HOLD UNTIL TOLD TO RESUME BY PHYSICIAN  Qty: 90 tablet, Refills: 1    Comments: .  Associated Diagnoses: Hypertension associated with diabetes         CONTINUE these medications which have NOT CHANGED    Details   atorvastatin (LIPITOR) 10 MG tablet Take 1 tablet (10 mg total) by mouth once daily.  Qty: 90 tablet, Refills: 1    Associated Diagnoses: Candidate for statin therapy due to risk of future cardiovascular event      cholecalciferol, vitamin D3, (VITAMIN D3) 1,000 unit capsule Take 2 capsules (2,000 Units total) by mouth once daily.  Refills: 0    Associated Diagnoses: Vitamin D insufficiency      glipiZIDE (GLUCOTROL) 10 MG TR24 Take 1 tablet (10 mg total) by mouth daily with breakfast.  Qty: 90 tablet, Refills: 0      blood sugar diagnostic Strp 1 strip by Misc.(Non-Drug; Combo Route) route 2 (two) times daily.  Qty: 100 strip, Refills: 5    Comments: Freestyle lite  Associated Diagnoses: Diabetes mellitus type 2 without retinopathy      clotrimazole-betamethasone (LOTRISONE) lotion Apply topically 2 (two) times daily.  Qty: 30 mL, Refills: 0      lancets Misc 1 lancet by Misc.(Non-Drug; Combo Route) route 2 (two) times daily.  Qty: 100 each, Refills: 5      latanoprost 0.005 % ophthalmic solution Place 1 drop into both eyes every evening.  Qty: 2.5 mL, Refills: 11    Associated Diagnoses: Bilateral ocular hypertension      multivitamin (ONE DAILY MULTIVITAMIN) per tablet Take 1 tablet by mouth once daily.      pen needle, diabetic 32 gauge x 1/4" Ndle 1 each by Misc.(Non-Drug; Combo Route) route once daily. Pt uses one pen needle daily for injection of Victoza  Qty: 90 each, Refills: 3      semaglutide (OZEMPIC) 0.25 mg " or 0.5 mg(2 mg/1.5 mL) PnIj Inject 0.25 mg into the skin every 7 days.  Qty: 2 mL, Refills: 2    Associated Diagnoses: Diabetes mellitus type 2 without retinopathy      triamcinolone acetonide 0.1% (KENALOG) 0.1 % Lotn          STOP taking these medications       clotrimazole-betamethasone 1-0.05% (LOTRISONE) cream Comments:   Reason for Stopping:                    _________________________________  Angela Lemus DO  11/09/2020

## 2020-11-18 ENCOUNTER — PATIENT MESSAGE (OUTPATIENT)
Dept: INTERNAL MEDICINE | Facility: CLINIC | Age: 45
End: 2020-11-18

## 2020-11-18 DIAGNOSIS — I27.20 PULMONARY HYPERTENSION: ICD-10-CM

## 2020-11-18 DIAGNOSIS — R06.02 SOB (SHORTNESS OF BREATH): Primary | ICD-10-CM

## 2020-11-20 ENCOUNTER — PATIENT MESSAGE (OUTPATIENT)
Dept: INTERNAL MEDICINE | Facility: CLINIC | Age: 45
End: 2020-11-20

## 2020-11-24 ENCOUNTER — PATIENT MESSAGE (OUTPATIENT)
Dept: INTERNAL MEDICINE | Facility: CLINIC | Age: 45
End: 2020-11-24

## 2020-11-30 ENCOUNTER — PATIENT MESSAGE (OUTPATIENT)
Dept: INTERNAL MEDICINE | Facility: CLINIC | Age: 45
End: 2020-11-30

## 2020-12-02 ENCOUNTER — TELEPHONE (OUTPATIENT)
Dept: INTERNAL MEDICINE | Facility: CLINIC | Age: 45
End: 2020-12-02

## 2020-12-02 ENCOUNTER — PATIENT MESSAGE (OUTPATIENT)
Dept: INTERNAL MEDICINE | Facility: CLINIC | Age: 45
End: 2020-12-02

## 2020-12-02 ENCOUNTER — OFFICE VISIT (OUTPATIENT)
Dept: INTERNAL MEDICINE | Facility: CLINIC | Age: 45
End: 2020-12-02
Payer: MEDICAID

## 2020-12-02 VITALS
WEIGHT: 293 LBS | TEMPERATURE: 98 F | RESPIRATION RATE: 20 BRPM | BODY MASS INDEX: 51.91 KG/M2 | HEART RATE: 80 BPM | DIASTOLIC BLOOD PRESSURE: 74 MMHG | SYSTOLIC BLOOD PRESSURE: 130 MMHG | OXYGEN SATURATION: 95 % | HEIGHT: 63 IN

## 2020-12-02 DIAGNOSIS — M54.50 CHRONIC MIDLINE LOW BACK PAIN WITHOUT SCIATICA: ICD-10-CM

## 2020-12-02 DIAGNOSIS — M79.672 HEEL PAIN, BILATERAL: ICD-10-CM

## 2020-12-02 DIAGNOSIS — E66.2 OBESITY HYPOVENTILATION SYNDROME: ICD-10-CM

## 2020-12-02 DIAGNOSIS — I50.810 RIGHT HEART FAILURE DUE TO PULMONARY HYPERTENSION: ICD-10-CM

## 2020-12-02 DIAGNOSIS — E11.21 CONTROLLED TYPE 2 DIABETES MELLITUS WITH DIABETIC NEPHROPATHY, WITHOUT LONG-TERM CURRENT USE OF INSULIN: ICD-10-CM

## 2020-12-02 DIAGNOSIS — I27.29 RIGHT HEART FAILURE DUE TO PULMONARY HYPERTENSION: ICD-10-CM

## 2020-12-02 DIAGNOSIS — Z09 HOSPITAL DISCHARGE FOLLOW-UP: Primary | ICD-10-CM

## 2020-12-02 DIAGNOSIS — M62.830 BACK MUSCLE SPASM: ICD-10-CM

## 2020-12-02 DIAGNOSIS — G89.29 CHRONIC MIDLINE LOW BACK PAIN WITHOUT SCIATICA: ICD-10-CM

## 2020-12-02 DIAGNOSIS — R19.7 DIARRHEA, UNSPECIFIED TYPE: ICD-10-CM

## 2020-12-02 DIAGNOSIS — G47.33 OSA ON CPAP: Primary | ICD-10-CM

## 2020-12-02 DIAGNOSIS — M79.671 HEEL PAIN, BILATERAL: ICD-10-CM

## 2020-12-02 DIAGNOSIS — G47.33 OSA ON CPAP: ICD-10-CM

## 2020-12-02 PROBLEM — I50.9 CONGESTIVE HEART FAILURE: Status: ACTIVE | Noted: 2020-11-09

## 2020-12-02 PROCEDURE — 99214 PR OFFICE/OUTPT VISIT, EST, LEVL IV, 30-39 MIN: ICD-10-PCS | Mod: S$PBB,,, | Performed by: INTERNAL MEDICINE

## 2020-12-02 PROCEDURE — 99999 PR PBB SHADOW E&M-EST. PATIENT-LVL V: CPT | Mod: PBBFAC,,, | Performed by: INTERNAL MEDICINE

## 2020-12-02 PROCEDURE — 99215 OFFICE O/P EST HI 40 MIN: CPT | Mod: PBBFAC,PO | Performed by: INTERNAL MEDICINE

## 2020-12-02 PROCEDURE — 99999 PR PBB SHADOW E&M-EST. PATIENT-LVL V: ICD-10-PCS | Mod: PBBFAC,,, | Performed by: INTERNAL MEDICINE

## 2020-12-02 PROCEDURE — 99214 OFFICE O/P EST MOD 30 MIN: CPT | Mod: S$PBB,,, | Performed by: INTERNAL MEDICINE

## 2020-12-02 PROCEDURE — 90471 IMMUNIZATION ADMIN: CPT | Mod: PBBFAC,PO

## 2020-12-02 RX ORDER — POTASSIUM CHLORIDE 20 MEQ/1
20 TABLET, EXTENDED RELEASE ORAL DAILY
Start: 2020-12-02 | End: 2021-01-12

## 2020-12-02 RX ORDER — FUROSEMIDE 40 MG/1
100 TABLET ORAL DAILY
Start: 2020-12-02 | End: 2021-02-01

## 2020-12-02 NOTE — PROGRESS NOTES
Subjective:       Patient ID: Halley Moreno is a 45 y.o. female who presents for Hospital Follow Up (CHF, Acute Respiratory Failure) and Flu Vaccine  She is accompanied by her mother.    HPI     44yo F with DM2, YARY and morbid obesity presented after a hospitalization from 10/22/20 to 11/9/20 for heart failure with volume overload due to pulmonary hypertension. She was diuresed a significant amount of fluid during hospitalization and her symptoms of SOB and edema improved. She was transferred to Rehab on 11/9/20 and was discharged on 11/17/20. She feels much better and returned to work a few days ago.    Transitional Care Note    Family and/or Caretaker present at visit?  Yes.  Diagnostic tests reviewed/disposition: No diagnosic tests pending after this hospitalization.  Disease/illness education: n/a  Home health/community services discussion/referrals: Patient does not have home health established from hospital visit.  They do not need home health.  If needed, we will set up home health for the patient.   Establishment or re-establishment of referral orders for community resources: No other necessary community resources.   Discussion with other health care providers: No discussion with other health care providers necessary.     She reports gaining 9 lbs in the last few weeks since d/c from rehab.      Review of Systems   Constitutional: Negative for chills, diaphoresis and fever.   HENT: Negative for congestion, ear pain and sinus pressure.    Eyes: Negative for discharge and redness.   Respiratory: Negative for cough, chest tightness and shortness of breath.    Cardiovascular: Positive for leg swelling. Negative for chest pain and palpitations.   Gastrointestinal: Positive for diarrhea (episode of soft, watery stool yesterday, no BM's today). Negative for abdominal pain, constipation, nausea and vomiting.   Endocrine: Negative for polydipsia, polyphagia and polyuria.   Musculoskeletal: Positive for  arthralgias (bilateral heel pain) and back pain (lower back pain, no radiation to legs). Negative for myalgias.   Skin: Negative for rash and wound.   Neurological: Negative for dizziness, tremors, numbness and headaches.       Objective:      Physical Exam  Vitals signs reviewed.   Constitutional:       General: She is not in acute distress.     Appearance: She is well-developed. She is not diaphoretic.   HENT:      Head: Normocephalic and atraumatic.      Right Ear: Hearing and external ear normal.      Left Ear: Hearing and external ear normal.      Nose: Nose normal.   Eyes:      General: Lids are normal.      Conjunctiva/sclera: Conjunctivae normal.   Cardiovascular:      Rate and Rhythm: Normal rate and regular rhythm.      Heart sounds: Normal heart sounds. No murmur.   Pulmonary:      Effort: Pulmonary effort is normal.      Breath sounds: Normal breath sounds. No wheezing.   Abdominal:      General: Bowel sounds are normal. There is no distension.      Palpations: Abdomen is soft.      Tenderness: There is no abdominal tenderness.   Musculoskeletal: Normal range of motion.      Lumbar back: She exhibits tenderness, bony tenderness, pain and spasm.   Skin:     General: Skin is warm and dry.      Findings: No rash.   Neurological:      Mental Status: She is alert and oriented to person, place, and time.   Psychiatric:         Attention and Perception: Attention normal.         Mood and Affect: Mood normal.         Assessment/Plan:       1. Hospital discharge follow-up    2. Right heart failure due to pulmonary hypertension  - Ambulatory referral/consult to Cardiology; Future  - BNP; Future  - CBC Auto Differential; Future  - furosemide (LASIX) 40 MG tablet; Take 2.5 tablets (100 mg total) by mouth once daily.    3. Obesity hypoventilation syndrome    4. YARY on CPAP  - she is now compliant with CPAP, drowsiness and fatigue has resolved    5. Chronic midline low back pain without sciatica  - X-Ray Lumbar Spine  Complete 5 View; Future    6. Back muscle spasm  - Comprehensive Metabolic Panel; Future  - Magnesium; Future  - PHOSPHORUS; Future    7. Heel pain, bilateral  - X-Ray Calcaneus Bilateral; Future    8. Diarrhea, unspecified type  - will collect a stool sample if the diarrhea continues  - Stool Exam-Ova,Cysts,Parasites; Future  - Stool culture; Future  - Giardia / Cryptosporidum, EIA; Future  - WBC, Stool; Future  - CLOSTRIDIUM DIFFICILE; Future    9. Controlled type 2 diabetes mellitus with diabetic nephropathy, without long-term current use of insulin  - controlled, stop Ozempic and consider resuming once she recovers    RTC in 3 months or sooner if needed    Yamila Tejeda MD

## 2020-12-02 NOTE — TELEPHONE ENCOUNTER
----- Message from Amanda Tam sent at 12/2/2020  3:30 PM CST -----  Regarding: Bipap  Contact: [phone]  I received an order to process for a Bipap but will need the Dx to be updated to YARY, G47.33 and will also need the current office notes to document the need for a replacement machine and why a Bipap was ordered. Patient was previously on a Cpap. Thanks! Jelena

## 2020-12-02 NOTE — TELEPHONE ENCOUNTER
Per pt request    Orders from office visit:    Labs scheduled tomorrow 12/3    [facility] will call her to schedule cardiology referral, x-ray l-spine and x-ray bilat heels (message sent to [facility])

## 2020-12-03 ENCOUNTER — LAB VISIT (OUTPATIENT)
Dept: LAB | Facility: HOSPITAL | Age: 45
End: 2020-12-03
Attending: INTERNAL MEDICINE
Payer: MEDICAID

## 2020-12-03 DIAGNOSIS — I27.29 RIGHT HEART FAILURE DUE TO PULMONARY HYPERTENSION: ICD-10-CM

## 2020-12-03 DIAGNOSIS — I50.810 RIGHT HEART FAILURE DUE TO PULMONARY HYPERTENSION: ICD-10-CM

## 2020-12-03 DIAGNOSIS — M62.830 BACK MUSCLE SPASM: ICD-10-CM

## 2020-12-03 LAB
ALBUMIN SERPL BCP-MCNC: 3.4 G/DL (ref 3.5–5.2)
ALP SERPL-CCNC: 82 U/L (ref 55–135)
ALT SERPL W/O P-5'-P-CCNC: 18 U/L (ref 10–44)
ANION GAP SERPL CALC-SCNC: 10 MMOL/L (ref 8–16)
AST SERPL-CCNC: 15 U/L (ref 10–40)
BASOPHILS # BLD AUTO: 0.02 K/UL (ref 0–0.2)
BASOPHILS NFR BLD: 0.2 % (ref 0–1.9)
BILIRUB SERPL-MCNC: 0.7 MG/DL (ref 0.1–1)
BNP SERPL-MCNC: 109 PG/ML (ref 0–99)
BUN SERPL-MCNC: 7 MG/DL (ref 6–20)
CALCIUM SERPL-MCNC: 9 MG/DL (ref 8.7–10.5)
CHLORIDE SERPL-SCNC: 103 MMOL/L (ref 95–110)
CO2 SERPL-SCNC: 31 MMOL/L (ref 23–29)
CREAT SERPL-MCNC: 0.8 MG/DL (ref 0.5–1.4)
DIFFERENTIAL METHOD: ABNORMAL
EOSINOPHIL # BLD AUTO: 0.1 K/UL (ref 0–0.5)
EOSINOPHIL NFR BLD: 1.2 % (ref 0–8)
ERYTHROCYTE [DISTWIDTH] IN BLOOD BY AUTOMATED COUNT: 21.1 % (ref 11.5–14.5)
EST. GFR  (AFRICAN AMERICAN): >60 ML/MIN/1.73 M^2
EST. GFR  (NON AFRICAN AMERICAN): >60 ML/MIN/1.73 M^2
GLUCOSE SERPL-MCNC: 112 MG/DL (ref 70–110)
HCT VFR BLD AUTO: 45.4 % (ref 37–48.5)
HGB BLD-MCNC: 13.7 G/DL (ref 12–16)
IMM GRANULOCYTES # BLD AUTO: 0.06 K/UL (ref 0–0.04)
IMM GRANULOCYTES NFR BLD AUTO: 0.6 % (ref 0–0.5)
LYMPHOCYTES # BLD AUTO: 2.1 K/UL (ref 1–4.8)
LYMPHOCYTES NFR BLD: 21.9 % (ref 18–48)
MAGNESIUM SERPL-MCNC: 1.4 MG/DL (ref 1.6–2.6)
MCH RBC QN AUTO: 26.5 PG (ref 27–31)
MCHC RBC AUTO-ENTMCNC: 30.2 G/DL (ref 32–36)
MCV RBC AUTO: 88 FL (ref 82–98)
MONOCYTES # BLD AUTO: 0.5 K/UL (ref 0.3–1)
MONOCYTES NFR BLD: 5.5 % (ref 4–15)
NEUTROPHILS # BLD AUTO: 6.6 K/UL (ref 1.8–7.7)
NEUTROPHILS NFR BLD: 70.6 % (ref 38–73)
NRBC BLD-RTO: 0 /100 WBC
PHOSPHATE SERPL-MCNC: 4.9 MG/DL (ref 2.7–4.5)
PLATELET # BLD AUTO: 190 K/UL (ref 150–350)
PMV BLD AUTO: 10.4 FL (ref 9.2–12.9)
POTASSIUM SERPL-SCNC: 4.3 MMOL/L (ref 3.5–5.1)
PROT SERPL-MCNC: 7 G/DL (ref 6–8.4)
RBC # BLD AUTO: 5.17 M/UL (ref 4–5.4)
SODIUM SERPL-SCNC: 144 MMOL/L (ref 136–145)
WBC # BLD AUTO: 9.4 K/UL (ref 3.9–12.7)

## 2020-12-03 PROCEDURE — 84100 ASSAY OF PHOSPHORUS: CPT

## 2020-12-03 PROCEDURE — 36415 COLL VENOUS BLD VENIPUNCTURE: CPT | Mod: PO

## 2020-12-03 PROCEDURE — 83880 ASSAY OF NATRIURETIC PEPTIDE: CPT

## 2020-12-03 PROCEDURE — 83735 ASSAY OF MAGNESIUM: CPT

## 2020-12-03 PROCEDURE — 85025 COMPLETE CBC W/AUTO DIFF WBC: CPT

## 2020-12-03 PROCEDURE — 80053 COMPREHEN METABOLIC PANEL: CPT

## 2020-12-03 NOTE — TELEPHONE ENCOUNTER
Ordered BiPAP based on discharge notes after hospitalization.    Will change to CPAP. Will just enter 10 mm Hg setting. Order in outbox.

## 2020-12-04 ENCOUNTER — PATIENT MESSAGE (OUTPATIENT)
Dept: INTERNAL MEDICINE | Facility: CLINIC | Age: 45
End: 2020-12-04

## 2020-12-08 ENCOUNTER — HOSPITAL ENCOUNTER (OUTPATIENT)
Dept: RADIOLOGY | Facility: HOSPITAL | Age: 45
Discharge: HOME | End: 2020-12-08
Attending: INTERNAL MEDICINE
Payer: MEDICAID

## 2020-12-08 DIAGNOSIS — M79.671 HEEL PAIN, BILATERAL: ICD-10-CM

## 2020-12-08 DIAGNOSIS — M79.672 HEEL PAIN, BILATERAL: ICD-10-CM

## 2020-12-08 DIAGNOSIS — M54.50 CHRONIC MIDLINE LOW BACK PAIN WITHOUT SCIATICA: ICD-10-CM

## 2020-12-08 DIAGNOSIS — G89.29 CHRONIC MIDLINE LOW BACK PAIN WITHOUT SCIATICA: ICD-10-CM

## 2020-12-08 PROCEDURE — 72110 XR LUMBAR SPINE COMPLETE 5 VIEW: ICD-10-PCS | Mod: 26,,, | Performed by: RADIOLOGY

## 2020-12-08 PROCEDURE — 73650 X-RAY EXAM OF HEEL: CPT | Mod: 26,50,, | Performed by: RADIOLOGY

## 2020-12-08 PROCEDURE — 73650 X-RAY EXAM OF HEEL: CPT | Mod: TC,50

## 2020-12-08 PROCEDURE — 72110 X-RAY EXAM L-2 SPINE 4/>VWS: CPT | Mod: TC

## 2020-12-08 PROCEDURE — 73650 XR CALCANEUS BILATERAL: ICD-10-PCS | Mod: 26,50,, | Performed by: RADIOLOGY

## 2020-12-08 PROCEDURE — 72110 X-RAY EXAM L-2 SPINE 4/>VWS: CPT | Mod: 26,,, | Performed by: RADIOLOGY

## 2020-12-09 DIAGNOSIS — M79.671 HEEL PAIN, BILATERAL: Primary | ICD-10-CM

## 2020-12-09 DIAGNOSIS — M79.672 HEEL PAIN, BILATERAL: Primary | ICD-10-CM

## 2020-12-10 ENCOUNTER — TELEPHONE (OUTPATIENT)
Dept: INTERNAL MEDICINE | Facility: CLINIC | Age: 45
End: 2020-12-10

## 2020-12-10 ENCOUNTER — PATIENT MESSAGE (OUTPATIENT)
Dept: INTERNAL MEDICINE | Facility: CLINIC | Age: 45
End: 2020-12-10

## 2020-12-10 DIAGNOSIS — E83.42 HYPOMAGNESEMIA: Primary | ICD-10-CM

## 2020-12-10 RX ORDER — MAGNESIUM 250 MG
250 TABLET ORAL DAILY
Qty: 7 TABLET | Refills: 0 | Status: SHIPPED | OUTPATIENT
Start: 2020-12-10 | End: 2020-12-17

## 2020-12-10 RX ORDER — DICLOFENAC SODIUM 50 MG/1
50 TABLET, DELAYED RELEASE ORAL 2 TIMES DAILY PRN
Qty: 20 TABLET | Refills: 0 | Status: SHIPPED | OUTPATIENT
Start: 2020-12-10 | End: 2020-12-20

## 2020-12-10 NOTE — TELEPHONE ENCOUNTER
Pt notified and verbalized understanding.    Referral, note, facesheet and x-ray faxed to [facility].

## 2020-12-10 NOTE — TELEPHONE ENCOUNTER
Pt notified and verbalized understanding.  She would like to know what she can do about the pain.    Worse in the evening and when trying to go to sleep.  Pain level 7-8.    Please advise.

## 2020-12-10 NOTE — TELEPHONE ENCOUNTER
Could try Voltaren 50mg bid prn for the back and heel pain.     Podiatry referral was entered.    If the back pain continues, may need to refer her to PT.    Magnesium level is low so will order magnesium 250mg daily for 1 week. Not sure if the insurance will cover but there is an OTC form.    Phosphate level is slightly elevated but cause is not clear. Will need to repeat labs next time she is in clinic.    Please arrange follow-up virtual appointment in 1 month   Spoke to Rona, states she is the guardianship for patient. Rely results of below. Rona would like copy of results. Advise her, writer will send out.   Also requesting a copy sent to the group home patient is staying at. Advise Rona, can also send out a copy them.     Per Rona, patient will be going to another group home in about 30-40 days; the place will be closing down. Writer asked if Rona is able to call the office about the time of the move to update pcp with new contact information for the new group home. Rona agreed.

## 2020-12-10 NOTE — TELEPHONE ENCOUNTER
----- Message from Yamila Tejeda MD sent at 12/9/2020 10:01 PM CST -----  Message sent via patient portal.

## 2020-12-10 NOTE — TELEPHONE ENCOUNTER
----- Message from Yamila Tejeda MD sent at 12/9/2020 10:11 PM CST -----  Message sent via patient portal.    - podiatry @ [facility]

## 2020-12-11 ENCOUNTER — PATIENT MESSAGE (OUTPATIENT)
Dept: OTHER | Facility: OTHER | Age: 45
End: 2020-12-11

## 2020-12-13 ENCOUNTER — PATIENT MESSAGE (OUTPATIENT)
Dept: ADMINISTRATIVE | Facility: OTHER | Age: 45
End: 2020-12-13

## 2020-12-13 ENCOUNTER — PATIENT MESSAGE (OUTPATIENT)
Dept: INTERNAL MEDICINE | Facility: CLINIC | Age: 45
End: 2020-12-13

## 2021-01-04 ENCOUNTER — PATIENT MESSAGE (OUTPATIENT)
Dept: ADMINISTRATIVE | Facility: HOSPITAL | Age: 46
End: 2021-01-04

## 2021-01-10 ENCOUNTER — PATIENT MESSAGE (OUTPATIENT)
Dept: INTERNAL MEDICINE | Facility: CLINIC | Age: 46
End: 2021-01-10

## 2021-01-11 RX ORDER — MICONAZOLE NITRATE 2 %
POWDER (GRAM) TOPICAL 2 TIMES DAILY
Qty: 85 G | Refills: 1 | Status: SHIPPED | OUTPATIENT
Start: 2021-01-11 | End: 2023-07-31

## 2021-01-12 ENCOUNTER — PATIENT MESSAGE (OUTPATIENT)
Dept: INTERNAL MEDICINE | Facility: CLINIC | Age: 46
End: 2021-01-12

## 2021-01-12 ENCOUNTER — OFFICE VISIT (OUTPATIENT)
Dept: CARDIOLOGY | Facility: CLINIC | Age: 46
End: 2021-01-12
Payer: MEDICAID

## 2021-01-12 VITALS
SYSTOLIC BLOOD PRESSURE: 116 MMHG | BODY MASS INDEX: 51.91 KG/M2 | HEIGHT: 63 IN | HEART RATE: 92 BPM | OXYGEN SATURATION: 94 % | DIASTOLIC BLOOD PRESSURE: 75 MMHG | WEIGHT: 293 LBS

## 2021-01-12 DIAGNOSIS — E66.01 MORBID OBESITY: ICD-10-CM

## 2021-01-12 DIAGNOSIS — I15.2 HYPERTENSION ASSOCIATED WITH DIABETES: ICD-10-CM

## 2021-01-12 DIAGNOSIS — E66.2 OBESITY HYPOVENTILATION SYNDROME: Primary | ICD-10-CM

## 2021-01-12 DIAGNOSIS — E11.9 DIABETES MELLITUS TYPE 2 WITHOUT RETINOPATHY: ICD-10-CM

## 2021-01-12 DIAGNOSIS — R79.89 ABNORMAL CBC: Primary | ICD-10-CM

## 2021-01-12 DIAGNOSIS — I27.81 CHRONIC COR PULMONALE: ICD-10-CM

## 2021-01-12 DIAGNOSIS — E11.59 HYPERTENSION ASSOCIATED WITH DIABETES: ICD-10-CM

## 2021-01-12 DIAGNOSIS — I50.810 RIGHT HEART FAILURE DUE TO PULMONARY HYPERTENSION: ICD-10-CM

## 2021-01-12 DIAGNOSIS — I27.29 RIGHT HEART FAILURE DUE TO PULMONARY HYPERTENSION: ICD-10-CM

## 2021-01-12 DIAGNOSIS — E11.21 CONTROLLED TYPE 2 DIABETES MELLITUS WITH DIABETIC NEPHROPATHY, WITHOUT LONG-TERM CURRENT USE OF INSULIN: ICD-10-CM

## 2021-01-12 PROCEDURE — 99999 PR PBB SHADOW E&M-EST. PATIENT-LVL IV: CPT | Mod: PBBFAC,,, | Performed by: INTERNAL MEDICINE

## 2021-01-12 PROCEDURE — 99214 OFFICE O/P EST MOD 30 MIN: CPT | Mod: S$PBB,,, | Performed by: INTERNAL MEDICINE

## 2021-01-12 PROCEDURE — 99214 PR OFFICE/OUTPT VISIT, EST, LEVL IV, 30-39 MIN: ICD-10-PCS | Mod: S$PBB,,, | Performed by: INTERNAL MEDICINE

## 2021-01-12 PROCEDURE — 99214 OFFICE O/P EST MOD 30 MIN: CPT | Mod: PBBFAC,PO,25 | Performed by: INTERNAL MEDICINE

## 2021-01-12 PROCEDURE — 93010 EKG 12-LEAD: ICD-10-PCS | Mod: S$PBB,,, | Performed by: INTERNAL MEDICINE

## 2021-01-12 PROCEDURE — 93010 ELECTROCARDIOGRAM REPORT: CPT | Mod: S$PBB,,, | Performed by: INTERNAL MEDICINE

## 2021-01-12 PROCEDURE — 93005 ELECTROCARDIOGRAM TRACING: CPT | Mod: PBBFAC,PO | Performed by: INTERNAL MEDICINE

## 2021-01-12 PROCEDURE — 99999 PR PBB SHADOW E&M-EST. PATIENT-LVL IV: ICD-10-PCS | Mod: PBBFAC,,, | Performed by: INTERNAL MEDICINE

## 2021-01-13 ENCOUNTER — PATIENT MESSAGE (OUTPATIENT)
Dept: INTERNAL MEDICINE | Facility: CLINIC | Age: 46
End: 2021-01-13

## 2021-01-13 ENCOUNTER — TELEPHONE (OUTPATIENT)
Dept: CARDIOLOGY | Facility: CLINIC | Age: 46
End: 2021-01-13

## 2021-01-18 ENCOUNTER — PATIENT MESSAGE (OUTPATIENT)
Dept: INTERNAL MEDICINE | Facility: CLINIC | Age: 46
End: 2021-01-18

## 2021-01-19 ENCOUNTER — PATIENT MESSAGE (OUTPATIENT)
Dept: INTERNAL MEDICINE | Facility: CLINIC | Age: 46
End: 2021-01-19

## 2021-01-21 ENCOUNTER — LAB VISIT (OUTPATIENT)
Dept: LAB | Facility: HOSPITAL | Age: 46
End: 2021-01-21
Attending: INTERNAL MEDICINE
Payer: MEDICAID

## 2021-01-21 DIAGNOSIS — E11.21 CONTROLLED TYPE 2 DIABETES MELLITUS WITH DIABETIC NEPHROPATHY, WITHOUT LONG-TERM CURRENT USE OF INSULIN: ICD-10-CM

## 2021-01-21 DIAGNOSIS — R79.89 ABNORMAL CBC: ICD-10-CM

## 2021-01-21 LAB
ESTIMATED AVG GLUCOSE: 128 MG/DL (ref 68–131)
FERRITIN SERPL-MCNC: 154 NG/ML (ref 20–300)
HBA1C MFR BLD HPLC: 6.1 % (ref 4–5.6)
IRON SERPL-MCNC: 60 UG/DL (ref 30–160)
RETICS/RBC NFR AUTO: 2.4 % (ref 0.5–2.5)
SATURATED IRON: 20 % (ref 20–50)
TOTAL IRON BINDING CAPACITY: 306 UG/DL (ref 250–450)
TRANSFERRIN SERPL-MCNC: 207 MG/DL (ref 200–375)

## 2021-01-21 PROCEDURE — 83540 ASSAY OF IRON: CPT

## 2021-01-21 PROCEDURE — 85045 AUTOMATED RETICULOCYTE COUNT: CPT

## 2021-01-21 PROCEDURE — 83036 HEMOGLOBIN GLYCOSYLATED A1C: CPT

## 2021-01-21 PROCEDURE — 36415 COLL VENOUS BLD VENIPUNCTURE: CPT | Mod: PO

## 2021-01-21 PROCEDURE — 82728 ASSAY OF FERRITIN: CPT

## 2021-01-26 ENCOUNTER — PATIENT MESSAGE (OUTPATIENT)
Dept: INTERNAL MEDICINE | Facility: CLINIC | Age: 46
End: 2021-01-26

## 2021-01-26 ENCOUNTER — OFFICE VISIT (OUTPATIENT)
Dept: INTERNAL MEDICINE | Facility: CLINIC | Age: 46
End: 2021-01-26
Payer: MEDICAID

## 2021-01-26 VITALS — DIASTOLIC BLOOD PRESSURE: 86 MMHG | SYSTOLIC BLOOD PRESSURE: 121 MMHG

## 2021-01-26 DIAGNOSIS — E11.21 CONTROLLED TYPE 2 DIABETES MELLITUS WITH DIABETIC NEPHROPATHY, WITHOUT LONG-TERM CURRENT USE OF INSULIN: Primary | ICD-10-CM

## 2021-01-26 DIAGNOSIS — I15.2 HYPERTENSION ASSOCIATED WITH DIABETES: ICD-10-CM

## 2021-01-26 DIAGNOSIS — E66.01 MORBID OBESITY: ICD-10-CM

## 2021-01-26 DIAGNOSIS — E11.59 HYPERTENSION ASSOCIATED WITH DIABETES: ICD-10-CM

## 2021-01-26 DIAGNOSIS — R53.81 PHYSICAL DECONDITIONING: ICD-10-CM

## 2021-01-26 DIAGNOSIS — G47.33 OSA ON CPAP: ICD-10-CM

## 2021-01-26 DIAGNOSIS — I27.20 PULMONARY HYPERTENSION: ICD-10-CM

## 2021-01-26 PROCEDURE — 99214 OFFICE O/P EST MOD 30 MIN: CPT | Mod: 95,,, | Performed by: INTERNAL MEDICINE

## 2021-01-26 PROCEDURE — 99214 PR OFFICE/OUTPT VISIT, EST, LEVL IV, 30-39 MIN: ICD-10-PCS | Mod: 95,,, | Performed by: INTERNAL MEDICINE

## 2021-01-26 RX ORDER — GLIPIZIDE 10 MG/1
10 TABLET, FILM COATED, EXTENDED RELEASE ORAL
Qty: 90 TABLET | Refills: 1 | Status: SHIPPED | OUTPATIENT
Start: 2021-01-26 | End: 2021-07-26

## 2021-01-27 ENCOUNTER — PATIENT MESSAGE (OUTPATIENT)
Dept: INTERNAL MEDICINE | Facility: CLINIC | Age: 46
End: 2021-01-27

## 2021-01-28 ENCOUNTER — PATIENT MESSAGE (OUTPATIENT)
Dept: INTERNAL MEDICINE | Facility: CLINIC | Age: 46
End: 2021-01-28

## 2021-02-02 ENCOUNTER — CLINICAL SUPPORT (OUTPATIENT)
Dept: REHABILITATION | Facility: HOSPITAL | Age: 46
End: 2021-02-02
Attending: INTERNAL MEDICINE
Payer: MEDICAID

## 2021-02-02 DIAGNOSIS — R53.81 PHYSICAL DECONDITIONING: ICD-10-CM

## 2021-02-02 DIAGNOSIS — R53.1 DECREASED STRENGTH: ICD-10-CM

## 2021-02-02 DIAGNOSIS — Z74.09 IMPAIRED FUNCTIONAL MOBILITY, BALANCE, GAIT, AND ENDURANCE: Primary | ICD-10-CM

## 2021-02-02 PROCEDURE — 97110 THERAPEUTIC EXERCISES: CPT

## 2021-02-02 PROCEDURE — 97161 PT EVAL LOW COMPLEX 20 MIN: CPT

## 2021-02-08 ENCOUNTER — OFFICE VISIT (OUTPATIENT)
Dept: OPTOMETRY | Facility: CLINIC | Age: 46
End: 2021-02-08
Payer: MEDICAID

## 2021-02-08 ENCOUNTER — CLINICAL SUPPORT (OUTPATIENT)
Dept: OPHTHALMOLOGY | Facility: CLINIC | Age: 46
End: 2021-02-08
Payer: MEDICAID

## 2021-02-08 DIAGNOSIS — H40.053 BILATERAL OCULAR HYPERTENSION: ICD-10-CM

## 2021-02-08 PROCEDURE — 92083 EXTENDED VISUAL FIELD XM: CPT | Mod: PBBFAC,PO | Performed by: OPTOMETRIST

## 2021-02-08 PROCEDURE — 76514 PR  US, EYE, FOR CORNEAL THICKNESS: ICD-10-PCS | Mod: 26,S$PBB,, | Performed by: OPTOMETRIST

## 2021-02-08 PROCEDURE — 92020 GONIOSCOPY: CPT | Mod: PBBFAC,PO | Performed by: OPTOMETRIST

## 2021-02-08 PROCEDURE — 92020 GONIOSCOPY: CPT | Mod: S$PBB,,, | Performed by: OPTOMETRIST

## 2021-02-08 PROCEDURE — 99999 PR PBB SHADOW E&M-EST. PATIENT-LVL III: ICD-10-PCS | Mod: PBBFAC,,, | Performed by: OPTOMETRIST

## 2021-02-08 PROCEDURE — 99999 PR PBB SHADOW E&M-EST. PATIENT-LVL III: CPT | Mod: PBBFAC,,, | Performed by: OPTOMETRIST

## 2021-02-08 PROCEDURE — 92133 POSTERIOR SEGMENT OCT OPTIC NERVE(OCULAR COHERENCE TOMOGRAPHY) - OU - BOTH EYES: ICD-10-PCS | Mod: 26,S$PBB,, | Performed by: OPTOMETRIST

## 2021-02-08 PROCEDURE — 76514 ECHO EXAM OF EYE THICKNESS: CPT | Mod: PBBFAC,PO | Performed by: OPTOMETRIST

## 2021-02-08 PROCEDURE — 99213 OFFICE O/P EST LOW 20 MIN: CPT | Mod: PBBFAC,PO,25 | Performed by: OPTOMETRIST

## 2021-02-08 PROCEDURE — 92083 HUMPHREY VISUAL FIELD - OU - BOTH EYES: ICD-10-PCS | Mod: 26,S$PBB,, | Performed by: OPTOMETRIST

## 2021-02-08 PROCEDURE — 92012 INTRM OPH EXAM EST PATIENT: CPT | Mod: S$PBB,,, | Performed by: OPTOMETRIST

## 2021-02-08 PROCEDURE — 76514 ECHO EXAM OF EYE THICKNESS: CPT | Mod: 26,S$PBB,, | Performed by: OPTOMETRIST

## 2021-02-08 PROCEDURE — 92012 PR EYE EXAM, EST PATIENT,INTERMED: ICD-10-PCS | Mod: S$PBB,,, | Performed by: OPTOMETRIST

## 2021-02-08 PROCEDURE — 92020 PR SPECIAL EYE EVAL,GONIOSCOPY: ICD-10-PCS | Mod: S$PBB,,, | Performed by: OPTOMETRIST

## 2021-02-08 PROCEDURE — 92133 CPTRZD OPH DX IMG PST SGM ON: CPT | Mod: PBBFAC,PO | Performed by: OPTOMETRIST

## 2021-02-10 ENCOUNTER — OFFICE VISIT (OUTPATIENT)
Dept: INTERNAL MEDICINE | Facility: CLINIC | Age: 46
End: 2021-02-10
Payer: MEDICAID

## 2021-02-10 VITALS
HEIGHT: 63 IN | DIASTOLIC BLOOD PRESSURE: 74 MMHG | RESPIRATION RATE: 14 BRPM | HEART RATE: 70 BPM | SYSTOLIC BLOOD PRESSURE: 118 MMHG | BODY MASS INDEX: 51.91 KG/M2 | TEMPERATURE: 98 F | WEIGHT: 293 LBS

## 2021-02-10 DIAGNOSIS — H60.392 OTHER INFECTIVE ACUTE OTITIS EXTERNA OF LEFT EAR: Primary | ICD-10-CM

## 2021-02-10 PROCEDURE — 99999 PR PBB SHADOW E&M-EST. PATIENT-LVL IV: CPT | Mod: PBBFAC,,, | Performed by: INTERNAL MEDICINE

## 2021-02-10 PROCEDURE — 99214 OFFICE O/P EST MOD 30 MIN: CPT | Mod: PBBFAC,PO | Performed by: INTERNAL MEDICINE

## 2021-02-10 PROCEDURE — 99214 OFFICE O/P EST MOD 30 MIN: CPT | Mod: S$PBB,,, | Performed by: INTERNAL MEDICINE

## 2021-02-10 PROCEDURE — 99214 PR OFFICE/OUTPT VISIT, EST, LEVL IV, 30-39 MIN: ICD-10-PCS | Mod: S$PBB,,, | Performed by: INTERNAL MEDICINE

## 2021-02-10 PROCEDURE — 99999 PR PBB SHADOW E&M-EST. PATIENT-LVL IV: ICD-10-PCS | Mod: PBBFAC,,, | Performed by: INTERNAL MEDICINE

## 2021-02-10 RX ORDER — CIPROFLOXACIN AND DEXAMETHASONE 3; 1 MG/ML; MG/ML
4 SUSPENSION/ DROPS AURICULAR (OTIC) 2 TIMES DAILY
Qty: 7.5 ML | Refills: 0 | Status: SHIPPED | OUTPATIENT
Start: 2021-02-10 | End: 2021-02-20

## 2021-02-17 ENCOUNTER — PATIENT MESSAGE (OUTPATIENT)
Dept: ADMINISTRATIVE | Facility: OTHER | Age: 46
End: 2021-02-17

## 2021-02-18 ENCOUNTER — PATIENT MESSAGE (OUTPATIENT)
Dept: INTERNAL MEDICINE | Facility: CLINIC | Age: 46
End: 2021-02-18

## 2021-02-18 DIAGNOSIS — G47.33 OSA ON CPAP: Primary | ICD-10-CM

## 2021-02-19 ENCOUNTER — PATIENT MESSAGE (OUTPATIENT)
Dept: INTERNAL MEDICINE | Facility: CLINIC | Age: 46
End: 2021-02-19

## 2021-02-19 DIAGNOSIS — G47.33 OSA ON CPAP: Primary | ICD-10-CM

## 2021-02-22 ENCOUNTER — PATIENT MESSAGE (OUTPATIENT)
Dept: INTERNAL MEDICINE | Facility: CLINIC | Age: 46
End: 2021-02-22

## 2021-02-23 ENCOUNTER — PATIENT MESSAGE (OUTPATIENT)
Dept: ADMINISTRATIVE | Facility: OTHER | Age: 46
End: 2021-02-23

## 2021-02-23 ENCOUNTER — PATIENT MESSAGE (OUTPATIENT)
Dept: INTERNAL MEDICINE | Facility: CLINIC | Age: 46
End: 2021-02-23

## 2021-02-23 ENCOUNTER — CLINICAL SUPPORT (OUTPATIENT)
Dept: REHABILITATION | Facility: HOSPITAL | Age: 46
End: 2021-02-23
Attending: INTERNAL MEDICINE
Payer: MEDICAID

## 2021-02-23 ENCOUNTER — TELEPHONE (OUTPATIENT)
Dept: ADMINISTRATIVE | Facility: OTHER | Age: 46
End: 2021-02-23

## 2021-02-23 DIAGNOSIS — R53.1 DECREASED STRENGTH: ICD-10-CM

## 2021-02-23 DIAGNOSIS — Z74.09 IMPAIRED FUNCTIONAL MOBILITY, BALANCE, GAIT, AND ENDURANCE: ICD-10-CM

## 2021-02-23 PROCEDURE — 97110 THERAPEUTIC EXERCISES: CPT | Mod: PO

## 2021-03-01 ENCOUNTER — TELEPHONE (OUTPATIENT)
Dept: INTERNAL MEDICINE | Facility: CLINIC | Age: 46
End: 2021-03-01

## 2021-03-01 DIAGNOSIS — E11.21 CONTROLLED TYPE 2 DIABETES MELLITUS WITH DIABETIC NEPHROPATHY, WITHOUT LONG-TERM CURRENT USE OF INSULIN: ICD-10-CM

## 2021-03-04 ENCOUNTER — PATIENT MESSAGE (OUTPATIENT)
Dept: INTERNAL MEDICINE | Facility: CLINIC | Age: 46
End: 2021-03-04

## 2021-03-04 RX ORDER — BLOOD SUGAR DIAGNOSTIC
1 STRIP MISCELLANEOUS DAILY
Qty: 12 EACH | Refills: 3 | Status: SHIPPED | OUTPATIENT
Start: 2021-03-04

## 2021-03-05 ENCOUNTER — LAB VISIT (OUTPATIENT)
Dept: LAB | Facility: HOSPITAL | Age: 46
End: 2021-03-05
Attending: INTERNAL MEDICINE
Payer: MEDICAID

## 2021-03-05 DIAGNOSIS — I15.2 HYPERTENSION ASSOCIATED WITH DIABETES: ICD-10-CM

## 2021-03-05 DIAGNOSIS — E11.59 HYPERTENSION ASSOCIATED WITH DIABETES: ICD-10-CM

## 2021-03-05 LAB
ANION GAP SERPL CALC-SCNC: 9 MMOL/L (ref 8–16)
BUN SERPL-MCNC: 9 MG/DL (ref 6–20)
CALCIUM SERPL-MCNC: 9.4 MG/DL (ref 8.7–10.5)
CHLORIDE SERPL-SCNC: 105 MMOL/L (ref 95–110)
CO2 SERPL-SCNC: 30 MMOL/L (ref 23–29)
CREAT SERPL-MCNC: 0.7 MG/DL (ref 0.5–1.4)
EST. GFR  (AFRICAN AMERICAN): >60 ML/MIN/1.73 M^2
EST. GFR  (NON AFRICAN AMERICAN): >60 ML/MIN/1.73 M^2
GLUCOSE SERPL-MCNC: 135 MG/DL (ref 70–110)
POTASSIUM SERPL-SCNC: 4 MMOL/L (ref 3.5–5.1)
SODIUM SERPL-SCNC: 144 MMOL/L (ref 136–145)

## 2021-03-05 PROCEDURE — 36415 COLL VENOUS BLD VENIPUNCTURE: CPT | Mod: PO | Performed by: INTERNAL MEDICINE

## 2021-03-05 PROCEDURE — 80048 BASIC METABOLIC PNL TOTAL CA: CPT | Performed by: INTERNAL MEDICINE

## 2021-03-08 ENCOUNTER — PATIENT MESSAGE (OUTPATIENT)
Dept: INTERNAL MEDICINE | Facility: CLINIC | Age: 46
End: 2021-03-08

## 2021-03-09 ENCOUNTER — CLINICAL SUPPORT (OUTPATIENT)
Dept: REHABILITATION | Facility: HOSPITAL | Age: 46
End: 2021-03-09
Attending: INTERNAL MEDICINE
Payer: MEDICAID

## 2021-03-09 ENCOUNTER — PATIENT MESSAGE (OUTPATIENT)
Dept: INTERNAL MEDICINE | Facility: CLINIC | Age: 46
End: 2021-03-09

## 2021-03-09 DIAGNOSIS — Z74.09 IMPAIRED FUNCTIONAL MOBILITY, BALANCE, GAIT, AND ENDURANCE: ICD-10-CM

## 2021-03-09 DIAGNOSIS — R53.1 DECREASED STRENGTH: ICD-10-CM

## 2021-03-09 PROCEDURE — 97110 THERAPEUTIC EXERCISES: CPT | Mod: PO

## 2021-03-12 ENCOUNTER — IMMUNIZATION (OUTPATIENT)
Dept: PRIMARY CARE CLINIC | Facility: CLINIC | Age: 46
End: 2021-03-12

## 2021-03-12 ENCOUNTER — CLINICAL SUPPORT (OUTPATIENT)
Dept: REHABILITATION | Facility: HOSPITAL | Age: 46
End: 2021-03-12
Attending: INTERNAL MEDICINE
Payer: MEDICAID

## 2021-03-12 DIAGNOSIS — Z74.09 IMPAIRED FUNCTIONAL MOBILITY, BALANCE, GAIT, AND ENDURANCE: ICD-10-CM

## 2021-03-12 DIAGNOSIS — Z23 NEED FOR VACCINATION: Primary | ICD-10-CM

## 2021-03-12 DIAGNOSIS — R53.1 DECREASED STRENGTH: ICD-10-CM

## 2021-03-12 PROCEDURE — 0001A PR IMMUNIZ ADMIN, SARS-COV-2 COVID-19 VACC, 30MCG/0.3ML, 1ST DOSE: CPT | Mod: CV19,S$GLB,, | Performed by: INTERNAL MEDICINE

## 2021-03-12 PROCEDURE — 91300 PR SARS-COV- 2 COVID-19 VACCINE, NO PRSV, 30MCG/0.3ML, IM: ICD-10-PCS | Mod: S$GLB,,, | Performed by: INTERNAL MEDICINE

## 2021-03-12 PROCEDURE — 97110 THERAPEUTIC EXERCISES: CPT | Mod: PO,CQ

## 2021-03-12 PROCEDURE — 0001A PR IMMUNIZ ADMIN, SARS-COV-2 COVID-19 VACC, 30MCG/0.3ML, 1ST DOSE: ICD-10-PCS | Mod: CV19,S$GLB,, | Performed by: INTERNAL MEDICINE

## 2021-03-12 PROCEDURE — 91300 PR SARS-COV- 2 COVID-19 VACCINE, NO PRSV, 30MCG/0.3ML, IM: CPT | Mod: S$GLB,,, | Performed by: INTERNAL MEDICINE

## 2021-03-12 RX ADMIN — Medication 0.3 ML: at 09:03

## 2021-03-19 ENCOUNTER — CLINICAL SUPPORT (OUTPATIENT)
Dept: REHABILITATION | Facility: HOSPITAL | Age: 46
End: 2021-03-19
Attending: INTERNAL MEDICINE
Payer: MEDICAID

## 2021-03-19 DIAGNOSIS — R53.1 DECREASED STRENGTH: ICD-10-CM

## 2021-03-19 DIAGNOSIS — Z74.09 IMPAIRED FUNCTIONAL MOBILITY, BALANCE, GAIT, AND ENDURANCE: ICD-10-CM

## 2021-03-19 PROCEDURE — 97110 THERAPEUTIC EXERCISES: CPT | Mod: PO,CQ

## 2021-03-24 ENCOUNTER — PATIENT MESSAGE (OUTPATIENT)
Dept: INTERNAL MEDICINE | Facility: CLINIC | Age: 46
End: 2021-03-24

## 2021-03-24 RX ORDER — TRIAMCINOLONE ACETONIDE 1 MG/ML
LOTION TOPICAL 3 TIMES DAILY
Qty: 60 ML | Refills: 0 | Status: SHIPPED | OUTPATIENT
Start: 2021-03-24 | End: 2023-07-31

## 2021-03-26 ENCOUNTER — CLINICAL SUPPORT (OUTPATIENT)
Dept: REHABILITATION | Facility: HOSPITAL | Age: 46
End: 2021-03-26
Attending: INTERNAL MEDICINE
Payer: MEDICAID

## 2021-03-26 DIAGNOSIS — R53.1 DECREASED STRENGTH: ICD-10-CM

## 2021-03-26 DIAGNOSIS — Z74.09 IMPAIRED FUNCTIONAL MOBILITY, BALANCE, GAIT, AND ENDURANCE: ICD-10-CM

## 2021-03-26 PROCEDURE — 97110 THERAPEUTIC EXERCISES: CPT | Mod: PO

## 2021-03-30 ENCOUNTER — CLINICAL SUPPORT (OUTPATIENT)
Dept: REHABILITATION | Facility: HOSPITAL | Age: 46
End: 2021-03-30
Attending: INTERNAL MEDICINE
Payer: MEDICAID

## 2021-03-30 DIAGNOSIS — R53.1 DECREASED STRENGTH: ICD-10-CM

## 2021-03-30 DIAGNOSIS — Z74.09 IMPAIRED FUNCTIONAL MOBILITY, BALANCE, GAIT, AND ENDURANCE: ICD-10-CM

## 2021-03-30 PROCEDURE — 97110 THERAPEUTIC EXERCISES: CPT | Mod: PO

## 2021-04-02 ENCOUNTER — IMMUNIZATION (OUTPATIENT)
Dept: PRIMARY CARE CLINIC | Facility: CLINIC | Age: 46
End: 2021-04-02
Payer: MEDICAID

## 2021-04-02 DIAGNOSIS — Z23 NEED FOR VACCINATION: Primary | ICD-10-CM

## 2021-04-02 PROCEDURE — 91300 PR SARS-COV- 2 COVID-19 VACCINE, NO PRSV, 30MCG/0.3ML, IM: CPT | Mod: S$GLB,,, | Performed by: INTERNAL MEDICINE

## 2021-04-02 PROCEDURE — 0002A PR IMMUNIZ ADMIN, SARS-COV-2 COVID-19 VACC, 30MCG/0.3ML, 2ND DOSE: CPT | Mod: CV19,S$GLB,, | Performed by: INTERNAL MEDICINE

## 2021-04-02 PROCEDURE — 0002A PR IMMUNIZ ADMIN, SARS-COV-2 COVID-19 VACC, 30MCG/0.3ML, 2ND DOSE: ICD-10-PCS | Mod: CV19,S$GLB,, | Performed by: INTERNAL MEDICINE

## 2021-04-02 PROCEDURE — 91300 PR SARS-COV- 2 COVID-19 VACCINE, NO PRSV, 30MCG/0.3ML, IM: ICD-10-PCS | Mod: S$GLB,,, | Performed by: INTERNAL MEDICINE

## 2021-04-02 RX ADMIN — Medication 0.3 ML: at 09:04

## 2021-04-09 ENCOUNTER — TELEPHONE (OUTPATIENT)
Dept: INTERNAL MEDICINE | Facility: CLINIC | Age: 46
End: 2021-04-09

## 2021-04-14 ENCOUNTER — PATIENT MESSAGE (OUTPATIENT)
Dept: INTERNAL MEDICINE | Facility: CLINIC | Age: 46
End: 2021-04-14

## 2021-04-14 ENCOUNTER — TELEPHONE (OUTPATIENT)
Dept: INTERNAL MEDICINE | Facility: CLINIC | Age: 46
End: 2021-04-14

## 2021-04-14 DIAGNOSIS — E11.21 CONTROLLED TYPE 2 DIABETES MELLITUS WITH DIABETIC NEPHROPATHY, WITHOUT LONG-TERM CURRENT USE OF INSULIN: Primary | ICD-10-CM

## 2021-04-20 ENCOUNTER — TELEPHONE (OUTPATIENT)
Dept: INTERNAL MEDICINE | Facility: CLINIC | Age: 46
End: 2021-04-20

## 2021-04-21 ENCOUNTER — LAB VISIT (OUTPATIENT)
Dept: LAB | Facility: HOSPITAL | Age: 46
End: 2021-04-21
Attending: INTERNAL MEDICINE
Payer: MEDICAID

## 2021-04-21 DIAGNOSIS — E11.21 CONTROLLED TYPE 2 DIABETES MELLITUS WITH DIABETIC NEPHROPATHY, WITHOUT LONG-TERM CURRENT USE OF INSULIN: ICD-10-CM

## 2021-04-21 LAB
ANION GAP SERPL CALC-SCNC: 7 MMOL/L (ref 8–16)
BUN SERPL-MCNC: 9 MG/DL (ref 6–20)
CALCIUM SERPL-MCNC: 9.2 MG/DL (ref 8.7–10.5)
CHLORIDE SERPL-SCNC: 106 MMOL/L (ref 95–110)
CO2 SERPL-SCNC: 28 MMOL/L (ref 23–29)
CREAT SERPL-MCNC: 0.7 MG/DL (ref 0.5–1.4)
EST. GFR  (AFRICAN AMERICAN): >60 ML/MIN/1.73 M^2
EST. GFR  (NON AFRICAN AMERICAN): >60 ML/MIN/1.73 M^2
ESTIMATED AVG GLUCOSE: 146 MG/DL (ref 68–131)
GLUCOSE SERPL-MCNC: 103 MG/DL (ref 70–110)
HBA1C MFR BLD: 6.7 % (ref 4–5.6)
POTASSIUM SERPL-SCNC: 4.2 MMOL/L (ref 3.5–5.1)
SODIUM SERPL-SCNC: 141 MMOL/L (ref 136–145)

## 2021-04-21 PROCEDURE — 36415 COLL VENOUS BLD VENIPUNCTURE: CPT | Mod: PO | Performed by: INTERNAL MEDICINE

## 2021-04-21 PROCEDURE — 80048 BASIC METABOLIC PNL TOTAL CA: CPT | Performed by: INTERNAL MEDICINE

## 2021-04-21 PROCEDURE — 83036 HEMOGLOBIN GLYCOSYLATED A1C: CPT | Performed by: INTERNAL MEDICINE

## 2021-04-22 ENCOUNTER — TELEPHONE (OUTPATIENT)
Dept: INTERNAL MEDICINE | Facility: CLINIC | Age: 46
End: 2021-04-22

## 2021-04-22 ENCOUNTER — OFFICE VISIT (OUTPATIENT)
Dept: INTERNAL MEDICINE | Facility: CLINIC | Age: 46
End: 2021-04-22
Payer: MEDICAID

## 2021-04-22 VITALS
HEIGHT: 63 IN | HEART RATE: 78 BPM | SYSTOLIC BLOOD PRESSURE: 116 MMHG | WEIGHT: 293 LBS | OXYGEN SATURATION: 93 % | DIASTOLIC BLOOD PRESSURE: 72 MMHG | TEMPERATURE: 98 F | BODY MASS INDEX: 51.91 KG/M2 | RESPIRATION RATE: 18 BRPM

## 2021-04-22 DIAGNOSIS — I15.2 HYPERTENSION ASSOCIATED WITH DIABETES: Primary | ICD-10-CM

## 2021-04-22 DIAGNOSIS — E11.59 HYPERTENSION ASSOCIATED WITH DIABETES: Primary | ICD-10-CM

## 2021-04-22 DIAGNOSIS — E11.9 TYPE 2 DIABETES MELLITUS WITHOUT COMPLICATION, WITHOUT LONG-TERM CURRENT USE OF INSULIN: ICD-10-CM

## 2021-04-22 DIAGNOSIS — M62.830 BACK MUSCLE SPASM: ICD-10-CM

## 2021-04-22 PROCEDURE — 99214 OFFICE O/P EST MOD 30 MIN: CPT | Mod: PBBFAC,PO | Performed by: INTERNAL MEDICINE

## 2021-04-22 PROCEDURE — 99214 PR OFFICE/OUTPT VISIT, EST, LEVL IV, 30-39 MIN: ICD-10-PCS | Mod: S$PBB,,, | Performed by: INTERNAL MEDICINE

## 2021-04-22 PROCEDURE — 99999 PR PBB SHADOW E&M-EST. PATIENT-LVL IV: ICD-10-PCS | Mod: PBBFAC,,, | Performed by: INTERNAL MEDICINE

## 2021-04-22 PROCEDURE — 99214 OFFICE O/P EST MOD 30 MIN: CPT | Mod: S$PBB,,, | Performed by: INTERNAL MEDICINE

## 2021-04-22 PROCEDURE — 99999 PR PBB SHADOW E&M-EST. PATIENT-LVL IV: CPT | Mod: PBBFAC,,, | Performed by: INTERNAL MEDICINE

## 2021-04-22 RX ORDER — METHOCARBAMOL 500 MG/1
500 TABLET, FILM COATED ORAL 2 TIMES DAILY PRN
Qty: 30 TABLET | Refills: 0 | Status: SHIPPED | OUTPATIENT
Start: 2021-04-22 | End: 2021-05-10

## 2021-04-30 ENCOUNTER — CLINICAL SUPPORT (OUTPATIENT)
Dept: REHABILITATION | Facility: HOSPITAL | Age: 46
End: 2021-04-30
Attending: INTERNAL MEDICINE
Payer: MEDICAID

## 2021-04-30 DIAGNOSIS — R53.1 DECREASED STRENGTH: ICD-10-CM

## 2021-04-30 DIAGNOSIS — Z74.09 IMPAIRED FUNCTIONAL MOBILITY, BALANCE, GAIT, AND ENDURANCE: ICD-10-CM

## 2021-04-30 PROCEDURE — 97110 THERAPEUTIC EXERCISES: CPT | Mod: PO,CQ

## 2021-05-04 ENCOUNTER — CLINICAL SUPPORT (OUTPATIENT)
Dept: REHABILITATION | Facility: HOSPITAL | Age: 46
End: 2021-05-04
Attending: INTERNAL MEDICINE
Payer: MEDICAID

## 2021-05-04 DIAGNOSIS — R53.1 DECREASED STRENGTH: ICD-10-CM

## 2021-05-04 DIAGNOSIS — Z74.09 IMPAIRED FUNCTIONAL MOBILITY, BALANCE, GAIT, AND ENDURANCE: ICD-10-CM

## 2021-05-04 PROCEDURE — 97110 THERAPEUTIC EXERCISES: CPT | Mod: PO,CQ

## 2021-05-11 ENCOUNTER — TELEPHONE (OUTPATIENT)
Dept: INTERNAL MEDICINE | Facility: CLINIC | Age: 46
End: 2021-05-11

## 2021-06-02 ENCOUNTER — PATIENT MESSAGE (OUTPATIENT)
Dept: INTERNAL MEDICINE | Facility: CLINIC | Age: 46
End: 2021-06-02

## 2021-06-02 ENCOUNTER — TELEPHONE (OUTPATIENT)
Dept: INTERNAL MEDICINE | Facility: CLINIC | Age: 46
End: 2021-06-02

## 2021-06-02 DIAGNOSIS — M62.830 BACK MUSCLE SPASM: ICD-10-CM

## 2021-06-02 RX ORDER — METHOCARBAMOL 500 MG/1
500 TABLET, FILM COATED ORAL 2 TIMES DAILY PRN
Qty: 30 TABLET | Refills: 1 | Status: SHIPPED | OUTPATIENT
Start: 2021-06-02 | End: 2022-01-03 | Stop reason: SDUPTHER

## 2021-06-08 ENCOUNTER — OFFICE VISIT (OUTPATIENT)
Dept: INTERNAL MEDICINE | Facility: CLINIC | Age: 46
End: 2021-06-08
Payer: MEDICAID

## 2021-06-08 VITALS
OXYGEN SATURATION: 96 % | BODY MASS INDEX: 51.91 KG/M2 | HEIGHT: 63 IN | HEART RATE: 72 BPM | SYSTOLIC BLOOD PRESSURE: 126 MMHG | TEMPERATURE: 97 F | RESPIRATION RATE: 20 BRPM | WEIGHT: 293 LBS | DIASTOLIC BLOOD PRESSURE: 70 MMHG

## 2021-06-08 DIAGNOSIS — I15.2 HYPERTENSION ASSOCIATED WITH DIABETES: ICD-10-CM

## 2021-06-08 DIAGNOSIS — E11.9 TYPE 2 DIABETES MELLITUS WITHOUT COMPLICATION, WITHOUT LONG-TERM CURRENT USE OF INSULIN: Primary | ICD-10-CM

## 2021-06-08 DIAGNOSIS — E11.59 HYPERTENSION ASSOCIATED WITH DIABETES: ICD-10-CM

## 2021-06-08 DIAGNOSIS — G47.33 OSA ON CPAP: ICD-10-CM

## 2021-06-08 DIAGNOSIS — E66.01 MORBID OBESITY WITH BMI OF 60.0-69.9, ADULT: ICD-10-CM

## 2021-06-08 PROBLEM — Z74.09 IMPAIRED MOBILITY AND ACTIVITIES OF DAILY LIVING: Status: RESOLVED | Noted: 2020-10-29 | Resolved: 2021-06-08

## 2021-06-08 PROBLEM — Z78.9 IMPAIRED MOBILITY AND ACTIVITIES OF DAILY LIVING: Status: RESOLVED | Noted: 2020-10-29 | Resolved: 2021-06-08

## 2021-06-08 PROBLEM — I10 HYPERTENSIVE DISORDER: Status: ACTIVE | Noted: 2020-11-10

## 2021-06-08 PROBLEM — I50.813 ACUTE ON CHRONIC RIGHT HEART FAILURE: Status: ACTIVE | Noted: 2020-10-22

## 2021-06-08 PROBLEM — M51.9 UNSPECIFIED THORACIC, THORACOLUMBAR AND LUMBOSACRAL INTERVERTEBRAL DISC DISORDER: Status: ACTIVE | Noted: 2020-11-10

## 2021-06-08 PROBLEM — Z75.8 DISCHARGE PLANNING ISSUES: Status: RESOLVED | Noted: 2020-10-30 | Resolved: 2021-06-08

## 2021-06-08 PROBLEM — M25.361 INSTABILITY OF RIGHT KNEE JOINT: Status: RESOLVED | Noted: 2019-08-16 | Resolved: 2021-06-08

## 2021-06-08 PROBLEM — L71.9 ROSACEA: Status: ACTIVE | Noted: 2020-11-10

## 2021-06-08 PROBLEM — M62.81 MUSCLE WEAKNESS: Status: RESOLVED | Noted: 2019-05-20 | Resolved: 2021-06-08

## 2021-06-08 PROBLEM — J96.02 ACUTE RESPIRATORY FAILURE WITH HYPOXIA AND HYPERCAPNIA: Status: RESOLVED | Noted: 2020-10-22 | Resolved: 2021-06-08

## 2021-06-08 PROBLEM — J96.01 ACUTE RESPIRATORY FAILURE WITH HYPOXIA AND HYPERCAPNIA: Status: RESOLVED | Noted: 2020-10-22 | Resolved: 2021-06-08

## 2021-06-08 PROBLEM — F32.A DEPRESSIVE DISORDER: Status: ACTIVE | Noted: 2020-11-10

## 2021-06-08 PROBLEM — G47.30 SLEEP APNEA: Status: ACTIVE | Noted: 2020-11-10

## 2021-06-08 PROBLEM — R53.81 PHYSICAL DECONDITIONING: Status: RESOLVED | Noted: 2021-01-26 | Resolved: 2021-06-08

## 2021-06-08 PROBLEM — R53.1 DECREASED STRENGTH: Status: RESOLVED | Noted: 2021-02-02 | Resolved: 2021-06-08

## 2021-06-08 PROBLEM — M25.561 CHRONIC PAIN OF RIGHT KNEE: Status: RESOLVED | Noted: 2018-10-23 | Resolved: 2021-06-08

## 2021-06-08 PROBLEM — Z02.9 DISCHARGE PLANNING ISSUES: Status: RESOLVED | Noted: 2020-10-30 | Resolved: 2021-06-08

## 2021-06-08 PROBLEM — G89.29 CHRONIC PAIN OF RIGHT KNEE: Status: RESOLVED | Noted: 2018-10-23 | Resolved: 2021-06-08

## 2021-06-08 PROBLEM — E88.9 METABOLIC DISEASE: Status: ACTIVE | Noted: 2018-02-19

## 2021-06-08 PROCEDURE — 99214 OFFICE O/P EST MOD 30 MIN: CPT | Mod: PBBFAC,PO | Performed by: INTERNAL MEDICINE

## 2021-06-08 PROCEDURE — 99999 PR PBB SHADOW E&M-EST. PATIENT-LVL IV: ICD-10-PCS | Mod: PBBFAC,,, | Performed by: INTERNAL MEDICINE

## 2021-06-08 PROCEDURE — 99214 PR OFFICE/OUTPT VISIT, EST, LEVL IV, 30-39 MIN: ICD-10-PCS | Mod: S$PBB,,, | Performed by: INTERNAL MEDICINE

## 2021-06-08 PROCEDURE — 99999 PR PBB SHADOW E&M-EST. PATIENT-LVL IV: CPT | Mod: PBBFAC,,, | Performed by: INTERNAL MEDICINE

## 2021-06-08 PROCEDURE — 99214 OFFICE O/P EST MOD 30 MIN: CPT | Mod: S$PBB,,, | Performed by: INTERNAL MEDICINE

## 2021-06-08 RX ORDER — INSULIN PUMP SYRINGE, 3 ML
1 EACH MISCELLANEOUS ONCE
Qty: 1 EACH | Refills: 0 | Status: SHIPPED | OUTPATIENT
Start: 2021-06-08 | End: 2021-06-08

## 2021-06-08 RX ORDER — PHENTERMINE HYDROCHLORIDE 37.5 MG/1
37.5 TABLET ORAL
Qty: 30 TABLET | Refills: 0 | Status: SHIPPED | OUTPATIENT
Start: 2021-06-08 | End: 2021-07-08

## 2021-06-11 ENCOUNTER — PATIENT MESSAGE (OUTPATIENT)
Dept: INTERNAL MEDICINE | Facility: CLINIC | Age: 46
End: 2021-06-11

## 2021-06-11 ENCOUNTER — TELEPHONE (OUTPATIENT)
Dept: INTERNAL MEDICINE | Facility: CLINIC | Age: 46
End: 2021-06-11

## 2021-06-16 ENCOUNTER — PATIENT MESSAGE (OUTPATIENT)
Dept: INTERNAL MEDICINE | Facility: CLINIC | Age: 46
End: 2021-06-16

## 2021-06-16 DIAGNOSIS — E11.9 TYPE 2 DIABETES MELLITUS WITHOUT COMPLICATION, WITHOUT LONG-TERM CURRENT USE OF INSULIN: Primary | ICD-10-CM

## 2021-06-17 ENCOUNTER — CLINICAL SUPPORT (OUTPATIENT)
Dept: REHABILITATION | Facility: HOSPITAL | Age: 46
End: 2021-06-17
Attending: INTERNAL MEDICINE
Payer: MEDICAID

## 2021-06-17 DIAGNOSIS — Z74.09 IMPAIRED FUNCTIONAL MOBILITY, BALANCE, GAIT, AND ENDURANCE: ICD-10-CM

## 2021-06-17 PROCEDURE — 97110 THERAPEUTIC EXERCISES: CPT | Mod: PO

## 2021-06-17 RX ORDER — LANCETS
1 EACH MISCELLANEOUS 2 TIMES DAILY
Qty: 100 EACH | Refills: 5 | Status: SHIPPED | OUTPATIENT
Start: 2021-06-17

## 2021-06-25 ENCOUNTER — OFFICE VISIT (OUTPATIENT)
Dept: INTERNAL MEDICINE | Facility: CLINIC | Age: 46
End: 2021-06-25
Payer: MEDICAID

## 2021-06-25 VITALS
WEIGHT: 293 LBS | DIASTOLIC BLOOD PRESSURE: 60 MMHG | SYSTOLIC BLOOD PRESSURE: 98 MMHG | TEMPERATURE: 97 F | RESPIRATION RATE: 18 BRPM | OXYGEN SATURATION: 97 % | BODY MASS INDEX: 51.91 KG/M2 | HEART RATE: 78 BPM | HEIGHT: 63 IN

## 2021-06-25 DIAGNOSIS — Z51.81 MEDICATION MONITORING ENCOUNTER: ICD-10-CM

## 2021-06-25 DIAGNOSIS — I15.2 HYPERTENSION ASSOCIATED WITH DIABETES: Primary | ICD-10-CM

## 2021-06-25 DIAGNOSIS — E66.01 MORBID OBESITY WITH BMI OF 60.0-69.9, ADULT: ICD-10-CM

## 2021-06-25 DIAGNOSIS — E11.59 HYPERTENSION ASSOCIATED WITH DIABETES: Primary | ICD-10-CM

## 2021-06-25 DIAGNOSIS — G47.33 OSA ON CPAP: ICD-10-CM

## 2021-06-25 DIAGNOSIS — E11.9 TYPE 2 DIABETES MELLITUS WITHOUT COMPLICATION, WITHOUT LONG-TERM CURRENT USE OF INSULIN: ICD-10-CM

## 2021-06-25 PROCEDURE — 99213 PR OFFICE/OUTPT VISIT, EST, LEVL III, 20-29 MIN: ICD-10-PCS | Mod: S$PBB,,, | Performed by: INTERNAL MEDICINE

## 2021-06-25 PROCEDURE — 99213 OFFICE O/P EST LOW 20 MIN: CPT | Mod: S$PBB,,, | Performed by: INTERNAL MEDICINE

## 2021-06-25 PROCEDURE — 93005 ELECTROCARDIOGRAM TRACING: CPT | Mod: PBBFAC,PO | Performed by: INTERNAL MEDICINE

## 2021-06-25 PROCEDURE — 99999 PR PBB SHADOW E&M-EST. PATIENT-LVL V: ICD-10-PCS | Mod: PBBFAC,,, | Performed by: INTERNAL MEDICINE

## 2021-06-25 PROCEDURE — 99999 PR PBB SHADOW E&M-EST. PATIENT-LVL V: CPT | Mod: PBBFAC,,, | Performed by: INTERNAL MEDICINE

## 2021-06-25 PROCEDURE — 99215 OFFICE O/P EST HI 40 MIN: CPT | Mod: PBBFAC,PO | Performed by: INTERNAL MEDICINE

## 2021-06-25 PROCEDURE — 93010 EKG 12-LEAD: ICD-10-PCS | Mod: S$PBB,,, | Performed by: INTERNAL MEDICINE

## 2021-06-25 PROCEDURE — 93010 ELECTROCARDIOGRAM REPORT: CPT | Mod: S$PBB,,, | Performed by: INTERNAL MEDICINE

## 2021-06-25 RX ORDER — LANCETS 30 GAUGE
EACH MISCELLANEOUS
COMMUNITY
Start: 2021-06-09

## 2021-06-30 ENCOUNTER — TELEPHONE (OUTPATIENT)
Dept: INTERNAL MEDICINE | Facility: CLINIC | Age: 46
End: 2021-06-30

## 2021-07-16 RX ORDER — FUROSEMIDE 80 MG/1
TABLET ORAL
Qty: 90 TABLET | Refills: 1 | Status: SHIPPED | OUTPATIENT
Start: 2021-07-16 | End: 2022-04-28

## 2021-08-02 ENCOUNTER — TELEPHONE (OUTPATIENT)
Dept: INTERNAL MEDICINE | Facility: CLINIC | Age: 46
End: 2021-08-02

## 2021-08-02 ENCOUNTER — PATIENT MESSAGE (OUTPATIENT)
Dept: INTERNAL MEDICINE | Facility: CLINIC | Age: 46
End: 2021-08-02

## 2021-08-02 DIAGNOSIS — Z12.31 ENCOUNTER FOR SCREENING MAMMOGRAM FOR HIGH-RISK PATIENT: ICD-10-CM

## 2021-08-02 DIAGNOSIS — I15.2 HYPERTENSION ASSOCIATED WITH DIABETES: ICD-10-CM

## 2021-08-02 DIAGNOSIS — E11.59 HYPERTENSION ASSOCIATED WITH DIABETES: ICD-10-CM

## 2021-08-02 DIAGNOSIS — E11.9 TYPE 2 DIABETES MELLITUS WITHOUT COMPLICATION, WITHOUT LONG-TERM CURRENT USE OF INSULIN: ICD-10-CM

## 2021-08-02 DIAGNOSIS — Z00.00 ANNUAL PHYSICAL EXAM: Primary | ICD-10-CM

## 2021-08-02 DIAGNOSIS — Z51.81 MEDICATION MONITORING ENCOUNTER: ICD-10-CM

## 2021-08-03 ENCOUNTER — PATIENT MESSAGE (OUTPATIENT)
Dept: ADMINISTRATIVE | Facility: HOSPITAL | Age: 46
End: 2021-08-03

## 2021-08-04 ENCOUNTER — TELEPHONE (OUTPATIENT)
Dept: INTERNAL MEDICINE | Facility: CLINIC | Age: 46
End: 2021-08-04

## 2021-08-06 ENCOUNTER — PATIENT MESSAGE (OUTPATIENT)
Dept: INTERNAL MEDICINE | Facility: CLINIC | Age: 46
End: 2021-08-06

## 2021-08-09 ENCOUNTER — TELEPHONE (OUTPATIENT)
Dept: INTERNAL MEDICINE | Facility: CLINIC | Age: 46
End: 2021-08-09

## 2021-08-09 ENCOUNTER — PATIENT MESSAGE (OUTPATIENT)
Dept: INTERNAL MEDICINE | Facility: CLINIC | Age: 46
End: 2021-08-09

## 2021-08-09 RX ORDER — SEMAGLUTIDE 1.34 MG/ML
1 INJECTION, SOLUTION SUBCUTANEOUS
Qty: 3 PEN | Refills: 1 | Status: SHIPPED | OUTPATIENT
Start: 2021-08-09 | End: 2021-08-09 | Stop reason: SDUPTHER

## 2021-08-09 RX ORDER — SEMAGLUTIDE 1.34 MG/ML
1 INJECTION, SOLUTION SUBCUTANEOUS
Qty: 3 PEN | Refills: 1 | Status: SHIPPED | OUTPATIENT
Start: 2021-08-09 | End: 2021-08-25 | Stop reason: SDUPTHER

## 2021-08-18 ENCOUNTER — HOSPITAL ENCOUNTER (OUTPATIENT)
Dept: RADIOLOGY | Facility: HOSPITAL | Age: 46
Discharge: HOME | End: 2021-08-18
Attending: INTERNAL MEDICINE
Payer: MEDICAID

## 2021-08-18 DIAGNOSIS — Z12.31 ENCOUNTER FOR SCREENING MAMMOGRAM FOR HIGH-RISK PATIENT: ICD-10-CM

## 2021-08-18 PROCEDURE — 77067 MAMMO DIGITAL SCREENING BILAT WITH TOMO: ICD-10-PCS | Mod: 26,,, | Performed by: RADIOLOGY

## 2021-08-18 PROCEDURE — 77063 BREAST TOMOSYNTHESIS BI: CPT | Mod: 26,,, | Performed by: RADIOLOGY

## 2021-08-18 PROCEDURE — 77067 SCR MAMMO BI INCL CAD: CPT | Mod: 26,,, | Performed by: RADIOLOGY

## 2021-08-18 PROCEDURE — 77067 SCR MAMMO BI INCL CAD: CPT | Mod: TC,PO

## 2021-08-18 PROCEDURE — 77063 MAMMO DIGITAL SCREENING BILAT WITH TOMO: ICD-10-PCS | Mod: 26,,, | Performed by: RADIOLOGY

## 2021-08-20 ENCOUNTER — TELEPHONE (OUTPATIENT)
Dept: INTERNAL MEDICINE | Facility: CLINIC | Age: 46
End: 2021-08-20

## 2021-08-25 ENCOUNTER — OFFICE VISIT (OUTPATIENT)
Dept: INTERNAL MEDICINE | Facility: CLINIC | Age: 46
End: 2021-08-25
Payer: MEDICAID

## 2021-08-25 VITALS
WEIGHT: 293 LBS | HEIGHT: 63 IN | SYSTOLIC BLOOD PRESSURE: 130 MMHG | BODY MASS INDEX: 51.91 KG/M2 | HEART RATE: 79 BPM | TEMPERATURE: 98 F | OXYGEN SATURATION: 97 % | DIASTOLIC BLOOD PRESSURE: 80 MMHG | RESPIRATION RATE: 16 BRPM

## 2021-08-25 DIAGNOSIS — E66.01 MORBID OBESITY WITH BMI OF 60.0-69.9, ADULT: ICD-10-CM

## 2021-08-25 DIAGNOSIS — R60.0 BILATERAL LEG EDEMA: ICD-10-CM

## 2021-08-25 DIAGNOSIS — E11.59 OBESITY, DIABETES, AND HYPERTENSION SYNDROME: ICD-10-CM

## 2021-08-25 DIAGNOSIS — Z00.00 ANNUAL PHYSICAL EXAM: ICD-10-CM

## 2021-08-25 DIAGNOSIS — G47.33 OSA ON CPAP: ICD-10-CM

## 2021-08-25 DIAGNOSIS — I15.2 HYPERTENSION ASSOCIATED WITH DIABETES: Primary | ICD-10-CM

## 2021-08-25 DIAGNOSIS — E11.69 OBESITY, DIABETES, AND HYPERTENSION SYNDROME: ICD-10-CM

## 2021-08-25 DIAGNOSIS — E11.9 TYPE 2 DIABETES MELLITUS WITHOUT COMPLICATION, WITHOUT LONG-TERM CURRENT USE OF INSULIN: ICD-10-CM

## 2021-08-25 DIAGNOSIS — E11.59 HYPERTENSION ASSOCIATED WITH DIABETES: Primary | ICD-10-CM

## 2021-08-25 DIAGNOSIS — E66.9 OBESITY, DIABETES, AND HYPERTENSION SYNDROME: ICD-10-CM

## 2021-08-25 DIAGNOSIS — I15.2 OBESITY, DIABETES, AND HYPERTENSION SYNDROME: ICD-10-CM

## 2021-08-25 PROCEDURE — 99999 PR PBB SHADOW E&M-EST. PATIENT-LVL IV: CPT | Mod: PBBFAC,,, | Performed by: INTERNAL MEDICINE

## 2021-08-25 PROCEDURE — 99214 OFFICE O/P EST MOD 30 MIN: CPT | Mod: S$PBB,,, | Performed by: INTERNAL MEDICINE

## 2021-08-25 PROCEDURE — 99214 PR OFFICE/OUTPT VISIT, EST, LEVL IV, 30-39 MIN: ICD-10-PCS | Mod: S$PBB,,, | Performed by: INTERNAL MEDICINE

## 2021-08-25 PROCEDURE — 99214 OFFICE O/P EST MOD 30 MIN: CPT | Mod: PBBFAC,PO | Performed by: INTERNAL MEDICINE

## 2021-08-25 PROCEDURE — 99999 PR PBB SHADOW E&M-EST. PATIENT-LVL IV: ICD-10-PCS | Mod: PBBFAC,,, | Performed by: INTERNAL MEDICINE

## 2021-08-25 RX ORDER — GLIPIZIDE 5 MG/1
5 TABLET, FILM COATED, EXTENDED RELEASE ORAL
Qty: 90 TABLET | Refills: 1 | Status: SHIPPED | OUTPATIENT
Start: 2021-08-25 | End: 2021-10-01

## 2021-08-25 RX ORDER — PHENTERMINE HYDROCHLORIDE 37.5 MG/1
37.5 TABLET ORAL
Qty: 30 TABLET | Refills: 0 | Status: SHIPPED | OUTPATIENT
Start: 2021-08-25 | End: 2021-09-24

## 2021-08-25 RX ORDER — SEMAGLUTIDE 1.34 MG/ML
1 INJECTION, SOLUTION SUBCUTANEOUS
Qty: 3 PEN | Refills: 1 | Status: SHIPPED | OUTPATIENT
Start: 2021-08-25 | End: 2022-04-04 | Stop reason: SDUPTHER

## 2021-09-07 ENCOUNTER — TELEPHONE (OUTPATIENT)
Dept: INTERNAL MEDICINE | Facility: CLINIC | Age: 46
End: 2021-09-07

## 2021-09-07 PROBLEM — I50.813 ACUTE ON CHRONIC RIGHT HEART FAILURE: Status: RESOLVED | Noted: 2020-10-22 | Resolved: 2021-09-07

## 2021-09-07 PROBLEM — R93.1 ABNORMAL FINDING ON ECHOCARDIOGRAM: Status: RESOLVED | Noted: 2020-09-15 | Resolved: 2021-09-07

## 2021-10-07 ENCOUNTER — PATIENT MESSAGE (OUTPATIENT)
Dept: ADMINISTRATIVE | Facility: OTHER | Age: 46
End: 2021-10-07

## 2021-10-12 ENCOUNTER — TELEPHONE (OUTPATIENT)
Dept: INTERNAL MEDICINE | Facility: CLINIC | Age: 46
End: 2021-10-12
Payer: MEDICAID

## 2021-10-12 NOTE — TELEPHONE ENCOUNTER
----- Message from Tyler Marquez sent at 10/12/2021  3:32 PM CDT -----  Contact: Tatiana with [employer]  Tatiana with [employer] called and said that the pts EPA  and she would like to make sure that you are still are going to complete the PA    Tatiana can be reached at [phone]    Ref key [id number]

## 2021-10-18 ENCOUNTER — PATIENT MESSAGE (OUTPATIENT)
Dept: INTERNAL MEDICINE | Facility: CLINIC | Age: 46
End: 2021-10-18

## 2021-10-18 ENCOUNTER — PATIENT MESSAGE (OUTPATIENT)
Dept: ADMINISTRATIVE | Facility: HOSPITAL | Age: 46
End: 2021-10-18
Payer: MEDICAID

## 2021-11-01 ENCOUNTER — LAB VISIT (OUTPATIENT)
Dept: LAB | Facility: HOSPITAL | Age: 46
End: 2021-11-01
Attending: INTERNAL MEDICINE
Payer: MEDICAID

## 2021-11-01 ENCOUNTER — OFFICE VISIT (OUTPATIENT)
Dept: INTERNAL MEDICINE | Facility: CLINIC | Age: 46
End: 2021-11-01
Payer: MEDICAID

## 2021-11-01 VITALS
WEIGHT: 293 LBS | SYSTOLIC BLOOD PRESSURE: 120 MMHG | HEART RATE: 71 BPM | OXYGEN SATURATION: 97 % | BODY MASS INDEX: 50.02 KG/M2 | HEIGHT: 64 IN | DIASTOLIC BLOOD PRESSURE: 78 MMHG | TEMPERATURE: 97 F

## 2021-11-01 DIAGNOSIS — Z91.89 CANDIDATE FOR STATIN THERAPY DUE TO RISK OF FUTURE CARDIOVASCULAR EVENT: ICD-10-CM

## 2021-11-01 DIAGNOSIS — I15.2 HYPERTENSION ASSOCIATED WITH DIABETES: Primary | ICD-10-CM

## 2021-11-01 DIAGNOSIS — E11.9 TYPE 2 DIABETES MELLITUS WITHOUT COMPLICATION, WITHOUT LONG-TERM CURRENT USE OF INSULIN: ICD-10-CM

## 2021-11-01 DIAGNOSIS — I15.2 HYPERTENSION ASSOCIATED WITH DIABETES: ICD-10-CM

## 2021-11-01 DIAGNOSIS — E11.59 HYPERTENSION ASSOCIATED WITH DIABETES: Primary | ICD-10-CM

## 2021-11-01 DIAGNOSIS — E11.59 HYPERTENSION ASSOCIATED WITH DIABETES: ICD-10-CM

## 2021-11-01 DIAGNOSIS — E66.01 MORBID OBESITY WITH BMI OF 60.0-69.9, ADULT: ICD-10-CM

## 2021-11-01 LAB
ANION GAP SERPL CALC-SCNC: 13 MMOL/L (ref 8–16)
BUN SERPL-MCNC: 10 MG/DL (ref 6–20)
CALCIUM SERPL-MCNC: 9.8 MG/DL (ref 8.7–10.5)
CHLORIDE SERPL-SCNC: 102 MMOL/L (ref 95–110)
CO2 SERPL-SCNC: 26 MMOL/L (ref 23–29)
CREAT SERPL-MCNC: 0.7 MG/DL (ref 0.5–1.4)
EST. GFR  (AFRICAN AMERICAN): >60 ML/MIN/1.73 M^2
EST. GFR  (NON AFRICAN AMERICAN): >60 ML/MIN/1.73 M^2
ESTIMATED AVG GLUCOSE: 123 MG/DL (ref 68–131)
GLUCOSE SERPL-MCNC: 118 MG/DL (ref 70–110)
HBA1C MFR BLD: 5.9 % (ref 4–5.6)
POTASSIUM SERPL-SCNC: 3.7 MMOL/L (ref 3.5–5.1)
SODIUM SERPL-SCNC: 141 MMOL/L (ref 136–145)

## 2021-11-01 PROCEDURE — 99214 OFFICE O/P EST MOD 30 MIN: CPT | Mod: S$PBB,,, | Performed by: INTERNAL MEDICINE

## 2021-11-01 PROCEDURE — 83036 HEMOGLOBIN GLYCOSYLATED A1C: CPT | Performed by: INTERNAL MEDICINE

## 2021-11-01 PROCEDURE — 99999 PR PBB SHADOW E&M-EST. PATIENT-LVL IV: ICD-10-PCS | Mod: PBBFAC,,, | Performed by: INTERNAL MEDICINE

## 2021-11-01 PROCEDURE — 99999 PR PBB SHADOW E&M-EST. PATIENT-LVL IV: CPT | Mod: PBBFAC,,, | Performed by: INTERNAL MEDICINE

## 2021-11-01 PROCEDURE — 36415 COLL VENOUS BLD VENIPUNCTURE: CPT | Mod: PO | Performed by: INTERNAL MEDICINE

## 2021-11-01 PROCEDURE — 99214 OFFICE O/P EST MOD 30 MIN: CPT | Mod: PBBFAC,PO | Performed by: INTERNAL MEDICINE

## 2021-11-01 PROCEDURE — 90471 IMMUNIZATION ADMIN: CPT | Mod: PBBFAC,PO

## 2021-11-01 PROCEDURE — 99214 PR OFFICE/OUTPT VISIT, EST, LEVL IV, 30-39 MIN: ICD-10-PCS | Mod: S$PBB,,, | Performed by: INTERNAL MEDICINE

## 2021-11-01 PROCEDURE — 80048 BASIC METABOLIC PNL TOTAL CA: CPT | Performed by: INTERNAL MEDICINE

## 2021-11-01 RX ORDER — ATORVASTATIN CALCIUM 10 MG/1
10 TABLET, FILM COATED ORAL DAILY
Qty: 90 TABLET | Refills: 3 | Status: SHIPPED | OUTPATIENT
Start: 2021-11-01 | End: 2022-10-11

## 2021-11-08 ENCOUNTER — TELEPHONE (OUTPATIENT)
Dept: INTERNAL MEDICINE | Facility: CLINIC | Age: 46
End: 2021-11-08
Payer: MEDICAID

## 2021-11-09 ENCOUNTER — TELEPHONE (OUTPATIENT)
Dept: OPTOMETRY | Facility: CLINIC | Age: 46
End: 2021-11-09
Payer: MEDICAID

## 2021-11-09 DIAGNOSIS — H40.053 BILATERAL OCULAR HYPERTENSION: ICD-10-CM

## 2021-11-09 RX ORDER — LATANOPROST 50 UG/ML
1 SOLUTION/ DROPS OPHTHALMIC NIGHTLY
Qty: 7.5 ML | Refills: 3 | Status: SHIPPED | OUTPATIENT
Start: 2021-11-09 | End: 2022-02-01 | Stop reason: SDUPTHER

## 2021-11-17 ENCOUNTER — PATIENT MESSAGE (OUTPATIENT)
Dept: INTERNAL MEDICINE | Facility: CLINIC | Age: 46
End: 2021-11-17
Payer: MEDICAID

## 2022-01-03 ENCOUNTER — OFFICE VISIT (OUTPATIENT)
Dept: INTERNAL MEDICINE | Facility: CLINIC | Age: 47
End: 2022-01-03
Payer: MEDICAID

## 2022-01-03 VITALS
DIASTOLIC BLOOD PRESSURE: 72 MMHG | RESPIRATION RATE: 18 BRPM | SYSTOLIC BLOOD PRESSURE: 114 MMHG | WEIGHT: 293 LBS | BODY MASS INDEX: 51.91 KG/M2 | HEART RATE: 68 BPM | HEIGHT: 63 IN | TEMPERATURE: 97 F | OXYGEN SATURATION: 95 %

## 2022-01-03 DIAGNOSIS — I15.2 HYPERTENSION ASSOCIATED WITH DIABETES: Primary | ICD-10-CM

## 2022-01-03 DIAGNOSIS — N93.9 VAGINAL BLEEDING: ICD-10-CM

## 2022-01-03 DIAGNOSIS — E66.01 MORBID OBESITY WITH BMI OF 60.0-69.9, ADULT: ICD-10-CM

## 2022-01-03 DIAGNOSIS — E11.59 HYPERTENSION ASSOCIATED WITH DIABETES: Primary | ICD-10-CM

## 2022-01-03 DIAGNOSIS — Z87.42 H/O AMENORRHEA: ICD-10-CM

## 2022-01-03 DIAGNOSIS — Z91.89 CANDIDATE FOR STATIN THERAPY DUE TO RISK OF FUTURE CARDIOVASCULAR EVENT: ICD-10-CM

## 2022-01-03 DIAGNOSIS — E11.9 TYPE 2 DIABETES MELLITUS WITHOUT COMPLICATION, WITHOUT LONG-TERM CURRENT USE OF INSULIN: ICD-10-CM

## 2022-01-03 DIAGNOSIS — M62.830 BACK MUSCLE SPASM: ICD-10-CM

## 2022-01-03 PROCEDURE — 3008F PR BODY MASS INDEX (BMI) DOCUMENTED: ICD-10-PCS | Mod: CPTII,,, | Performed by: INTERNAL MEDICINE

## 2022-01-03 PROCEDURE — 99214 OFFICE O/P EST MOD 30 MIN: CPT | Mod: S$PBB,,, | Performed by: INTERNAL MEDICINE

## 2022-01-03 PROCEDURE — 1159F PR MEDICATION LIST DOCUMENTED IN MEDICAL RECORD: ICD-10-PCS | Mod: CPTII,,, | Performed by: INTERNAL MEDICINE

## 2022-01-03 PROCEDURE — 3078F PR MOST RECENT DIASTOLIC BLOOD PRESSURE < 80 MM HG: ICD-10-PCS | Mod: CPTII,,, | Performed by: INTERNAL MEDICINE

## 2022-01-03 PROCEDURE — 99215 OFFICE O/P EST HI 40 MIN: CPT | Mod: PBBFAC,PO | Performed by: INTERNAL MEDICINE

## 2022-01-03 PROCEDURE — 1160F RVW MEDS BY RX/DR IN RCRD: CPT | Mod: CPTII,,, | Performed by: INTERNAL MEDICINE

## 2022-01-03 PROCEDURE — 99999 PR PBB SHADOW E&M-EST. PATIENT-LVL V: ICD-10-PCS | Mod: PBBFAC,,, | Performed by: INTERNAL MEDICINE

## 2022-01-03 PROCEDURE — 1160F PR REVIEW ALL MEDS BY PRESCRIBER/CLIN PHARMACIST DOCUMENTED: ICD-10-PCS | Mod: CPTII,,, | Performed by: INTERNAL MEDICINE

## 2022-01-03 PROCEDURE — 3074F SYST BP LT 130 MM HG: CPT | Mod: CPTII,,, | Performed by: INTERNAL MEDICINE

## 2022-01-03 PROCEDURE — 3074F PR MOST RECENT SYSTOLIC BLOOD PRESSURE < 130 MM HG: ICD-10-PCS | Mod: CPTII,,, | Performed by: INTERNAL MEDICINE

## 2022-01-03 PROCEDURE — 99999 PR PBB SHADOW E&M-EST. PATIENT-LVL V: CPT | Mod: PBBFAC,,, | Performed by: INTERNAL MEDICINE

## 2022-01-03 PROCEDURE — 4010F PR ACE/ARB THEARPY RXD/TAKEN: ICD-10-PCS | Mod: CPTII,,, | Performed by: INTERNAL MEDICINE

## 2022-01-03 PROCEDURE — 3078F DIAST BP <80 MM HG: CPT | Mod: CPTII,,, | Performed by: INTERNAL MEDICINE

## 2022-01-03 PROCEDURE — 4010F ACE/ARB THERAPY RXD/TAKEN: CPT | Mod: CPTII,,, | Performed by: INTERNAL MEDICINE

## 2022-01-03 PROCEDURE — 3008F BODY MASS INDEX DOCD: CPT | Mod: CPTII,,, | Performed by: INTERNAL MEDICINE

## 2022-01-03 PROCEDURE — 99214 PR OFFICE/OUTPT VISIT, EST, LEVL IV, 30-39 MIN: ICD-10-PCS | Mod: S$PBB,,, | Performed by: INTERNAL MEDICINE

## 2022-01-03 PROCEDURE — 1159F MED LIST DOCD IN RCRD: CPT | Mod: CPTII,,, | Performed by: INTERNAL MEDICINE

## 2022-01-03 RX ORDER — METHOCARBAMOL 750 MG/1
750 TABLET, FILM COATED ORAL 2 TIMES DAILY PRN
Qty: 60 TABLET | Refills: 0 | Status: SHIPPED | OUTPATIENT
Start: 2022-01-03 | End: 2022-01-31

## 2022-01-03 NOTE — PROGRESS NOTES
Subjective:     Halley Moreno is a 46 y.o. female who presents for   Chief Complaint   Patient presents with    Hypertension    Diabetes    Menstrual Problem     Hasn't had a menstrual cycle in years.  Has been heavily bleeding and cramping.       HPI     Diabetes: Patient presents for follow up of diabetes. Current symptoms include: none. Symptoms have been basically asymptomatic. Patient denies foot ulcerations, polydipsia and polyuria. Evaluation to date has included: hemoglobin A1C.  Home sugars: BGs consistently in an acceptable range. Current treatment: Ozempic.     Lab Results   Component Value Date    HGBA1C 5.9 (H) 11/01/2021     (H) 11/01/2021       Hypertension: The patient has been taking medications as instructed, no medication side effects noted, no chest pain on exertion, no dyspnea on exertion and no swelling of ankles. She has chronic BLE edema that has improved over time with exercise and weight loss.    BP Readings from Last 3 Encounters:   01/27/22 132/76   01/03/22 114/72   11/01/21 120/78       Vaginal Bleeding: Last appointment with OB/GYN was in 2017 and she was evaluated for amenorrhea. Last period was at least 10 years ago. She reports spotting recently but noticed heavy bleeding 3 days ago.       Body mass index is 65.3 kg/m².  Wt Readings from Last 3 Encounters:   01/27/22 (!) 168.4 kg (371 lb 4.1 oz)   01/03/22 (!) 167.2 kg (368 lb 9.8 oz)   11/01/21 (!) 170.8 kg (376 lb 8.7 oz)   - she recently purchased a seated elliptical machine and she has been riding 2 miles each time      Review of Systems   Constitutional: Negative for chills, diaphoresis and fever.   HENT: Negative for congestion, ear pain and sinus pressure.    Eyes: Negative for discharge and redness.   Respiratory: Negative for cough and shortness of breath.    Cardiovascular: Negative for chest pain and palpitations.   Gastrointestinal: Negative for abdominal pain, constipation, diarrhea, nausea and  vomiting.   Genitourinary: Positive for menstrual problem and vaginal bleeding. Negative for dysuria.   Musculoskeletal: Positive for myalgias (left trapezius muscle cramping, some improvement with Robaxin). Negative for arthralgias.   Skin: Negative for rash and wound.   Neurological: Negative for dizziness, tremors and headaches.          Objective:     Physical Exam  Vitals reviewed.   Constitutional:       General: She is awake. She is not in acute distress.     Appearance: Normal appearance. She is well-developed and well-groomed.   HENT:      Head: Normocephalic and atraumatic.      Right Ear: Hearing and external ear normal.      Left Ear: Hearing and external ear normal.      Nose: Nose normal.   Eyes:      General: Lids are normal. Vision grossly intact.   Cardiovascular:      Rate and Rhythm: Normal rate and regular rhythm.      Heart sounds: Normal heart sounds. No murmur heard.      Pulmonary:      Effort: Pulmonary effort is normal.      Breath sounds: Normal breath sounds. No decreased breath sounds or wheezing.   Abdominal:      General: Bowel sounds are normal. There is no distension.   Musculoskeletal:         General: Normal range of motion.      Cervical back: Normal range of motion and neck supple.      Right lower le+ Edema (non-pitting) present.      Left lower le+ Edema (non-pitting) present.   Skin:     General: Skin is warm and dry.      Findings: No lesion or rash.   Neurological:      Mental Status: She is alert and oriented to person, place, and time.   Psychiatric:         Attention and Perception: Attention normal.         Mood and Affect: Mood normal.         Behavior: Behavior is cooperative.            Assessment:      1. Hypertension associated with diabetes    2. Vaginal bleeding    3. H/O amenorrhea    4. Type 2 diabetes mellitus without complication, without long-term current use of insulin    5. Back muscle spasm    6. Morbid obesity with BMI of 60.0-69.9, adult    7.  Candidate for statin therapy due to risk of future cardiovascular event           Plan:     1. Hypertension associated with diabetes  - BP is controlled, continue losartan, furosemide    2. Vaginal bleeding  - Ambulatory referral/consult to Obstetrics / Gynecology; Future  - US Pelvis Comp with Transvag NON-OB (xpd; Future    3. H/O amenorrhea  - Ambulatory referral/consult to Obstetrics / Gynecology; Future  - Estradiol; Future  - Follicle Stimulating Hormone; Future  - Luteinizing Hormone; Future    4. Type 2 diabetes mellitus without complication, without long-term current use of insulin  - CBC Auto Differential; Future  - Basic Metabolic Panel; Future  - Hemoglobin A1C; Future    5. Back muscle spasm  - methocarbamoL (ROBAXIN) 750 MG Tab; Take 1 tablet (750 mg total) by mouth 2 (two) times daily as needed (muscle spasms).  Dispense: 60 tablet; Refill: 0    6. Morbid obesity with BMI of 60.0-69.9, adult  - lost 8 lbs in the last few months, increase physical activity, discussed trial of Wegovy    7. Candidate for statin therapy due to risk of future cardiovascular event  - continue Lipitor    RTC in 3 months with labs prior for follow-up or sooner if needed    __________________________    Yamila Tejeda MD, PharmD  [facility]- [facility] Medicine  American Board of Obesity Medicine diplomate  Office [phone]

## 2022-01-04 ENCOUNTER — LAB VISIT (OUTPATIENT)
Dept: LAB | Facility: HOSPITAL | Age: 47
End: 2022-01-04
Attending: INTERNAL MEDICINE
Payer: MEDICAID

## 2022-01-04 DIAGNOSIS — Z87.42 H/O AMENORRHEA: ICD-10-CM

## 2022-01-04 LAB
ESTRADIOL SERPL-MCNC: 21 PG/ML
FSH SERPL-ACNC: 6.78 MIU/ML
LH SERPL-ACNC: 3 MIU/ML

## 2022-01-04 PROCEDURE — 82670 ASSAY OF TOTAL ESTRADIOL: CPT | Performed by: INTERNAL MEDICINE

## 2022-01-04 PROCEDURE — 83002 ASSAY OF GONADOTROPIN (LH): CPT | Performed by: INTERNAL MEDICINE

## 2022-01-04 PROCEDURE — 36415 COLL VENOUS BLD VENIPUNCTURE: CPT | Mod: PO | Performed by: INTERNAL MEDICINE

## 2022-01-04 PROCEDURE — 83001 ASSAY OF GONADOTROPIN (FSH): CPT | Performed by: INTERNAL MEDICINE

## 2022-01-19 ENCOUNTER — HOSPITAL ENCOUNTER (OUTPATIENT)
Dept: RADIOLOGY | Facility: HOSPITAL | Age: 47
Discharge: HOME | End: 2022-01-19
Attending: INTERNAL MEDICINE
Payer: MEDICAID

## 2022-01-19 DIAGNOSIS — N93.9 VAGINAL BLEEDING: ICD-10-CM

## 2022-01-19 PROCEDURE — 76830 TRANSVAGINAL US NON-OB: CPT | Mod: TC

## 2022-01-19 PROCEDURE — 76856 US PELVIS COMP WITH TRANSVAG NON-OB (XPD): ICD-10-PCS | Mod: 26,,, | Performed by: RADIOLOGY

## 2022-01-19 PROCEDURE — 76856 US EXAM PELVIC COMPLETE: CPT | Mod: 26,,, | Performed by: RADIOLOGY

## 2022-01-19 PROCEDURE — 76830 TRANSVAGINAL US NON-OB: CPT | Mod: 26,,, | Performed by: RADIOLOGY

## 2022-01-19 PROCEDURE — 76830 US PELVIS COMP WITH TRANSVAG NON-OB (XPD): ICD-10-PCS | Mod: 26,,, | Performed by: RADIOLOGY

## 2022-01-20 ENCOUNTER — TELEPHONE (OUTPATIENT)
Dept: INTERNAL MEDICINE | Facility: CLINIC | Age: 47
End: 2022-01-20
Payer: MEDICAID

## 2022-01-24 ENCOUNTER — PATIENT MESSAGE (OUTPATIENT)
Dept: INTERNAL MEDICINE | Facility: CLINIC | Age: 47
End: 2022-01-24
Payer: MEDICAID

## 2022-01-25 ENCOUNTER — PATIENT OUTREACH (OUTPATIENT)
Dept: ADMINISTRATIVE | Facility: OTHER | Age: 47
End: 2022-01-25
Payer: MEDICAID

## 2022-01-25 DIAGNOSIS — Z12.11 ENCOUNTER FOR FIT (FECAL IMMUNOCHEMICAL TEST) SCREENING: Primary | ICD-10-CM

## 2022-01-25 NOTE — PROGRESS NOTES
Care Everywhere: updated  Immunization:   Health Maintenance: updated  Media Review:   Legacy Review:   DIS:  Order placed: fit kit   Upcoming appts:optometry 2.1, hemoglobin 3.28   EFAX:  Task Tickets:  Referrals:

## 2022-01-27 ENCOUNTER — OFFICE VISIT (OUTPATIENT)
Dept: OBSTETRICS AND GYNECOLOGY | Facility: CLINIC | Age: 47
End: 2022-01-27
Payer: MEDICAID

## 2022-01-27 VITALS
SYSTOLIC BLOOD PRESSURE: 132 MMHG | HEIGHT: 63 IN | BODY MASS INDEX: 51.91 KG/M2 | DIASTOLIC BLOOD PRESSURE: 76 MMHG | WEIGHT: 293 LBS

## 2022-01-27 DIAGNOSIS — R93.89 THICKENED ENDOMETRIUM: ICD-10-CM

## 2022-01-27 DIAGNOSIS — Z87.42 H/O AMENORRHEA: ICD-10-CM

## 2022-01-27 DIAGNOSIS — Z12.31 BREAST CANCER SCREENING BY MAMMOGRAM: ICD-10-CM

## 2022-01-27 DIAGNOSIS — Z98.890 HISTORY OF D&C: ICD-10-CM

## 2022-01-27 DIAGNOSIS — Z01.419 ENCOUNTER FOR ANNUAL ROUTINE GYNECOLOGICAL EXAMINATION: Primary | ICD-10-CM

## 2022-01-27 DIAGNOSIS — N93.9 VAGINAL BLEEDING: ICD-10-CM

## 2022-01-27 PROCEDURE — 4010F ACE/ARB THERAPY RXD/TAKEN: CPT | Mod: CPTII,,, | Performed by: OBSTETRICS & GYNECOLOGY

## 2022-01-27 PROCEDURE — 99999 PR PBB SHADOW E&M-EST. PATIENT-LVL V: CPT | Mod: PBBFAC,,, | Performed by: OBSTETRICS & GYNECOLOGY

## 2022-01-27 PROCEDURE — 1159F PR MEDICATION LIST DOCUMENTED IN MEDICAL RECORD: ICD-10-PCS | Mod: CPTII,,, | Performed by: OBSTETRICS & GYNECOLOGY

## 2022-01-27 PROCEDURE — 3008F BODY MASS INDEX DOCD: CPT | Mod: CPTII,,, | Performed by: OBSTETRICS & GYNECOLOGY

## 2022-01-27 PROCEDURE — 3078F PR MOST RECENT DIASTOLIC BLOOD PRESSURE < 80 MM HG: ICD-10-PCS | Mod: CPTII,,, | Performed by: OBSTETRICS & GYNECOLOGY

## 2022-01-27 PROCEDURE — 99386 PR PREVENTIVE VISIT,NEW,40-64: ICD-10-PCS | Mod: S$PBB,,, | Performed by: OBSTETRICS & GYNECOLOGY

## 2022-01-27 PROCEDURE — 99386 PREV VISIT NEW AGE 40-64: CPT | Mod: S$PBB,,, | Performed by: OBSTETRICS & GYNECOLOGY

## 2022-01-27 PROCEDURE — 1159F MED LIST DOCD IN RCRD: CPT | Mod: CPTII,,, | Performed by: OBSTETRICS & GYNECOLOGY

## 2022-01-27 PROCEDURE — 99215 OFFICE O/P EST HI 40 MIN: CPT | Mod: PBBFAC | Performed by: OBSTETRICS & GYNECOLOGY

## 2022-01-27 PROCEDURE — 3075F SYST BP GE 130 - 139MM HG: CPT | Mod: CPTII,,, | Performed by: OBSTETRICS & GYNECOLOGY

## 2022-01-27 PROCEDURE — 3075F PR MOST RECENT SYSTOLIC BLOOD PRESS GE 130-139MM HG: ICD-10-PCS | Mod: CPTII,,, | Performed by: OBSTETRICS & GYNECOLOGY

## 2022-01-27 PROCEDURE — 3078F DIAST BP <80 MM HG: CPT | Mod: CPTII,,, | Performed by: OBSTETRICS & GYNECOLOGY

## 2022-01-27 PROCEDURE — 3008F PR BODY MASS INDEX (BMI) DOCUMENTED: ICD-10-PCS | Mod: CPTII,,, | Performed by: OBSTETRICS & GYNECOLOGY

## 2022-01-27 PROCEDURE — 99999 PR PBB SHADOW E&M-EST. PATIENT-LVL V: ICD-10-PCS | Mod: PBBFAC,,, | Performed by: OBSTETRICS & GYNECOLOGY

## 2022-01-27 PROCEDURE — 4010F PR ACE/ARB THEARPY RXD/TAKEN: ICD-10-PCS | Mod: CPTII,,, | Performed by: OBSTETRICS & GYNECOLOGY

## 2022-01-27 RX ORDER — SODIUM CHLORIDE 9 MG/ML
INJECTION, SOLUTION INTRAVENOUS CONTINUOUS
Status: CANCELLED | OUTPATIENT
Start: 2022-01-27

## 2022-01-27 RX ORDER — MUPIROCIN 20 MG/G
OINTMENT TOPICAL
Status: CANCELLED | OUTPATIENT
Start: 2022-01-27

## 2022-01-27 NOTE — PROGRESS NOTES
Chief Complaint: Well Woman Exam     HPI:      Halley Moreno is a 46 y.o.  who presents for annual exam. She reports a prolonged history of amenorrhea since age 34.  Denies hot flashes or other menopausal symptoms at that time.  Reports a 7 day period of heavy flow with continued spotting for the following 6-7 days.  Has since stopped bleeding.     Was seen by PCP, who ordered a pelvic US and FSH, and sent patient here for further evaluation.     Ms. Moreno is not currently sexually active. She declines STD screening today. Patient does not have regular monthly menses. Patient's last menstrual period was 2022 (exact date). She is currently using bilateral tubal ligation for contraception.    Previous Pap:  no abnormalities ()  Previous Mammogram:   Results for orders placed during the hospital encounter of 21    Mammo Digital Screening Bilat w/ Lex    Narrative  Result:  Mammo Digital Screening Bilat w/ Lex    History:  Patient is 46 y.o. and is seen for a screening mammogram.      Films Compared:  Compared to: 2020 Mammo Digital Screening Bilat w/ Lex (No Change)    Findings:  This procedure was performed using tomosynthesis.  Computer-aided detection was utilized in the interpretation of this examination.    The breasts have scattered areas of fibroglandular density. There is no evidence of suspicious masses, microcalcifications or architectural distortion.    Impression  No mammographic evidence of malignancy.    BI-RADS Category 1: Negative    Recommendation:  Routine screening mammogram in 1 year is recommended.    Your estimated lifetime risk of breast cancer (to age 85) based on Tyrer-Cuzick risk assessment model is Tyrer-Cuzick: 9.24 %. According to the American Cancer Society, patients with a lifetime breast cancer risk of 20% or higher might benefit from supplemental screening tests.     Most Recent Dexa: Never had  Colonoscopy: Never had    COVID Vaccine:  "Completed +booster  Flu Vaccine: Completed  Gardasil:Has never had     Patient Active Problem List   Diagnosis    Hypertensive disorder    Morbid obesity    Sleep apnea    Depressive disorder    Rosacea    Unspecified thoracic, thoracolumbar and lumbosacral intervertebral disc disorder    Metabolic disease    Chronic pain    Vitamin D deficiency    Chronic pain of both knees    Diabetes mellitus type 2 without retinopathy    Morbid (severe) obesity with alveolar hypoventilation    Constipation    Hypokalemia    Congestive heart failure    Pulmonary hypertension    Impaired functional mobility, balance, gait, and endurance       Past Medical History:   Diagnosis Date    Acute respiratory failure with hypoxia and hypercapnia 10/22/2020    Last Assessment & Plan:  Patient is a chronic CO2 retainer in setting of obesity hypoventilation syndrome 2/2 morbid obesity. Stepped down from ICU 10/26, currently continuing diuresis. De-escalated to NC during the day with BiPAP nightly on 10/31, which patient tolerated well. On Lasix gtt for diuresis through .   Plan:  - Target SpO2 of >88%. Continue BiPAP nightly. Patient on 3LNC, will con    BMI 70 and over, adult     Depression     Diabetes mellitus     DM (diabetes mellitus) type II controlled with renal manifestation     HTN (hypertension)     Hyperlipidemia     Lumbar disc disease     Morbid obesity     Recurrent nephrolithiasis     Rosacea     Sleep apnea     Tear of medial meniscus of right knee, current 2017       Past Surgical History:   Procedure Laterality Date     SECTION       SECTION  2000    DILATION AND CURETTAGE OF UTERUS  2010    AUB "negative path" per patient    ENDOMETRIAL ABLATION  2010       OB History        2    Para   2    Term   0            AB        Living   2       SAB        IAB        Ectopic        Multiple        Live Births                     ROS:     Review of " "Systems   Constitutional: Negative for activity change, appetite change and fatigue.   Respiratory: Negative for shortness of breath.    Cardiovascular: Negative for chest pain.   Gastrointestinal: Negative for abdominal pain, constipation and diarrhea.   Endocrine: Negative for cold intolerance and heat intolerance.   Genitourinary: Positive for vaginal bleeding. Negative for dysuria, frequency, menstrual irregularity, pelvic pain and vaginal discharge.   Integumentary:  Negative for breast mass, breast discharge and breast tenderness.   Psychiatric/Behavioral: Negative for dysphoric mood. The patient is not nervous/anxious.    Breast: Negative for mass and tenderness      Physical Exam:      PHYSICAL EXAM:  /76   Ht 5' 3" (1.6 m)   Wt (!) 168.4 kg (371 lb 4.1 oz)   LMP 01/01/2022 (Exact Date)   BMI 65.76 kg/m²   Body mass index is 65.76 kg/m².     APPEARANCE: Well nourished, well developed, in no acute distress.  PSYCH: Appropriate mood and affect.  SKIN: No acne or hirsutism  NECK: Neck symmetric without masses or thyromegaly  NODES: No inguinal, axillary, or supraclavicular lymph node enlargement  CHEST: Normal respiratory effort.  ABDOMEN: Soft.  No tenderness or masses.   BREASTS: Symmetrical, no skin changes or visible lesions.  No palpable masses or nipple discharge bilaterally.  PELVIC: Normal external genitalia without lesions.  Normal hair distribution.  Adequate perineal body, normal urethral meatus.  Vagina moist and well rugated without lesions, bloody discharge noted in vagina.  Cervix unable to by visualized despite using all available speculum sizes.  Cervix unable to be palpated on exam likely due to habitus. Bimanual exam significantly limited.  No tenderness.    EXTREMITIES: No edema.    Results for orders placed during the hospital encounter of 01/19/22    US Pelvis Comp with Transvag NON-OB (xpd    Narrative  EXAMINATION:  US PELVIS COMP WITH TRANSVAG NON-OB (XPD)    CLINICAL " HISTORY:  Abnormal uterine and vaginal bleeding, unspecified    TECHNIQUE:  Transabdominal sonography of the pelvis was performed, followed by transvaginal sonography to better evaluate the uterus and ovaries.    COMPARISON:  Pelvic ultrasound 02/21/2017.    FINDINGS:  Examination is technically limited due to patient's body habitus.    Uterus:    Size: 11.6 x 7.4 x 6.9 cm    Masses: Probable small fibroid measuring 0.7 x 0.8 x 0.7 cm.    Endometrium: Thickened in this Candice menopausal patient, measuring 10 mm.    The bilateral ovaries were not visualized.  No abnormal adnexal masses within the limitations of this exam.    Free Fluid:    None.    Impression  Technically limited exam with nonvisualization of the ovaries.    Endometrial thickening in this perimenopausal patient, measuring up to 10 mm.  Clinical correlation and further evaluation with endometrial sampling is recommended.    This report was flagged in Epic as abnormal.    Electronically signed by resident: Coco Alvarenga MD  Date:    01/19/2022  Time:    14:22    Electronically signed by: Manuel Ventura MD  Date:    01/19/2022  Time:    14:35    Component Ref Range & Units 3 wk ago 4 yr ago 12 yr ago   Estradiol See Text pg/mL 21  40 CM  80 High  R    Comment: Estradiol Reference Ranges (Female):   Follicular phase:  pg/mL   Midcycle:          pg/mL   Luteal phase:      pg/mL   Post-menopausal(Not on HRT): <10-28 pg/mL   Post-menopausal(On HRT): < pg/mL   Males: 11-44 pg/mL     The drug Fulvestrant (Faslodex) may interfere with the   assay leading to falsely elevated Estradiol results.     Patients treated with Mifepristone should not be tested with   the  or Alinity I Estradiol assay for up to two   weeks due to the interference of the drug in this assay.    Resulting Agency  [facility]              Specimen Collected: 01/04/22 12:26 Last Resulted: 01/04/22 17:13           Component Ref Range & Units 3 wk ago 4 yr ago  12 yr ago   Follicle Stimulating Hormone See Text mIU/mL 6.78  3.80 CM  3.8 R, CM    Comment: Female Reference Ranges:   Follicular Phase.................3.03-8.08 mIU/mL   Midcycle Peak....................2.55-16.69 mIU/mL   Luteal Phase.....................1.38-5.47 mIU/mL   Postmenopausal...................26..41 mIU/mL   Male Reference Range:............0.95-11.95 mIU/mL    Resulting Agency  [facility]              Specimen Collected: 01/04/22 12:26 Last Resulted: 01/04/22 17:13           Component Ref Range & Units 3 wk ago 4 yr ago 12 yr ago   LH See Text mIU/mL 3.0  2.7 CM  3.3 R, CM    Comment: Female Reference Ranges:   Follicular phase.............1.8-11.8 mIU/mL   Midcycle phase...............7.6-89.1 mIU/mL   Luteal phase.................0.6-14.0 mIU/mL   Post-menopausal without HRT..5.2-62.0 mIU/mL   Male Reference Interval......0.6-12.1 mIU/mL    Resulting Agency  [facility]              Specimen Collected: 01/04/22 12:26 Last Resulted: 01/04/22 17:13               Assessment:     1. Encounter for annual routine gynecological examination     2. Breast cancer screening by mammogram  Mammo Digital Screening Bilat w/ Lex   3. H/O amenorrhea  Ambulatory referral/consult to Obstetrics / Gynecology   4. Vaginal bleeding  Ambulatory referral/consult to Obstetrics / Gynecology    Full code    Insert peripheral IV    Notify Physician - Potential Need of Opioid Reversal    Diet NPO    Case Request Operating Room: HYSTEROSCOPY, WITH DILATION AND CURETTAGE OF UTERUS    Bed rest with bathroom privileges    Place sequential compression device    CBC auto differential    Hemoglobin A1c    Basic metabolic panel    COVID-19 Routine Screening    Full code    Insert peripheral IV    Notify Physician - Potential Need of Opioid Reversal    Diet NPO    Bed rest with bathroom privileges    Place sequential compression device    COVID-19 Routine Screening   5. History of D&C     6. Thickened endometrium   Full code    Insert peripheral IV    Notify Physician - Potential Need of Opioid Reversal    Diet NPO    Case Request Operating Room: HYSTEROSCOPY, WITH DILATION AND CURETTAGE OF UTERUS    Bed rest with bathroom privileges    Place sequential compression device    CBC auto differential    Hemoglobin A1c    Basic metabolic panel    COVID-19 Routine Screening    Full code    Insert peripheral IV    Notify Physician - Potential Need of Opioid Reversal    Diet NPO    Bed rest with bathroom privileges    Place sequential compression device    COVID-19 Routine Screening         Plan:     1. Clinical breast exam performed.  2. Pap w HPV needed, to be collected once adequate visualization obtained in OR.  3. Mammogram UTD, due 8/2022.  4. Colonoscopy declined.  5. AUB - US reviewed with a 1 cm EMS.  FSH not consistent with menopause.  Recommend endometrial sampling due to prolonged amenorrhea vs PMB.  EMBx attempted today. Unsuccessful.  Recommend hysteroscopy D&C for further evaluation.   6. Case request placed.  Will need PAT.       Counseling:     Patient was counseled today on current ASCCP pap guidelines, the recommendation for yearly pelvic exams, healthy diet and exercise routines, breast self awareness and annual mammograms. She is to see her PCP for other health maintenance.       Use of the [facility] Patient Portal discussed and encouraged during today's visit.         Dixie Rod MD  [facility]  01/27/2022

## 2022-01-28 ENCOUNTER — TELEPHONE (OUTPATIENT)
Dept: OBSTETRICS AND GYNECOLOGY | Facility: CLINIC | Age: 47
End: 2022-01-28
Payer: MEDICAID

## 2022-01-28 ENCOUNTER — PATIENT MESSAGE (OUTPATIENT)
Dept: INTERNAL MEDICINE | Facility: CLINIC | Age: 47
End: 2022-01-28
Payer: MEDICAID

## 2022-01-28 NOTE — TELEPHONE ENCOUNTER
Called patient to let her know the call yesterday was to see if I could catch her while she was still in office to fill out the records request. She can disregard and we will get it done next time we see her. No answer. Left message.

## 2022-01-28 NOTE — TELEPHONE ENCOUNTER
----- Message from Shira Flynn MA sent at 1/27/2022  5:25 PM CST -----  [facility] Staff  Caller: Unspecified (Today, 10:39 AM)  Type:  Patient Returning Call           Who Called: KARISSA GOOD [[id number]]           Who Left Message for Patient: Unknown           Does the patient know what this is regarding? yes           Best Call Back Number:[phone]

## 2022-01-30 DIAGNOSIS — M62.830 BACK MUSCLE SPASM: ICD-10-CM

## 2022-01-30 NOTE — TELEPHONE ENCOUNTER
No new care gaps identified.  Powered by [employer]. Reference number: [id number].   1/30/2022 11:57:20 AM CST

## 2022-01-31 RX ORDER — METHOCARBAMOL 750 MG/1
TABLET, FILM COATED ORAL
Qty: 60 TABLET | Refills: 0 | Status: SHIPPED | OUTPATIENT
Start: 2022-01-31 | End: 2022-03-07

## 2022-02-01 ENCOUNTER — OFFICE VISIT (OUTPATIENT)
Dept: OPTOMETRY | Facility: CLINIC | Age: 47
End: 2022-02-01
Payer: MEDICAID

## 2022-02-01 DIAGNOSIS — H40.053 BILATERAL OCULAR HYPERTENSION: Primary | ICD-10-CM

## 2022-02-01 DIAGNOSIS — E11.9 DIABETES MELLITUS TYPE 2 WITHOUT RETINOPATHY: ICD-10-CM

## 2022-02-01 PROCEDURE — 4010F PR ACE/ARB THEARPY RXD/TAKEN: ICD-10-PCS | Mod: CPTII,,, | Performed by: OPTOMETRIST

## 2022-02-01 PROCEDURE — 1159F PR MEDICATION LIST DOCUMENTED IN MEDICAL RECORD: ICD-10-PCS | Mod: CPTII,,, | Performed by: OPTOMETRIST

## 2022-02-01 PROCEDURE — 92014 COMPRE OPH EXAM EST PT 1/>: CPT | Mod: S$PBB,,, | Performed by: OPTOMETRIST

## 2022-02-01 PROCEDURE — 2023F PR DILATED RETINAL EXAM W/O EVID OF RETINOPATHY: ICD-10-PCS | Mod: CPTII,,, | Performed by: OPTOMETRIST

## 2022-02-01 PROCEDURE — 92014 PR EYE EXAM, EST PATIENT,COMPREHESV: ICD-10-PCS | Mod: S$PBB,,, | Performed by: OPTOMETRIST

## 2022-02-01 PROCEDURE — 99999 PR PBB SHADOW E&M-EST. PATIENT-LVL II: CPT | Mod: PBBFAC,,, | Performed by: OPTOMETRIST

## 2022-02-01 PROCEDURE — 2023F DILAT RTA XM W/O RTNOPTHY: CPT | Mod: CPTII,,, | Performed by: OPTOMETRIST

## 2022-02-01 PROCEDURE — 99999 PR PBB SHADOW E&M-EST. PATIENT-LVL II: ICD-10-PCS | Mod: PBBFAC,,, | Performed by: OPTOMETRIST

## 2022-02-01 PROCEDURE — 1160F RVW MEDS BY RX/DR IN RCRD: CPT | Mod: CPTII,,, | Performed by: OPTOMETRIST

## 2022-02-01 PROCEDURE — 1159F MED LIST DOCD IN RCRD: CPT | Mod: CPTII,,, | Performed by: OPTOMETRIST

## 2022-02-01 PROCEDURE — 4010F ACE/ARB THERAPY RXD/TAKEN: CPT | Mod: CPTII,,, | Performed by: OPTOMETRIST

## 2022-02-01 PROCEDURE — 99212 OFFICE O/P EST SF 10 MIN: CPT | Mod: PBBFAC | Performed by: OPTOMETRIST

## 2022-02-01 PROCEDURE — 1160F PR REVIEW ALL MEDS BY PRESCRIBER/CLIN PHARMACIST DOCUMENTED: ICD-10-PCS | Mod: CPTII,,, | Performed by: OPTOMETRIST

## 2022-02-01 RX ORDER — LATANOPROST 50 UG/ML
1 SOLUTION/ DROPS OPHTHALMIC NIGHTLY
Qty: 7.5 ML | Refills: 3 | Status: SHIPPED | OUTPATIENT
Start: 2022-02-01 | End: 2023-02-06

## 2022-02-01 NOTE — PROGRESS NOTES
HPI     Ocular Hypertension      Additional comments: Used drops last night              Comments     No complaints  Vision stable  Needs drop refills  No eye pain          Last edited by Saroj Archibald, OD on 2/1/2022 11:03 AM. (History)            Assessment /Plan     For exam results, see Encounter Report.    Bilateral ocular hypertension  -     latanoprost 0.005 % ophthalmic solution; Place 1 drop into both eyes every evening.  Dispense: 7.5 mL; Refill: 3    Diabetes mellitus type 2 without retinopathy      1.  IOP high normal, cont Latanaprost qhs ou, pachy normal, prev oct of rnfl normal, hvf normal, gonio open, RTC 6 mos with iop ck. No fam history of glaucoma.   2. No diabetic retinopathy, no csme. Return in 1 year for dilated eye exam.

## 2022-02-07 ENCOUNTER — PATIENT MESSAGE (OUTPATIENT)
Dept: INTERNAL MEDICINE | Facility: CLINIC | Age: 47
End: 2022-02-07
Payer: MEDICAID

## 2022-03-02 ENCOUNTER — HOSPITAL ENCOUNTER (OUTPATIENT)
Dept: PREADMISSION TESTING | Facility: OTHER | Age: 47
Discharge: HOME | End: 2022-03-02
Attending: OBSTETRICS & GYNECOLOGY
Payer: MEDICAID

## 2022-03-02 VITALS
TEMPERATURE: 99 F | HEART RATE: 77 BPM | WEIGHT: 293 LBS | SYSTOLIC BLOOD PRESSURE: 135 MMHG | OXYGEN SATURATION: 96 % | RESPIRATION RATE: 16 BRPM | BODY MASS INDEX: 51.91 KG/M2 | HEIGHT: 63 IN | DIASTOLIC BLOOD PRESSURE: 68 MMHG

## 2022-03-02 DIAGNOSIS — R93.89 THICKENED ENDOMETRIUM: ICD-10-CM

## 2022-03-02 DIAGNOSIS — Z01.818 PREOP TESTING: ICD-10-CM

## 2022-03-02 DIAGNOSIS — N93.9 VAGINAL BLEEDING: ICD-10-CM

## 2022-03-02 LAB
ANION GAP SERPL CALC-SCNC: 11 MMOL/L (ref 8–16)
BASOPHILS # BLD AUTO: 0.02 K/UL (ref 0–0.2)
BASOPHILS NFR BLD: 0.2 % (ref 0–1.9)
BUN SERPL-MCNC: 12 MG/DL (ref 6–20)
CALCIUM SERPL-MCNC: 9.8 MG/DL (ref 8.7–10.5)
CHLORIDE SERPL-SCNC: 107 MMOL/L (ref 95–110)
CO2 SERPL-SCNC: 23 MMOL/L (ref 23–29)
CREAT SERPL-MCNC: 0.8 MG/DL (ref 0.5–1.4)
DIFFERENTIAL METHOD: ABNORMAL
EOSINOPHIL # BLD AUTO: 0.1 K/UL (ref 0–0.5)
EOSINOPHIL NFR BLD: 1 % (ref 0–8)
ERYTHROCYTE [DISTWIDTH] IN BLOOD BY AUTOMATED COUNT: 14.5 % (ref 11.5–14.5)
EST. GFR  (AFRICAN AMERICAN): >60 ML/MIN/1.73 M^2
EST. GFR  (NON AFRICAN AMERICAN): >60 ML/MIN/1.73 M^2
GLUCOSE SERPL-MCNC: 124 MG/DL (ref 70–110)
HCT VFR BLD AUTO: 36.2 % (ref 37–48.5)
HGB BLD-MCNC: 12 G/DL (ref 12–16)
IMM GRANULOCYTES # BLD AUTO: 0.12 K/UL (ref 0–0.04)
IMM GRANULOCYTES NFR BLD AUTO: 1.1 % (ref 0–0.5)
LYMPHOCYTES # BLD AUTO: 1.9 K/UL (ref 1–4.8)
LYMPHOCYTES NFR BLD: 18 % (ref 18–48)
MCH RBC QN AUTO: 29.3 PG (ref 27–31)
MCHC RBC AUTO-ENTMCNC: 33.1 G/DL (ref 32–36)
MCV RBC AUTO: 88 FL (ref 82–98)
MONOCYTES # BLD AUTO: 0.7 K/UL (ref 0.3–1)
MONOCYTES NFR BLD: 6.8 % (ref 4–15)
NEUTROPHILS # BLD AUTO: 7.9 K/UL (ref 1.8–7.7)
NEUTROPHILS NFR BLD: 72.9 % (ref 38–73)
NRBC BLD-RTO: 0 /100 WBC
PLATELET # BLD AUTO: 200 K/UL (ref 150–450)
PMV BLD AUTO: 9.9 FL (ref 9.2–12.9)
POTASSIUM SERPL-SCNC: 4.4 MMOL/L (ref 3.5–5.1)
RBC # BLD AUTO: 4.1 M/UL (ref 4–5.4)
SODIUM SERPL-SCNC: 141 MMOL/L (ref 136–145)
WBC # BLD AUTO: 10.78 K/UL (ref 3.9–12.7)

## 2022-03-02 PROCEDURE — 93010 EKG 12-LEAD: ICD-10-PCS | Mod: ,,, | Performed by: INTERNAL MEDICINE

## 2022-03-02 PROCEDURE — 93005 ELECTROCARDIOGRAM TRACING: CPT

## 2022-03-02 PROCEDURE — 93010 ELECTROCARDIOGRAM REPORT: CPT | Mod: ,,, | Performed by: INTERNAL MEDICINE

## 2022-03-02 PROCEDURE — 36415 COLL VENOUS BLD VENIPUNCTURE: CPT | Performed by: ANESTHESIOLOGY

## 2022-03-02 PROCEDURE — 85025 COMPLETE CBC W/AUTO DIFF WBC: CPT | Performed by: ANESTHESIOLOGY

## 2022-03-02 PROCEDURE — 80048 BASIC METABOLIC PNL TOTAL CA: CPT | Performed by: ANESTHESIOLOGY

## 2022-03-02 RX ORDER — SODIUM CHLORIDE, SODIUM LACTATE, POTASSIUM CHLORIDE, CALCIUM CHLORIDE 600; 310; 30; 20 MG/100ML; MG/100ML; MG/100ML; MG/100ML
INJECTION, SOLUTION INTRAVENOUS CONTINUOUS
Status: CANCELLED | OUTPATIENT
Start: 2022-03-02

## 2022-03-02 RX ORDER — LIDOCAINE HYDROCHLORIDE 10 MG/ML
0.5 INJECTION, SOLUTION EPIDURAL; INFILTRATION; INTRACAUDAL; PERINEURAL ONCE
Status: CANCELLED | OUTPATIENT
Start: 2022-03-02 | End: 2022-03-02

## 2022-03-02 RX ORDER — FAMOTIDINE 20 MG/1
20 TABLET, FILM COATED ORAL
Status: CANCELLED | OUTPATIENT
Start: 2022-03-02 | End: 2022-03-02

## 2022-03-02 NOTE — DISCHARGE INSTRUCTIONS
Information to Prepare you for your Surgery    PRE-ADMIT TESTING -  [phone]    [address] [facility] [place]          Your surgery has been scheduled at [facility]. We are pleased to have the opportunity to serve you. For Further Information please call [phone].    On the day of surgery please report to the Information Desk on the 1st floor.    CONTACT YOUR PHYSICIAN'S OFFICE THE DAY PRIOR TO YOUR SURGERY TO OBTAIN YOUR ARRIVAL TIME.     The evening before surgery do not eat anything after 9 p.m. ( this includes hard candy, chewing gum and mints).  You may only have GATORADE, POWERADE AND WATER  from 9 p.m. until you leave your home.   DO NOT DRINK ANY LIQUIDS ON THE WAY TO THE HOSPITAL.      Why does your anesthesiologist allow you to drink Gatorade/Powerade before surgery?  Gatorade/Powerade helps to increase your comfort before surgery and to decrease your nausea after surgery. The carbohydrates in Gatorade/Powerade help reduce your body's stress response to surgery.  If you are a diabetic-drink only water prior to surgery.      Current Visitor policy(12/27/2021) - Patients may have 1 adult visitor pre and post procedure. Only 1 visitor will be allowed in the Surgical building with the patient.     SPECIAL MEDICATION INSTRUCTIONS: TAKE medications checked off by the Anesthesiologist on your Medication List.    Angiogram Patients: Take medications as instructed by your physician, including aspirin.     Surgery Patients:    If you take ASPIRIN - Your PHYSICIAN/SURGEON will need to inform you IF/OR when you need to stop taking aspirin prior to your surgery.     Do Not take any medications containing IBUPROFEN.    Do Not Wear any make-up (especially eye make-up) to surgery. Please remove any false eyelashes or eyelash extensions. If you arrive the day of surgery with makeup/eyelashes on you will be required to remove prior to surgery. (There is a risk of  corneal abrasions if eye makeup/eyelash extensions are not removed)      Leave all valuables at home.   Do Not wear any jewelry or watches, including any metal in body piercings. Jewelry must be removed prior to coming to the hospital.  There is a possibility that rings that are unable to be removed may be cut off if they are on the surgical extremity.    Please remove all hair extensions, wigs, clips and any other metal accessories/ ornaments from your hair.  These items may pose a flammable/fire risk in Surgery and must be removed.    Do not shave your surgical area at least 5 days prior to your surgery. The surgical prep will be performed at the hospital according to Infection Control regulations.    Contact Lens must be removed before surgery. Either do not wear the contact lens or bring a case and solution for storage.  Please bring a container for eyeglasses or dentures as required.  Bring any paperwork your physician has provided, such as consent forms,  history and physicals, doctor's orders, etc.   Bring comfortable clothes that are loose fitting to wear upon discharge. Take into consideration the type of surgery being performed.  Maintain your diet as advised per your physician the day prior to surgery.      Adequate rest the night before surgery is advised.   Park in the Parking lot behind the hospital or in the [employer] [facility] across the street from the parking lot. Parking is complimentary.  If you will be discharged the same day as your procedure, please arrange for a responsible adult to drive you home or to accompany you if traveling by taxi.   YOU WILL NOT BE PERMITTED TO DRIVE OR TO LEAVE THE HOSPITAL ALONE AFTER SURGERY.   If you are being discharged the same day, it is strongly recommended that you arrange for someone to remain with you for the first 24 hrs following your surgery.    The Surgeon will speak to your family/visitor after your surgery regarding the outcome of your surgery and  post op care.  The Surgeon may speak to you after your surgery, but there is a possibility you may not remember the details.  Please check with your family members regarding the conversation with the Surgeon.    We strongly recommend whoever is bringing you home be present for discharge instructions.  This will ensure a thorough understanding for your post op home care.    ALL CHILDREN MUST ALWAYS BE ACCOMPANIED BY AN ADULT.    Visitors-Refer to current Visitor policy handouts.    Thank you for your cooperation.  The Staff of [facility].            Bathing Instructions with Hibiclens    Shower the evening before and morning of your procedure with Hibiclens:  Wash your face with water and your regular face wash/soap  Apply Hibiclens directly on your skin or on a wet washcloth and wash gently. When showering: Move away from the shower stream when applying Hibiclens to avoid rinsing off too soon.  Rinse thoroughly with warm water  Do not dilute Hibiclens        Dry off as usual, do not use any deodorant, powder, body lotions, perfume, after shave or cologne.

## 2022-03-05 ENCOUNTER — PATIENT MESSAGE (OUTPATIENT)
Dept: INTERNAL MEDICINE | Facility: CLINIC | Age: 47
End: 2022-03-05
Payer: MEDICAID

## 2022-03-05 DIAGNOSIS — E11.59 HYPERTENSION ASSOCIATED WITH DIABETES: ICD-10-CM

## 2022-03-05 DIAGNOSIS — I15.2 HYPERTENSION ASSOCIATED WITH DIABETES: ICD-10-CM

## 2022-03-06 RX ORDER — LOSARTAN POTASSIUM 100 MG/1
100 TABLET ORAL DAILY
Qty: 30 TABLET | Refills: 1 | Status: CANCELLED | OUTPATIENT
Start: 2022-03-06

## 2022-03-06 NOTE — TELEPHONE ENCOUNTER
No new care gaps identified.  Powered by [employer] by to [employer]. Reference number: [id number].   3/06/2022 3:10:08 PM CST

## 2022-03-11 ENCOUNTER — PATIENT OUTREACH (OUTPATIENT)
Dept: ADMINISTRATIVE | Facility: OTHER | Age: 47
End: 2022-03-11
Payer: MEDICAID

## 2022-03-11 NOTE — PROGRESS NOTES
LINKS immunization registry updated  Care Everywhere updated  Health Maintenance updated  Chart reviewed for overdue Proactive [facility]ters ([facility]) health maintenance testing (CRS, Breast Ca, Diabetic Eye Exam)   Orders entered:N/A

## 2022-03-16 ENCOUNTER — OFFICE VISIT (OUTPATIENT)
Dept: CARDIOLOGY | Facility: CLINIC | Age: 47
End: 2022-03-16
Payer: MEDICAID

## 2022-03-16 VITALS
BODY MASS INDEX: 51.91 KG/M2 | WEIGHT: 293 LBS | DIASTOLIC BLOOD PRESSURE: 62 MMHG | HEIGHT: 63 IN | HEART RATE: 67 BPM | SYSTOLIC BLOOD PRESSURE: 120 MMHG | OXYGEN SATURATION: 95 %

## 2022-03-16 DIAGNOSIS — G47.37 CENTRAL SLEEP APNEA DUE TO MEDICAL CONDITION: ICD-10-CM

## 2022-03-16 DIAGNOSIS — I27.20 PULMONARY HYPERTENSION: Primary | ICD-10-CM

## 2022-03-16 DIAGNOSIS — I10 ESSENTIAL HYPERTENSION: ICD-10-CM

## 2022-03-16 DIAGNOSIS — E66.2 MORBID (SEVERE) OBESITY WITH ALVEOLAR HYPOVENTILATION: ICD-10-CM

## 2022-03-16 DIAGNOSIS — E11.9 DIABETES MELLITUS TYPE 2 WITHOUT RETINOPATHY: ICD-10-CM

## 2022-03-16 DIAGNOSIS — I27.81 CHRONIC COR PULMONALE: ICD-10-CM

## 2022-03-16 PROCEDURE — 4010F PR ACE/ARB THEARPY RXD/TAKEN: ICD-10-PCS | Mod: CPTII,,, | Performed by: INTERNAL MEDICINE

## 2022-03-16 PROCEDURE — 1160F PR REVIEW ALL MEDS BY PRESCRIBER/CLIN PHARMACIST DOCUMENTED: ICD-10-PCS | Mod: CPTII,,, | Performed by: INTERNAL MEDICINE

## 2022-03-16 PROCEDURE — 3078F DIAST BP <80 MM HG: CPT | Mod: CPTII,,, | Performed by: INTERNAL MEDICINE

## 2022-03-16 PROCEDURE — 3008F PR BODY MASS INDEX (BMI) DOCUMENTED: ICD-10-PCS | Mod: CPTII,,, | Performed by: INTERNAL MEDICINE

## 2022-03-16 PROCEDURE — 1160F RVW MEDS BY RX/DR IN RCRD: CPT | Mod: CPTII,,, | Performed by: INTERNAL MEDICINE

## 2022-03-16 PROCEDURE — 4010F ACE/ARB THERAPY RXD/TAKEN: CPT | Mod: CPTII,,, | Performed by: INTERNAL MEDICINE

## 2022-03-16 PROCEDURE — 99214 OFFICE O/P EST MOD 30 MIN: CPT | Mod: PBBFAC,PN | Performed by: INTERNAL MEDICINE

## 2022-03-16 PROCEDURE — 3078F PR MOST RECENT DIASTOLIC BLOOD PRESSURE < 80 MM HG: ICD-10-PCS | Mod: CPTII,,, | Performed by: INTERNAL MEDICINE

## 2022-03-16 PROCEDURE — 3074F PR MOST RECENT SYSTOLIC BLOOD PRESSURE < 130 MM HG: ICD-10-PCS | Mod: CPTII,,, | Performed by: INTERNAL MEDICINE

## 2022-03-16 PROCEDURE — 1159F MED LIST DOCD IN RCRD: CPT | Mod: CPTII,,, | Performed by: INTERNAL MEDICINE

## 2022-03-16 PROCEDURE — 99215 OFFICE O/P EST HI 40 MIN: CPT | Mod: S$PBB,,, | Performed by: INTERNAL MEDICINE

## 2022-03-16 PROCEDURE — 3008F BODY MASS INDEX DOCD: CPT | Mod: CPTII,,, | Performed by: INTERNAL MEDICINE

## 2022-03-16 PROCEDURE — 99999 PR PBB SHADOW E&M-EST. PATIENT-LVL IV: ICD-10-PCS | Mod: PBBFAC,,, | Performed by: INTERNAL MEDICINE

## 2022-03-16 PROCEDURE — 99999 PR PBB SHADOW E&M-EST. PATIENT-LVL IV: CPT | Mod: PBBFAC,,, | Performed by: INTERNAL MEDICINE

## 2022-03-16 PROCEDURE — 3074F SYST BP LT 130 MM HG: CPT | Mod: CPTII,,, | Performed by: INTERNAL MEDICINE

## 2022-03-16 PROCEDURE — 1159F PR MEDICATION LIST DOCUMENTED IN MEDICAL RECORD: ICD-10-PCS | Mod: CPTII,,, | Performed by: INTERNAL MEDICINE

## 2022-03-16 PROCEDURE — 99215 PR OFFICE/OUTPT VISIT, EST, LEVL V, 40-54 MIN: ICD-10-PCS | Mod: S$PBB,,, | Performed by: INTERNAL MEDICINE

## 2022-03-16 NOTE — PROGRESS NOTES
"  Subjective:      Patient ID: Halley Moreno is a 46 y.o. female.    Chief Complaint: Pre-op Exam (Clearance for D & C)    HPI:  Pt is scheduled for D and C due to a heavy period after having no menses for 12 years.  An ultrasound showed a thickened endometrium.    Dr Dixie Rod is surgeon.    Pt works out on an elliptical bike 2 miles 3 days a week.    Pt walks at work. Pt works at [employer].  Pt works as a .    Pt is mostly limited by low back pain when walking.    Pt wears CPAP hs    Review of Systems   Cardiovascular: Negative for chest pain, claudication, dyspnea on exertion, irregular heartbeat, leg swelling, near-syncope, orthopnea, palpitations and syncope.        Past Medical History:   Diagnosis Date    Acute respiratory failure with hypoxia and hypercapnia 10/22/2020    Last Assessment & Plan:  Patient is a chronic CO2 retainer in setting of obesity hypoventilation syndrome 2/2 morbid obesity. Stepped down from ICU 10/26, currently continuing diuresis. De-escalated to NC during the day with BiPAP nightly on 10/31, which patient tolerated well. On Lasix gtt for diuresis through .   Plan:  - Target SpO2 of >88%. Continue BiPAP nightly. Patient on 3LNC, will con    Back pain     BMI 70 and over, adult     CHF (congestive heart failure) 10/2020    Depression     Diabetes mellitus     DM (diabetes mellitus) type II controlled with renal manifestation     HTN (hypertension)     Hyperlipidemia     Lumbar disc disease     Morbid obesity     Rosacea     Sleep apnea     Tear of medial meniscus of right knee, current 2017        Past Surgical History:   Procedure Laterality Date     SECTION       SECTION  2000    DILATION AND CURETTAGE OF UTERUS  2010    AUB "negative path" per patient    ENDOMETRIAL ABLATION  2010    Patient unsure if procedure was performed       Family History   Problem Relation Age of Onset    Hypertension " Mother     Diabetes Father     Lymphoma Father         cns lymphoma    Heart disease Paternal Grandfather     Colon cancer Neg Hx     Ovarian cancer Neg Hx     Breast cancer Neg Hx     Amblyopia Neg Hx     Blindness Neg Hx     Cataracts Neg Hx     Glaucoma Neg Hx     Macular degeneration Neg Hx     Retinal detachment Neg Hx     Strabismus Neg Hx     Stroke Neg Hx     Thyroid disease Neg Hx     Uterine cancer Neg Hx        Social History     Socioeconomic History    Marital status: Single   Tobacco Use    Smoking status: Never Smoker    Smokeless tobacco: Never Used   Substance and Sexual Activity    Alcohol use: No    Drug use: No    Sexual activity: Yes     Partners: Male     Birth control/protection: Surgical   Social History Narrative    She is a pharmacy tech, works at [employer] in [place].  Nonsmoker, social etoh.  No exercise.       Current Outpatient Medications on File Prior to Visit   Medication Sig Dispense Refill    atorvastatin (LIPITOR) 10 MG tablet Take 1 tablet (10 mg total) by mouth once daily. 90 tablet 3    blood sugar diagnostic Strp 1 strip by Misc.(Non-Drug; Combo Route) route 2 (two) times daily. Please provide items covered by patient's insurance 100 strip 5    cholecalciferol, vitamin D3, (VITAMIN D3) 1,000 unit capsule Take 2 capsules (2,000 Units total) by mouth once daily.  0    clotrimazole-betamethasone (LOTRISONE) lotion Apply topically 2 (two) times daily. 30 mL 0    furosemide (LASIX) 80 MG tablet TAKE ONE TABLET BY MOUTH EVERY DAY. 90 tablet 1    glipiZIDE (GLUCOTROL) 5 MG TR24 TAKE ONE TABLET BY MOUTH ONCE DAILY WITH BREAKFAST 90 tablet 1    lancets Misc 1 lancet by Misc.(Non-Drug; Combo Route) route 2 (two) times daily. 100 each 5    latanoprost 0.005 % ophthalmic solution Place 1 drop into both eyes every evening. 7.5 mL 3    losartan (COZAAR) 100 MG tablet Take 1 tablet (100 mg total) by mouth once daily. 30 tablet 1    methocarbamoL (ROBAXIN)  "750 MG Tab TAKE ONE TABLET BY MOUTH TWICE DAILY AS NEEDED FOR MUSCLE SPASMS 60 tablet 1    miconazole NITRATE 2 % (MICOTIN) 2 % top powder Apply topically 2 (two) times daily. Apply to skin folds. 85 g 1    multivitamin (THERAGRAN) per tablet Take 1 tablet by mouth once daily.      ONETOUCH ULTRA2 METER Misc       pen needle, diabetic 32 gauge x 1/4" Ndle 1 each by Misc.(Non-Drug; Combo Route) route once daily. Pt uses one pen needle weekly for injection of Ozempic 12 each 3    semaglutide (OZEMPIC) 1 mg/dose (4 mg/3 mL) Inject 1 mg into the skin every 7 days. 3 pen 1    triamcinolone acetonide 0.1% (KENALOG) 0.1 % Lotn Apply topically 3 (three) times daily. 60 mL 0     No current facility-administered medications on file prior to visit.       Review of patient's allergies indicates:   Allergen Reactions    Victoza [liraglutide] Swelling     Objective:     Vitals:    03/16/22 0710   BP: 120/62   BP Location: Left arm   Patient Position: Sitting   BP Method: Large (Automatic)   Pulse: 67   SpO2: 95%   Weight: (!) 167 kg (368 lb 2.7 oz)   Height: 5' 3" (1.6 m)        Physical Exam  Vitals reviewed.   Constitutional:       Appearance: She is well-developed. She is obese.   Eyes:      General: No scleral icterus.  Neck:      Vascular: No carotid bruit or JVD.   Cardiovascular:      Rate and Rhythm: Normal rate and regular rhythm.      Heart sounds: No murmur heard.    No gallop.   Pulmonary:      Breath sounds: Normal breath sounds.   Musculoskeletal:      Right lower leg: Edema (trivial bilateral with bilateral stasis changes) present.      Left lower leg: Edema present.   Skin:     General: Skin is warm and dry.   Neurological:      Mental Status: She is alert and oriented to person, place, and time.   Psychiatric:         Behavior: Behavior normal.         Thought Content: Thought content normal.         Judgment: Judgment normal.                Wt down 20 lbs over the past year.      ECG 3/2/22:  NSR, LAHB, " unchanged compared with 1/21, reviewed by [facility] Outpatient Visit on 03/02/2022   Component Date Value Ref Range Status    WBC 03/02/2022 10.78  3.90 - 12.70 K/uL Final    RBC 03/02/2022 4.10  4.00 - 5.40 M/uL Final    Hemoglobin 03/02/2022 12.0  12.0 - 16.0 g/dL Final    Hematocrit 03/02/2022 36.2 (A) 37.0 - 48.5 % Final    MCV 03/02/2022 88  82 - 98 fL Final    MCH 03/02/2022 29.3  27.0 - 31.0 pg Final    MCHC 03/02/2022 33.1  32.0 - 36.0 g/dL Final    RDW 03/02/2022 14.5  11.5 - 14.5 % Final    Platelets 03/02/2022 200  150 - 450 K/uL Final    MPV 03/02/2022 9.9  9.2 - 12.9 fL Final    Immature Granulocytes 03/02/2022 1.1 (A) 0.0 - 0.5 % Final    Gran # (ANC) 03/02/2022 7.9 (A) 1.8 - 7.7 K/uL Final    Immature Grans (Abs) 03/02/2022 0.12 (A) 0.00 - 0.04 K/uL Final    Lymph # 03/02/2022 1.9  1.0 - 4.8 K/uL Final    Mono # 03/02/2022 0.7  0.3 - 1.0 K/uL Final    Eos # 03/02/2022 0.1  0.0 - 0.5 K/uL Final    Baso # 03/02/2022 0.02  0.00 - 0.20 K/uL Final    nRBC 03/02/2022 0  0 /100 WBC Final    Gran % 03/02/2022 72.9  38.0 - 73.0 % Final    Lymph % 03/02/2022 18.0  18.0 - 48.0 % Final    Mono % 03/02/2022 6.8  4.0 - 15.0 % Final    Eosinophil % 03/02/2022 1.0  0.0 - 8.0 % Final    Basophil % 03/02/2022 0.2  0.0 - 1.9 % Final    Differential Method 03/02/2022 Automated   Final    Sodium 03/02/2022 141  136 - 145 mmol/L Final    Potassium 03/02/2022 4.4  3.5 - 5.1 mmol/L Final    Chloride 03/02/2022 107  95 - 110 mmol/L Final    CO2 03/02/2022 23  23 - 29 mmol/L Final    Glucose 03/02/2022 124 (A) 70 - 110 mg/dL Final    BUN 03/02/2022 12  6 - 20 mg/dL Final    Creatinine 03/02/2022 0.8  0.5 - 1.4 mg/dL Final    Calcium 03/02/2022 9.8  8.7 - 10.5 mg/dL Final    Anion Gap 03/02/2022 11  8 - 16 mmol/L Final    eGFR if African American 03/02/2022 >60  >60 mL/min/1.73 m^2 Final    eGFR if non African American 03/02/2022 >60  >60 mL/min/1.73 m^2 Final   Lab Visit on 01/04/2022    Component Date Value Ref Range Status    Estradiol 01/04/2022 21  See Text pg/mL Final    Follicle Stimulating Hormone 01/04/2022 6.78  See Text mIU/mL Final    LH 01/04/2022 3.0  See Text mIU/mL Final   Lab Visit on 11/01/2021   Component Date Value Ref Range Status    Sodium 11/01/2021 141  136 - 145 mmol/L Final    Potassium 11/01/2021 3.7  3.5 - 5.1 mmol/L Final    Chloride 11/01/2021 102  95 - 110 mmol/L Final    CO2 11/01/2021 26  23 - 29 mmol/L Final    Glucose 11/01/2021 118 (A) 70 - 110 mg/dL Final    BUN 11/01/2021 10  6 - 20 mg/dL Final    Creatinine 11/01/2021 0.7  0.5 - 1.4 mg/dL Final    Calcium 11/01/2021 9.8  8.7 - 10.5 mg/dL Final    Anion Gap 11/01/2021 13  8 - 16 mmol/L Final    eGFR if African American 11/01/2021 >60.0  >60 mL/min/1.73 m^2 Final    eGFR if non African American 11/01/2021 >60.0  >60 mL/min/1.73 m^2 Final    Hemoglobin A1C 11/01/2021 5.9 (A) 4.0 - 5.6 % Final    Estimated Avg Glucose 11/01/2021 123  68 - 131 mg/dL Final   (  Accession #: [id number]    Transthoracic echo (TTE) complete with contrast  Order# [id number]  Reading physician: Nikolai Champion MD Ordering physician: Yaniv Jorgensen MD Study date: 10/23/20       Reason for Exam  Priority: STAT  Dx: Right heart failure due to pulmonary hypertension [I27.29, I50.810 (ICD-10-CM)]     Result Image Hyperlink     Show images for Echo Color Flow Doppler? Yes    Summary    · Poor endocardial visualization. Contrast used.  · The left ventricle is small with hyperdynamic systolic function. The estimated ejection fraction is 75%.  · Mildly to moderately reduced right ventricular systolic function.  · Septal wall has abnormal motion. There is systolic flattening of the interventricular septum consistent with right ventricle pressure overload.  · Moderate right ventricular enlargement.  · Mild to moderate tricuspid regurgitation.  · Normal left ventricular diastolic function.  · Elevated central venous pressure (15  "mmHg).  · The estimated PA systolic pressure is 109 mmHg.         Vitals    Height Weight     Accession #: [id number]    Transthoracic echo (TTE) complete with contrast  Order# [id number]  Reading physician: Pavel Jason MD Ordering physician: Rao Szymanski MD Study date: 11/5/20       Reason for Exam  Priority: Routine  Dx: Right heart failure [I50.810 (ICD-10-CM)]   Comments: Echo done on admission, repeating now that pt is relatively euvolemic     Result Image Hyperlink     Show images for Echo Color Flow Doppler? Yes    Summary    · The left ventricle is normal in size with normal systolic function. The estimated ejection fraction is 65%.  · Moderate right ventricular enlargement with mildly to moderately reduced right ventricular systolic function.  · Normal left ventricular diastolic function.  · Mild right atrial enlargement.         Vitals    Height Weight BMI (Calculated) BSA (Calculated - sq m) BP Pulse   5' 3" (1.6 m) 175.5 kg (387 lb) 68.6 2.79 sq meters 96/61 81     Study Details    A complete echo was performed using complete 2D, color flow Doppler and spectral Doppler. During the study, the apical, parasternal, subcostal and suprasternal views were captured.     Echocardiography Findings      Left Ventricle    The left ventricle is normal in size with normal systolic function. The estimated ejection fraction is 65%. There is left ventricular concentric remodeling. There is normal diastolic function. There is normal wall motion.     Right Ventricle    The right ventricle is moderately dilated. The systolic function is mildly to moderately reduced.     Left Atrium    The left atrial volume index is normal. Left atrial volume index is 19.4 mL/m2.     Right Atrium    There is mild right atrial enlargement.     Aortic Valve    The aortic valve appears structurally normal. There is normal leaflet mobility.     Mitral Valve    The mitral valve appears structurally normal. There is normal leaflet " mobility.  The estimated mitral valve area by pressure half time is 3.26 cm2.     Tricuspid Valve    The tricuspid valve is not well visualized due to poor sonic window.     Pulmonic Valve    The pulmonic valve was not well visualized.     Ascending Aorta    The aortic root and ascending aorta are normal in size.     Pericardium and Other Findings    There is no pericardial effusion.         Status: Final result       MyChart Results Release    [email or web address] Status: Active  Results Release       PACS Images for [employer] Viewer     Show images for X-Ray Chest AP Portable    X-Ray Chest AP Portable  Order: [id number]  · Status: Final result     · Visible to patient: Yes (not seen)      · Next appt: 03/28/2022 at 07:45 AM in Lab (LAB, [facility])     · 0 Result Notes      Details    Reading Physician Reading Date Result Priority   Pavel Hernandes MD  [phone]  [phone] 10/22/2020 STAT     Narrative & Impression  EXAMINATION:  XR CHEST AP PORTABLE     CLINICAL HISTORY:  CHF;     TECHNIQUE:  Single frontal view of the chest was performed.     COMPARISON:  11/08/2019     FINDINGS:  Cardiac size is mildly enlarged.  Pulmonary vascularity is a little increased.  No obvious pleural effusion is seen on this portable film.     Impression:     See above        Electronically signed by: Pavel Hernandes MD  Date:                                            10/22/2020  Time:                                           12:48               Exam Ended: 10/22/20 12:42 Last Resulted: 10/22/20 12:48               US Pelvis Comp with Transvag NON-OB (xpd    Status: Final result       MyChart Results Release    Bitstamp Status: Active  Results Release       PACS Images for [employer]Fidelity Viewer     Show images for US Pelvis Comp with Transvag NON-OB (xpd     Contains abnormal data US Pelvis Comp with Transvag NON-OB (xpd  Order: [id number]  · Status: Final result     · Visible to patient: Yes (seen)      · Next appt: 03/28/2022  at 07:45 AM in Lab (LAB, [place])     · Dx: Vaginal bleeding      · 0 Result Notes      Details    Reading Physician Reading Date Result Priority   Manuel Ventura MD  [phone]  [phone] 1/19/2022 Routine   Coco Alvarenga MD  [phone]  [phone] 1/19/2022      Narrative & Impression  EXAMINATION:  US PELVIS COMP WITH TRANSVAG NON-OB (XPD)     CLINICAL HISTORY:  Abnormal uterine and vaginal bleeding, unspecified     TECHNIQUE:  Transabdominal sonography of the pelvis was performed, followed by transvaginal sonography to better evaluate the uterus and ovaries.     COMPARISON:  Pelvic ultrasound 02/21/2017.     FINDINGS:  Examination is technically limited due to patient's body habitus.     Uterus:     Size: 11.6 x 7.4 x 6.9 cm     Masses: Probable small fibroid measuring 0.7 x 0.8 x 0.7 cm.     Endometrium: Thickened in this Candice menopausal patient, measuring 10 mm.     The bilateral ovaries were not visualized.  No abnormal adnexal masses within the limitations of this exam.     Free Fluid:     None.     Impression:     Technically limited exam with nonvisualization of the ovaries.     Endometrial thickening in this perimenopausal patient, measuring up to 10 mm.  Clinical correlation and further evaluation with endometrial sampling is recommended.     This report was flagged in Epic as abnormal.     Electronically signed by resident: Coco Alvarenga MD  Date:                                            01/19/2022  Time:                                           14:22     Electronically signed by: Manuel Ventura MD  Date:                                            01/19/2022  Time:                                           14:35               Exam Ended: 01/19/22 13:50 Last Resulted: 01/19/22 14:35      ·  Order Details      ·  View Encounter      ·  Lab and Collection Details      ·  Routing     ·  Result History              Result Care Coordination        Patient Communication     Add Comments                 Assessment:     1. Pulmonary hypertension    2. Chronic cor pulmonale    3. Essential hypertension    4. Diabetes mellitus type 2 without retinopathy    5. BMI 60.0-69.9, adult    6. Morbid (severe) obesity with alveolar hypoventilation    7. Central sleep apnea due to medical condition      Plan:   Halley was seen today for pre-op exam.    Diagnoses and all orders for this visit:    Pulmonary hypertension  -     Echo Saline Bubble? No; Future  -     X-Ray Chest PA And Lateral; Future    Chronic cor pulmonale  -     Echo Saline Bubble? No; Future  -     X-Ray Chest PA And Lateral; Future    Essential hypertension    Diabetes mellitus type 2 without retinopathy    BMI 60.0-69.9, adult    Morbid (severe) obesity with alveolar hypoventilation    Central sleep apnea due to medical condition    Corpulmonale due to morbid obesity and hypoventilation is well compensated.     Pt is clinically stable for needed D and C    Pt is at increased risk due to potential for hypoventilation jason-operatively    Will repeat the echocardiogram and CXR pre-operatively.      Low carb diet counseled in detail    Exercise    Follow up in about 6 months (around 9/16/2022).

## 2022-03-18 ENCOUNTER — PATIENT MESSAGE (OUTPATIENT)
Dept: ADMINISTRATIVE | Facility: HOSPITAL | Age: 47
End: 2022-03-18
Payer: MEDICAID

## 2022-03-18 DIAGNOSIS — Z12.11 SCREENING FOR COLON CANCER: ICD-10-CM

## 2022-03-29 ENCOUNTER — PATIENT MESSAGE (OUTPATIENT)
Dept: INTERNAL MEDICINE | Facility: CLINIC | Age: 47
End: 2022-03-29
Payer: MEDICAID

## 2022-03-29 DIAGNOSIS — E11.9 TYPE 2 DIABETES MELLITUS WITHOUT COMPLICATION, WITHOUT LONG-TERM CURRENT USE OF INSULIN: ICD-10-CM

## 2022-03-30 ENCOUNTER — PATIENT MESSAGE (OUTPATIENT)
Dept: INTERNAL MEDICINE | Facility: CLINIC | Age: 47
End: 2022-03-30
Payer: MEDICAID

## 2022-03-30 RX ORDER — GLIPIZIDE 5 MG/1
TABLET, FILM COATED, EXTENDED RELEASE ORAL
Qty: 90 TABLET | Refills: 1 | Status: SHIPPED | OUTPATIENT
Start: 2022-03-30 | End: 2023-03-21

## 2022-03-30 NOTE — TELEPHONE ENCOUNTER
No new care gaps identified.  Powered by [employer]. Reference number: [id number].   3/30/2022 9:13:27 AM CDT

## 2022-03-31 ENCOUNTER — TELEPHONE (OUTPATIENT)
Dept: CARDIOLOGY | Facility: CLINIC | Age: 47
End: 2022-03-31
Payer: MEDICAID

## 2022-03-31 ENCOUNTER — LAB VISIT (OUTPATIENT)
Dept: LAB | Facility: HOSPITAL | Age: 47
End: 2022-03-31
Attending: INTERNAL MEDICINE
Payer: MEDICAID

## 2022-03-31 DIAGNOSIS — E11.9 TYPE 2 DIABETES MELLITUS WITHOUT COMPLICATION, WITHOUT LONG-TERM CURRENT USE OF INSULIN: ICD-10-CM

## 2022-03-31 DIAGNOSIS — I15.2 HYPERTENSION ASSOCIATED WITH DIABETES: ICD-10-CM

## 2022-03-31 DIAGNOSIS — N93.9 VAGINAL BLEEDING: ICD-10-CM

## 2022-03-31 DIAGNOSIS — E11.59 HYPERTENSION ASSOCIATED WITH DIABETES: ICD-10-CM

## 2022-03-31 LAB
ANION GAP SERPL CALC-SCNC: 9 MMOL/L (ref 8–16)
BASOPHILS # BLD AUTO: 0.03 K/UL (ref 0–0.2)
BASOPHILS NFR BLD: 0.3 % (ref 0–1.9)
BUN SERPL-MCNC: 13 MG/DL (ref 6–20)
CALCIUM SERPL-MCNC: 9.8 MG/DL (ref 8.7–10.5)
CHLORIDE SERPL-SCNC: 102 MMOL/L (ref 95–110)
CO2 SERPL-SCNC: 29 MMOL/L (ref 23–29)
CREAT SERPL-MCNC: 0.7 MG/DL (ref 0.5–1.4)
DIFFERENTIAL METHOD: ABNORMAL
EOSINOPHIL # BLD AUTO: 0.1 K/UL (ref 0–0.5)
EOSINOPHIL NFR BLD: 0.8 % (ref 0–8)
ERYTHROCYTE [DISTWIDTH] IN BLOOD BY AUTOMATED COUNT: 14.8 % (ref 11.5–14.5)
EST. GFR  (AFRICAN AMERICAN): >60 ML/MIN/1.73 M^2
EST. GFR  (NON AFRICAN AMERICAN): >60 ML/MIN/1.73 M^2
ESTIMATED AVG GLUCOSE: 117 MG/DL (ref 68–131)
GLUCOSE SERPL-MCNC: 112 MG/DL (ref 70–110)
HBA1C MFR BLD: 5.7 % (ref 4–5.6)
HCT VFR BLD AUTO: 38.4 % (ref 37–48.5)
HGB BLD-MCNC: 12.3 G/DL (ref 12–16)
IMM GRANULOCYTES # BLD AUTO: 0.08 K/UL (ref 0–0.04)
IMM GRANULOCYTES NFR BLD AUTO: 0.8 % (ref 0–0.5)
LYMPHOCYTES # BLD AUTO: 1.8 K/UL (ref 1–4.8)
LYMPHOCYTES NFR BLD: 17.1 % (ref 18–48)
MCH RBC QN AUTO: 28.7 PG (ref 27–31)
MCHC RBC AUTO-ENTMCNC: 32 G/DL (ref 32–36)
MCV RBC AUTO: 90 FL (ref 82–98)
MONOCYTES # BLD AUTO: 0.8 K/UL (ref 0.3–1)
MONOCYTES NFR BLD: 7.1 % (ref 4–15)
NEUTROPHILS # BLD AUTO: 7.9 K/UL (ref 1.8–7.7)
NEUTROPHILS NFR BLD: 73.9 % (ref 38–73)
NRBC BLD-RTO: 0 /100 WBC
PLATELET # BLD AUTO: 227 K/UL (ref 150–450)
PMV BLD AUTO: 11.1 FL (ref 9.2–12.9)
POTASSIUM SERPL-SCNC: 4.3 MMOL/L (ref 3.5–5.1)
RBC # BLD AUTO: 4.28 M/UL (ref 4–5.4)
SODIUM SERPL-SCNC: 140 MMOL/L (ref 136–145)
WBC # BLD AUTO: 10.63 K/UL (ref 3.9–12.7)

## 2022-03-31 PROCEDURE — 83036 HEMOGLOBIN GLYCOSYLATED A1C: CPT | Performed by: INTERNAL MEDICINE

## 2022-03-31 PROCEDURE — 85025 COMPLETE CBC W/AUTO DIFF WBC: CPT | Performed by: INTERNAL MEDICINE

## 2022-03-31 PROCEDURE — 80048 BASIC METABOLIC PNL TOTAL CA: CPT | Performed by: INTERNAL MEDICINE

## 2022-03-31 PROCEDURE — 36415 COLL VENOUS BLD VENIPUNCTURE: CPT | Mod: PO | Performed by: INTERNAL MEDICINE

## 2022-03-31 NOTE — TELEPHONE ENCOUNTER
Spoke with patient.  Rescheduled missed appointments.        ----- Message from Shannon Vasques MA sent at 3/30/2022  2:24 PM CDT -----  Contact: Patient    ----- Message -----  From: Stefania Basilio  Sent: 3/30/2022   1:06 PM CDT  To: [facility]QUE[facility] Staff    Patient phone in regarding missed appointment     Please reinstated order to reschedule appointment       Please assist    Patient can be reach at [phone]

## 2022-04-01 DIAGNOSIS — R93.89 THICKENED ENDOMETRIUM: Primary | ICD-10-CM

## 2022-04-01 DIAGNOSIS — Z87.42 H/O AMENORRHEA: ICD-10-CM

## 2022-04-01 DIAGNOSIS — N93.9 VAGINAL BLEEDING: ICD-10-CM

## 2022-04-01 NOTE — PROGRESS NOTES
Called to review cardiology notes with patient.  Pushed echo back to 4/12.  Will schedule for preop 5/31 and plan for hysteroscopy D&C and IUD placement 6/16.         Dixie Rod MD  [facility]  04/01/2022

## 2022-04-04 ENCOUNTER — PATIENT MESSAGE (OUTPATIENT)
Dept: INTERNAL MEDICINE | Facility: CLINIC | Age: 47
End: 2022-04-04
Payer: MEDICAID

## 2022-04-04 DIAGNOSIS — E11.9 TYPE 2 DIABETES MELLITUS WITHOUT COMPLICATION, WITHOUT LONG-TERM CURRENT USE OF INSULIN: ICD-10-CM

## 2022-04-04 RX ORDER — SEMAGLUTIDE 1.34 MG/ML
1 INJECTION, SOLUTION SUBCUTANEOUS
Qty: 3 PEN | Refills: 1 | Status: SHIPPED | OUTPATIENT
Start: 2022-04-04 | End: 2022-05-26 | Stop reason: SDUPTHER

## 2022-04-04 NOTE — TELEPHONE ENCOUNTER
No new care gaps identified.  Powered by [employer]. Reference number: [id number].   4/04/2022 9:29:31 AM CDT

## 2022-04-05 RX ORDER — SEMAGLUTIDE 1.34 MG/ML
INJECTION, SOLUTION SUBCUTANEOUS
Qty: 3 PEN | Refills: 1 | Status: SHIPPED | OUTPATIENT
Start: 2022-04-05 | End: 2022-05-26 | Stop reason: SDUPTHER

## 2022-04-06 NOTE — TELEPHONE ENCOUNTER
Refill Authorization Note   Halley Moreno  is requesting a refill authorization.  Brief Assessment and Rationale for Refill:  Approve     Medication Therapy Plan:  Rx sent to patient's home pharmacy. Sig matches    Medication Reconciliation Completed: No   Comments:     No Care Gaps recommended.     Note composed:7:30 PM 04/05/2022

## 2022-04-12 ENCOUNTER — HOSPITAL ENCOUNTER (OUTPATIENT)
Dept: CARDIOLOGY | Facility: HOSPITAL | Age: 47
Discharge: HOME | End: 2022-04-12
Attending: INTERNAL MEDICINE
Payer: MEDICAID

## 2022-04-12 ENCOUNTER — HOSPITAL ENCOUNTER (OUTPATIENT)
Dept: RADIOLOGY | Facility: HOSPITAL | Age: 47
Discharge: HOME | End: 2022-04-12
Attending: INTERNAL MEDICINE
Payer: MEDICAID

## 2022-04-12 VITALS
HEART RATE: 66 BPM | WEIGHT: 293 LBS | HEIGHT: 63 IN | SYSTOLIC BLOOD PRESSURE: 104 MMHG | DIASTOLIC BLOOD PRESSURE: 66 MMHG | BODY MASS INDEX: 51.91 KG/M2

## 2022-04-12 DIAGNOSIS — I27.81 CHRONIC COR PULMONALE: ICD-10-CM

## 2022-04-12 DIAGNOSIS — I27.20 PULMONARY HYPERTENSION: ICD-10-CM

## 2022-04-12 LAB
ASCENDING AORTA: 3.12 CM
AV INDEX (PROSTH): 0.71
AV MEAN GRADIENT: 7 MMHG
AV PEAK GRADIENT: 11 MMHG
AV VALVE AREA: 2.69 CM2
AV VELOCITY RATIO: 0.66
BSA FOR ECHO PROCEDURE: 2.72 M2
CV ECHO LV RWT: 0.31 CM
DOP CALC AO PEAK VEL: 1.66 M/S
DOP CALC AO VTI: 36.11 CM
DOP CALC LVOT AREA: 3.8 CM2
DOP CALC LVOT DIAMETER: 2.2 CM
DOP CALC LVOT PEAK VEL: 1.09 M/S
DOP CALC LVOT STROKE VOLUME: 97.07 CM3
DOP CALCLVOT PEAK VEL VTI: 25.55 CM
E WAVE DECELERATION TIME: 229.64 MSEC
E/A RATIO: 1.04
E/E' RATIO: 9.06 M/S
ECHO LV POSTERIOR WALL: 0.78 CM (ref 0.6–1.1)
EJECTION FRACTION: 65 %
FRACTIONAL SHORTENING: 37 % (ref 28–44)
INTERVENTRICULAR SEPTUM: 0.79 CM (ref 0.6–1.1)
IVRT: 108.47 MSEC
LA MAJOR: 4.98 CM
LA MINOR: 5.2 CM
LA WIDTH: 4.16 CM
LEFT ATRIUM SIZE: 3.57 CM
LEFT ATRIUM VOLUME INDEX MOD: 20.2 ML/M2
LEFT ATRIUM VOLUME INDEX: 25.6 ML/M2
LEFT ATRIUM VOLUME MOD: 50.75 CM3
LEFT ATRIUM VOLUME: 64.22 CM3
LEFT INTERNAL DIMENSION IN SYSTOLE: 3.12 CM (ref 2.1–4)
LEFT VENTRICLE DIASTOLIC VOLUME INDEX: 46.27 ML/M2
LEFT VENTRICLE DIASTOLIC VOLUME: 116.15 ML
LEFT VENTRICLE MASS INDEX: 52 G/M2
LEFT VENTRICLE SYSTOLIC VOLUME INDEX: 15.3 ML/M2
LEFT VENTRICLE SYSTOLIC VOLUME: 38.51 ML
LEFT VENTRICULAR INTERNAL DIMENSION IN DIASTOLE: 4.96 CM (ref 3.5–6)
LEFT VENTRICULAR MASS: 130.74 G
LV LATERAL E/E' RATIO: 8.56 M/S
LV SEPTAL E/E' RATIO: 9.63 M/S
MV PEAK A VEL: 0.74 M/S
MV PEAK E VEL: 0.77 M/S
MV STENOSIS PRESSURE HALF TIME: 66.6 MS
MV VALVE AREA P 1/2 METHOD: 3.3 CM2
PISA TR MAX VEL: 2.89 M/S
PULM VEIN S/D RATIO: 1.18
PV PEAK D VEL: 0.39 M/S
PV PEAK S VEL: 0.46 M/S
RA MAJOR: 5.1 CM
RA WIDTH: 2.95 CM
RIGHT VENTRICULAR END-DIASTOLIC DIMENSION: 2.76 CM
SINUS: 3.04 CM
STJ: 2.74 CM
TDI LATERAL: 0.09 M/S
TDI SEPTAL: 0.08 M/S
TDI: 0.09 M/S
TR MAX PG: 33 MMHG
TRICUSPID ANNULAR PLANE SYSTOLIC EXCURSION: 2.74 CM

## 2022-04-12 PROCEDURE — 71046 XR CHEST PA AND LATERAL: ICD-10-PCS | Mod: 26,,, | Performed by: RADIOLOGY

## 2022-04-12 PROCEDURE — 93306 TTE W/DOPPLER COMPLETE: CPT | Mod: 26,,, | Performed by: INTERNAL MEDICINE

## 2022-04-12 PROCEDURE — 93306 TTE W/DOPPLER COMPLETE: CPT

## 2022-04-12 PROCEDURE — 93306 ECHO (CUPID ONLY): ICD-10-PCS | Mod: 26,,, | Performed by: INTERNAL MEDICINE

## 2022-04-12 PROCEDURE — 71046 X-RAY EXAM CHEST 2 VIEWS: CPT | Mod: TC,PO

## 2022-04-12 PROCEDURE — 71046 X-RAY EXAM CHEST 2 VIEWS: CPT | Mod: 26,,, | Performed by: RADIOLOGY

## 2022-04-13 ENCOUNTER — TELEPHONE (OUTPATIENT)
Dept: OBSTETRICS AND GYNECOLOGY | Facility: CLINIC | Age: 47
End: 2022-04-13
Payer: MEDICAID

## 2022-04-13 ENCOUNTER — TELEPHONE (OUTPATIENT)
Dept: CARDIOLOGY | Facility: CLINIC | Age: 47
End: 2022-04-13
Payer: MEDICAID

## 2022-04-13 NOTE — TELEPHONE ENCOUNTER
----- Message from Dixie Rod MD sent at 4/12/2022  6:12 PM CDT -----  Patient will need preop visit with me and with anesthesia prior to surgery on 6/1  AT  ----- Message -----  From: Trey Rose MD  Sent: 3/16/2022   7:47 AM CDT  To: Yamila Tejeda MD, Dixie Rod MD

## 2022-04-13 NOTE — TELEPHONE ENCOUNTER
Message left on voicemail.  CXR looks good.  Echocardiogram looks good. Pressure in lungs is almost normal, a marked improvement compared with last echo.  Pt is medically stable for upcoming surgery.

## 2022-04-13 NOTE — TELEPHONE ENCOUNTER
Called [facility] and was able to get scheduled for preop, preadmit, and post op appointment. Patient expressed understanding.

## 2022-04-19 ENCOUNTER — TELEPHONE (OUTPATIENT)
Dept: INTERNAL MEDICINE | Facility: CLINIC | Age: 47
End: 2022-04-19

## 2022-04-19 ENCOUNTER — HOSPITAL ENCOUNTER (OUTPATIENT)
Dept: RADIOLOGY | Facility: HOSPITAL | Age: 47
Discharge: HOME | End: 2022-04-19
Attending: INTERNAL MEDICINE
Payer: MEDICAID

## 2022-04-19 ENCOUNTER — OFFICE VISIT (OUTPATIENT)
Dept: INTERNAL MEDICINE | Facility: CLINIC | Age: 47
End: 2022-04-19
Payer: MEDICAID

## 2022-04-19 VITALS
BODY MASS INDEX: 51.91 KG/M2 | OXYGEN SATURATION: 95 % | HEIGHT: 63 IN | DIASTOLIC BLOOD PRESSURE: 60 MMHG | TEMPERATURE: 97 F | WEIGHT: 293 LBS | HEART RATE: 97 BPM | SYSTOLIC BLOOD PRESSURE: 100 MMHG

## 2022-04-19 DIAGNOSIS — M54.2 CHRONIC NECK PAIN: ICD-10-CM

## 2022-04-19 DIAGNOSIS — Z00.00 ANNUAL PHYSICAL EXAM: ICD-10-CM

## 2022-04-19 DIAGNOSIS — E11.59 HYPERTENSION ASSOCIATED WITH DIABETES: ICD-10-CM

## 2022-04-19 DIAGNOSIS — I10 PRIMARY HYPERTENSION: Primary | ICD-10-CM

## 2022-04-19 DIAGNOSIS — G89.29 CHRONIC NECK PAIN: ICD-10-CM

## 2022-04-19 DIAGNOSIS — M54.2 ACUTE NECK PAIN: ICD-10-CM

## 2022-04-19 DIAGNOSIS — E11.9 TYPE 2 DIABETES MELLITUS WITHOUT COMPLICATION, WITHOUT LONG-TERM CURRENT USE OF INSULIN: Primary | ICD-10-CM

## 2022-04-19 DIAGNOSIS — M62.838 MUSCLE SPASMS OF NECK: ICD-10-CM

## 2022-04-19 DIAGNOSIS — I15.2 HYPERTENSION ASSOCIATED WITH DIABETES: ICD-10-CM

## 2022-04-19 DIAGNOSIS — E66.01 MORBID OBESITY WITH BMI OF 60.0-69.9, ADULT: ICD-10-CM

## 2022-04-19 DIAGNOSIS — E11.9 DIABETES MELLITUS TYPE 2 WITHOUT RETINOPATHY: ICD-10-CM

## 2022-04-19 PROBLEM — I27.81 CHRONIC COR PULMONALE: Status: RESOLVED | Noted: 2022-03-16 | Resolved: 2022-04-19

## 2022-04-19 PROCEDURE — 3074F SYST BP LT 130 MM HG: CPT | Mod: CPTII,,, | Performed by: INTERNAL MEDICINE

## 2022-04-19 PROCEDURE — 99215 OFFICE O/P EST HI 40 MIN: CPT | Mod: PBBFAC,PO | Performed by: INTERNAL MEDICINE

## 2022-04-19 PROCEDURE — 99214 PR OFFICE/OUTPT VISIT, EST, LEVL IV, 30-39 MIN: ICD-10-PCS | Mod: S$PBB,,, | Performed by: INTERNAL MEDICINE

## 2022-04-19 PROCEDURE — 3044F HG A1C LEVEL LT 7.0%: CPT | Mod: CPTII,,, | Performed by: INTERNAL MEDICINE

## 2022-04-19 PROCEDURE — 3078F DIAST BP <80 MM HG: CPT | Mod: CPTII,,, | Performed by: INTERNAL MEDICINE

## 2022-04-19 PROCEDURE — 3074F PR MOST RECENT SYSTOLIC BLOOD PRESSURE < 130 MM HG: ICD-10-PCS | Mod: CPTII,,, | Performed by: INTERNAL MEDICINE

## 2022-04-19 PROCEDURE — 1160F RVW MEDS BY RX/DR IN RCRD: CPT | Mod: CPTII,,, | Performed by: INTERNAL MEDICINE

## 2022-04-19 PROCEDURE — 72050 X-RAY EXAM NECK SPINE 4/5VWS: CPT | Mod: 26,,, | Performed by: RADIOLOGY

## 2022-04-19 PROCEDURE — 1160F PR REVIEW ALL MEDS BY PRESCRIBER/CLIN PHARMACIST DOCUMENTED: ICD-10-PCS | Mod: CPTII,,, | Performed by: INTERNAL MEDICINE

## 2022-04-19 PROCEDURE — 1159F PR MEDICATION LIST DOCUMENTED IN MEDICAL RECORD: ICD-10-PCS | Mod: CPTII,,, | Performed by: INTERNAL MEDICINE

## 2022-04-19 PROCEDURE — 4010F PR ACE/ARB THEARPY RXD/TAKEN: ICD-10-PCS | Mod: CPTII,,, | Performed by: INTERNAL MEDICINE

## 2022-04-19 PROCEDURE — 3008F PR BODY MASS INDEX (BMI) DOCUMENTED: ICD-10-PCS | Mod: CPTII,,, | Performed by: INTERNAL MEDICINE

## 2022-04-19 PROCEDURE — 99999 PR PBB SHADOW E&M-EST. PATIENT-LVL V: CPT | Mod: PBBFAC,,, | Performed by: INTERNAL MEDICINE

## 2022-04-19 PROCEDURE — 1159F MED LIST DOCD IN RCRD: CPT | Mod: CPTII,,, | Performed by: INTERNAL MEDICINE

## 2022-04-19 PROCEDURE — 72050 X-RAY EXAM NECK SPINE 4/5VWS: CPT | Mod: TC,PO

## 2022-04-19 PROCEDURE — 99999 PR PBB SHADOW E&M-EST. PATIENT-LVL V: ICD-10-PCS | Mod: PBBFAC,,, | Performed by: INTERNAL MEDICINE

## 2022-04-19 PROCEDURE — 72050 XR CERVICAL SPINE COMPLETE 5 VIEW: ICD-10-PCS | Mod: 26,,, | Performed by: RADIOLOGY

## 2022-04-19 PROCEDURE — 3008F BODY MASS INDEX DOCD: CPT | Mod: CPTII,,, | Performed by: INTERNAL MEDICINE

## 2022-04-19 PROCEDURE — 4010F ACE/ARB THERAPY RXD/TAKEN: CPT | Mod: CPTII,,, | Performed by: INTERNAL MEDICINE

## 2022-04-19 PROCEDURE — 99214 OFFICE O/P EST MOD 30 MIN: CPT | Mod: S$PBB,,, | Performed by: INTERNAL MEDICINE

## 2022-04-19 PROCEDURE — 3044F PR MOST RECENT HEMOGLOBIN A1C LEVEL <7.0%: ICD-10-PCS | Mod: CPTII,,, | Performed by: INTERNAL MEDICINE

## 2022-04-19 PROCEDURE — 3078F PR MOST RECENT DIASTOLIC BLOOD PRESSURE < 80 MM HG: ICD-10-PCS | Mod: CPTII,,, | Performed by: INTERNAL MEDICINE

## 2022-04-19 RX ORDER — DICLOFENAC SODIUM 50 MG/1
50 TABLET, DELAYED RELEASE ORAL 2 TIMES DAILY PRN
Qty: 30 TABLET | Refills: 1 | Status: SHIPPED | OUTPATIENT
Start: 2022-04-19 | End: 2023-10-02

## 2022-04-19 RX ORDER — PHENTERMINE HYDROCHLORIDE 37.5 MG/1
18.75 TABLET ORAL
COMMUNITY
End: 2022-09-13 | Stop reason: ALTCHOICE

## 2022-04-19 NOTE — TELEPHONE ENCOUNTER
----- Message from Jennie Morales LPN sent at 4/19/2022 11:37 AM CDT -----    ----- Message -----  From: Bola Niño  Sent: 4/19/2022  11:17 AM CDT  To: Savanna Vieyra    Good Morning,   Patient: KARISSA GOOD [[id number]] has an annual visit scheduled for: 8/26 and her labs are scheduled for: 8/12. Thank you

## 2022-04-19 NOTE — PROGRESS NOTES
Subjective:     Halley Moreno is a 46 y.o. female who presents for   Chief Complaint   Patient presents with    Follow-up    Hypertension    Diabetes    Neck Pain       HPI     Hypertension: The patient has been taking medications as instructed, no medication side effects noted, no chest pain on exertion, no dyspnea on exertion and no swelling of ankles.    BP Readings from Last 3 Encounters:   04/19/22 100/60   04/12/22 104/66   03/16/22 120/62       Diabetes: Patient presents for follow up of diabetes. Current symptoms include: none. Symptoms have been basically asymptomatic. Patient denies foot ulcerations and visual disturbances. Evaluation to date has included: hemoglobin A1C.  Home sugars: BGs consistently in an acceptable range. Current treatment: glipizide and Ozempic.     Lab Results   Component Value Date    HGBA1C 5.7 (H) 03/31/2022     (H) 03/31/2022       Neck Pain: The patient presents for evaluation of neck pain. Event that precipitated these symptoms: none known. Onset of symptoms was 1 week ago, and have been unchanged since that time. Current symptoms are left sided neck pain. Patient denies numbness or tingling. Patient has had no prior neck problems. Previous treatments: none.    Body mass index is 64.98 kg/m².  Wt Readings from Last 3 Encounters:   04/19/22 (!) 166.4 kg (366 lb 13.5 oz)   04/12/22 (!) 166.9 kg (368 lb)   03/16/22 (!) 167 kg (368 lb 2.7 oz)   - [employer] for dinner, salad, sandwiches  - still drinks sweet tea, agrees to quit for 1 month  - strawberries and other fruits for snacks      Review of Systems   Constitutional: Negative for chills, diaphoresis, fatigue and fever.   HENT: Negative for congestion, ear pain, sinus pressure and sore throat.    Eyes: Negative for discharge and visual disturbance.   Respiratory: Negative for cough and shortness of breath.    Cardiovascular: Positive for leg swelling (chronic, stable). Negative for chest pain and  palpitations.   Gastrointestinal: Negative for abdominal pain, constipation, diarrhea, nausea and vomiting.   Endocrine: Negative for polydipsia and polyuria.   Genitourinary: Positive for menstrual problem (planning D&C). Negative for frequency and urgency.   Musculoskeletal: Positive for neck pain (and limited ROM when turning head to the right) and neck stiffness. Negative for arthralgias and myalgias.   Skin: Positive for color change (bilateral lower legs). Negative for rash and wound.   Neurological: Negative for dizziness, tremors, numbness and headaches.   Psychiatric/Behavioral: Negative for dysphoric mood and sleep disturbance.          Objective:     Physical Exam  Vitals reviewed.   Constitutional:       General: She is awake. She is not in acute distress.     Appearance: Normal appearance. She is well-developed and well-groomed.   HENT:      Head: Normocephalic and atraumatic.      Right Ear: Hearing and external ear normal.      Left Ear: Hearing and external ear normal.      Nose: Nose normal.      Mouth/Throat:      Mouth: Mucous membranes are moist.   Eyes:      General: Lids are normal. Vision grossly intact.   Cardiovascular:      Rate and Rhythm: Normal rate and regular rhythm.      Heart sounds: Normal heart sounds. No murmur heard.  Pulmonary:      Effort: Pulmonary effort is normal.      Breath sounds: Normal breath sounds. No decreased breath sounds or wheezing.   Abdominal:      General: Bowel sounds are normal. There is no distension.   Musculoskeletal:      Cervical back: No rigidity. Pain with movement and muscular tenderness present. No spinous process tenderness. Decreased range of motion.      Right lower le+ Edema present.      Left lower le+ Edema present.   Skin:     General: Skin is warm and dry.      Findings: Erythema and rash present. No lesion. Rash is scaling (bilateral lower legs with erythema and scaling).   Neurological:      Mental Status: She is alert and oriented  to person, place, and time.   Psychiatric:         Attention and Perception: Attention normal.         Mood and Affect: Mood normal.         Behavior: Behavior is cooperative.            Assessment:      1. Type 2 diabetes mellitus without complication, without long-term current use of insulin    2. Hypertension associated with diabetes    3. Acute neck pain    4. Muscle spasms of neck    5. Morbid obesity with BMI of 60.0-69.9, adult           Plan:     1. Type 2 diabetes mellitus without complication, without long-term current use of insulin  - recent HbA1c was 5.7  - will plan to eventually stop glipizide and increase Ozempic dose     2. Hypertension associated with diabetes  - controlled, continue losartan, furosemide    3. Acute neck pain  - diclofenac (VOLTAREN) 50 MG EC tablet; Take 1 tablet (50 mg total) by mouth 2 (two) times daily as needed (pain).  Dispense: 30 tablet; Refill: 1  - X-Ray Cervical Spine Complete 5 view; Future    4. Muscle spasms of neck  - start Robaxin for muscle spasms, continue Voltaren as needed    5. Morbid obesity  - increase physical activity, continue to walk  - re-start phentermine 18.75mg daily, monitor BP regularly    RTC in August 2022 for annual exam or sooner if needed    __________________________    Yamila Tejeda MD, PharmD  [facility]- Internal Medicine  American Board of Obesity Medicine diplomate  Office [phone]

## 2022-04-22 ENCOUNTER — TELEPHONE (OUTPATIENT)
Dept: INTERNAL MEDICINE | Facility: CLINIC | Age: 47
End: 2022-04-22
Payer: MEDICAID

## 2022-04-22 NOTE — TELEPHONE ENCOUNTER
Portal message unread.    Pt notified and verbalized understanding.  States she has been taking robaxin, only at night, and it's working well.

## 2022-04-22 NOTE — TELEPHONE ENCOUNTER
----- Message from Yamila Tejeda MD sent at 4/19/2022 10:18 PM CDT -----  The x-ray shows that there is no arthritis of the neck.     The pain is likely due to muscle spasms.

## 2022-04-28 RX ORDER — FUROSEMIDE 80 MG/1
TABLET ORAL
Qty: 90 TABLET | Refills: 3 | Status: SHIPPED | OUTPATIENT
Start: 2022-04-28 | End: 2023-06-13

## 2022-04-28 NOTE — TELEPHONE ENCOUNTER
Refill Authorization Note   Halley Moreno  is requesting a refill authorization.  Brief Assessment and Rationale for Refill:  Approve     Medication Therapy Plan:       Medication Reconciliation Completed: No   Comments:     No Care Gaps recommended.     Note composed:1:15 PM 04/28/2022

## 2022-04-28 NOTE — TELEPHONE ENCOUNTER
No new care gaps identified.  Powered by [employer]. Reference number: [id number].   4/28/2022 9:04:55 AM CDT

## 2022-05-16 ENCOUNTER — TELEPHONE (OUTPATIENT)
Dept: INTERNAL MEDICINE | Facility: CLINIC | Age: 47
End: 2022-05-16
Payer: MEDICAID

## 2022-05-16 ENCOUNTER — HOSPITAL ENCOUNTER (EMERGENCY)
Facility: HOSPITAL | Age: 47
Discharge: HOME | End: 2022-05-16
Attending: EMERGENCY MEDICINE
Payer: MEDICAID

## 2022-05-16 ENCOUNTER — PATIENT MESSAGE (OUTPATIENT)
Dept: INTERNAL MEDICINE | Facility: CLINIC | Age: 47
End: 2022-05-16
Payer: MEDICAID

## 2022-05-16 VITALS
SYSTOLIC BLOOD PRESSURE: 115 MMHG | HEIGHT: 63 IN | HEART RATE: 73 BPM | OXYGEN SATURATION: 96 % | DIASTOLIC BLOOD PRESSURE: 58 MMHG | WEIGHT: 293 LBS | BODY MASS INDEX: 51.91 KG/M2 | RESPIRATION RATE: 22 BRPM | TEMPERATURE: 98 F

## 2022-05-16 DIAGNOSIS — H66.91 RIGHT OTITIS MEDIA, UNSPECIFIED OTITIS MEDIA TYPE: Primary | ICD-10-CM

## 2022-05-16 LAB — SARS-COV-2 RNA RESP QL NAA+PROBE: NOT DETECTED

## 2022-05-16 PROCEDURE — U0003 INFECTIOUS AGENT DETECTION BY NUCLEIC ACID (DNA OR RNA); SEVERE ACUTE RESPIRATORY SYNDROME CORONAVIRUS 2 (SARS-COV-2) (CORONAVIRUS DISEASE [COVID-19]), AMPLIFIED PROBE TECHNIQUE, MAKING USE OF HIGH THROUGHPUT TECHNOLOGIES AS DESCRIBED BY CMS-2020-01-R: HCPCS | Performed by: PHYSICIAN ASSISTANT

## 2022-05-16 PROCEDURE — 25000003 PHARM REV CODE 250: Performed by: PHYSICIAN ASSISTANT

## 2022-05-16 PROCEDURE — 99284 PR EMERGENCY DEPT VISIT,LEVEL IV: ICD-10-PCS | Mod: CS,,, | Performed by: PHYSICIAN ASSISTANT

## 2022-05-16 PROCEDURE — U0005 INFEC AGEN DETEC AMPLI PROBE: HCPCS | Performed by: PHYSICIAN ASSISTANT

## 2022-05-16 PROCEDURE — 99284 EMERGENCY DEPT VISIT MOD MDM: CPT | Mod: CS,,, | Performed by: PHYSICIAN ASSISTANT

## 2022-05-16 PROCEDURE — 99283 EMERGENCY DEPT VISIT LOW MDM: CPT

## 2022-05-16 RX ORDER — AMOXICILLIN AND CLAVULANATE POTASSIUM 875; 125 MG/1; MG/1
1 TABLET, FILM COATED ORAL
Status: COMPLETED | OUTPATIENT
Start: 2022-05-16 | End: 2022-05-16

## 2022-05-16 RX ORDER — OXYCODONE HYDROCHLORIDE 5 MG/1
10 TABLET ORAL
Status: DISCONTINUED | OUTPATIENT
Start: 2022-05-16 | End: 2022-05-16

## 2022-05-16 RX ORDER — AMOXICILLIN AND CLAVULANATE POTASSIUM 875; 125 MG/1; MG/1
1 TABLET, FILM COATED ORAL 2 TIMES DAILY
Qty: 14 TABLET | Refills: 0 | Status: SHIPPED | OUTPATIENT
Start: 2022-05-16 | End: 2022-05-23

## 2022-05-16 RX ORDER — IBUPROFEN 600 MG/1
600 TABLET ORAL
Status: COMPLETED | OUTPATIENT
Start: 2022-05-16 | End: 2022-05-16

## 2022-05-16 RX ORDER — OXYCODONE HYDROCHLORIDE 5 MG/1
5 TABLET ORAL
Status: COMPLETED | OUTPATIENT
Start: 2022-05-16 | End: 2022-05-16

## 2022-05-16 RX ADMIN — AMOXICILLIN AND CLAVULANATE POTASSIUM 1 TABLET: 875; 125 TABLET, FILM COATED ORAL at 05:05

## 2022-05-16 RX ADMIN — IBUPROFEN 600 MG: 600 TABLET ORAL at 05:05

## 2022-05-16 RX ADMIN — OXYCODONE 5 MG: 5 TABLET ORAL at 05:05

## 2022-05-16 NOTE — TELEPHONE ENCOUNTER
----- Message from Cristhian Stacy sent at 5/16/2022 10:44 AM CDT -----  Contact: pt  No blue slot available to schedule an appointment for the patient.  Patient is established with which PCP: kelle  Reason for the visit: Ear ache  Would the patient like a call back, or a response through their [facility] portal?:

## 2022-05-16 NOTE — ED TRIAGE NOTES
Patient presents to the ER with complaints of right ear pain that started over the weekend, patient states she also has nasal drip and cough that started over the weekend. Patient states she took ibuprofen and it helped a little but woke up in more pain. Patient states she took hydrocodone this morning but still has a lot of ear pressure. Patient thinks she has fluid in her ear.

## 2022-05-17 NOTE — ED PROVIDER NOTES
"Encounter Date: 2022       History     Chief Complaint   Patient presents with    Otalgia     sinus     This is a 46 year old female with a PMH of HTN, DM, morbid obesity presenting to the ED with a chief complaint of ear pain. She reports a 2 day history of sinus symptoms, clear rhinorrhea and right ear pain. She works with children. She is vaccinated for Covid. She denies fever, headache, neck pain/stiffness, hearing loss, ear drainage or additional complaints.        Review of patient's allergies indicates:   Allergen Reactions    Victoza [liraglutide] Swelling     Past Medical History:   Diagnosis Date    Acute respiratory failure with hypoxia and hypercapnia 10/22/2020    Last Assessment & Plan:  Patient is a chronic CO2 retainer in setting of obesity hypoventilation syndrome 2/2 morbid obesity. Stepped down from ICU 10/26, currently continuing diuresis. De-escalated to NC during the day with BiPAP nightly on 10/31, which patient tolerated well. On Lasix gtt for diuresis through .   Plan:  - Target SpO2 of >88%. Continue BiPAP nightly. Patient on 3LNC, will con    Back pain     BMI 70 and over, adult     CHF (congestive heart failure) 10/2020    Depression     Diabetes mellitus     DM (diabetes mellitus) type II controlled with renal manifestation     HTN (hypertension)     Hyperlipidemia     Lumbar disc disease     Morbid obesity     Rosacea     Sleep apnea     Tear of medial meniscus of right knee, current 2017     Past Surgical History:   Procedure Laterality Date     SECTION       SECTION  2000    DILATION AND CURETTAGE OF UTERUS      AUB "negative path" per patient    ENDOMETRIAL ABLATION      Patient unsure if procedure was performed     Family History   Problem Relation Age of Onset    Hypertension Mother     Diabetes Father     Lymphoma Father         cns lymphoma    Heart disease Paternal Grandfather     Colon cancer Neg Hx     " Ovarian cancer Neg Hx     Breast cancer Neg Hx     Amblyopia Neg Hx     Blindness Neg Hx     Cataracts Neg Hx     Glaucoma Neg Hx     Macular degeneration Neg Hx     Retinal detachment Neg Hx     Strabismus Neg Hx     Stroke Neg Hx     Thyroid disease Neg Hx     Uterine cancer Neg Hx      Social History     Tobacco Use    Smoking status: Never Smoker    Smokeless tobacco: Never Used   Substance Use Topics    Alcohol use: No    Drug use: No     Review of Systems   Constitutional: Negative for chills and fever.   HENT: Positive for ear pain. Negative for congestion, ear discharge and sore throat.    Respiratory: Negative for cough and shortness of breath.    Cardiovascular: Negative for chest pain.   Gastrointestinal: Negative for nausea and vomiting.   Neurological: Negative for headaches.       Physical Exam     Initial Vitals [05/16/22 1609]   BP Pulse Resp Temp SpO2   (!) 115/58 73 18 98.1 °F (36.7 °C) 96 %      MAP       --         Physical Exam    Constitutional: She appears well-developed and well-nourished. She is Obese . No distress. Face mask in place.   HENT:   Head: Atraumatic.   Right Ear: No mastoid tenderness. Tympanic membrane is erythematous and bulging.   Left Ear: No mastoid tenderness. Tympanic membrane is injected.   Eyes: Conjunctivae and EOM are normal. Pupils are equal, round, and reactive to light.     Neurological: She is alert and oriented to person, place, and time.   Skin: Skin is warm and dry. No rash noted.         ED Course   Procedures  Labs Reviewed   SARS-COV-2 (COVID-19) QUALITATIVE PCR    Narrative:     Is the patient symptomatic?->Yes  Is this needed for pre-procedure or pre-op testing?->No          Imaging Results    None          Medications   amoxicillin-clavulanate 875-125mg per tablet 1 tablet (1 tablet Oral Given 5/16/22 1711)   oxyCODONE immediate release tablet 5 mg (5 mg Oral Given 5/16/22 1711)   ibuprofen tablet 600 mg (600 mg Oral Given 5/16/22 1711)      Medical Decision Making:   History:   Old Medical Records: I decided to obtain old medical records.       APC / Resident Notes:   46 year old female presenting with URI symptoms and right ear pain.  Exam consistent with otitis media. Will send routine covid and treat with Augmentin.  Patient understands to f/u on mychart for Covid test results. Isolation precautions per current CDC guidelines. She is stable for discharge.                  Clinical Impression:   Final diagnoses:  [H66.91] Right otitis media, unspecified otitis media type (Primary)          ED Disposition Condition    Discharge Stable        ED Prescriptions     Medication Sig Dispense Start Date End Date Auth. Provider    amoxicillin-clavulanate 875-125mg (AUGMENTIN) 875-125 mg per tablet Take 1 tablet by mouth 2 (two) times daily. for 7 days 14 tablet 5/16/2022 5/23/2022 Jenny Hoffmann PA-C        Follow-up Information     Follow up With Specialties Details Why Contact Info    Yamila Tejeda MD Internal Medicine Schedule an appointment as soon as possible for a visit   [address] [facility] [address]  [phone]             Jenny Hoffmann PA-C  05/16/22 2024

## 2022-05-23 ENCOUNTER — PATIENT MESSAGE (OUTPATIENT)
Dept: INTERNAL MEDICINE | Facility: CLINIC | Age: 47
End: 2022-05-23
Payer: MEDICAID

## 2022-05-24 ENCOUNTER — OFFICE VISIT (OUTPATIENT)
Dept: URGENT CARE | Facility: CLINIC | Age: 47
End: 2022-05-24
Payer: MEDICAID

## 2022-05-24 VITALS
OXYGEN SATURATION: 95 % | RESPIRATION RATE: 20 BRPM | HEART RATE: 67 BPM | TEMPERATURE: 99 F | BODY MASS INDEX: 51.91 KG/M2 | HEIGHT: 63 IN | SYSTOLIC BLOOD PRESSURE: 155 MMHG | DIASTOLIC BLOOD PRESSURE: 81 MMHG | WEIGHT: 293 LBS

## 2022-05-24 DIAGNOSIS — H65.01 RIGHT ACUTE SEROUS OTITIS MEDIA, RECURRENCE NOT SPECIFIED: Primary | ICD-10-CM

## 2022-05-24 DIAGNOSIS — J30.1 SEASONAL ALLERGIC RHINITIS DUE TO POLLEN: ICD-10-CM

## 2022-05-24 PROCEDURE — 3044F HG A1C LEVEL LT 7.0%: CPT | Mod: CPTII,S$GLB,, | Performed by: FAMILY MEDICINE

## 2022-05-24 PROCEDURE — 3008F BODY MASS INDEX DOCD: CPT | Mod: CPTII,S$GLB,, | Performed by: FAMILY MEDICINE

## 2022-05-24 PROCEDURE — 3008F PR BODY MASS INDEX (BMI) DOCUMENTED: ICD-10-PCS | Mod: CPTII,S$GLB,, | Performed by: FAMILY MEDICINE

## 2022-05-24 PROCEDURE — 4010F ACE/ARB THERAPY RXD/TAKEN: CPT | Mod: CPTII,S$GLB,, | Performed by: FAMILY MEDICINE

## 2022-05-24 PROCEDURE — 1159F PR MEDICATION LIST DOCUMENTED IN MEDICAL RECORD: ICD-10-PCS | Mod: CPTII,S$GLB,, | Performed by: FAMILY MEDICINE

## 2022-05-24 PROCEDURE — 3079F DIAST BP 80-89 MM HG: CPT | Mod: CPTII,S$GLB,, | Performed by: FAMILY MEDICINE

## 2022-05-24 PROCEDURE — 3077F SYST BP >= 140 MM HG: CPT | Mod: CPTII,S$GLB,, | Performed by: FAMILY MEDICINE

## 2022-05-24 PROCEDURE — 3077F PR MOST RECENT SYSTOLIC BLOOD PRESSURE >= 140 MM HG: ICD-10-PCS | Mod: CPTII,S$GLB,, | Performed by: FAMILY MEDICINE

## 2022-05-24 PROCEDURE — 99213 PR OFFICE/OUTPT VISIT, EST, LEVL III, 20-29 MIN: ICD-10-PCS | Mod: S$GLB,,, | Performed by: FAMILY MEDICINE

## 2022-05-24 PROCEDURE — 3079F PR MOST RECENT DIASTOLIC BLOOD PRESSURE 80-89 MM HG: ICD-10-PCS | Mod: CPTII,S$GLB,, | Performed by: FAMILY MEDICINE

## 2022-05-24 PROCEDURE — 1159F MED LIST DOCD IN RCRD: CPT | Mod: CPTII,S$GLB,, | Performed by: FAMILY MEDICINE

## 2022-05-24 PROCEDURE — 4010F PR ACE/ARB THEARPY RXD/TAKEN: ICD-10-PCS | Mod: CPTII,S$GLB,, | Performed by: FAMILY MEDICINE

## 2022-05-24 PROCEDURE — 3044F PR MOST RECENT HEMOGLOBIN A1C LEVEL <7.0%: ICD-10-PCS | Mod: CPTII,S$GLB,, | Performed by: FAMILY MEDICINE

## 2022-05-24 PROCEDURE — 99213 OFFICE O/P EST LOW 20 MIN: CPT | Mod: S$GLB,,, | Performed by: FAMILY MEDICINE

## 2022-05-24 RX ORDER — PREDNISONE 20 MG/1
20 TABLET ORAL DAILY
Qty: 5 TABLET | Refills: 0 | Status: SHIPPED | OUTPATIENT
Start: 2022-05-24 | End: 2022-05-29

## 2022-05-24 NOTE — PROGRESS NOTES
"Subjective:       Patient ID: Halley Moreno is a 46 y.o. female.    Vitals:  height is 5' 3" (1.6 m) and weight is 161.5 kg (356 lb) (abnormal). Her temperature is 98.5 °F (36.9 °C). Her blood pressure is 155/81 (abnormal) and her pulse is 67. Her respiration is 20 and oxygen saturation is 95%.     Chief Complaint: Ear Fullness    Pt stated that she has been having right ear pain and fullness for over a week . Pt stated that she went to the er and she was given antibiotics . Pt stated that she has been taking them for a week now and she still cant hear out her right ear . Pts pharmacy has been updated /     Ear Fullness   There is pain in the right ear. This is a new problem. The current episode started in the past 7 days. The problem occurs constantly. The problem has been unchanged. There has been no fever. The pain is at a severity of 6/10. The pain is moderate. Associated symptoms include hearing loss. Pertinent negatives include no abdominal pain, coughing, diarrhea, ear discharge, headaches, neck pain, rash, rhinorrhea, sore throat or vomiting. She has tried antibiotics for the symptoms. The treatment provided no relief.       HENT: Positive for hearing loss. Negative for ear discharge and sore throat.    Neck: Negative for neck pain.   Respiratory: Negative for cough.    Gastrointestinal: Negative for abdominal pain, vomiting and diarrhea.   Skin: Negative for rash.   Neurological: Negative for headaches.       Objective:      Physical Exam   Constitutional: She is oriented to person, place, and time. She appears well-developed. She is cooperative.  Non-toxic appearance. She does not appear ill. No distress.   HENT:   Head: Normocephalic and atraumatic.   Ears:   Right Ear: Hearing, tympanic membrane, external ear and ear canal normal.   Left Ear: Hearing, tympanic membrane, external ear and ear canal normal.      Comments: Right tm with slight dullness, no draiange seen,  Left tm fluid    Nose: Nose " normal. No mucosal edema, rhinorrhea or nasal deformity. No epistaxis. Right sinus exhibits no maxillary sinus tenderness and no frontal sinus tenderness. Left sinus exhibits no maxillary sinus tenderness and no frontal sinus tenderness.   Mouth/Throat: Uvula is midline, oropharynx is clear and moist and mucous membranes are normal. Mucous membranes are moist. No trismus in the jaw. Normal dentition. No uvula swelling. No posterior oropharyngeal erythema. Oropharynx is clear.   Eyes: Conjunctivae and lids are normal. Right eye exhibits no discharge. Left eye exhibits no discharge. No scleral icterus.   Neck: Trachea normal and phonation normal. Neck supple.   Cardiovascular: Normal rate, regular rhythm, normal heart sounds and normal pulses.   Pulmonary/Chest: Effort normal and breath sounds normal. No respiratory distress.   Abdominal: Normal appearance and bowel sounds are normal. She exhibits no distension, no pulsatile midline mass and no mass. Soft. There is no abdominal tenderness.   Musculoskeletal: Normal range of motion.         General: No deformity. Normal range of motion.   Neurological: She is alert and oriented to person, place, and time. She exhibits normal muscle tone. Coordination normal.   Skin: Skin is warm, dry, intact, not diaphoretic and not pale.   Psychiatric: Her speech is normal and behavior is normal. Judgment and thought content normal.   Nursing note and vitals reviewed.        Assessment:       1. Right acute serous otitis media, recurrence not specified    2. Seasonal allergic rhinitis due to pollen          Plan:         Right acute serous otitis media, recurrence not specified    Seasonal allergic rhinitis due to pollen    Other orders  -     predniSONE (DELTASONE) 20 MG tablet; Take 1 tablet (20 mg total) by mouth once daily. for 5 days  Dispense: 5 tablet; Refill: 0              Continue claritin d 12  Bid    Warm compresses

## 2022-05-26 ENCOUNTER — PATIENT MESSAGE (OUTPATIENT)
Dept: INTERNAL MEDICINE | Facility: CLINIC | Age: 47
End: 2022-05-26
Payer: MEDICAID

## 2022-05-26 ENCOUNTER — OFFICE VISIT (OUTPATIENT)
Dept: OBSTETRICS AND GYNECOLOGY | Facility: CLINIC | Age: 47
End: 2022-05-26
Payer: MEDICAID

## 2022-05-26 ENCOUNTER — ANESTHESIA EVENT (OUTPATIENT)
Dept: SURGERY | Facility: OTHER | Age: 47
End: 2022-05-26
Payer: MEDICAID

## 2022-05-26 ENCOUNTER — HOSPITAL ENCOUNTER (OUTPATIENT)
Dept: PREADMISSION TESTING | Facility: OTHER | Age: 47
Discharge: HOME | End: 2022-05-26
Attending: OBSTETRICS & GYNECOLOGY
Payer: MEDICAID

## 2022-05-26 VITALS
SYSTOLIC BLOOD PRESSURE: 143 MMHG | HEIGHT: 63 IN | WEIGHT: 293 LBS | OXYGEN SATURATION: 95 % | BODY MASS INDEX: 51.91 KG/M2 | TEMPERATURE: 97 F | RESPIRATION RATE: 18 BRPM | DIASTOLIC BLOOD PRESSURE: 90 MMHG | HEART RATE: 69 BPM

## 2022-05-26 VITALS
WEIGHT: 293 LBS | BODY MASS INDEX: 51.91 KG/M2 | DIASTOLIC BLOOD PRESSURE: 78 MMHG | SYSTOLIC BLOOD PRESSURE: 118 MMHG | HEIGHT: 63 IN

## 2022-05-26 DIAGNOSIS — R93.89 THICKENED ENDOMETRIUM: ICD-10-CM

## 2022-05-26 DIAGNOSIS — G47.37 CENTRAL SLEEP APNEA DUE TO MEDICAL CONDITION: ICD-10-CM

## 2022-05-26 DIAGNOSIS — N93.9 ABNORMAL UTERINE BLEEDING (AUB): Primary | ICD-10-CM

## 2022-05-26 DIAGNOSIS — H92.09 OTALGIA, UNSPECIFIED LATERALITY: Primary | ICD-10-CM

## 2022-05-26 PROCEDURE — 4010F PR ACE/ARB THEARPY RXD/TAKEN: ICD-10-PCS | Mod: CPTII,,, | Performed by: OBSTETRICS & GYNECOLOGY

## 2022-05-26 PROCEDURE — 3044F HG A1C LEVEL LT 7.0%: CPT | Mod: CPTII,,, | Performed by: OBSTETRICS & GYNECOLOGY

## 2022-05-26 PROCEDURE — 99999 PR PBB SHADOW E&M-EST. PATIENT-LVL III: ICD-10-PCS | Mod: PBBFAC,,, | Performed by: OBSTETRICS & GYNECOLOGY

## 2022-05-26 PROCEDURE — 3078F PR MOST RECENT DIASTOLIC BLOOD PRESSURE < 80 MM HG: ICD-10-PCS | Mod: CPTII,,, | Performed by: OBSTETRICS & GYNECOLOGY

## 2022-05-26 PROCEDURE — 99499 UNLISTED E&M SERVICE: CPT | Mod: S$PBB,,, | Performed by: OBSTETRICS & GYNECOLOGY

## 2022-05-26 PROCEDURE — 99213 OFFICE O/P EST LOW 20 MIN: CPT | Mod: PBBFAC | Performed by: OBSTETRICS & GYNECOLOGY

## 2022-05-26 PROCEDURE — 4010F ACE/ARB THERAPY RXD/TAKEN: CPT | Mod: CPTII,,, | Performed by: OBSTETRICS & GYNECOLOGY

## 2022-05-26 PROCEDURE — 3044F PR MOST RECENT HEMOGLOBIN A1C LEVEL <7.0%: ICD-10-PCS | Mod: CPTII,,, | Performed by: OBSTETRICS & GYNECOLOGY

## 2022-05-26 PROCEDURE — 99999 PR PBB SHADOW E&M-EST. PATIENT-LVL III: CPT | Mod: PBBFAC,,, | Performed by: OBSTETRICS & GYNECOLOGY

## 2022-05-26 PROCEDURE — 3074F SYST BP LT 130 MM HG: CPT | Mod: CPTII,,, | Performed by: OBSTETRICS & GYNECOLOGY

## 2022-05-26 PROCEDURE — 3008F BODY MASS INDEX DOCD: CPT | Mod: CPTII,,, | Performed by: OBSTETRICS & GYNECOLOGY

## 2022-05-26 PROCEDURE — 99499 NO LOS: ICD-10-PCS | Mod: S$PBB,,, | Performed by: OBSTETRICS & GYNECOLOGY

## 2022-05-26 PROCEDURE — 3078F DIAST BP <80 MM HG: CPT | Mod: CPTII,,, | Performed by: OBSTETRICS & GYNECOLOGY

## 2022-05-26 PROCEDURE — 3008F PR BODY MASS INDEX (BMI) DOCUMENTED: ICD-10-PCS | Mod: CPTII,,, | Performed by: OBSTETRICS & GYNECOLOGY

## 2022-05-26 PROCEDURE — 3074F PR MOST RECENT SYSTOLIC BLOOD PRESSURE < 130 MM HG: ICD-10-PCS | Mod: CPTII,,, | Performed by: OBSTETRICS & GYNECOLOGY

## 2022-05-26 RX ORDER — ACETAMINOPHEN 500 MG
1000 TABLET ORAL
Status: CANCELLED | OUTPATIENT
Start: 2022-05-26 | End: 2022-05-26

## 2022-05-26 RX ORDER — SODIUM CHLORIDE, SODIUM LACTATE, POTASSIUM CHLORIDE, CALCIUM CHLORIDE 600; 310; 30; 20 MG/100ML; MG/100ML; MG/100ML; MG/100ML
INJECTION, SOLUTION INTRAVENOUS CONTINUOUS
Status: CANCELLED | OUTPATIENT
Start: 2022-05-26

## 2022-05-26 NOTE — DISCHARGE INSTRUCTIONS
Information to Prepare you for your Surgery    PRE-ADMIT TESTING -  [phone]    [address] [facility]          Your surgery has been scheduled at [facility]. We are pleased to have the opportunity to serve you. For Further Information please call [phone].    On the day of surgery please report to the Information Desk on the 1st floor.    CONTACT YOUR PHYSICIAN'S OFFICE THE DAY PRIOR TO YOUR SURGERY TO OBTAIN YOUR ARRIVAL TIME.     The evening before surgery do not eat anything after 9 p.m. ( this includes hard candy, chewing gum and mints).  You may only have WATER  from 9 p.m. until you leave your home.   DO NOT DRINK ANY LIQUIDS ON THE WAY TO THE HOSPITAL.            Current Visitor policy(12/27/2021) - Patients may have 2 visitors pre and post procedure. Only 2 visitors will be allowed in the Surgical building with the patient.     SPECIAL MEDICATION INSTRUCTIONS: TAKE medications checked off by the Anesthesiologist on your Medication List.    Angiogram Patients: Take medications as instructed by your physician, including aspirin.     Surgery Patients:    If you take ASPIRIN - Your PHYSICIAN/SURGEON will need to inform you IF/OR when you need to stop taking aspirin prior to your surgery.     Do Not take any medications containing IBUPROFEN.    Do Not Wear any make-up (especially eye make-up) to surgery. Please remove any false eyelashes or eyelash extensions. If you arrive the day of surgery with makeup/eyelashes on you will be required to remove prior to surgery. (There is a risk of corneal abrasions if eye makeup/eyelash extensions are not removed)      Leave all valuables at home.   Do Not wear any jewelry or watches, including any metal in body piercings. Jewelry must be removed prior to coming to the hospital.  There is a possibility that rings that are unable to be removed may be cut off if they are on the surgical extremity.    Please  remove all hair extensions, wigs, clips and any other metal accessories/ ornaments from your hair.  These items may pose a flammable/fire risk in Surgery and must be removed.    Do not shave your surgical area at least 5 days prior to your surgery. The surgical prep will be performed at the hospital according to Infection Control regulations.    Contact Lens must be removed before surgery. Either do not wear the contact lens or bring a case and solution for storage.  Please bring a container for eyeglasses or dentures as required.  Bring any paperwork your physician has provided, such as consent forms,  history and physicals, doctor's orders, etc.   Bring comfortable clothes that are loose fitting to wear upon discharge. Take into consideration the type of surgery being performed.  Maintain your diet as advised per your physician the day prior to surgery.      Adequate rest the night before surgery is advised.   Park in the Parking lot behind the hospital or in the [facility] across the street from the parking lot. Parking is complimentary.  If you will be discharged the same day as your procedure, please arrange for a responsible adult to drive you home or to accompany you if traveling by taxi.   YOU WILL NOT BE PERMITTED TO DRIVE OR TO LEAVE THE HOSPITAL ALONE AFTER SURGERY.   If you are being discharged the same day, it is strongly recommended that you arrange for someone to remain with you for the first 24 hrs following your surgery.    The Surgeon will speak to your family/visitor after your surgery regarding the outcome of your surgery and post op care.  The Surgeon may speak to you after your surgery, but there is a possibility you may not remember the details.  Please check with your family members regarding the conversation with the Surgeon.    We strongly recommend whoever is bringing you home be present for discharge instructions.  This will ensure a thorough understanding for your post op  home care.    ALL CHILDREN MUST ALWAYS BE ACCOMPANIED BY AN ADULT.    Visitors-Refer to current Visitor policy handouts.    Thank you for your cooperation.  The Staff of [facility].            Bathing Instructions with Hibiclens    Shower the evening before and morning of your procedure with Hibiclens:  Wash your face with water and your regular face wash/soap  Apply Hibiclens directly on your skin or on a wet washcloth and wash gently. When showering: Move away from the shower stream when applying Hibiclens to avoid rinsing off too soon.  Rinse thoroughly with warm water  Do not dilute Hibiclens        Dry off as usual, do not use any deodorant, powder, body lotions, perfume, after shave or cologne.

## 2022-05-26 NOTE — ANESTHESIA PREPROCEDURE EVALUATION
05/26/2022  Halley Moreno is a 46 y.o., female.      Pre-op Assessment    I have reviewed the Patient Summary Reports.     I have reviewed the Nursing Notes.    I have reviewed the Medications.     Review of Systems  Anesthesia Hx:  Hx of difficult intubation History of prior surgery of interest to airway management or planning: Denies Family Hx of Anesthesia complications.  Personal Hx of Anesthesia complications  Difficult Intubation, according to patient history   Social:  Non-Smoker    Hematology/Oncology:  Hematology Normal   Oncology Normal     EENT/Dental:EENT/Dental Normal   Cardiovascular:   Exercise tolerance: poor Hypertension CHF hyperlipidemia Old echo revealed pulm hypertension,more recent Echo basically WNL   Pulmonary:   Sleep Apnea, CPAP    Renal/:  Renal/ Normal     Hepatic/GI:  Hepatic/GI Normal    Musculoskeletal:   Arthritis     Neurological:  Neurology Normal    Endocrine:   Diabetes  Morbid Obesity / BMI > 40  Psych:   Psychiatric History depression          Physical Exam  General: Cooperative, Alert and Oriented  Morbid obesity  Airway:  Mallampati: II   Mouth Opening: Normal  TM Distance: Normal  Tongue: Large  Neck ROM: Extension Decreased  Neck: Girth Increased    Dental:  Intact, Periodontal disease        Anesthesia Plan  Type of Anesthesia, risks & benefits discussed:    Anesthesia Type: Gen ETT  Intra-op Monitoring Plan: Standard ASA Monitors  Post Op Pain Control Plan: multimodal analgesia  Induction:  IV  Airway Plan: Video, Post-Induction with ramp  Informed Consent: Informed consent signed with the Patient and all parties understand the risks and agree with anesthesia plan.  All questions answered.   ASA Score: 3  Anesthesia Plan Notes: Morbid obesity,sending for cardiac clearance,history of difficult intubation 10 yrs ago Cam[facility]    Addendum: · The left ventricle  is normal in size with normal systolic function.  · The estimated ejection fraction is 65%.  · Indeterminate left ventricular diastolic function.  · Normal right ventricular size with normal right ventricular systolic function. Significantly better than previous report.  · The estimated PA systolic pressure is 36 mm Hg. Considerably lower than previously reported.  · Low central venous pressure.        Ready For Surgery From Anesthesia Perspective.     .

## 2022-05-26 NOTE — H&P (VIEW-ONLY)
[facility]  History & Physical  Gynecology    SUBJECTIVE:     Chief Complaint/Reason for Admission: AUB    History of Present Illness:  47 yo  with history of prolonged amenorrhea from age 34 to 46 who is now experiencing irregular episodes of heavy vaginal bleeding.  In office physical exam limited by body habitus and is preventing adequate evaluation.  Here for preop visit for EUA with pap smear collection, hysteroscopy D&C, and progesterone IUD insertion.     Patient is s/p cardiology evaluation and is medically stable for surgery.      Trey Rose MD     22 7:32 AM  Note  Message left on voicemail.  CXR looks good.  Echocardiogram looks good. Pressure in lungs is almost normal, a marked improvement compared with last echo.  Pt is medically stable for upcoming surgery.          Current Outpatient Medications on File Prior to Visit   Medication Sig Dispense Refill    atorvastatin (LIPITOR) 10 MG tablet Take 1 tablet (10 mg total) by mouth once daily. 90 tablet 3    blood sugar diagnostic Strp 1 strip by Misc.(Non-Drug; Combo Route) route 2 (two) times daily. Please provide items covered by patient's insurance 100 strip 5    cholecalciferol, vitamin D3, (VITAMIN D3) 1,000 unit capsule Take 2 capsules (2,000 Units total) by mouth once daily.  0    clotrimazole-betamethasone (LOTRISONE) lotion Apply topically 2 (two) times daily. 30 mL 0    furosemide (LASIX) 80 MG tablet TAKE ONE TABLET BY MOUTH EVERY DAY. 90 tablet 3    glipiZIDE (GLUCOTROL) 5 MG TR24 TAKE ONE TABLET BY MOUTH ONCE DAILY WITH BREAKFAST 90 tablet 1    lancets Misc 1 lancet by Misc.(Non-Drug; Combo Route) route 2 (two) times daily. 100 each 5    latanoprost 0.005 % ophthalmic solution Place 1 drop into both eyes every evening. 7.5 mL 3    losartan (COZAAR) 100 MG tablet Take 1 tablet (100 mg total) by mouth once daily. 30 tablet 1    methocarbamoL (ROBAXIN) 750 MG Tab TAKE ONE TABLET BY MOUTH TWICE DAILY AS NEEDED 60  "tablet 1    multivitamin (THERAGRAN) per tablet Take 1 tablet by mouth once daily.      ONETOUCH ULTRA2 METER Misc       OZEMPIC 1 mg/dose (4 mg/3 mL) INJECT 1 MG INTO SKIN EVERY 7 DAYS 3 pen 1    pen needle, diabetic 32 gauge x 1/4" Ndle 1 each by Misc.(Non-Drug; Combo Route) route once daily. Pt uses one pen needle weekly for injection of Ozempic 12 each 3    phentermine (ADIPEX-P) 37.5 mg tablet Take 18.75 mg by mouth before breakfast.      predniSONE (DELTASONE) 20 MG tablet Take 1 tablet (20 mg total) by mouth once daily. for 5 days 5 tablet 0    semaglutide (OZEMPIC) 1 mg/dose (4 mg/3 mL) Inject 1 mg into the skin every 7 days. 3 pen 1    triamcinolone acetonide 0.1% (KENALOG) 0.1 % Lotn Apply topically 3 (three) times daily. 60 mL 0    diclofenac (VOLTAREN) 50 MG EC tablet Take 1 tablet (50 mg total) by mouth 2 (two) times daily as needed (pain). 30 tablet 1    miconazole NITRATE 2 % (MICOTIN) 2 % top powder Apply topically 2 (two) times daily. Apply to skin folds. 85 g 1     No current facility-administered medications on file prior to visit.         Review of patient's allergies indicates:   Allergen Reactions    Victoza [liraglutide] Swelling       Past Medical History:   Diagnosis Date    Acute respiratory failure with hypoxia and hypercapnia 10/22/2020    Last Assessment & Plan:  Patient is a chronic CO2 retainer in setting of obesity hypoventilation syndrome 2/2 morbid obesity. Stepped down from ICU 10/26, currently continuing diuresis. De-escalated to NC during the day with BiPAP nightly on 10/31, which patient tolerated well. On Lasix gtt for diuresis through 11/5.   Plan:  - Target SpO2 of >88%. Continue BiPAP nightly. Patient on 3LNC, will con    Back pain     BMI 70 and over, adult     CHF (congestive heart failure) 10/2020    Depression     Diabetes mellitus     Difficult intubation     DM (diabetes mellitus) type II controlled with renal manifestation     HTN (hypertension)  " "   Hyperlipidemia     Lumbar disc disease     Morbid obesity     Rosacea     Sleep apnea     Tear of medial meniscus of right knee, current 2017     Past Surgical History:   Procedure Laterality Date     SECTION       SECTION  2000    DILATION AND CURETTAGE OF UTERUS      AUB "negative path" per patient    ENDOMETRIAL ABLATION      Patient unsure if procedure was performed     Family History   Problem Relation Age of Onset    Hypertension Mother     Diabetes Father     Lymphoma Father         cns lymphoma    Heart disease Paternal Grandfather     Colon cancer Neg Hx     Ovarian cancer Neg Hx     Breast cancer Neg Hx     Amblyopia Neg Hx     Blindness Neg Hx     Cataracts Neg Hx     Glaucoma Neg Hx     Macular degeneration Neg Hx     Retinal detachment Neg Hx     Strabismus Neg Hx     Stroke Neg Hx     Thyroid disease Neg Hx     Uterine cancer Neg Hx      Social History     Tobacco Use    Smoking status: Never Smoker    Smokeless tobacco: Never Used   Substance Use Topics    Alcohol use: No    Drug use: No       Review of Systems:  Constitutional: no fever or chills  Respiratory: no cough or shortness of breath  Cardiovascular: no chest pain or palpitations  Genitourinary: no hematuria or dysuria     OBJECTIVE:     Vital Signs (Most Recent):  BP: 118/78 (22 0946)    Physical Exam:  General: well developed, well nourished  Lungs:  clear to auscultation bilaterally and normal respiratory effort  Cardiovascular: Heart: regular rate and rhythm.  Pelvic:   Normal external genitalia without lesions.  Normal hair distribution.  Adequate perineal body, normal urethral meatus.  Vagina moist and well rugated without lesions, bloody discharge noted in vagina.  Cervix unable to by visualized despite using all available speculum sizes.  Cervix unable to be palpated on exam likely due to habitus. Bimanual exam significantly limited.  No tenderness. "      Laboratory:  Lab Results   Component Value Date    WBC 10.63 2022    HGB 12.3 2022    HCT 38.4 2022    MCV 90 2022     2022       Ultrasound:  Results for orders placed during the hospital encounter of 22    US Pelvis Comp with Transvag NON-OB (xpd    Narrative  EXAMINATION:  US PELVIS COMP WITH TRANSVAG NON-OB (XPD)    CLINICAL HISTORY:  Abnormal uterine and vaginal bleeding, unspecified    TECHNIQUE:  Transabdominal sonography of the pelvis was performed, followed by transvaginal sonography to better evaluate the uterus and ovaries.    COMPARISON:  Pelvic ultrasound 2017.    FINDINGS:  Examination is technically limited due to patient's body habitus.    Uterus:    Size: 11.6 x 7.4 x 6.9 cm    Masses: Probable small fibroid measuring 0.7 x 0.8 x 0.7 cm.    Endometrium: Thickened in this Candice menopausal patient, measuring 10 mm.    The bilateral ovaries were not visualized.  No abnormal adnexal masses within the limitations of this exam.    Free Fluid:    None.    Impression  Technically limited exam with nonvisualization of the ovaries.    Endometrial thickening in this perimenopausal patient, measuring up to 10 mm.  Clinical correlation and further evaluation with endometrial sampling is recommended.    This report was flagged in Epic as abnormal.    Electronically signed by resident: Coco Alvarenga MD  Date:    2022  Time:    14:22    Electronically signed by: Manuel Ventura MD  Date:    2022  Time:    14:35          ASSESSMENT/PLAN:     45 yo  with AUB, thickened endometrium, BMI 61  To OR for pap smear collection, hysteroscopy D&C, progesterone IUD insertion.    Preop labs and studies ordered.    NPO 8 hours prior to surgery.     Medications reviewed.     Risks and benefits explained in detail to patient, including but not limited to damage to internal organs, including but not limited to bowel, bladder, uterus, tubes, ovaries, nerves, arteries,  veins, possible need for blood transfusion. Patient is aware of all risks and desires surgery. All questions answered. Consents signed.         Dixie Rod MD  [facility]  05/26/2022

## 2022-05-26 NOTE — H&P
[facility]  History & Physical  Gynecology    SUBJECTIVE:     Chief Complaint/Reason for Admission: AUB    History of Present Illness:  45 yo  with history of prolonged amenorrhea from age 34 to 46 who is now experiencing irregular episodes of heavy vaginal bleeding.  In office physical exam limited by body habitus and is preventing adequate evaluation.  Here for preop visit for EUA with pap smear collection, hysteroscopy D&C, and progesterone IUD insertion.     Patient is s/p cardiology evaluation and is medically stable for surgery.      Trey Rose MD     22 7:32 AM  Note  Message left on voicemail.  CXR looks good.  Echocardiogram looks good. Pressure in lungs is almost normal, a marked improvement compared with last echo.  Pt is medically stable for upcoming surgery.          Current Outpatient Medications on File Prior to Visit   Medication Sig Dispense Refill    atorvastatin (LIPITOR) 10 MG tablet Take 1 tablet (10 mg total) by mouth once daily. 90 tablet 3    blood sugar diagnostic Strp 1 strip by Misc.(Non-Drug; Combo Route) route 2 (two) times daily. Please provide items covered by patient's insurance 100 strip 5    cholecalciferol, vitamin D3, (VITAMIN D3) 1,000 unit capsule Take 2 capsules (2,000 Units total) by mouth once daily.  0    clotrimazole-betamethasone (LOTRISONE) lotion Apply topically 2 (two) times daily. 30 mL 0    furosemide (LASIX) 80 MG tablet TAKE ONE TABLET BY MOUTH EVERY DAY. 90 tablet 3    glipiZIDE (GLUCOTROL) 5 MG TR24 TAKE ONE TABLET BY MOUTH ONCE DAILY WITH BREAKFAST 90 tablet 1    lancets Misc 1 lancet by Misc.(Non-Drug; Combo Route) route 2 (two) times daily. 100 each 5    latanoprost 0.005 % ophthalmic solution Place 1 drop into both eyes every evening. 7.5 mL 3    losartan (COZAAR) 100 MG tablet Take 1 tablet (100 mg total) by mouth once daily. 30 tablet 1    methocarbamoL (ROBAXIN) 750 MG Tab TAKE ONE TABLET BY MOUTH TWICE DAILY AS NEEDED 60  "tablet 1    multivitamin (THERAGRAN) per tablet Take 1 tablet by mouth once daily.      ONETOUCH ULTRA2 METER Misc       OZEMPIC 1 mg/dose (4 mg/3 mL) INJECT 1 MG INTO SKIN EVERY 7 DAYS 3 pen 1    pen needle, diabetic 32 gauge x 1/4" Ndle 1 each by Misc.(Non-Drug; Combo Route) route once daily. Pt uses one pen needle weekly for injection of Ozempic 12 each 3    phentermine (ADIPEX-P) 37.5 mg tablet Take 18.75 mg by mouth before breakfast.      predniSONE (DELTASONE) 20 MG tablet Take 1 tablet (20 mg total) by mouth once daily. for 5 days 5 tablet 0    semaglutide (OZEMPIC) 1 mg/dose (4 mg/3 mL) Inject 1 mg into the skin every 7 days. 3 pen 1    triamcinolone acetonide 0.1% (KENALOG) 0.1 % Lotn Apply topically 3 (three) times daily. 60 mL 0    diclofenac (VOLTAREN) 50 MG EC tablet Take 1 tablet (50 mg total) by mouth 2 (two) times daily as needed (pain). 30 tablet 1    miconazole NITRATE 2 % (MICOTIN) 2 % top powder Apply topically 2 (two) times daily. Apply to skin folds. 85 g 1     No current facility-administered medications on file prior to visit.         Review of patient's allergies indicates:   Allergen Reactions    Victoza [liraglutide] Swelling       Past Medical History:   Diagnosis Date    Acute respiratory failure with hypoxia and hypercapnia 10/22/2020    Last Assessment & Plan:  Patient is a chronic CO2 retainer in setting of obesity hypoventilation syndrome 2/2 morbid obesity. Stepped down from ICU 10/26, currently continuing diuresis. De-escalated to NC during the day with BiPAP nightly on 10/31, which patient tolerated well. On Lasix gtt for diuresis through 11/5.   Plan:  - Target SpO2 of >88%. Continue BiPAP nightly. Patient on 3LNC, will con    Back pain     BMI 70 and over, adult     CHF (congestive heart failure) 10/2020    Depression     Diabetes mellitus     Difficult intubation     DM (diabetes mellitus) type II controlled with renal manifestation     HTN (hypertension)  " "   Hyperlipidemia     Lumbar disc disease     Morbid obesity     Rosacea     Sleep apnea     Tear of medial meniscus of right knee, current 2017     Past Surgical History:   Procedure Laterality Date     SECTION       SECTION  2000    DILATION AND CURETTAGE OF UTERUS      AUB "negative path" per patient    ENDOMETRIAL ABLATION      Patient unsure if procedure was performed     Family History   Problem Relation Age of Onset    Hypertension Mother     Diabetes Father     Lymphoma Father         cns lymphoma    Heart disease Paternal Grandfather     Colon cancer Neg Hx     Ovarian cancer Neg Hx     Breast cancer Neg Hx     Amblyopia Neg Hx     Blindness Neg Hx     Cataracts Neg Hx     Glaucoma Neg Hx     Macular degeneration Neg Hx     Retinal detachment Neg Hx     Strabismus Neg Hx     Stroke Neg Hx     Thyroid disease Neg Hx     Uterine cancer Neg Hx      Social History     Tobacco Use    Smoking status: Never Smoker    Smokeless tobacco: Never Used   Substance Use Topics    Alcohol use: No    Drug use: No       Review of Systems:  Constitutional: no fever or chills  Respiratory: no cough or shortness of breath  Cardiovascular: no chest pain or palpitations  Genitourinary: no hematuria or dysuria     OBJECTIVE:     Vital Signs (Most Recent):  BP: 118/78 (22 0946)    Physical Exam:  General: well developed, well nourished  Lungs:  clear to auscultation bilaterally and normal respiratory effort  Cardiovascular: Heart: regular rate and rhythm.  Pelvic:   Normal external genitalia without lesions.  Normal hair distribution.  Adequate perineal body, normal urethral meatus.  Vagina moist and well rugated without lesions, bloody discharge noted in vagina.  Cervix unable to by visualized despite using all available speculum sizes.  Cervix unable to be palpated on exam likely due to habitus. Bimanual exam significantly limited.  No tenderness. "      Laboratory:  Lab Results   Component Value Date    WBC 10.63 2022    HGB 12.3 2022    HCT 38.4 2022    MCV 90 2022     2022       Ultrasound:  Results for orders placed during the hospital encounter of 22    US Pelvis Comp with Transvag NON-OB (xpd    Narrative  EXAMINATION:  US PELVIS COMP WITH TRANSVAG NON-OB (XPD)    CLINICAL HISTORY:  Abnormal uterine and vaginal bleeding, unspecified    TECHNIQUE:  Transabdominal sonography of the pelvis was performed, followed by transvaginal sonography to better evaluate the uterus and ovaries.    COMPARISON:  Pelvic ultrasound 2017.    FINDINGS:  Examination is technically limited due to patient's body habitus.    Uterus:    Size: 11.6 x 7.4 x 6.9 cm    Masses: Probable small fibroid measuring 0.7 x 0.8 x 0.7 cm.    Endometrium: Thickened in this Candice menopausal patient, measuring 10 mm.    The bilateral ovaries were not visualized.  No abnormal adnexal masses within the limitations of this exam.    Free Fluid:    None.    Impression  Technically limited exam with nonvisualization of the ovaries.    Endometrial thickening in this perimenopausal patient, measuring up to 10 mm.  Clinical correlation and further evaluation with endometrial sampling is recommended.    This report was flagged in Epic as abnormal.    Electronically signed by resident: Coco Alvarenga MD  Date:    2022  Time:    14:22    Electronically signed by: Manuel Ventura MD  Date:    2022  Time:    14:35          ASSESSMENT/PLAN:     47 yo  with AUB, thickened endometrium, BMI 61  To OR for pap smear collection, hysteroscopy D&C, progesterone IUD insertion.    Preop labs and studies ordered.    NPO 8 hours prior to surgery.     Medications reviewed.     Risks and benefits explained in detail to patient, including but not limited to damage to internal organs, including but not limited to bowel, bladder, uterus, tubes, ovaries, nerves, arteries,  veins, possible need for blood transfusion. Patient is aware of all risks and desires surgery. All questions answered. Consents signed.         Dixie Rod MD  [facility]  05/26/2022

## 2022-05-26 NOTE — PROGRESS NOTES
Patient ID: Halley Moreno is a 46 y.o. female.    Chief Complaint:  Pre-op Exam      Patient Active Problem List   Diagnosis    Hypertension associated with diabetes    Morbid obesity    Sleep apnea    Depressive disorder    Rosacea    Unspecified thoracic, thoracolumbar and lumbosacral intervertebral disc disorder    Metabolic disease    Vitamin D deficiency    Chronic pain of both knees    Diabetes mellitus type 2 without retinopathy    Morbid (severe) obesity with alveolar hypoventilation    Constipation    Hypokalemia    Congestive heart failure    Pulmonary hypertension    Impaired functional mobility, balance, gait, and endurance    Essential hypertension    BMI 60.0-69.9, adult       History of Present Illness    47 yo  with history of prolonged amenorrhea from age 34 to 46 who is now experiencing irregular episodes of heavy vaginal bleeding.  In office physical exam limited by body habitus and is preventing adequate evaluation.  Here for preop visit for EUA with pap smear collection, hysteroscopy D&C, and progesterone IUD insertion.    Patient is s/p cardiology evaluation and is medically stable for surgery.     Trey Rose MD     22 7:32 AM  Note  Message left on voicemail.  CXR looks good.  Echocardiogram looks good. Pressure in lungs is almost normal, a marked improvement compared with last echo.  Pt is medically stable for upcoming surgery.            Past Medical History:   Diagnosis Date    Acute respiratory failure with hypoxia and hypercapnia 10/22/2020    Last Assessment & Plan:  Patient is a chronic CO2 retainer in setting of obesity hypoventilation syndrome 2/2 morbid obesity. Stepped down from ICU 10/26, currently continuing diuresis. De-escalated to NC during the day with BiPAP nightly on 10/31, which patient tolerated well. On Lasix gtt for diuresis through .   Plan:  - Target SpO2 of >88%. Continue BiPAP nightly. Patient on 3LNC, will con     "Back pain     BMI 70 and over, adult     CHF (congestive heart failure) 10/2020    Depression     Diabetes mellitus     DM (diabetes mellitus) type II controlled with renal manifestation     HTN (hypertension)     Hyperlipidemia     Lumbar disc disease     Morbid obesity     Rosacea     Sleep apnea     Tear of medial meniscus of right knee, current 2017       Past Surgical History:   Procedure Laterality Date     SECTION       SECTION  2000    DILATION AND CURETTAGE OF UTERUS      AUB "negative path" per patient    ENDOMETRIAL ABLATION      Patient unsure if procedure was performed       OB History    Para Term  AB Living   2 2 0     2   SAB IAB Ectopic Multiple Live Births           2      # Outcome Date GA Lbr Mode/2nd Weight Sex Delivery Anes PTL Lv   2 Para      CS-LTranv      1 Para      CS-LTranv         Obstetric Comments   Menarche at 12   No abnormal pap   No STDs   CD x 2   Amenorrhea since age 34       Patient's last menstrual period was 04/10/2022 (exact date).   Date of Last Pap: No result found    Review of Systems   Constitutional: Negative for activity change, appetite change and fatigue.   Respiratory: Negative for shortness of breath.    Cardiovascular: Negative for chest pain.   Gastrointestinal: Negative for abdominal pain, constipation and diarrhea.   Endocrine: Negative for cold intolerance and heat intolerance.   Genitourinary: Positive for vaginal bleeding. Negative for dysuria, frequency, menstrual irregularity, pelvic pain and vaginal discharge.   Integumentary:  Negative for breast mass, breast discharge and breast tenderness.   Psychiatric/Behavioral: Negative for dysphoric mood. The patient is not nervous/anxious.    Breast: Negative for mass and tenderness     Objective:     Vitals:    22 0946   BP: 118/78   BP Location: Left arm   Patient Position: Sitting   BP Method: Large (Manual)   Weight: (!) 157 kg (346 lb 2 " "oz)   Height: 5' 3" (1.6 m)     APPEARANCE: Well nourished, well developed, in no acute distress.  PSYCH: Appropriate mood and affect.  SKIN: No acne or hirsutism  NECK: Neck symmetric without masses or thyromegaly  NODES: No inguinal, axillary, or supraclavicular lymph node enlargement  CHEST: Normal respiratory effort.  ABDOMEN: Soft.  No tenderness or masses.   BREASTS: Symmetrical, no skin changes or visible lesions.  No palpable masses or nipple discharge bilaterally.  PELVIC: Normal external genitalia without lesions.  Normal hair distribution.  Adequate perineal body, normal urethral meatus.  Vagina moist and well rugated without lesions, bloody discharge noted in vagina.  Cervix unable to by visualized despite using all available speculum sizes.  Cervix unable to be palpated on exam likely due to habitus. Bimanual exam significantly limited.  No tenderness.    EXTREMITIES: No edema.    Details    Reading Physician Reading Date Result Priority   Manuel Ventura MD  [phone]  [phone] 1/19/2022 Routine   Abluisa Alvarenga MD  [phone]  [phone] 1/19/2022      Narrative & Impression  EXAMINATION:  US PELVIS COMP WITH TRANSVAG NON-OB (XPD)     CLINICAL HISTORY:  Abnormal uterine and vaginal bleeding, unspecified     TECHNIQUE:  Transabdominal sonography of the pelvis was performed, followed by transvaginal sonography to better evaluate the uterus and ovaries.     COMPARISON:  Pelvic ultrasound 02/21/2017.     FINDINGS:  Examination is technically limited due to patient's body habitus.     Uterus:     Size: 11.6 x 7.4 x 6.9 cm     Masses: Probable small fibroid measuring 0.7 x 0.8 x 0.7 cm.     Endometrium: Thickened in this Candice menopausal patient, measuring 10 mm.     The bilateral ovaries were not visualized.  No abnormal adnexal masses within the limitations of this exam.      US Pelvis Comp with Transvag NON-OB (xpd      Free Fluid:     None.     Impression:     Technically limited exam with " nonvisualization of the ovaries.     Endometrial thickening in this perimenopausal patient, measuring up to 10 mm.  Clinical correlation and further evaluation with endometrial sampling is recommended.     This report was flagged in Epic as abnormal.     Electronically signed by resident: Coco Alvarenga MD  Date:                                            01/19/2022  Time:                                           14:22     Electronically signed by: Manuel Ventura MD  Date:                                            01/19/2022  Time:                                           14:35             Exam Ended: 01/19/22 13:50 Last Resulted: 01/19/22 14:35                Assessment/ Plan:     1. Abnormal uterine bleeding (AUB)     2. BMI 60.0-69.9, adult     3. Central sleep apnea due to medical condition     4. Thickened endometrium         To OR for pap smear collection, hysteroscopy D&C, progesterone IUD insertion.    All risks and benefits explained, including but not limited to damage to internal organs, including but not limited to uterus, tubes, ovaries, vagina, vulva, urethra, possible inability to remove all tissue, possible hysterectomy, possible blood transfusion, possible need to convert to open procedure. Patient is aware of all risks and desires surgery. All questions were answered. Consents were signed.         Dixie Rod MD  [facility]  05/26/2022

## 2022-05-26 NOTE — TELEPHONE ENCOUNTER
Should avoid the decongestant in Claritin D since it can increase the BP. It can also interact with phentermine if she is still using it.    Does she have any nasal symptoms? If so, flonase may be useful. It may need to allow a little more time on the steroid. If no improvement, she needs ENT. Will enter and let her call and schedule if no improvement.

## 2022-05-31 ENCOUNTER — TELEPHONE (OUTPATIENT)
Dept: OBSTETRICS AND GYNECOLOGY | Facility: CLINIC | Age: 47
End: 2022-05-31
Payer: MEDICAID

## 2022-06-01 ENCOUNTER — ANESTHESIA (OUTPATIENT)
Dept: SURGERY | Facility: OTHER | Age: 47
End: 2022-06-01
Payer: MEDICAID

## 2022-06-01 ENCOUNTER — HOSPITAL ENCOUNTER (OUTPATIENT)
Facility: OTHER | Age: 47
Discharge: HOME | End: 2022-06-01
Attending: OBSTETRICS & GYNECOLOGY | Admitting: OBSTETRICS & GYNECOLOGY
Payer: MEDICAID

## 2022-06-01 ENCOUNTER — PATIENT MESSAGE (OUTPATIENT)
Dept: ADMINISTRATIVE | Facility: HOSPITAL | Age: 47
End: 2022-06-01
Payer: MEDICAID

## 2022-06-01 VITALS
WEIGHT: 293 LBS | HEART RATE: 64 BPM | OXYGEN SATURATION: 96 % | SYSTOLIC BLOOD PRESSURE: 118 MMHG | DIASTOLIC BLOOD PRESSURE: 62 MMHG | RESPIRATION RATE: 16 BRPM | HEIGHT: 63 IN | TEMPERATURE: 98 F | BODY MASS INDEX: 51.91 KG/M2

## 2022-06-01 DIAGNOSIS — Z30.430 ENCOUNTER FOR IUD INSERTION: ICD-10-CM

## 2022-06-01 DIAGNOSIS — Z98.890 STATUS POST HYSTEROSCOPY: Primary | ICD-10-CM

## 2022-06-01 LAB
B-HCG UR QL: NEGATIVE
CTP QC/QA: YES
POCT GLUCOSE: 140 MG/DL (ref 70–110)
POCT GLUCOSE: 99 MG/DL (ref 70–110)

## 2022-06-01 PROCEDURE — 71000016 HC POSTOP RECOV ADDL HR: Performed by: OBSTETRICS & GYNECOLOGY

## 2022-06-01 PROCEDURE — 36000707: Performed by: OBSTETRICS & GYNECOLOGY

## 2022-06-01 PROCEDURE — 82962 GLUCOSE BLOOD TEST: CPT | Performed by: OBSTETRICS & GYNECOLOGY

## 2022-06-01 PROCEDURE — 25000003 PHARM REV CODE 250: Performed by: ANESTHESIOLOGY

## 2022-06-01 PROCEDURE — 37000008 HC ANESTHESIA 1ST 15 MINUTES: Performed by: OBSTETRICS & GYNECOLOGY

## 2022-06-01 PROCEDURE — 63600175 PHARM REV CODE 636 W HCPCS: Performed by: ANESTHESIOLOGY

## 2022-06-01 PROCEDURE — C1782 MORCELLATOR: HCPCS | Performed by: OBSTETRICS & GYNECOLOGY

## 2022-06-01 PROCEDURE — 63600175 PHARM REV CODE 636 W HCPCS: Performed by: NURSE ANESTHETIST, CERTIFIED REGISTERED

## 2022-06-01 PROCEDURE — 71000039 HC RECOVERY, EACH ADD'L HOUR: Performed by: OBSTETRICS & GYNECOLOGY

## 2022-06-01 PROCEDURE — 27201423 OPTIME MED/SURG SUP & DEVICES STERILE SUPPLY: Performed by: OBSTETRICS & GYNECOLOGY

## 2022-06-01 PROCEDURE — 37000009 HC ANESTHESIA EA ADD 15 MINS: Performed by: OBSTETRICS & GYNECOLOGY

## 2022-06-01 PROCEDURE — 00952 ANES VAG PX HYSTSC&/HSG: CPT | Performed by: OBSTETRICS & GYNECOLOGY

## 2022-06-01 PROCEDURE — 58558 PR HYSTEROSCOPY,W/ENDO BX: ICD-10-PCS | Mod: ,,, | Performed by: OBSTETRICS & GYNECOLOGY

## 2022-06-01 PROCEDURE — 63600175 PHARM REV CODE 636 W HCPCS

## 2022-06-01 PROCEDURE — 58558 HYSTEROSCOPY BIOPSY: CPT | Mod: ,,, | Performed by: OBSTETRICS & GYNECOLOGY

## 2022-06-01 PROCEDURE — 81025 URINE PREGNANCY TEST: CPT | Performed by: ANESTHESIOLOGY

## 2022-06-01 PROCEDURE — 71000033 HC RECOVERY, INTIAL HOUR: Performed by: OBSTETRICS & GYNECOLOGY

## 2022-06-01 PROCEDURE — 36000706: Performed by: OBSTETRICS & GYNECOLOGY

## 2022-06-01 PROCEDURE — 58300 INSERT INTRAUTERINE DEVICE: CPT | Mod: 51,,, | Performed by: OBSTETRICS & GYNECOLOGY

## 2022-06-01 PROCEDURE — 58300 PR INSERT INTRAUTERINE DEVICE: ICD-10-PCS | Mod: 51,,, | Performed by: OBSTETRICS & GYNECOLOGY

## 2022-06-01 PROCEDURE — 25000003 PHARM REV CODE 250

## 2022-06-01 PROCEDURE — 71000015 HC POSTOP RECOV 1ST HR: Performed by: OBSTETRICS & GYNECOLOGY

## 2022-06-01 PROCEDURE — 88305 TISSUE EXAM BY PATHOLOGIST: ICD-10-PCS | Mod: 26,,, | Performed by: PATHOLOGY

## 2022-06-01 PROCEDURE — 88175 CYTOPATH C/V AUTO FLUID REDO: CPT

## 2022-06-01 PROCEDURE — 88305 TISSUE EXAM BY PATHOLOGIST: CPT | Performed by: PATHOLOGY

## 2022-06-01 PROCEDURE — 88305 TISSUE EXAM BY PATHOLOGIST: CPT | Mod: 26,,, | Performed by: PATHOLOGY

## 2022-06-01 PROCEDURE — 25000003 PHARM REV CODE 250: Performed by: NURSE ANESTHETIST, CERTIFIED REGISTERED

## 2022-06-01 RX ORDER — DIPHENHYDRAMINE HYDROCHLORIDE 50 MG/ML
25 INJECTION INTRAMUSCULAR; INTRAVENOUS EVERY 6 HOURS PRN
Status: DISCONTINUED | OUTPATIENT
Start: 2022-06-01 | End: 2022-06-01 | Stop reason: HOSPADM

## 2022-06-01 RX ORDER — DEXAMETHASONE SODIUM PHOSPHATE 4 MG/ML
INJECTION, SOLUTION INTRA-ARTICULAR; INTRALESIONAL; INTRAMUSCULAR; INTRAVENOUS; SOFT TISSUE
Status: DISCONTINUED | OUTPATIENT
Start: 2022-06-01 | End: 2022-06-01

## 2022-06-01 RX ORDER — ONDANSETRON 2 MG/ML
INJECTION INTRAMUSCULAR; INTRAVENOUS
Status: DISCONTINUED | OUTPATIENT
Start: 2022-06-01 | End: 2022-06-01

## 2022-06-01 RX ORDER — LIDOCAINE HCL/PF 100 MG/5ML
SYRINGE (ML) INTRAVENOUS
Status: DISCONTINUED | OUTPATIENT
Start: 2022-06-01 | End: 2022-06-01

## 2022-06-01 RX ORDER — OXYCODONE HYDROCHLORIDE 5 MG/1
5 TABLET ORAL
Status: DISCONTINUED | OUTPATIENT
Start: 2022-06-01 | End: 2022-06-01 | Stop reason: HOSPADM

## 2022-06-01 RX ORDER — HYDROMORPHONE HYDROCHLORIDE 2 MG/ML
0.4 INJECTION, SOLUTION INTRAMUSCULAR; INTRAVENOUS; SUBCUTANEOUS EVERY 5 MIN PRN
Status: DISCONTINUED | OUTPATIENT
Start: 2022-06-01 | End: 2022-06-01 | Stop reason: HOSPADM

## 2022-06-01 RX ORDER — PROPOFOL 10 MG/ML
VIAL (ML) INTRAVENOUS
Status: DISCONTINUED | OUTPATIENT
Start: 2022-06-01 | End: 2022-06-01

## 2022-06-01 RX ORDER — ONDANSETRON 8 MG/1
8 TABLET, ORALLY DISINTEGRATING ORAL EVERY 8 HOURS PRN
Status: DISCONTINUED | OUTPATIENT
Start: 2022-06-01 | End: 2022-06-01 | Stop reason: HOSPADM

## 2022-06-01 RX ORDER — HYDROCODONE BITARTRATE AND ACETAMINOPHEN 5; 325 MG/1; MG/1
1 TABLET ORAL EVERY 4 HOURS PRN
Status: DISCONTINUED | OUTPATIENT
Start: 2022-06-01 | End: 2022-06-01 | Stop reason: HOSPADM

## 2022-06-01 RX ORDER — DIPHENHYDRAMINE HYDROCHLORIDE 50 MG/ML
25 INJECTION INTRAMUSCULAR; INTRAVENOUS EVERY 4 HOURS PRN
Status: DISCONTINUED | OUTPATIENT
Start: 2022-06-01 | End: 2022-06-01 | Stop reason: HOSPADM

## 2022-06-01 RX ORDER — MUPIROCIN 20 MG/G
OINTMENT TOPICAL
Status: DISCONTINUED | OUTPATIENT
Start: 2022-06-01 | End: 2022-06-01 | Stop reason: HOSPADM

## 2022-06-01 RX ORDER — PROCHLORPERAZINE EDISYLATE 5 MG/ML
5 INJECTION INTRAMUSCULAR; INTRAVENOUS EVERY 6 HOURS PRN
Status: DISCONTINUED | OUTPATIENT
Start: 2022-06-01 | End: 2022-06-01 | Stop reason: HOSPADM

## 2022-06-01 RX ORDER — HYDROCODONE BITARTRATE AND ACETAMINOPHEN 5; 325 MG/1; MG/1
1 TABLET ORAL EVERY 6 HOURS PRN
Qty: 10 TABLET | Refills: 0 | Status: SHIPPED | OUTPATIENT
Start: 2022-06-01 | End: 2024-03-07

## 2022-06-01 RX ORDER — SODIUM CHLORIDE 9 MG/ML
INJECTION, SOLUTION INTRAVENOUS CONTINUOUS
Status: DISCONTINUED | OUTPATIENT
Start: 2022-06-01 | End: 2022-06-01 | Stop reason: HOSPADM

## 2022-06-01 RX ORDER — DIPHENHYDRAMINE HCL 25 MG
25 CAPSULE ORAL EVERY 4 HOURS PRN
Status: DISCONTINUED | OUTPATIENT
Start: 2022-06-01 | End: 2022-06-01 | Stop reason: HOSPADM

## 2022-06-01 RX ORDER — SODIUM CHLORIDE 0.9 % (FLUSH) 0.9 %
3 SYRINGE (ML) INJECTION
Status: DISCONTINUED | OUTPATIENT
Start: 2022-06-01 | End: 2022-06-01 | Stop reason: HOSPADM

## 2022-06-01 RX ORDER — EPHEDRINE SULFATE 50 MG/ML
INJECTION, SOLUTION INTRAVENOUS
Status: DISCONTINUED | OUTPATIENT
Start: 2022-06-01 | End: 2022-06-01

## 2022-06-01 RX ORDER — PROCHLORPERAZINE EDISYLATE 5 MG/ML
5 INJECTION INTRAMUSCULAR; INTRAVENOUS EVERY 30 MIN PRN
Status: DISCONTINUED | OUTPATIENT
Start: 2022-06-01 | End: 2022-06-01 | Stop reason: HOSPADM

## 2022-06-01 RX ORDER — PHENYLEPHRINE HYDROCHLORIDE 10 MG/ML
INJECTION INTRAVENOUS
Status: DISCONTINUED | OUTPATIENT
Start: 2022-06-01 | End: 2022-06-01

## 2022-06-01 RX ORDER — SUCCINYLCHOLINE CHLORIDE 20 MG/ML
INJECTION INTRAMUSCULAR; INTRAVENOUS
Status: DISCONTINUED | OUTPATIENT
Start: 2022-06-01 | End: 2022-06-01

## 2022-06-01 RX ORDER — ROCURONIUM BROMIDE 10 MG/ML
INJECTION, SOLUTION INTRAVENOUS
Status: DISCONTINUED | OUTPATIENT
Start: 2022-06-01 | End: 2022-06-01

## 2022-06-01 RX ORDER — FENTANYL CITRATE 50 UG/ML
INJECTION, SOLUTION INTRAMUSCULAR; INTRAVENOUS
Status: DISCONTINUED | OUTPATIENT
Start: 2022-06-01 | End: 2022-06-01

## 2022-06-01 RX ORDER — SODIUM CHLORIDE, SODIUM LACTATE, POTASSIUM CHLORIDE, CALCIUM CHLORIDE 600; 310; 30; 20 MG/100ML; MG/100ML; MG/100ML; MG/100ML
INJECTION, SOLUTION INTRAVENOUS CONTINUOUS
Status: DISCONTINUED | OUTPATIENT
Start: 2022-06-01 | End: 2022-06-01 | Stop reason: HOSPADM

## 2022-06-01 RX ORDER — ACETAMINOPHEN 500 MG
1000 TABLET ORAL
Status: COMPLETED | OUTPATIENT
Start: 2022-06-01 | End: 2022-06-01

## 2022-06-01 RX ORDER — IBUPROFEN 600 MG/1
600 TABLET ORAL EVERY 6 HOURS PRN
Qty: 30 TABLET | Refills: 2 | Status: SHIPPED | OUTPATIENT
Start: 2022-06-01

## 2022-06-01 RX ORDER — MEPERIDINE HYDROCHLORIDE 25 MG/ML
12.5 INJECTION INTRAMUSCULAR; INTRAVENOUS; SUBCUTANEOUS ONCE AS NEEDED
Status: DISCONTINUED | OUTPATIENT
Start: 2022-06-01 | End: 2022-06-01 | Stop reason: HOSPADM

## 2022-06-01 RX ADMIN — ONDANSETRON HYDROCHLORIDE 4 MG: 2 INJECTION INTRAMUSCULAR; INTRAVENOUS at 11:06

## 2022-06-01 RX ADMIN — CARBOXYMETHYLCELLULOSE SODIUM 2 DROP: 2.5 SOLUTION/ DROPS OPHTHALMIC at 10:06

## 2022-06-01 RX ADMIN — PHENYLEPHRINE HYDROCHLORIDE 200 MCG: 10 INJECTION INTRAVENOUS at 10:06

## 2022-06-01 RX ADMIN — SODIUM CHLORIDE, SODIUM LACTATE, POTASSIUM CHLORIDE, AND CALCIUM CHLORIDE: .6; .31; .03; .02 INJECTION, SOLUTION INTRAVENOUS at 09:06

## 2022-06-01 RX ADMIN — PHENYLEPHRINE HYDROCHLORIDE 100 MCG: 10 INJECTION INTRAVENOUS at 10:06

## 2022-06-01 RX ADMIN — EPHEDRINE SULFATE 10 MG: 50 INJECTION INTRAVENOUS at 11:06

## 2022-06-01 RX ADMIN — ACETAMINOPHEN 1000 MG: 500 TABLET, FILM COATED ORAL at 08:06

## 2022-06-01 RX ADMIN — GLYCOPYRROLATE 0.2 MG: 0.2 INJECTION, SOLUTION INTRAMUSCULAR; INTRAVITREAL at 10:06

## 2022-06-01 RX ADMIN — DEXAMETHASONE SODIUM PHOSPHATE 8 MG: 4 INJECTION, SOLUTION INTRAMUSCULAR; INTRAVENOUS at 10:06

## 2022-06-01 RX ADMIN — SUCCINYLCHOLINE CHLORIDE 180 MG: 20 INJECTION, SOLUTION INTRAMUSCULAR; INTRAVENOUS at 10:06

## 2022-06-01 RX ADMIN — ROCURONIUM BROMIDE 10 MG: 10 INJECTION, SOLUTION INTRAVENOUS at 10:06

## 2022-06-01 RX ADMIN — SODIUM CHLORIDE, SODIUM LACTATE, POTASSIUM CHLORIDE, AND CALCIUM CHLORIDE: .6; .31; .03; .02 INJECTION, SOLUTION INTRAVENOUS at 11:06

## 2022-06-01 RX ADMIN — PROPOFOL 300 MG: 10 INJECTION, EMULSION INTRAVENOUS at 10:06

## 2022-06-01 RX ADMIN — LIDOCAINE HYDROCHLORIDE 75 MG: 20 INJECTION, SOLUTION INTRAVENOUS at 10:06

## 2022-06-01 RX ADMIN — FENTANYL CITRATE 100 MCG: 50 INJECTION, SOLUTION INTRAMUSCULAR; INTRAVENOUS at 10:06

## 2022-06-01 RX ADMIN — OXYCODONE HYDROCHLORIDE 5 MG: 5 TABLET ORAL at 12:06

## 2022-06-01 RX ADMIN — MUPIROCIN: 20 OINTMENT TOPICAL at 09:06

## 2022-06-01 NOTE — DISCHARGE INSTRUCTIONS
Home Care Instruction D&C Hysteroscopy             ACTIVITY LEVEL:  If you received sedation and/or an anesthetic, you may feel sleepy for several hours. Rest until you feel more  awake. Gradually resume your normal activities.    DIET:  At home, continue with liquids. If there is no nausea, you may eat a soft diet and gradually resume a regular diet.    BATHING:  You may shower  as desired in one day.  You should avoid tub baths, hot tubs and swimming pools until seen by your physician for a post-op follow up.    CARE:  You can expect watery or bloody vaginal discharge for several days. Do not use tampons, please only use pads. Sexual activity is restricted as advised by your doctor.    MEDICATIONS:  You will receive instructions for any pain and/or antibiotic prescriptions. Pain medication should be taken only if needed and as directed. Antibiotics, if ordered, should be taken as directed until the entire prescription is completed.    ADDITIONAL INFORMATION:  __________________________________________________________________________________________  WHEN TO CALL THE DOCTOR:   For any heavy vaginal bleeding   Fever over 101°F (38.4°C)   Any lower abdominal pain not relieved by the pain mediation  RETURN APPOINTMENT:  __________________________________________________________________________________________  FOR EMERGENCIES:  If any unusual problems or difficulties occur, contact  __________________ or the resident at [phone] (page ) or the Clinic office, [phone].

## 2022-06-01 NOTE — ANESTHESIA POSTPROCEDURE EVALUATION
Anesthesia Post Evaluation    Patient: Halley Moreno    Procedure(s) Performed: Procedure(s) (LRB):  INSERTION, INTRAUTERINE DEVICE (N/A)  HYSTEROSCOPY, WITH DILATION AND CURETTAGE OF UTERUS (N/A)    Final Anesthesia Type: general      Patient location during evaluation: PACU  Patient participation: Yes- Able to Participate  Level of consciousness: awake and alert  Post-procedure vital signs: reviewed and stable  Pain management: adequate  Airway patency: patent    PONV status at discharge: No PONV  Anesthetic complications: no      Cardiovascular status: blood pressure returned to baseline and stable  Respiratory status: unassisted, spontaneous ventilation and room air  Hydration status: euvolemic  Follow-up not needed.          Vitals Value Taken Time   /54 06/01/22 1249   Temp 36.4 °C (97.6 °F) 06/01/22 1245   Pulse 67 06/01/22 1254   Resp 16 06/01/22 1245   SpO2 89 % 06/01/22 1254   Vitals shown include unvalidated device data.      Event Time   Out of Recovery 12:55:56         Pain/Alia Score: Pain Rating Prior to Med Admin: 4 (6/1/2022 12:40 PM)  Pain Rating Post Med Admin: 0 (6/1/2022  1:00 PM)  Alia Score: 10 (6/1/2022  1:20 PM)

## 2022-06-01 NOTE — OR NURSING
Vital signs stable on room air. Pain is tolerable per patient. Dressing and PIV clean dry and intact. Meets PACU discharge criteria, ready for transfer to phase II. Patient and mother updated on plan of care.

## 2022-06-01 NOTE — ANESTHESIA PROCEDURE NOTES
Intubation    Date/Time: 6/1/2022 10:09 AM  Performed by: Diana King CRNA  Authorized by: Ernesto Rice MD     Intubation:     Induction:  Rapid sequence induction    Intubated:  Postinduction    Mask Ventilation:  N/a    Attempts:  1    Method of Intubation:  Video laryngoscopy    Blade:  Gaxiola 3    Laryngeal View Grade: Grade IIA - cords partially seen      Difficult Airway Encountered?: No      Complications:  None    Airway Device Size:  7.5    Style/Cuff Inflation:  Cuffed    Inflation Amount (mL):  3    Tube secured:  23    Secured at:  The lips    Placement Verified By:  Capnometry    Complicating Factors:  Short neck, obesity and oropharyngeal edema or fat    Findings Post-Intubation:  BS equal bilateral

## 2022-06-01 NOTE — PLAN OF CARE
Halley Moreno has met all discharge criteria from Phase II. Vital Signs are stable, ambulating  without difficulty. Discharge instructions given, patient verbalized understanding. Discharged from facility via wheelchair in stable condition.

## 2022-06-01 NOTE — INTERVAL H&P NOTE
The patient has been examined and the H&P has been reviewed:    I concur with the findings and no changes have occurred since H&P was written.    Surgery risks, benefits and alternative options discussed and understood by patient/family.    Sravani Dubois MD  OBGYN PGY-1        There are no hospital problems to display for this patient.

## 2022-06-01 NOTE — OP NOTE
OPERATIVE NOTE    DATE OR PROCEDURE 06/01/2022    HYSTEROSCOPY DILITATION AND CURETTAGE, EMBX, PAP SMEAR    SURGEON: Dr. Dixie Rod  ASSISTANT:  Sravani Dubois PGY1    PRE-OP DIAGNOSIS  1. Postmenopausal bleeding  2. Obesity     POST-OP DIAGNOSIS  2. S/p pap smear, hysteroscopy D&C, Embx    ANESTHESIA: General     COMPLICATIONS: None     EBL: 10 cc     IV FLUIDS: see anesthesia report     URINE OUTPUT: 100 cc drained via in-and-out catheterization prior to procedure     Hysteroscopy fluid deficit: 600 cc.  Total hysteroscopic fluid infused 5250 cc     FINDINGS:    1. Normal external genitalia, large vulva due to body habitus, redundant vaginal tissue.   2. Pap smear collected   3. Uterus sounded to 10.5 cm   4. Cervix withOUT decensus   5. Cavitary assessment showed an endometrial mass that consumed approximately 50% of the endometrial cavity and obscured the right tubal ostia. Left ostia visualized with hysteroscope  6. Endometrial biopsy  7. Curettage with myosure reach used in all four quadrants of the uterus, specimen sent to pathology.         SPECIMENS:  1. Endometrial curettage via myosure reach  2. Encometrial biopsy with pipelle  3. Pap smear     PROCEDURE:   Patient was taken to the operating room where general anesthesia was administered and found to be adequate.  She was placed in the dorsal lithotomy position using yellow fin stirrups.    Pap smear collected prior to prep and drape.  Adequate visualization of the cervix was very difficult. Cervix very high and anterior with no descensus.     Patient was then prepped and draped in the usual sterile fashion. Bladder was drained via in-and-out catheterization prior to procedure.  A surgical timeout was performed with patient's name, date of birth, procedure to be performed, and allergies verbalized. All OR staff in agreement. No preoperative antibiotics were administered as none were indicated.     Attention was then turned to the vagina where several  right angle retractors, malleables and side wall retractors were used to achieve visualization the cervix. Once the cervix was visualized, a tenaculum was placed on the anterior lip of the cervix. The uterus was sounded as above.  An endometrial biopsy with pipelle was collected. At this time visualization of the cervix was lost.  Multiple attempts made to regain visualization.  The decision was then made to perform vaginoscopy with the hysteroscope to achieve visualization of the cervix. The cervix was identified and noted to be dilated. The hysteroscope was advanced through the cervical os and the uterus was distended with normal saline.  The endometrium was inspected and findings as noted above.  The left tubal ostia was identified and appeared normal. The endometrial mass as noted above obscured the rest of the endometrial cavity. The myosure reach was used to obtain a sample of the endometrial mass and the surrounding endometrium. The hysteroscope was withdrawn without difficulty.     The endometrial samples and pap smear were sent to pathology. The tenaculum was removed and hemostasis was noted.     Sponge and instrument counts were correct x 2. The patient tolerated the procedure well and was awakened without difficulty. She was taken to the recovery room in stable condition.    This patient would not be a candidate for TVH due to the poor visualization and lack of descent.     Sravani Dubois MD  OBGYN PGY-1        Dixie Rod MD  [facility]  06/01/2022

## 2022-06-01 NOTE — DISCHARGE SUMMARY
Discharge Summary  Gynecology      Admit Date: 2022    Discharge Date and Time: 2022     Attending Physician: Dixie Rod MD    Principal Diagnoses: Postmenopausal bleeding    Active Hospital Problems    Diagnosis  POA    Status post hysteroscopy D&C, pap smear, Embx [Z98.890]  Not Applicable      Resolved Hospital Problems   No resolved problems to display.       Procedures: Procedure(s) (LRB):  INSERTION, INTRAUTERINE DEVICE (N/A)  HYSTEROSCOPY, WITH DILATION AND CURETTAGE OF UTERUS (N/A)    Discharged Condition: good    Hospital Course:   Halley Moreno is a 47 y.o. y.o.  female who presented on 2022   for above procedures for the treatment of AUB. Patient tolerated procedure. PMH significant for AUB, obesity, CHF, DM. Post-operative course was umcomplicated.  On day of discharge, patient was urinating, ambulating, and tolerating a regular diet without difficulty. Pain was well controlled on PO medication. She was discharged home on POD#0 in stable condition with instructions to follow up with Dr. Rod in 4 weeks (post-op visit previously scheduled).     Consults: None    Significant Diagnostic Studies:  No results for input(s): WBC, HGB, HCT, MCV, PLT in the last 168 hours.     Treatments:   1. Surgery as above    Disposition: Home or Self Care    Patient Instructions:   Current Discharge Medication List        START taking these medications    Details   HYDROcodone-acetaminophen (NORCO) 5-325 mg per tablet Take 1 tablet by mouth every 6 (six) hours as needed for Pain.  Qty: 10 tablet, Refills: 0    Comments: Quantity prescribed more than 7 day supply? No      ibuprofen (ADVIL,MOTRIN) 600 MG tablet Take 1 tablet (600 mg total) by mouth every 6 (six) hours as needed for Pain.  Qty: 30 tablet, Refills: 2           CONTINUE these medications which have NOT CHANGED    Details   atorvastatin (LIPITOR) 10 MG tablet Take 1 tablet (10 mg total) by mouth once daily.  Qty: 90 tablet,  Refills: 3    Associated Diagnoses: Candidate for statin therapy due to risk of future cardiovascular event      cholecalciferol, vitamin D3, (VITAMIN D3) 1,000 unit capsule Take 2 capsules (2,000 Units total) by mouth once daily.  Refills: 0    Associated Diagnoses: Vitamin D insufficiency      diclofenac (VOLTAREN) 50 MG EC tablet Take 1 tablet (50 mg total) by mouth 2 (two) times daily as needed (pain).  Qty: 30 tablet, Refills: 1    Associated Diagnoses: Acute neck pain      furosemide (LASIX) 80 MG tablet TAKE ONE TABLET BY MOUTH EVERY DAY.  Qty: 90 tablet, Refills: 3      glipiZIDE (GLUCOTROL) 5 MG TR24 TAKE ONE TABLET BY MOUTH ONCE DAILY WITH BREAKFAST  Qty: 90 tablet, Refills: 1    Associated Diagnoses: Type 2 diabetes mellitus without complication, without long-term current use of insulin      losartan (COZAAR) 100 MG tablet Take 1 tablet (100 mg total) by mouth once daily.  Qty: 30 tablet, Refills: 1    Comments: .  Associated Diagnoses: Hypertension associated with diabetes      methocarbamoL (ROBAXIN) 750 MG Tab TAKE ONE TABLET BY MOUTH TWICE DAILY AS NEEDED  Qty: 60 tablet, Refills: 1    Associated Diagnoses: Back muscle spasm      multivitamin (THERAGRAN) per tablet Take 1 tablet by mouth once daily.      OZEMPIC 1 mg/dose (4 mg/3 mL) INJECT 1 MG INTO THE SKIN EVERY 7 DAYS.  Qty: 1 pen, Refills: 2      phentermine (ADIPEX-P) 37.5 mg tablet Take 18.75 mg by mouth before breakfast.      blood sugar diagnostic Strp 1 strip by Misc.(Non-Drug; Combo Route) route 2 (two) times daily. Please provide items covered by patient's insurance  Qty: 100 strip, Refills: 5    Associated Diagnoses: Type 2 diabetes mellitus without complication, without long-term current use of insulin      clotrimazole-betamethasone (LOTRISONE) lotion Apply topically 2 (two) times daily.  Qty: 30 mL, Refills: 0      lancets Misc 1 lancet by Misc.(Non-Drug; Combo Route) route 2 (two) times daily.  Qty: 100 each, Refills: 5    Comments:  "One Touch Ultra Delica  Associated Diagnoses: Type 2 diabetes mellitus without complication, without long-term current use of insulin      latanoprost 0.005 % ophthalmic solution Place 1 drop into both eyes every evening.  Qty: 7.5 mL, Refills: 3    Associated Diagnoses: Bilateral ocular hypertension      miconazole NITRATE 2 % (MICOTIN) 2 % top powder Apply topically 2 (two) times daily. Apply to skin folds.  Qty: 85 g, Refills: 1      ONETOUCH ULTRA2 METER Misc       pen needle, diabetic 32 gauge x 1/4" Ndle 1 each by Misc.(Non-Drug; Combo Route) route once daily. Pt uses one pen needle weekly for injection of Ozempic  Qty: 12 each, Refills: 3      triamcinolone acetonide 0.1% (KENALOG) 0.1 % Lotn Apply topically 3 (three) times daily.  Qty: 60 mL, Refills: 0             Discharge Procedure Orders   Diet general     Call MD for:  temperature >100.4     Call MD for:  persistent nausea and vomiting     Call MD for:  severe uncontrolled pain     Call MD for:  difficulty breathing, headache or visual disturbances     Call MD for:  redness, tenderness, or signs of infection (pain, swelling, redness, odor or green/yellow discharge around incision site)     Call MD for:  hives     Call MD for:  persistent dizziness or light-headedness     Call MD for:  extreme fatigue     Call MD for:   Order Comments: Notify MD if bleeding 1 pad/hour for 2 consecutive hours.     Pelvic Rest   Order Comments: Pelvic rest until cleared by MD. Nothing in vagina till cleared by MD including tampons, douching, intercourse        Follow-up Information       Dixie Rod MD Follow up in 4 week(s).    Specialty: Obstetrics and Gynecology  Why: As needed, post op follow up previously scheduled  Contact information:  [address] [address] [phone]                             Sravani Dubois MD  OBGYN PGY-1    "

## 2022-06-01 NOTE — TRANSFER OF CARE
"Anesthesia Transfer of Care Note    Patient: Halley Moreno    Procedure(s) Performed: Procedure(s) (LRB):  INSERTION, INTRAUTERINE DEVICE (N/A)  HYSTEROSCOPY, WITH DILATION AND CURETTAGE OF UTERUS (N/A)    Patient location: PACU    Anesthesia Type: general    Transport from OR: Transported from OR on 6-10 L/min O2 by face mask with adequate spontaneous ventilation    Post pain: adequate analgesia    Post assessment: no apparent anesthetic complications    Post vital signs: stable    Level of consciousness: awake and responds to stimulation    Nausea/Vomiting: no nausea/vomiting    Complications: none    Transfer of care protocol was followed      Last vitals:   Visit Vitals  BP (!) 148/81 (BP Location: Left arm, Patient Position: Lying)   Pulse 75   Temp 36.9 °C (98.4 °F) (Oral)   Resp 18   Ht 5' 3" (1.6 m)   Wt (!) 157.4 kg (347 lb)   SpO2 98%   Breastfeeding No   BMI 61.47 kg/m²     "

## 2022-06-03 ENCOUNTER — TELEPHONE (OUTPATIENT)
Dept: OBSTETRICS AND GYNECOLOGY | Facility: CLINIC | Age: 47
End: 2022-06-03
Payer: MEDICAID

## 2022-06-03 NOTE — TELEPHONE ENCOUNTER
Called to check in with patient s/p hysteroscopy D&C.  No answer, left message.         Dixie Rod MD  [facility]  06/03/2022

## 2022-06-03 NOTE — TELEPHONE ENCOUNTER
Called patient to check in with patient after recent surgery. Patient reports no pain or bleeding post surgery. She did mention discomfort with urination and in the throat. Concerns reported to Dr. Rod and she advised that these are normal due to manipulations of the areas and should resolve over time. Patient expressed understanding. I let patient know to give us a call if anything more came up.

## 2022-06-04 ENCOUNTER — PATIENT MESSAGE (OUTPATIENT)
Dept: ADMINISTRATIVE | Facility: OTHER | Age: 47
End: 2022-06-04
Payer: MEDICAID

## 2022-06-08 ENCOUNTER — TELEPHONE (OUTPATIENT)
Dept: OBSTETRICS AND GYNECOLOGY | Facility: HOSPITAL | Age: 47
End: 2022-06-08
Payer: MEDICAID

## 2022-06-08 DIAGNOSIS — N85.01 COMPLEX ENDOMETRIAL HYPERPLASIA: Primary | ICD-10-CM

## 2022-06-08 LAB
COMMENT: NORMAL
FINAL PATHOLOGIC DIAGNOSIS: NORMAL
GROSS: NORMAL
Lab: NORMAL

## 2022-06-09 LAB
FINAL PATHOLOGIC DIAGNOSIS: NORMAL
Lab: NORMAL

## 2022-06-09 NOTE — PROGRESS NOTES
REFERRING PROVIDER  Dixie Rod MD     HISTORY OF PRESENT CONDITION  CC: MASSIEL Moreno is a 47 y.o.  who presents in consultation at for an opinion regarding complex hyperplasia with atypia.    +PO. -N/V. +Flatus. +BM. -VB, VD.  Last episodes of VB was in April.  -Changes in stool or urine. -Abdominal or pelvic pain. -Unintentional weight loss. +Intentional 40 lbs weight loss.  Mammogram (21):  BI-RADS1.       REVIEW OF SYSTEMS  All systems reviewed and negative except as noted in HPI.    OBJECTIVE   Vitals:    22 1413   BP: (!) 116/56   Pulse: 74      Body mass index is 62.87 kg/m².      1. General: Well appearing, no apparent distress, alert and oriented.  2. Lymph: Neck symmetric without cervical or supraclavicular adenopathy or mass.  3. Lungs: Normal respiratory rate, no accessory muscle use.  4. Cardiac: Normal rate  5. Psych: Normal affect.  6. Abdomen:  non-distended, soft, non-tender, are no masses, no ascites; large panus with edematous and thickened skin  7. Skin: Warm, dry, no rashes or lesions.   8. Extremities: Bilateral lower extremities without edema or tenderness.  9. Genitourinary: Deferred               ECOG status: 2    LABORATORY DATA  Lab data reviewed.    RADIOLOGICAL DATA  Radiology data reviewed.    PATHOLOGY DATA  Pathology data reviewed.    ASSESSMENT / PLAN     1. Complex endometrial hyperplasia with atypia    2. Morbid obesity    3. Central sleep apnea due to medical condition    4. Diabetes mellitus type 2 without retinopathy    5. Pulmonary hypertension    6. Complex endometrial hyperplasia       22: Pap: NIL  22: Hysteroscopy, D&C: at least EIN  22: Cardiology clearance  3/31/22: HbA1c = 5.7, Hb 12.3, Cr 0.7  3/16/22: 2D Echo  3/2/22: EKG  22: Pelvic US: 12 cm uterus    I discussed with the patient that the primary treatment for EIN is surgery. This includes a total hysterectomy, bilateral salpingo-oophorectomy, and sentinel lymph  "node biopsies.  She is not a surgical candidate, and we will proceed with medical therapy.  She has a very impressive panus and reports multiple "infections" which required extended rehabilitation stays.  She declines a referral to Bariatric Surgery and Nutrition.  My recommendation would be a RONC in 6 months to discuss repeat sampling.  If surgery is feasible, we will need to consult Plastic  Surgery to consider a panniculemectomy at the time of surgery.   .    I explained that obesity is a major risk factor for low grade (grade 1-2) endometrial cancer/hyperplasia. I told the patient that in my practice I routinely refer all patients with low-grade, low-stage endometrial cancer or hyperplasia with a BMI greater than or equal to 30 kg/m2 for weight loss management. I offered a referral for her to be seen by a specialist.    PATIENT EDUCATION  Ready to learn, no apparent learning barriers were identified; learning preferences include listening.  Explained diagnosis and treatment plan; patient expressed understanding of the content.    INFORMED CONSENT  Discussed the risks, benefits, and alternatives of the procedure and of possible blood transfusion.  Discussed the necessity of other members of the healthcare team participating in the procedure.  All questions answered and consent given.    Florentino Lovell       "

## 2022-06-09 NOTE — TELEPHONE ENCOUNTER
Spoke with our patient about her insurance schedule appointment in gyn oncology she agreed she voiced understanding of the date, time and location. All questions answered appointment mail. MA/LISBETH /Preceptor Pasquale JAIME

## 2022-06-09 NOTE — TELEPHONE ENCOUNTER
"Called patient to review D&C path.     Results for orders placed or performed during the hospital encounter of 22   POCT urine pregnancy   Result Value Ref Range    POC Preg Test, Ur Negative Negative     Acceptable Yes    Specimen to Pathology, Surgery Gynecology and Obstetrics   Result Value Ref Range    Final Pathologic Diagnosis       ENDOMETRIUM, BIOPSY:  - At least complex atypical endometrial hyperplasia, see comment.  - Complex endometrial hyperplasia involving an endometrial polyp.      Comment       Areas of glandular fusion are present within the complex atypical endometrial  hyperplasia.  However, these areas are not greater than 2.1 MM in size and do  not meet the definitive requirements for diagnosis of endometrioid  endometrial adenocarcinoma (FIGO grade 1).  The features, nonetheless, are  concerning for possible carcinoma elsewhere within the unsampled endometrium.      Gross       Pathology ID: [id number]     :  [id number]     MRN:  [id number]  Received in formalin labeled as "endometrial biopsy" is an aggregate of  tan-white rubbery tissue with interspersed blood clot (1.8 x 1.7 x 0.3 cm).  Received in same container is an aggregate of red-pink semi mucinous material  (2.5 x 2.3 x 0.1 cm).  The specimen is submitted entirely as follows:  YBK--1-A:  Tan-white rubbery tissue  ZYT--1-B:  Mucinous tissue  -Tangela Aldridge MS, PA([facility])      Disclaimer       Unless the case is a 'gross only' or additional testing only, the final  diagnosis for each specimen is based on a microscopic examination of  appropriate tissue sections.     POCT glucose   Result Value Ref Range    POCT Glucose 99 70 - 110 mg/dL   POCT glucose   Result Value Ref Range    POCT Glucose 140 (H) 70 - 110 mg/dL     Patient aware of results.  Refer to GYN ONC for evaluation and management recommendations.         Amanda N. Thomas, MD [facility]  2022    "

## 2022-06-14 ENCOUNTER — OFFICE VISIT (OUTPATIENT)
Dept: GYNECOLOGIC ONCOLOGY | Facility: CLINIC | Age: 47
End: 2022-06-14
Payer: MEDICAID

## 2022-06-14 VITALS
SYSTOLIC BLOOD PRESSURE: 116 MMHG | HEART RATE: 74 BPM | BODY MASS INDEX: 51.91 KG/M2 | HEIGHT: 63 IN | DIASTOLIC BLOOD PRESSURE: 56 MMHG | WEIGHT: 293 LBS

## 2022-06-14 DIAGNOSIS — N85.01 COMPLEX ENDOMETRIAL HYPERPLASIA: ICD-10-CM

## 2022-06-14 DIAGNOSIS — I27.20 PULMONARY HYPERTENSION: ICD-10-CM

## 2022-06-14 DIAGNOSIS — N85.02 COMPLEX ENDOMETRIAL HYPERPLASIA WITH ATYPIA: Primary | ICD-10-CM

## 2022-06-14 DIAGNOSIS — E66.01 MORBID OBESITY: ICD-10-CM

## 2022-06-14 DIAGNOSIS — E11.9 DIABETES MELLITUS TYPE 2 WITHOUT RETINOPATHY: ICD-10-CM

## 2022-06-14 DIAGNOSIS — G47.37 CENTRAL SLEEP APNEA DUE TO MEDICAL CONDITION: ICD-10-CM

## 2022-06-14 PROCEDURE — 3044F HG A1C LEVEL LT 7.0%: CPT | Mod: CPTII,,, | Performed by: OBSTETRICS & GYNECOLOGY

## 2022-06-14 PROCEDURE — 3078F PR MOST RECENT DIASTOLIC BLOOD PRESSURE < 80 MM HG: ICD-10-PCS | Mod: CPTII,,, | Performed by: OBSTETRICS & GYNECOLOGY

## 2022-06-14 PROCEDURE — 1159F MED LIST DOCD IN RCRD: CPT | Mod: CPTII,,, | Performed by: OBSTETRICS & GYNECOLOGY

## 2022-06-14 PROCEDURE — 3008F PR BODY MASS INDEX (BMI) DOCUMENTED: ICD-10-PCS | Mod: CPTII,,, | Performed by: OBSTETRICS & GYNECOLOGY

## 2022-06-14 PROCEDURE — 99205 OFFICE O/P NEW HI 60 MIN: CPT | Mod: S$PBB,,, | Performed by: OBSTETRICS & GYNECOLOGY

## 2022-06-14 PROCEDURE — 4010F PR ACE/ARB THEARPY RXD/TAKEN: ICD-10-PCS | Mod: CPTII,,, | Performed by: OBSTETRICS & GYNECOLOGY

## 2022-06-14 PROCEDURE — 4010F ACE/ARB THERAPY RXD/TAKEN: CPT | Mod: CPTII,,, | Performed by: OBSTETRICS & GYNECOLOGY

## 2022-06-14 PROCEDURE — 3044F PR MOST RECENT HEMOGLOBIN A1C LEVEL <7.0%: ICD-10-PCS | Mod: CPTII,,, | Performed by: OBSTETRICS & GYNECOLOGY

## 2022-06-14 PROCEDURE — 99999 PR PBB SHADOW E&M-EST. PATIENT-LVL V: ICD-10-PCS | Mod: PBBFAC,,, | Performed by: OBSTETRICS & GYNECOLOGY

## 2022-06-14 PROCEDURE — 99215 OFFICE O/P EST HI 40 MIN: CPT | Mod: PBBFAC | Performed by: OBSTETRICS & GYNECOLOGY

## 2022-06-14 PROCEDURE — 3008F BODY MASS INDEX DOCD: CPT | Mod: CPTII,,, | Performed by: OBSTETRICS & GYNECOLOGY

## 2022-06-14 PROCEDURE — 99205 PR OFFICE/OUTPT VISIT, NEW, LEVL V, 60-74 MIN: ICD-10-PCS | Mod: S$PBB,,, | Performed by: OBSTETRICS & GYNECOLOGY

## 2022-06-14 PROCEDURE — 3074F SYST BP LT 130 MM HG: CPT | Mod: CPTII,,, | Performed by: OBSTETRICS & GYNECOLOGY

## 2022-06-14 PROCEDURE — 1159F PR MEDICATION LIST DOCUMENTED IN MEDICAL RECORD: ICD-10-PCS | Mod: CPTII,,, | Performed by: OBSTETRICS & GYNECOLOGY

## 2022-06-14 PROCEDURE — 3078F DIAST BP <80 MM HG: CPT | Mod: CPTII,,, | Performed by: OBSTETRICS & GYNECOLOGY

## 2022-06-14 PROCEDURE — 3074F PR MOST RECENT SYSTOLIC BLOOD PRESSURE < 130 MM HG: ICD-10-PCS | Mod: CPTII,,, | Performed by: OBSTETRICS & GYNECOLOGY

## 2022-06-14 PROCEDURE — 99999 PR PBB SHADOW E&M-EST. PATIENT-LVL V: CPT | Mod: PBBFAC,,, | Performed by: OBSTETRICS & GYNECOLOGY

## 2022-06-14 RX ORDER — MEGESTROL ACETATE 40 MG/1
80 TABLET ORAL 2 TIMES DAILY
Qty: 120 TABLET | Refills: 11 | Status: SHIPPED | OUTPATIENT
Start: 2022-06-14 | End: 2023-07-11 | Stop reason: SDUPTHER

## 2022-06-14 NOTE — Clinical Note
Impressive panus.  I am just going to do megace and see her in 6 months to evaluate.  Hopefully she doesn't gain more weight on it.  I'll keep you posted.

## 2022-06-30 ENCOUNTER — PATIENT MESSAGE (OUTPATIENT)
Dept: GYNECOLOGIC ONCOLOGY | Facility: CLINIC | Age: 47
End: 2022-06-30
Payer: MEDICAID

## 2022-06-30 ENCOUNTER — OFFICE VISIT (OUTPATIENT)
Dept: OBSTETRICS AND GYNECOLOGY | Facility: CLINIC | Age: 47
End: 2022-06-30
Payer: MEDICAID

## 2022-06-30 VITALS
BODY MASS INDEX: 51.91 KG/M2 | DIASTOLIC BLOOD PRESSURE: 64 MMHG | SYSTOLIC BLOOD PRESSURE: 110 MMHG | HEIGHT: 63 IN | WEIGHT: 293 LBS

## 2022-06-30 DIAGNOSIS — N85.02 COMPLEX ENDOMETRIAL HYPERPLASIA WITH ATYPIA: Primary | ICD-10-CM

## 2022-06-30 PROCEDURE — 99213 OFFICE O/P EST LOW 20 MIN: CPT | Mod: PBBFAC | Performed by: OBSTETRICS & GYNECOLOGY

## 2022-06-30 PROCEDURE — 3078F DIAST BP <80 MM HG: CPT | Mod: CPTII,,, | Performed by: OBSTETRICS & GYNECOLOGY

## 2022-06-30 PROCEDURE — 4010F ACE/ARB THERAPY RXD/TAKEN: CPT | Mod: CPTII,,, | Performed by: OBSTETRICS & GYNECOLOGY

## 2022-06-30 PROCEDURE — 3078F PR MOST RECENT DIASTOLIC BLOOD PRESSURE < 80 MM HG: ICD-10-PCS | Mod: CPTII,,, | Performed by: OBSTETRICS & GYNECOLOGY

## 2022-06-30 PROCEDURE — 3008F BODY MASS INDEX DOCD: CPT | Mod: CPTII,,, | Performed by: OBSTETRICS & GYNECOLOGY

## 2022-06-30 PROCEDURE — 99024 POSTOP FOLLOW-UP VISIT: CPT | Mod: ,,, | Performed by: OBSTETRICS & GYNECOLOGY

## 2022-06-30 PROCEDURE — 99999 PR PBB SHADOW E&M-EST. PATIENT-LVL III: CPT | Mod: PBBFAC,,, | Performed by: OBSTETRICS & GYNECOLOGY

## 2022-06-30 PROCEDURE — 99999 PR PBB SHADOW E&M-EST. PATIENT-LVL III: ICD-10-PCS | Mod: PBBFAC,,, | Performed by: OBSTETRICS & GYNECOLOGY

## 2022-06-30 PROCEDURE — 3074F PR MOST RECENT SYSTOLIC BLOOD PRESSURE < 130 MM HG: ICD-10-PCS | Mod: CPTII,,, | Performed by: OBSTETRICS & GYNECOLOGY

## 2022-06-30 PROCEDURE — 3044F HG A1C LEVEL LT 7.0%: CPT | Mod: CPTII,,, | Performed by: OBSTETRICS & GYNECOLOGY

## 2022-06-30 PROCEDURE — 1159F MED LIST DOCD IN RCRD: CPT | Mod: CPTII,,, | Performed by: OBSTETRICS & GYNECOLOGY

## 2022-06-30 PROCEDURE — 99024 PR POST-OP FOLLOW-UP VISIT: ICD-10-PCS | Mod: ,,, | Performed by: OBSTETRICS & GYNECOLOGY

## 2022-06-30 PROCEDURE — 3074F SYST BP LT 130 MM HG: CPT | Mod: CPTII,,, | Performed by: OBSTETRICS & GYNECOLOGY

## 2022-06-30 PROCEDURE — 3044F PR MOST RECENT HEMOGLOBIN A1C LEVEL <7.0%: ICD-10-PCS | Mod: CPTII,,, | Performed by: OBSTETRICS & GYNECOLOGY

## 2022-06-30 PROCEDURE — 4010F PR ACE/ARB THEARPY RXD/TAKEN: ICD-10-PCS | Mod: CPTII,,, | Performed by: OBSTETRICS & GYNECOLOGY

## 2022-06-30 PROCEDURE — 3008F PR BODY MASS INDEX (BMI) DOCUMENTED: ICD-10-PCS | Mod: CPTII,,, | Performed by: OBSTETRICS & GYNECOLOGY

## 2022-06-30 PROCEDURE — 1159F PR MEDICATION LIST DOCUMENTED IN MEDICAL RECORD: ICD-10-PCS | Mod: CPTII,,, | Performed by: OBSTETRICS & GYNECOLOGY

## 2022-06-30 NOTE — Clinical Note
REGINALD - saw her for a postop visit today.  She is only taking Megace 40 mg once daily out of fear of weight gain.  I encouraged her to increase to the prescribed dose and to reach out to you if she was experiencing side effects.  She is doing fine with taking is once a day.  I re discussed with her the possible consequences of not taking it as prescribed.  AT

## 2022-06-30 NOTE — PROGRESS NOTES
"Subjective:       Halley Moreno is a 47 y.o. female who presents to the clinic 4 weeks status post hysteroscopy D&C for postmenopausal bleeding. Eating a regular diet without difficulty. Bowel movements are normal. The patient is not having any pain.     Is s/p GYN ONC consultation with Dr. Lovell.  Has been started on Megace 80 mg BID.  Notes she is only taking 40 mg once daily at this time.  She is concerned of weight gain.     Has lost 25# since initial visit with me 1/2022.  Also is exercising with a recumbent ellipDirect Sitters machine 30 min 3 times per week.    The following portions of the patient's history were reviewed and updated as appropriate: allergies, current medications, past family history, past medical history, past social history, past surgical history and problem list.    Review of Systems  Pertinent items are noted in HPI.      Objective:      /64   Ht 5' 3" (1.6 m)   Wt (!) 157 kg (346 lb 2 oz)   BMI 61.31 kg/m²   General:  alert, appears stated age and cooperative   Abdomen: soft, bowel sounds active, non-tender       Assessment:      Doing well postoperatively.  Operative findings again reviewed. Pathology report discussed.      Plan:      1. Continue any current medications.  2. Discussed importance of adherence to Megace regimen as prescribed.  Discussed referral to Bariatric Medicine.  Patient declines at this time.  Continue dietary changes and exercise.  3. Activity restrictions: none  4. Follow up: For annual well woman exam.  5. Follow up with Dr. Lovell as scheduled.         Dixie Rod MD  [facility]  06/30/2022        "

## 2022-07-14 DIAGNOSIS — E11.9 TYPE 2 DIABETES MELLITUS WITHOUT COMPLICATION, WITHOUT LONG-TERM CURRENT USE OF INSULIN: ICD-10-CM

## 2022-07-14 RX ORDER — GLIPIZIDE 5 MG/1
TABLET, FILM COATED, EXTENDED RELEASE ORAL
Qty: 90 TABLET | Refills: 1 | OUTPATIENT
Start: 2022-07-14

## 2022-07-14 NOTE — TELEPHONE ENCOUNTER
Care Due:                  Date            Visit Type   Department     Provider  --------------------------------------------------------------------------------                                EP -                              PRIMARY      MET INTERNAL  Last Visit: 04-      CARE ([facility])   BESSIE Tejeda                              EP -                              PRIMARY      MET INTERNAL  Next Visit: 08-      CARE ([facility])   MEDICINE       Yamila Tejeda                                                            Last  Test          Frequency    Reason                     Performed    Due Date  --------------------------------------------------------------------------------    HBA1C.......  6 months...  OZEMPIC, glipiZIDE.......  03- 09-    [facility] Embedded Care Gaps. Reference number: [id number]. 7/14/2022   3:51:19 PM CDT

## 2022-07-22 ENCOUNTER — TELEPHONE (OUTPATIENT)
Dept: CARDIOLOGY | Facility: CLINIC | Age: 47
End: 2022-07-22
Payer: MEDICAID

## 2022-07-22 NOTE — TELEPHONE ENCOUNTER
Returned call to patient. Scheduled appointment with Dr Rose.    ----- Message from Tiffanie Betancur sent at 7/22/2022 12:49 PM CDT -----   Name of Who is Calling:     What is the request in detail:  patient request call back in reference to schedule appointment Please contact to further discuss and advise      Can the clinic reply by [facility]:     What [facility]  [phone]

## 2022-07-28 ENCOUNTER — PATIENT MESSAGE (OUTPATIENT)
Dept: INTERNAL MEDICINE | Facility: CLINIC | Age: 47
End: 2022-07-28
Payer: MEDICAID

## 2022-08-02 ENCOUNTER — OFFICE VISIT (OUTPATIENT)
Dept: OPTOMETRY | Facility: CLINIC | Age: 47
End: 2022-08-02
Payer: MEDICAID

## 2022-08-02 DIAGNOSIS — H40.053 BILATERAL OCULAR HYPERTENSION: Primary | ICD-10-CM

## 2022-08-02 PROCEDURE — 1159F MED LIST DOCD IN RCRD: CPT | Mod: CPTII,,, | Performed by: OPTOMETRIST

## 2022-08-02 PROCEDURE — 92012 INTRM OPH EXAM EST PATIENT: CPT | Mod: S$PBB,,, | Performed by: OPTOMETRIST

## 2022-08-02 PROCEDURE — 99213 OFFICE O/P EST LOW 20 MIN: CPT | Mod: PBBFAC,PO | Performed by: OPTOMETRIST

## 2022-08-02 PROCEDURE — 3044F HG A1C LEVEL LT 7.0%: CPT | Mod: CPTII,,, | Performed by: OPTOMETRIST

## 2022-08-02 PROCEDURE — 92012 PR EYE EXAM, EST PATIENT,INTERMED: ICD-10-PCS | Mod: S$PBB,,, | Performed by: OPTOMETRIST

## 2022-08-02 PROCEDURE — 2023F DILAT RTA XM W/O RTNOPTHY: CPT | Mod: CPTII,,, | Performed by: OPTOMETRIST

## 2022-08-02 PROCEDURE — 4010F ACE/ARB THERAPY RXD/TAKEN: CPT | Mod: CPTII,,, | Performed by: OPTOMETRIST

## 2022-08-02 PROCEDURE — 1160F PR REVIEW ALL MEDS BY PRESCRIBER/CLIN PHARMACIST DOCUMENTED: ICD-10-PCS | Mod: CPTII,,, | Performed by: OPTOMETRIST

## 2022-08-02 PROCEDURE — 1159F PR MEDICATION LIST DOCUMENTED IN MEDICAL RECORD: ICD-10-PCS | Mod: CPTII,,, | Performed by: OPTOMETRIST

## 2022-08-02 PROCEDURE — 99999 PR PBB SHADOW E&M-EST. PATIENT-LVL III: CPT | Mod: PBBFAC,,, | Performed by: OPTOMETRIST

## 2022-08-02 PROCEDURE — 1160F RVW MEDS BY RX/DR IN RCRD: CPT | Mod: CPTII,,, | Performed by: OPTOMETRIST

## 2022-08-02 PROCEDURE — 4010F PR ACE/ARB THEARPY RXD/TAKEN: ICD-10-PCS | Mod: CPTII,,, | Performed by: OPTOMETRIST

## 2022-08-02 PROCEDURE — 3044F PR MOST RECENT HEMOGLOBIN A1C LEVEL <7.0%: ICD-10-PCS | Mod: CPTII,,, | Performed by: OPTOMETRIST

## 2022-08-02 PROCEDURE — 99999 PR PBB SHADOW E&M-EST. PATIENT-LVL III: ICD-10-PCS | Mod: PBBFAC,,, | Performed by: OPTOMETRIST

## 2022-08-02 PROCEDURE — 2023F PR DILATED RETINAL EXAM W/O EVID OF RETINOPATHY: ICD-10-PCS | Mod: CPTII,,, | Performed by: OPTOMETRIST

## 2022-08-02 NOTE — PROGRESS NOTES
HPI     46 Y/o female is here for IOP check   Pt denies pain and discomfort   No f/f    Eye med: Latanoprost OU QHS     Last edited by Jacqueline Reeves MA on 8/2/2022 10:53 AM. (History)            Assessment /Plan     For exam results, see Encounter Report.    Bilateral ocular hypertension      1. IOP stable ou, cont Latanaprost qhs ou, pachy normal, prev oct of rnfl normal, prev hvf normal, gonio open, RTC 6 mos with iop ck and dfe. No fam history of glaucoma.

## 2022-08-10 ENCOUNTER — PATIENT OUTREACH (OUTPATIENT)
Dept: ADMINISTRATIVE | Facility: HOSPITAL | Age: 47
End: 2022-08-10
Payer: MEDICAID

## 2022-08-10 ENCOUNTER — PATIENT MESSAGE (OUTPATIENT)
Dept: ADMINISTRATIVE | Facility: HOSPITAL | Age: 47
End: 2022-08-10
Payer: MEDICAID

## 2022-08-12 ENCOUNTER — LAB VISIT (OUTPATIENT)
Dept: LAB | Facility: HOSPITAL | Age: 47
End: 2022-08-12
Payer: MEDICAID

## 2022-08-12 DIAGNOSIS — Z00.00 ANNUAL PHYSICAL EXAM: ICD-10-CM

## 2022-08-12 DIAGNOSIS — E11.9 DIABETES MELLITUS TYPE 2 WITHOUT RETINOPATHY: ICD-10-CM

## 2022-08-12 DIAGNOSIS — I10 PRIMARY HYPERTENSION: ICD-10-CM

## 2022-08-12 LAB
ALBUMIN SERPL BCP-MCNC: 3.9 G/DL (ref 3.5–5.2)
ALP SERPL-CCNC: 66 U/L (ref 55–135)
ALT SERPL W/O P-5'-P-CCNC: 21 U/L (ref 10–44)
ANION GAP SERPL CALC-SCNC: 7 MMOL/L (ref 8–16)
AST SERPL-CCNC: 16 U/L (ref 10–40)
BASOPHILS # BLD AUTO: 0.02 K/UL (ref 0–0.2)
BASOPHILS NFR BLD: 0.2 % (ref 0–1.9)
BILIRUB SERPL-MCNC: 0.6 MG/DL (ref 0.1–1)
BUN SERPL-MCNC: 17 MG/DL (ref 6–20)
CALCIUM SERPL-MCNC: 9.5 MG/DL (ref 8.7–10.5)
CHLORIDE SERPL-SCNC: 106 MMOL/L (ref 95–110)
CHOLEST SERPL-MCNC: 90 MG/DL (ref 120–199)
CHOLEST/HDLC SERPL: 2.7 {RATIO} (ref 2–5)
CO2 SERPL-SCNC: 26 MMOL/L (ref 23–29)
CREAT SERPL-MCNC: 0.9 MG/DL (ref 0.5–1.4)
DIFFERENTIAL METHOD: ABNORMAL
EOSINOPHIL # BLD AUTO: 0.1 K/UL (ref 0–0.5)
EOSINOPHIL NFR BLD: 0.9 % (ref 0–8)
ERYTHROCYTE [DISTWIDTH] IN BLOOD BY AUTOMATED COUNT: 15.4 % (ref 11.5–14.5)
EST. GFR  (NO RACE VARIABLE): >60 ML/MIN/1.73 M^2
ESTIMATED AVG GLUCOSE: 140 MG/DL (ref 68–131)
GLUCOSE SERPL-MCNC: 116 MG/DL (ref 70–110)
HBA1C MFR BLD: 6.5 % (ref 4–5.6)
HCT VFR BLD AUTO: 36.2 % (ref 37–48.5)
HDLC SERPL-MCNC: 33 MG/DL (ref 40–75)
HDLC SERPL: 36.7 % (ref 20–50)
HGB BLD-MCNC: 11.8 G/DL (ref 12–16)
IMM GRANULOCYTES # BLD AUTO: 0.03 K/UL (ref 0–0.04)
IMM GRANULOCYTES NFR BLD AUTO: 0.3 % (ref 0–0.5)
LDLC SERPL CALC-MCNC: 43.6 MG/DL (ref 63–159)
LYMPHOCYTES # BLD AUTO: 2.9 K/UL (ref 1–4.8)
LYMPHOCYTES NFR BLD: 24.3 % (ref 18–48)
MCH RBC QN AUTO: 29.1 PG (ref 27–31)
MCHC RBC AUTO-ENTMCNC: 32.6 G/DL (ref 32–36)
MCV RBC AUTO: 89 FL (ref 82–98)
MONOCYTES # BLD AUTO: 0.8 K/UL (ref 0.3–1)
MONOCYTES NFR BLD: 7 % (ref 4–15)
NEUTROPHILS # BLD AUTO: 7.9 K/UL (ref 1.8–7.7)
NEUTROPHILS NFR BLD: 67.3 % (ref 38–73)
NONHDLC SERPL-MCNC: 57 MG/DL
NRBC BLD-RTO: 0 /100 WBC
PLATELET # BLD AUTO: 237 K/UL (ref 150–450)
PMV BLD AUTO: 10.5 FL (ref 9.2–12.9)
POTASSIUM SERPL-SCNC: 4.1 MMOL/L (ref 3.5–5.1)
PROT SERPL-MCNC: 6.9 G/DL (ref 6–8.4)
RBC # BLD AUTO: 4.05 M/UL (ref 4–5.4)
SODIUM SERPL-SCNC: 139 MMOL/L (ref 136–145)
TRIGL SERPL-MCNC: 67 MG/DL (ref 30–150)
TSH SERPL DL<=0.005 MIU/L-ACNC: 2.11 UIU/ML (ref 0.4–4)
WBC # BLD AUTO: 11.77 K/UL (ref 3.9–12.7)

## 2022-08-12 PROCEDURE — 36415 COLL VENOUS BLD VENIPUNCTURE: CPT | Mod: PO | Performed by: INTERNAL MEDICINE

## 2022-08-12 PROCEDURE — 84443 ASSAY THYROID STIM HORMONE: CPT | Performed by: INTERNAL MEDICINE

## 2022-08-12 PROCEDURE — 83036 HEMOGLOBIN GLYCOSYLATED A1C: CPT | Performed by: INTERNAL MEDICINE

## 2022-08-12 PROCEDURE — 80061 LIPID PANEL: CPT | Performed by: INTERNAL MEDICINE

## 2022-08-12 PROCEDURE — 80053 COMPREHEN METABOLIC PANEL: CPT | Performed by: INTERNAL MEDICINE

## 2022-08-12 PROCEDURE — 85025 COMPLETE CBC W/AUTO DIFF WBC: CPT | Performed by: INTERNAL MEDICINE

## 2022-08-25 ENCOUNTER — TELEPHONE (OUTPATIENT)
Dept: INTERNAL MEDICINE | Facility: CLINIC | Age: 47
End: 2022-08-25
Payer: MEDICAID

## 2022-09-07 ENCOUNTER — OFFICE VISIT (OUTPATIENT)
Dept: CARDIOLOGY | Facility: CLINIC | Age: 47
End: 2022-09-07
Payer: MEDICAID

## 2022-09-07 VITALS
WEIGHT: 293 LBS | DIASTOLIC BLOOD PRESSURE: 56 MMHG | HEART RATE: 71 BPM | BODY MASS INDEX: 51.91 KG/M2 | OXYGEN SATURATION: 96 % | HEIGHT: 63 IN | SYSTOLIC BLOOD PRESSURE: 115 MMHG

## 2022-09-07 DIAGNOSIS — Z74.09 IMPAIRED FUNCTIONAL MOBILITY, BALANCE, GAIT, AND ENDURANCE: ICD-10-CM

## 2022-09-07 DIAGNOSIS — M51.9 UNSPECIFIED THORACIC, THORACOLUMBAR AND LUMBOSACRAL INTERVERTEBRAL DISC DISORDER: ICD-10-CM

## 2022-09-07 DIAGNOSIS — I15.2 HYPERTENSION ASSOCIATED WITH DIABETES: ICD-10-CM

## 2022-09-07 DIAGNOSIS — E11.59 HYPERTENSION ASSOCIATED WITH DIABETES: ICD-10-CM

## 2022-09-07 DIAGNOSIS — R07.9 CHEST PAIN OF UNCERTAIN ETIOLOGY: ICD-10-CM

## 2022-09-07 DIAGNOSIS — E66.2 MORBID (SEVERE) OBESITY WITH ALVEOLAR HYPOVENTILATION: ICD-10-CM

## 2022-09-07 DIAGNOSIS — G47.30 SLEEP APNEA, UNSPECIFIED TYPE: ICD-10-CM

## 2022-09-07 DIAGNOSIS — E11.9 DIABETES MELLITUS TYPE 2 WITHOUT RETINOPATHY: ICD-10-CM

## 2022-09-07 DIAGNOSIS — I10 ESSENTIAL HYPERTENSION: Primary | ICD-10-CM

## 2022-09-07 PROCEDURE — 1159F MED LIST DOCD IN RCRD: CPT | Mod: CPTII,,, | Performed by: INTERNAL MEDICINE

## 2022-09-07 PROCEDURE — 99999 PR PBB SHADOW E&M-EST. PATIENT-LVL III: CPT | Mod: PBBFAC,,, | Performed by: INTERNAL MEDICINE

## 2022-09-07 PROCEDURE — 99213 OFFICE O/P EST LOW 20 MIN: CPT | Mod: PBBFAC,PN | Performed by: INTERNAL MEDICINE

## 2022-09-07 PROCEDURE — 99214 PR OFFICE/OUTPT VISIT, EST, LEVL IV, 30-39 MIN: ICD-10-PCS | Mod: S$PBB,25,, | Performed by: INTERNAL MEDICINE

## 2022-09-07 PROCEDURE — 3008F BODY MASS INDEX DOCD: CPT | Mod: CPTII,,, | Performed by: INTERNAL MEDICINE

## 2022-09-07 PROCEDURE — 3061F NEG MICROALBUMINURIA REV: CPT | Mod: CPTII,,, | Performed by: INTERNAL MEDICINE

## 2022-09-07 PROCEDURE — 93005 ELECTROCARDIOGRAM TRACING: CPT | Mod: PBBFAC,PN | Performed by: INTERNAL MEDICINE

## 2022-09-07 PROCEDURE — 99999 PR PBB SHADOW E&M-EST. PATIENT-LVL III: ICD-10-PCS | Mod: PBBFAC,,, | Performed by: INTERNAL MEDICINE

## 2022-09-07 PROCEDURE — 3074F SYST BP LT 130 MM HG: CPT | Mod: CPTII,,, | Performed by: INTERNAL MEDICINE

## 2022-09-07 PROCEDURE — 3066F NEPHROPATHY DOC TX: CPT | Mod: CPTII,,, | Performed by: INTERNAL MEDICINE

## 2022-09-07 PROCEDURE — 3078F DIAST BP <80 MM HG: CPT | Mod: CPTII,,, | Performed by: INTERNAL MEDICINE

## 2022-09-07 PROCEDURE — 93010 EKG 12-LEAD: ICD-10-PCS | Mod: S$PBB,,, | Performed by: INTERNAL MEDICINE

## 2022-09-07 PROCEDURE — 3061F PR NEG MICROALBUMINURIA RESULT DOCUMENTED/REVIEW: ICD-10-PCS | Mod: CPTII,,, | Performed by: INTERNAL MEDICINE

## 2022-09-07 PROCEDURE — 99214 OFFICE O/P EST MOD 30 MIN: CPT | Mod: S$PBB,25,, | Performed by: INTERNAL MEDICINE

## 2022-09-07 PROCEDURE — 1160F RVW MEDS BY RX/DR IN RCRD: CPT | Mod: CPTII,,, | Performed by: INTERNAL MEDICINE

## 2022-09-07 PROCEDURE — 3044F PR MOST RECENT HEMOGLOBIN A1C LEVEL <7.0%: ICD-10-PCS | Mod: CPTII,,, | Performed by: INTERNAL MEDICINE

## 2022-09-07 PROCEDURE — 93010 ELECTROCARDIOGRAM REPORT: CPT | Mod: S$PBB,,, | Performed by: INTERNAL MEDICINE

## 2022-09-07 PROCEDURE — 1160F PR REVIEW ALL MEDS BY PRESCRIBER/CLIN PHARMACIST DOCUMENTED: ICD-10-PCS | Mod: CPTII,,, | Performed by: INTERNAL MEDICINE

## 2022-09-07 PROCEDURE — 3066F PR DOCUMENTATION OF TREATMENT FOR NEPHROPATHY: ICD-10-PCS | Mod: CPTII,,, | Performed by: INTERNAL MEDICINE

## 2022-09-07 PROCEDURE — 3044F HG A1C LEVEL LT 7.0%: CPT | Mod: CPTII,,, | Performed by: INTERNAL MEDICINE

## 2022-09-07 PROCEDURE — 3074F PR MOST RECENT SYSTOLIC BLOOD PRESSURE < 130 MM HG: ICD-10-PCS | Mod: CPTII,,, | Performed by: INTERNAL MEDICINE

## 2022-09-07 PROCEDURE — 4010F ACE/ARB THERAPY RXD/TAKEN: CPT | Mod: CPTII,,, | Performed by: INTERNAL MEDICINE

## 2022-09-07 PROCEDURE — 3008F PR BODY MASS INDEX (BMI) DOCUMENTED: ICD-10-PCS | Mod: CPTII,,, | Performed by: INTERNAL MEDICINE

## 2022-09-07 PROCEDURE — 1159F PR MEDICATION LIST DOCUMENTED IN MEDICAL RECORD: ICD-10-PCS | Mod: CPTII,,, | Performed by: INTERNAL MEDICINE

## 2022-09-07 PROCEDURE — 4010F PR ACE/ARB THEARPY RXD/TAKEN: ICD-10-PCS | Mod: CPTII,,, | Performed by: INTERNAL MEDICINE

## 2022-09-07 PROCEDURE — 3078F PR MOST RECENT DIASTOLIC BLOOD PRESSURE < 80 MM HG: ICD-10-PCS | Mod: CPTII,,, | Performed by: INTERNAL MEDICINE

## 2022-09-07 NOTE — PROGRESS NOTES
"  Subjective:      Patient ID: Halley Mroeno is a 47 y.o. female.    Chief Complaint: No chief complaint on file.    HPI:  Walking more since losing 44 lbs.  Pt is avoiding fast food.  Pt is doing [employer],.  Pt is taking phentermine.    Pt had posterior chest pain when walking in [place] which lasted  20 minutes.  Pt is concerned that the chest pain was a warning that she is at risk of a heart attack.    Pt usually has lower back pain when walking.    Review of Systems   Cardiovascular:  Negative for chest pain (One episode of posterior chest pain when walking.), claudication, dyspnea on exertion, irregular heartbeat, leg swelling, near-syncope, orthopnea, palpitations and syncope.        Pt uses biPAP qhs    HgbA1C 6.5      Past Medical History:   Diagnosis Date    Acute respiratory failure with hypoxia and hypercapnia 10/22/2020    Last Assessment & Plan:  Patient is a chronic CO2 retainer in setting of obesity hypoventilation syndrome 2/2 morbid obesity. Stepped down from ICU 10/26, currently continuing diuresis. De-escalated to NC during the day with BiPAP nightly on 10/31, which patient tolerated well. On Lasix gtt for diuresis through .   Plan:  - Target SpO2 of >88%. Continue BiPAP nightly. Patient on 3LNC, will con    Back pain     BMI 70 and over, adult     CHF (congestive heart failure) 10/2020    Depression     Diabetes mellitus     Difficult intubation     DM (diabetes mellitus) type II controlled with renal manifestation     HTN (hypertension)     Hyperlipidemia     Lumbar disc disease     Morbid obesity     Rosacea     Sleep apnea     Tear of medial meniscus of right knee, current 2017        Past Surgical History:   Procedure Laterality Date     SECTION       SECTION  2000    DILATION AND CURETTAGE OF UTERUS  2010    AUB "negative path" per patient    ENDOMETRIAL ABLATION  2010    Patient unsure if procedure was performed    HYSTEROSCOPY WITH DILATION AND " CURETTAGE OF UTERUS N/A 6/1/2022    Procedure: HYSTEROSCOPY, WITH DILATION AND CURETTAGE OF UTERUS;  Surgeon: Dixie Rod MD;  Location: [facility];  Service: OB/GYN;  Laterality: N/A;    INTRAUTERINE DEVICE INSERTION N/A 6/1/2022    Procedure: INSERTION, INTRAUTERINE DEVICE;  Surgeon: Dixie Rod MD;  Location: [facility], [place], operated by [facility] OR;  Service: OB/GYN;  Laterality: N/A;       Family History   Problem Relation Age of Onset    Hypertension Mother     Diabetes Father     Lymphoma Father         cns lymphoma    Heart disease Paternal Grandfather     Colon cancer Neg Hx     Ovarian cancer Neg Hx     Breast cancer Neg Hx     Amblyopia Neg Hx     Blindness Neg Hx     Cataracts Neg Hx     Glaucoma Neg Hx     Macular degeneration Neg Hx     Retinal detachment Neg Hx     Strabismus Neg Hx     Stroke Neg Hx     Thyroid disease Neg Hx     Uterine cancer Neg Hx        Social History     Socioeconomic History    Marital status: Single   Tobacco Use    Smoking status: Never    Smokeless tobacco: Never   Substance and Sexual Activity    Alcohol use: No    Drug use: No    Sexual activity: Yes     Partners: Male     Birth control/protection: Surgical   Social History Narrative    She is a pharmacy tech, works at [employer] in [place].  Nonsmoker, social etoh.  No exercise.     Social Determinants of Health     Financial Resource Strain: Low Risk     Difficulty of Paying Living Expenses: Not hard at all   Food Insecurity: No Food Insecurity    Worried About Running Out of Food in the Last Year: Never true    Ran Out of Food in the Last Year: Never true   Transportation Needs: No Transportation Needs    Lack of Transportation (Medical): No    Lack of Transportation (Non-Medical): No   Physical Activity: Insufficiently Active    Days of Exercise per Week: 3 days    Minutes of Exercise per Session: 30 min   Stress: Unknown    Feeling of Stress : Patient refused   Social Connections: Unknown    Frequency of Communication with Friends and  "Family: Three times a week    Frequency of Social Gatherings with Friends and Family: Three times a week    Active Member of Clubs or Organizations: Yes    Attends Club or Organization Meetings: More than 4 times per year    Marital Status: Never    Housing Stability: Low Risk     Unable to Pay for Housing in the Last Year: No    Number of Places Lived in the Last Year: 1    Unstable Housing in the Last Year: No       Current Outpatient Medications on File Prior to Visit   Medication Sig Dispense Refill    atorvastatin (LIPITOR) 10 MG tablet Take 1 tablet (10 mg total) by mouth once daily. 90 tablet 3    blood sugar diagnostic Strp 1 strip by Misc.(Non-Drug; Combo Route) route 2 (two) times daily. Please provide items covered by patient's insurance 100 strip 5    cholecalciferol, vitamin D3, (VITAMIN D3) 1,000 unit capsule Take 2 capsules (2,000 Units total) by mouth once daily.  0    clotrimazole-betamethasone (LOTRISONE) lotion Apply topically 2 (two) times daily. 30 mL 0    furosemide (LASIX) 80 MG tablet TAKE ONE TABLET BY MOUTH EVERY DAY. 90 tablet 3    glipiZIDE (GLUCOTROL) 5 MG TR24 TAKE ONE TABLET BY MOUTH ONCE DAILY WITH BREAKFAST 90 tablet 1    lancets Misc 1 lancet by Misc.(Non-Drug; Combo Route) route 2 (two) times daily. 100 each 5    latanoprost 0.005 % ophthalmic solution Place 1 drop into both eyes every evening. 7.5 mL 3    losartan (COZAAR) 100 MG tablet TAKE ONE TABLET BY MOUTH EVERY DAY. 30 tablet 1    megestroL (MEGACE) 40 MG Tab Take 2 tablets (80 mg total) by mouth 2 (two) times daily. 120 tablet 11    miconazole NITRATE 2 % (MICOTIN) 2 % top powder Apply topically 2 (two) times daily. Apply to skin folds. 85 g 1    multivitamin (THERAGRAN) per tablet Take 1 tablet by mouth once daily.      ONETOUCH ULTRA2 METER [facility]       OZEMPIC 1 mg/dose (4 mg/3 mL) INJECT 1 MG INTO THE SKIN EVERY 7 DAYS. 3 pen 1    pen needle, diabetic 32 gauge x 1/4" Ndle 1 each by Misc.(Non-Drug; Combo Route) " "route once daily. Pt uses one pen needle weekly for injection of Ozempic 12 each 3    phentermine (ADIPEX-P) 37.5 mg tablet Take 18.75 mg by mouth before breakfast.      triamcinolone acetonide 0.1% (KENALOG) 0.1 % Lotn Apply topically 3 (three) times daily. 60 mL 0    diclofenac (VOLTAREN) 50 MG EC tablet Take 1 tablet (50 mg total) by mouth 2 (two) times daily as needed (pain). 30 tablet 1    HYDROcodone-acetaminophen (NORCO) 5-325 mg per tablet Take 1 tablet by mouth every 6 (six) hours as needed for Pain. 10 tablet 0    ibuprofen (ADVIL,MOTRIN) 600 MG tablet Take 1 tablet (600 mg total) by mouth every 6 (six) hours as needed for Pain. 30 tablet 2    methocarbamoL (ROBAXIN) 750 MG Tab TAKE ONE TABLET BY MOUTH TWICE DAILY AS NEEDED 60 tablet 1     No current facility-administered medications on file prior to visit.       Review of patient's allergies indicates:   Allergen Reactions    Victoza [liraglutide] Swelling     Objective:     Vitals:    09/07/22 0753   BP: (!) 115/56   BP Location: Right arm   Patient Position: Sitting   BP Method: Large (Automatic)   Pulse: 71   SpO2: 96%   Weight: (!) 158.9 kg (350 lb 5 oz)   Height: 5' 3" (1.6 m)        Physical Exam  Vitals reviewed.   Constitutional:       Appearance: She is well-developed. She is obese.   Eyes:      General: No scleral icterus.  Neck:      Vascular: No carotid bruit or JVD.   Cardiovascular:      Rate and Rhythm: Normal rate and regular rhythm.      Heart sounds: No murmur heard.    No gallop.   Pulmonary:      Breath sounds: Normal breath sounds.   Musculoskeletal:      Right lower leg: No edema.      Left lower leg: No edema.   Skin:     General: Skin is warm and dry.   Neurological:      Mental Status: She is alert and oriented to person, place, and time.   Psychiatric:         Behavior: Behavior normal.         Thought Content: Thought content normal.         Judgment: Judgment normal.    Pt has bilateral stasis changes        ECG today reviewed " by me: NSR, left axis deviation, otherwise WNL      View Images Vital Vitrea     Show images for Echo Saline Bubble? No  Summary    The left ventricle is normal in size with normal systolic function.  The estimated ejection fraction is 65%.  Indeterminate left ventricular diastolic function.  Normal right ventricular size with normal right ventricular systolic function. Significantly better than previous report.  The estimated PA systolic pressure is 36 mm Hg. Considerably lower than previously reported.  Low central venous pressure.           Lab Visit on 08/12/2022   Component Date Value Ref Range Status    WBC 08/12/2022 11.77  3.90 - 12.70 K/uL Final    RBC 08/12/2022 4.05  4.00 - 5.40 M/uL Final    Hemoglobin 08/12/2022 11.8 (L)  12.0 - 16.0 g/dL Final    Hematocrit 08/12/2022 36.2 (L)  37.0 - 48.5 % Final    MCV 08/12/2022 89  82 - 98 fL Final    MCH 08/12/2022 29.1  27.0 - 31.0 pg Final    MCHC 08/12/2022 32.6  32.0 - 36.0 g/dL Final    RDW 08/12/2022 15.4 (H)  11.5 - 14.5 % Final    Platelets 08/12/2022 237  150 - 450 K/uL Final    MPV 08/12/2022 10.5  9.2 - 12.9 fL Final    Immature Granulocytes 08/12/2022 0.3  0.0 - 0.5 % Final    Gran # (ANC) 08/12/2022 7.9 (H)  1.8 - 7.7 K/uL Final    Immature Grans (Abs) 08/12/2022 0.03  0.00 - 0.04 K/uL Final    Lymph # 08/12/2022 2.9  1.0 - 4.8 K/uL Final    Mono # 08/12/2022 0.8  0.3 - 1.0 K/uL Final    Eos # 08/12/2022 0.1  0.0 - 0.5 K/uL Final    Baso # 08/12/2022 0.02  0.00 - 0.20 K/uL Final    nRBC 08/12/2022 0  0 /100 WBC Final    Gran % 08/12/2022 67.3  38.0 - 73.0 % Final    Lymph % 08/12/2022 24.3  18.0 - 48.0 % Final    Mono % 08/12/2022 7.0  4.0 - 15.0 % Final    Eosinophil % 08/12/2022 0.9  0.0 - 8.0 % Final    Basophil % 08/12/2022 0.2  0.0 - 1.9 % Final    Differential Method 08/12/2022 Automated   Final    Sodium 08/12/2022 139  136 - 145 mmol/L Final    Potassium 08/12/2022 4.1  3.5 - 5.1 mmol/L Final    Chloride 08/12/2022 106  95 - 110 mmol/L Final     CO2 08/12/2022 26  23 - 29 mmol/L Final    Glucose 08/12/2022 116 (H)  70 - 110 mg/dL Final    BUN 08/12/2022 17  6 - 20 mg/dL Final    Creatinine 08/12/2022 0.9  0.5 - 1.4 mg/dL Final    Calcium 08/12/2022 9.5  8.7 - 10.5 mg/dL Final    Total Protein 08/12/2022 6.9  6.0 - 8.4 g/dL Final    Albumin 08/12/2022 3.9  3.5 - 5.2 g/dL Final    Total Bilirubin 08/12/2022 0.6  0.1 - 1.0 mg/dL Final    Alkaline Phosphatase 08/12/2022 66  55 - 135 U/L Final    AST 08/12/2022 16  10 - 40 U/L Final    ALT 08/12/2022 21  10 - 44 U/L Final    Anion Gap 08/12/2022 7 (L)  8 - 16 mmol/L Final    eGFR 08/12/2022 >60.0  >60 mL/min/1.73 m^2 Final    Hemoglobin A1C 08/12/2022 6.5 (H)  4.0 - 5.6 % Final    Estimated Avg Glucose 08/12/2022 140 (H)  68 - 131 mg/dL Final    TSH 08/12/2022 2.108  0.400 - 4.000 uIU/mL Final    Cholesterol 08/12/2022 90 (L)  120 - 199 mg/dL Final    Triglycerides 08/12/2022 67  30 - 150 mg/dL Final    HDL 08/12/2022 33 (L)  40 - 75 mg/dL Final    LDL Cholesterol 08/12/2022 43.6 (L)  63.0 - 159.0 mg/dL Final    HDL/Cholesterol Ratio 08/12/2022 36.7  20.0 - 50.0 % Final    Total Cholesterol/HDL Ratio 08/12/2022 2.7  2.0 - 5.0 Final    Non-HDL Cholesterol 08/12/2022 57  mg/dL Final   Lab Visit on 08/12/2022   Component Date Value Ref Range Status    Specimen UA 08/12/2022 Urine, Unspecified   Final    Color, UA 08/12/2022 Yellow  Yellow, Straw, Kamila Final    Appearance, UA 08/12/2022 Clear  Clear Final    pH, UA 08/12/2022 5.0  5.0 - 8.0 Final    Specific Gravity, UA 08/12/2022 1.025  1.005 - 1.030 Final    Protein, UA 08/12/2022 Negative  Negative Final    Glucose, UA 08/12/2022 Negative  Negative Final    Ketones, UA 08/12/2022 Negative  Negative Final    Bilirubin (UA) 08/12/2022 Negative  Negative Final    Occult Blood UA 08/12/2022 Negative  Negative Final    Nitrite, UA 08/12/2022 Negative  Negative Final    Leukocytes, UA 08/12/2022 1+ (A)  Negative Final    Microalbumin, Urine 08/12/2022 10.0  ug/mL  "Final    Creatinine, Urine 2022 198.0  15.0 - 325.0 mg/dL Final    Microalb/Creat Ratio 2022 5.1  0.0 - 30.0 ug/mg Final    RBC, UA 2022 2  0 - 4 /hpf Final    WBC, UA 2022 19 (H)  0 - 5 /hpf Final    Bacteria 2022 Occasional  None-Occ /hpf Final    Squam Epithel, UA 2022 12  /hpf Final    Non-Squam Epith 2022 1 (A)  <1/hpf /hpf Final    Hyaline Casts, UA 2022 1  0-1/lpf /lpf Final    Ca Oxalate Jackie, UA 2022 Moderate  None-Moderate Final    Microscopic Comment 2022 SEE COMMENT   Final   Admission on 2022, Discharged on 2022   Component Date Value Ref Range Status    POC Preg Test, Ur 2022 Negative  Negative Final     Acceptable 2022 Yes   Final    POCT Glucose 2022 99  70 - 110 mg/dL Final    Final Pathologic Diagnosis 2022    Final                    Value:ENDOMETRIUM, BIOPSY:  - At least complex atypical endometrial hyperplasia, see comment.  - Complex endometrial hyperplasia involving an endometrial polyp.      Comment 2022    Final                    Value:Areas of glandular fusion are present within the complex atypical endometrial  hyperplasia.  However, these areas are not greater than 2.1 MM in size and do  not meet the definitive requirements for diagnosis of endometrioid  endometrial adenocarcinoma (FIGO grade 1).  The features, nonetheless, are  concerning for possible carcinoma elsewhere within the unsampled endometrium.      Gross 2022    Final                    Value:Pathology ID: UBS-     :  1975     MRN:  [id number]  Received in formalin labeled as "endometrial biopsy" is an aggregate of  tan-white rubbery tissue with interspersed blood clot (1.8 x 1.7 x 0.3 cm).  Received in same container is an aggregate of red-pink semi mucinous material  (2.5 x 2.3 x 0.1 cm).  The specimen is submitted entirely as follows:  XGS--1-A:  Tan-white rubbery " tissue  OXF--1-B:  Mucinous tissue  -Tangela Aldridge, MS, PA(ASCP)      Disclaimer 06/01/2022    Final                    Value:Unless the case is a 'gross only' or additional testing only, the final  diagnosis for each specimen is based on a microscopic examination of  appropriate tissue sections.      POCT Glucose 06/01/2022 140 (H)  70 - 110 mg/dL Final    Final Pathologic Diagnosis 06/01/2022    Final                    Value:Specimen Adequacy  Satisfactory for interpretation. Endocervical component is absent.  [place] Category  Negative for intraepithelial lesion or malignancy.  Shift in octaviano suggestive of bacterial vaginosis.      Disclaimer 06/01/2022    Final                    Value:The Pap smear is a screening test that aids in the detection of cervical  cancer and cancer precursors. Both false positive and false negative results  can occur. The test should be used at regular intervals, and positive results  should be confirmed before definitive therapy.  This liquid based specimen is processed using the  or  Thin PrepPAP  System. This specimen has been analyzed by the ThinPrep Imaging System  ([employer]), an automated imaging and review system which assists  the laboratory in evaluating cells on ThinPrep PAP tests. Following automated  imaging, selected fields from every slide are reviewed by a cytotechnologist  and/or pathologist.  Screening was performed at [facility], [address] [place] [address].     Admission on 05/16/2022, Discharged on 05/16/2022   Component Date Value Ref Range Status    SARS-CoV2 (COVID-19) Qualitative P* 05/16/2022 Not Detected  Not Detected Final   Hospital Outpatient Visit on 04/12/2022   Component Date Value Ref Range Status    BSA 04/12/2022 2.72  m2 Final    TDI SEPTAL 04/12/2022 0.08  m/s Final    LV LATERAL E/E' RATIO 04/12/2022 8.56  m/s Final    LV SEPTAL E/E' RATIO 04/12/2022 9.63  m/s  Final    LA WIDTH 04/12/2022 4.16  cm Final    TDI LATERAL 04/12/2022 0.09  m/s Final    LVIDd 04/12/2022 4.96  3.5 - 6.0 cm Final    IVS 04/12/2022 0.79  0.6 - 1.1 cm Final    Posterior Wall 04/12/2022 0.78  0.6 - 1.1 cm Final    LVIDs 04/12/2022 3.12  2.1 - 4.0 cm Final    FS 04/12/2022 37  28 - 44 % Final    LA volume 04/12/2022 64.22  cm3 Final    Sinus 04/12/2022 3.04  cm Final    STJ 04/12/2022 2.74  cm Final    Ascending aorta 04/12/2022 3.12  cm Final    LV mass 04/12/2022 130.74  g Final    LA size 04/12/2022 3.57  cm Final    RVDD 04/12/2022 2.76  cm Final    TAPSE 04/12/2022 2.74  cm Final    Left Ventricle Relative Wall Thick* 04/12/2022 0.31  cm Final    AV mean gradient 04/12/2022 7  mmHg Final    AV valve area 04/12/2022 2.69  cm2 Final    AV Velocity Ratio 04/12/2022 0.66   Final    AV index (prosthetic) 04/12/2022 0.71   Final    MV valve area p 1/2 method 04/12/2022 3.30  cm2 Final    E/A ratio 04/12/2022 1.04   Final    Mean e' 04/12/2022 0.09  m/s Final    E wave deceleration time 04/12/2022 229.64  msec Final    IVRT 04/12/2022 108.47  msec Final    Pulm vein S/D ratio 04/12/2022 1.18   Final    LVOT diameter 04/12/2022 2.20  cm Final    LVOT area 04/12/2022 3.8  cm2 Final    LVOT peak louie 04/12/2022 1.09  m/s Final    LVOT peak VTI 04/12/2022 25.55  cm Final    Ao peak louie 04/12/2022 1.66  m/s Final    Ao VTI 04/12/2022 36.11  cm Final    LVOT stroke volume 04/12/2022 97.07  cm3 Final    AV peak gradient 04/12/2022 11  mmHg Final    E/E' ratio 04/12/2022 9.06  m/s Final    MV Peak E Louie 04/12/2022 0.77  m/s Final    TR Max Louie 04/12/2022 2.89  m/s Final    MV stenosis pressure 1/2 time 04/12/2022 66.60  ms Final    MV Peak A Louie 04/12/2022 0.74  m/s Final    PV Peak S Louie 04/12/2022 0.46  m/s Final    PV Peak D Louie 04/12/2022 0.39  m/s Final    LV Systolic Volume 04/12/2022 38.51  mL Final    LV Systolic Volume Index 04/12/2022 15.3  mL/m2 Final    LV Diastolic Volume 04/12/2022 116.15  mL  Final    LV Diastolic Volume Index 04/12/2022 46.27  mL/m2 Final    LA Volume Index 04/12/2022 25.6  mL/m2 Final    LV Mass Index 04/12/2022 52  g/m2 Final    RA Major Axis 04/12/2022 5.10  cm Final    Left Atrium Minor Axis 04/12/2022 5.20  cm Final    Left Atrium Major Axis 04/12/2022 4.98  cm Final    Triscuspid Valve Regurgitation Pea* 04/12/2022 33  mmHg Final    LA Volume Index (Mod) 04/12/2022 20.2  mL/m2 Final    LA volume (mod) 04/12/2022 50.75  cm3 Final    RA Width 04/12/2022 2.95  cm Final    EF 04/12/2022 65  % Final   Lab Visit on 03/31/2022   Component Date Value Ref Range Status    WBC 03/31/2022 10.63  3.90 - 12.70 K/uL Final    RBC 03/31/2022 4.28  4.00 - 5.40 M/uL Final    Hemoglobin 03/31/2022 12.3  12.0 - 16.0 g/dL Final    Hematocrit 03/31/2022 38.4  37.0 - 48.5 % Final    MCV 03/31/2022 90  82 - 98 fL Final    MCH 03/31/2022 28.7  27.0 - 31.0 pg Final    MCHC 03/31/2022 32.0  32.0 - 36.0 g/dL Final    RDW 03/31/2022 14.8 (H)  11.5 - 14.5 % Final    Platelets 03/31/2022 227  150 - 450 K/uL Final    MPV 03/31/2022 11.1  9.2 - 12.9 fL Final    Immature Granulocytes 03/31/2022 0.8 (H)  0.0 - 0.5 % Final    Gran # (ANC) 03/31/2022 7.9 (H)  1.8 - 7.7 K/uL Final    Immature Grans (Abs) 03/31/2022 0.08 (H)  0.00 - 0.04 K/uL Final    Lymph # 03/31/2022 1.8  1.0 - 4.8 K/uL Final    Mono # 03/31/2022 0.8  0.3 - 1.0 K/uL Final    Eos # 03/31/2022 0.1  0.0 - 0.5 K/uL Final    Baso # 03/31/2022 0.03  0.00 - 0.20 K/uL Final    nRBC 03/31/2022 0  0 /100 WBC Final    Gran % 03/31/2022 73.9 (H)  38.0 - 73.0 % Final    Lymph % 03/31/2022 17.1 (L)  18.0 - 48.0 % Final    Mono % 03/31/2022 7.1  4.0 - 15.0 % Final    Eosinophil % 03/31/2022 0.8  0.0 - 8.0 % Final    Basophil % 03/31/2022 0.3  0.0 - 1.9 % Final    Differential Method 03/31/2022 Automated   Final    Sodium 03/31/2022 140  136 - 145 mmol/L Final    Potassium 03/31/2022 4.3  3.5 - 5.1 mmol/L Final    Chloride 03/31/2022 102  95 - 110 mmol/L Final     CO2 03/31/2022 29  23 - 29 mmol/L Final    Glucose 03/31/2022 112 (H)  70 - 110 mg/dL Final    BUN 03/31/2022 13  6 - 20 mg/dL Final    Creatinine 03/31/2022 0.7  0.5 - 1.4 mg/dL Final    Calcium 03/31/2022 9.8  8.7 - 10.5 mg/dL Final    Anion Gap 03/31/2022 9  8 - 16 mmol/L Final    eGFR if African American 03/31/2022 >60.0  >60 mL/min/1.73 m^2 Final    eGFR if non African American 03/31/2022 >60.0  >60 mL/min/1.73 m^2 Final    Hemoglobin A1C 03/31/2022 5.7 (H)  4.0 - 5.6 % Final    Estimated Avg Glucose 03/31/2022 117  68 - 131 mg/dL Final   (      Assessment:     1. Essential hypertension    2. Hypertension associated with diabetes    3. BMI 60.0-69.9, adult    4. Diabetes mellitus type 2 without retinopathy    5. Morbid (severe) obesity with alveolar hypoventilation    6. Impaired functional mobility, balance, gait, and endurance    7. Chest pain of uncertain etiology    8. Unspecified thoracic, thoracolumbar and lumbosacral intervertebral disc disorder    9. Sleep apnea, unspecified type      Plan:   Diagnoses and all orders for this visit:    Essential hypertension  -     IN OFFICE EKG 12-LEAD (to Muse)  -     Stress Echo Which stress agent will be used? Treadmill Exercise; Color Flow Doppler? No; Future    Hypertension associated with diabetes  -     IN OFFICE EKG 12-LEAD (to Muse)  -     Stress Echo Which stress agent will be used? Treadmill Exercise; Color Flow Doppler? No; Future    BMI 60.0-69.9, adult  -     IN OFFICE EKG 12-LEAD (to Muse)  -     Stress Echo Which stress agent will be used? Treadmill Exercise; Color Flow Doppler? No; Future    Diabetes mellitus type 2 without retinopathy  -     IN OFFICE EKG 12-LEAD (to Muse)  -     Stress Echo Which stress agent will be used? Treadmill Exercise; Color Flow Doppler? No; Future    Morbid (severe) obesity with alveolar hypoventilation  -     IN OFFICE EKG 12-LEAD (to Muse)  -     Stress Echo Which stress agent will be used? Treadmill Exercise; Color Flow  Doppler? No; Future    Impaired functional mobility, balance, gait, and endurance  -     IN OFFICE EKG 12-LEAD (to Muse)  -     Stress Echo Which stress agent will be used? Treadmill Exercise; Color Flow Doppler? No; Future    Chest pain of uncertain etiology  -     IN OFFICE EKG 12-LEAD (to Muse)  -     Stress Echo Which stress agent will be used? Treadmill Exercise; Color Flow Doppler? No; Future    Unspecified thoracic, thoracolumbar and lumbosacral intervertebral disc disorder  -     IN OFFICE EKG 12-LEAD (to Muse)  -     Stress Echo Which stress agent will be used? Treadmill Exercise; Color Flow Doppler? No; Future    Sleep apnea, unspecified type  -     IN OFFICE EKG 12-LEAD (to Muse)  -     Stress Echo Which stress agent will be used? Treadmill Exercise; Color Flow Doppler? No; Future     The posterior chest pain was likely musculoskeletal.    Doubt CAD    Will attempt treadmill stress echo, [facility] protocol.    The pulmonary hypertension has resolved with weight loss and biPAP.    Pt counseled to continue diet and exercise    Follow up in about 6 months (around 3/7/2023).

## 2022-09-13 ENCOUNTER — OFFICE VISIT (OUTPATIENT)
Dept: INTERNAL MEDICINE | Facility: CLINIC | Age: 47
End: 2022-09-13
Payer: MEDICAID

## 2022-09-13 VITALS
SYSTOLIC BLOOD PRESSURE: 110 MMHG | TEMPERATURE: 99 F | OXYGEN SATURATION: 97 % | WEIGHT: 293 LBS | BODY MASS INDEX: 53.92 KG/M2 | RESPIRATION RATE: 18 BRPM | HEIGHT: 62 IN | HEART RATE: 68 BPM | DIASTOLIC BLOOD PRESSURE: 60 MMHG

## 2022-09-13 DIAGNOSIS — E11.59 HYPERTENSION ASSOCIATED WITH DIABETES: ICD-10-CM

## 2022-09-13 DIAGNOSIS — E66.01 MORBID OBESITY WITH BMI OF 60.0-69.9, ADULT: ICD-10-CM

## 2022-09-13 DIAGNOSIS — E11.69 OBESITY, DIABETES, AND HYPERTENSION SYNDROME: ICD-10-CM

## 2022-09-13 DIAGNOSIS — Z00.00 ANNUAL PHYSICAL EXAM: ICD-10-CM

## 2022-09-13 DIAGNOSIS — E66.9 OBESITY, DIABETES, AND HYPERTENSION SYNDROME: ICD-10-CM

## 2022-09-13 DIAGNOSIS — Z12.11 SCREEN FOR COLON CANCER: ICD-10-CM

## 2022-09-13 DIAGNOSIS — Z12.31 ENCOUNTER FOR SCREENING MAMMOGRAM FOR HIGH-RISK PATIENT: ICD-10-CM

## 2022-09-13 DIAGNOSIS — E11.9 DIABETES MELLITUS TYPE 2 WITHOUT RETINOPATHY: Primary | ICD-10-CM

## 2022-09-13 DIAGNOSIS — I15.2 OBESITY, DIABETES, AND HYPERTENSION SYNDROME: ICD-10-CM

## 2022-09-13 DIAGNOSIS — G47.33 OSA ON CPAP: ICD-10-CM

## 2022-09-13 DIAGNOSIS — N85.02 COMPLEX ENDOMETRIAL HYPERPLASIA WITH ATYPIA: ICD-10-CM

## 2022-09-13 DIAGNOSIS — I15.2 HYPERTENSION ASSOCIATED WITH DIABETES: ICD-10-CM

## 2022-09-13 DIAGNOSIS — H40.9 GLAUCOMA, UNSPECIFIED GLAUCOMA TYPE, UNSPECIFIED LATERALITY: ICD-10-CM

## 2022-09-13 DIAGNOSIS — E11.59 OBESITY, DIABETES, AND HYPERTENSION SYNDROME: ICD-10-CM

## 2022-09-13 DIAGNOSIS — E65 ABDOMINAL PANNUS: ICD-10-CM

## 2022-09-13 PROCEDURE — 3078F PR MOST RECENT DIASTOLIC BLOOD PRESSURE < 80 MM HG: ICD-10-PCS | Mod: CPTII,,, | Performed by: INTERNAL MEDICINE

## 2022-09-13 PROCEDURE — 1159F PR MEDICATION LIST DOCUMENTED IN MEDICAL RECORD: ICD-10-PCS | Mod: CPTII,,, | Performed by: INTERNAL MEDICINE

## 2022-09-13 PROCEDURE — 99215 OFFICE O/P EST HI 40 MIN: CPT | Mod: PBBFAC,PO | Performed by: INTERNAL MEDICINE

## 2022-09-13 PROCEDURE — 1159F MED LIST DOCD IN RCRD: CPT | Mod: CPTII,,, | Performed by: INTERNAL MEDICINE

## 2022-09-13 PROCEDURE — 3066F NEPHROPATHY DOC TX: CPT | Mod: CPTII,,, | Performed by: INTERNAL MEDICINE

## 2022-09-13 PROCEDURE — 3066F PR DOCUMENTATION OF TREATMENT FOR NEPHROPATHY: ICD-10-PCS | Mod: CPTII,,, | Performed by: INTERNAL MEDICINE

## 2022-09-13 PROCEDURE — 99999 PR PBB SHADOW E&M-EST. PATIENT-LVL V: CPT | Mod: PBBFAC,,, | Performed by: INTERNAL MEDICINE

## 2022-09-13 PROCEDURE — 4010F PR ACE/ARB THEARPY RXD/TAKEN: ICD-10-PCS | Mod: CPTII,,, | Performed by: INTERNAL MEDICINE

## 2022-09-13 PROCEDURE — 3061F PR NEG MICROALBUMINURIA RESULT DOCUMENTED/REVIEW: ICD-10-PCS | Mod: CPTII,,, | Performed by: INTERNAL MEDICINE

## 2022-09-13 PROCEDURE — 3078F DIAST BP <80 MM HG: CPT | Mod: CPTII,,, | Performed by: INTERNAL MEDICINE

## 2022-09-13 PROCEDURE — 3074F PR MOST RECENT SYSTOLIC BLOOD PRESSURE < 130 MM HG: ICD-10-PCS | Mod: CPTII,,, | Performed by: INTERNAL MEDICINE

## 2022-09-13 PROCEDURE — 99214 OFFICE O/P EST MOD 30 MIN: CPT | Mod: S$PBB,,, | Performed by: INTERNAL MEDICINE

## 2022-09-13 PROCEDURE — 3044F PR MOST RECENT HEMOGLOBIN A1C LEVEL <7.0%: ICD-10-PCS | Mod: CPTII,,, | Performed by: INTERNAL MEDICINE

## 2022-09-13 PROCEDURE — 99214 PR OFFICE/OUTPT VISIT, EST, LEVL IV, 30-39 MIN: ICD-10-PCS | Mod: S$PBB,,, | Performed by: INTERNAL MEDICINE

## 2022-09-13 PROCEDURE — 1160F RVW MEDS BY RX/DR IN RCRD: CPT | Mod: CPTII,,, | Performed by: INTERNAL MEDICINE

## 2022-09-13 PROCEDURE — 99999 PR PBB SHADOW E&M-EST. PATIENT-LVL V: ICD-10-PCS | Mod: PBBFAC,,, | Performed by: INTERNAL MEDICINE

## 2022-09-13 PROCEDURE — 3008F PR BODY MASS INDEX (BMI) DOCUMENTED: ICD-10-PCS | Mod: CPTII,,, | Performed by: INTERNAL MEDICINE

## 2022-09-13 PROCEDURE — 3044F HG A1C LEVEL LT 7.0%: CPT | Mod: CPTII,,, | Performed by: INTERNAL MEDICINE

## 2022-09-13 PROCEDURE — 4010F ACE/ARB THERAPY RXD/TAKEN: CPT | Mod: CPTII,,, | Performed by: INTERNAL MEDICINE

## 2022-09-13 PROCEDURE — 1160F PR REVIEW ALL MEDS BY PRESCRIBER/CLIN PHARMACIST DOCUMENTED: ICD-10-PCS | Mod: CPTII,,, | Performed by: INTERNAL MEDICINE

## 2022-09-13 PROCEDURE — 3061F NEG MICROALBUMINURIA REV: CPT | Mod: CPTII,,, | Performed by: INTERNAL MEDICINE

## 2022-09-13 PROCEDURE — 3008F BODY MASS INDEX DOCD: CPT | Mod: CPTII,,, | Performed by: INTERNAL MEDICINE

## 2022-09-13 PROCEDURE — 3074F SYST BP LT 130 MM HG: CPT | Mod: CPTII,,, | Performed by: INTERNAL MEDICINE

## 2022-09-13 RX ORDER — DIETHYLPROPION HYDROCHLORIDE 25 MG/1
25 TABLET ORAL
Qty: 60 EACH | Refills: 0 | Status: SHIPPED | OUTPATIENT
Start: 2022-09-13 | End: 2022-12-23

## 2022-09-13 NOTE — PROGRESS NOTES
"    Subjective:     PCP: Yamila Tejeda MD    Halley Moreno is a 47 y.o. female who presents for an annual exam.    Medical History:   Past Medical History:   Diagnosis Date    Acute respiratory failure with hypoxia and hypercapnia 10/22/2020    Last Assessment & Plan:  Patient is a chronic CO2 retainer in setting of obesity hypoventilation syndrome 2/2 morbid obesity. Stepped down from ICU 10/26, currently continuing diuresis. De-escalated to NC during the day with BiPAP nightly on 10/31, which patient tolerated well. On Lasix gtt for diuresis through .   Plan:  - Target SpO2 of >88%. Continue BiPAP nightly. Patient on 3LNC, will con    Back pain     BMI 70 and over, adult     CHF (congestive heart failure) 10/2020    Depression     Diabetes mellitus     Difficult intubation     DM (diabetes mellitus) type II controlled with renal manifestation     Glaucoma 2022    HTN (hypertension)     Hyperlipidemia     Lumbar disc disease     Morbid obesity     Rosacea     Sleep apnea     Tear of medial meniscus of right knee, current 2017       Family History: family history includes Diabetes in her father; Heart disease in her paternal grandfather; Hypertension in her mother; Lymphoma in her father.    Surgical History:   Past Surgical History:   Procedure Laterality Date     SECTION       SECTION  2000    DILATION AND CURETTAGE OF UTERUS      AUB "negative path" per patient    ENDOMETRIAL ABLATION      Patient unsure if procedure was performed    HYSTEROSCOPY WITH DILATION AND CURETTAGE OF UTERUS N/A 2022    Procedure: HYSTEROSCOPY, WITH DILATION AND CURETTAGE OF UTERUS;  Surgeon: Dixie Rod MD;  Location: [facility] OR;  Service: OB/GYN;  Laterality: N/A;    INTRAUTERINE DEVICE INSERTION N/A 2022    Procedure: INSERTION, INTRAUTERINE DEVICE;  Surgeon: Dixie Rod MD;  Location: [facility] OR;  Service: OB/GYN;  Laterality: N/A;        Social History:  reports " "that she has never smoked. She has never used smokeless tobacco. She reports that she does not drink alcohol and does not use drugs.     Allergies:   Review of patient's allergies indicates:   Allergen Reactions    Victoza [liraglutide] Swelling       Medications:   Current Outpatient Medications   Medication Sig    atorvastatin (LIPITOR) 10 MG tablet Take 1 tablet (10 mg total) by mouth once daily.    blood sugar diagnostic Strp 1 strip by Misc.(Non-Drug; Combo Route) route 2 (two) times daily. Please provide items covered by patient's insurance    cholecalciferol, vitamin D3, (VITAMIN D3) 1,000 unit capsule Take 2 capsules (2,000 Units total) by mouth once daily.    diclofenac (VOLTAREN) 50 MG EC tablet Take 1 tablet (50 mg total) by mouth 2 (two) times daily as needed (pain).    furosemide (LASIX) 80 MG tablet TAKE ONE TABLET BY MOUTH EVERY DAY.    glipiZIDE (GLUCOTROL) 5 MG TR24 TAKE ONE TABLET BY MOUTH ONCE DAILY WITH BREAKFAST    lancets Misc 1 lancet by Misc.(Non-Drug; Combo Route) route 2 (two) times daily.    latanoprost 0.005 % ophthalmic solution Place 1 drop into both eyes every evening.    losartan (COZAAR) 100 MG tablet TAKE ONE TABLET BY MOUTH EVERY DAY.    megestroL (MEGACE) 40 MG Tab Take 2 tablets (80 mg total) by mouth 2 (two) times daily.    multivitamin (THERAGRAN) per tablet Take 1 tablet by mouth once daily.    ONETOUCH ULTRA2 METER [facility]     OZEMPIC 1 mg/dose (4 mg/3 mL) INJECT 1 MG INTO THE SKIN EVERY 7 DAYS.    pen needle, diabetic 32 gauge x 1/4" Ndle 1 each by Misc.(Non-Drug; Combo Route) route once daily. Pt uses one pen needle weekly for injection of Ozempic    diethylpropion 25 mg Tab Take 25 mg by mouth 2 (two) times daily before meals.    HYDROcodone-acetaminophen (NORCO) 5-325 mg per tablet Take 1 tablet by mouth every 6 (six) hours as needed for Pain. (Patient not taking: Reported on 9/13/2022)    ibuprofen (ADVIL,MOTRIN) 600 MG tablet Take 1 tablet (600 mg total) by mouth every 6 " (six) hours as needed for Pain. (Patient not taking: Reported on 9/13/2022)    methocarbamoL (ROBAXIN) 750 MG Tab TAKE ONE TABLET BY MOUTH TWICE DAILY AS NEEDED (Patient not taking: Reported on 9/13/2022)    miconazole NITRATE 2 % (MICOTIN) 2 % top powder Apply topically 2 (two) times daily. Apply to skin folds. (Patient not taking: Reported on 9/13/2022)    triamcinolone acetonide 0.1% (KENALOG) 0.1 % Lotn Apply topically 3 (three) times daily. (Patient not taking: Reported on 9/13/2022)     No current facility-administered medications for this visit.       Health Maintenance:   Health Maintenance Topics with due status: Not Due       Topic Last Completion Date    TETANUS VACCINE 02/06/2017    Cervical Cancer Screening 06/01/2022    Eye Exam 08/02/2022    Diabetes Urine Screening 08/12/2022    Lipid Panel 08/12/2022    Hemoglobin A1c 08/12/2022    Low Dose Statin 09/13/2022     Lab Results   Component Value Date    HEPCAB Negative 10/05/2011     Eye Exam: 8/2022  Dental Exam: due  OB/GYN: No data on file.   Pap smear: 6/9/2022  Mammogram: due    FitKit:  due  Cologuard:   HIV:   Hepatitic C  Ab:     Vaccinations:  Immunization History   Administered Date(s) Administered    COVID-19, MRNA, LN-S, PF (Pfizer) (Purple Cap) 03/12/2021, 04/02/2021, 11/20/2021    Influenza 11/20/2018    Influenza - Quadrivalent - PF *Preferred* (6 months and older) 11/20/2018, 10/14/2019, 12/02/2020, 11/01/2021    Influenza - Trivalent - PF (ADULT) 11/20/2018    Pneumococcal Polysaccharide - 23 Valent 02/06/2017    Td - PF (ADULT) 02/06/2017    Zoster Recombinant 05/16/2022     Influenza:   Tetanus: 2017  Pneumovax:   Prevnar-13:   Covid vaccine:    Body mass index is 64.4 kg/m².  Wt Readings from Last 3 Encounters:   09/13/22 (!) 159.7 kg (352 lb 1.2 oz)   09/07/22 (!) 158.9 kg (350 lb 5 oz)   06/30/22 (!) 157 kg (346 lb 2 oz)       Review of Systems   Constitutional:  Negative for chills, diaphoresis, fatigue and fever.   HENT:   Negative for congestion, dental problem, ear discharge, ear pain, postnasal drip, rhinorrhea, sinus pressure, sore throat and trouble swallowing.    Eyes:  Negative for redness and visual disturbance.   Respiratory:  Negative for cough, chest tightness and shortness of breath.    Cardiovascular:  Positive for leg swelling. Negative for chest pain and palpitations.   Gastrointestinal:  Negative for abdominal pain, blood in stool, constipation, diarrhea, nausea and vomiting.   Endocrine: Negative for polydipsia and polyuria.   Genitourinary:  Negative for decreased urine volume, dysuria, frequency and hematuria.   Musculoskeletal:  Negative for arthralgias, back pain and myalgias.   Skin:  Positive for color change (discoloration on lower legs and under pannus, repeated fungal and bacterial infections have not returned). Negative for rash and wound.   Neurological:  Negative for dizziness, weakness, numbness and headaches.   Hematological:  Negative for adenopathy.   Psychiatric/Behavioral:  Negative for dysphoric mood and sleep disturbance. The patient is not nervous/anxious.         Objective:     Physical Exam  Vitals reviewed.   Constitutional:       General: She is awake. She is not in acute distress.     Appearance: Normal appearance. She is well-developed and well-groomed. She is not diaphoretic.   HENT:      Head: Normocephalic and atraumatic.      Right Ear: Hearing, tympanic membrane, ear canal and external ear normal. Tympanic membrane is not erythematous or bulging.      Left Ear: Hearing, tympanic membrane, ear canal and external ear normal. Tympanic membrane is not erythematous or bulging.      Nose: Nose normal. No congestion.      Mouth/Throat:      Mouth: Mucous membranes are moist.      Tongue: No lesions.      Pharynx: Oropharynx is clear. Uvula midline. No oropharyngeal exudate or posterior oropharyngeal erythema.   Eyes:      General: Lids are normal. Vision grossly intact. Gaze aligned  appropriately. No scleral icterus.     Conjunctiva/sclera:      Right eye: Right conjunctiva is not injected.      Left eye: Left conjunctiva is not injected.      Pupils: Pupils are equal, round, and reactive to light.   Neck:      Thyroid: No thyroid mass or thyromegaly.   Cardiovascular:      Rate and Rhythm: Normal rate and regular rhythm.      Pulses: Normal pulses.      Heart sounds: Normal heart sounds. No murmur heard.  Pulmonary:      Effort: Pulmonary effort is normal. No respiratory distress.      Breath sounds: Normal breath sounds. No decreased breath sounds or wheezing.   Abdominal:      General: Bowel sounds are normal. There is no distension.      Palpations: Abdomen is soft. Abdomen is not rigid.      Tenderness: There is no abdominal tenderness. There is no guarding or rebound.   Musculoskeletal:         General: Normal range of motion.      Cervical back: Normal range of motion and neck supple.      Right lower le+ Edema present.      Left lower le+ Edema present.   Lymphadenopathy:      Cervical: No cervical adenopathy.      Upper Body:      Right upper body: No supraclavicular adenopathy.      Left upper body: No supraclavicular adenopathy.   Skin:     General: Skin is warm and dry.      Coloration: Skin is not cyanotic.      Findings: Rash present. No lesion. Rash is scaling (with hyperpigmented BLE).      Nails: There is no clubbing.   Neurological:      General: No focal deficit present.      Mental Status: She is alert and oriented to person, place, and time.      Sensory: Sensation is intact.      Coordination: Coordination is intact.      Gait: Gait is intact.      Deep Tendon Reflexes: Reflexes are normal and symmetric.   Psychiatric:         Attention and Perception: Attention normal.         Mood and Affect: Mood normal.         Behavior: Behavior is cooperative.            Assessment:        1. Diabetes mellitus type 2 without retinopathy    2. Hypertension associated with  diabetes    3. Complex endometrial hyperplasia with atypia    4. YARY on CPAP    5. Abdominal pannus    6. Glaucoma, unspecified glaucoma type, unspecified laterality    7. Annual physical exam    8. Encounter for screening mammogram for high-risk patient    9. Screen for colon cancer    10. Obesity, diabetes, and hypertension syndrome    11. Morbid obesity with BMI of 60.0-69.9, adult           Plan:     1. Diabetes mellitus type 2 without retinopathy  - controlled, continue metformin, glipizide, Ozempic  - consider change to Mounjaro for effects of weight loss    2. Hypertension associated with diabetes  - BP is controlled, continue losartan, furosemide    3. Complex endometrial hyperplasia with atypia  - planning SHORTY but unable to due to BMI, goal to perform surgery safely is BMI <50  - continue to monitor    4. YARY on CPAP  - compliant with CPAP, continue    5. Abdominal pannus  - associated with multiple episodes of fungal and bacterial infection, no recent infections, continue to monitor  - needs panniculectomy, continue miconazole as needed     6. Glaucoma  - on xalatan, monitor closely    7. Annual physical exam  - reviewed recent labs with patient    8. Encounter for screening mammogram for high-risk patient  - Mammo Digital Screening Bilat w/ Lex; Future    9. Screen for colon cancer  - proceed with FitKit    10. Obesity, diabetes, and hypertension syndrome  - diethylpropion 25 mg Tab; Take 25 mg by mouth 2 (two) times daily before meals.  Dispense: 60 each; Refill: 0  - monitor closely for adverse reactions    11. Morbid obesity with BMI of 60.0-69.9, adult  - diethylpropion 25 mg Tab; Take 25 mg by mouth 2 (two) times daily before meals.  Dispense: 60 each; Refill: 0  - will consider requesting coverage for [facility] in 2 months for follow-up or sooner if needed    __________________________    Yamila Tejeda MD, PharmD  [facility]- [facility]  American Board of Obesity  Medicine diplomate  Office [phone]

## 2022-09-14 ENCOUNTER — HOSPITAL ENCOUNTER (OUTPATIENT)
Dept: RADIOLOGY | Facility: HOSPITAL | Age: 47
Discharge: HOME | End: 2022-09-14
Attending: INTERNAL MEDICINE
Payer: MEDICAID

## 2022-09-14 DIAGNOSIS — Z12.31 ENCOUNTER FOR SCREENING MAMMOGRAM FOR HIGH-RISK PATIENT: ICD-10-CM

## 2022-09-14 PROCEDURE — 77067 SCR MAMMO BI INCL CAD: CPT | Mod: 26,,, | Performed by: RADIOLOGY

## 2022-09-14 PROCEDURE — 77063 MAMMO DIGITAL SCREENING BILAT WITH TOMO: ICD-10-PCS | Mod: 26,,, | Performed by: RADIOLOGY

## 2022-09-14 PROCEDURE — 77063 BREAST TOMOSYNTHESIS BI: CPT | Mod: 26,,, | Performed by: RADIOLOGY

## 2022-09-14 PROCEDURE — 77063 BREAST TOMOSYNTHESIS BI: CPT | Mod: TC,PO

## 2022-09-14 PROCEDURE — 77067 SCR MAMMO BI INCL CAD: CPT | Mod: TC,PO

## 2022-09-14 PROCEDURE — 77067 MAMMO DIGITAL SCREENING BILAT WITH TOMO: ICD-10-PCS | Mod: 26,,, | Performed by: RADIOLOGY

## 2022-09-22 ENCOUNTER — TELEPHONE (OUTPATIENT)
Dept: CARDIOLOGY | Facility: CLINIC | Age: 47
End: 2022-09-22
Payer: MEDICAID

## 2022-09-22 ENCOUNTER — HOSPITAL ENCOUNTER (OUTPATIENT)
Dept: CARDIOLOGY | Facility: HOSPITAL | Age: 47
Discharge: HOME | End: 2022-09-22
Attending: INTERNAL MEDICINE
Payer: MEDICAID

## 2022-09-22 VITALS — WEIGHT: 293 LBS | HEIGHT: 63 IN | BODY MASS INDEX: 51.91 KG/M2

## 2022-09-22 DIAGNOSIS — R07.9 CHEST PAIN OF UNCERTAIN ETIOLOGY: ICD-10-CM

## 2022-09-22 DIAGNOSIS — E11.59 HYPERTENSION ASSOCIATED WITH DIABETES: ICD-10-CM

## 2022-09-22 DIAGNOSIS — M51.9 UNSPECIFIED THORACIC, THORACOLUMBAR AND LUMBOSACRAL INTERVERTEBRAL DISC DISORDER: ICD-10-CM

## 2022-09-22 DIAGNOSIS — E11.9 DIABETES MELLITUS TYPE 2 WITHOUT RETINOPATHY: ICD-10-CM

## 2022-09-22 DIAGNOSIS — Z74.09 IMPAIRED FUNCTIONAL MOBILITY, BALANCE, GAIT, AND ENDURANCE: ICD-10-CM

## 2022-09-22 DIAGNOSIS — G47.30 SLEEP APNEA, UNSPECIFIED TYPE: ICD-10-CM

## 2022-09-22 DIAGNOSIS — I15.2 HYPERTENSION ASSOCIATED WITH DIABETES: ICD-10-CM

## 2022-09-22 DIAGNOSIS — E66.2 MORBID (SEVERE) OBESITY WITH ALVEOLAR HYPOVENTILATION: ICD-10-CM

## 2022-09-22 DIAGNOSIS — I10 ESSENTIAL HYPERTENSION: ICD-10-CM

## 2022-09-22 LAB
ASCENDING AORTA: 3.05 CM
BSA FOR ECHO PROCEDURE: 2.66 M2
CV ECHO LV RWT: 0.3 CM
CV STRESS BASE HR: 74 BPM
DIASTOLIC BLOOD PRESSURE: 52 MMHG
DOP CALC LVOT AREA: 4 CM2
DOP CALC LVOT DIAMETER: 2.26 CM
DOP CALC LVOT PEAK VEL: 1.35 M/S
DOP CALC LVOT STROKE VOLUME: 105.01 CM3
DOP CALCLVOT PEAK VEL VTI: 26.19 CM
E WAVE DECELERATION TIME: 253.62 MSEC
E/A RATIO: 1.24
E/E' RATIO: 10.84 M/S
ECHO LV POSTERIOR WALL: 0.82 CM (ref 0.6–1.1)
EJECTION FRACTION: 60 %
FRACTIONAL SHORTENING: 46 % (ref 28–44)
INTERVENTRICULAR SEPTUM: 0.86 CM (ref 0.6–1.1)
IVRT: 91.34 MSEC
LA MAJOR: 5.33 CM
LA MINOR: 4.93 CM
LA WIDTH: 3.62 CM
LEFT ATRIUM SIZE: 4.19 CM
LEFT ATRIUM VOLUME INDEX MOD: 17.6 ML/M2
LEFT ATRIUM VOLUME INDEX: 27 ML/M2
LEFT ATRIUM VOLUME MOD: 43.06 CM3
LEFT ATRIUM VOLUME: 66.04 CM3
LEFT INTERNAL DIMENSION IN SYSTOLE: 2.9 CM (ref 2.1–4)
LEFT VENTRICLE DIASTOLIC VOLUME INDEX: 57.15 ML/M2
LEFT VENTRICLE DIASTOLIC VOLUME: 140.01 ML
LEFT VENTRICLE MASS INDEX: 67 G/M2
LEFT VENTRICLE SYSTOLIC VOLUME INDEX: 13.2 ML/M2
LEFT VENTRICLE SYSTOLIC VOLUME: 32.31 ML
LEFT VENTRICULAR INTERNAL DIMENSION IN DIASTOLE: 5.38 CM (ref 3.5–6)
LEFT VENTRICULAR MASS: 163.82 G
LV LATERAL E/E' RATIO: 10.3 M/S
LV SEPTAL E/E' RATIO: 11.44 M/S
MV PEAK A VEL: 0.83 M/S
MV PEAK E VEL: 1.03 M/S
MV STENOSIS PRESSURE HALF TIME: 73.55 MS
MV VALVE AREA P 1/2 METHOD: 2.99 CM2
OHS CV CPX 1 MINUTE RECOVERY HEART RATE: 123 BPM
OHS CV CPX 85 PERCENT MAX PREDICTED HEART RATE MALE: 138
OHS CV CPX ESTIMATED METS: 3
OHS CV CPX MAX PREDICTED HEART RATE: 163
OHS CV CPX PATIENT IS FEMALE: 1
OHS CV CPX PATIENT IS MALE: 0
OHS CV CPX PEAK DIASTOLIC BLOOD PRESSURE: 104 MMHG
OHS CV CPX PEAK HEAR RATE: 150 BPM
OHS CV CPX PEAK RATE PRESSURE PRODUCT: NORMAL
OHS CV CPX PEAK SYSTOLIC BLOOD PRESSURE: 181 MMHG
OHS CV CPX PERCENT MAX PREDICTED HEART RATE ACHIEVED: 92
OHS CV CPX RATE PRESSURE PRODUCT PRESENTING: 8584
PULM VEIN S/D RATIO: 1
PV PEAK D VEL: 0.43 M/S
PV PEAK S VEL: 0.43 M/S
RA MAJOR: 5.27 CM
RA PRESSURE: 3 MMHG
RA WIDTH: 2.45 CM
RIGHT VENTRICULAR END-DIASTOLIC DIMENSION: 3.21 CM
SINUS: 3.46 CM
STJ: 3.12 CM
STRESS ECHO POST EXERCISE DUR MIN: 5 MINUTES
STRESS ECHO POST EXERCISE DUR SEC: 1 SECONDS
SYSTOLIC BLOOD PRESSURE: 116 MMHG
TDI LATERAL: 0.1 M/S
TDI SEPTAL: 0.09 M/S
TDI: 0.1 M/S
TRICUSPID ANNULAR PLANE SYSTOLIC EXCURSION: 2.39 CM

## 2022-09-22 PROCEDURE — 93351 STRESS TTE COMPLETE: CPT

## 2022-09-22 PROCEDURE — 93351 STRESS TTE COMPLETE: CPT | Mod: 26,,, | Performed by: INTERNAL MEDICINE

## 2022-09-22 PROCEDURE — 93351 STRESS ECHO (CUPID ONLY): ICD-10-PCS | Mod: 26,,, | Performed by: INTERNAL MEDICINE

## 2022-09-22 NOTE — TELEPHONE ENCOUNTER
I spoke with pt  Stress ECG and stress echo are normal  Pt reassured: Heart is strong and there is no blockage

## 2022-10-03 ENCOUNTER — PATIENT MESSAGE (OUTPATIENT)
Dept: ADMINISTRATIVE | Facility: HOSPITAL | Age: 47
End: 2022-10-03
Payer: MEDICAID

## 2022-10-09 RX ORDER — SEMAGLUTIDE 1.34 MG/ML
INJECTION, SOLUTION SUBCUTANEOUS
Qty: 3 PEN | Refills: 1 | Status: SHIPPED | OUTPATIENT
Start: 2022-10-09 | End: 2022-12-06

## 2022-10-09 NOTE — TELEPHONE ENCOUNTER
No new care gaps identified.  [facility] Embedded Care Gaps. Reference number: [id number]. 10/09/2022   11:45:14 AM BENT

## 2022-10-10 NOTE — TELEPHONE ENCOUNTER
Refill Decision Note   Halley Moreno  is requesting a refill authorization.  Brief Assessment and Rationale for Refill:  Approve     Medication Therapy Plan:       Medication Reconciliation Completed: No   Comments:     No Care Gaps recommended.     Note composed:11:45 PM 10/09/2022

## 2022-11-10 ENCOUNTER — TELEPHONE (OUTPATIENT)
Dept: INTERNAL MEDICINE | Facility: CLINIC | Age: 47
End: 2022-11-10
Payer: MEDICAID

## 2022-11-10 DIAGNOSIS — E11.9 DIABETES MELLITUS TYPE 2 WITHOUT RETINOPATHY: Primary | ICD-10-CM

## 2022-11-10 NOTE — TELEPHONE ENCOUNTER
I called her to discuss  message and meds.    She saw you in September and you stopped phentermine & gave her diethylpropion 25 mg Tab.    She says that caused her to have increase appetite and has gained 10 pounds.  She has an appt to see you late December for 2 mos fol p.  She is wondering if can go back to phentermine or if you talked to Her other dr to see if she can try Gastric bypass medication?    I told her we'd call back if you feels you can change medication before next months 12/23 appt.

## 2022-11-10 NOTE — TELEPHONE ENCOUNTER
----- Message from Kindra Eason MA sent at 11/9/2022  4:50 PM CST -----  Regarding: FW: medications    ----- Message -----  From: Dixie Ingram  Sent: 11/9/2022  12:12 PM CST  To: Kelle BOONE Staff  Subject: medications                                      Good afternoon,   Pt would like a call back from the nurse regarding her get changed back to phentermine and also would like to inquire about a gastric bypass  medication that kelle has talked to her about. Please advise pt.

## 2022-11-12 DIAGNOSIS — E11.59 HYPERTENSION ASSOCIATED WITH DIABETES: ICD-10-CM

## 2022-11-12 DIAGNOSIS — I15.2 HYPERTENSION ASSOCIATED WITH DIABETES: ICD-10-CM

## 2022-11-12 NOTE — TELEPHONE ENCOUNTER
Care Due:                  Date            Visit Type   Department     Provider  --------------------------------------------------------------------------------                                EP -                              PRIMARY      MET INTERNAL  Last Visit: 09-      CARE ([facility])   BESSIE Tejeda                              EP -                              PRIMARY      MET INTERNAL  Next Visit: 12-      CARE ([facility])   MEDICINE       Yamila Tejeda                                                            Last  Test          Frequency    Reason                     Performed    Due Date  --------------------------------------------------------------------------------    HBA1C.......  6 months...  OZEMPIC, glipiZIDE.......  08- 02-    [facility] Embedded Care Gaps. Reference number: [id number]. 11/12/2022   8:09:00 AM CST

## 2022-11-14 DIAGNOSIS — E11.59 HYPERTENSION ASSOCIATED WITH DIABETES: ICD-10-CM

## 2022-11-14 DIAGNOSIS — I15.2 HYPERTENSION ASSOCIATED WITH DIABETES: ICD-10-CM

## 2022-11-14 RX ORDER — LOSARTAN POTASSIUM 100 MG/1
100 TABLET ORAL DAILY
Qty: 90 TABLET | Refills: 2 | Status: CANCELLED | OUTPATIENT
Start: 2022-11-14

## 2022-11-14 RX ORDER — LOSARTAN POTASSIUM 100 MG/1
TABLET ORAL
Qty: 90 TABLET | Refills: 2 | Status: SHIPPED | OUTPATIENT
Start: 2022-11-14 | End: 2023-09-08

## 2022-11-14 NOTE — TELEPHONE ENCOUNTER
Refill Decision Note   Halley Moreno  is requesting a refill authorization.  Brief Assessment and Rationale for Refill:  Approve    -Medication-Related Problems Identified: Requires labs  Medication Therapy Plan:       Medication Reconciliation Completed: No   Comments:     Provider Staff:     Action is required for this patient.   Please see care gap opportunities below in Care Due Message.     Thanks!  [facility]     Appointments      Date Provider   Last Visit   9/13/2022 Yamila Tejeda MD   Next Visit   12/23/2022 Yamila Tejeda MD     Note composed:8:07 AM 11/14/2022           Note composed:8:07 AM 11/14/2022

## 2022-11-16 ENCOUNTER — PATIENT MESSAGE (OUTPATIENT)
Dept: ADMINISTRATIVE | Facility: HOSPITAL | Age: 47
End: 2022-11-16
Payer: MEDICAID

## 2022-11-16 ENCOUNTER — PATIENT OUTREACH (OUTPATIENT)
Dept: ADMINISTRATIVE | Facility: HOSPITAL | Age: 47
End: 2022-11-16
Payer: MEDICAID

## 2022-12-23 ENCOUNTER — LAB VISIT (OUTPATIENT)
Dept: LAB | Facility: HOSPITAL | Age: 47
End: 2022-12-23
Attending: INTERNAL MEDICINE
Payer: MEDICAID

## 2022-12-23 ENCOUNTER — OFFICE VISIT (OUTPATIENT)
Dept: INTERNAL MEDICINE | Facility: CLINIC | Age: 47
End: 2022-12-23
Payer: MEDICAID

## 2022-12-23 VITALS
OXYGEN SATURATION: 98 % | HEIGHT: 63 IN | TEMPERATURE: 97 F | WEIGHT: 293 LBS | SYSTOLIC BLOOD PRESSURE: 130 MMHG | HEART RATE: 74 BPM | BODY MASS INDEX: 51.91 KG/M2 | DIASTOLIC BLOOD PRESSURE: 72 MMHG | RESPIRATION RATE: 18 BRPM

## 2022-12-23 DIAGNOSIS — E11.9 TYPE 2 DIABETES MELLITUS WITHOUT COMPLICATION, WITHOUT LONG-TERM CURRENT USE OF INSULIN: Primary | ICD-10-CM

## 2022-12-23 DIAGNOSIS — E66.01 MORBID OBESITY WITH BMI OF 60.0-69.9, ADULT: ICD-10-CM

## 2022-12-23 DIAGNOSIS — I15.2 HYPERTENSION ASSOCIATED WITH DIABETES: ICD-10-CM

## 2022-12-23 DIAGNOSIS — E11.9 TYPE 2 DIABETES MELLITUS WITHOUT COMPLICATION, WITHOUT LONG-TERM CURRENT USE OF INSULIN: ICD-10-CM

## 2022-12-23 DIAGNOSIS — N85.02 COMPLEX ENDOMETRIAL HYPERPLASIA WITH ATYPIA: ICD-10-CM

## 2022-12-23 DIAGNOSIS — E11.59 HYPERTENSION ASSOCIATED WITH DIABETES: ICD-10-CM

## 2022-12-23 LAB
ESTIMATED AVG GLUCOSE: 169 MG/DL (ref 68–131)
HBA1C MFR BLD: 7.5 % (ref 4–5.6)

## 2022-12-23 PROCEDURE — 1159F PR MEDICATION LIST DOCUMENTED IN MEDICAL RECORD: ICD-10-PCS | Mod: CPTII,,, | Performed by: INTERNAL MEDICINE

## 2022-12-23 PROCEDURE — 99999 PR PBB SHADOW E&M-EST. PATIENT-LVL IV: CPT | Mod: PBBFAC,,, | Performed by: INTERNAL MEDICINE

## 2022-12-23 PROCEDURE — 3075F PR MOST RECENT SYSTOLIC BLOOD PRESS GE 130-139MM HG: ICD-10-PCS | Mod: CPTII,,, | Performed by: INTERNAL MEDICINE

## 2022-12-23 PROCEDURE — 80048 BASIC METABOLIC PNL TOTAL CA: CPT | Performed by: INTERNAL MEDICINE

## 2022-12-23 PROCEDURE — 3075F SYST BP GE 130 - 139MM HG: CPT | Mod: CPTII,,, | Performed by: INTERNAL MEDICINE

## 2022-12-23 PROCEDURE — 3008F PR BODY MASS INDEX (BMI) DOCUMENTED: ICD-10-PCS | Mod: CPTII,,, | Performed by: INTERNAL MEDICINE

## 2022-12-23 PROCEDURE — 1159F MED LIST DOCD IN RCRD: CPT | Mod: CPTII,,, | Performed by: INTERNAL MEDICINE

## 2022-12-23 PROCEDURE — 36415 COLL VENOUS BLD VENIPUNCTURE: CPT | Mod: PO | Performed by: INTERNAL MEDICINE

## 2022-12-23 PROCEDURE — 1160F RVW MEDS BY RX/DR IN RCRD: CPT | Mod: CPTII,,, | Performed by: INTERNAL MEDICINE

## 2022-12-23 PROCEDURE — 3078F DIAST BP <80 MM HG: CPT | Mod: CPTII,,, | Performed by: INTERNAL MEDICINE

## 2022-12-23 PROCEDURE — 3072F PR LOW RISK FOR RETINOPATHY: ICD-10-PCS | Mod: CPTII,,, | Performed by: INTERNAL MEDICINE

## 2022-12-23 PROCEDURE — 3008F BODY MASS INDEX DOCD: CPT | Mod: CPTII,,, | Performed by: INTERNAL MEDICINE

## 2022-12-23 PROCEDURE — 99214 OFFICE O/P EST MOD 30 MIN: CPT | Mod: S$PBB,,, | Performed by: INTERNAL MEDICINE

## 2022-12-23 PROCEDURE — 3078F PR MOST RECENT DIASTOLIC BLOOD PRESSURE < 80 MM HG: ICD-10-PCS | Mod: CPTII,,, | Performed by: INTERNAL MEDICINE

## 2022-12-23 PROCEDURE — 99214 PR OFFICE/OUTPT VISIT, EST, LEVL IV, 30-39 MIN: ICD-10-PCS | Mod: S$PBB,,, | Performed by: INTERNAL MEDICINE

## 2022-12-23 PROCEDURE — 1160F PR REVIEW ALL MEDS BY PRESCRIBER/CLIN PHARMACIST DOCUMENTED: ICD-10-PCS | Mod: CPTII,,, | Performed by: INTERNAL MEDICINE

## 2022-12-23 PROCEDURE — 99214 OFFICE O/P EST MOD 30 MIN: CPT | Mod: PBBFAC,PO | Performed by: INTERNAL MEDICINE

## 2022-12-23 PROCEDURE — 99999 PR PBB SHADOW E&M-EST. PATIENT-LVL IV: ICD-10-PCS | Mod: PBBFAC,,, | Performed by: INTERNAL MEDICINE

## 2022-12-23 PROCEDURE — 83036 HEMOGLOBIN GLYCOSYLATED A1C: CPT | Performed by: INTERNAL MEDICINE

## 2022-12-23 PROCEDURE — 3072F LOW RISK FOR RETINOPATHY: CPT | Mod: CPTII,,, | Performed by: INTERNAL MEDICINE

## 2022-12-23 RX ORDER — PHENTERMINE HYDROCHLORIDE 37.5 MG/1
37.5 TABLET ORAL
Qty: 30 TABLET | Refills: 0 | Status: SHIPPED | OUTPATIENT
Start: 2022-12-23 | End: 2023-01-24 | Stop reason: SDUPTHER

## 2022-12-23 NOTE — PROGRESS NOTES
Subjective:     Halley Moreno is a 47 y.o. female who presents for   Chief Complaint   Patient presents with    Follow-up     2 Mos f/u    Diabetes    Hypertension    Hyperlipidemia       HPI    Diabetes: Patient presents for follow up of diabetes. Current symptoms include: none. Symptoms have been well-controlled. Patient denies foot ulcerations and nausea. Evaluation to date has included: hemoglobin A1C.  Home sugars: BGs consistently in an acceptable range. Current treatment:  ozempic . Last dilated eye exam: done.    Lab Results   Component Value Date    HGBA1C 7.5 (H) 12/23/2022     (H) 12/23/2022       Hypertension: The patient has been taking medications as instructed, no medication side effects noted, no chest pain on exertion, no dyspnea on exertion.    BP Readings from Last 2 Encounters:   12/23/22 130/72   09/13/22 110/60       Hyperlipidemia: Compliance with treatment has been good. The patient exercises intermittently. Patient denies muscle pain associated with his medications. Previous history of cardiac disease includes: CHF.    Lab Results   Component Value Date    CHOL 90 (L) 08/12/2022    HDL 33 (L) 08/12/2022        She has a uterine mass and will need to have a hysterectomy. But due to her health conditions and weight, she will not be able to safely proceed with surgery without weight loss. Her GYN requests that her BMI decrease to 50.      Review of Systems   Constitutional:  Negative for chills, diaphoresis and fever.   HENT:  Negative for congestion, ear pain and sinus pressure.    Eyes:  Negative for redness and visual disturbance.   Respiratory:  Negative for cough and shortness of breath.    Cardiovascular:  Negative for chest pain and palpitations.   Gastrointestinal:  Negative for abdominal pain, constipation, diarrhea, nausea and vomiting.   Endocrine: Negative for polydipsia and polyuria.   Musculoskeletal:  Negative for arthralgias and myalgias.   Skin:  Negative for  rash and wound.   Neurological:  Negative for dizziness, tremors, numbness and headaches.        Objective:     Physical Exam  Vitals reviewed.   Constitutional:       General: She is awake. She is not in acute distress.     Appearance: Normal appearance. She is well-developed and well-groomed.   HENT:      Head: Normocephalic and atraumatic.      Right Ear: Hearing and external ear normal.      Left Ear: Hearing and external ear normal.      Nose: Nose normal. No congestion.      Mouth/Throat:      Mouth: Mucous membranes are moist.   Eyes:      General: Lids are normal. Vision grossly intact.   Cardiovascular:      Rate and Rhythm: Normal rate and regular rhythm.      Heart sounds: Normal heart sounds. No murmur heard.  Pulmonary:      Effort: Pulmonary effort is normal.      Breath sounds: Normal breath sounds. No decreased breath sounds or wheezing.   Abdominal:      General: Bowel sounds are normal. There is no distension.   Musculoskeletal:         General: Normal range of motion.      Cervical back: Normal range of motion.      Right lower leg: Edema present.      Left lower leg: Edema present.   Skin:     General: Skin is warm and dry.      Findings: No lesion or rash.   Neurological:      Mental Status: She is alert and oriented to person, place, and time.   Psychiatric:         Attention and Perception: Attention normal.         Mood and Affect: Mood normal.         Behavior: Behavior is cooperative.          Assessment:      1. Type 2 diabetes mellitus without complication, without long-term current use of insulin    2. Hypertension associated with diabetes    3. Complex endometrial hyperplasia with atypia    4. Morbid obesity with BMI of 60.0-69.9, adult           Plan:     1. Type 2 diabetes mellitus without complication, without long-term current use of insulin  - continue glipizide, add Mounjaro  - Hemoglobin A1C; Future  - Basic Metabolic Panel; Future    2. Hypertension associated with diabetes  -  Basic Metabolic Panel; Future  - controlled, continue losartan, furosemide    3. Complex endometrial hyperplasia with atypia  - planning hysterectomy if she reaches goal weight    4. Morbid obesity with BMI of 60.0-69.9, adult  - continue to improve diet and increase physical activity  - Mounjaro should help with weight loss that is hopefully close to amount lost with bariatric surgery  - continue phentermine, monitor BP    RTC in 3 months for follow-up or sooner if needed    __________________________    Yamila Tejeda MD, PharmD  [facility]  American Board of Obesity Medicine diplomate  Office [phone]

## 2022-12-24 LAB
ANION GAP SERPL CALC-SCNC: 9 MMOL/L (ref 8–16)
BUN SERPL-MCNC: 15 MG/DL (ref 6–20)
CALCIUM SERPL-MCNC: 9.3 MG/DL (ref 8.7–10.5)
CHLORIDE SERPL-SCNC: 105 MMOL/L (ref 95–110)
CO2 SERPL-SCNC: 27 MMOL/L (ref 23–29)
CREAT SERPL-MCNC: 1.1 MG/DL (ref 0.5–1.4)
EST. GFR  (NO RACE VARIABLE): >60 ML/MIN/1.73 M^2
GLUCOSE SERPL-MCNC: 253 MG/DL (ref 70–110)
POTASSIUM SERPL-SCNC: 4.6 MMOL/L (ref 3.5–5.1)
SODIUM SERPL-SCNC: 141 MMOL/L (ref 136–145)

## 2022-12-28 ENCOUNTER — TELEPHONE (OUTPATIENT)
Dept: PHARMACY | Facility: CLINIC | Age: 47
End: 2022-12-28
Payer: MEDICAID

## 2022-12-28 ENCOUNTER — TELEPHONE (OUTPATIENT)
Dept: GYNECOLOGIC ONCOLOGY | Facility: CLINIC | Age: 47
End: 2022-12-28
Payer: MEDICAID

## 2022-12-28 NOTE — TELEPHONE ENCOUNTER
"----- Message from Marisa Chin sent at 12/13/2022  8:56 AM CST -----  Regarding: Reschedule Existing Appointment  Appt Date: 12/13    Type of appt :  CARLOS    Physician: Nas    Reason for rescheduling? Work conflict    Caller:  self    Contact Preference: [phone]                   Additional Information:  "Thank you for all that you do for our patients'     "

## 2023-01-06 RX ORDER — SEMAGLUTIDE 1.34 MG/ML
1 INJECTION, SOLUTION SUBCUTANEOUS
Qty: 3 PEN | Refills: 1 | OUTPATIENT
Start: 2023-01-06

## 2023-01-06 NOTE — TELEPHONE ENCOUNTER
Quick DC. Inappropriate Request    Refill Authorization Note   Halley Moreno  is requesting a refill authorization.  Brief Assessment and Rationale for Refill:  Quick Discontinue  Medication Therapy Plan:  discontinued on 12/23/22 for altnerative therapy    Medication Reconciliation Completed:  No      Comments:     Note composed:11:26 AM 01/06/2023

## 2023-01-06 NOTE — TELEPHONE ENCOUNTER
No new care gaps identified.  [facility] Embedded Care Gaps. Reference number: [id number]. 1/06/2023   9:15:07 AM CST

## 2023-01-10 ENCOUNTER — TELEPHONE (OUTPATIENT)
Dept: INTERNAL MEDICINE | Facility: CLINIC | Age: 48
End: 2023-01-10

## 2023-01-10 ENCOUNTER — OFFICE VISIT (OUTPATIENT)
Dept: GYNECOLOGIC ONCOLOGY | Facility: CLINIC | Age: 48
End: 2023-01-10
Payer: MEDICAID

## 2023-01-10 VITALS
HEART RATE: 76 BPM | DIASTOLIC BLOOD PRESSURE: 68 MMHG | SYSTOLIC BLOOD PRESSURE: 136 MMHG | BODY MASS INDEX: 65.22 KG/M2 | WEIGHT: 293 LBS

## 2023-01-10 DIAGNOSIS — G47.37 CENTRAL SLEEP APNEA DUE TO MEDICAL CONDITION: ICD-10-CM

## 2023-01-10 DIAGNOSIS — E66.01 MORBID OBESITY WITH BMI OF 60.0-69.9, ADULT: ICD-10-CM

## 2023-01-10 DIAGNOSIS — E11.9 DIABETES MELLITUS TYPE 2 WITHOUT RETINOPATHY: ICD-10-CM

## 2023-01-10 DIAGNOSIS — I27.20 PULMONARY HYPERTENSION: ICD-10-CM

## 2023-01-10 DIAGNOSIS — N85.02 COMPLEX ENDOMETRIAL HYPERPLASIA WITH ATYPIA: Primary | ICD-10-CM

## 2023-01-10 PROCEDURE — 99215 OFFICE O/P EST HI 40 MIN: CPT | Mod: S$PBB,,, | Performed by: OBSTETRICS & GYNECOLOGY

## 2023-01-10 PROCEDURE — 3008F BODY MASS INDEX DOCD: CPT | Mod: CPTII,,, | Performed by: OBSTETRICS & GYNECOLOGY

## 2023-01-10 PROCEDURE — 1159F MED LIST DOCD IN RCRD: CPT | Mod: CPTII,,, | Performed by: OBSTETRICS & GYNECOLOGY

## 2023-01-10 PROCEDURE — 1159F PR MEDICATION LIST DOCUMENTED IN MEDICAL RECORD: ICD-10-PCS | Mod: CPTII,,, | Performed by: OBSTETRICS & GYNECOLOGY

## 2023-01-10 PROCEDURE — 99999 PR PBB SHADOW E&M-EST. PATIENT-LVL III: CPT | Mod: PBBFAC,,, | Performed by: OBSTETRICS & GYNECOLOGY

## 2023-01-10 PROCEDURE — 3075F PR MOST RECENT SYSTOLIC BLOOD PRESS GE 130-139MM HG: ICD-10-PCS | Mod: CPTII,,, | Performed by: OBSTETRICS & GYNECOLOGY

## 2023-01-10 PROCEDURE — 99215 PR OFFICE/OUTPT VISIT, EST, LEVL V, 40-54 MIN: ICD-10-PCS | Mod: S$PBB,,, | Performed by: OBSTETRICS & GYNECOLOGY

## 2023-01-10 PROCEDURE — 3072F PR LOW RISK FOR RETINOPATHY: ICD-10-PCS | Mod: CPTII,,, | Performed by: OBSTETRICS & GYNECOLOGY

## 2023-01-10 PROCEDURE — 1160F PR REVIEW ALL MEDS BY PRESCRIBER/CLIN PHARMACIST DOCUMENTED: ICD-10-PCS | Mod: CPTII,,, | Performed by: OBSTETRICS & GYNECOLOGY

## 2023-01-10 PROCEDURE — 3075F SYST BP GE 130 - 139MM HG: CPT | Mod: CPTII,,, | Performed by: OBSTETRICS & GYNECOLOGY

## 2023-01-10 PROCEDURE — 3072F LOW RISK FOR RETINOPATHY: CPT | Mod: CPTII,,, | Performed by: OBSTETRICS & GYNECOLOGY

## 2023-01-10 PROCEDURE — 99999 PR PBB SHADOW E&M-EST. PATIENT-LVL III: ICD-10-PCS | Mod: PBBFAC,,, | Performed by: OBSTETRICS & GYNECOLOGY

## 2023-01-10 PROCEDURE — 3078F PR MOST RECENT DIASTOLIC BLOOD PRESSURE < 80 MM HG: ICD-10-PCS | Mod: CPTII,,, | Performed by: OBSTETRICS & GYNECOLOGY

## 2023-01-10 PROCEDURE — 3078F DIAST BP <80 MM HG: CPT | Mod: CPTII,,, | Performed by: OBSTETRICS & GYNECOLOGY

## 2023-01-10 PROCEDURE — 1160F RVW MEDS BY RX/DR IN RCRD: CPT | Mod: CPTII,,, | Performed by: OBSTETRICS & GYNECOLOGY

## 2023-01-10 PROCEDURE — 3008F PR BODY MASS INDEX (BMI) DOCUMENTED: ICD-10-PCS | Mod: CPTII,,, | Performed by: OBSTETRICS & GYNECOLOGY

## 2023-01-10 PROCEDURE — 99213 OFFICE O/P EST LOW 20 MIN: CPT | Mod: PBBFAC | Performed by: OBSTETRICS & GYNECOLOGY

## 2023-01-10 RX ORDER — MEDROXYPROGESTERONE ACETATE 10 MG/1
10 TABLET ORAL DAILY
Qty: 30 TABLET | Refills: 11 | Status: SHIPPED | OUTPATIENT
Start: 2023-01-10 | End: 2023-07-13 | Stop reason: CLARIF

## 2023-01-10 NOTE — TELEPHONE ENCOUNTER
----- Message from Quynh Willett MA sent at 1/4/2023  4:13 PM CST -----  Contact: CoverMy Meds  Please advise is pt still on Ozempic and what the correct dosage?  ----- Message -----  From: Suki Wellington  Sent: 1/3/2023   2:55 PM CST  To: Nikhil BOONE Staff    Cover my meds rep call in regards prior Auth for semaglutide (OZEMPIC) 1 mg/dose (4 mg/3 mL) . Reference # [id number].  # [phone]. Please call and advise. Thank you

## 2023-01-10 NOTE — TELEPHONE ENCOUNTER
Ozempic was discontinued.    Changed to Mounjaro 2.5mg weekly and sent to [facility]. PA cancelled.    Re-ordered Mounjaro and PA information is in outbox.

## 2023-01-10 NOTE — PROGRESS NOTES
REFERRING PROVIDER  Dixie Rod MD     HISTORY OF PRESENT CONDITION  CC: MASSIEL Moreno is a 47 y.o.  who presents in consultation at for an opinion regarding complex hyperplasia with atypia.    +PO. -N/V. +Flatus. +BM. -VB, VD.  Tolerating Megace 80 mg BID without complications. -Changes in stool or urine. -Abdominal or pelvic pain. -Unintentional weight loss. +Unintentional weight gain.  Mammogram (22):  BI-RADS1.       REVIEW OF SYSTEMS  All systems reviewed and negative except as noted in HPI.    OBJECTIVE   Vitals:    01/10/23 1140   BP: 136/68   Pulse: 76      Body mass index is 65.22 kg/m².      1. General: Well appearing, no apparent distress, alert and oriented.  2. Lymph: Neck symmetric without cervical or supraclavicular adenopathy or mass.  3. Lungs: Normal respiratory rate, no accessory muscle use.  4. Cardiac: Normal rate  5. Psych: Normal affect.  6. Abdomen:  non-distended, soft, non-tender, are no masses, no ascites; large panus with edematous and thickened skin  7. Skin: Warm, dry, no rashes or lesions.   8. Extremities: Bilateral lower extremities without edema or tenderness.  9. Genitourinary: Declined (patient state she is unable to get undressed)               ECOG status: 2    LABORATORY DATA  Lab data reviewed.    RADIOLOGICAL DATA  Radiology data reviewed.    PATHOLOGY DATA  Pathology data reviewed.    ASSESSMENT / PLAN     1. Complex endometrial hyperplasia with atypia    2. Morbid obesity with BMI of 60.0-69.9, adult    3. Central sleep apnea due to medical condition    4. Diabetes mellitus type 2 without retinopathy    5. Pulmonary hypertension         22: Pap: NIL  22: Hysteroscopy, D&C: at least EIN  22: Cardiology clearance  3/31/22: HbA1c = 5.7, Hb 12.3, Cr 0.7  3/16/22: 2D Echo  3/2/22: EKG  22: Pelvic US: 12 cm uterus    Overall she has gained weight and still is not a surgical candidate.  She is tolerating Megace 80 mg BID without  complications but reports some soft stool and gas.  I think this is very unlikely to be related to Megace, but we will transition her to Provera 10 mg qD.  She was instructed to D/C Provera and transition back to Megace 80 mg BID if the symptoms don't improve. F/U Bariatric Medicine and strongly consider Bariatric Surgery given overall weight gain in the last 6 months.  RONC 6 months.      PATIENT EDUCATION  Ready to learn, no apparent learning barriers were identified; learning preferences include listening.  Explained diagnosis and treatment plan; patient expressed understanding of the content.    INFORMED CONSENT  Discussed the risks, benefits, and alternatives of the procedure and of possible blood transfusion.  Discussed the necessity of other members of the healthcare team participating in the procedure.  All questions answered and consent given.    Florentino Lovell

## 2023-01-13 NOTE — TELEPHONE ENCOUNTER
Spoke to patient re: mounjaro. She has taken 2 doses and tolerates it well. She will contact me 1 week before she needs the next month and will start Mounjaro 5mg weekly if tolerated.

## 2023-01-19 ENCOUNTER — PATIENT MESSAGE (OUTPATIENT)
Dept: INTERNAL MEDICINE | Facility: CLINIC | Age: 48
End: 2023-01-19
Payer: MEDICAID

## 2023-01-19 DIAGNOSIS — E66.01 MORBID OBESITY WITH BMI OF 60.0-69.9, ADULT: ICD-10-CM

## 2023-01-19 DIAGNOSIS — E11.9 TYPE 2 DIABETES MELLITUS WITHOUT COMPLICATION, WITHOUT LONG-TERM CURRENT USE OF INSULIN: Primary | ICD-10-CM

## 2023-01-21 RX ORDER — TIRZEPATIDE 5 MG/.5ML
5 INJECTION, SOLUTION SUBCUTANEOUS
Qty: 4 PEN | Refills: 0 | Status: SHIPPED | OUTPATIENT
Start: 2023-01-21 | End: 2023-02-22

## 2023-01-24 DIAGNOSIS — E66.01 MORBID OBESITY WITH BMI OF 60.0-69.9, ADULT: ICD-10-CM

## 2023-01-26 RX ORDER — PHENTERMINE HYDROCHLORIDE 37.5 MG/1
37.5 TABLET ORAL
Qty: 30 TABLET | Refills: 0 | Status: SHIPPED | OUTPATIENT
Start: 2023-01-26 | End: 2023-02-25

## 2023-01-30 ENCOUNTER — PATIENT MESSAGE (OUTPATIENT)
Dept: INTERNAL MEDICINE | Facility: CLINIC | Age: 48
End: 2023-01-30
Payer: MEDICAID

## 2023-02-01 ENCOUNTER — TELEPHONE (OUTPATIENT)
Dept: INTERNAL MEDICINE | Facility: CLINIC | Age: 48
End: 2023-02-01
Payer: MEDICAID

## 2023-02-01 NOTE — TELEPHONE ENCOUNTER
Pt is schedule on 3/23/23 at 9 am with  at 9 am for 3 mos F/u. Pt appointment letter was printed and mailed out to pt.

## 2023-02-16 NOTE — PROGRESS NOTES
Physical Therapy Daily Note     Name: Halley Moreno  Clinic Number: [id number]  Diagnosis:   Encounter Diagnoses   Name Primary?    Decreased spinal mobility     Decreased functional mobility      Physician: Yamila Tejeda MD  Precautions: fall   Visit #: 3 of 12  PTA Visit #: 0  Time In: 10:00 am   Time Out: 10:35 am   Total Treatment Time 1:1: 35    Evaluation Date: 1/8/19  Visit # authorized: 20   Authorization period: 12/31/19   Plan of care Expiration: 3/8/19   MD referral: y     Subjective     Pt reports: that the lumbar roll has helped a lot and that she has been compliant with HEP which has also helped. She reports that her buttock is still painful and sore but denies any radicular pain.   Pain Scale: Halley rates pain on a scale of 0-10 to be 5 currently.    Objective     Halley received individual therapeutic exercises to develop strength, endurance and core stabilization for 35 minutes including:  UBE 2 min F/2 min B   Seated Breuggers OTB 2 x 10   Sit to stand 3 x 5 from low mat    Standing hip ext /abd 2 x 10 each B   Tband row LOTB 2 X 10   Tband ext LOTB 2 x 10     Written Home Exercises Provided: per above   Pt demo good understanding of the education provided. Halley demonstrated good return demonstration of activities.     Education provided re: no new   Halley verbalized good understanding of education provided.   No spiritual or educational barriers to learning provided    Assessment     Patient tolerated all treatment fairly. It is evident the pt is doing her HEP as evidenced by her ability to recall and properly perform all exercises. However, the pt is easily winded and demos decreased cardiovascular endurance. Will continue to gradually progress as tolerated.   This is a 43 y.o. female referred to outpatient physical therapy and presents with a medical diagnosis of cervical and lumbar pain and demonstrates limitations as described in the  Daily Note     Today's date: 2023  Patient name: Saw Dee  : 1972  MRN: [id number]  Referring provider: Alanis Fisher, PA*  Dx:   Encounter Diagnosis     ICD-10-CM    1  Left hand tendonitis  M77 8                      Subjective: pt notes less [pain following her initial treatment      Objective: See treatment diary below    AROM left wrist E/F- 60/60- limited by pain                    Thumb MP- 0/65; IP- 0/75; opp- -1 0- limited by pain  Strength-  II- L/R- 35/40; 3 SANDEEP- 1/3; Key- 3/5- all painful left  Circumference at wrist- 14 5 cm  Palpation- tender ECR, EPL/APL  (+) Finkelstein  Pt wearing a SA wrist orthosis when active    Assessment: Tolerated treatment well  Patient would benefit from continued PT      Plan: Continue per plan of care        Precautions: avoid provocative activity, position      Manuals            STM 15 15                                                  Neuro Re-Ed             HP/pulsed biph 15 15                                     Ther Ex                                                                                                                                                                                                   Modalities             US 15 15           CP 15 15 problem list. Pt prognosis is Fair. Pt will continue to benefit from skilled outpatient physical therapy to address the deficits listed in the problem list, provide pt/family education and to maximize pt's level of independence in the home and community environment.      Plan     Continue with established Plan of Care towards PT goals.    Therapist: Gila Joseph, PT  1/31/2019

## 2023-02-20 ENCOUNTER — TELEPHONE (OUTPATIENT)
Dept: INTERNAL MEDICINE | Facility: CLINIC | Age: 48
End: 2023-02-20
Payer: MEDICAID

## 2023-02-20 RX ORDER — TIRZEPATIDE 5 MG/.5ML
5 INJECTION, SOLUTION SUBCUTANEOUS
Qty: 4 PEN | Refills: 0 | Status: CANCELLED | OUTPATIENT
Start: 2023-02-20

## 2023-02-20 NOTE — TELEPHONE ENCOUNTER
----- Message from Martha Felix sent at 2/20/2023 10:50 AM CST -----  Contact: KARISSA GOOD [[id number]]@ [phone]  Medicaid patient want to be seen today for back spasms

## 2023-02-20 NOTE — TELEPHONE ENCOUNTER
No new care gaps identified.  [facility] Embedded Care Gaps. Reference number: [id number]. 2/20/2023   3:30:20 PM CST

## 2023-02-20 NOTE — TELEPHONE ENCOUNTER
Spoke to pt regarding back Spasm Rt side. Pt states she took Robaxin & used icy hot patches. She states medication not helping. Stated she might have a UTI. I ask pt has she been having vignal discharged, Urgency to urine, itch or burning , abdomen pain. Pt states he doesn't not have any of symptoms just spotting. I recommend if pain worsen go to urgent care.

## 2023-03-02 ENCOUNTER — TELEPHONE (OUTPATIENT)
Dept: INTERNAL MEDICINE | Facility: CLINIC | Age: 48
End: 2023-03-02
Payer: MEDICAID

## 2023-03-02 ENCOUNTER — PATIENT MESSAGE (OUTPATIENT)
Dept: INTERNAL MEDICINE | Facility: CLINIC | Age: 48
End: 2023-03-02
Payer: MEDICAID

## 2023-03-15 ENCOUNTER — PATIENT MESSAGE (OUTPATIENT)
Dept: INTERNAL MEDICINE | Facility: CLINIC | Age: 48
End: 2023-03-15
Payer: MEDICAID

## 2023-03-15 DIAGNOSIS — E11.9 TYPE 2 DIABETES MELLITUS WITHOUT COMPLICATION, WITHOUT LONG-TERM CURRENT USE OF INSULIN: Primary | ICD-10-CM

## 2023-03-15 NOTE — TELEPHONE ENCOUNTER
Called pt and Lt voice message regarding form has been Faxed to [facility]. Stated that labs were ordered by  when do she wants to get them schedule. Pt was also sent a my chart message as well.

## 2023-03-20 DIAGNOSIS — E11.9 TYPE 2 DIABETES MELLITUS WITHOUT COMPLICATION, WITHOUT LONG-TERM CURRENT USE OF INSULIN: ICD-10-CM

## 2023-03-20 NOTE — TELEPHONE ENCOUNTER
No new care gaps identified.  [facility] Embedded Care Gaps. Reference number: [id number]. 3/20/2023   10:33:49 AM BENT

## 2023-03-20 NOTE — TELEPHONE ENCOUNTER
Refill Routing Note   Medication(s) are not appropriate for processing by [facility] for the following reason(s):       Drug-disease interaction: glipiZIDE and Impaired functional mobility, balance, gait, and endurance    [facility] action(s):  Defer         Appointments  past 12m or future 3m with PCP    Date Provider   Last Visit   12/23/2022 Yamila Tejeda MD   Next Visit   3/23/2023 Yamila Tejeda MD   ED visits in past 90 days: 0        Note composed:5:44 PM 03/20/2023

## 2023-03-21 ENCOUNTER — PATIENT MESSAGE (OUTPATIENT)
Dept: INTERNAL MEDICINE | Facility: CLINIC | Age: 48
End: 2023-03-21
Payer: MEDICAID

## 2023-03-21 ENCOUNTER — LAB VISIT (OUTPATIENT)
Dept: LAB | Facility: HOSPITAL | Age: 48
End: 2023-03-21
Attending: INTERNAL MEDICINE
Payer: MEDICAID

## 2023-03-21 DIAGNOSIS — E11.9 TYPE 2 DIABETES MELLITUS WITHOUT COMPLICATION, WITHOUT LONG-TERM CURRENT USE OF INSULIN: ICD-10-CM

## 2023-03-21 LAB
ANION GAP SERPL CALC-SCNC: 8 MMOL/L (ref 8–16)
BASOPHILS # BLD AUTO: 0.03 K/UL (ref 0–0.2)
BASOPHILS NFR BLD: 0.3 % (ref 0–1.9)
BUN SERPL-MCNC: 16 MG/DL (ref 6–20)
CALCIUM SERPL-MCNC: 9.4 MG/DL (ref 8.7–10.5)
CHLORIDE SERPL-SCNC: 105 MMOL/L (ref 95–110)
CO2 SERPL-SCNC: 25 MMOL/L (ref 23–29)
CREAT SERPL-MCNC: 0.9 MG/DL (ref 0.5–1.4)
DIFFERENTIAL METHOD: ABNORMAL
EOSINOPHIL # BLD AUTO: 0.2 K/UL (ref 0–0.5)
EOSINOPHIL NFR BLD: 1.3 % (ref 0–8)
ERYTHROCYTE [DISTWIDTH] IN BLOOD BY AUTOMATED COUNT: 16.8 % (ref 11.5–14.5)
EST. GFR  (NO RACE VARIABLE): >60 ML/MIN/1.73 M^2
ESTIMATED AVG GLUCOSE: 131 MG/DL (ref 68–131)
GLUCOSE SERPL-MCNC: 137 MG/DL (ref 70–110)
HBA1C MFR BLD: 6.2 % (ref 4–5.6)
HCT VFR BLD AUTO: 34.2 % (ref 37–48.5)
HGB BLD-MCNC: 10.8 G/DL (ref 12–16)
IMM GRANULOCYTES # BLD AUTO: 0.06 K/UL (ref 0–0.04)
IMM GRANULOCYTES NFR BLD AUTO: 0.5 % (ref 0–0.5)
LYMPHOCYTES # BLD AUTO: 2.4 K/UL (ref 1–4.8)
LYMPHOCYTES NFR BLD: 20.2 % (ref 18–48)
MCH RBC QN AUTO: 28.8 PG (ref 27–31)
MCHC RBC AUTO-ENTMCNC: 31.6 G/DL (ref 32–36)
MCV RBC AUTO: 91 FL (ref 82–98)
MONOCYTES # BLD AUTO: 0.8 K/UL (ref 0.3–1)
MONOCYTES NFR BLD: 6.7 % (ref 4–15)
NEUTROPHILS # BLD AUTO: 8.5 K/UL (ref 1.8–7.7)
NEUTROPHILS NFR BLD: 71 % (ref 38–73)
NRBC BLD-RTO: 0 /100 WBC
PLATELET # BLD AUTO: 251 K/UL (ref 150–450)
PMV BLD AUTO: 10.1 FL (ref 9.2–12.9)
POTASSIUM SERPL-SCNC: 3.9 MMOL/L (ref 3.5–5.1)
RBC # BLD AUTO: 3.75 M/UL (ref 4–5.4)
SODIUM SERPL-SCNC: 138 MMOL/L (ref 136–145)
WBC # BLD AUTO: 11.91 K/UL (ref 3.9–12.7)

## 2023-03-21 PROCEDURE — 83036 HEMOGLOBIN GLYCOSYLATED A1C: CPT | Performed by: INTERNAL MEDICINE

## 2023-03-21 PROCEDURE — 80048 BASIC METABOLIC PNL TOTAL CA: CPT | Performed by: INTERNAL MEDICINE

## 2023-03-21 PROCEDURE — 85025 COMPLETE CBC W/AUTO DIFF WBC: CPT | Performed by: INTERNAL MEDICINE

## 2023-03-21 PROCEDURE — 36415 COLL VENOUS BLD VENIPUNCTURE: CPT | Mod: PO | Performed by: INTERNAL MEDICINE

## 2023-03-21 RX ORDER — GLIPIZIDE 5 MG/1
TABLET, FILM COATED, EXTENDED RELEASE ORAL
Qty: 90 TABLET | Refills: 0 | Status: SHIPPED | OUTPATIENT
Start: 2023-03-21 | End: 2023-06-13

## 2023-03-23 ENCOUNTER — PATIENT MESSAGE (OUTPATIENT)
Dept: GYNECOLOGIC ONCOLOGY | Facility: CLINIC | Age: 48
End: 2023-03-23
Payer: MEDICAID

## 2023-03-23 ENCOUNTER — LAB VISIT (OUTPATIENT)
Dept: LAB | Facility: HOSPITAL | Age: 48
End: 2023-03-23
Payer: MEDICAID

## 2023-03-23 ENCOUNTER — OFFICE VISIT (OUTPATIENT)
Dept: INTERNAL MEDICINE | Facility: CLINIC | Age: 48
End: 2023-03-23
Payer: MEDICAID

## 2023-03-23 VITALS
RESPIRATION RATE: 18 BRPM | WEIGHT: 293 LBS | OXYGEN SATURATION: 96 % | BODY MASS INDEX: 51.91 KG/M2 | HEIGHT: 63 IN | SYSTOLIC BLOOD PRESSURE: 120 MMHG | TEMPERATURE: 97 F | DIASTOLIC BLOOD PRESSURE: 72 MMHG | HEART RATE: 84 BPM

## 2023-03-23 DIAGNOSIS — R93.89 THICKENED ENDOMETRIUM: ICD-10-CM

## 2023-03-23 DIAGNOSIS — N85.02 COMPLEX ENDOMETRIAL HYPERPLASIA WITH ATYPIA: ICD-10-CM

## 2023-03-23 DIAGNOSIS — D50.0 BLOOD LOSS ANEMIA: ICD-10-CM

## 2023-03-23 DIAGNOSIS — I15.2 HYPERTENSION ASSOCIATED WITH DIABETES: Primary | ICD-10-CM

## 2023-03-23 DIAGNOSIS — E11.9 TYPE 2 DIABETES MELLITUS WITHOUT COMPLICATION, WITHOUT LONG-TERM CURRENT USE OF INSULIN: ICD-10-CM

## 2023-03-23 DIAGNOSIS — N93.9 VAGINAL BLEEDING: ICD-10-CM

## 2023-03-23 DIAGNOSIS — E11.59 HYPERTENSION ASSOCIATED WITH DIABETES: Primary | ICD-10-CM

## 2023-03-23 DIAGNOSIS — E66.01 MORBID OBESITY WITH BMI OF 60.0-69.9, ADULT: ICD-10-CM

## 2023-03-23 LAB
ANION GAP SERPL CALC-SCNC: 12 MMOL/L (ref 8–16)
BASOPHILS # BLD AUTO: 0.03 K/UL (ref 0–0.2)
BASOPHILS # BLD AUTO: 0.03 K/UL (ref 0–0.2)
BASOPHILS NFR BLD: 0.3 % (ref 0–1.9)
BASOPHILS NFR BLD: 0.3 % (ref 0–1.9)
BUN SERPL-MCNC: 16 MG/DL (ref 6–20)
CALCIUM SERPL-MCNC: 9.5 MG/DL (ref 8.7–10.5)
CHLORIDE SERPL-SCNC: 106 MMOL/L (ref 95–110)
CO2 SERPL-SCNC: 21 MMOL/L (ref 23–29)
CREAT SERPL-MCNC: 0.9 MG/DL (ref 0.5–1.4)
DIFFERENTIAL METHOD: ABNORMAL
DIFFERENTIAL METHOD: ABNORMAL
EOSINOPHIL # BLD AUTO: 0.2 K/UL (ref 0–0.5)
EOSINOPHIL # BLD AUTO: 0.2 K/UL (ref 0–0.5)
EOSINOPHIL NFR BLD: 1.5 % (ref 0–8)
EOSINOPHIL NFR BLD: 1.5 % (ref 0–8)
ERYTHROCYTE [DISTWIDTH] IN BLOOD BY AUTOMATED COUNT: 17 % (ref 11.5–14.5)
ERYTHROCYTE [DISTWIDTH] IN BLOOD BY AUTOMATED COUNT: 17 % (ref 11.5–14.5)
EST. GFR  (NO RACE VARIABLE): >60 ML/MIN/1.73 M^2
ESTIMATED AVG GLUCOSE: 137 MG/DL (ref 68–131)
FERRITIN SERPL-MCNC: 282 NG/ML (ref 20–300)
GLUCOSE SERPL-MCNC: 151 MG/DL (ref 70–110)
HBA1C MFR BLD: 6.4 % (ref 4–5.6)
HCT VFR BLD AUTO: 35.3 % (ref 37–48.5)
HCT VFR BLD AUTO: 35.3 % (ref 37–48.5)
HGB BLD-MCNC: 10.8 G/DL (ref 12–16)
HGB BLD-MCNC: 10.8 G/DL (ref 12–16)
IMM GRANULOCYTES # BLD AUTO: 0.03 K/UL (ref 0–0.04)
IMM GRANULOCYTES # BLD AUTO: 0.03 K/UL (ref 0–0.04)
IMM GRANULOCYTES NFR BLD AUTO: 0.3 % (ref 0–0.5)
IMM GRANULOCYTES NFR BLD AUTO: 0.3 % (ref 0–0.5)
IRON SERPL-MCNC: 44 UG/DL (ref 30–160)
LYMPHOCYTES # BLD AUTO: 2.2 K/UL (ref 1–4.8)
LYMPHOCYTES # BLD AUTO: 2.2 K/UL (ref 1–4.8)
LYMPHOCYTES NFR BLD: 20.6 % (ref 18–48)
LYMPHOCYTES NFR BLD: 20.6 % (ref 18–48)
MCH RBC QN AUTO: 28.1 PG (ref 27–31)
MCH RBC QN AUTO: 28.1 PG (ref 27–31)
MCHC RBC AUTO-ENTMCNC: 30.6 G/DL (ref 32–36)
MCHC RBC AUTO-ENTMCNC: 30.6 G/DL (ref 32–36)
MCV RBC AUTO: 92 FL (ref 82–98)
MCV RBC AUTO: 92 FL (ref 82–98)
MONOCYTES # BLD AUTO: 0.9 K/UL (ref 0.3–1)
MONOCYTES # BLD AUTO: 0.9 K/UL (ref 0.3–1)
MONOCYTES NFR BLD: 8.1 % (ref 4–15)
MONOCYTES NFR BLD: 8.1 % (ref 4–15)
NEUTROPHILS # BLD AUTO: 7.5 K/UL (ref 1.8–7.7)
NEUTROPHILS # BLD AUTO: 7.5 K/UL (ref 1.8–7.7)
NEUTROPHILS NFR BLD: 69.2 % (ref 38–73)
NEUTROPHILS NFR BLD: 69.2 % (ref 38–73)
NRBC BLD-RTO: 0 /100 WBC
NRBC BLD-RTO: 0 /100 WBC
PLATELET # BLD AUTO: 239 K/UL (ref 150–450)
PLATELET # BLD AUTO: 239 K/UL (ref 150–450)
PMV BLD AUTO: 10.1 FL (ref 9.2–12.9)
PMV BLD AUTO: 10.1 FL (ref 9.2–12.9)
POTASSIUM SERPL-SCNC: 4.5 MMOL/L (ref 3.5–5.1)
RBC # BLD AUTO: 3.85 M/UL (ref 4–5.4)
RBC # BLD AUTO: 3.85 M/UL (ref 4–5.4)
SATURATED IRON: 13 % (ref 20–50)
SODIUM SERPL-SCNC: 139 MMOL/L (ref 136–145)
TOTAL IRON BINDING CAPACITY: 334 UG/DL (ref 250–450)
TRANSFERRIN SERPL-MCNC: 226 MG/DL (ref 200–375)
WBC # BLD AUTO: 10.77 K/UL (ref 3.9–12.7)
WBC # BLD AUTO: 10.77 K/UL (ref 3.9–12.7)

## 2023-03-23 PROCEDURE — 1160F PR REVIEW ALL MEDS BY PRESCRIBER/CLIN PHARMACIST DOCUMENTED: ICD-10-PCS | Mod: CPTII,,, | Performed by: INTERNAL MEDICINE

## 2023-03-23 PROCEDURE — 36415 COLL VENOUS BLD VENIPUNCTURE: CPT | Mod: PO | Performed by: OBSTETRICS & GYNECOLOGY

## 2023-03-23 PROCEDURE — 83036 HEMOGLOBIN GLYCOSYLATED A1C: CPT | Performed by: OBSTETRICS & GYNECOLOGY

## 2023-03-23 PROCEDURE — 3044F HG A1C LEVEL LT 7.0%: CPT | Mod: CPTII,,, | Performed by: INTERNAL MEDICINE

## 2023-03-23 PROCEDURE — 84466 ASSAY OF TRANSFERRIN: CPT | Performed by: INTERNAL MEDICINE

## 2023-03-23 PROCEDURE — 3074F SYST BP LT 130 MM HG: CPT | Mod: CPTII,,, | Performed by: INTERNAL MEDICINE

## 2023-03-23 PROCEDURE — 99999 PR PBB SHADOW E&M-EST. PATIENT-LVL V: ICD-10-PCS | Mod: PBBFAC,,, | Performed by: INTERNAL MEDICINE

## 2023-03-23 PROCEDURE — 99214 OFFICE O/P EST MOD 30 MIN: CPT | Mod: S$PBB,,, | Performed by: INTERNAL MEDICINE

## 2023-03-23 PROCEDURE — 82728 ASSAY OF FERRITIN: CPT | Performed by: INTERNAL MEDICINE

## 2023-03-23 PROCEDURE — 3078F DIAST BP <80 MM HG: CPT | Mod: CPTII,,, | Performed by: INTERNAL MEDICINE

## 2023-03-23 PROCEDURE — 3072F LOW RISK FOR RETINOPATHY: CPT | Mod: CPTII,,, | Performed by: INTERNAL MEDICINE

## 2023-03-23 PROCEDURE — 1160F RVW MEDS BY RX/DR IN RCRD: CPT | Mod: CPTII,,, | Performed by: INTERNAL MEDICINE

## 2023-03-23 PROCEDURE — 1159F PR MEDICATION LIST DOCUMENTED IN MEDICAL RECORD: ICD-10-PCS | Mod: CPTII,,, | Performed by: INTERNAL MEDICINE

## 2023-03-23 PROCEDURE — 4010F ACE/ARB THERAPY RXD/TAKEN: CPT | Mod: CPTII,,, | Performed by: INTERNAL MEDICINE

## 2023-03-23 PROCEDURE — 3072F PR LOW RISK FOR RETINOPATHY: ICD-10-PCS | Mod: CPTII,,, | Performed by: INTERNAL MEDICINE

## 2023-03-23 PROCEDURE — 4010F PR ACE/ARB THEARPY RXD/TAKEN: ICD-10-PCS | Mod: CPTII,,, | Performed by: INTERNAL MEDICINE

## 2023-03-23 PROCEDURE — 3008F BODY MASS INDEX DOCD: CPT | Mod: CPTII,,, | Performed by: INTERNAL MEDICINE

## 2023-03-23 PROCEDURE — 3074F PR MOST RECENT SYSTOLIC BLOOD PRESSURE < 130 MM HG: ICD-10-PCS | Mod: CPTII,,, | Performed by: INTERNAL MEDICINE

## 2023-03-23 PROCEDURE — 3008F PR BODY MASS INDEX (BMI) DOCUMENTED: ICD-10-PCS | Mod: CPTII,,, | Performed by: INTERNAL MEDICINE

## 2023-03-23 PROCEDURE — 3044F PR MOST RECENT HEMOGLOBIN A1C LEVEL <7.0%: ICD-10-PCS | Mod: CPTII,,, | Performed by: INTERNAL MEDICINE

## 2023-03-23 PROCEDURE — 99214 PR OFFICE/OUTPT VISIT, EST, LEVL IV, 30-39 MIN: ICD-10-PCS | Mod: S$PBB,,, | Performed by: INTERNAL MEDICINE

## 2023-03-23 PROCEDURE — 99215 OFFICE O/P EST HI 40 MIN: CPT | Mod: PBBFAC,PO | Performed by: INTERNAL MEDICINE

## 2023-03-23 PROCEDURE — 3078F PR MOST RECENT DIASTOLIC BLOOD PRESSURE < 80 MM HG: ICD-10-PCS | Mod: CPTII,,, | Performed by: INTERNAL MEDICINE

## 2023-03-23 PROCEDURE — 80048 BASIC METABOLIC PNL TOTAL CA: CPT | Performed by: OBSTETRICS & GYNECOLOGY

## 2023-03-23 PROCEDURE — 1159F MED LIST DOCD IN RCRD: CPT | Mod: CPTII,,, | Performed by: INTERNAL MEDICINE

## 2023-03-23 PROCEDURE — 99999 PR PBB SHADOW E&M-EST. PATIENT-LVL V: CPT | Mod: PBBFAC,,, | Performed by: INTERNAL MEDICINE

## 2023-03-23 PROCEDURE — 85025 COMPLETE CBC W/AUTO DIFF WBC: CPT | Performed by: OBSTETRICS & GYNECOLOGY

## 2023-03-23 RX ORDER — PHENTERMINE HYDROCHLORIDE 37.5 MG/1
37.5 TABLET ORAL
Qty: 30 TABLET | Refills: 0 | Status: SHIPPED | OUTPATIENT
Start: 2023-03-23 | End: 2023-05-02 | Stop reason: SDUPTHER

## 2023-03-23 NOTE — PROGRESS NOTES
Subjective:     Halley Moreno is a 47 y.o. female who presents for   Chief Complaint   Patient presents with    Follow-up    Hypertension    Diabetes    Anemia       HPI    Hypertension: The patient has been taking medications as instructed, no medication side effects noted, no chest pain on exertion, no dyspnea on exertion, and no swelling of ankles.    BP Readings from Last 3 Encounters:   03/23/23 120/72   01/10/23 136/68   12/23/22 130/72       Diabetes: Patient presents for follow up of diabetes. Current symptoms include: none. Symptoms have been well-controlled. Patient denies foot ulcerations and vomiting. Evaluation to date has included: hemoglobin A1C.  Home sugars: BGs consistently in an acceptable range. Current treatment:  glipizide, mounjaro . Last dilated eye exam: done. HbA1c 7.5 --> 6.2    Lab Results   Component Value Date    HGBA1C 6.2 (H) 03/21/2023     (H) 03/21/2023       Anemia: The patient presents for evaluation of anemia. Anemia was found by routine CBC.  It has been present for several months.  Associated signs & symptoms: none. She recently took progesterone which caused her to have a 2-week menstrual cycle with 2 days of heavy bleeding and clotting.     Body mass index is 64.83 kg/m².    Wt Readings from Last 3 Encounters:   03/23/23 (!) 166 kg (365 lb 15.4 oz)   01/10/23 (!) 167 kg (368 lb 2.7 oz)   12/23/22 (!) 169.5 kg (373 lb 10.9 oz)   - she has taken multiple medications to help with weight loss for many years but has lost a limited amount of weight in preparation for surgery with GYN  - multiple family members are overweight    Weight trends:  -overweight as a child  - wt 185 lbs in HS  - wt 245 lbs with first pregnancy  - wt 225 lbs with second pregnancy  - wt 375 lbs since 2010      Review of Systems   Constitutional:  Negative for chills, diaphoresis and fever.   HENT:  Negative for congestion, ear pain, rhinorrhea and sinus pressure.    Eyes:  Negative for  discharge and visual disturbance.   Respiratory:  Negative for cough, chest tightness and shortness of breath.    Cardiovascular:  Positive for leg swelling (chronic). Negative for chest pain and palpitations.   Gastrointestinal:  Negative for abdominal pain, constipation, diarrhea, nausea and vomiting.   Endocrine: Negative for polydipsia and polyuria.   Genitourinary:  Positive for menstrual problem (menorrhagia after progesterone pills). Negative for decreased urine volume, dysuria and frequency.   Musculoskeletal:  Negative for arthralgias and myalgias.   Skin:  Negative for rash and wound.   Neurological:  Negative for dizziness, tremors and headaches.        Objective:     Physical Exam  Vitals reviewed.   Constitutional:       General: She is awake. She is not in acute distress.     Appearance: Normal appearance. She is well-developed and well-groomed. She is not ill-appearing.   HENT:      Head: Normocephalic and atraumatic.      Right Ear: Hearing and external ear normal.      Left Ear: Hearing and external ear normal.      Nose: Nose normal. No congestion.      Mouth/Throat:      Mouth: Mucous membranes are moist.   Eyes:      General: Lids are normal. Vision grossly intact.   Cardiovascular:      Rate and Rhythm: Normal rate and regular rhythm.      Heart sounds: Normal heart sounds. No murmur heard.  Pulmonary:      Effort: Pulmonary effort is normal.      Breath sounds: Normal breath sounds. No decreased breath sounds or wheezing.   Abdominal:      General: Bowel sounds are normal. There is no distension.   Musculoskeletal:         General: Normal range of motion.      Cervical back: Normal range of motion.      Right lower leg: No edema.      Left lower leg: No edema.   Skin:     General: Skin is warm and dry.      Findings: No lesion or rash.   Neurological:      Mental Status: She is alert and oriented to person, place, and time.   Psychiatric:         Attention and Perception: Attention normal.          Mood and Affect: Mood normal.         Behavior: Behavior is cooperative.          Assessment:      1. Hypertension associated with diabetes    2. Blood loss anemia    3. Type 2 diabetes mellitus without complication, without long-term current use of insulin    4. Complex endometrial hyperplasia with atypia    5. Morbid obesity with BMI of 60.0-69.9, adult           Plan:     1. Hypertension associated with diabetes  - controlled, continue losartan, furosemide    2. Blood loss anemia  - CBC Auto Differential; Future  - Ferritin; Future  - Iron and TIBC; Future  - may need to start an iron supplement  - should also proceed with stool screening for blood    3. Type 2 diabetes mellitus without complication, without long-term current use of insulin  - HbA1c decreased to 6.2, controlled  - continue glipizide, Mounjaro    4. Complex endometrial hyperplasia with atypia  - will need to lose weight before she can safely have surgery and current treatments cause lots of side effects    5. Morbid obesity with BMI of 60.0-69.9, adult  - phentermine (ADIPEX-P) 37.5 mg tablet; Take 1 tablet (37.5 mg total) by mouth before breakfast.  Dispense: 30 tablet; Refill: 0  - continue Mounjaro, advised to proceed with evaluation for bariatric surgery    RTC in 3 months for follow-up or sooner if needed    __________________________    Yamila Tejeda MD, PharmD  [facility]  American Board of Obesity Medicine diplomate  Office [phone]

## 2023-03-24 ENCOUNTER — TELEPHONE (OUTPATIENT)
Dept: INTERNAL MEDICINE | Facility: CLINIC | Age: 48
End: 2023-03-24
Payer: MEDICAID

## 2023-03-24 NOTE — TELEPHONE ENCOUNTER
Pt 3 Mos F/U schedule 6/23/23 at 2 pm with . Printed appointment note and will be mailed out to Pt.

## 2023-03-24 NOTE — TELEPHONE ENCOUNTER
Called pt regarding Uncovering Rare Obesity form needs consent. Pt stated she can come on Monday Morning to sign form.

## 2023-03-24 NOTE — TELEPHONE ENCOUNTER
Received [facility] order for CPAP today (3/24/23) but it was signed and faxed on 3/15/23. See outbox.    Also need consent form signed for the genetic obesity testing. Ask her if it should be attached to an email. Or should it be faxed?

## 2023-03-27 ENCOUNTER — TELEPHONE (OUTPATIENT)
Dept: GYNECOLOGIC ONCOLOGY | Facility: CLINIC | Age: 48
End: 2023-03-27
Payer: MEDICAID

## 2023-03-27 DIAGNOSIS — E66.01 MORBID OBESITY WITH BMI OF 60.0-69.9, ADULT: Primary | ICD-10-CM

## 2023-03-27 DIAGNOSIS — N85.02 COMPLEX ENDOMETRIAL HYPERPLASIA WITH ATYPIA: ICD-10-CM

## 2023-03-28 ENCOUNTER — PATIENT MESSAGE (OUTPATIENT)
Dept: INTERNAL MEDICINE | Facility: CLINIC | Age: 48
End: 2023-03-28
Payer: MEDICAID

## 2023-03-28 ENCOUNTER — TELEPHONE (OUTPATIENT)
Dept: INTERNAL MEDICINE | Facility: CLINIC | Age: 48
End: 2023-03-28
Payer: MEDICAID

## 2023-03-28 NOTE — TELEPHONE ENCOUNTER
Called pt regarding consent form to be filled out Uncovering Rare Obesity. Pt stated that she will come in about 10 on 3/29/23.

## 2023-03-28 NOTE — TELEPHONE ENCOUNTER
Re faxed pt prescription for CPAP to [facility] Fax:[phone]. Pt was Notified that prescription was faxed again as requested.

## 2023-03-29 ENCOUNTER — TELEPHONE (OUTPATIENT)
Dept: INTERNAL MEDICINE | Facility: CLINIC | Age: 48
End: 2023-03-29
Payer: MEDICAID

## 2023-03-29 NOTE — TELEPHONE ENCOUNTER
Made a copy of paper work Notified 5th floor  that [employer] package was coming down to be mailed out.

## 2023-04-21 ENCOUNTER — PATIENT MESSAGE (OUTPATIENT)
Dept: GYNECOLOGIC ONCOLOGY | Facility: CLINIC | Age: 48
End: 2023-04-21
Payer: MEDICAID

## 2023-04-21 ENCOUNTER — PATIENT MESSAGE (OUTPATIENT)
Dept: ADMINISTRATIVE | Facility: HOSPITAL | Age: 48
End: 2023-04-21
Payer: MEDICAID

## 2023-04-21 NOTE — ASSESSMENT & PLAN NOTE
PT/OT recommend rehab  - Needs follow up with [facility]m and Cards with discharge  - Patient needs to be closer to dry weight ~380 lbs  - Consider 6 min walk test to assess need for oxygen prior to discharge in setting of heart failure   several yrs ago

## 2023-05-02 ENCOUNTER — TELEPHONE (OUTPATIENT)
Dept: INTERNAL MEDICINE | Facility: CLINIC | Age: 48
End: 2023-05-02
Payer: MEDICAID

## 2023-05-02 DIAGNOSIS — E66.01 MORBID OBESITY WITH BMI OF 60.0-69.9, ADULT: ICD-10-CM

## 2023-05-04 ENCOUNTER — PATIENT MESSAGE (OUTPATIENT)
Dept: GYNECOLOGIC ONCOLOGY | Facility: CLINIC | Age: 48
End: 2023-05-04
Payer: MEDICAID

## 2023-05-04 ENCOUNTER — PATIENT MESSAGE (OUTPATIENT)
Dept: INTERNAL MEDICINE | Facility: CLINIC | Age: 48
End: 2023-05-04
Payer: MEDICAID

## 2023-05-04 RX ORDER — PHENTERMINE HYDROCHLORIDE 37.5 MG/1
37.5 TABLET ORAL
Qty: 30 TABLET | Refills: 0 | Status: SHIPPED | OUTPATIENT
Start: 2023-05-04 | End: 2023-06-05 | Stop reason: SDUPTHER

## 2023-05-10 ENCOUNTER — TELEPHONE (OUTPATIENT)
Dept: INTERNAL MEDICINE | Facility: CLINIC | Age: 48
End: 2023-05-10
Payer: MEDICAID

## 2023-05-10 NOTE — TELEPHONE ENCOUNTER
----- Message from Dalila Tee sent at 5/10/2023 11:13 AM CDT -----  Contact: [phone]  No blue slot available to schedule an appointment for the patient.  Patient is established with which PCP: Dr Tejeda  Reason for the visit: left shoulder pain would like to be seen on today   Would the patient like a call back, or a response through their [facility] portal?: callback

## 2023-05-10 NOTE — TELEPHONE ENCOUNTER
Spoke with pt  She is having major shoulder pain  Helped pt look for appt with any provider, none available, she states she will go to the er when she gets off work

## 2023-05-11 ENCOUNTER — OFFICE VISIT (OUTPATIENT)
Dept: URGENT CARE | Facility: CLINIC | Age: 48
End: 2023-05-11
Payer: MEDICAID

## 2023-05-11 VITALS
RESPIRATION RATE: 17 BRPM | OXYGEN SATURATION: 98 % | HEART RATE: 74 BPM | HEIGHT: 63 IN | SYSTOLIC BLOOD PRESSURE: 130 MMHG | BODY MASS INDEX: 51.91 KG/M2 | DIASTOLIC BLOOD PRESSURE: 68 MMHG | TEMPERATURE: 98 F | WEIGHT: 293 LBS

## 2023-05-11 DIAGNOSIS — M25.512 ACUTE PAIN OF LEFT SHOULDER: Primary | ICD-10-CM

## 2023-05-11 PROCEDURE — 99214 PR OFFICE/OUTPT VISIT, EST, LEVL IV, 30-39 MIN: ICD-10-PCS | Mod: S$GLB,,, | Performed by: FAMILY MEDICINE

## 2023-05-11 PROCEDURE — 73030 XR SHOULDER COMPLETE 2 OR MORE VIEWS LEFT: ICD-10-PCS | Mod: FY,LT,S$GLB, | Performed by: RADIOLOGY

## 2023-05-11 PROCEDURE — 99214 OFFICE O/P EST MOD 30 MIN: CPT | Mod: S$GLB,,, | Performed by: FAMILY MEDICINE

## 2023-05-11 PROCEDURE — 73030 X-RAY EXAM OF SHOULDER: CPT | Mod: FY,LT,S$GLB, | Performed by: RADIOLOGY

## 2023-05-11 RX ORDER — KETOROLAC TROMETHAMINE 30 MG/ML
30 INJECTION, SOLUTION INTRAMUSCULAR; INTRAVENOUS
Status: COMPLETED | OUTPATIENT
Start: 2023-05-11 | End: 2023-05-11

## 2023-05-11 RX ORDER — NAPROXEN 500 MG/1
500 TABLET ORAL 2 TIMES DAILY WITH MEALS
Qty: 30 TABLET | Refills: 2 | Status: SHIPPED | OUTPATIENT
Start: 2023-05-11 | End: 2023-07-31 | Stop reason: SDUPTHER

## 2023-05-11 RX ADMIN — KETOROLAC TROMETHAMINE 30 MG: 30 INJECTION, SOLUTION INTRAMUSCULAR; INTRAVENOUS at 10:05

## 2023-05-11 NOTE — PROGRESS NOTES
"Subjective:      Patient ID: Halley Moreno is a 47 y.o. female.    Vitals:  height is 5' 3" (1.6 m) and weight is 161.5 kg (356 lb) (abnormal). Her oral temperature is 98.2 °F (36.8 °C). Her blood pressure is 130/68 and her pulse is 74. Her respiration is 17 and oxygen saturation is 98%.     Chief Complaint: Shoulder Pain    47 yr old female came in with complaints of left shoulder pain. Her symptoms started a few weeks ago.States pain only on movement of shoulder, specially on over head ROM and abduction, denies injury or trauma, Pain has been there for over a month,   Denies any chest pain    Shoulder Pain   The pain is present in the left shoulder. This is a new problem. The current episode started 1 to 4 weeks ago. There has been no history of extremity trauma. The problem occurs constantly. The problem has been gradually worsening. The quality of the pain is described as aching. The pain is at a severity of 6/10. The pain is mild. Associated symptoms include a limited range of motion. Pertinent negatives include no fever, headaches, inability to bear weight, itching, joint locking, joint swelling, numbness, stiffness, tingling or visual symptoms. She has tried nothing for the symptoms.   Constitution: Negative for fever.   Musculoskeletal:  Positive for abnormal ROM of joint.   Neurological:  Negative for headaches and numbness.    Objective:     Physical Exam   Constitutional: She is oriented to person, place, and time. She appears well-developed. She is cooperative. obesity  HENT:   Head: Normocephalic and atraumatic.   Ears:   Right Ear: Hearing, tympanic membrane, external ear and ear canal normal.   Left Ear: Hearing, tympanic membrane, external ear and ear canal normal.   Nose: Nose normal. No mucosal edema or nasal deformity. No epistaxis. Right sinus exhibits no maxillary sinus tenderness and no frontal sinus tenderness. Left sinus exhibits no maxillary sinus tenderness and no frontal sinus " tenderness.   Mouth/Throat: Uvula is midline, oropharynx is clear and moist and mucous membranes are normal. No trismus in the jaw. Normal dentition. No uvula swelling.   Eyes: Conjunctivae and lids are normal.   Neck: Trachea normal and phonation normal. Neck supple.   Cardiovascular: Normal rate, regular rhythm, normal heart sounds and normal pulses.   Pulmonary/Chest: Effort normal and breath sounds normal.   Abdominal: Normal appearance and bowel sounds are normal. Soft.   Musculoskeletal:      Comments: Left shoulder no swelling or rash  Abduction and over head ROM with increase tenderness     Neurological: She is alert and oriented to person, place, and time. She exhibits normal muscle tone.   Skin: Skin is warm, dry and intact.   Psychiatric: Her speech is normal and behavior is normal. Judgment and thought content normal.   Nursing note and vitals reviewed.    Assessment:     1. Acute pain of left shoulder        Plan:       Acute pain of left shoulder  -     X-Ray Shoulder 2 or More Views Left; Future; Expected date: 05/11/2023    Other orders  -     ketorolac injection 30 mg  -     naproxen (NAPROSYN) 500 MG tablet; Take 1 tablet (500 mg total) by mouth 2 (two) times daily with meals.  Dispense: 30 tablet; Refill: 2

## 2023-05-25 ENCOUNTER — PATIENT MESSAGE (OUTPATIENT)
Dept: INTERNAL MEDICINE | Facility: CLINIC | Age: 48
End: 2023-05-25
Payer: MEDICAID

## 2023-05-25 NOTE — TELEPHONE ENCOUNTER
Do they have the Mounjaro 7.5mg dose? Will temporarily decrease dose if it is available at [facility].

## 2023-05-25 NOTE — TELEPHONE ENCOUNTER
Pt cannot get mounjaro rx that she is getting because it is on back order she wants to know do you want her to take 2 5mg injections per week? Suggestions?

## 2023-05-26 ENCOUNTER — PATIENT MESSAGE (OUTPATIENT)
Dept: INTERNAL MEDICINE | Facility: CLINIC | Age: 48
End: 2023-05-26
Payer: MEDICAID

## 2023-05-29 ENCOUNTER — PATIENT MESSAGE (OUTPATIENT)
Dept: GYNECOLOGIC ONCOLOGY | Facility: CLINIC | Age: 48
End: 2023-05-29
Payer: MEDICAID

## 2023-05-30 ENCOUNTER — PATIENT MESSAGE (OUTPATIENT)
Dept: ADMINISTRATIVE | Facility: OTHER | Age: 48
End: 2023-05-30
Payer: MEDICAID

## 2023-06-05 ENCOUNTER — PATIENT MESSAGE (OUTPATIENT)
Dept: INTERNAL MEDICINE | Facility: CLINIC | Age: 48
End: 2023-06-05
Payer: MEDICAID

## 2023-06-05 DIAGNOSIS — E66.01 MORBID OBESITY WITH BMI OF 60.0-69.9, ADULT: ICD-10-CM

## 2023-06-06 ENCOUNTER — PATIENT MESSAGE (OUTPATIENT)
Dept: ADMINISTRATIVE | Facility: OTHER | Age: 48
End: 2023-06-06
Payer: MEDICAID

## 2023-06-07 ENCOUNTER — PATIENT MESSAGE (OUTPATIENT)
Dept: INTERNAL MEDICINE | Facility: CLINIC | Age: 48
End: 2023-06-07
Payer: MEDICAID

## 2023-06-07 RX ORDER — PHENTERMINE HYDROCHLORIDE 37.5 MG/1
37.5 TABLET ORAL
Qty: 30 TABLET | Refills: 0 | Status: SHIPPED | OUTPATIENT
Start: 2023-06-07 | End: 2023-07-07

## 2023-06-07 NOTE — TELEPHONE ENCOUNTER
Pt has been having trouble losing weight, at home they cook hello fresh  She states she needs to exercise more.  She would like suggestions

## 2023-06-07 NOTE — TELEPHONE ENCOUNTER
She entered her weight on 6/5/23 and her weight has not changed much. Has she noticed any new problems with weight loss. Has she been exercising?    (Checked [place] )

## 2023-06-08 ENCOUNTER — PATIENT OUTREACH (OUTPATIENT)
Dept: ADMINISTRATIVE | Facility: HOSPITAL | Age: 48
End: 2023-06-08
Payer: MEDICAID

## 2023-06-08 ENCOUNTER — PATIENT MESSAGE (OUTPATIENT)
Dept: ADMINISTRATIVE | Facility: HOSPITAL | Age: 48
End: 2023-06-08
Payer: MEDICAID

## 2023-06-20 ENCOUNTER — PATIENT MESSAGE (OUTPATIENT)
Dept: INTERNAL MEDICINE | Facility: CLINIC | Age: 48
End: 2023-06-20
Payer: MEDICAID

## 2023-06-21 ENCOUNTER — PATIENT MESSAGE (OUTPATIENT)
Dept: INTERNAL MEDICINE | Facility: CLINIC | Age: 48
End: 2023-06-21
Payer: MEDICAID

## 2023-06-23 ENCOUNTER — LAB VISIT (OUTPATIENT)
Dept: LAB | Facility: HOSPITAL | Age: 48
End: 2023-06-23
Attending: INTERNAL MEDICINE
Payer: MEDICAID

## 2023-06-23 ENCOUNTER — OFFICE VISIT (OUTPATIENT)
Dept: INTERNAL MEDICINE | Facility: CLINIC | Age: 48
End: 2023-06-23
Payer: MEDICAID

## 2023-06-23 VITALS
DIASTOLIC BLOOD PRESSURE: 60 MMHG | WEIGHT: 293 LBS | SYSTOLIC BLOOD PRESSURE: 110 MMHG | RESPIRATION RATE: 17 BRPM | HEIGHT: 63 IN | HEART RATE: 75 BPM | OXYGEN SATURATION: 95 % | BODY MASS INDEX: 51.91 KG/M2 | TEMPERATURE: 97 F

## 2023-06-23 DIAGNOSIS — E11.9 TYPE 2 DIABETES MELLITUS WITHOUT COMPLICATION, WITHOUT LONG-TERM CURRENT USE OF INSULIN: ICD-10-CM

## 2023-06-23 DIAGNOSIS — M25.512 CHRONIC LEFT SHOULDER PAIN: ICD-10-CM

## 2023-06-23 DIAGNOSIS — G89.29 CHRONIC LEFT SHOULDER PAIN: ICD-10-CM

## 2023-06-23 DIAGNOSIS — I15.2 HYPERTENSION ASSOCIATED WITH DIABETES: Primary | ICD-10-CM

## 2023-06-23 DIAGNOSIS — E66.01 MORBID OBESITY WITH BMI OF 60.0-69.9, ADULT: ICD-10-CM

## 2023-06-23 DIAGNOSIS — E11.59 HYPERTENSION ASSOCIATED WITH DIABETES: Primary | ICD-10-CM

## 2023-06-23 DIAGNOSIS — N85.02 COMPLEX ENDOMETRIAL HYPERPLASIA WITH ATYPIA: ICD-10-CM

## 2023-06-23 DIAGNOSIS — Z12.11 SCREEN FOR COLON CANCER: ICD-10-CM

## 2023-06-23 DIAGNOSIS — R60.0 LEG EDEMA: ICD-10-CM

## 2023-06-23 LAB
ANION GAP SERPL CALC-SCNC: 12 MMOL/L (ref 8–16)
BASOPHILS # BLD AUTO: 0.03 K/UL (ref 0–0.2)
BASOPHILS NFR BLD: 0.2 % (ref 0–1.9)
BUN SERPL-MCNC: 22 MG/DL (ref 6–20)
CALCIUM SERPL-MCNC: 9.9 MG/DL (ref 8.7–10.5)
CHLORIDE SERPL-SCNC: 105 MMOL/L (ref 95–110)
CO2 SERPL-SCNC: 23 MMOL/L (ref 23–29)
CREAT SERPL-MCNC: 1 MG/DL (ref 0.5–1.4)
DIFFERENTIAL METHOD: ABNORMAL
EOSINOPHIL # BLD AUTO: 0.2 K/UL (ref 0–0.5)
EOSINOPHIL NFR BLD: 1.2 % (ref 0–8)
ERYTHROCYTE [DISTWIDTH] IN BLOOD BY AUTOMATED COUNT: 16.2 % (ref 11.5–14.5)
EST. GFR  (NO RACE VARIABLE): >60 ML/MIN/1.73 M^2
GLUCOSE SERPL-MCNC: 146 MG/DL (ref 70–110)
HCT VFR BLD AUTO: 37.8 % (ref 37–48.5)
HGB BLD-MCNC: 12.2 G/DL (ref 12–16)
IMM GRANULOCYTES # BLD AUTO: 0.06 K/UL (ref 0–0.04)
IMM GRANULOCYTES NFR BLD AUTO: 0.4 % (ref 0–0.5)
LYMPHOCYTES # BLD AUTO: 2.8 K/UL (ref 1–4.8)
LYMPHOCYTES NFR BLD: 20.5 % (ref 18–48)
MCH RBC QN AUTO: 27.4 PG (ref 27–31)
MCHC RBC AUTO-ENTMCNC: 32.3 G/DL (ref 32–36)
MCV RBC AUTO: 85 FL (ref 82–98)
MONOCYTES # BLD AUTO: 0.9 K/UL (ref 0.3–1)
MONOCYTES NFR BLD: 6.6 % (ref 4–15)
NEUTROPHILS # BLD AUTO: 9.5 K/UL (ref 1.8–7.7)
NEUTROPHILS NFR BLD: 71.1 % (ref 38–73)
NRBC BLD-RTO: 0 /100 WBC
PLATELET # BLD AUTO: 254 K/UL (ref 150–450)
PMV BLD AUTO: 11 FL (ref 9.2–12.9)
POTASSIUM SERPL-SCNC: 4 MMOL/L (ref 3.5–5.1)
RBC # BLD AUTO: 4.45 M/UL (ref 4–5.4)
SODIUM SERPL-SCNC: 140 MMOL/L (ref 136–145)
WBC # BLD AUTO: 13.42 K/UL (ref 3.9–12.7)

## 2023-06-23 PROCEDURE — 99999 PR PBB SHADOW E&M-EST. PATIENT-LVL V: CPT | Mod: PBBFAC,,, | Performed by: INTERNAL MEDICINE

## 2023-06-23 PROCEDURE — 3051F PR MOST RECENT HEMOGLOBIN A1C LEVEL 7.0 - < 8.0%: ICD-10-PCS | Mod: CPTII,,, | Performed by: INTERNAL MEDICINE

## 2023-06-23 PROCEDURE — 3074F PR MOST RECENT SYSTOLIC BLOOD PRESSURE < 130 MM HG: ICD-10-PCS | Mod: CPTII,,, | Performed by: INTERNAL MEDICINE

## 2023-06-23 PROCEDURE — 99999PBSHW PNEUMOCOCCAL CONJUGATE VACCINE 20-VALENT: ICD-10-PCS | Mod: PBBFAC,,,

## 2023-06-23 PROCEDURE — 3074F SYST BP LT 130 MM HG: CPT | Mod: CPTII,,, | Performed by: INTERNAL MEDICINE

## 2023-06-23 PROCEDURE — 1159F MED LIST DOCD IN RCRD: CPT | Mod: CPTII,,, | Performed by: INTERNAL MEDICINE

## 2023-06-23 PROCEDURE — 4010F PR ACE/ARB THEARPY RXD/TAKEN: ICD-10-PCS | Mod: CPTII,,, | Performed by: INTERNAL MEDICINE

## 2023-06-23 PROCEDURE — 90677 PCV20 VACCINE IM: CPT | Mod: PBBFAC,PO | Performed by: INTERNAL MEDICINE

## 2023-06-23 PROCEDURE — 3078F DIAST BP <80 MM HG: CPT | Mod: CPTII,,, | Performed by: INTERNAL MEDICINE

## 2023-06-23 PROCEDURE — 1159F PR MEDICATION LIST DOCUMENTED IN MEDICAL RECORD: ICD-10-PCS | Mod: CPTII,,, | Performed by: INTERNAL MEDICINE

## 2023-06-23 PROCEDURE — 1160F RVW MEDS BY RX/DR IN RCRD: CPT | Mod: CPTII,,, | Performed by: INTERNAL MEDICINE

## 2023-06-23 PROCEDURE — 85025 COMPLETE CBC W/AUTO DIFF WBC: CPT | Performed by: INTERNAL MEDICINE

## 2023-06-23 PROCEDURE — 99999 PR PBB SHADOW E&M-EST. PATIENT-LVL V: ICD-10-PCS | Mod: PBBFAC,,, | Performed by: INTERNAL MEDICINE

## 2023-06-23 PROCEDURE — 3008F BODY MASS INDEX DOCD: CPT | Mod: CPTII,,, | Performed by: INTERNAL MEDICINE

## 2023-06-23 PROCEDURE — 80048 BASIC METABOLIC PNL TOTAL CA: CPT | Performed by: INTERNAL MEDICINE

## 2023-06-23 PROCEDURE — 99215 OFFICE O/P EST HI 40 MIN: CPT | Mod: PBBFAC,PO | Performed by: INTERNAL MEDICINE

## 2023-06-23 PROCEDURE — 3072F LOW RISK FOR RETINOPATHY: CPT | Mod: CPTII,,, | Performed by: INTERNAL MEDICINE

## 2023-06-23 PROCEDURE — 4010F ACE/ARB THERAPY RXD/TAKEN: CPT | Mod: CPTII,,, | Performed by: INTERNAL MEDICINE

## 2023-06-23 PROCEDURE — 3072F PR LOW RISK FOR RETINOPATHY: ICD-10-PCS | Mod: CPTII,,, | Performed by: INTERNAL MEDICINE

## 2023-06-23 PROCEDURE — 99214 PR OFFICE/OUTPT VISIT, EST, LEVL IV, 30-39 MIN: ICD-10-PCS | Mod: S$PBB,,, | Performed by: INTERNAL MEDICINE

## 2023-06-23 PROCEDURE — 1160F PR REVIEW ALL MEDS BY PRESCRIBER/CLIN PHARMACIST DOCUMENTED: ICD-10-PCS | Mod: CPTII,,, | Performed by: INTERNAL MEDICINE

## 2023-06-23 PROCEDURE — 99214 OFFICE O/P EST MOD 30 MIN: CPT | Mod: S$PBB,,, | Performed by: INTERNAL MEDICINE

## 2023-06-23 PROCEDURE — 3008F PR BODY MASS INDEX (BMI) DOCUMENTED: ICD-10-PCS | Mod: CPTII,,, | Performed by: INTERNAL MEDICINE

## 2023-06-23 PROCEDURE — 3078F PR MOST RECENT DIASTOLIC BLOOD PRESSURE < 80 MM HG: ICD-10-PCS | Mod: CPTII,,, | Performed by: INTERNAL MEDICINE

## 2023-06-23 PROCEDURE — 99999PBSHW PNEUMOCOCCAL CONJUGATE VACCINE 20-VALENT: Mod: PBBFAC,,,

## 2023-06-23 PROCEDURE — 36415 COLL VENOUS BLD VENIPUNCTURE: CPT | Mod: PO | Performed by: INTERNAL MEDICINE

## 2023-06-23 PROCEDURE — 3051F HG A1C>EQUAL 7.0%<8.0%: CPT | Mod: CPTII,,, | Performed by: INTERNAL MEDICINE

## 2023-06-23 PROCEDURE — 83036 HEMOGLOBIN GLYCOSYLATED A1C: CPT | Performed by: INTERNAL MEDICINE

## 2023-06-23 NOTE — PROGRESS NOTES
Subjective:     Halley Moreno is a 48 y.o. female who presents for   Chief Complaint   Patient presents with    Follow-up     3 mo    Hypertension    Diabetes    Shoulder Pain       HPI  Hypertension: The patient has been taking medications as instructed, no medication side effects noted, no chest pain on exertion, no dyspnea on exertion. She has chronic leg swelling but has improved overall.    Diabetes: Patient presents for follow up of diabetes. Current symptoms include: none. Symptoms have been well-controlled. Patient denies foot ulcerations and vomiting. Evaluation to date has included: hemoglobin A1C.  Home sugars: BGs consistently in an acceptable range. Current treatment:  mounjaro, glipizide . Last dilated eye exam: done.    Lab Results   Component Value Date    HGBA1C 7.0 (H) 06/23/2023     (H) 06/23/2023       Shoulder Pain: She presents with left shoulder pain. The symptoms began several months ago. Aggravating factors: no known event. Pain is located diffusely throughout the shoulder. Discomfort is described as aching. Symptoms are exacerbated by repetitive movements. Evaluation to date: none. Therapy to date includes: rest.       Body mass index is 64.32 kg/m².  Wt Readings from Last 3 Encounters:   07/13/23 (!) 165.6 kg (365 lb)   07/11/23 (!) 165.1 kg (363 lb 15.7 oz)   06/23/23 (!) 164.7 kg (363 lb 1.6 oz)   - now works 11pm to 7am, sleep until 10:30 and eats no breakfast, eats snack at 3pm and dinner 6pm-7pm  - weight has been stable      Review of Systems   Constitutional:  Negative for chills, diaphoresis and fever.   HENT:  Negative for congestion, ear pain, postnasal drip and sinus pressure.    Eyes:  Negative for discharge and visual disturbance.   Respiratory:  Negative for cough and shortness of breath.    Cardiovascular:  Positive for leg swelling. Negative for chest pain and palpitations.   Gastrointestinal:  Negative for abdominal pain, constipation, diarrhea, nausea and  vomiting.   Endocrine: Negative for polydipsia and polyuria.   Musculoskeletal:  Positive for arthralgias (left shoulder pain). Negative for myalgias.   Skin:  Negative for rash and wound.   Neurological:  Negative for dizziness, tremors and headaches.          Objective:     Physical Exam  Vitals reviewed.   Constitutional:       General: She is awake. She is not in acute distress.     Appearance: Normal appearance. She is well-developed and well-groomed.   HENT:      Head: Normocephalic and atraumatic.      Right Ear: Hearing and external ear normal.      Left Ear: Hearing and external ear normal.      Nose: Nose normal. No congestion.      Mouth/Throat:      Mouth: Mucous membranes are moist.   Eyes:      General: Lids are normal. Vision grossly intact.   Cardiovascular:      Rate and Rhythm: Normal rate and regular rhythm.      Heart sounds: Normal heart sounds. No murmur heard.  Pulmonary:      Effort: Pulmonary effort is normal.      Breath sounds: Normal breath sounds. No decreased breath sounds or wheezing.   Abdominal:      General: Bowel sounds are normal. There is no distension.   Musculoskeletal:      Right shoulder: Bony tenderness present. No swelling. Decreased range of motion.      Cervical back: Normal range of motion.      Right lower le+ Edema present.      Left lower le+ Edema present.   Skin:     General: Skin is warm and dry.      Findings: No lesion or rash.   Neurological:      Mental Status: She is alert and oriented to person, place, and time.   Psychiatric:         Attention and Perception: Attention normal.         Mood and Affect: Mood normal.         Behavior: Behavior is cooperative.            Assessment:      1. Hypertension associated with diabetes    2. Type 2 diabetes mellitus without complication, without long-term current use of insulin    3. Chronic left shoulder pain    4. Leg edema    5. Complex endometrial hyperplasia with atypia    6. Morbid obesity with BMI of  60.0-69.9, adult    7. Screen for colon cancer           Plan:     1. Hypertension associated with diabetes  - controlled, continue losartan, furosemide    2. Type 2 diabetes mellitus without complication, without long-term current use of insulin  - tirzepatide 10 mg/0.5 mL PnIj; Inject 10 mg into the skin every 7 days.  Dispense: 4 pen; Refill: 2  - continue glipizide and Mounjaro  - CBC Auto Differential; Future  - Basic Metabolic Panel; Future  - Hemoglobin A1C; Future    3. Chronic left shoulder pain  - Ambulatory referral/consult to Sports Medicine; Future    4. Leg edema  - COMPRESSION STOCKINGS    5. Morbid obesity with BMI of 60.0-69.9, adult  - Ambulatory referral/consult to Bariatric Surgery; Future  - weight loss is needed so that she can safely proceed with surgery to treat complex endometrial hyperplasia with atypic, medications have not been tolerated well     6. Screen for colon cancer  - Ambulatory referral/consult to Endo Procedure ; Future      RTC in September 2023 for annual exam or sooner if needed    __________________________    Yamila Tejeda MD, PharmD  [facility]- Internal Medicine  American Board of Obesity Medicine diplomate  Office [phone]

## 2023-06-24 LAB
ESTIMATED AVG GLUCOSE: 154 MG/DL (ref 68–131)
HBA1C MFR BLD: 7 % (ref 4–5.6)

## 2023-06-28 NOTE — H&P (VIEW-ONLY)
REFERRING PROVIDER  Dixie Rod MD     HISTORY OF PRESENT CONDITION  CC: MASSIEL Moreno is a 48 y.o.  who presents in consultation at for an opinion regarding complex hyperplasia with atypia.    +PO. -N/V. +Flatus. +BM. -VB, VD.  Tolerating Megace 40 mg BID without complications. -Changes in stool or urine. -Abdominal or pelvic pain. -Unintentional weight loss. -Unintentional weight gain.  Mammogram (22):  BI-RADS1.       REVIEW OF SYSTEMS  All systems reviewed and negative except as noted in HPI.    OBJECTIVE   Vitals:    23 1123   BP: (!) 124/59   Pulse: 80        Body mass index is 64.48 kg/m².      1. General: Well appearing, no apparent distress, alert and oriented.  2. Lymph: Neck symmetric without cervical or supraclavicular adenopathy or mass.  3. Lungs: Normal respiratory rate, no accessory muscle use.  4. Cardiac: Normal rate  5. Psych: Normal affect.  6. Abdomen:  non-distended, soft, non-tender, are no masses, no ascites; large panus with edematous and thickened skin  7. Skin: Warm, dry, no rashes or lesions.   8. Extremities: Bilateral lower extremities without edema or tenderness.  9. Genitourinary: Declined (patient state she is unable to get undressed)               ECOG status: 2    LABORATORY DATA  Lab data reviewed.    RADIOLOGICAL DATA  Radiology data reviewed.    PATHOLOGY DATA  Pathology data reviewed.    ASSESSMENT / PLAN     1. Complex endometrial hyperplasia with atypia    2. Morbid obesity with BMI of 60.0-69.9, adult    3. Central sleep apnea due to medical condition    4. Diabetes mellitus type 2 without retinopathy    5. Pulmonary hypertension      23: A1c = 7.0, Cr 1.0, Hb 12.2  22: Pap: NIL  22: Hysteroscopy, D&C: at least EIN  22: Cardiology clearance  3/31/22: HbA1c = 5.7, Hb 12.3, Cr 0.7  3/16/22: 2D Echo  3/2/22: EKG  22: Pelvic US: 12 cm uterus    Overall she still has not lost weight and still is not a surgical candidate.   She is tolerating Megace, but we discussed the importance of taking 80 mg BID.  My recommendation at this time is to proceed with a hysteroscopy, D&C, and IUD insertion.    PATIENT EDUCATION  Ready to learn, no apparent learning barriers were identified; learning preferences include listening.  Explained diagnosis and treatment plan; patient expressed understanding of the content.    INFORMED CONSENT  Discussed the risks, benefits, and alternatives of the procedure and of possible blood transfusion.  Discussed the necessity of other members of the healthcare team participating in the procedure.  All questions answered and consent given.    Florentino Lovell

## 2023-07-11 ENCOUNTER — OFFICE VISIT (OUTPATIENT)
Dept: GYNECOLOGIC ONCOLOGY | Facility: CLINIC | Age: 48
End: 2023-07-11
Payer: MEDICAID

## 2023-07-11 ENCOUNTER — TELEPHONE (OUTPATIENT)
Dept: GYNECOLOGIC ONCOLOGY | Facility: CLINIC | Age: 48
End: 2023-07-11
Payer: MEDICAID

## 2023-07-11 VITALS
SYSTOLIC BLOOD PRESSURE: 124 MMHG | HEART RATE: 80 BPM | WEIGHT: 293 LBS | HEIGHT: 63 IN | DIASTOLIC BLOOD PRESSURE: 59 MMHG | BODY MASS INDEX: 51.91 KG/M2

## 2023-07-11 DIAGNOSIS — E11.9 DIABETES MELLITUS TYPE 2 WITHOUT RETINOPATHY: ICD-10-CM

## 2023-07-11 DIAGNOSIS — E66.01 MORBID OBESITY WITH BMI OF 60.0-69.9, ADULT: ICD-10-CM

## 2023-07-11 DIAGNOSIS — I27.20 PULMONARY HYPERTENSION: ICD-10-CM

## 2023-07-11 DIAGNOSIS — G47.37 CENTRAL SLEEP APNEA DUE TO MEDICAL CONDITION: ICD-10-CM

## 2023-07-11 DIAGNOSIS — N85.02 COMPLEX ENDOMETRIAL HYPERPLASIA WITH ATYPIA: Primary | ICD-10-CM

## 2023-07-11 PROCEDURE — 1159F MED LIST DOCD IN RCRD: CPT | Mod: CPTII,,, | Performed by: OBSTETRICS & GYNECOLOGY

## 2023-07-11 PROCEDURE — 99999 PR PBB SHADOW E&M-EST. PATIENT-LVL V: CPT | Mod: PBBFAC,,, | Performed by: OBSTETRICS & GYNECOLOGY

## 2023-07-11 PROCEDURE — 3008F BODY MASS INDEX DOCD: CPT | Mod: CPTII,,, | Performed by: OBSTETRICS & GYNECOLOGY

## 2023-07-11 PROCEDURE — 99215 OFFICE O/P EST HI 40 MIN: CPT | Mod: S$PBB,,, | Performed by: OBSTETRICS & GYNECOLOGY

## 2023-07-11 PROCEDURE — 3051F PR MOST RECENT HEMOGLOBIN A1C LEVEL 7.0 - < 8.0%: ICD-10-PCS | Mod: CPTII,,, | Performed by: OBSTETRICS & GYNECOLOGY

## 2023-07-11 PROCEDURE — 4010F PR ACE/ARB THEARPY RXD/TAKEN: ICD-10-PCS | Mod: CPTII,,, | Performed by: OBSTETRICS & GYNECOLOGY

## 2023-07-11 PROCEDURE — 3072F PR LOW RISK FOR RETINOPATHY: ICD-10-PCS | Mod: CPTII,,, | Performed by: OBSTETRICS & GYNECOLOGY

## 2023-07-11 PROCEDURE — 3008F PR BODY MASS INDEX (BMI) DOCUMENTED: ICD-10-PCS | Mod: CPTII,,, | Performed by: OBSTETRICS & GYNECOLOGY

## 2023-07-11 PROCEDURE — 1159F PR MEDICATION LIST DOCUMENTED IN MEDICAL RECORD: ICD-10-PCS | Mod: CPTII,,, | Performed by: OBSTETRICS & GYNECOLOGY

## 2023-07-11 PROCEDURE — 3078F DIAST BP <80 MM HG: CPT | Mod: CPTII,,, | Performed by: OBSTETRICS & GYNECOLOGY

## 2023-07-11 PROCEDURE — 3072F LOW RISK FOR RETINOPATHY: CPT | Mod: CPTII,,, | Performed by: OBSTETRICS & GYNECOLOGY

## 2023-07-11 PROCEDURE — 4010F ACE/ARB THERAPY RXD/TAKEN: CPT | Mod: CPTII,,, | Performed by: OBSTETRICS & GYNECOLOGY

## 2023-07-11 PROCEDURE — 99999 PR PBB SHADOW E&M-EST. PATIENT-LVL V: ICD-10-PCS | Mod: PBBFAC,,, | Performed by: OBSTETRICS & GYNECOLOGY

## 2023-07-11 PROCEDURE — 99215 PR OFFICE/OUTPT VISIT, EST, LEVL V, 40-54 MIN: ICD-10-PCS | Mod: S$PBB,,, | Performed by: OBSTETRICS & GYNECOLOGY

## 2023-07-11 PROCEDURE — 3078F PR MOST RECENT DIASTOLIC BLOOD PRESSURE < 80 MM HG: ICD-10-PCS | Mod: CPTII,,, | Performed by: OBSTETRICS & GYNECOLOGY

## 2023-07-11 PROCEDURE — 99215 OFFICE O/P EST HI 40 MIN: CPT | Mod: PBBFAC | Performed by: OBSTETRICS & GYNECOLOGY

## 2023-07-11 PROCEDURE — 3051F HG A1C>EQUAL 7.0%<8.0%: CPT | Mod: CPTII,,, | Performed by: OBSTETRICS & GYNECOLOGY

## 2023-07-11 PROCEDURE — 3074F SYST BP LT 130 MM HG: CPT | Mod: CPTII,,, | Performed by: OBSTETRICS & GYNECOLOGY

## 2023-07-11 PROCEDURE — 3074F PR MOST RECENT SYSTOLIC BLOOD PRESSURE < 130 MM HG: ICD-10-PCS | Mod: CPTII,,, | Performed by: OBSTETRICS & GYNECOLOGY

## 2023-07-11 RX ORDER — LIDOCAINE HYDROCHLORIDE 10 MG/ML
1 INJECTION, SOLUTION EPIDURAL; INFILTRATION; INTRACAUDAL; PERINEURAL ONCE
Status: CANCELLED | OUTPATIENT
Start: 2023-07-11 | End: 2023-07-11

## 2023-07-11 RX ORDER — MEGESTROL ACETATE 40 MG/1
80 TABLET ORAL 2 TIMES DAILY
Qty: 120 TABLET | Refills: 11 | Status: SHIPPED | OUTPATIENT
Start: 2023-07-11 | End: 2024-07-10

## 2023-07-13 ENCOUNTER — TELEPHONE (OUTPATIENT)
Dept: GYNECOLOGIC ONCOLOGY | Facility: CLINIC | Age: 48
End: 2023-07-13
Payer: MEDICAID

## 2023-07-13 ENCOUNTER — HOSPITAL ENCOUNTER (OUTPATIENT)
Dept: PREADMISSION TESTING | Facility: OTHER | Age: 48
Discharge: HOME | End: 2023-07-13
Attending: OBSTETRICS & GYNECOLOGY
Payer: MEDICAID

## 2023-07-13 ENCOUNTER — ANESTHESIA EVENT (OUTPATIENT)
Dept: SURGERY | Facility: OTHER | Age: 48
End: 2023-07-13
Payer: MEDICAID

## 2023-07-13 VITALS
HEIGHT: 63 IN | WEIGHT: 293 LBS | HEART RATE: 82 BPM | BODY MASS INDEX: 51.91 KG/M2 | OXYGEN SATURATION: 95 % | DIASTOLIC BLOOD PRESSURE: 73 MMHG | SYSTOLIC BLOOD PRESSURE: 139 MMHG | TEMPERATURE: 98 F

## 2023-07-13 RX ORDER — SODIUM CHLORIDE, SODIUM LACTATE, POTASSIUM CHLORIDE, CALCIUM CHLORIDE 600; 310; 30; 20 MG/100ML; MG/100ML; MG/100ML; MG/100ML
INJECTION, SOLUTION INTRAVENOUS CONTINUOUS
Status: CANCELLED | OUTPATIENT
Start: 2023-07-13

## 2023-07-13 RX ORDER — ACETAMINOPHEN 500 MG
1000 TABLET ORAL
Status: CANCELLED | OUTPATIENT
Start: 2023-07-13 | End: 2023-07-13

## 2023-07-13 RX ORDER — PROCHLORPERAZINE EDISYLATE 5 MG/ML
5 INJECTION INTRAMUSCULAR; INTRAVENOUS EVERY 30 MIN PRN
Status: CANCELLED | OUTPATIENT
Start: 2023-07-13

## 2023-07-13 RX ORDER — DIPHENHYDRAMINE HYDROCHLORIDE 50 MG/ML
25 INJECTION INTRAMUSCULAR; INTRAVENOUS EVERY 6 HOURS PRN
Status: CANCELLED | OUTPATIENT
Start: 2023-07-13

## 2023-07-13 RX ORDER — LIDOCAINE HYDROCHLORIDE 10 MG/ML
0.5 INJECTION, SOLUTION EPIDURAL; INFILTRATION; INTRACAUDAL; PERINEURAL ONCE
Status: CANCELLED | OUTPATIENT
Start: 2023-07-13 | End: 2023-07-13

## 2023-07-13 RX ORDER — OXYCODONE HYDROCHLORIDE 5 MG/1
5 TABLET ORAL
Status: CANCELLED | OUTPATIENT
Start: 2023-07-13

## 2023-07-13 RX ORDER — SODIUM CHLORIDE 0.9 % (FLUSH) 0.9 %
3 SYRINGE (ML) INJECTION
Status: DISCONTINUED | OUTPATIENT
Start: 2023-07-13 | End: 2023-07-14 | Stop reason: HOSPADM

## 2023-07-13 RX ORDER — HYDROMORPHONE HYDROCHLORIDE 2 MG/ML
0.4 INJECTION, SOLUTION INTRAMUSCULAR; INTRAVENOUS; SUBCUTANEOUS EVERY 5 MIN PRN
Status: CANCELLED | OUTPATIENT
Start: 2023-07-13

## 2023-07-13 RX ORDER — MEPERIDINE HYDROCHLORIDE 25 MG/ML
12.5 INJECTION INTRAMUSCULAR; INTRAVENOUS; SUBCUTANEOUS ONCE AS NEEDED
Status: CANCELLED | OUTPATIENT
Start: 2023-07-13 | End: 2023-07-14

## 2023-07-13 NOTE — DISCHARGE INSTRUCTIONS
Information to Prepare you for your Surgery    PRE-ADMIT TESTING -  [phone]    [address] [facility]          Your surgery has been scheduled at [facility]. We are pleased to have the opportunity to serve you. For Further Information please call [phone].    On the day of surgery please report to the Information Desk on the 1st floor.    CONTACT YOUR PHYSICIAN'S OFFICE THE DAY PRIOR TO YOUR SURGERY TO OBTAIN YOUR ARRIVAL TIME.     The evening before surgery do not eat anything after 9 p.m. ( this includes hard candy, chewing gum and mints).  You may only have GATORADE, POWERADE AND WATER  from 9 p.m. until you leave your home.   DO NOT DRINK ANY LIQUIDS ON THE WAY TO THE HOSPITAL.      Why does your anesthesiologist allow you to drink Gatorade/Powerade before surgery?  Gatorade/Powerade helps to increase your comfort before surgery and to decrease your nausea after surgery. The carbohydrates in Gatorade/Powerade help reduce your body's stress response to surgery.  If you are a diabetic-drink only water prior to surgery.       Patients may have 2 visitors pre and post procedure. Only 2 visitors will be allowed in the Surgical building with the patient. No one under the age of 12 will be allowed into the facility.    SPECIAL MEDICATION INSTRUCTIONS: TAKE medications checked off by the Anesthesiologist on your Medication List.    Angiogram Patients: Take medications as instructed by your physician, including aspirin.     Surgery Patients:    If you take ASPIRIN - Your PHYSICIAN/SURGEON will need to inform you IF/OR when you need to stop taking aspirin prior to your surgery.     The week prior to surgery do not ot take any medications containing IBUPROFEN or NSAIDS ( Advil, Motrin, Goodys, BC, Aleve, Naproxen etc) If you are not sure if you should take a medicine please call your surgeon's office.  Ok to take Tylenol    Do Not Wear any make-up  (especially eye make-up) to surgery. Please remove any false eyelashes or eyelash extensions. If you arrive the day of surgery with makeup/eyelashes on you will be required to remove prior to surgery. (There is a risk of corneal abrasions if eye makeup/eyelash extensions are not removed)      Leave all valuables at home.   Do Not wear any jewelry or watches, including any metal in body piercings. Jewelry must be removed prior to coming to the hospital.  There is a possibility that rings that are unable to be removed may be cut off if they are on the surgical extremity.    Please remove all hair extensions, wigs, clips and any other metal accessories/ ornaments from your hair.  These items may pose a flammable/fire risk in Surgery and must be removed.    Do not shave your surgical area at least 5 days prior to your surgery. The surgical prep will be performed at the hospital according to Infection Control regulations.    Contact Lens must be removed before surgery. Either do not wear the contact lens or bring a case and solution for storage.  Please bring a container for eyeglasses or dentures as required.  Bring any paperwork your physician has provided, such as consent forms,  history and physicals, doctor's orders, etc.   Bring comfortable clothes that are loose fitting to wear upon discharge. Take into consideration the type of surgery being performed.  Maintain your diet as advised per your physician the day prior to surgery.      Adequate rest the night before surgery is advised.   Park in the Parking lot behind the hospital or in the [facility] across the street from the parking lot. Parking is complimentary.  If you will be discharged the same day as your procedure, please arrange for a responsible adult to drive you home or to accompany you if traveling by taxi.   YOU WILL NOT BE PERMITTED TO DRIVE OR TO LEAVE THE HOSPITAL ALONE AFTER SURGERY.   If you are being discharged the same day, it is  strongly recommended that you arrange for someone to remain with you for the first 24 hrs following your surgery.    The Surgeon will speak to your family/visitor after your surgery regarding the outcome of your surgery and post op care.  The Surgeon may speak to you after your surgery, but there is a possibility you may not remember the details.  Please check with your family members regarding the conversation with the Surgeon.    We strongly recommend whoever is bringing you home be present for discharge instructions.  This will ensure a thorough understanding for your post op home care.    ALL CHILDREN MUST ALWAYS BE ACCOMPANIED BY AN ADULT.    Visitors-Refer to current Visitor policy handouts.    Thank you for your cooperation.  The Staff of [facility].            Bathing Instructions with Hibiclens    Shower the evening before and morning of your procedure with Chlorhexidine (Hibiclens)  do not use Chlorhexidine on your face or genitals. Do not get in your eyes.  Wash your face with water and your regular face wash/soap  Use your regular shampoo  Apply Chlorhexidine (Hibiclens) directly on your skin or on a wet washcloth and wash gently. When showering: Move away from the shower stream when applying Chlorhexidine (Hibiclens) to avoid rinsing off too soon.  Rinse thoroughly with warm water  Do not dilute Chlorhexidine (Hibiclens)   Dry off as usual, do not use any deodorant, powder, body lotions, perfume, after shave or cologne.

## 2023-07-13 NOTE — ANESTHESIA PREPROCEDURE EVALUATION
07/13/2023  Halley Moreno is a 48 y.o., female.      Pre-op Assessment    I have reviewed the Patient Summary Reports.     I have reviewed the Nursing Notes. I have reviewed the NPO Status.   I have reviewed the Medications.     Review of Systems  Anesthesia Hx:  Denies Family Hx of Anesthesia complications.  Personal Hx of Anesthesia complications  Difficult Intubation   Social:  Non-Smoker    Hematology/Oncology:  Hematology Normal   Oncology Normal     EENT/Dental:EENT/Dental Normal   Cardiovascular:   Hypertension CHF CHF 2 years ago from Victo[facility] induced fluid retention    Saw cardiologist 9/22 with good echo, no pulm htn   Pulmonary:   Sleep Apnea    Renal/:   Chronic Renal Disease    Hepatic/GI:  Hepatic/GI Normal    Musculoskeletal:  Spine Disorders: lumbar and thoracic Disc disease    Neurological:  Neurology Normal    Endocrine:   Diabetes Super Morbid obesity Morbid Obesity / BMI > 40  Dermatological:  Skin Normal    Psych:   Psychiatric History          Physical Exam  General: Well nourished, Cooperative, Alert and Oriented    Airway:  Mallampati: III   Mouth Opening: Normal  TM Distance: Normal  Tongue: Normal  Neck ROM: Normal ROM    Dental:  Intact        Anesthesia Plan  Type of Anesthesia, risks & benefits discussed:    Anesthesia Type: Gen ETT  Intra-op Monitoring Plan: Standard ASA Monitors  Post Op Pain Control Plan: multimodal analgesia  Induction:  IV  Airway Plan: Video  Informed Consent: Informed consent signed with the Patient and all parties understand the risks and agree with anesthesia plan.  All questions answered.   ASA Score: 3  Anesthesia Plan Notes: Recent labs ok    mounjaro last dose 7/10    Intubated 6/22 here at [facility], no issues    Ready For Surgery From Anesthesia Perspective.     .

## 2023-07-17 ENCOUNTER — HOSPITAL ENCOUNTER (OUTPATIENT)
Facility: OTHER | Age: 48
Discharge: HOME | End: 2023-07-17
Attending: OBSTETRICS & GYNECOLOGY | Admitting: OBSTETRICS & GYNECOLOGY
Payer: MEDICAID

## 2023-07-17 ENCOUNTER — ANESTHESIA (OUTPATIENT)
Dept: SURGERY | Facility: OTHER | Age: 48
End: 2023-07-17
Payer: MEDICAID

## 2023-07-17 DIAGNOSIS — N85.02 COMPLEX ENDOMETRIAL HYPERPLASIA WITH ATYPIA: ICD-10-CM

## 2023-07-17 DIAGNOSIS — E66.01 MORBID OBESITY WITH BMI OF 60.0-69.9, ADULT: ICD-10-CM

## 2023-07-17 DIAGNOSIS — G47.37 CENTRAL SLEEP APNEA DUE TO MEDICAL CONDITION: ICD-10-CM

## 2023-07-17 DIAGNOSIS — Z98.890 STATUS POST HYSTEROSCOPY: Primary | ICD-10-CM

## 2023-07-17 DIAGNOSIS — Z97.5 INTRAUTERINE DEVICE: ICD-10-CM

## 2023-07-17 DIAGNOSIS — N85.02 COMPLEX ENDOMETRIAL HYPERPLASIA WITH ATYPIA: Primary | ICD-10-CM

## 2023-07-17 DIAGNOSIS — I27.20 PULMONARY HYPERTENSION: ICD-10-CM

## 2023-07-17 DIAGNOSIS — E11.9 DIABETES MELLITUS TYPE 2 WITHOUT RETINOPATHY: ICD-10-CM

## 2023-07-17 LAB
B-HCG UR QL: NEGATIVE
CTP QC/QA: YES
POCT GLUCOSE: 147 MG/DL (ref 70–110)
POCT GLUCOSE: 166 MG/DL (ref 70–110)

## 2023-07-17 PROCEDURE — 58558 PR HYSTEROSCOPY,W/ENDO BX: ICD-10-PCS | Mod: 22,,, | Performed by: OBSTETRICS & GYNECOLOGY

## 2023-07-17 PROCEDURE — 58300 PR INSERT INTRAUTERINE DEVICE: ICD-10-PCS | Mod: ,,, | Performed by: OBSTETRICS & GYNECOLOGY

## 2023-07-17 PROCEDURE — 88342 IMHCHEM/IMCYTCHM 1ST ANTB: CPT | Performed by: STUDENT IN AN ORGANIZED HEALTH CARE EDUCATION/TRAINING PROGRAM

## 2023-07-17 PROCEDURE — D9220A PRA ANESTHESIA: Mod: ANES,,, | Performed by: ANESTHESIOLOGY

## 2023-07-17 PROCEDURE — 88342 CHG IMMUNOCYTOCHEMISTRY: ICD-10-PCS | Mod: 26,,, | Performed by: STUDENT IN AN ORGANIZED HEALTH CARE EDUCATION/TRAINING PROGRAM

## 2023-07-17 PROCEDURE — 00952 ANES VAG PX HYSTSC&/HSG: CPT | Performed by: OBSTETRICS & GYNECOLOGY

## 2023-07-17 PROCEDURE — 82962 GLUCOSE BLOOD TEST: CPT | Performed by: OBSTETRICS & GYNECOLOGY

## 2023-07-17 PROCEDURE — 25000003 PHARM REV CODE 250: Performed by: NURSE ANESTHETIST, CERTIFIED REGISTERED

## 2023-07-17 PROCEDURE — 88305 TISSUE EXAM BY PATHOLOGIST: CPT | Performed by: STUDENT IN AN ORGANIZED HEALTH CARE EDUCATION/TRAINING PROGRAM

## 2023-07-17 PROCEDURE — 63600175 PHARM REV CODE 636 W HCPCS: Performed by: NURSE ANESTHETIST, CERTIFIED REGISTERED

## 2023-07-17 PROCEDURE — D9220A PRA ANESTHESIA: Mod: CRNA,,, | Performed by: NURSE ANESTHETIST, CERTIFIED REGISTERED

## 2023-07-17 PROCEDURE — 36000707: Performed by: OBSTETRICS & GYNECOLOGY

## 2023-07-17 PROCEDURE — D9220A PRA ANESTHESIA: ICD-10-PCS | Mod: CRNA,,, | Performed by: NURSE ANESTHETIST, CERTIFIED REGISTERED

## 2023-07-17 PROCEDURE — 37000009 HC ANESTHESIA EA ADD 15 MINS: Performed by: OBSTETRICS & GYNECOLOGY

## 2023-07-17 PROCEDURE — 71000033 HC RECOVERY, INTIAL HOUR: Performed by: OBSTETRICS & GYNECOLOGY

## 2023-07-17 PROCEDURE — 81025 URINE PREGNANCY TEST: CPT | Performed by: ANESTHESIOLOGY

## 2023-07-17 PROCEDURE — 71000015 HC POSTOP RECOV 1ST HR: Performed by: OBSTETRICS & GYNECOLOGY

## 2023-07-17 PROCEDURE — 88341 IMHCHEM/IMCYTCHM EA ADD ANTB: CPT | Mod: 26,,, | Performed by: STUDENT IN AN ORGANIZED HEALTH CARE EDUCATION/TRAINING PROGRAM

## 2023-07-17 PROCEDURE — 63600175 PHARM REV CODE 636 W HCPCS: Performed by: ANESTHESIOLOGY

## 2023-07-17 PROCEDURE — 71000016 HC POSTOP RECOV ADDL HR: Performed by: OBSTETRICS & GYNECOLOGY

## 2023-07-17 PROCEDURE — 88341 IMHCHEM/IMCYTCHM EA ADD ANTB: CPT | Performed by: STUDENT IN AN ORGANIZED HEALTH CARE EDUCATION/TRAINING PROGRAM

## 2023-07-17 PROCEDURE — 88305 TISSUE EXAM BY PATHOLOGIST: ICD-10-PCS | Mod: 26,,, | Performed by: STUDENT IN AN ORGANIZED HEALTH CARE EDUCATION/TRAINING PROGRAM

## 2023-07-17 PROCEDURE — 25000003 PHARM REV CODE 250: Performed by: ANESTHESIOLOGY

## 2023-07-17 PROCEDURE — 88342 IMHCHEM/IMCYTCHM 1ST ANTB: CPT | Mod: 26,,, | Performed by: STUDENT IN AN ORGANIZED HEALTH CARE EDUCATION/TRAINING PROGRAM

## 2023-07-17 PROCEDURE — 58558 HYSTEROSCOPY BIOPSY: CPT | Mod: 22,,, | Performed by: OBSTETRICS & GYNECOLOGY

## 2023-07-17 PROCEDURE — 88341 PR IHC OR ICC EACH ADD'L SINGLE ANTIBODY  STAINPR: ICD-10-PCS | Mod: 26,,, | Performed by: STUDENT IN AN ORGANIZED HEALTH CARE EDUCATION/TRAINING PROGRAM

## 2023-07-17 PROCEDURE — 88305 TISSUE EXAM BY PATHOLOGIST: CPT | Mod: 26,,, | Performed by: STUDENT IN AN ORGANIZED HEALTH CARE EDUCATION/TRAINING PROGRAM

## 2023-07-17 PROCEDURE — 36000706: Performed by: OBSTETRICS & GYNECOLOGY

## 2023-07-17 PROCEDURE — 63600175 PHARM REV CODE 636 W HCPCS

## 2023-07-17 PROCEDURE — 58300 INSERT INTRAUTERINE DEVICE: CPT | Mod: ,,, | Performed by: OBSTETRICS & GYNECOLOGY

## 2023-07-17 PROCEDURE — D9220A PRA ANESTHESIA: ICD-10-PCS | Mod: ANES,,, | Performed by: ANESTHESIOLOGY

## 2023-07-17 PROCEDURE — 37000008 HC ANESTHESIA 1ST 15 MINUTES: Performed by: OBSTETRICS & GYNECOLOGY

## 2023-07-17 RX ORDER — ACETAMINOPHEN 500 MG
1000 TABLET ORAL
Status: COMPLETED | OUTPATIENT
Start: 2023-07-17 | End: 2023-07-17

## 2023-07-17 RX ORDER — DEXAMETHASONE SODIUM PHOSPHATE 4 MG/ML
INJECTION, SOLUTION INTRA-ARTICULAR; INTRALESIONAL; INTRAMUSCULAR; INTRAVENOUS; SOFT TISSUE
Status: DISCONTINUED | OUTPATIENT
Start: 2023-07-17 | End: 2023-07-17

## 2023-07-17 RX ORDER — SUCCINYLCHOLINE CHLORIDE 20 MG/ML
INJECTION INTRAMUSCULAR; INTRAVENOUS
Status: DISCONTINUED | OUTPATIENT
Start: 2023-07-17 | End: 2023-07-17

## 2023-07-17 RX ORDER — ONDANSETRON 2 MG/ML
INJECTION INTRAMUSCULAR; INTRAVENOUS
Status: DISCONTINUED | OUTPATIENT
Start: 2023-07-17 | End: 2023-07-17

## 2023-07-17 RX ORDER — PROCHLORPERAZINE EDISYLATE 5 MG/ML
5 INJECTION INTRAMUSCULAR; INTRAVENOUS EVERY 30 MIN PRN
Status: DISCONTINUED | OUTPATIENT
Start: 2023-07-17 | End: 2023-07-17 | Stop reason: HOSPADM

## 2023-07-17 RX ORDER — OXYCODONE HYDROCHLORIDE 5 MG/1
5 TABLET ORAL
Status: DISCONTINUED | OUTPATIENT
Start: 2023-07-17 | End: 2023-07-17 | Stop reason: HOSPADM

## 2023-07-17 RX ORDER — LIDOCAINE HYDROCHLORIDE 10 MG/ML
1 INJECTION, SOLUTION EPIDURAL; INFILTRATION; INTRACAUDAL; PERINEURAL ONCE
Status: DISCONTINUED | OUTPATIENT
Start: 2023-07-17 | End: 2023-07-17 | Stop reason: HOSPADM

## 2023-07-17 RX ORDER — ONDANSETRON 2 MG/ML
4 INJECTION INTRAMUSCULAR; INTRAVENOUS EVERY 4 HOURS PRN
Status: CANCELLED | OUTPATIENT
Start: 2023-07-17

## 2023-07-17 RX ORDER — KETOROLAC TROMETHAMINE 30 MG/ML
15 INJECTION, SOLUTION INTRAMUSCULAR; INTRAVENOUS EVERY 6 HOURS PRN
Status: CANCELLED | OUTPATIENT
Start: 2023-07-17 | End: 2023-07-20

## 2023-07-17 RX ORDER — HYDROMORPHONE HYDROCHLORIDE 2 MG/ML
0.4 INJECTION, SOLUTION INTRAMUSCULAR; INTRAVENOUS; SUBCUTANEOUS EVERY 5 MIN PRN
Status: DISCONTINUED | OUTPATIENT
Start: 2023-07-17 | End: 2023-07-17 | Stop reason: HOSPADM

## 2023-07-17 RX ORDER — ACETAMINOPHEN 500 MG
1000 TABLET ORAL EVERY 6 HOURS PRN
Status: CANCELLED | OUTPATIENT
Start: 2023-07-17

## 2023-07-17 RX ORDER — KETOROLAC TROMETHAMINE 30 MG/ML
30 INJECTION, SOLUTION INTRAMUSCULAR; INTRAVENOUS ONCE
Status: COMPLETED | OUTPATIENT
Start: 2023-07-17 | End: 2023-07-17

## 2023-07-17 RX ORDER — DIPHENHYDRAMINE HYDROCHLORIDE 50 MG/ML
25 INJECTION INTRAMUSCULAR; INTRAVENOUS EVERY 6 HOURS PRN
Status: DISCONTINUED | OUTPATIENT
Start: 2023-07-17 | End: 2023-07-17 | Stop reason: HOSPADM

## 2023-07-17 RX ORDER — HYDROMORPHONE HYDROCHLORIDE 2 MG/ML
0.2 INJECTION, SOLUTION INTRAMUSCULAR; INTRAVENOUS; SUBCUTANEOUS
Status: CANCELLED | OUTPATIENT
Start: 2023-07-17

## 2023-07-17 RX ORDER — KETOROLAC TROMETHAMINE 30 MG/ML
15 INJECTION, SOLUTION INTRAMUSCULAR; INTRAVENOUS ONCE
Status: DISCONTINUED | OUTPATIENT
Start: 2023-07-17 | End: 2023-07-17

## 2023-07-17 RX ORDER — LIDOCAINE HYDROCHLORIDE 10 MG/ML
0.5 INJECTION, SOLUTION EPIDURAL; INFILTRATION; INTRACAUDAL; PERINEURAL ONCE
Status: DISCONTINUED | OUTPATIENT
Start: 2023-07-17 | End: 2023-07-17 | Stop reason: HOSPADM

## 2023-07-17 RX ORDER — ROCURONIUM BROMIDE 10 MG/ML
INJECTION, SOLUTION INTRAVENOUS
Status: DISCONTINUED | OUTPATIENT
Start: 2023-07-17 | End: 2023-07-17

## 2023-07-17 RX ORDER — MEPERIDINE HYDROCHLORIDE 25 MG/ML
12.5 INJECTION INTRAMUSCULAR; INTRAVENOUS; SUBCUTANEOUS ONCE AS NEEDED
Status: DISCONTINUED | OUTPATIENT
Start: 2023-07-17 | End: 2023-07-17 | Stop reason: HOSPADM

## 2023-07-17 RX ORDER — PROPOFOL 10 MG/ML
VIAL (ML) INTRAVENOUS
Status: DISCONTINUED | OUTPATIENT
Start: 2023-07-17 | End: 2023-07-17

## 2023-07-17 RX ORDER — SODIUM CHLORIDE, SODIUM LACTATE, POTASSIUM CHLORIDE, CALCIUM CHLORIDE 600; 310; 30; 20 MG/100ML; MG/100ML; MG/100ML; MG/100ML
INJECTION, SOLUTION INTRAVENOUS CONTINUOUS
Status: DISCONTINUED | OUTPATIENT
Start: 2023-07-17 | End: 2023-07-17 | Stop reason: HOSPADM

## 2023-07-17 RX ORDER — FENTANYL CITRATE 50 UG/ML
INJECTION, SOLUTION INTRAMUSCULAR; INTRAVENOUS
Status: DISCONTINUED | OUTPATIENT
Start: 2023-07-17 | End: 2023-07-17

## 2023-07-17 RX ORDER — LIDOCAINE HYDROCHLORIDE 20 MG/ML
INJECTION INTRAVENOUS
Status: DISCONTINUED | OUTPATIENT
Start: 2023-07-17 | End: 2023-07-17

## 2023-07-17 RX ADMIN — SODIUM CHLORIDE, SODIUM LACTATE, POTASSIUM CHLORIDE, AND CALCIUM CHLORIDE: 600; 310; 30; 20 INJECTION, SOLUTION INTRAVENOUS at 08:07

## 2023-07-17 RX ADMIN — LIDOCAINE HYDROCHLORIDE 75 MG: 20 INJECTION, SOLUTION INTRAVENOUS at 08:07

## 2023-07-17 RX ADMIN — LIDOCAINE HYDROCHLORIDE 50 MG: 20 INJECTION, SOLUTION INTRAVENOUS at 09:07

## 2023-07-17 RX ADMIN — ONDANSETRON HYDROCHLORIDE 4 MG: 2 INJECTION INTRAMUSCULAR; INTRAVENOUS at 09:07

## 2023-07-17 RX ADMIN — SUCCINYLCHOLINE CHLORIDE 180 MG: 20 INJECTION, SOLUTION INTRAMUSCULAR; INTRAVENOUS at 08:07

## 2023-07-17 RX ADMIN — FENTANYL CITRATE 100 MCG: 50 INJECTION, SOLUTION INTRAMUSCULAR; INTRAVENOUS at 08:07

## 2023-07-17 RX ADMIN — DEXAMETHASONE SODIUM PHOSPHATE 8 MG: 4 INJECTION, SOLUTION INTRAMUSCULAR; INTRAVENOUS at 08:07

## 2023-07-17 RX ADMIN — ROCURONIUM BROMIDE 10 MG: 10 INJECTION, SOLUTION INTRAVENOUS at 08:07

## 2023-07-17 RX ADMIN — ACETAMINOPHEN 1000 MG: 500 TABLET ORAL at 07:07

## 2023-07-17 RX ADMIN — PROPOFOL 200 MG: 10 INJECTION, EMULSION INTRAVENOUS at 08:07

## 2023-07-17 RX ADMIN — CARBOXYMETHYLCELLULOSE SODIUM 2 DROP: 2.5 SOLUTION/ DROPS OPHTHALMIC at 08:07

## 2023-07-17 RX ADMIN — GLYCOPYRROLATE 0.2 MG: 0.2 INJECTION, SOLUTION INTRAMUSCULAR; INTRAVITREAL at 08:07

## 2023-07-17 RX ADMIN — OXYCODONE HYDROCHLORIDE 5 MG: 5 TABLET ORAL at 09:07

## 2023-07-17 RX ADMIN — SUGAMMADEX 200 MG: 100 INJECTION, SOLUTION INTRAVENOUS at 09:07

## 2023-07-17 RX ADMIN — SODIUM CHLORIDE, SODIUM LACTATE, POTASSIUM CHLORIDE, AND CALCIUM CHLORIDE: 600; 310; 30; 20 INJECTION, SOLUTION INTRAVENOUS at 10:07

## 2023-07-17 RX ADMIN — KETOROLAC TROMETHAMINE 30 MG: 30 INJECTION, SOLUTION INTRAMUSCULAR; INTRAVENOUS at 10:07

## 2023-07-17 NOTE — TRANSFER OF CARE
Anesthesia Transfer of Care Note    Patient: Halley Moreno    Procedure(s) Performed: Procedure(s) (LRB):  DILATION AND CURETTAGE, UTERUS (N/A)  INSERTION, INTRAUTERINE DEVICE (N/A)  HYSTEROSCOPY (N/A)    Patient location: PACU    Anesthesia Type: general    Transport from OR: Transported from OR on 6-10 L/min O2 by face mask with adequate spontaneous ventilation. Continuous SpO2 monitoring in transport    Post pain: adequate analgesia    Post assessment: no apparent anesthetic complications    Post vital signs: stable    Level of consciousness: awake and alert    Nausea/Vomiting: no nausea/vomiting    Complications: none    Transfer of care protocol was followed      Last vitals:   Visit Vitals  /67 (BP Location: Right arm, Patient Position: Lying)   Pulse 75   Temp 36.4 °C (97.5 °F) (Temporal)   Resp 16   LMP 02/20/2023 (Approximate)   SpO2 99%   Breastfeeding No

## 2023-07-17 NOTE — DISCHARGE SUMMARY
Discharge Summary  Gynecology      Admit Date: 2023    Discharge Date and Time: 2023     Attending Physician: Florentino oLvell MD    Principal Diagnoses: Complex endometrial hyperplasia with atypia    Active Hospital Problems    Diagnosis  POA    *Complex endometrial hyperplasia with atypia [N85.02]  Yes    S/P Hscope, D&C, IUD placement  [Z98.890]  Not Applicable      Resolved Hospital Problems   No resolved problems to display.       Procedures: Procedure(s) (LRB):  DILATION AND CURETTAGE, UTERUS (N/A)  INSERTION, INTRAUTERINE DEVICE (N/A)  HYSTEROSCOPY (N/A)    Discharged Condition: good    Hospital Course:   Halley Moreno is a 48 y.o. y.o.  female who presented on 2023 or above procedures for the treatment of endometrial hyperplasia with atypia. Patient tolerated procedure. Please see op note for further details. Post-operative course was uncomplicated.  On day of discharge, patient was urinating, ambulating, and tolerating a regular diet without difficulty. Pain was well controlled on PO medication. She was discharged home on POD#0 in stable condition with instructions to follow up with Dr. Lovell in 4-6 weeks.     Consults: None    Significant Diagnostic Studies:  No results for input(s): WBC, HGB, HCT, MCV, PLT in the last 168 hours.     Treatments:   1. Surgery as above    Disposition: Home or Self Care    Patient Instructions:   Current Discharge Medication List        CONTINUE these medications which have NOT CHANGED    Details   atorvastatin (LIPITOR) 10 MG tablet TAKE ONE TABLET BY MOUTH EVERY DAY.  Qty: 90 tablet, Refills: 3    Associated Diagnoses: Candidate for statin therapy due to risk of future cardiovascular event      cholecalciferol, vitamin D3, (VITAMIN D3) 1,000 unit capsule Take 2 capsules (2,000 Units total) by mouth once daily.  Refills: 0    Associated Diagnoses: Vitamin D insufficiency      diclofenac (VOLTAREN) 50 MG EC tablet Take 1 tablet (50 mg total) by mouth  2 (two) times daily as needed (pain).  Qty: 30 tablet, Refills: 1    Associated Diagnoses: Acute neck pain      furosemide (LASIX) 80 MG tablet Take one tablet by mouth every day  Qty: 90 tablet, Refills: 3      glipiZIDE 5 MG TR24 Take one tablet by mouth every morning with breakfast  Qty: 90 tablet, Refills: 1    Associated Diagnoses: Type 2 diabetes mellitus without complication, without long-term current use of insulin      HYDROcodone-acetaminophen (NORCO) 5-325 mg per tablet Take 1 tablet by mouth every 6 (six) hours as needed for Pain.  Qty: 10 tablet, Refills: 0    Comments: Quantity prescribed more than 7 day supply? No      ibuprofen (ADVIL,MOTRIN) 600 MG tablet Take 1 tablet (600 mg total) by mouth every 6 (six) hours as needed for Pain.  Qty: 30 tablet, Refills: 2      losartan (COZAAR) 100 MG tablet TAKE ONE TABLET BY MOUTH EVERY DAY.  Qty: 90 tablet, Refills: 2    Associated Diagnoses: Hypertension associated with diabetes      megestroL (MEGACE) 40 MG Tab Take 2 tablets (80 mg total) by mouth 2 (two) times daily.  Qty: 120 tablet, Refills: 11    Associated Diagnoses: Complex endometrial hyperplasia with atypia      methocarbamoL (ROBAXIN) 750 MG Tab TAKE ONE TABLET BY MOUTH TWICE DAILY AS NEEDED  Qty: 60 tablet, Refills: 1    Associated Diagnoses: Back muscle spasm      multivitamin (THERAGRAN) per tablet Take 1 tablet by mouth once daily.      naproxen (NAPROSYN) 500 MG tablet Take 1 tablet (500 mg total) by mouth 2 (two) times daily with meals.  Qty: 30 tablet, Refills: 2      tirzepatide 10 mg/0.5 mL PnIj Inject 10 mg into the skin every 7 days.  Qty: 4 pen, Refills: 2    Associated Diagnoses: Type 2 diabetes mellitus without complication, without long-term current use of insulin      blood sugar diagnostic Strp 1 strip by Misc.(Non-Drug; Combo Route) route 2 (two) times daily. Please provide items covered by patient's insurance  Qty: 100 strip, Refills: 5    Associated Diagnoses: Type 2 diabetes  "mellitus without complication, without long-term current use of insulin      lancets Misc 1 lancet by Misc.(Non-Drug; Combo Route) route 2 (two) times daily.  Qty: 100 each, Refills: 5    Comments: One Touch Ultra Delica  Associated Diagnoses: Type 2 diabetes mellitus without complication, without long-term current use of insulin      latanoprost 0.005 % ophthalmic solution INSTILL ONE DROP IN BOTH EYES IN THE EVENING  Qty: 7.5 mL, Refills: 3    Associated Diagnoses: Bilateral ocular hypertension      miconazole NITRATE 2 % (MICOTIN) 2 % top powder Apply topically 2 (two) times daily. Apply to skin folds.  Qty: 85 g, Refills: 1      ONETOUCH ULTRA2 METER Misc       pen needle, diabetic 32 gauge x 1/4" Ndle 1 each by Misc.(Non-Drug; Combo Route) route once daily. Pt uses one pen needle weekly for injection of Ozempic  Qty: 12 each, Refills: 3      triamcinolone acetonide 0.1% (KENALOG) 0.1 % Lotn Apply topically 3 (three) times daily.  Qty: 60 mL, Refills: 0             Discharge Procedure Orders   Diet general     Lifting restrictions   Order Comments: LIFTING:  No lifting greater than 15 pounds for six weeks.     PELVIC REST:  No douching, tampons, or intercourse for 6 weeks.  If prescribed, vaginal estrogen cream may be used during the postoperative period.     Other restrictions (specify):   Order Comments: DRIVING:  No driving while on narcotics. Driving may be resumed initially with a competent passenger one to two weeks after surgery if no longer taking narcotics.     EXERCISE:  For six weeks your exercise should be limited to walking. You may walk as far as you wish, as long as you increase your level of exertion gradually and avoid slippery surfaces. You may climb stairs as needed to get around, but should not use stair climbing for exercise.     Leave dressing on - Keep it clean, dry, and intact until clinic visit     Remove dressing in 24 hours   Order Comments: If you have a bandage on wound, you may " remove it the day after dismissal.  If you had steri-strips remove them once they begin to peel off (usually 2 weeks). Keep incision clean and dry.  Inspect the incision daily for signs and symptoms of infection.     Call MD for:  temperature >100.4     Call MD for:  persistent nausea and vomiting     Call MD for:  severe uncontrolled pain     Call MD for:  difficulty breathing, headache or visual disturbances     Call MD for:  redness, tenderness, or signs of infection (pain, swelling, redness, odor or green/yellow discharge around incision site)     Call MD for:  hives     Call MD for:   Order Comments: inability to void,urine is ketchup colored or you have large clots, vaginal bleeding is heavier than a period.    VAGINAL DISCHARGE: You may develop a vaginal discharge and intermittent vaginal spotting after surgery and up to 6 weeks postoperatively.  The discharge may have an odor and may change in color but it is normal.  This is due to dissolving stiches.  Contact your surgical team if you develop vaginal or vulvar irritation along with a discharge.  Also contact your surgical team if you have vaginal discharge that smells like urine or stool.    PAIN MEDICATIONS:     Take your pain medications as instructed. It is best to take pain medications before your pain becomes severe. This will allow you to take less medication yet have better pain relief. For the first 2 or 3 days it may be helpful to take your pain medications on a regular schedule (e.g. every 4 to 6 hours). This will help you to keep your pain under better control. You should then begin to take fewer medications each day until you no longer need them. Do not take pain medication on an empty stomach. This may lead to nausea and vomiting.    CONSTIPATION REMEDIES: Patients are often constipated after surgery or with use of oral narcotic medicine. You should continue to take the stool softener, Senokot-S during the next six weeks, and consume adequate  amounts of water.  If you have not had a bowel movement for 3 days after dismissal, or are uncomfortable and unable to pass stool, please try one or all of the following measures:  1.  Milk of Magnesia - 30 cc by mouth every 12 hours   2.  Dulcolax suppository - One suppository per rectum every 4-6 hours   3.  Metamucil, Fibercon or other bulk former - use as directed  4.  Fleets Enema  5.  Prunes or Prune juice    If you continue to have constipation after trying the above remedies, you should contact your surgical team using the contact information listed above     Activity as tolerated   Order Comments: Return to normal activity slowly as you feel able.  For 6 weeks your exercise should be limited to walking.  You may walk as far as you wish, as long as you increase your level of exertion gradually and avoid slippery surfaces.     Shower on day dressing removed (No bath)   Order Comments: You may shower at any time but should avoid immersing any abdominal incisions in water for at least 2 weeks after surgery or until the wound is completely healed.  If given, please shower with Hibaclens soap until bottle is completely finish           Letty Bauman MD PGY-2  Obstetrics and Gynecology  [facility]

## 2023-07-17 NOTE — PLAN OF CARE
Halley Moreno has met all discharge criteria from Phase II. Vital Signs are stable, ambulating  without difficulty.Pain is now under control and tolerable for the pt. Pain score 2 at this time.  Discharge instructions given, patient verbalized understanding. Discharged from facility via wheelchair in stable condition.

## 2023-07-17 NOTE — OR NURSING
C/o caraing5/10.  Dr. Krishna called, order for toradol received.  Also asking for prescription for pain med for home. Secure chat message sent to Dr. Bauman.

## 2023-07-17 NOTE — INTERVAL H&P NOTE
The patient has been examined and the H&P has been reviewed:    I concur with the findings and no changes have occurred since H&P was written.    Surgery risks, benefits and alternative options discussed and understood by patient/family.    To OR for Hscope D&C and IUD placement    Pauline Sotomayor M.D.  OB/GYN PGY- 4        Active Hospital Problems    Diagnosis  POA    *Complex endometrial hyperplasia with atypia [N85.02]  Yes      Resolved Hospital Problems   No resolved problems to display.

## 2023-07-17 NOTE — OP NOTE
[facility] - [facility] ([place])    Procedure(s) (LRB):  DILATION AND CURETTAGE, UTERUS (N/A)  INSERTION, INTRAUTERINE DEVICE (N/A)  HYSTEROSCOPY (N/A)    DATE OF SURGERY  7/17/2023     Surgeon(s) and Role  Primary: Florentino Lovell MD  Resident - Assisting: Letty Bauman MD       ANESTHESIA TYPE  General       POST-OPERATIVE DIAGNOSIS  Post-Op Diagnosis Codes:     * Complex endometrial hyperplasia with atypia [N85.02]     * Morbid obesity with BMI of 60.0-69.9, adult [E66.01, Z68.44]     * Central sleep apnea due to medical condition [G47.37]     * Diabetes mellitus type 2 without retinopathy [E11.9]     * Pulmonary hypertension [I27.20]    FINDINGS  Minimum cervix flushed anteriorly.  External os was located with a blue spike.  Uterus was sounded to 11 cm.  Bilateral ostia.  Proliferative endometrium with abnormal vascularity.  No discrete polyps or fibroids.  Fluid deficit of 250 cc.    Procedure in Detail: TThe patient was brought to the operating room and after induction of general anesthesia placed in synchronous position. After sterile prep and drape, exam revealed the operative findings above. A catheter was used to empty the bladder. The cervix was gently dilated after grasping with a singletooth tenaculum. Hysteroscopy was performed attention to the fluid deficit and revealed the findings above.  D&C was then performed in usual fashion.  Endometrial biopsy was also performed with a Pipelle.  All instruments were then removed from the vagina, and hemostasis was confirmed.  We then placed a Liletta IUD (Lot# [id number]) according to the 's instructions and cut the strings to 4cm.  All instruments were then removed from the vagina, and hemostasis was confirmed. The patient tolerated the procedure well. She was taken to the recovery room in good condition.    ASSISTANT SURGEONS  Residents    UNUSUAL CIRCUMSTANCES  Yes. Morbid obesity distorted the patient's anatomy and made it significantly more  difficult to perform the procedure.    CONDITION  chronic    POST-OP COMPLICATION  No    DIABETIC  No    SPECIMENS  Specimens (From admission, onward)       Start     Ordered    07/17/23 0839  Specimen to Pathology, Surgery Gynecology and Obstetrics  Once        Comments: Pre-op Diagnosis: Complex endometrial hyperplasia with atypia [N85.02]Morbid obesity with BMI of 60.0-69.9, adult [E66.01, Z68.44]Central sleep apnea due to medical condition [G47.37]Diabetes mellitus type 2 without retinopathy [E11.9]Pulmonary hypertension [I27.20]Procedure(s):DILATION AND CURETTAGE, UTERUSINSERTION, INTRAUTERINE DEVICEHYSTEROSCOPY Number of specimens: 1Name of specimens: 1. [facility]     References:    Click here for ordering Quick Tip   Question Answer Comment   Procedure Type: Gynecology and Obstetrics    Specimen Class: Known or suspected malignancy    Which provider would you like to cc? LIVIA ALFARO    Release to patient Immediate        07/17/23 0851                     ESTIMATED BLOOD LOSS  10 cc

## 2023-07-17 NOTE — ANESTHESIA POSTPROCEDURE EVALUATION
Anesthesia Post Evaluation    Patient: Halley Moreno    Procedure(s) Performed: Procedure(s) (LRB):  DILATION AND CURETTAGE, UTERUS (N/A)  INSERTION, INTRAUTERINE DEVICE (N/A)  HYSTEROSCOPY (N/A)    Final Anesthesia Type: general      Patient location during evaluation: PACU  Patient participation: Yes- Able to Participate  Level of consciousness: awake and alert  Post-procedure vital signs: reviewed and stable  Pain management: adequate  Airway patency: patent    PONV status at discharge: No PONV  Anesthetic complications: no      Cardiovascular status: blood pressure returned to baseline  Respiratory status: unassisted  Hydration status: euvolemic  Follow-up not needed.          Vitals Value Taken Time   /60 07/17/23 0945   Temp 36.9 °C (98.5 °F) 07/17/23 0945   Pulse 74 07/17/23 0945   Resp 16 07/17/23 0945   SpO2 98 % 07/17/23 0945         Event Time   Out of Recovery 09:40:21         Pain/Alia Score: Pain Rating Prior to Med Admin: 5 (7/17/2023  9:19 AM)  Alia Score: 10 (7/17/2023  9:45 AM)

## 2023-07-17 NOTE — ANESTHESIA PROCEDURE NOTES
Intubation    Date/Time: 7/17/2023 8:27 AM  Performed by: Qiana Rios CRNA  Authorized by: Saul Silver MD     Intubation:     Induction:  Intravenous    Intubated:  Postinduction    Mask Ventilation:  Moderately difficult with oral airway (shoulder roll/ 2 folded sheets pink head rest/chest tissue retracted)    Attempts:  1    Attempted By:  CRNA    Method of Intubation:  Video laryngoscopy    Blade:  Gaxiola 3    Laryngeal View Grade: Grade IIA - cords partially seen      Difficult Airway Encountered?: No      Complications:  None    Airway Device:  Oral endotracheal tube    Airway Device Size:  7.5    Style/Cuff Inflation:  Cuffed (inflated to minimal occlusive pressure)    Inflation Amount (mL):  6    Tube secured:  23    Secured at:  The lips    Placement Verified By:  Capnometry    Complicating Factors:  Short neck, obesity and oropharyngeal edema or fat    Findings Post-Intubation:  BS equal bilateral and atraumatic/condition of teeth unchanged

## 2023-07-18 VITALS
SYSTOLIC BLOOD PRESSURE: 134 MMHG | DIASTOLIC BLOOD PRESSURE: 80 MMHG | OXYGEN SATURATION: 92 % | HEART RATE: 80 BPM | RESPIRATION RATE: 16 BRPM | TEMPERATURE: 99 F

## 2023-07-21 LAB
COMMENT: ABNORMAL
FINAL PATHOLOGIC DIAGNOSIS: ABNORMAL
GROSS: ABNORMAL
Lab: ABNORMAL
MICROSCOPIC EXAM: ABNORMAL

## 2023-08-07 ENCOUNTER — CLINICAL SUPPORT (OUTPATIENT)
Dept: ENDOSCOPY | Facility: HOSPITAL | Age: 48
End: 2023-08-07
Attending: INTERNAL MEDICINE
Payer: MEDICAID

## 2023-08-07 ENCOUNTER — TELEPHONE (OUTPATIENT)
Dept: ENDOSCOPY | Facility: HOSPITAL | Age: 48
End: 2023-08-07

## 2023-08-07 VITALS — BODY MASS INDEX: 51.91 KG/M2 | HEIGHT: 63 IN | WEIGHT: 293 LBS

## 2023-08-07 DIAGNOSIS — Z12.11 SPECIAL SCREENING FOR MALIGNANT NEOPLASMS, COLON: Primary | ICD-10-CM

## 2023-08-07 DIAGNOSIS — Z12.11 SCREEN FOR COLON CANCER: ICD-10-CM

## 2023-08-07 RX ORDER — SOD SULF/POT CHLORIDE/MAG SULF 1.479 G
12 TABLET ORAL DAILY
Qty: 24 TABLET | Refills: 0 | Status: SHIPPED | OUTPATIENT
Start: 2023-08-07 | End: 2024-03-07

## 2023-08-07 NOTE — TELEPHONE ENCOUNTER
Spoke to patient to schedule procedure(s) Colonoscopy       Physician to perform procedure(s) Dr. KELIN Perea  Date of Procedure (s) 11/14/23  Arrival Time 7:00 AM  Time of Procedure(s) 8:00 AM   Location of Procedure(s) [facility] [address]  Type of Rx Prep sent to patient: Sutab  Instructions provided to patient via [facility]    Patient was informed on the following information and verbalized understanding. Screening questionnaire reviewed with patient and complete. If procedure requires anesthesia, a responsible adult needs to be present to accompany the patient home, patient cannot drive after receiving anesthesia. Appointment details are tentative, especially check-in time. Patient will receive a prep-op call 4 days prior to confirm check-in time for procedure. If applicable the patient should contact their pharmacy to verify Rx for procedure prep is ready for pick-up. Patient was advised to call the scheduling department at [phone] if pharmacy states no Rx is available. Patient was advised to call the endoscopy scheduling department if any questions or concerns arise.      [facility] Scheduling Department

## 2023-09-08 DIAGNOSIS — I15.2 HYPERTENSION ASSOCIATED WITH DIABETES: ICD-10-CM

## 2023-09-08 DIAGNOSIS — E11.59 HYPERTENSION ASSOCIATED WITH DIABETES: ICD-10-CM

## 2023-09-08 RX ORDER — LOSARTAN POTASSIUM 100 MG/1
TABLET ORAL
Qty: 90 TABLET | Refills: 3 | Status: SHIPPED | OUTPATIENT
Start: 2023-09-08

## 2023-09-08 NOTE — TELEPHONE ENCOUNTER
Care Due:                  Date            Visit Type   Department     Provider  --------------------------------------------------------------------------------                                EP -                              PRIMARY      MET INTERNAL  Last Visit: 06-      CARE ([facility])   MEDICINE       Yamila Tejeda  Next Visit: None Scheduled  None         None Found                                                            Last  Test          Frequency    Reason                     Performed    Due Date  --------------------------------------------------------------------------------    CMP.........  12 months..  atorvastatin.............  08- 08-    Lipid Panel.  12 months..  atorvastatin.............  08- 08-    [employer] Embedded Care Due Messages. Reference number: [id number].   9/08/2023 11:29:04 AM CDT

## 2023-09-09 NOTE — TELEPHONE ENCOUNTER
Provider Staff:  Action required for this patient     Please see care gap opportunities below in Care Due Message.    Thanks!  [facility]     Appointments      Date Provider   Last Visit   6/23/2023 Yamila Tejeda MD   Next Visit   Visit date not found Yamila Tejeda MD     Refill Decision Note   Halley Moreno  is requesting a refill authorization.  Brief Assessment and Rationale for Refill:  Approve     Medication Therapy Plan:         Comments:     Note composed:7:20 PM 09/08/2023

## 2023-09-13 ENCOUNTER — PATIENT MESSAGE (OUTPATIENT)
Dept: INTERNAL MEDICINE | Facility: CLINIC | Age: 48
End: 2023-09-13
Payer: MEDICAID

## 2023-09-13 DIAGNOSIS — E11.9 TYPE 2 DIABETES MELLITUS WITHOUT COMPLICATION, WITHOUT LONG-TERM CURRENT USE OF INSULIN: Primary | ICD-10-CM

## 2023-09-13 NOTE — TELEPHONE ENCOUNTER
"Per Pt"My weight has been slowly going up.   I am 371lbs as of today. I was wondering if increasing the Mounjaro to the 15mg will help?  I ran out of phentermine over a month ago, and I purchased GOLO. I have been taking GOLO for about a month but haven't seen any results.   Do you know if any [facility] dieticians accept medicaid? Still haven't heard anything from Dr. Herrera's office on the seminar for Bariatrics...  Should I get back on phentermine to curb my appetite? "  "

## 2023-09-18 RX ORDER — TIRZEPATIDE 12.5 MG/.5ML
12.5 INJECTION, SOLUTION SUBCUTANEOUS
Qty: 4 PEN | Refills: 0 | Status: SHIPPED | OUTPATIENT
Start: 2023-09-18 | End: 2023-10-02 | Stop reason: ALTCHOICE

## 2023-09-26 ENCOUNTER — PATIENT MESSAGE (OUTPATIENT)
Dept: GYNECOLOGIC ONCOLOGY | Facility: CLINIC | Age: 48
End: 2023-09-26

## 2023-09-27 ENCOUNTER — TELEPHONE (OUTPATIENT)
Dept: GYNECOLOGIC ONCOLOGY | Facility: CLINIC | Age: 48
End: 2023-09-27
Payer: MEDICAID

## 2023-09-28 ENCOUNTER — TELEPHONE (OUTPATIENT)
Dept: GYNECOLOGIC ONCOLOGY | Facility: CLINIC | Age: 48
End: 2023-09-28
Payer: MEDICAID

## 2023-09-28 NOTE — TELEPHONE ENCOUNTER
Spoke with our patient about her  reschedule appointment she voiced understanding of the date, time and location. All questions answered appointment mail. Provider Scheduling Coord.  Gynecologic Oncology MA/PAR /Preceptor Pasquale Lomax

## 2023-10-02 ENCOUNTER — OFFICE VISIT (OUTPATIENT)
Dept: INTERNAL MEDICINE | Facility: CLINIC | Age: 48
End: 2023-10-02
Payer: MEDICAID

## 2023-10-02 ENCOUNTER — TELEPHONE (OUTPATIENT)
Dept: INTERNAL MEDICINE | Facility: CLINIC | Age: 48
End: 2023-10-02

## 2023-10-02 VITALS
SYSTOLIC BLOOD PRESSURE: 130 MMHG | BODY MASS INDEX: 51.91 KG/M2 | RESPIRATION RATE: 20 BRPM | HEART RATE: 68 BPM | OXYGEN SATURATION: 97 % | DIASTOLIC BLOOD PRESSURE: 70 MMHG | TEMPERATURE: 97 F | HEIGHT: 63 IN | WEIGHT: 293 LBS

## 2023-10-02 DIAGNOSIS — I15.2 HYPERTENSION ASSOCIATED WITH DIABETES: Primary | ICD-10-CM

## 2023-10-02 DIAGNOSIS — N85.02 COMPLEX ENDOMETRIAL HYPERPLASIA WITH ATYPIA: ICD-10-CM

## 2023-10-02 DIAGNOSIS — E66.01 MORBID OBESITY WITH BMI OF 60.0-69.9, ADULT: ICD-10-CM

## 2023-10-02 DIAGNOSIS — E78.5 DYSLIPIDEMIA ASSOCIATED WITH TYPE 2 DIABETES MELLITUS: ICD-10-CM

## 2023-10-02 DIAGNOSIS — E11.59 HYPERTENSION ASSOCIATED WITH DIABETES: Primary | ICD-10-CM

## 2023-10-02 DIAGNOSIS — E11.9 TYPE 2 DIABETES MELLITUS WITHOUT COMPLICATION, WITHOUT LONG-TERM CURRENT USE OF INSULIN: ICD-10-CM

## 2023-10-02 DIAGNOSIS — G47.33 OSA ON CPAP: ICD-10-CM

## 2023-10-02 DIAGNOSIS — Z00.00 ANNUAL PHYSICAL EXAM: ICD-10-CM

## 2023-10-02 DIAGNOSIS — Z12.31 ENCOUNTER FOR SCREENING MAMMOGRAM FOR HIGH-RISK PATIENT: ICD-10-CM

## 2023-10-02 DIAGNOSIS — E11.69 DYSLIPIDEMIA ASSOCIATED WITH TYPE 2 DIABETES MELLITUS: ICD-10-CM

## 2023-10-02 PROCEDURE — 99215 OFFICE O/P EST HI 40 MIN: CPT | Mod: PBBFAC,PO | Performed by: INTERNAL MEDICINE

## 2023-10-02 PROCEDURE — 3072F LOW RISK FOR RETINOPATHY: CPT | Mod: CPTII,,, | Performed by: INTERNAL MEDICINE

## 2023-10-02 PROCEDURE — 99214 OFFICE O/P EST MOD 30 MIN: CPT | Mod: S$PBB,,, | Performed by: INTERNAL MEDICINE

## 2023-10-02 PROCEDURE — 3078F DIAST BP <80 MM HG: CPT | Mod: CPTII,,, | Performed by: INTERNAL MEDICINE

## 2023-10-02 PROCEDURE — 1160F RVW MEDS BY RX/DR IN RCRD: CPT | Mod: CPTII,,, | Performed by: INTERNAL MEDICINE

## 2023-10-02 PROCEDURE — 1159F MED LIST DOCD IN RCRD: CPT | Mod: CPTII,,, | Performed by: INTERNAL MEDICINE

## 2023-10-02 PROCEDURE — 4010F ACE/ARB THERAPY RXD/TAKEN: CPT | Mod: CPTII,,, | Performed by: INTERNAL MEDICINE

## 2023-10-02 PROCEDURE — 99214 PR OFFICE/OUTPT VISIT, EST, LEVL IV, 30-39 MIN: ICD-10-PCS | Mod: S$PBB,,, | Performed by: INTERNAL MEDICINE

## 2023-10-02 PROCEDURE — 3051F PR MOST RECENT HEMOGLOBIN A1C LEVEL 7.0 - < 8.0%: ICD-10-PCS | Mod: CPTII,,, | Performed by: INTERNAL MEDICINE

## 2023-10-02 PROCEDURE — 3078F PR MOST RECENT DIASTOLIC BLOOD PRESSURE < 80 MM HG: ICD-10-PCS | Mod: CPTII,,, | Performed by: INTERNAL MEDICINE

## 2023-10-02 PROCEDURE — 3075F SYST BP GE 130 - 139MM HG: CPT | Mod: CPTII,,, | Performed by: INTERNAL MEDICINE

## 2023-10-02 PROCEDURE — 1159F PR MEDICATION LIST DOCUMENTED IN MEDICAL RECORD: ICD-10-PCS | Mod: CPTII,,, | Performed by: INTERNAL MEDICINE

## 2023-10-02 PROCEDURE — 99999 PR PBB SHADOW E&M-EST. PATIENT-LVL V: CPT | Mod: PBBFAC,,, | Performed by: INTERNAL MEDICINE

## 2023-10-02 PROCEDURE — 3008F PR BODY MASS INDEX (BMI) DOCUMENTED: ICD-10-PCS | Mod: CPTII,,, | Performed by: INTERNAL MEDICINE

## 2023-10-02 PROCEDURE — 3072F PR LOW RISK FOR RETINOPATHY: ICD-10-PCS | Mod: CPTII,,, | Performed by: INTERNAL MEDICINE

## 2023-10-02 PROCEDURE — 3075F PR MOST RECENT SYSTOLIC BLOOD PRESS GE 130-139MM HG: ICD-10-PCS | Mod: CPTII,,, | Performed by: INTERNAL MEDICINE

## 2023-10-02 PROCEDURE — 3051F HG A1C>EQUAL 7.0%<8.0%: CPT | Mod: CPTII,,, | Performed by: INTERNAL MEDICINE

## 2023-10-02 PROCEDURE — 99999 PR PBB SHADOW E&M-EST. PATIENT-LVL V: ICD-10-PCS | Mod: PBBFAC,,, | Performed by: INTERNAL MEDICINE

## 2023-10-02 PROCEDURE — 4010F PR ACE/ARB THEARPY RXD/TAKEN: ICD-10-PCS | Mod: CPTII,,, | Performed by: INTERNAL MEDICINE

## 2023-10-02 PROCEDURE — 3008F BODY MASS INDEX DOCD: CPT | Mod: CPTII,,, | Performed by: INTERNAL MEDICINE

## 2023-10-02 PROCEDURE — 1160F PR REVIEW ALL MEDS BY PRESCRIBER/CLIN PHARMACIST DOCUMENTED: ICD-10-PCS | Mod: CPTII,,, | Performed by: INTERNAL MEDICINE

## 2023-10-02 RX ORDER — PHENTERMINE HYDROCHLORIDE 37.5 MG/1
37.5 TABLET ORAL
Qty: 30 TABLET | Refills: 0 | Status: SHIPPED | OUTPATIENT
Start: 2023-10-02 | End: 2023-12-05 | Stop reason: SDUPTHER

## 2023-10-02 NOTE — TELEPHONE ENCOUNTER
Please ask schedulers to arrange mammogram and fasting labs.    Needs 3 mo f/u---> virtual is ok. Please schedule

## 2023-10-02 NOTE — PROGRESS NOTES
Subjective:     PCP: Yamila Tejeda MD Dawn Jennie Moreno is a 48 y.o. female who presents for an annual exam.    Hypertension: The patient has been taking medications as instructed, no TIAs, no chest pain on exertion, no dyspnea on exertion. Leg swelling is chronic and stable. Wears compression stockings.    BP Readings from Last 3 Encounters:   10/02/23 130/70   07/17/23 134/80   07/13/23 139/73     Diabetes: Patient presents for follow up of diabetes. Current symptoms include: none. Symptoms have gradually improved. Patient denies foot ulcerations and weight loss. Evaluation to date has included: hemoglobin A1C.  Home sugars: BGs consistently in an acceptable range. Current treatment:  mounjaro, glipizide . Last dilated eye exam: due. HbA1c has increased despite increase in the dose of Mounjaro.    Lab Results   Component Value Date    HGBA1C 7.0 (H) 06/23/2023     (H) 06/23/2023       Hyperlipidemia: Compliance with treatment has been good. The patient exercises intermittently. Patient denies muscle pain associated with his medications. Previous history of cardiac disease includes: cardiomyopathy.    Lab Results   Component Value Date    CHOL 90 (L) 08/12/2022    HDL 33 (L) 08/12/2022        Medical History:   Past Medical History:   Diagnosis Date    Acute respiratory failure with hypoxia and hypercapnia 10/22/2020    Last Assessment & Plan:  Patient is a chronic CO2 retainer in setting of obesity hypoventilation syndrome 2/2 morbid obesity. Stepped down from ICU 10/26, currently continuing diuresis. De-escalated to NC during the day with BiPAP nightly on 10/31, which patient tolerated well. On Lasix gtt for diuresis through 11/5.   Plan:  - Target SpO2 of >88%. Continue BiPAP nightly. Patient on 3LNC, will con    Back pain     BMI 70 and over, adult     CHF (congestive heart failure) 10/2020    Depression     Diabetes mellitus     Difficult intubation     DM (diabetes mellitus) type II  "controlled with renal manifestation     Glaucoma 2022    HTN (hypertension)     Hyperlipidemia     Lumbar disc disease     Morbid obesity     Rosacea     Sleep apnea     cpap    Tear of medial meniscus of right knee, current 2017       Family History: family history includes Diabetes in her father; Heart disease in her paternal grandfather; Hypertension in her mother; Lymphoma in her father.    Surgical History:   Past Surgical History:   Procedure Laterality Date     SECTION       SECTION  2000    DILATION AND CURETTAGE OF UTERUS      AUB "negative path" per patient    DILATION AND CURETTAGE OF UTERUS N/A 2023    Procedure: DILATION AND CURETTAGE, UTERUS;  Surgeon: Florentino Lovell MD;  Location: [facility];  Service: OB/GYN;  Laterality: N/A;  PROCEED AS INDICATED    ENDOMETRIAL ABLATION      Patient unsure if procedure was performed    HYSTEROSCOPY N/A 2023    Procedure: HYSTEROSCOPY;  Surgeon: Florentino Lovell MD;  Location: [facility] OR;  Service: OB/GYN;  Laterality: N/A;    HYSTEROSCOPY WITH DILATION AND CURETTAGE OF UTERUS N/A 2022    Procedure: HYSTEROSCOPY, WITH DILATION AND CURETTAGE OF UTERUS;  Surgeon: Dixie Rod MD;  Location: [facility] OR;  Service: OB/GYN;  Laterality: N/A;    INTRAUTERINE DEVICE INSERTION N/A 2022    Procedure: INSERTION, INTRAUTERINE DEVICE;  Surgeon: Dixie Rod MD;  Location: [facility] OR;  Service: OB/GYN;  Laterality: N/A;    INTRAUTERINE DEVICE INSERTION N/A 2023    Procedure: INSERTION, INTRAUTERINE DEVICE;  Surgeon: Florentino Lovell MD;  Location: [facility];  Service: OB/GYN;  Laterality: N/A;        Social History:  reports that she has never smoked. She has never been exposed to tobacco smoke. She has never used smokeless tobacco. She reports that she does not drink alcohol and does not use drugs.     Allergies:   Review of patient's allergies indicates:   Allergen Reactions    Victoza [liraglutide] Swelling " "      Medications:   Current Outpatient Medications   Medication Sig    atorvastatin (LIPITOR) 10 MG tablet TAKE ONE TABLET BY MOUTH EVERY DAY.    blood sugar diagnostic Strp 1 strip by Misc.(Non-Drug; Combo Route) route 2 (two) times daily. Please provide items covered by patient's insurance    cholecalciferol, vitamin D3, (VITAMIN D3) 1,000 unit capsule Take 2 capsules (2,000 Units total) by mouth once daily.    furosemide (LASIX) 80 MG tablet Take one tablet by mouth every day    glipiZIDE 5 MG TR24 Take one tablet by mouth every morning with breakfast    HYDROcodone-acetaminophen (NORCO) 5-325 mg per tablet Take 1 tablet by mouth every 6 (six) hours as needed for Pain.    ibuprofen (ADVIL,MOTRIN) 600 MG tablet Take 1 tablet (600 mg total) by mouth every 6 (six) hours as needed for Pain.    lancets Misc 1 lancet by Misc.(Non-Drug; Combo Route) route 2 (two) times daily.    latanoprost 0.005 % ophthalmic solution INSTILL ONE DROP IN BOTH EYES IN THE EVENING    losartan (COZAAR) 100 MG tablet Take one tablet by mouth every day    megestroL (MEGACE) 40 MG Tab Take 2 tablets (80 mg total) by mouth 2 (two) times daily.    methocarbamoL (ROBAXIN) 750 MG Tab TAKE ONE TABLET BY MOUTH TWICE DAILY AS NEEDED    multivitamin (THERAGRAN) per tablet Take 1 tablet by mouth once daily.    ONETOUCH ULTRA2 METER Misc     pen needle, diabetic 32 gauge x 1/4" Ndle 1 each by Misc.(Non-Drug; Combo Route) route once daily. Pt uses one pen needle weekly for injection of Ozempic    sod sulf-pot chloride-mag sulf (SUTAB) 1.479-0.188- 0.225 gram tablet Take 12 tablets by mouth once daily. BIN: [id number]  PCN: [id number]  GROUP: [id number] MEMBER ID: [id number]    diclofenac (VOLTAREN) 50 MG EC tablet Take 1 tablet (50 mg total) by mouth 2 (two) times daily as needed (pain).    phentermine (ADIPEX-P) 37.5 mg tablet Take 1 tablet (37.5 mg total) by mouth before breakfast.    semaglutide (OZEMPIC) 0.25 mg or 0.5 mg (2 mg/3 mL) pen injector Inject 0.5 mg " into the skin every 7 days.     No current facility-administered medications for this visit.       Health Maintenance:   Health Maintenance Topics with due status: Not Due       Topic Last Completion Date    TETANUS VACCINE 02/06/2017    Cervical Cancer Screening 06/01/2022    Hemoglobin A1c 06/23/2023    Low Dose Statin 10/02/2023     Lab Results   Component Value Date    HEPCAB Negative 10/05/2011     Eye Exam: due  Dental Exam: done in July 2023  OB/GYN: Dr. Lovell  Pap smear: 6/9/2022  Mammogram: 9/2022  Colonoscopy:  scheduled  Hepatitic C  Ab: 10/2011, neg    Vaccinations:  Immunization History   Administered Date(s) Administered    COVID-19, MRNA, LN-S, PF (Pfizer) (Purple Cap) 03/12/2021, 04/02/2021, 11/20/2021    COVID-19, mRNA, LNP-S, bivalent booster, PF (PFIZER OMICRON) 09/28/2022    Influenza 11/20/2018    Influenza - Quadrivalent - MDCK - PF 09/28/2022    Influenza - Quadrivalent - PF *Preferred* (6 months and older) 11/20/2018, 10/14/2019, 12/02/2020, 11/01/2021    Influenza - Trivalent - PF (ADULT) 11/20/2018    Pneumococcal Conjugate - 20 Valent 06/23/2023    Pneumococcal Polysaccharide - 23 Valent 02/06/2017    Td - PF (ADULT) 02/06/2017    Zoster Recombinant 05/16/2022     Influenza: due  Tetanus: 2017  Shingrix: #2 due  Pneumovax: 2017  Prevnar-20: 2023  Covid vaccine: due      Body mass index is 66.16 kg/m².  Wt Readings from Last 3 Encounters:   10/02/23 (!) 169.4 kg (373 lb 7.4 oz)   08/07/23 (!) 165.6 kg (365 lb)   07/13/23 (!) 165.6 kg (365 lb)   - weight has increased despite dietary efforts to lose weight  - has a new job as a , some walking involved and steps on FitBit increased from usual 3K/d to 5k/d  - goal weight to consider hysterectomy is 282 lbs      Review of Systems   Constitutional:  Negative for activity change, chills, diaphoresis, fatigue, fever and unexpected weight change.   HENT:  Negative for congestion, dental problem, ear discharge, ear pain, hearing loss,  postnasal drip, rhinorrhea, sinus pressure, sore throat and trouble swallowing.    Eyes:  Negative for discharge, redness and visual disturbance.   Respiratory:  Negative for cough, chest tightness, shortness of breath and wheezing.    Cardiovascular:  Positive for leg swelling (chronic). Negative for chest pain and palpitations.   Gastrointestinal:  Positive for diarrhea (very soft stool, once daily for the last few days, unclear if this is associated with certain foods). Negative for abdominal pain, blood in stool, constipation, nausea and vomiting.        + gas   Endocrine: Negative for polydipsia and polyuria.   Genitourinary:  Negative for decreased urine volume, difficulty urinating, dysuria, frequency, hematuria and menstrual problem.   Musculoskeletal:  Negative for arthralgias, back pain, joint swelling, myalgias and neck pain.   Skin:  Negative for rash and wound.   Neurological:  Negative for dizziness, weakness, numbness and headaches.   Hematological:  Negative for adenopathy.   Psychiatric/Behavioral:  Negative for confusion, dysphoric mood and sleep disturbance. The patient is not nervous/anxious.         Answers submitted by the patient for this visit:  Review of Systems Questionnaire (Submitted on 9/30/2023)  activity change: No  unexpected weight change: No  neck pain: No  hearing loss: No  rhinorrhea: No  trouble swallowing: No  eye discharge: No  visual disturbance: No  chest tightness: No  wheezing: No  chest pain: No  palpitations: No  blood in stool: No  constipation: No  vomiting: No  diarrhea: No  polydipsia: No  polyuria: No  difficulty urinating: No  hematuria: No  menstrual problem: No  dysuria: No  joint swelling: No  arthralgias: No  headaches: No  weakness: No  confusion: No  dysphoric mood: No        Objective:     Physical Exam  Vitals reviewed.   Constitutional:       General: She is awake. She is not in acute distress.     Appearance: Normal appearance. She is well-developed and  well-groomed. She is not diaphoretic.   HENT:      Head: Normocephalic and atraumatic.      Right Ear: Hearing, tympanic membrane, ear canal and external ear normal. Tympanic membrane is not erythematous or bulging.      Left Ear: Hearing, tympanic membrane, ear canal and external ear normal. Tympanic membrane is not erythematous or bulging.      Nose: Nose normal. No congestion.      Mouth/Throat:      Mouth: Mucous membranes are moist.      Tongue: No lesions.      Pharynx: Oropharynx is clear. Uvula midline. No oropharyngeal exudate or posterior oropharyngeal erythema.   Eyes:      General: Lids are normal. Vision grossly intact. Gaze aligned appropriately. No scleral icterus.     Conjunctiva/sclera:      Right eye: Right conjunctiva is not injected.      Left eye: Left conjunctiva is not injected.      Pupils: Pupils are equal, round, and reactive to light.   Neck:      Thyroid: No thyroid mass or thyromegaly.   Cardiovascular:      Rate and Rhythm: Normal rate and regular rhythm.      Pulses: Normal pulses.      Heart sounds: Normal heart sounds. No murmur heard.  Pulmonary:      Effort: Pulmonary effort is normal.      Breath sounds: Normal air entry. No wheezing.   Abdominal:      General: Bowel sounds are normal. There is no distension.      Palpations: Abdomen is soft. Abdomen is not rigid.      Tenderness: There is no abdominal tenderness. There is no guarding or rebound.   Musculoskeletal:         General: Swelling present. Normal range of motion.      Cervical back: Normal range of motion and neck supple.      Right lower leg: No edema.      Left lower leg: No edema.   Lymphadenopathy:      Cervical: No cervical adenopathy.      Upper Body:      Right upper body: No supraclavicular adenopathy.      Left upper body: No supraclavicular adenopathy.   Skin:     General: Skin is warm and dry.      Coloration: Skin is not cyanotic.      Findings: No lesion or rash.      Nails: There is no clubbing.    Neurological:      General: No focal deficit present.      Mental Status: She is alert and oriented to person, place, and time.      Sensory: Sensation is intact.      Coordination: Coordination is intact.      Gait: Gait is intact.      Deep Tendon Reflexes: Reflexes are normal and symmetric.   Psychiatric:         Attention and Perception: Attention normal.         Mood and Affect: Mood normal.         Behavior: Behavior is cooperative.              Assessment:        1. Hypertension associated with diabetes    2. Type 2 diabetes mellitus without complication, without long-term current use of insulin    3. Dyslipidemia associated with type 2 diabetes mellitus    4. Complex endometrial hyperplasia with atypia    5. YARY on CPAP    6. Annual physical exam    7. Encounter for screening mammogram for high-risk patient    8. Morbid obesity with BMI of 60.0-69.9, adult           Plan:     1. Hypertension associated with diabetes  - controlled, continue losartan, furosemide, monitor BP closely    2. Type 2 diabetes mellitus without complication, without long-term current use of insulin  - stop Mounjaro, start Ozempic 0.25mg weekly and increase each month to 2mg weekly, continue glipizide  - Hemoglobin A1C; Future  - Microalbumin/Creatinine Ratio, Urine; Future  - semaglutide (OZEMPIC) 0.25 mg or 0.5 mg (2 mg/3 mL) pen injector; Inject 0.5 mg into the skin every 7 days.  Dispense: 3 mL; Refill: 0    3. Dyslipidemia associated with type 2 diabetes mellitus  - LDL at goal, continue Crestor, monitor closely    4. Complex endometrial hyperplasia with atypia  - IUD placed, plan to defer surgery for now, f/u with GYN/ONC regularly    5. YARY on CPAP  - stable, compliant, continue t0 monitor    6. Annual physical exam  - CBC Auto Differential; Future  - Comprehensive Metabolic Panel; Future  - Lipid Panel; Future  - TSH; Future  - Urinalysis; Future    7. Encounter for screening mammogram for high-risk patient  - Mammo Digital  Screening Bilat w/ Lex; Future    8. Morbid obesity with BMI of 60.0-69.9, adult  - advised patient to call bariatric surgeon to follow-up on referral  - has been off phentermine but it worked well, will cautiously resume and monitor for any adverse effects  - semaglutide (OZEMPIC) 0.25 mg or 0.5 mg (2 mg/3 mL) pen injector; Inject 0.5 mg into the skin every 7 days.  Dispense: 3 mL; Refill: 0  - phentermine (ADIPEX-P) 37.5 mg tablet; Take 1 tablet (37.5 mg total) by mouth before breakfast.  Dispense: 30 tablet; Refill: 0  - start entering weight in [place] every week      RTC in 3 months for follow-up or sooner if needed    __________________________    Yamila Tejeda MD, PharmD  [facility]- Internal Medicine  American Board of Obesity Medicine diplomate  Office [phone]

## 2023-10-04 ENCOUNTER — HOSPITAL ENCOUNTER (OUTPATIENT)
Dept: RADIOLOGY | Facility: HOSPITAL | Age: 48
Discharge: HOME | End: 2023-10-04
Attending: INTERNAL MEDICINE
Payer: MEDICAID

## 2023-10-04 ENCOUNTER — TELEPHONE (OUTPATIENT)
Dept: GYNECOLOGIC ONCOLOGY | Facility: CLINIC | Age: 48
End: 2023-10-04
Payer: MEDICAID

## 2023-10-04 VITALS — WEIGHT: 293 LBS | BODY MASS INDEX: 51.91 KG/M2 | HEIGHT: 63 IN

## 2023-10-04 DIAGNOSIS — Z12.31 ENCOUNTER FOR SCREENING MAMMOGRAM FOR HIGH-RISK PATIENT: ICD-10-CM

## 2023-10-04 PROCEDURE — 77063 MAMMO DIGITAL SCREENING BILAT WITH TOMO: ICD-10-PCS | Mod: 26,,, | Performed by: RADIOLOGY

## 2023-10-04 PROCEDURE — 77067 SCR MAMMO BI INCL CAD: CPT | Mod: 26,,, | Performed by: RADIOLOGY

## 2023-10-04 PROCEDURE — 77067 SCR MAMMO BI INCL CAD: CPT | Mod: TC

## 2023-10-04 PROCEDURE — 77067 MAMMO DIGITAL SCREENING BILAT WITH TOMO: ICD-10-PCS | Mod: 26,,, | Performed by: RADIOLOGY

## 2023-10-04 PROCEDURE — 77063 BREAST TOMOSYNTHESIS BI: CPT | Mod: 26,,, | Performed by: RADIOLOGY

## 2023-10-04 NOTE — TELEPHONE ENCOUNTER
Spoke with our patient about her  reschedule appointment she voiced understanding of the date, time and location. All questions answered Provider Scheduling Coord.  Gynecologic Oncology MA/PAR /Preceptor Pasquale Lomax

## 2023-10-23 DIAGNOSIS — E66.01 MORBID OBESITY WITH BMI OF 60.0-69.9, ADULT: ICD-10-CM

## 2023-10-23 DIAGNOSIS — E11.9 TYPE 2 DIABETES MELLITUS WITHOUT COMPLICATION, WITHOUT LONG-TERM CURRENT USE OF INSULIN: ICD-10-CM

## 2023-10-23 NOTE — TELEPHONE ENCOUNTER
No care due was identified.  [facility] Embedded Care Due Messages. Reference number: [id number].   10/23/2023 4:12:14 PM CDT

## 2023-10-25 RX ORDER — SEMAGLUTIDE 1.34 MG/ML
1 INJECTION, SOLUTION SUBCUTANEOUS
Qty: 3 ML | Refills: 0 | Status: SHIPPED | OUTPATIENT
Start: 2023-10-25 | End: 2023-11-22 | Stop reason: DRUGHIGH

## 2023-10-25 NOTE — TELEPHONE ENCOUNTER
Changed to Ozempic 1mg weekly for 1 month. Will increase to 2mg after she completes the 1mg dose as long as there are no side effects.    How does she feel with Ozempic? Any changes in appetite?

## 2023-11-13 ENCOUNTER — ANESTHESIA EVENT (OUTPATIENT)
Dept: ENDOSCOPY | Facility: HOSPITAL | Age: 48
End: 2023-11-13
Payer: MEDICAID

## 2023-11-14 ENCOUNTER — ANESTHESIA (OUTPATIENT)
Dept: ENDOSCOPY | Facility: HOSPITAL | Age: 48
End: 2023-11-14
Payer: MEDICAID

## 2023-11-14 ENCOUNTER — TELEPHONE (OUTPATIENT)
Dept: ENDOSCOPY | Facility: HOSPITAL | Age: 48
End: 2023-11-14

## 2023-11-14 ENCOUNTER — HOSPITAL ENCOUNTER (OUTPATIENT)
Facility: HOSPITAL | Age: 48
Discharge: HOME | End: 2023-11-14
Attending: INTERNAL MEDICINE | Admitting: INTERNAL MEDICINE
Payer: MEDICAID

## 2023-11-14 VITALS
SYSTOLIC BLOOD PRESSURE: 128 MMHG | BODY MASS INDEX: 51.91 KG/M2 | WEIGHT: 293 LBS | HEIGHT: 63 IN | OXYGEN SATURATION: 98 % | RESPIRATION RATE: 20 BRPM | HEART RATE: 69 BPM | TEMPERATURE: 98 F | DIASTOLIC BLOOD PRESSURE: 59 MMHG

## 2023-11-14 DIAGNOSIS — Z12.11 ENCOUNTER FOR COLORECTAL CANCER SCREENING: ICD-10-CM

## 2023-11-14 DIAGNOSIS — Z12.12 ENCOUNTER FOR COLORECTAL CANCER SCREENING: ICD-10-CM

## 2023-11-14 LAB
POCT GLUCOSE: 120 MG/DL (ref 70–110)
POCT GLUCOSE: 134 MG/DL (ref 70–110)

## 2023-11-14 PROCEDURE — D9220A PRA ANESTHESIA: Mod: ANES,,, | Performed by: INTERNAL MEDICINE

## 2023-11-14 PROCEDURE — D9220A PRA ANESTHESIA: ICD-10-PCS | Mod: CRNA,,, | Performed by: NURSE ANESTHETIST, CERTIFIED REGISTERED

## 2023-11-14 PROCEDURE — D9220A PRA ANESTHESIA: Mod: CRNA,,, | Performed by: NURSE ANESTHETIST, CERTIFIED REGISTERED

## 2023-11-14 PROCEDURE — D9220A PRA ANESTHESIA: ICD-10-PCS | Mod: ANES,,, | Performed by: INTERNAL MEDICINE

## 2023-11-14 PROCEDURE — 82962 GLUCOSE BLOOD TEST: CPT | Performed by: INTERNAL MEDICINE

## 2023-11-14 PROCEDURE — 45378 DIAGNOSTIC COLONOSCOPY: CPT | Performed by: INTERNAL MEDICINE

## 2023-11-14 PROCEDURE — 63600175 PHARM REV CODE 636 W HCPCS: Performed by: NURSE ANESTHETIST, CERTIFIED REGISTERED

## 2023-11-14 PROCEDURE — 45378 PR COLONOSCOPY,DIAGNOSTIC: ICD-10-PCS | Mod: ,,, | Performed by: INTERNAL MEDICINE

## 2023-11-14 PROCEDURE — 37000009 HC ANESTHESIA EA ADD 15 MINS: Performed by: INTERNAL MEDICINE

## 2023-11-14 PROCEDURE — 45378 DIAGNOSTIC COLONOSCOPY: CPT | Mod: ,,, | Performed by: INTERNAL MEDICINE

## 2023-11-14 PROCEDURE — 25000003 PHARM REV CODE 250: Performed by: NURSE ANESTHETIST, CERTIFIED REGISTERED

## 2023-11-14 PROCEDURE — 37000008 HC ANESTHESIA 1ST 15 MINUTES: Performed by: INTERNAL MEDICINE

## 2023-11-14 RX ORDER — SODIUM CHLORIDE 0.9 % (FLUSH) 0.9 %
10 SYRINGE (ML) INJECTION
Status: DISCONTINUED | OUTPATIENT
Start: 2023-11-14 | End: 2023-11-14 | Stop reason: HOSPADM

## 2023-11-14 RX ORDER — PROPOFOL 10 MG/ML
VIAL (ML) INTRAVENOUS
Status: DISCONTINUED | OUTPATIENT
Start: 2023-11-14 | End: 2023-11-14

## 2023-11-14 RX ORDER — LIDOCAINE HYDROCHLORIDE 20 MG/ML
INJECTION INTRAVENOUS
Status: DISCONTINUED | OUTPATIENT
Start: 2023-11-14 | End: 2023-11-14

## 2023-11-14 RX ORDER — KETAMINE HCL IN 0.9 % NACL 50 MG/5 ML
SYRINGE (ML) INTRAVENOUS
Status: DISCONTINUED | OUTPATIENT
Start: 2023-11-14 | End: 2023-11-14

## 2023-11-14 RX ORDER — HALOPERIDOL 5 MG/ML
0.5 INJECTION INTRAMUSCULAR EVERY 10 MIN PRN
Status: DISCONTINUED | OUTPATIENT
Start: 2023-11-14 | End: 2023-11-14 | Stop reason: HOSPADM

## 2023-11-14 RX ADMIN — LIDOCAINE HYDROCHLORIDE 60 MG: 20 INJECTION INTRAVENOUS at 08:11

## 2023-11-14 RX ADMIN — Medication 10 MG: at 08:11

## 2023-11-14 RX ADMIN — Medication 20 MG: at 08:11

## 2023-11-14 RX ADMIN — PROPOFOL 80 MG: 10 INJECTION, EMULSION INTRAVENOUS at 08:11

## 2023-11-14 RX ADMIN — SODIUM CHLORIDE: 9 INJECTION, SOLUTION INTRAVENOUS at 08:11

## 2023-11-14 NOTE — ANESTHESIA PREPROCEDURE EVALUATION
[facility]  Anesthesia Pre-Operative Evaluation       Patient Name: Halley Moreno  YOB: 1975  MRN: [id number]  [facility]: [id number]      Code Status: Prior   Date of Procedure: 11/14/2023  Anesthesia: Choice Procedure: Procedure(s) (LRB):  COLONOSCOPY (N/A)  Pre-Operative Diagnosis: Screen for colon cancer [Z12.11]  Proceduralist: Surgeon(s) and Role:     * Sharath Perea MD - Primary Nurse: (Unknown)      SUBJECTIVE:   Halley Moreno is a 48 y.o. female who  has a past medical history of Acute respiratory failure with hypoxia and hypercapnia (10/22/2020), Back pain, BMI 70 and over, adult, CHF (congestive heart failure) (10/2020), Depression, Diabetes mellitus, Difficult intubation, DM (diabetes mellitus) type II controlled with renal manifestation, Glaucoma (09/13/2022), HTN (hypertension), Hyperlipidemia, Lumbar disc disease, Morbid obesity, Rosacea, Sleep apnea, and Tear of medial meniscus of right knee, current (09/05/2017). No notes on file    No current facility-administered medications for this encounter.       she has a current medication list which includes the following long-term medication(s): atorvastatin, furosemide, glipizide, latanoprost, losartan, megestrol, onetouch ultra2 meter, and pen needle, diabetic.     Last took ozempic 1 week ago   ALLERGIES:     Review of patient's allergies indicates:   Allergen Reactions    Victoza [liraglutide] Swelling     LDA:      Status: Signed  Hx of difficult airway x 10 years ago at [facility]. Unable to locate the airway note for this.     Intubation     Date/Time: 7/17/2023 8:27 AM  Performed by: Qiana Rios CRNA  Authorized by: Saul Silver MD      Intubation:     Induction:  Intravenous    Intubated:  Postinduction    Mask Ventilation:  Moderately difficult with oral airway (shoulder roll/ 2 folded sheets pink head rest/chest tissue retracted)    Attempts:  1    Attempted By:  CRNA    Method of Intubation:  Video  laryngoscopy    Blade:  Gaxiola 3    Laryngeal View Grade: Grade IIA - cords partially seen      Difficult Airway Encountered?: No      Complications:  None    Airway Device:  Oral endotracheal tube    Airway Device Size:  7.5    Style/Cuff Inflation:  Cuffed (inflated to minimal occlusive pressure)    Inflation Amount (mL):  6    Tube secured:  23    Secured at:  The lips    Placement Verified By:  Capnometry    Complicating Factors:  Short neck, obesity and oropharyngeal edema or fat    Findings Post-Intubation:  BS equal bilateral and atraumatic/condition of teeth unchanged          Lines/Drains/Airways       None                 MEDICATIONS:     Antibiotics (From admission, onward)      None          VTE Risk Mitigation (From admission, onward)      None          No current facility-administered medications for this encounter.     Current Outpatient Medications   Medication Sig Dispense Refill    atorvastatin (LIPITOR) 10 MG tablet TAKE ONE TABLET BY MOUTH EVERY DAY. 90 tablet 3    blood sugar diagnostic Strp 1 strip by Misc.(Non-Drug; Combo Route) route 2 (two) times daily. Please provide items covered by patient's insurance 100 strip 5    cholecalciferol, vitamin D3, (VITAMIN D3) 1,000 unit capsule Take 2 capsules (2,000 Units total) by mouth once daily.  0    furosemide (LASIX) 80 MG tablet Take one tablet by mouth every day 90 tablet 3    glipiZIDE 5 MG TR24 Take one tablet by mouth every morning with breakfast 90 tablet 1    HYDROcodone-acetaminophen (NORCO) 5-325 mg per tablet Take 1 tablet by mouth every 6 (six) hours as needed for Pain. 10 tablet 0    ibuprofen (ADVIL,MOTRIN) 600 MG tablet Take 1 tablet (600 mg total) by mouth every 6 (six) hours as needed for Pain. 30 tablet 2    lancets Misc 1 lancet by Misc.(Non-Drug; Combo Route) route 2 (two) times daily. 100 each 5    latanoprost 0.005 % ophthalmic solution INSTILL ONE DROP IN BOTH EYES IN THE EVENING 7.5 mL 3    losartan (COZAAR) 100 MG tablet  "Take one tablet by mouth every day 90 tablet 3    megestroL (MEGACE) 40 MG Tab Take 2 tablets (80 mg total) by mouth 2 (two) times daily. 120 tablet 11    methocarbamoL (ROBAXIN) 750 MG Tab TAKE ONE TABLET BY MOUTH TWICE DAILY AS NEEDED 60 tablet 1    multivitamin (THERAGRAN) per tablet Take 1 tablet by mouth once daily.      ONETOUCH ULTRA2 METER Misc       pen needle, diabetic 32 gauge x 1/4" Ndle 1 each by Misc.(Non-Drug; Combo Route) route once daily. Pt uses one pen needle weekly for injection of Ozempic 12 each 3    semaglutide (OZEMPIC) 1 mg/dose (2 mg/1.5 mL) PnIj Inject 1 mg into the skin every 7 days. 3 mL 0    sod sulf-pot chloride-mag sulf (SUTAB) 1.479-0.188- 0.225 gram tablet Take 12 tablets by mouth once daily. BIN: [id number]  PCN: [id number]  GROUP: [id number] MEMBER ID: [id number] 24 tablet 0          History:   There are no hospital problems to display for this patient.    Surgical History:    has a past surgical history that includes  section (); Dilation and curettage of uterus (); Endometrial ablation ();  section (2000); Intrauterine device insertion (N/A, 2022); Hysteroscopy with dilation and curettage of uterus (N/A, 2022); Dilation and curettage of uterus (N/A, 2023); Intrauterine device insertion (N/A, 2023); and Hysteroscopy (N/A, 2023).   Social History:    reports being sexually active and has had partner(s) who are male. She reports using the following method of birth control/protection: Surgical.  reports that she has never smoked. She has never been exposed to tobacco smoke. She has never used smokeless tobacco. She reports that she does not drink alcohol and does not use drugs.     OBJECTIVE:     Vital Signs (Most Recent):    Vital Signs Range (Last 24H):          There is no height or weight on file to calculate BMI.   Wt Readings from Last 4 Encounters:   10/04/23 (!) 169.2 kg (373 lb)   10/02/23 (!) 169.4 kg (373 lb 7.4 oz)   23 (!) " 165.6 kg (365 lb)   07/13/23 (!) 165.6 kg (365 lb)     Significant Labs:  Lab Results   Component Value Date    WBC 11.79 10/04/2023    HGB 12.6 10/04/2023    HCT 39.1 10/04/2023     10/04/2023     10/04/2023    K 4.8 10/04/2023     10/04/2023    CREATININE 1.0 10/04/2023    BUN 15 10/04/2023    CO2 21 (L) 10/04/2023     (H) 10/04/2023    CALCIUM 9.8 10/04/2023    MG 1.4 (L) 12/03/2020    PHOS 4.9 (H) 12/03/2020    ALKPHOS 75 10/04/2023    ALT 20 10/04/2023    AST 14 10/04/2023    ALBUMIN 3.9 10/04/2023    INR 1.1 10/22/2020    APTT 29.4 05/05/2017    HGBA1C 7.5 (H) 10/04/2023     06/09/2009    CPKMB 1.4 06/09/2009    TROPONINI 0.021 10/22/2020    MB 1.4 06/09/2009     (H) 12/03/2020    PREGTESTUR Negative 07/06/2010     No LMP recorded. (Menstrual status: Other).  No results found for this or any previous visit (from the past 72 hour(s)).    EKG:   Results for orders placed or performed in visit on 09/07/22   IN OFFICE EKG 12-LEAD (to [facility])    Collection Time: 09/07/22  7:53 AM    Narrative    Test Reason : I10,E11.59,I15.2,Z68.44,E11.9,E66.2,Z74.09,R07.9,M51.9,G47.30,    Vent. Rate : 071 BPM     Atrial Rate : 071 BPM     P-R Int : 184 ms          QRS Dur : 100 ms      QT Int : 350 ms       P-R-T Axes : 079 -69 083 degrees     QTc Int : 380 ms    Normal sinus rhythm  Left anterior fascicular block  Abnormal ECG  When compared with ECG of 02-MAR-2022 12:04,  No significant change was found  Confirmed by Trey Rose MD ([id number]) on 9/8/2022 6:52:20 PM    Referred By:  ashley           Confirmed By:Trey Rose MD       TTE:  Results for orders placed or performed during the hospital encounter of 04/12/22   Echo Saline Bubble? No   Result Value Ref Range    BSA 2.72 m2    TDI SEPTAL 0.08 m/s    LV LATERAL E/E' RATIO 8.56 m/s    LV SEPTAL E/E' RATIO 9.63 m/s    LA WIDTH 4.16 cm    TDI LATERAL 0.09 m/s    LVIDd 4.96 3.5 - 6.0 cm    IVS 0.79 0.6 - 1.1 cm    Posterior Wall  0.78 0.6 - 1.1 cm    LVIDs 3.12 2.1 - 4.0 cm    FS 37 28 - 44 %    LA volume 64.22 cm3    Sinus 3.04 cm    STJ 2.74 cm    Ascending aorta 3.12 cm    LV mass 130.74 g    LA size 3.57 cm    RVDD 2.76 cm    TAPSE 2.74 cm    Left Ventricle Relative Wall Thickness 0.31 cm    AV mean gradient 7 mmHg    AV valve area 2.69 cm2    AV Velocity Ratio 0.66     AV index (prosthetic) 0.71     MV valve area p 1/2 method 3.30 cm2    E/A ratio 1.04     Mean e' 0.09 m/s    E wave deceleration time 229.64 msec    IVRT 108.47 msec    Pulm vein S/D ratio 1.18     LVOT diameter 2.20 cm    LVOT area 3.8 cm2    LVOT peak louie 1.09 m/s    LVOT peak VTI 25.55 cm    Ao peak louie 1.66 m/s    Ao VTI 36.11 cm    LVOT stroke volume 97.07 cm3    AV peak gradient 11 mmHg    E/E' ratio 9.06 m/s    MV Peak E Louie 0.77 m/s    TR Max Louie 2.89 m/s    MV stenosis pressure 1/2 time 66.60 ms    MV Peak A Louie 0.74 m/s    PV Peak S Louie 0.46 m/s    PV Peak D Louie 0.39 m/s    LV Systolic Volume 38.51 mL    LV Systolic Volume Index 15.3 mL/m2    LV Diastolic Volume 116.15 mL    LV Diastolic Volume Index 46.27 mL/m2    LA Volume Index 25.6 mL/m2    LV Mass Index 52 g/m2    RA Major Axis 5.10 cm    Left Atrium Minor Axis 5.20 cm    Left Atrium Major Axis 4.98 cm    Triscuspid Valve Regurgitation Peak Gradient 33 mmHg    LA Volume Index (Mod) 20.2 mL/m2    LA volume (mod) 50.75 cm3    RA Width 2.95 cm    EF 65 %    Narrative    · The left ventricle is normal in size with normal systolic function.  · The estimated ejection fraction is 65%.  · Indeterminate left ventricular diastolic function.  · Normal right ventricular size with normal right ventricular systolic   function. Significantly better than previous report.  · The estimated PA systolic pressure is 36 mm Hg. Considerably lower than   previously reported.  · Low central venous pressure.        EF   Date Value Ref Range Status   09/22/2022 60 % Final   04/12/2022 65 % Final      Results for orders placed or  "performed in visit on 05/02/18   2D echo with color flow doppler   Result Value Ref Range    EF + QEF 60 55 - 65    Diastolic Dysfunction No     Est. PA Systolic Pressure 19.01     Tricuspid Valve Regurgitation TRIVIAL      STORMY:  No results found. However, due to the size of the patient record, not all encounters were searched. Please check Results Review for a complete set of results.  Stress Test:  No results found for this or any previous visit.     LHC:  No results found for this or any previous visit.     PFT:  No results found for: "FEV1", "FVC", "XUJ7WSI", "TLC", "DLCO"   ASSESSMENT/PLAN:          Pre-op Assessment    I have reviewed the Patient Summary Reports.     I have reviewed the Nursing Notes. I have reviewed the NPO Status.   I have reviewed the Medications.     Review of Systems  Social:  Non-Smoker, No Alcohol Use       Cardiovascular:  Exercise tolerance: good   Hypertension                                        Pulmonary:        Sleep Apnea, CPAP                Hepatic/GI:  Bowel Prep.                    Physical Exam  General: Well nourished, Cooperative, Alert and Oriented    Airway:  Mallampati: III   Mouth Opening: Small, but > 3cm  TM Distance: 4 - 6 cm  Tongue: Large  Neck ROM: Normal ROM    Dental:  Intact    Chest/Lungs:  Clear to auscultation, Normal Respiratory Rate    Heart:  Rate: Normal        Anesthesia Plan  Type of Anesthesia, risks & benefits discussed:    Anesthesia Type: Gen ETT, Gen Natural Airway  Intra-op Monitoring Plan: Standard ASA Monitors  Induction:  IV  Informed Consent: Informed consent signed with the Patient and all parties understand the risks and agree with anesthesia plan.  All questions answered.   ASA Score: 3    Ready For Surgery From Anesthesia Perspective.     .      "

## 2023-11-14 NOTE — H&P
Short Stay Endoscopy History and Physical    PCP - Yamila Tejeda MD    Procedure - Colonoscopy  ASA - per anesthesia  Mallampati - per anesthesia  History of Anesthesia problems - no  Family history Anesthesia problems - no     HPI:  Halley Moreno a 48 y.o. female with PMH significant for T2DM (on Ozempic) here for her first screening colonoscopy. She denies history of melena or hematochezia or FH of gastric or colon cancer. She last took Ozempic on .     ROS:  Constitutional: No fevers, chills, No weight loss  ENT: No allergies  CV: No chest pain  Pulm: No shortness of breath  GI: see HPI  Derm: No rash    Medical History:  has a past medical history of Acute respiratory failure with hypoxia and hypercapnia (10/22/2020), Back pain, BMI 70 and over, adult, CHF (congestive heart failure) (10/2020), Depression, Diabetes mellitus, Difficult intubation, DM (diabetes mellitus) type II controlled with renal manifestation, Glaucoma (2022), HTN (hypertension), Hyperlipidemia, Lumbar disc disease, Morbid obesity, Rosacea, Sleep apnea, and Tear of medial meniscus of right knee, current (2017).    Surgical History:  has a past surgical history that includes  section (); Dilation and curettage of uterus (); Endometrial ablation ();  section (2000); Intrauterine device insertion (N/A, 2022); Hysteroscopy with dilation and curettage of uterus (N/A, 2022); Dilation and curettage of uterus (N/A, 2023); Intrauterine device insertion (N/A, 2023); and Hysteroscopy (N/A, 2023).    Family History: family history includes Diabetes in her father; Heart disease in her paternal grandfather; Hypertension in her mother; Lymphoma in her father.. Otherwise no colon cancer, inflammatory bowel disease, or GI malignancies.    Social History:  reports that she has never smoked. She has never been exposed to tobacco smoke. She has never used smokeless tobacco. She  "reports that she does not drink alcohol and does not use drugs.    Review of patient's allergies indicates:   Allergen Reactions    Victoza [liraglutide] Swelling       Medications:   Medications Prior to Admission   Medication Sig Dispense Refill Last Dose    atorvastatin (LIPITOR) 10 MG tablet TAKE ONE TABLET BY MOUTH EVERY DAY. 90 tablet 3     blood sugar diagnostic Strp 1 strip by Misc.(Non-Drug; Combo Route) route 2 (two) times daily. Please provide items covered by patient's insurance 100 strip 5     cholecalciferol, vitamin D3, (VITAMIN D3) 1,000 unit capsule Take 2 capsules (2,000 Units total) by mouth once daily.  0     furosemide (LASIX) 80 MG tablet Take one tablet by mouth every day 90 tablet 3     glipiZIDE 5 MG TR24 Take one tablet by mouth every morning with breakfast 90 tablet 1     HYDROcodone-acetaminophen (NORCO) 5-325 mg per tablet Take 1 tablet by mouth every 6 (six) hours as needed for Pain. 10 tablet 0     ibuprofen (ADVIL,MOTRIN) 600 MG tablet Take 1 tablet (600 mg total) by mouth every 6 (six) hours as needed for Pain. 30 tablet 2     lancets Misc 1 lancet by Misc.(Non-Drug; Combo Route) route 2 (two) times daily. 100 each 5     latanoprost 0.005 % ophthalmic solution INSTILL ONE DROP IN BOTH EYES IN THE EVENING 7.5 mL 3     losartan (COZAAR) 100 MG tablet Take one tablet by mouth every day 90 tablet 3     megestroL (MEGACE) 40 MG Tab Take 2 tablets (80 mg total) by mouth 2 (two) times daily. 120 tablet 11     methocarbamoL (ROBAXIN) 750 MG Tab TAKE ONE TABLET BY MOUTH TWICE DAILY AS NEEDED 60 tablet 1     multivitamin (THERAGRAN) per tablet Take 1 tablet by mouth once daily.       ONETOUCH ULTRA2 METER Misc        pen needle, diabetic 32 gauge x 1/4" Ndle 1 each by Misc.(Non-Drug; Combo Route) route once daily. Pt uses one pen needle weekly for injection of Ozempic 12 each 3     semaglutide (OZEMPIC) 1 mg/dose (2 mg/1.5 mL) PnIj Inject 1 mg into the skin every 7 days. 3 mL 0     sod " sulf-pot chloride-mag sulf (SUTAB) 1.479-0.188- 0.225 gram tablet Take 12 tablets by mouth once daily. BIN: [id number]  PCN: [id number]  GROUP: [id number] MEMBER ID: [id number] 24 tablet 0          Objective Findings:    Vital Signs: see nursing notes    Physical Exam:  General Appearance: Well appearing in no acute distress  Eyes:    No scleral icterus  ENT: Neck supple  Lungs: CTA anteriorly  Heart:  S1, S2 normal, no murmurs heard  Abdomen: Soft, non tender, non distended with positive bowel sounds. No hepatosplenomegaly, ascites, or mass  Extremities: no edema  Skin: No rash      Labs:  Lab Results   Component Value Date    WBC 11.79 10/04/2023    HGB 12.6 10/04/2023    HCT 39.1 10/04/2023     10/04/2023    CHOL 107 (L) 10/04/2023    TRIG 98 10/04/2023    HDL 38 (L) 10/04/2023    ALT 20 10/04/2023    AST 14 10/04/2023     10/04/2023    K 4.8 10/04/2023     10/04/2023    CREATININE 1.0 10/04/2023    BUN 15 10/04/2023    CO2 21 (L) 10/04/2023    TSH 1.202 10/04/2023    INR 1.1 10/22/2020    HGBA1C 7.5 (H) 10/04/2023         Assessment:   Halley Moreno is a 48 y.o. female presenting today for a screening colonoscopy.       Plan:   -Proceed with scheduled procedure today. I have explained the risks and benefits of endoscopy procedures to the patient including but not limited to bleeding, perforation, infection, and death.  -Evaluation and consent for anesthesia portion of this procedure completed by anesthesia team.         Carlos Darling MD, PGY-VI  Gastroenterology Fellow  [facility]

## 2023-11-14 NOTE — PROVATION PATIENT INSTRUCTIONS
Discharge Summary/Instructions after an Endoscopic Procedure  Patient Name: Halley Moreno  Patient MRN: [id number]  Patient YOB: 1975 Tuesday, November 14, 2023  Sharath Perea MD  Dear patient,  As a result of recent federal legislation (The Federal Cures Act), you may   receive lab or pathology results from your procedure in your [facility]   account before your physician is able to contact you. Your physician or   their representative will relay the results to you with their   recommendations at their soonest availability.  Thank you,  RESTRICTIONS:  During your procedure today, you received medications for sedation.  These   medications may affect your judgment, balance and coordination.  Therefore,   for 24 hours, you have the following restrictions:   - DO NOT drive a car, operate machinery, make legal/financial decisions,   sign important papers or drink alcohol.    ACTIVITY:  Today: no heavy lifting, straining or running due to procedural   sedation/anesthesia.  The following day: return to full activity including work.  DIET:  Eat and drink normally unless instructed otherwise.     TREATMENT FOR COMMON SIDE EFFECTS:  - Mild abdominal pain, nausea, belching, bloating or excessive gas:  rest,   eat lightly and use a heating pad.  - Sore Throat: treat with throat lozenges and/or gargle with warm salt   water.  - Because air was used during the procedure, expelling large amounts of air   from your rectum or belching is normal.  - If a bowel prep was taken, you may not have a bowel movement for 1-3 days.    This is normal.  SYMPTOMS TO WATCH FOR AND REPORT TO YOUR PHYSICIAN:  1. Abdominal pain or bloating, other than gas cramps.  2. Chest pain.  3. Back pain.  4. Signs of infection such as: chills or fever occurring within 24 hours   after the procedure.  5. Rectal bleeding, which would show as bright red, maroon, or black stools.   (A tablespoon of blood from the rectum is not serious, especially  if   hemorrhoids are present.)  6. Vomiting.  7. Weakness or dizziness.  GO DIRECTLY TO THE NEAREST EMERGENCY ROOM IF YOU HAVE ANY OF THE FOLLOWING:      Difficulty breathing              Chills and/or fever over 101 F   Persistent vomiting and/or vomiting blood   Severe abdominal pain   Severe chest pain   Black, tarry stools   Bleeding- more than one tablespoon   Any other symptom or condition that you feel may need urgent attention  Your doctor recommends these additional instructions:  If any biopsies were taken, your doctors clinic will contact you in 1 to 2   weeks with any results.  - Discharge patient to home (with escort).   - Resume previous diet today.   - Continue present medications.   - Repeat colonoscopy in 10 years for screening purposes.   - Written discharge instructions were provided to the patient.   - The signs and symptoms of potential delayed complications were discussed   with the patient.   - Patient has a contact number available for emergencies.   - Return to normal activities tomorrow.  For questions, problems or results please call your physician - Sharath Perea MD at Work:  [phone].  [facility], EMERGENCY ROOM PHONE NUMBER: [phone]  IF A COMPLICATION OR EMERGENCY SITUATION ARISES AND YOU ARE UNABLE TO REACH   YOUR PHYSICIAN - GO DIRECTLY TO THE EMERGENCY ROOM.  Sharath Perea MD  11/14/2023 9:23:13 AM  This report has been verified and signed electronically.  Dear patient,  As a result of recent federal legislation (The Federal Cures Act), you may   receive lab or pathology results from your procedure in your [facility]   account before your physician is able to contact you. Your physician or   their representative will relay the results to you with their   recommendations at their soonest availability.  Thank you,  PROVATION

## 2023-11-14 NOTE — TRANSFER OF CARE
"Anesthesia Transfer of Care Note    Patient: Halley Moreno    Procedure(s) Performed: Procedure(s) (LRB):  COLONOSCOPY (N/A)    Patient location: [facility]    Anesthesia Type: general    Transport from OR: Transported from OR on room air with adequate spontaneous ventilation    Post pain: adequate analgesia    Post assessment: no apparent anesthetic complications    Post vital signs: stable    Level of consciousness: awake    Nausea/Vomiting: no nausea/vomiting    Complications: none    Transfer of care protocol was followed    Last vitals: Visit Vitals  BP (!) 122/59 (BP Location: Left arm, Patient Position: Lying)   Pulse 72   Temp 37.3 °C (99.2 °F) (Oral)   Resp 18   Ht 5' 3" (1.6 m)   Wt (!) 164.7 kg (363 lb)   LMP 03/01/2023 (Approximate)   SpO2 98%   BMI 64.30 kg/m²     "

## 2023-11-14 NOTE — ANESTHESIA POSTPROCEDURE EVALUATION
Anesthesia Post Evaluation    Patient: Halley Moreno    Procedure(s) Performed: Procedure(s) (LRB):  COLONOSCOPY (N/A)    Final Anesthesia Type: general      Patient location during evaluation: [facility]  Patient participation: Yes- Able to Participate  Level of consciousness: awake and alert  Post-procedure vital signs: reviewed and stable  Pain management: adequate  Airway patency: patent    PONV status at discharge: No PONV  Anesthetic complications: no      Cardiovascular status: blood pressure returned to baseline  Respiratory status: unassisted  Hydration status: euvolemic  Follow-up not needed.          Vitals Value Taken Time   /63 11/14/23 0901   Temp 36.7 °C (98.1 °F) 11/14/23 0845   Pulse 66 11/14/23 0907   Resp 22 11/14/23 0907   SpO2 99 % 11/14/23 0907   Vitals shown include unvalidated device data.      No case tracking events are documented in the log.      Pain/Alia Score: Alia Score: 10 (11/14/2023  8:46 AM)

## 2023-11-22 ENCOUNTER — PATIENT MESSAGE (OUTPATIENT)
Dept: INTERNAL MEDICINE | Facility: CLINIC | Age: 48
End: 2023-11-22
Payer: MEDICAID

## 2023-11-22 DIAGNOSIS — E11.9 TYPE 2 DIABETES MELLITUS WITHOUT COMPLICATION, WITHOUT LONG-TERM CURRENT USE OF INSULIN: ICD-10-CM

## 2023-11-22 RX ORDER — SEMAGLUTIDE 2.68 MG/ML
2 INJECTION, SOLUTION SUBCUTANEOUS
Qty: 3 ML | Refills: 5 | Status: SHIPPED | OUTPATIENT
Start: 2023-11-22 | End: 2023-12-26 | Stop reason: SDUPTHER

## 2023-11-22 RX ORDER — SEMAGLUTIDE 1.34 MG/ML
1 INJECTION, SOLUTION SUBCUTANEOUS
Qty: 3 ML | Refills: 0 | OUTPATIENT
Start: 2023-11-22

## 2023-11-22 NOTE — TELEPHONE ENCOUNTER
No care due was identified.  [facility] Embedded Care Due Messages. Reference number: [id number].   11/22/2023 9:18:05 AM CST

## 2023-12-05 ENCOUNTER — PATIENT MESSAGE (OUTPATIENT)
Dept: INTERNAL MEDICINE | Facility: CLINIC | Age: 48
End: 2023-12-05
Payer: MEDICAID

## 2023-12-05 DIAGNOSIS — E66.01 MORBID OBESITY WITH BMI OF 60.0-69.9, ADULT: ICD-10-CM

## 2023-12-06 RX ORDER — PHENTERMINE HYDROCHLORIDE 37.5 MG/1
37.5 TABLET ORAL
Qty: 30 TABLET | Refills: 0 | Status: SHIPPED | OUTPATIENT
Start: 2023-12-06 | End: 2024-01-04

## 2023-12-08 ENCOUNTER — HOSPITAL ENCOUNTER (EMERGENCY)
Facility: HOSPITAL | Age: 48
Discharge: HOME | End: 2023-12-08
Attending: EMERGENCY MEDICINE
Payer: MEDICAID

## 2023-12-08 VITALS
SYSTOLIC BLOOD PRESSURE: 141 MMHG | HEART RATE: 69 BPM | TEMPERATURE: 98 F | WEIGHT: 293 LBS | BODY MASS INDEX: 51.91 KG/M2 | DIASTOLIC BLOOD PRESSURE: 67 MMHG | OXYGEN SATURATION: 97 % | RESPIRATION RATE: 16 BRPM | HEIGHT: 63 IN

## 2023-12-08 DIAGNOSIS — K80.20 CALCULUS OF GALLBLADDER WITHOUT CHOLECYSTITIS WITHOUT OBSTRUCTION: Primary | ICD-10-CM

## 2023-12-08 DIAGNOSIS — R10.11 RUQ ABDOMINAL PAIN: ICD-10-CM

## 2023-12-08 LAB
ALBUMIN SERPL BCP-MCNC: 3.5 G/DL (ref 3.5–5.2)
ALP SERPL-CCNC: 83 U/L (ref 55–135)
ALT SERPL W/O P-5'-P-CCNC: 19 U/L (ref 10–44)
ANION GAP SERPL CALC-SCNC: 10 MMOL/L (ref 8–16)
AST SERPL-CCNC: 18 U/L (ref 10–40)
BASOPHILS # BLD AUTO: 0.02 K/UL (ref 0–0.2)
BASOPHILS NFR BLD: 0.2 % (ref 0–1.9)
BILIRUB SERPL-MCNC: 0.6 MG/DL (ref 0.1–1)
BUN SERPL-MCNC: 14 MG/DL (ref 6–20)
CALCIUM SERPL-MCNC: 9.6 MG/DL (ref 8.7–10.5)
CHLORIDE SERPL-SCNC: 105 MMOL/L (ref 95–110)
CO2 SERPL-SCNC: 25 MMOL/L (ref 23–29)
CREAT SERPL-MCNC: 0.9 MG/DL (ref 0.5–1.4)
DIFFERENTIAL METHOD: ABNORMAL
EOSINOPHIL # BLD AUTO: 0.1 K/UL (ref 0–0.5)
EOSINOPHIL NFR BLD: 1.2 % (ref 0–8)
ERYTHROCYTE [DISTWIDTH] IN BLOOD BY AUTOMATED COUNT: 15.3 % (ref 11.5–14.5)
EST. GFR  (NO RACE VARIABLE): >60 ML/MIN/1.73 M^2
GLUCOSE SERPL-MCNC: 157 MG/DL (ref 70–110)
HCT VFR BLD AUTO: 34.3 % (ref 37–48.5)
HCV AB SERPL QL IA: NORMAL
HGB BLD-MCNC: 11.2 G/DL (ref 12–16)
HIV 1+2 AB+HIV1 P24 AG SERPL QL IA: NORMAL
IMM GRANULOCYTES # BLD AUTO: 0.09 K/UL (ref 0–0.04)
IMM GRANULOCYTES NFR BLD AUTO: 0.9 % (ref 0–0.5)
LIPASE SERPL-CCNC: 36 U/L (ref 4–60)
LYMPHOCYTES # BLD AUTO: 1.6 K/UL (ref 1–4.8)
LYMPHOCYTES NFR BLD: 14.9 % (ref 18–48)
MCH RBC QN AUTO: 27.9 PG (ref 27–31)
MCHC RBC AUTO-ENTMCNC: 32.7 G/DL (ref 32–36)
MCV RBC AUTO: 85 FL (ref 82–98)
MONOCYTES # BLD AUTO: 0.7 K/UL (ref 0.3–1)
MONOCYTES NFR BLD: 6.2 % (ref 4–15)
NEUTROPHILS # BLD AUTO: 8.1 K/UL (ref 1.8–7.7)
NEUTROPHILS NFR BLD: 76.6 % (ref 38–73)
NRBC BLD-RTO: 0 /100 WBC
PLATELET # BLD AUTO: 232 K/UL (ref 150–450)
PMV BLD AUTO: 10.4 FL (ref 9.2–12.9)
POCT GLUCOSE: 177 MG/DL (ref 70–110)
POTASSIUM SERPL-SCNC: 4.4 MMOL/L (ref 3.5–5.1)
PROT SERPL-MCNC: 7.1 G/DL (ref 6–8.4)
RBC # BLD AUTO: 4.02 M/UL (ref 4–5.4)
SODIUM SERPL-SCNC: 140 MMOL/L (ref 136–145)
WBC # BLD AUTO: 10.57 K/UL (ref 3.9–12.7)

## 2023-12-08 PROCEDURE — 87389 HIV-1 AG W/HIV-1&-2 AB AG IA: CPT | Performed by: PHYSICIAN ASSISTANT

## 2023-12-08 PROCEDURE — 25500020 PHARM REV CODE 255: Performed by: EMERGENCY MEDICINE

## 2023-12-08 PROCEDURE — 80053 COMPREHEN METABOLIC PANEL: CPT | Performed by: PHYSICIAN ASSISTANT

## 2023-12-08 PROCEDURE — 63600175 PHARM REV CODE 636 W HCPCS: Performed by: PHYSICIAN ASSISTANT

## 2023-12-08 PROCEDURE — 86803 HEPATITIS C AB TEST: CPT | Performed by: PHYSICIAN ASSISTANT

## 2023-12-08 PROCEDURE — 82962 GLUCOSE BLOOD TEST: CPT

## 2023-12-08 PROCEDURE — 99285 EMERGENCY DEPT VISIT HI MDM: CPT | Mod: 25

## 2023-12-08 PROCEDURE — 96374 THER/PROPH/DIAG INJ IV PUSH: CPT | Mod: 59

## 2023-12-08 PROCEDURE — 85025 COMPLETE CBC W/AUTO DIFF WBC: CPT | Performed by: PHYSICIAN ASSISTANT

## 2023-12-08 PROCEDURE — 83690 ASSAY OF LIPASE: CPT | Performed by: PHYSICIAN ASSISTANT

## 2023-12-08 RX ORDER — NAPROXEN 500 MG/1
500 TABLET ORAL 2 TIMES DAILY WITH MEALS
Qty: 20 TABLET | Refills: 0 | Status: SHIPPED | OUTPATIENT
Start: 2023-12-08

## 2023-12-08 RX ORDER — KETOROLAC TROMETHAMINE 30 MG/ML
10 INJECTION, SOLUTION INTRAMUSCULAR; INTRAVENOUS
Status: COMPLETED | OUTPATIENT
Start: 2023-12-08 | End: 2023-12-08

## 2023-12-08 RX ORDER — ONDANSETRON 4 MG/1
4 TABLET, ORALLY DISINTEGRATING ORAL EVERY 8 HOURS PRN
Qty: 12 TABLET | Refills: 0 | Status: SHIPPED | OUTPATIENT
Start: 2023-12-08

## 2023-12-08 RX ADMIN — KETOROLAC TROMETHAMINE 10 MG: 30 INJECTION, SOLUTION INTRAMUSCULAR; INTRAVENOUS at 10:12

## 2023-12-08 RX ADMIN — IOHEXOL 100 ML: 350 INJECTION, SOLUTION INTRAVENOUS at 12:12

## 2023-12-08 NOTE — ED PROVIDER NOTES
"Encounter Date: 12/8/2023       History     Chief Complaint   Patient presents with    Abdominal Pain     LUQ pain since last night. Denies V/V      48-year-old female with history of diabetes, hypertension, heart failure presents to the ED complaining of right upper quadrant abdominal pain that started last night, 1 hour after eating [employer].  Pain acutely worsened this morning, is now constant 6/10 "cramping" to the right upper quadrant.  She has not taken any over-the-counter pain medication prior to arrival.  This morning, developed diarrhea.  Currently on menstrual cycle.  No prior history of similar pain, no past surgical history of the abdomen.  No falls or trauma.  Denies fever, chills, nausea, vomiting, chest pain, shortness of breath, dysuria, headache, lightheadedness.  Has not had any po intake since dinner last night.     The history is provided by the patient.     Review of patient's allergies indicates:   Allergen Reactions    Victoza [liraglutide] Swelling     Past Medical History:   Diagnosis Date    Acute respiratory failure with hypoxia and hypercapnia 10/22/2020    Last Assessment & Plan:  Patient is a chronic CO2 retainer in setting of obesity hypoventilation syndrome 2/2 morbid obesity. Stepped down from ICU 10/26, currently continuing diuresis. De-escalated to NC during the day with BiPAP nightly on 10/31, which patient tolerated well. On Lasix gtt for diuresis through 11/5.   Plan:  - Target SpO2 of >88%. Continue BiPAP nightly. Patient on 3LNC, will con    Back pain     BMI 70 and over, adult     CHF (congestive heart failure) 10/2020    Depression     Diabetes mellitus     Difficult intubation     DM (diabetes mellitus) type II controlled with renal manifestation     Glaucoma 09/13/2022    HTN (hypertension)     Hyperlipidemia     Lumbar disc disease     Morbid obesity     Rosacea     Sleep apnea     cpap    Tear of medial meniscus of right knee, current 09/05/2017     Past Surgical History: " "  Procedure Laterality Date     SECTION       SECTION  2000    COLONOSCOPY N/A 2023    Procedure: COLONOSCOPY;  Surgeon: Sharath Perea MD;  Location: [facility] ([address] [place] [address]);  Service: Endoscopy;  Laterality: N/A;  Any MD  BMI 64.66  pt refused WB, no availability at [place] in next 3-4 days   ref. by Yamila Tejeda MD, [place], instr. to portal, [facility]  -precall complete-pt verbalized understanding of holding Ozempic-MS    DILATION AND CURETTAGE OF UTERUS      AUB "negative path" per patient    DILATION AND CURETTAGE OF UTERUS N/A 2023    Procedure: DILATION AND CURETTAGE, UTERUS;  Surgeon: Florentino Lovell MD;  Location: [facility];  Service: OB/GYN;  Laterality: N/A;  PROCEED AS INDICATED    ENDOMETRIAL ABLATION      Patient unsure if procedure was performed    HYSTEROSCOPY N/A 2023    Procedure: HYSTEROSCOPY;  Surgeon: Florentino Lovell MD;  Location: [facility];  Service: OB/GYN;  Laterality: N/A;    HYSTEROSCOPY WITH DILATION AND CURETTAGE OF UTERUS N/A 2022    Procedure: HYSTEROSCOPY, WITH DILATION AND CURETTAGE OF UTERUS;  Surgeon: Dixie Rod MD;  Location: [facility];  Service: OB/GYN;  Laterality: N/A;    INTRAUTERINE DEVICE INSERTION N/A 2022    Procedure: INSERTION, INTRAUTERINE DEVICE;  Surgeon: Dixie Rod MD;  Location: [facility];  Service: OB/GYN;  Laterality: N/A;    INTRAUTERINE DEVICE INSERTION N/A 2023    Procedure: INSERTION, INTRAUTERINE DEVICE;  Surgeon: Florentino Lovell MD;  Location: [facility];  Service: OB/GYN;  Laterality: N/A;     Family History   Problem Relation Age of Onset    Hypertension Mother     Diabetes Father     Lymphoma Father         cns lymphoma    Heart disease Paternal Grandfather     Colon cancer Neg Hx     Ovarian cancer Neg Hx     Breast cancer Neg Hx     Amblyopia Neg Hx     Blindness Neg Hx     Cataracts Neg Hx     Glaucoma Neg Hx     Macular degeneration Neg Hx     Retinal detachment Neg Hx     Strabismus " Neg Hx     Stroke Neg Hx     Thyroid disease Neg Hx     Uterine cancer Neg Hx      Social History     Tobacco Use    Smoking status: Never     Passive exposure: Never    Smokeless tobacco: Never   Substance Use Topics    Alcohol use: No    Drug use: No     Review of Systems   Gastrointestinal:  Positive for abdominal pain and diarrhea.   Genitourinary:  Positive for vaginal bleeding (on cycle).       Physical Exam     Initial Vitals [12/08/23 0931]   BP Pulse Resp Temp SpO2   118/65 65 18 98.5 °F (36.9 °C) 99 %      MAP       --         Physical Exam    Nursing note and vitals reviewed.  Constitutional: She appears well-developed and well-nourished. She is not diaphoretic. She is Obese . No distress.   HENT:   Head: Normocephalic and atraumatic.   Neck: Neck supple.   Normal range of motion.  Cardiovascular:  Normal rate, regular rhythm and normal heart sounds.     Exam reveals no gallop and no friction rub.       No murmur heard.  Pulmonary/Chest: Breath sounds normal. She has no wheezes. She has no rhonchi. She has no rales.   Abdominal: Abdomen is soft. There is abdominal tenderness in the right upper quadrant.   No right CVA tenderness.  No left CVA tenderness. There is no rebound and no guarding.   Musculoskeletal:         General: Normal range of motion.      Cervical back: Normal range of motion and neck supple.     Neurological: She is alert and oriented to person, place, and time.   Skin: Skin is warm and dry.   Psychiatric: She has a normal mood and affect.         ED Course   Procedures  Labs Reviewed   CBC W/ AUTO DIFFERENTIAL - Abnormal; Notable for the following components:       Result Value    Hemoglobin 11.2 (*)     Hematocrit 34.3 (*)     RDW 15.3 (*)     Immature Granulocytes 0.9 (*)     Gran # (ANC) 8.1 (*)     Immature Grans (Abs) 0.09 (*)     Gran % 76.6 (*)     Lymph % 14.9 (*)     All other components within normal limits   COMPREHENSIVE METABOLIC PANEL - Abnormal; Notable for the following  components:    Glucose 157 (*)     All other components within normal limits   POCT GLUCOSE - Abnormal; Notable for the following components:    POCT Glucose 177 (*)     All other components within normal limits   HIV 1 / 2 ANTIBODY    Narrative:     Release to patient->Immediate   HEPATITIS C ANTIBODY    Narrative:     Release to patient->Immediate   LIPASE   URINALYSIS, REFLEX TO URINE CULTURE   POCT GLUCOSE MONITORING CONTINUOUS          Imaging Results              CT Abdomen Pelvis With IV Contrast NO Oral Contrast (Final result)  Result time 12/08/23 13:51:21      Final result by Waqas Brock MD (12/08/23 13:51:21)                   Impression:      1. Low attenuating lesions within the hepatic parenchyma, some of which have a lobular appearance with peripheral enhancement suggesting hemangioma, others are too small for characterization.  Nonemergent MRI could be performed for definitive characterization as warranted.  2. Findings suggesting hepatic steatosis, correlation with LFTs recommended.  3. Cholelithiasis without secondary findings to suggest acute cholecystitis.  4. Right nonobstructive nephrolithiasis.  5. Scattered prominent bilateral iliac chain lymph nodes, follow-up recommended.  6. Please see above for several additional findings.      Electronically signed by: Waqas Brock MD  Date:    12/08/2023  Time:    13:51               Narrative:    EXAMINATION:  CT ABDOMEN PELVIS WITH IV CONTRAST    CLINICAL HISTORY:  Abdominal pain, acute, nonlocalized;    TECHNIQUE:  Low dose axial images, sagittal and coronal reformations were obtained from the lung bases to the pubic symphysis following the IV administration of 100 mL of Omnipaque 350 .  Oral contrast was not given.    COMPARISON:  None.    FINDINGS:  Images of the lower thorax are remarkable for bilateral dependent atelectasis.  There is a calcified granuloma within the right lower lobe.    The liver is hypoattenuating suggesting  steatosis, correlation with LFTs recommended.  There is a subcentimeter low attenuating lesion within the right hepatic dome, too small for characterization.  There are lobular peripherally enhancing low attenuating lesions within the hepatic parenchyma, largest within the right hepatic lobe measures up to 7 cm.  The spleen, pancreas and adrenal glands are grossly unremarkable noting pancreatic atrophy.  There is cholelithiasis, no secondary findings to suggest acute cholecystitis.  The portal vein, splenic vein, SMV, celiac axis and SMA all are patent.  No significant abdominal lymphadenopathy.    The kidneys enhance symmetrically without hydronephrosis.  There is right nonobstructive nephrolithiasis.  The bilateral ureters are unremarkable without calculi seen.  The urinary bladder is decompressed without wall thickening.  There is a punctate focus of air within the urinary bladder, may reflect sequela of prior catheterization.  The uterus and adnexa are grossly unremarkable noting punctate focus of air within the endometrial canal.  There is an IUD.    The large bowel is for the most part decompressed.  The terminal ileum and appendix are unremarkable.  The small bowel is grossly unremarkable.  There are a few scattered shotty periaortic, pericaval, and mesenteric lymph nodes.  There are a few scattered prominent bilateral iliac chain lymph nodes.  No focal organized pelvic fluid collection.    There are degenerative changes of the spine.  There are a few scattered mildly prominent bilateral inguinal lymph nodes.                                        US Abdomen Limited (Gallbladder) (Final result)  Result time 12/08/23 12:16:39      Final result by Brent Holloway MD (12/08/23 12:16:39)                   Impression:      Cholelithiasis without evidence for gallbladder wall thickening or pericholecystic fluid.  Patient is tender upon palpation in the right upper quadrant without definite evidence for sonographic  Cooney's sign.  Note is made that patient received pain medication prior imaging, limiting evaluation.  Correlation is advised.    Partial evaluation of the right hepatic lobe demonstrates a large echogenic focus measuring up to 7.7 cm.  While findings may relate to a benign etiology such as hepatic hemangioma, malignant process cannot be completely excluded.  Consider further evaluation with CT or MR liver protocol as clinically indicated.    This report was flagged in Epic as abnormal.      Electronically signed by: Brent Holloway  Date:    12/08/2023  Time:    12:16               Narrative:    EXAMINATION:  US ABDOMEN LIMITED    CLINICAL HISTORY:  Right upper quadrant pain    TECHNIQUE:  Limited ultrasound of the right upper quadrant of the abdomen focused on the biliary system was performed.    COMPARISON:  Ultrasound abdomen limited 10/05/2011, CT abdomen with contrast 10/03/2011    FINDINGS:  Gallbladder: There is a 3 cm gallstone, without significant gallbladder wall thickening or pericholecystic fluid.  Patient is tender upon palpation in the right upper quadrant without definite evidence for sonographic Cooney's sign.  Note is made that patient received pain medication prior to imaging.    Biliary system: The common duct is not dilated, measuring 3 mm.  No intrahepatic ductal dilatation.    Pancreas: The visualized portions of pancreas appear normal.    Miscellaneous: No upper abdominal ascites.    Partial evaluation of the right hepatic lobe demonstrates a large echogenic focus measuring on the order of 5.6 x 5.2 x 7.7 cm.                                       Medications   ketorolac injection 9.999 mg (9.999 mg Intravenous Given 12/8/23 1024)   iohexoL (OMNIPAQUE 350) injection 100 mL (100 mLs Intravenous Given 12/8/23 1253)     Medical Decision Making  Amount and/or Complexity of Data Reviewed  Labs: ordered.  Radiology: ordered.    Risk  Prescription drug management.         APC / Resident Notes:    48-year-old female with history of diabetes, hypertension, heart failure presents to the ED complaining of right upper quadrant abdominal pain that started last night, 1 hour after eating [employer].  Vital signs stable.  Well-appearing.  Abdomen is soft, tender in the right upper quadrant.  No CVA tenderness.  No rashes.  Differential diagnosis includes but isn't limited to cholecystitis, biliary colic, pancreatitis, colitis, gastritis, hyperglycemia, DKA.  Will get labs, right upper quadrant ultrasound.    Glucose 177.    No leukocytosis or anemia.  CMP unremarkable.  Lipase normal.    Right upper quadrant ultrasound shows cholelithiasis without any evidence for acute cholecystitis.  There is a large echogenic focus in the right hepatic lobe.  CT ordered for further evaluation.    CT abdomen pelvis shows possible hemangioma and the liver.  Hepatic steatosis.  Patient does report prior history of lesions to the liver, states she had a biopsy which just showed fatty liver.  Cholelithiasis without any secondary findings to suggest acute cholecystitis.    Pain has improved, repeat abdominal exam improved.  She was able to tolerate po challenge without any worsening of pain. I do not feel that she needs any further labs or imaging at this time. Stable for discharge.     She was discharged with prescriptions for naproxen and Zofran.  She will follow up with General surgery, referral placed.  Instructed on diet for gallstones.  Strict ED return precautions given.  All of the patient's questions were answered.  I reviewed the patient's chart, labs, and imaging.         ED Course as of 12/08/23 1545   Fri Dec 08, 2023   1113 Pain improved with toradol [CD]      ED Course User Index  [CD] Lilly Ku PA-C                     Clinical Impression:  Final diagnoses:  [R10.11] RUQ abdominal pain  [K80.20] Calculus of gallbladder without cholecystitis without obstruction (Primary)          ED Disposition Condition    Discharge  Stable          ED Prescriptions       Medication Sig Dispense Start Date End Date Auth. Provider    naproxen (NAPROSYN) 500 MG tablet Take 1 tablet (500 mg total) by mouth 2 (two) times daily with meals. 20 tablet 12/8/2023 -- Lilly Ku PA-C    ondansetron (ZOFRAN-ODT) 4 MG TbDL Take 1 tablet (4 mg total) by mouth every 8 (eight) hours as needed (nausea). 12 tablet 12/8/2023 -- Lilly Ku PA-C          Follow-up Information       Follow up With Specialties Details Why Contact Info Additional Information    Yamila Tejeda MD Internal Medicine   [address] [facility] [facility] [place] [address]  [phone]       [facility]   [address]  [facility] [address] [phone] [facility] - Main Building, 2nd Floor Please park in [facility] and use escalator or Clinic elevator             Lilly Ku PA-C  12/08/23 4088

## 2023-12-08 NOTE — ED TRIAGE NOTES
Patient presents to the ED today with reports of RUQ abdominal pain onset yesterday patient also endorses episodes of diarrhea     Patient identifiers verified and correct for Halley Aragon  LOC: The patient is awake, alert and aware of environment with an appropriate affect, the patient is oriented x 3 and speaking appropriately.   APPEARANCE: Patient appears comfortable and in no acute distress, patient is clean and well groomed.  SKIN: The skin is warm and dry, color consistent with ethnicity, patient has normal skin turgor and moist mucus membranes, skin intact, no breakdown or bruising noted. Discoloration to lower extremities  MUSCULOSKELETAL: Patient moving all extremities spontaneously, no swelling noted.  RESPIRATORY: Airway is open and patent, respirations are spontaneous, patient has a normal effort and rate, no accessory muscle use noted, O2 sats noted at 99% on room air.  CARDIAC: Denies chest pain HR 65  GASTRO: RUQ abdominal pain and diarrhea  : Pt denies any pain or frequency with urination.  NEURO: Pt opens eyes spontaneously, behavior appropriate to situation, follows commands, facial expression symmetrical, bilateral hand grasp equal and even, purposeful motor response noted, normal sensation in all extremities when touched with a finger.

## 2023-12-19 ENCOUNTER — PATIENT MESSAGE (OUTPATIENT)
Dept: INTERNAL MEDICINE | Facility: CLINIC | Age: 48
End: 2023-12-19
Payer: MEDICAID

## 2023-12-19 ENCOUNTER — PATIENT MESSAGE (OUTPATIENT)
Dept: GYNECOLOGIC ONCOLOGY | Facility: CLINIC | Age: 48
End: 2023-12-19
Payer: MEDICAID

## 2023-12-19 DIAGNOSIS — Z91.89 CANDIDATE FOR STATIN THERAPY DUE TO RISK OF FUTURE CARDIOVASCULAR EVENT: ICD-10-CM

## 2023-12-19 RX ORDER — ATORVASTATIN CALCIUM 10 MG/1
10 TABLET, FILM COATED ORAL DAILY
Qty: 90 TABLET | Refills: 0 | Status: SHIPPED | OUTPATIENT
Start: 2023-12-19 | End: 2024-03-18

## 2023-12-19 NOTE — TELEPHONE ENCOUNTER
Provider Staff:  Action required. This patient has received emergency care.     Please schedule patient for a follow up appointment within the next 90 days.     Thanks!  [facility]     Appointments      Date Provider   Last Visit   10/2/2023 Yamila Tejeda MD   Next Visit   1/4/2024 Yamila Tejeda MD

## 2023-12-26 ENCOUNTER — PATIENT MESSAGE (OUTPATIENT)
Dept: INTERNAL MEDICINE | Facility: CLINIC | Age: 48
End: 2023-12-26
Payer: MEDICAID

## 2023-12-26 RX ORDER — SEMAGLUTIDE 2.68 MG/ML
2 INJECTION, SOLUTION SUBCUTANEOUS
Qty: 3 ML | Refills: 5 | Status: SHIPPED | OUTPATIENT
Start: 2023-12-26 | End: 2023-12-27 | Stop reason: SDUPTHER

## 2023-12-26 NOTE — TELEPHONE ENCOUNTER
No care due was identified.  [facility] Embedded Care Due Messages. Reference number: [id number].   12/26/2023 12:42:57 PM CST

## 2023-12-27 ENCOUNTER — TELEPHONE (OUTPATIENT)
Dept: INTERNAL MEDICINE | Facility: CLINIC | Age: 48
End: 2023-12-27
Payer: MEDICAID

## 2023-12-27 DIAGNOSIS — E11.9 TYPE 2 DIABETES MELLITUS WITHOUT COMPLICATION, WITHOUT LONG-TERM CURRENT USE OF INSULIN: Primary | ICD-10-CM

## 2023-12-27 RX ORDER — SEMAGLUTIDE 2.68 MG/ML
2 INJECTION, SOLUTION SUBCUTANEOUS
Qty: 3 ML | Refills: 5 | Status: SHIPPED | OUTPATIENT
Start: 2023-12-27

## 2024-01-04 ENCOUNTER — LAB VISIT (OUTPATIENT)
Dept: LAB | Facility: HOSPITAL | Age: 49
End: 2024-01-04
Attending: INTERNAL MEDICINE
Payer: MEDICAID

## 2024-01-04 ENCOUNTER — OFFICE VISIT (OUTPATIENT)
Dept: INTERNAL MEDICINE | Facility: CLINIC | Age: 49
End: 2024-01-04
Payer: MEDICAID

## 2024-01-04 VITALS
BODY MASS INDEX: 51.91 KG/M2 | HEART RATE: 80 BPM | DIASTOLIC BLOOD PRESSURE: 84 MMHG | TEMPERATURE: 97 F | OXYGEN SATURATION: 98 % | RESPIRATION RATE: 18 BRPM | WEIGHT: 293 LBS | SYSTOLIC BLOOD PRESSURE: 140 MMHG | HEIGHT: 63 IN

## 2024-01-04 DIAGNOSIS — I15.2 HYPERTENSION ASSOCIATED WITH DIABETES: ICD-10-CM

## 2024-01-04 DIAGNOSIS — E11.9 TYPE 2 DIABETES MELLITUS WITHOUT COMPLICATION, WITHOUT LONG-TERM CURRENT USE OF INSULIN: Primary | ICD-10-CM

## 2024-01-04 DIAGNOSIS — R00.2 PALPITATIONS: ICD-10-CM

## 2024-01-04 DIAGNOSIS — E11.59 HYPERTENSION ASSOCIATED WITH DIABETES: ICD-10-CM

## 2024-01-04 DIAGNOSIS — D64.9 ANEMIA, UNSPECIFIED TYPE: ICD-10-CM

## 2024-01-04 DIAGNOSIS — N85.02 COMPLEX ENDOMETRIAL HYPERPLASIA WITH ATYPIA: ICD-10-CM

## 2024-01-04 DIAGNOSIS — E66.01 MORBID OBESITY WITH BMI OF 60.0-69.9, ADULT: ICD-10-CM

## 2024-01-04 LAB
BASOPHILS # BLD AUTO: 0.03 K/UL (ref 0–0.2)
BASOPHILS NFR BLD: 0.3 % (ref 0–1.9)
DIFFERENTIAL METHOD BLD: ABNORMAL
EOSINOPHIL # BLD AUTO: 0.1 K/UL (ref 0–0.5)
EOSINOPHIL NFR BLD: 1 % (ref 0–8)
ERYTHROCYTE [DISTWIDTH] IN BLOOD BY AUTOMATED COUNT: 16.1 % (ref 11.5–14.5)
FERRITIN SERPL-MCNC: 197 NG/ML (ref 20–300)
HCT VFR BLD AUTO: 38.1 % (ref 37–48.5)
HGB BLD-MCNC: 12 G/DL (ref 12–16)
IMM GRANULOCYTES # BLD AUTO: 0.08 K/UL (ref 0–0.04)
IMM GRANULOCYTES NFR BLD AUTO: 0.7 % (ref 0–0.5)
IRON SERPL-MCNC: 55 UG/DL (ref 30–160)
LYMPHOCYTES # BLD AUTO: 2 K/UL (ref 1–4.8)
LYMPHOCYTES NFR BLD: 17.8 % (ref 18–48)
MCH RBC QN AUTO: 27.8 PG (ref 27–31)
MCHC RBC AUTO-ENTMCNC: 31.5 G/DL (ref 32–36)
MCV RBC AUTO: 88 FL (ref 82–98)
MONOCYTES # BLD AUTO: 0.8 K/UL (ref 0.3–1)
MONOCYTES NFR BLD: 6.7 % (ref 4–15)
NEUTROPHILS # BLD AUTO: 8.3 K/UL (ref 1.8–7.7)
NEUTROPHILS NFR BLD: 73.5 % (ref 38–73)
NRBC BLD-RTO: 0 /100 WBC
PLATELET # BLD AUTO: 248 K/UL (ref 150–450)
PMV BLD AUTO: 11 FL (ref 9.2–12.9)
RBC # BLD AUTO: 4.32 M/UL (ref 4–5.4)
SATURATED IRON: 17 % (ref 20–50)
TOTAL IRON BINDING CAPACITY: 323 UG/DL (ref 250–450)
TRANSFERRIN SERPL-MCNC: 218 MG/DL (ref 200–375)
WBC # BLD AUTO: 11.34 K/UL (ref 3.9–12.7)

## 2024-01-04 PROCEDURE — 36415 COLL VENOUS BLD VENIPUNCTURE: CPT | Mod: PO | Performed by: INTERNAL MEDICINE

## 2024-01-04 PROCEDURE — 93005 ELECTROCARDIOGRAM TRACING: CPT | Mod: PBBFAC,PO | Performed by: INTERNAL MEDICINE

## 2024-01-04 PROCEDURE — 85025 COMPLETE CBC W/AUTO DIFF WBC: CPT | Performed by: INTERNAL MEDICINE

## 2024-01-04 PROCEDURE — 3079F DIAST BP 80-89 MM HG: CPT | Mod: CPTII,,, | Performed by: INTERNAL MEDICINE

## 2024-01-04 PROCEDURE — 93010 ELECTROCARDIOGRAM REPORT: CPT | Mod: S$PBB,,, | Performed by: INTERNAL MEDICINE

## 2024-01-04 PROCEDURE — 3008F BODY MASS INDEX DOCD: CPT | Mod: CPTII,,, | Performed by: INTERNAL MEDICINE

## 2024-01-04 PROCEDURE — 82728 ASSAY OF FERRITIN: CPT | Performed by: INTERNAL MEDICINE

## 2024-01-04 PROCEDURE — 99214 OFFICE O/P EST MOD 30 MIN: CPT | Mod: S$PBB,,, | Performed by: INTERNAL MEDICINE

## 2024-01-04 PROCEDURE — 99999 PR PBB SHADOW E&M-EST. PATIENT-LVL V: CPT | Mod: PBBFAC,,, | Performed by: INTERNAL MEDICINE

## 2024-01-04 PROCEDURE — 1160F RVW MEDS BY RX/DR IN RCRD: CPT | Mod: CPTII,,, | Performed by: INTERNAL MEDICINE

## 2024-01-04 PROCEDURE — 3077F SYST BP >= 140 MM HG: CPT | Mod: CPTII,,, | Performed by: INTERNAL MEDICINE

## 2024-01-04 PROCEDURE — 1159F MED LIST DOCD IN RCRD: CPT | Mod: CPTII,,, | Performed by: INTERNAL MEDICINE

## 2024-01-04 PROCEDURE — 83540 ASSAY OF IRON: CPT | Performed by: INTERNAL MEDICINE

## 2024-01-04 PROCEDURE — 99215 OFFICE O/P EST HI 40 MIN: CPT | Mod: PBBFAC,PO | Performed by: INTERNAL MEDICINE

## 2024-01-04 NOTE — PROGRESS NOTES
Subjective:     Halley Moreno is a 48 y.o. female who presents for   Chief Complaint   Patient presents with    Follow-up    Diabetes    Hypertension    Palpitations       HPI      ER for RUE pain and found to have gallstones. She was referred to General Surgery.    Diabetes: Patient presents for follow up of diabetes. Current symptoms include: none. Symptoms have been well-controlled. Patient denies foot ulcerations and visual disturbances. Evaluation to date has included: hemoglobin A1C.  Home sugars: BGs consistently in an acceptable range. Current treatment:  ozempic, glipizide .     Lab Results   Component Value Date    HGBA1C 7.5 (H) 10/04/2023     (H) 12/08/2023       Hypertension: The patient has been taking medications as instructed, no medication side effects noted, no chest pain on exertion, no dyspnea on exertion, and no swelling of ankles      Palpitations: Patient mentioned palpitations near the end of office visit. The symptoms are mild and occur intermittently. Cardiac risk factors include: diabetes mellitus, dyslipidemia, hypertension, obesity (BMI >= 30 kg/m2), and sedentary lifestyle. Aggravating factors: none. Relieving factors: none. Associated symptoms: none. Patient denies: dizziness, syncope.     She does report checking her BP and HR and her heart rate was in 150's. She is in digital HTN program and reviewed readings but this was not recorded.    Body mass index is 64.91 kg/m².  - she has not lost more weight   - she admits to splurging a little more often and she went to [facility] with her mother--> ate grits and pancakes      Review of Systems   Constitutional:  Negative for chills, diaphoresis and fever.   HENT:  Negative for congestion, ear pain, postnasal drip, sinus pressure and sore throat.    Eyes:  Negative for discharge and visual disturbance.   Respiratory:  Negative for cough and shortness of breath.    Cardiovascular:  Positive for palpitations and leg swelling  (chronic but improved). Negative for chest pain.   Gastrointestinal:  Negative for abdominal pain, constipation, diarrhea, nausea and vomiting.   Endocrine: Negative for polydipsia and polyuria.   Genitourinary:  Positive for menstrual problem (abnormal uterine bleeding for 3 weeks, now spotting). Negative for dysuria and urgency.   Musculoskeletal:  Negative for arthralgias and myalgias.   Skin:  Negative for rash and wound.   Neurological:  Negative for dizziness, tremors and headaches.   Psychiatric/Behavioral:  Negative for dysphoric mood. The patient is not nervous/anxious.           Objective:     Physical Exam  Vitals reviewed.   Constitutional:       General: She is awake. She is not in acute distress.     Appearance: Normal appearance. She is well-developed and well-groomed.   HENT:      Head: Normocephalic and atraumatic.      Right Ear: Hearing and external ear normal.      Left Ear: Hearing and external ear normal.      Nose: Nose normal. No congestion.      Mouth/Throat:      Mouth: Mucous membranes are moist.   Eyes:      General: Lids are normal. Vision grossly intact.   Cardiovascular:      Rate and Rhythm: Normal rate and regular rhythm.      Heart sounds: Normal heart sounds. No murmur heard.  Pulmonary:      Effort: Pulmonary effort is normal.      Breath sounds: Normal breath sounds. No decreased breath sounds or wheezing.   Abdominal:      General: Bowel sounds are normal. There is no distension.   Musculoskeletal:         General: Normal range of motion.      Cervical back: Normal range of motion.      Right lower leg: Edema present.      Left lower leg: Edema present.   Skin:     General: Skin is warm and dry.      Findings: No lesion or rash.   Neurological:      Mental Status: She is alert and oriented to person, place, and time.   Psychiatric:         Attention and Perception: Attention normal.         Mood and Affect: Mood normal.         Behavior: Behavior is cooperative.             Assessment:      1. Type 2 diabetes mellitus without complication, without long-term current use of insulin    2. Palpitations    3. Hypertension associated with diabetes    4. Anemia, unspecified type    5. Complex endometrial hyperplasia with atypia    6. Morbid obesity with BMI of 60.0-69.9, adult           Plan:     1. Type 2 diabetes mellitus without complication, without long-term current use of insulin  - - will continue semaglutide for now, continue glipizide  - HbA1c increased recently, work on maintaining a diabetic diet  -  Ambulatory referral/consult to Optometry; Future    2. Palpitations  - IN OFFICE EKG 12-LEAD (to [place])  - Ambulatory referral/consult to Cardiology; Future    3. Hypertension associated with diabetes  - mildly elevated BP, did not take her antihypertensives early enough, check readings at home, in digital hypertension program    4. Anemia, unspecified type  - CBC Auto Differential; Future  - Ferritin; Future  - Iron and TIBC; Future    5. Complex endometrial hyperplasia with atypia  - return to GYN/ONC for follow-up    6. Morbid obesity with BMI of 60.0-69.9, adult  - discussed a few options for change in regimen, will consider a plan with meal replacements to see if weight loss resumes  - she is considering bariatric surgery    RTC in 6 weeks for follow-up or sooner if needed    __________________________    Yamila Tejeda MD, PharmD  [facility]  [employer] Board of Obesity Medicine diplomate  Office [phone]

## 2024-01-09 ENCOUNTER — OFFICE VISIT (OUTPATIENT)
Dept: SURGERY | Facility: CLINIC | Age: 49
End: 2024-01-09
Payer: MEDICAID

## 2024-01-09 VITALS
HEIGHT: 63 IN | SYSTOLIC BLOOD PRESSURE: 145 MMHG | OXYGEN SATURATION: 95 % | BODY MASS INDEX: 51.91 KG/M2 | HEART RATE: 75 BPM | DIASTOLIC BLOOD PRESSURE: 69 MMHG | WEIGHT: 293 LBS

## 2024-01-09 DIAGNOSIS — K80.20 CALCULUS OF GALLBLADDER WITHOUT CHOLECYSTITIS WITHOUT OBSTRUCTION: ICD-10-CM

## 2024-01-09 PROCEDURE — 1159F MED LIST DOCD IN RCRD: CPT | Mod: CPTII,,, | Performed by: SURGERY

## 2024-01-09 PROCEDURE — 3008F BODY MASS INDEX DOCD: CPT | Mod: CPTII,,, | Performed by: SURGERY

## 2024-01-09 PROCEDURE — 3078F DIAST BP <80 MM HG: CPT | Mod: CPTII,,, | Performed by: SURGERY

## 2024-01-09 PROCEDURE — 99999 PR PBB SHADOW E&M-EST. PATIENT-LVL IV: CPT | Mod: PBBFAC,,, | Performed by: SURGERY

## 2024-01-09 PROCEDURE — 99203 OFFICE O/P NEW LOW 30 MIN: CPT | Mod: S$PBB,,, | Performed by: SURGERY

## 2024-01-09 PROCEDURE — 99214 OFFICE O/P EST MOD 30 MIN: CPT | Mod: PBBFAC | Performed by: SURGERY

## 2024-01-09 PROCEDURE — 3077F SYST BP >= 140 MM HG: CPT | Mod: CPTII,,, | Performed by: SURGERY

## 2024-01-09 NOTE — PROGRESS NOTES
General Surgery Office Visit   History and Physical    Patient Name: Halley Moreno  YOB: 1975 (48 y.o.)  MRN: [id number]  Today's Date: 01/09/2024    Referring Md:   Lilly Ku [facility]  [address]  [place]  [place] [address]    SUBJECTIVE:     Chief Complaint: NEW PATIENT ED f/u acute cholecystitis     History of Present Illness:  Halley Moreno is a 48 y.o. female with PMHx of DM, HTN, HF who presents to the clinic today for acute cholecystitis which was first noticed on 12/8. She originally had sudden onset of pain to her R abdomen and went to the ED. Imaging tests were conducted and findings were consistent with acute cholecystitis. Patient says the pain only lasted 2 days and the pain has not returned since than. Patient denies fever, chills, unintentional weight loss, n/v/d, constipation, hematochezia, dysuria, hematuria, CP, SOB, and all other symptoms. Patient's functional status is excellent.     Patient denies personal history of MI, CVA, lung disease, DM  Previous abdominal surgeries include: None  Denies alcohol, tobacco, and elicit drug use.   Not currently on any anticoagulants    Current Outpatient Medications   Medication Sig Dispense Refill    atorvastatin (LIPITOR) 10 MG tablet Take 1 tablet (10 mg total) by mouth once daily. 90 tablet 0    blood sugar diagnostic Strp 1 strip by Misc.(Non-Drug; Combo Route) route 2 (two) times daily. Please provide items covered by patient's insurance 100 strip 5    cholecalciferol, vitamin D3, (VITAMIN D3) 1,000 unit capsule Take 2 capsules (2,000 Units total) by mouth once daily.  0    furosemide (LASIX) 80 MG tablet Take one tablet by mouth every day 90 tablet 3    glipiZIDE 5 MG TR24 Take one tablet by mouth every morning with breakfast 90 tablet 1    HYDROcodone-acetaminophen (NORCO) 5-325 mg per tablet Take 1 tablet by mouth every 6 (six) hours as needed for Pain. 10 tablet 0    ibuprofen (ADVIL,MOTRIN) 600 MG tablet Take 1 tablet  "(600 mg total) by mouth every 6 (six) hours as needed for Pain. 30 tablet 2    lancets Misc 1 lancet by Misc.(Non-Drug; Combo Route) route 2 (two) times daily. 100 each 5    latanoprost 0.005 % ophthalmic solution INSTILL ONE DROP IN BOTH EYES IN THE EVENING 7.5 mL 3    losartan (COZAAR) 100 MG tablet Take one tablet by mouth every day 90 tablet 3    megestroL (MEGACE) 40 MG Tab Take 2 tablets (80 mg total) by mouth 2 (two) times daily. 120 tablet 11    methocarbamoL (ROBAXIN) 750 MG Tab TAKE ONE TABLET BY MOUTH TWICE DAILY AS NEEDED 60 tablet 1    multivitamin (THERAGRAN) per tablet Take 1 tablet by mouth once daily.      naproxen (NAPROSYN) 500 MG tablet Take 1 tablet (500 mg total) by mouth 2 (two) times daily with meals. 20 tablet 0    ondansetron (ZOFRAN-ODT) 4 MG TbDL Take 1 tablet (4 mg total) by mouth every 8 (eight) hours as needed (nausea). 12 tablet 0    ONETOUCH ULTRA2 METER Misc       pen needle, diabetic 32 gauge x 1/4" Ndle 1 each by Misc.(Non-Drug; Combo Route) route once daily. Pt uses one pen needle weekly for injection of Ozempic 12 each 3    semaglutide (OZEMPIC) 2 mg/dose (8 mg/3 mL) PnIj Inject 2 mg into the skin every 7 days. 3 mL 5    sod sulf-pot chloride-mag sulf (SUTAB) 1.479-0.188- 0.225 gram tablet Take 12 tablets by mouth once daily. BIN: [id number]  PCN: [id number]  GROUP: [id number] MEMBER ID: [id number] 24 tablet 0     No current facility-administered medications for this visit.     Review of patient's allergies indicates:   Allergen Reactions    Victoza [liraglutide] Swelling     Past Medical History:   Diagnosis Date    Acute respiratory failure with hypoxia and hypercapnia 10/22/2020    Last Assessment & Plan:  Patient is a chronic CO2 retainer in setting of obesity hypoventilation syndrome 2/2 morbid obesity. Stepped down from ICU 10/26, currently continuing diuresis. De-escalated to NC during the day with BiPAP nightly on 10/31, which patient tolerated well. On Lasix gtt for diuresis " "through .   Plan:  - Target SpO2 of >88%. Continue BiPAP nightly. Patient on 3LNC, will con    Back pain     BMI 70 and over, adult     CHF (congestive heart failure) 10/2020    Depression     Diabetes mellitus     Difficult intubation     DM (diabetes mellitus) type II controlled with renal manifestation     Glaucoma 2022    HTN (hypertension)     Hyperlipidemia     Lumbar disc disease     Morbid obesity     Rosacea     Sleep apnea     cpap    Tear of medial meniscus of right knee, current 2017     Past Surgical History:   Procedure Laterality Date     SECTION       SECTION  2000    COLONOSCOPY N/A 2023    Procedure: COLONOSCOPY;  Surgeon: Shaarth Perea MD;  [facility]  Service: Endoscopy;  Laterality: N/A;  Any MD  BMI 64.66  pt refused WB, no availability at [facility] in next 3-4 days   ref. by Yamila Tejeda MD, [place], instr. to portal, [facility]  -precall complete-pt verbalized understanding of holding Ozempic-MS    DILATION AND CURETTAGE OF UTERUS      AUB "negative path" per patient    DILATION AND CURETTAGE OF UTERUS N/A 2023    Procedure: DILATION AND CURETTAGE, UTERUS;  Surgeon: Florentino Lovell MD;  Location[facility];  Service: OB/GYN;  Laterality: N/A;  PROCEED AS INDICATED    ENDOMETRIAL ABLATION      Patient unsure if procedure was performed    HYSTEROSCOPY N/A 2023    Procedure: HYSTEROSCOPY;  Surgeon: Florentino Lovell MD;  Location: [facility] OR;  Service: OB/GYN;  Laterality: N/A;    HYSTEROSCOPY WITH DILATION AND CURETTAGE OF UTERUS N/A 2022    Procedure: HYSTEROSCOPY, WITH DILATION AND CURETTAGE OF UTERUS;  Surgeon: Dixie Rod MD;  Location: [facility] OR;  Service: OB/GYN;  Laterality: N/A;    INTRAUTERINE DEVICE INSERTION N/A 2022    Procedure: INSERTION, INTRAUTERINE DEVICE;  Surgeon: Dixie Rod MD;  Location: [facility] OR;  Service: OB/GYN;  Laterality: N/A;    INTRAUTERINE DEVICE INSERTION N/A " "7/17/2023    Procedure: INSERTION, INTRAUTERINE DEVICE;  Surgeon: Florentino Lovell MD;  Location: [facility];  Service: OB/GYN;  Laterality: N/A;     Family History   Problem Relation Age of Onset    Hypertension Mother     Diabetes Father     Lymphoma Father         cns lymphoma    Heart disease Paternal Grandfather     Colon cancer Neg Hx     Ovarian cancer Neg Hx     Breast cancer Neg Hx     Amblyopia Neg Hx     Blindness Neg Hx     Cataracts Neg Hx     Glaucoma Neg Hx     Macular degeneration Neg Hx     Retinal detachment Neg Hx     Strabismus Neg Hx     Stroke Neg Hx     Thyroid disease Neg Hx     Uterine cancer Neg Hx      Social History     Tobacco Use    Smoking status: Never     Passive exposure: Never    Smokeless tobacco: Never   Substance Use Topics    Alcohol use: No    Drug use: No        Review of Systems:  Review of Systems   Constitutional:  Negative for fever and weight loss.   HENT:  Negative for congestion.    Eyes: Negative.    Respiratory:  Negative for shortness of breath.    Cardiovascular: Negative.  Negative for chest pain and leg swelling.   Gastrointestinal:  Negative for abdominal pain, constipation, diarrhea and vomiting.   Genitourinary:  Negative for flank pain and urgency.   Musculoskeletal:  Negative for back pain, joint pain and neck pain.   Neurological:  Negative for focal weakness and weakness.   Endo/Heme/Allergies:  Bruises/bleeds easily.   Psychiatric/Behavioral:  Negative for depression.      OBJECTIVE:     Vital Signs (Most Recent)  BP (!) 145/69 (BP Location: Left arm, Patient Position: Sitting)   Pulse 75   Ht 5' 3" (1.6 m)   Wt (!) 167 kg (368 lb 0.9 oz)   SpO2 95%   BMI 65.20 kg/m²     Physical Exam  Constitutional:       Appearance: She is obese.   HENT:      Head: Normocephalic and atraumatic.      Right Ear: Tympanic membrane normal.      Left Ear: Tympanic membrane normal.      Nose: Nose normal.      Mouth/Throat:      Mouth: Mucous membranes are moist.      " Pharynx: Oropharynx is clear.   Cardiovascular:      Rate and Rhythm: Normal rate and regular rhythm.   Pulmonary:      Effort: No respiratory distress.      Breath sounds: No wheezing.   Abdominal:      Tenderness: There is no abdominal tenderness. There is no right CVA tenderness, left CVA tenderness or guarding.   Musculoskeletal:         General: No swelling or tenderness.      Cervical back: Normal range of motion. No tenderness.      Right lower leg: No edema.      Left lower leg: No edema.   Skin:     General: Skin is warm.      Capillary Refill: Capillary refill takes less than 2 seconds.   Neurological:      General: No focal deficit present.      Mental Status: She is alert and oriented to person, place, and time.   Psychiatric:         Mood and Affect: Mood normal.     Labs:     Lab Results   Component Value Date    WBC 11.34 01/04/2024    HGB 12.0 01/04/2024    HCT 38.1 01/04/2024    MCV 88 01/04/2024     01/04/2024     CMP  Sodium   Date Value Ref Range Status   12/08/2023 140 136 - 145 mmol/L Final     Potassium   Date Value Ref Range Status   12/08/2023 4.4 3.5 - 5.1 mmol/L Final     Chloride   Date Value Ref Range Status   12/08/2023 105 95 - 110 mmol/L Final     CO2   Date Value Ref Range Status   12/08/2023 25 23 - 29 mmol/L Final     Glucose   Date Value Ref Range Status   12/08/2023 157 (H) 70 - 110 mg/dL Final     BUN   Date Value Ref Range Status   12/08/2023 14 6 - 20 mg/dL Final     Creatinine   Date Value Ref Range Status   12/08/2023 0.9 0.5 - 1.4 mg/dL Final     Calcium   Date Value Ref Range Status   12/08/2023 9.6 8.7 - 10.5 mg/dL Final     Total Protein   Date Value Ref Range Status   12/08/2023 7.1 6.0 - 8.4 g/dL Final     Albumin   Date Value Ref Range Status   12/08/2023 3.5 3.5 - 5.2 g/dL Final     Total Bilirubin   Date Value Ref Range Status   12/08/2023 0.6 0.1 - 1.0 mg/dL Final     Comment:     For infants and newborns, interpretation of results should be based  on  gestational age, weight and in agreement with clinical  observations.    Premature Infant recommended reference ranges:  Up to 24 hours.............<8.0 mg/dL  Up to 48 hours............<12.0 mg/dL  3-5 days..................<15.0 mg/dL  6-29 days.................<15.0 mg/dL       Alkaline Phosphatase   Date Value Ref Range Status   12/08/2023 83 55 - 135 U/L Final     AST   Date Value Ref Range Status   12/08/2023 18 10 - 40 U/L Final     ALT   Date Value Ref Range Status   12/08/2023 19 10 - 44 U/L Final     Anion Gap   Date Value Ref Range Status   12/08/2023 10 8 - 16 mmol/L Final     eGFR if    Date Value Ref Range Status   03/31/2022 >60.0 >60 mL/min/1.73 m^2 Final     eGFR if non    Date Value Ref Range Status   03/31/2022 >60.0 >60 mL/min/1.73 m^2 Final     Comment:     Calculation used to obtain the estimated glomerular filtration  rate (eGFR) is the CKD-EPI equation.        Imaging: Cholelithiasis without evidence for gallbladder wall thickening or pericholecystic fluid.  Patient is tender upon palpation in the right upper quadrant without definite evidence for sonographic Cooney's sign.  Note is made that patient received pain medication prior imaging, limiting evaluation.  Correlation is advised.     Partial evaluation of the right hepatic lobe demonstrates a large echogenic focus measuring up to 7.7 cm.  While findings may relate to a benign etiology such as hepatic hemangioma, malignant process cannot be completely excluded.  Consider further evaluation with CT or MR liver protocol as clinically indicated.    ASSESSMENT/PLAN:     Halley was seen today for consult.    Diagnoses and all orders for this visit:    Calculus of gallbladder without cholecystitis without obstruction  -     Ambulatory referral/consult to General Surgery      Halley Moreno is a 48 y.o. female with PMHx of DM, HTN, HF.    - Patient instructed to call clinic with any questions, concerns, or new  symptoms  - Discussed with the patient that she can have gallbladder removed or she can wait to see if she will have another event. Patient would like to wait and observe if there will be a reoccurrence.   - Patient understands and in agreement with plan; all questions answered    Case discussed with Dr. Dia.    Letty Torrez, CLAUDIA   General Surgery PGY-1  1/9/2024

## 2024-01-11 ENCOUNTER — PATIENT MESSAGE (OUTPATIENT)
Dept: INTERNAL MEDICINE | Facility: CLINIC | Age: 49
End: 2024-01-11
Payer: MEDICAID

## 2024-01-19 ENCOUNTER — TELEPHONE (OUTPATIENT)
Dept: GYNECOLOGIC ONCOLOGY | Facility: CLINIC | Age: 49
End: 2024-01-19
Payer: MEDICAID

## 2024-01-22 ENCOUNTER — TELEPHONE (OUTPATIENT)
Dept: GYNECOLOGIC ONCOLOGY | Facility: CLINIC | Age: 49
End: 2024-01-22
Payer: MEDICAID

## 2024-01-23 NOTE — PROGRESS NOTES
REFERRING PROVIDER  Dixie Rod MD     HISTORY OF PRESENT CONDITION  CC: MASSIEL Moreno is a 48 y.o.  who presents in consultation at for an opinion regarding complex hyperplasia with atypia.    +PO. -N/V. +Flatus. +BM. -VD. AUB characterized by intermenstrual bleeding.  -Changes in stool or urine. -Abdominal or pelvic pain. -Unintentional weight loss. -Unintentional weight gain.  Mammogram (22):  BI-RADS1.       REVIEW OF SYSTEMS  All systems reviewed and negative except as noted in HPI.    OBJECTIVE   Vitals:    24 0907   BP: 130/70   Pulse: 73          Body mass index is 64.48 kg/m².      1. General: Well appearing, no apparent distress, alert and oriented.  2. Lymph: Neck symmetric without cervical or supraclavicular adenopathy or mass.  3. Lungs: Normal respiratory rate, no accessory muscle use.  4. Cardiac: Normal rate  5. Psych: Normal affect.  6. Abdomen:  non-distended, soft, non-tender, are no masses, no ascites; large panus with edematous and thickened skin  7. Skin: Warm, dry, no rashes or lesions.   8. Extremities: Bilateral lower extremities without edema or tenderness.  9. Genitourinary: Declined (patient state she is unable to get undressed)               ECOG status: 2    LABORATORY DATA  Lab data reviewed.    RADIOLOGICAL DATA  Radiology data reviewed.    PATHOLOGY DATA  Pathology data reviewed.    ASSESSMENT / PLAN     1. Complex endometrial hyperplasia with atypia    2. Intrauterine device    3. Morbid obesity with BMI of 60.0-69.9, adult    4. Central sleep apnea due to medical condition    5. Diabetes mellitus type 2 without retinopathy    6. Pulmonary hypertension    7. Morbid obesity        23: Hysteroscopy, D&C, IUD insertion: residual foci of atypical hyperplasia with treatment effect  23: A1c = 7.0, Cr 1.0, Hb 12.2  22: Pap: NIL  22: Hysteroscopy, D&C: at least EIN  22: Cardiology clearance  3/31/22: HbA1c = 5.7, Hb 12.3, Cr  0.7  3/16/22: 2D Echo  3/2/22: EKG  1/19/22: Pelvic US: 12 cm uterus    Overall she still has not lost weight and still is not a surgical candidate.  She had an IUD inserted 4 months with treatment effect.  Her IUD cannot be visualized in clinic but is in plane on her CT A/P w/ performed on 12/8/23.  There is really no high level evidence to guide treatment but my recommendation is to continue with observation as endometrial sampling is extremely difficult even under anesthesia.  We will revisit in 1 year with a CT Pelvis w/o.    PATIENT EDUCATION  Ready to learn, no apparent learning barriers were identified; learning preferences include listening.  Explained diagnosis and treatment plan; patient expressed understanding of the content.    INFORMED CONSENT  Discussed the risks, benefits, and alternatives of the procedure and of possible blood transfusion.  Discussed the necessity of other members of the healthcare team participating in the procedure.  All questions answered and consent given.    Florentino Lovell

## 2024-01-24 ENCOUNTER — OFFICE VISIT (OUTPATIENT)
Dept: GYNECOLOGIC ONCOLOGY | Facility: CLINIC | Age: 49
End: 2024-01-24
Payer: MEDICAID

## 2024-01-24 VITALS
BODY MASS INDEX: 51.91 KG/M2 | DIASTOLIC BLOOD PRESSURE: 70 MMHG | HEIGHT: 63 IN | HEART RATE: 73 BPM | WEIGHT: 293 LBS | SYSTOLIC BLOOD PRESSURE: 130 MMHG

## 2024-01-24 DIAGNOSIS — I27.20 PULMONARY HYPERTENSION: ICD-10-CM

## 2024-01-24 DIAGNOSIS — Z97.5 INTRAUTERINE DEVICE: ICD-10-CM

## 2024-01-24 DIAGNOSIS — E11.9 DIABETES MELLITUS TYPE 2 WITHOUT RETINOPATHY: ICD-10-CM

## 2024-01-24 DIAGNOSIS — E66.01 MORBID OBESITY: ICD-10-CM

## 2024-01-24 DIAGNOSIS — N85.02 COMPLEX ENDOMETRIAL HYPERPLASIA WITH ATYPIA: Primary | ICD-10-CM

## 2024-01-24 DIAGNOSIS — E66.01 MORBID OBESITY WITH BMI OF 60.0-69.9, ADULT: ICD-10-CM

## 2024-01-24 DIAGNOSIS — G47.37 CENTRAL SLEEP APNEA DUE TO MEDICAL CONDITION: ICD-10-CM

## 2024-01-24 PROCEDURE — 3008F BODY MASS INDEX DOCD: CPT | Mod: CPTII,,, | Performed by: OBSTETRICS & GYNECOLOGY

## 2024-01-24 PROCEDURE — 1159F MED LIST DOCD IN RCRD: CPT | Mod: CPTII,,, | Performed by: OBSTETRICS & GYNECOLOGY

## 2024-01-24 PROCEDURE — 3078F DIAST BP <80 MM HG: CPT | Mod: CPTII,,, | Performed by: OBSTETRICS & GYNECOLOGY

## 2024-01-24 PROCEDURE — 99999 PR PBB SHADOW E&M-EST. PATIENT-LVL III: CPT | Mod: PBBFAC,,, | Performed by: OBSTETRICS & GYNECOLOGY

## 2024-01-24 PROCEDURE — 3075F SYST BP GE 130 - 139MM HG: CPT | Mod: CPTII,,, | Performed by: OBSTETRICS & GYNECOLOGY

## 2024-01-24 PROCEDURE — 99213 OFFICE O/P EST LOW 20 MIN: CPT | Mod: PBBFAC | Performed by: OBSTETRICS & GYNECOLOGY

## 2024-01-24 PROCEDURE — 99215 OFFICE O/P EST HI 40 MIN: CPT | Mod: S$PBB,,, | Performed by: OBSTETRICS & GYNECOLOGY

## 2024-01-31 PROBLEM — E11.69 DYSLIPIDEMIA ASSOCIATED WITH TYPE 2 DIABETES MELLITUS: Status: ACTIVE | Noted: 2024-01-31

## 2024-01-31 PROBLEM — E78.5 DYSLIPIDEMIA ASSOCIATED WITH TYPE 2 DIABETES MELLITUS: Status: ACTIVE | Noted: 2024-01-31

## 2024-02-15 ENCOUNTER — OFFICE VISIT (OUTPATIENT)
Dept: INTERNAL MEDICINE | Facility: CLINIC | Age: 49
End: 2024-02-15
Payer: MEDICAID

## 2024-02-15 ENCOUNTER — LAB VISIT (OUTPATIENT)
Dept: LAB | Facility: HOSPITAL | Age: 49
End: 2024-02-15
Attending: INTERNAL MEDICINE
Payer: MEDICAID

## 2024-02-15 VITALS
WEIGHT: 293 LBS | HEART RATE: 75 BPM | BODY MASS INDEX: 51.91 KG/M2 | RESPIRATION RATE: 18 BRPM | HEIGHT: 63 IN | TEMPERATURE: 98 F | SYSTOLIC BLOOD PRESSURE: 132 MMHG | DIASTOLIC BLOOD PRESSURE: 76 MMHG | OXYGEN SATURATION: 97 %

## 2024-02-15 DIAGNOSIS — G89.29 CHRONIC LEFT SHOULDER PAIN: ICD-10-CM

## 2024-02-15 DIAGNOSIS — M25.512 CHRONIC LEFT SHOULDER PAIN: ICD-10-CM

## 2024-02-15 DIAGNOSIS — E11.9 TYPE 2 DIABETES MELLITUS WITHOUT COMPLICATION, WITHOUT LONG-TERM CURRENT USE OF INSULIN: ICD-10-CM

## 2024-02-15 DIAGNOSIS — I15.2 HYPERTENSION ASSOCIATED WITH DIABETES: ICD-10-CM

## 2024-02-15 DIAGNOSIS — E11.59 HYPERTENSION ASSOCIATED WITH DIABETES: ICD-10-CM

## 2024-02-15 DIAGNOSIS — E66.01 MORBID OBESITY WITH BMI OF 60.0-69.9, ADULT: ICD-10-CM

## 2024-02-15 DIAGNOSIS — E11.9 TYPE 2 DIABETES MELLITUS WITHOUT COMPLICATION, WITHOUT LONG-TERM CURRENT USE OF INSULIN: Primary | ICD-10-CM

## 2024-02-15 PROCEDURE — 3008F BODY MASS INDEX DOCD: CPT | Mod: CPTII,,, | Performed by: INTERNAL MEDICINE

## 2024-02-15 PROCEDURE — 99999 PR PBB SHADOW E&M-EST. PATIENT-LVL V: CPT | Mod: PBBFAC,,, | Performed by: INTERNAL MEDICINE

## 2024-02-15 PROCEDURE — 83036 HEMOGLOBIN GLYCOSYLATED A1C: CPT | Performed by: INTERNAL MEDICINE

## 2024-02-15 PROCEDURE — 3075F SYST BP GE 130 - 139MM HG: CPT | Mod: CPTII,,, | Performed by: INTERNAL MEDICINE

## 2024-02-15 PROCEDURE — 99214 OFFICE O/P EST MOD 30 MIN: CPT | Mod: S$PBB,,, | Performed by: INTERNAL MEDICINE

## 2024-02-15 PROCEDURE — 3044F HG A1C LEVEL LT 7.0%: CPT | Mod: CPTII,,, | Performed by: INTERNAL MEDICINE

## 2024-02-15 PROCEDURE — 4010F ACE/ARB THERAPY RXD/TAKEN: CPT | Mod: CPTII,,, | Performed by: INTERNAL MEDICINE

## 2024-02-15 PROCEDURE — 1159F MED LIST DOCD IN RCRD: CPT | Mod: CPTII,,, | Performed by: INTERNAL MEDICINE

## 2024-02-15 PROCEDURE — 1160F RVW MEDS BY RX/DR IN RCRD: CPT | Mod: CPTII,,, | Performed by: INTERNAL MEDICINE

## 2024-02-15 PROCEDURE — 3078F DIAST BP <80 MM HG: CPT | Mod: CPTII,,, | Performed by: INTERNAL MEDICINE

## 2024-02-15 PROCEDURE — 99215 OFFICE O/P EST HI 40 MIN: CPT | Mod: PBBFAC,PO | Performed by: INTERNAL MEDICINE

## 2024-02-15 PROCEDURE — 36415 COLL VENOUS BLD VENIPUNCTURE: CPT | Mod: PO | Performed by: INTERNAL MEDICINE

## 2024-02-15 NOTE — PROGRESS NOTES
Subjective:     Halley Moreno is a 48 y.o. female who presents for   Chief Complaint   Patient presents with    Follow-up    Hypertension    Diabetes    Shoulder Pain       HPI    Diabetes: Patient presents for follow up of diabetes. Current symptoms include: none. Symptoms have been well-controlled. Patient denies foot ulcerations. Evaluation to date has included: hemoglobin A1C.  Home sugars: BGs consistently in an acceptable range. Current treatment:  semaglutide and glipizide.     Lab Results   Component Value Date    HGBA1C 6.3 (H) 02/15/2024     (H) 12/08/2023     Hypertension: The patient has been taking medications as instructed, no medication side effects noted, no chest pain on exertion, no dyspnea on exertion, and no swelling of ankles    BP Readings from Last 3 Encounters:   03/07/24 (!) 118/56   02/15/24 132/76   01/24/24 130/70     Shoulder Pain: She presents with left shoulder pain. The symptoms began several months ago. Aggravating factors: no known event. Pain is located diffusely throughout the shoulder. Discomfort is described as aching. Symptoms are exacerbated by overhead movements. Evaluation to date: none. Therapy to date includes: rest.      Review of Systems   Constitutional:  Negative for chills, diaphoresis and fever.   HENT:  Negative for congestion, ear pain, sinus pressure and sore throat.    Eyes:  Negative for discharge and redness.   Respiratory:  Negative for cough and shortness of breath.    Cardiovascular:  Negative for chest pain and palpitations.   Gastrointestinal:  Negative for abdominal pain, constipation, diarrhea, nausea and vomiting.   Musculoskeletal:  Negative for arthralgias and myalgias.   Skin:  Negative for rash and wound.   Neurological:  Negative for dizziness, tremors and headaches.          Objective:     Physical Exam  Vitals reviewed.   Constitutional:       General: She is awake. She is not in acute distress.     Appearance: Normal appearance.  She is well-developed and well-groomed.   HENT:      Head: Normocephalic and atraumatic.      Right Ear: Hearing and external ear normal.      Left Ear: Hearing and external ear normal.      Nose: Nose normal. No congestion.      Mouth/Throat:      Mouth: Mucous membranes are moist.   Eyes:      General: Lids are normal. Vision grossly intact.   Cardiovascular:      Rate and Rhythm: Normal rate and regular rhythm.      Heart sounds: Normal heart sounds. No murmur heard.  Pulmonary:      Effort: Pulmonary effort is normal.      Breath sounds: Normal breath sounds. No decreased breath sounds or wheezing.   Abdominal:      General: Bowel sounds are normal. There is no distension.   Musculoskeletal:         General: Normal range of motion.      Left shoulder: Bony tenderness present. Normal range of motion.      Cervical back: Normal range of motion.      Right lower leg: No edema.      Left lower leg: No edema.   Skin:     General: Skin is warm and dry.      Findings: No lesion or rash.   Neurological:      Mental Status: She is alert and oriented to person, place, and time.   Psychiatric:         Attention and Perception: Attention normal.         Mood and Affect: Mood normal.         Behavior: Behavior is cooperative.            Assessment:      1. Type 2 diabetes mellitus without complication, without long-term current use of insulin    2. Hypertension associated with diabetes    3. Chronic left shoulder pain    4. Morbid obesity with BMI of 60.0-69.9, adult           Plan:     1. Type 2 diabetes mellitus without complication, without long-term current use of insulin  - Hemoglobin A1C; Future  - continue glipizide, semaglutide    2. Hypertension associated with diabetes  - controlled, continue current regimen    3. Chronic left shoulder pain  - Ambulatory referral/consult to Physical/Occupational Therapy; Future    4. Morbid obesity with BMI of 60.0-69.9, adult  - discussed current diet and recent splurges, will  continue semaglutide and start drinking ctrl shake with nutrients as a meal replacement    RTC in 2 months for follow-up or sooner if needed    __________________________    Yamila Tejeda MD, PharmD  [facility]- Internal Medicine  American Board of Obesity Medicine diplomate  Office [phone]

## 2024-02-16 LAB
ESTIMATED AVG GLUCOSE: 134 MG/DL (ref 68–131)
HBA1C MFR BLD: 6.3 % (ref 4–5.6)

## 2024-02-28 ENCOUNTER — PATIENT MESSAGE (OUTPATIENT)
Dept: INTERNAL MEDICINE | Facility: CLINIC | Age: 49
End: 2024-02-28
Payer: MEDICAID

## 2024-02-28 DIAGNOSIS — H92.01 RIGHT EAR PAIN: Primary | ICD-10-CM

## 2024-02-28 DIAGNOSIS — H61.21 EXCESSIVE CERUMEN IN RIGHT EAR CANAL: ICD-10-CM

## 2024-02-28 DIAGNOSIS — E66.01 MORBID OBESITY WITH BMI OF 60.0-69.9, ADULT: ICD-10-CM

## 2024-02-28 RX ORDER — PHENTERMINE HYDROCHLORIDE 37.5 MG/1
37.5 TABLET ORAL
Qty: 30 TABLET | Refills: 0 | Status: SHIPPED | OUTPATIENT
Start: 2024-02-28 | End: 2024-03-12

## 2024-03-04 ENCOUNTER — TELEPHONE (OUTPATIENT)
Dept: CARDIOLOGY | Facility: CLINIC | Age: 49
End: 2024-03-04
Payer: MEDICAID

## 2024-03-04 NOTE — TELEPHONE ENCOUNTER
----- Message from Kenneth Morrow sent at 3/4/2024 11:44 AM CST -----  Consult/Advisory    Name Of Caller:Halley [phone]      Nature of call: Ptn wants to cancel her appt she wants a appt in [place] nothing is showing avail please call to assist

## 2024-03-07 ENCOUNTER — OFFICE VISIT (OUTPATIENT)
Dept: CARDIOLOGY | Facility: CLINIC | Age: 49
End: 2024-03-07
Payer: MEDICAID

## 2024-03-07 VITALS
BODY MASS INDEX: 51.91 KG/M2 | HEART RATE: 62 BPM | OXYGEN SATURATION: 95 % | WEIGHT: 293 LBS | HEIGHT: 63 IN | DIASTOLIC BLOOD PRESSURE: 56 MMHG | SYSTOLIC BLOOD PRESSURE: 118 MMHG

## 2024-03-07 DIAGNOSIS — I15.2 HYPERTENSION ASSOCIATED WITH DIABETES: ICD-10-CM

## 2024-03-07 DIAGNOSIS — E11.59 HYPERTENSION ASSOCIATED WITH DIABETES: ICD-10-CM

## 2024-03-07 DIAGNOSIS — I27.20 PULMONARY HYPERTENSION: ICD-10-CM

## 2024-03-07 DIAGNOSIS — E11.9 TYPE 2 DIABETES MELLITUS WITHOUT COMPLICATION, WITHOUT LONG-TERM CURRENT USE OF INSULIN: ICD-10-CM

## 2024-03-07 DIAGNOSIS — E66.2 MORBID (SEVERE) OBESITY WITH ALVEOLAR HYPOVENTILATION: ICD-10-CM

## 2024-03-07 DIAGNOSIS — R00.2 PALPITATIONS: ICD-10-CM

## 2024-03-07 DIAGNOSIS — I10 ESSENTIAL HYPERTENSION: Primary | ICD-10-CM

## 2024-03-07 DIAGNOSIS — E11.69 DYSLIPIDEMIA ASSOCIATED WITH TYPE 2 DIABETES MELLITUS: ICD-10-CM

## 2024-03-07 DIAGNOSIS — E78.5 DYSLIPIDEMIA ASSOCIATED WITH TYPE 2 DIABETES MELLITUS: ICD-10-CM

## 2024-03-07 PROCEDURE — 3074F SYST BP LT 130 MM HG: CPT | Mod: CPTII,,, | Performed by: INTERNAL MEDICINE

## 2024-03-07 PROCEDURE — 93010 ELECTROCARDIOGRAM REPORT: CPT | Mod: S$PBB,,, | Performed by: INTERNAL MEDICINE

## 2024-03-07 PROCEDURE — 93005 ELECTROCARDIOGRAM TRACING: CPT | Mod: PBBFAC,PN | Performed by: INTERNAL MEDICINE

## 2024-03-07 PROCEDURE — 99999 PR PBB SHADOW E&M-EST. PATIENT-LVL III: CPT | Mod: PBBFAC,,, | Performed by: INTERNAL MEDICINE

## 2024-03-07 PROCEDURE — 99213 OFFICE O/P EST LOW 20 MIN: CPT | Mod: PBBFAC,PN | Performed by: INTERNAL MEDICINE

## 2024-03-07 PROCEDURE — 3044F HG A1C LEVEL LT 7.0%: CPT | Mod: CPTII,,, | Performed by: INTERNAL MEDICINE

## 2024-03-07 PROCEDURE — 99214 OFFICE O/P EST MOD 30 MIN: CPT | Mod: S$PBB,,, | Performed by: INTERNAL MEDICINE

## 2024-03-07 PROCEDURE — 3008F BODY MASS INDEX DOCD: CPT | Mod: CPTII,,, | Performed by: INTERNAL MEDICINE

## 2024-03-07 PROCEDURE — 1160F RVW MEDS BY RX/DR IN RCRD: CPT | Mod: CPTII,,, | Performed by: INTERNAL MEDICINE

## 2024-03-07 PROCEDURE — 1159F MED LIST DOCD IN RCRD: CPT | Mod: CPTII,,, | Performed by: INTERNAL MEDICINE

## 2024-03-07 PROCEDURE — 3078F DIAST BP <80 MM HG: CPT | Mod: CPTII,,, | Performed by: INTERNAL MEDICINE

## 2024-03-07 NOTE — PROGRESS NOTES
Subjective:      Patient ID: Halley Moreno is a 48 y.o. female.    Chief Complaint: Palpitations and Hypertension    HPI:  Walks and swims and drives a lot.    Works for a [employer].    Review of Systems   Cardiovascular:  Positive for palpitations. Negative for chest pain, claudication, dyspnea on exertion, irregular heartbeat, leg swelling, near-syncope, orthopnea and syncope.      Sometimes while watching TV my heart races.  Fitbit reads a heart rate of 145.  Heart rate goes back to normal after 5 minutes.    Pt has been having fewer palpitations since holding the phentermine.    Past Medical History:   Diagnosis Date    Acute respiratory failure with hypoxia and hypercapnia 10/22/2020    Last Assessment & Plan:  Patient is a chronic CO2 retainer in setting of obesity hypoventilation syndrome 2/2 morbid obesity. Stepped down from ICU 10/26, currently continuing diuresis. De-escalated to NC during the day with BiPAP nightly on 10/31, which patient tolerated well. On Lasix gtt for diuresis through .   Plan:  - Target SpO2 of >88%. Continue BiPAP nightly. Patient on 3LNC, will con    Back pain     BMI 70 and over, adult     Cavernous hemangioma of liver     CHF (congestive heart failure) 10/2020    Depression     Diabetes mellitus     Difficult intubation     DM (diabetes mellitus) type II controlled with renal manifestation     Glaucoma 2022    HTN (hypertension)     Hyperlipidemia     Lumbar disc disease     Morbid obesity     Rosacea     Sleep apnea     cpap    Tear of medial meniscus of right knee, current 2017        Past Surgical History:   Procedure Laterality Date     SECTION       SECTION  2000    COLONOSCOPY N/A 2023    Procedure: COLONOSCOPY;  Surgeon: Sharath Perea MD;  Location: [facility] ([address] [place] [address]);  Service: Endoscopy;  Laterality: N/A;  Any MD  BMI 64.66  pt refused WB, no availability at [facility] in next 3-4 days   ref. by Yamila  "MD Nikhil, Sutab, instr. to [facility]  11/7-precall complete-pt verbalized understanding of holding Ozempic-MS    DILATION AND CURETTAGE OF UTERUS  2010    AUB "negative path" per patient    DILATION AND CURETTAGE OF UTERUS N/A 7/17/2023    Procedure: DILATION AND CURETTAGE, UTERUS;  Surgeon: Florentino Lovell MD;  Location: [facility] OR;  Service: OB/GYN;  Laterality: N/A;  PROCEED AS INDICATED    ENDOMETRIAL ABLATION  2010    Patient unsure if procedure was performed    HYSTEROSCOPY N/A 7/17/2023    Procedure: HYSTEROSCOPY;  Surgeon: Florentino Lovell MD;  Location: [facility] OR;  Service: OB/GYN;  Laterality: N/A;    HYSTEROSCOPY WITH DILATION AND CURETTAGE OF UTERUS N/A 6/1/2022    Procedure: HYSTEROSCOPY, WITH DILATION AND CURETTAGE OF UTERUS;  Surgeon: Dixie Rod MD;  Location: [facility];  Service: OB/GYN;  Laterality: N/A;    INTRAUTERINE DEVICE INSERTION N/A 6/1/2022    Procedure: INSERTION, INTRAUTERINE DEVICE;  Surgeon: Dixie Rod MD;  Location: [facility] OR;  Service: OB/GYN;  Laterality: N/A;    INTRAUTERINE DEVICE INSERTION N/A 7/17/2023    Procedure: INSERTION, INTRAUTERINE DEVICE;  Surgeon: Florentino Lovell MD;  Location: [facility];  Service: OB/GYN;  Laterality: N/A;       Family History   Problem Relation Age of Onset    Hypertension Mother     Diabetes Father     Lymphoma Father         cns lymphoma    Heart disease Paternal Grandfather     Colon cancer Neg Hx     Ovarian cancer Neg Hx     Breast cancer Neg Hx     Amblyopia Neg Hx     Blindness Neg Hx     Cataracts Neg Hx     Glaucoma Neg Hx     Macular degeneration Neg Hx     Retinal detachment Neg Hx     Strabismus Neg Hx     Stroke Neg Hx     Thyroid disease Neg Hx     Uterine cancer Neg Hx        Social History     Socioeconomic History    Marital status: Single   Tobacco Use    Smoking status: Never     Passive exposure: Never    Smokeless tobacco: Never   Substance and Sexual Activity    Alcohol use: No    Drug use: No    Sexual activity: Not " Currently     Partners: Male     Birth control/protection: Surgical   Social History Narrative    She is a pharmacy tech, works at [employer] in [place].  Nonsmoker, social etoh.  No exercise.     Social Determinants of Health     Financial Resource Strain: Low Risk  (9/30/2023)    Overall Financial Resource Strain (CARDIA)     Difficulty of Paying Living Expenses: Not hard at all   Food Insecurity: No Food Insecurity (9/30/2023)    Hunger Vital Sign     Worried About Running Out of Food in the Last Year: Never true     Ran Out of Food in the Last Year: Never true   Transportation Needs: No Transportation Needs (9/30/2023)    PRAPARE - Transportation     Lack of Transportation (Medical): No     Lack of Transportation (Non-Medical): No   Physical Activity: Insufficiently Active (9/30/2023)    Exercise Vital Sign     Days of Exercise per Week: 2 days     Minutes of Exercise per Session: 20 min   Stress: No Stress Concern Present (9/30/2023)    [place] [employer] - Occupational Stress Questionnaire     Feeling of Stress : Not at all   Social Connections: Unknown (9/30/2023)    Social Connection and Isolation Panel [NHANES]     Frequency of Communication with Friends and Family: More than three times a week     Frequency of Social Gatherings with Friends and Family: More than three times a week     Active Member of Clubs or Organizations: Yes     Attends Club or Organization Meetings: More than 4 times per year     Marital Status: Never    Housing Stability: Low Risk  (9/30/2023)    Housing Stability Vital Sign     Unable to Pay for Housing in the Last Year: No     Number of Places Lived in the Last Year: 1     Unstable Housing in the Last Year: No       Current Outpatient Medications on File Prior to Visit   Medication Sig Dispense Refill    atorvastatin (LIPITOR) 10 MG tablet Take 1 tablet (10 mg total) by mouth once daily. 90 tablet 0    blood sugar diagnostic Strp 1 strip by  "Misc.(Non-Drug; Combo Route) route 2 (two) times daily. Please provide items covered by patient's insurance 100 strip 5    cholecalciferol, vitamin D3, (VITAMIN D3) 1,000 unit capsule Take 2 capsules (2,000 Units total) by mouth once daily.  0    furosemide (LASIX) 80 MG tablet Take one tablet by mouth every day 90 tablet 3    glipiZIDE 5 MG TR24 Take one tablet by mouth every morning with breakfast 90 tablet 1    ibuprofen (ADVIL,MOTRIN) 600 MG tablet Take 1 tablet (600 mg total) by mouth every 6 (six) hours as needed for Pain. 30 tablet 2    lancets Misc 1 lancet by Misc.(Non-Drug; Combo Route) route 2 (two) times daily. 100 each 5    latanoprost 0.005 % ophthalmic solution INSTILL ONE DROP IN BOTH EYES IN THE EVENING 7.5 mL 3    losartan (COZAAR) 100 MG tablet Take one tablet by mouth every day 90 tablet 3    megestroL (MEGACE) 40 MG Tab Take 2 tablets (80 mg total) by mouth 2 (two) times daily. 120 tablet 11    methocarbamoL (ROBAXIN) 750 MG Tab TAKE ONE TABLET BY MOUTH TWICE DAILY AS NEEDED 60 tablet 1    multivitamin (THERAGRAN) per tablet Take 1 tablet by mouth once daily.      naproxen (NAPROSYN) 500 MG tablet Take 1 tablet (500 mg total) by mouth 2 (two) times daily with meals. 20 tablet 0    ondansetron (ZOFRAN-ODT) 4 MG TbDL Take 1 tablet (4 mg total) by mouth every 8 (eight) hours as needed (nausea). 12 tablet 0    ONETOUCH ULTRA2 METER Misc       pen needle, diabetic 32 gauge x 1/4" Ndle 1 each by Misc.(Non-Drug; Combo Route) route once daily. Pt uses one pen needle weekly for injection of Ozempic 12 each 3    phentermine (ADIPEX-P) 37.5 mg tablet Take 1 tablet (37.5 mg total) by mouth before breakfast. 30 tablet 0    semaglutide (OZEMPIC) 2 mg/dose (8 mg/3 mL) PnIj Inject 2 mg into the skin every 7 days. 3 mL 5    [DISCONTINUED] HYDROcodone-acetaminophen (NORCO) 5-325 mg per tablet Take 1 tablet by mouth every 6 (six) hours as needed for Pain. 10 tablet 0    [DISCONTINUED] sod sulf-pot chloride-mag " "sulf (SUTAB) 1.479-0.188- 0.225 gram tablet Take 12 tablets by mouth once daily. BIN: [id number]  PCN: [id number]  GROUP: [id number] MEMBER ID: [id number] 24 tablet 0     No current facility-administered medications on file prior to visit.       Review of patient's allergies indicates:   Allergen Reactions    Victoza [liraglutide] Swelling     Objective:     Vitals:    03/07/24 0933   BP: (!) 118/56   BP Location: Left forearm   Patient Position: Sitting   BP Method: Medium (Automatic)   Pulse: 62   SpO2: 95%   Weight: (!) 166.5 kg (367 lb 1.1 oz)   Height: 5' 3" (1.6 m)        Physical Exam  Vitals reviewed.   Constitutional:       Appearance: She is well-developed.   Eyes:      General: No scleral icterus.  Neck:      Vascular: No carotid bruit or JVD.   Cardiovascular:      Rate and Rhythm: Normal rate and regular rhythm.      Heart sounds: No murmur heard.     No gallop.   Pulmonary:      Breath sounds: Normal breath sounds.   Musculoskeletal:      Right lower leg: No edema.      Left lower leg: No edema.   Skin:     General: Skin is warm and dry.   Neurological:      Mental Status: She is alert and oriented to person, place, and time.   Psychiatric:         Behavior: Behavior normal.         Thought Content: Thought content normal.         Judgment: Judgment normal.        ECG today reviewed by me: NSR, left axis deviation, unchanged      Lab Visit on 02/15/2024   Component Date Value Ref Range Status    Hemoglobin A1C 02/15/2024 6.3 (H)  4.0 - 5.6 % Final    Estimated Avg Glucose 02/15/2024 134 (H)  68 - 131 mg/dL Final   Lab Visit on 01/04/2024   Component Date Value Ref Range Status    WBC 01/04/2024 11.34  3.90 - 12.70 K/uL Final    RBC 01/04/2024 4.32  4.00 - 5.40 M/uL Final    Hemoglobin 01/04/2024 12.0  12.0 - 16.0 g/dL Final    Hematocrit 01/04/2024 38.1  37.0 - 48.5 % Final    MCV 01/04/2024 88  82 - 98 fL Final    MCH 01/04/2024 27.8  27.0 - 31.0 pg Final    MCHC 01/04/2024 31.5 (L)  32.0 - 36.0 g/dL Final    RDW " 01/04/2024 16.1 (H)  11.5 - 14.5 % Final    Platelets 01/04/2024 248  150 - 450 K/uL Final    MPV 01/04/2024 11.0  9.2 - 12.9 fL Final    Immature Granulocytes 01/04/2024 0.7 (H)  0.0 - 0.5 % Final    Gran # (ANC) 01/04/2024 8.3 (H)  1.8 - 7.7 K/uL Final    Immature Grans (Abs) 01/04/2024 0.08 (H)  0.00 - 0.04 K/uL Final    Lymph # 01/04/2024 2.0  1.0 - 4.8 K/uL Final    Mono # 01/04/2024 0.8  0.3 - 1.0 K/uL Final    Eos # 01/04/2024 0.1  0.0 - 0.5 K/uL Final    Baso # 01/04/2024 0.03  0.00 - 0.20 K/uL Final    nRBC 01/04/2024 0  0 /100 WBC Final    Gran % 01/04/2024 73.5 (H)  38.0 - 73.0 % Final    Lymph % 01/04/2024 17.8 (L)  18.0 - 48.0 % Final    Mono % 01/04/2024 6.7  4.0 - 15.0 % Final    Eosinophil % 01/04/2024 1.0  0.0 - 8.0 % Final    Basophil % 01/04/2024 0.3  0.0 - 1.9 % Final    Differential Method 01/04/2024 Automated   Final    Ferritin 01/04/2024 197  20.0 - 300.0 ng/mL Final    Iron 01/04/2024 55  30 - 160 ug/dL Final    Transferrin 01/04/2024 218  200 - 375 mg/dL Final    TIBC 01/04/2024 323  250 - 450 ug/dL Final    Saturated Iron 01/04/2024 17 (L)  20 - 50 % Final   Admission on 12/08/2023, Discharged on 12/08/2023   Component Date Value Ref Range Status    HIV 1/2 Ag/Ab 12/08/2023 Non-reactive  Non-reactive Final    Hepatitis C Ab 12/08/2023 Non-reactive  Non-reactive Final    POCT Glucose 12/08/2023 177 (H)  70 - 110 mg/dL Final    WBC 12/08/2023 10.57  3.90 - 12.70 K/uL Final    RBC 12/08/2023 4.02  4.00 - 5.40 M/uL Final    Hemoglobin 12/08/2023 11.2 (L)  12.0 - 16.0 g/dL Final    Hematocrit 12/08/2023 34.3 (L)  37.0 - 48.5 % Final    MCV 12/08/2023 85  82 - 98 fL Final    MCH 12/08/2023 27.9  27.0 - 31.0 pg Final    MCHC 12/08/2023 32.7  32.0 - 36.0 g/dL Final    RDW 12/08/2023 15.3 (H)  11.5 - 14.5 % Final    Platelets 12/08/2023 232  150 - 450 K/uL Final    MPV 12/08/2023 10.4  9.2 - 12.9 fL Final    Immature Granulocytes 12/08/2023 0.9 (H)  0.0 - 0.5 % Final    Gran # (ANC) 12/08/2023 8.1  (H)  1.8 - 7.7 K/uL Final    Immature Grans (Abs) 12/08/2023 0.09 (H)  0.00 - 0.04 K/uL Final    Lymph # 12/08/2023 1.6  1.0 - 4.8 K/uL Final    Mono # 12/08/2023 0.7  0.3 - 1.0 K/uL Final    Eos # 12/08/2023 0.1  0.0 - 0.5 K/uL Final    Baso # 12/08/2023 0.02  0.00 - 0.20 K/uL Final    nRBC 12/08/2023 0  0 /100 WBC Final    Gran % 12/08/2023 76.6 (H)  38.0 - 73.0 % Final    Lymph % 12/08/2023 14.9 (L)  18.0 - 48.0 % Final    Mono % 12/08/2023 6.2  4.0 - 15.0 % Final    Eosinophil % 12/08/2023 1.2  0.0 - 8.0 % Final    Basophil % 12/08/2023 0.2  0.0 - 1.9 % Final    Differential Method 12/08/2023 Automated   Final    Sodium 12/08/2023 140  136 - 145 mmol/L Final    Potassium 12/08/2023 4.4  3.5 - 5.1 mmol/L Final    Chloride 12/08/2023 105  95 - 110 mmol/L Final    CO2 12/08/2023 25  23 - 29 mmol/L Final    Glucose 12/08/2023 157 (H)  70 - 110 mg/dL Final    BUN 12/08/2023 14  6 - 20 mg/dL Final    Creatinine 12/08/2023 0.9  0.5 - 1.4 mg/dL Final    Calcium 12/08/2023 9.6  8.7 - 10.5 mg/dL Final    Total Protein 12/08/2023 7.1  6.0 - 8.4 g/dL Final    Albumin 12/08/2023 3.5  3.5 - 5.2 g/dL Final    Total Bilirubin 12/08/2023 0.6  0.1 - 1.0 mg/dL Final    Alkaline Phosphatase 12/08/2023 83  55 - 135 U/L Final    AST 12/08/2023 18  10 - 40 U/L Final    ALT 12/08/2023 19  10 - 44 U/L Final    eGFR 12/08/2023 >60.0  >60 mL/min/1.73 m^2 Final    Anion Gap 12/08/2023 10  8 - 16 mmol/L Final    Lipase 12/08/2023 36  4 - 60 U/L Final   Admission on 11/14/2023, Discharged on 11/14/2023   Component Date Value Ref Range Status    POCT Glucose 11/14/2023 134 (H)  70 - 110 mg/dL Final    POCT Glucose 11/14/2023 120 (H)  70 - 110 mg/dL Final   Lab Visit on 10/04/2023   Component Date Value Ref Range Status    WBC 10/04/2023 11.79  3.90 - 12.70 K/uL Final    RBC 10/04/2023 4.48  4.00 - 5.40 M/uL Final    Hemoglobin 10/04/2023 12.6  12.0 - 16.0 g/dL Final    Hematocrit 10/04/2023 39.1  37.0 - 48.5 % Final    MCV 10/04/2023 87  82 -  98 fL Final    MCH 10/04/2023 28.1  27.0 - 31.0 pg Final    MCHC 10/04/2023 32.2  32.0 - 36.0 g/dL Final    RDW 10/04/2023 15.7 (H)  11.5 - 14.5 % Final    Platelets 10/04/2023 230  150 - 450 K/uL Final    MPV 10/04/2023 11.1  9.2 - 12.9 fL Final    Immature Granulocytes 10/04/2023 0.3  0.0 - 0.5 % Final    Gran # (ANC) 10/04/2023 8.4 (H)  1.8 - 7.7 K/uL Final    Immature Grans (Abs) 10/04/2023 0.04  0.00 - 0.04 K/uL Final    Lymph # 10/04/2023 2.3  1.0 - 4.8 K/uL Final    Mono # 10/04/2023 0.9  0.3 - 1.0 K/uL Final    Eos # 10/04/2023 0.2  0.0 - 0.5 K/uL Final    Baso # 10/04/2023 0.03  0.00 - 0.20 K/uL Final    nRBC 10/04/2023 0  0 /100 WBC Final    Gran % 10/04/2023 71.5  38.0 - 73.0 % Final    Lymph % 10/04/2023 19.2  18.0 - 48.0 % Final    Mono % 10/04/2023 7.4  4.0 - 15.0 % Final    Eosinophil % 10/04/2023 1.3  0.0 - 8.0 % Final    Basophil % 10/04/2023 0.3  0.0 - 1.9 % Final    Differential Method 10/04/2023 Automated   Final    Sodium 10/04/2023 141  136 - 145 mmol/L Final    Potassium 10/04/2023 4.8  3.5 - 5.1 mmol/L Final    Chloride 10/04/2023 109  95 - 110 mmol/L Final    CO2 10/04/2023 21 (L)  23 - 29 mmol/L Final    Glucose 10/04/2023 159 (H)  70 - 110 mg/dL Final    BUN 10/04/2023 15  6 - 20 mg/dL Final    Creatinine 10/04/2023 1.0  0.5 - 1.4 mg/dL Final    Calcium 10/04/2023 9.8  8.7 - 10.5 mg/dL Final    Total Protein 10/04/2023 7.6  6.0 - 8.4 g/dL Final    Albumin 10/04/2023 3.9  3.5 - 5.2 g/dL Final    Total Bilirubin 10/04/2023 0.6  0.1 - 1.0 mg/dL Final    Alkaline Phosphatase 10/04/2023 75  55 - 135 U/L Final    AST 10/04/2023 14  10 - 40 U/L Final    ALT 10/04/2023 20  10 - 44 U/L Final    eGFR 10/04/2023 >60.0  >60 mL/min/1.73 m^2 Final    Anion Gap 10/04/2023 11  8 - 16 mmol/L Final    Cholesterol 10/04/2023 107 (L)  120 - 199 mg/dL Final    Triglycerides 10/04/2023 98  30 - 150 mg/dL Final    HDL 10/04/2023 38 (L)  40 - 75 mg/dL Final    LDL Cholesterol 10/04/2023 49.4 (L)  63.0 - 159.0  "mg/dL Final    HDL/Cholesterol Ratio 10/04/2023 35.5  20.0 - 50.0 % Final    Total Cholesterol/HDL Ratio 10/04/2023 2.8  2.0 - 5.0 Final    Non-HDL Cholesterol 10/04/2023 69  mg/dL Final    TSH 10/04/2023 1.202  0.400 - 4.000 uIU/mL Final    Specimen UA 10/04/2023 Urine, Clean Catch   Final    Color, UA 10/04/2023 Yellow  Yellow, Straw, Kamila Final    Appearance, UA 10/04/2023 Hazy (A)  Clear Final    pH, UA 10/04/2023 6.0  5.0 - 8.0 Final    Specific Gravity, UA 10/04/2023 1.030  1.005 - 1.030 Final    Protein, UA 10/04/2023 1+ (A)  Negative Final    Glucose, UA 10/04/2023 Negative  Negative Final    Ketones, UA 10/04/2023 Negative  Negative Final    Bilirubin (UA) 10/04/2023 Negative  Negative Final    Occult Blood UA 10/04/2023 2+ (A)  Negative Final    Nitrite, UA 10/04/2023 Negative  Negative Final    Leukocytes, UA 10/04/2023 Negative  Negative Final    Hemoglobin A1C 10/04/2023 7.5 (H)  4.0 - 5.6 % Final    Estimated Avg Glucose 10/04/2023 169 (H)  68 - 131 mg/dL Final    RBC, UA 10/04/2023 59 (H)  0 - 4 /hpf Final    WBC, UA 10/04/2023 5  0 - 5 /hpf Final    Bacteria 10/04/2023 Rare  None-Occ /hpf Final    Squam Epithel, UA 10/04/2023 29  /hpf Final    Hyaline Casts, UA 10/04/2023 0  0-1/lpf /lpf Final    Microscopic Comment 10/04/2023 SEE COMMENT   Final   (  Transthoracic echo (TTE) complete    Height:  5' 3" (1.6 m)   Weight:  166.9 kg (368 lb)   Blood Pressure:  104/66    Date of Study:  4/12/22   Ordering Provider:  Trey Rose MD   Clinical Indications:  Pulmonary hypertension [I27.20 (ICD-10-CM)], Chronic cor pulmonale [I27.81 (ICD-10-CM)]       Interpreting Physicians  Performing Staff   Dinshaw, Homeyar K., MD Tech:  Jorge Alberto Todd Jr        Reason for Exam  Priority: Routine  Dx: Pulmonary hypertension [I27.20 (ICD-10-CM)]; Chronic cor pulmonale [I27.81 (ICD-10-CM)]     View Images Vital Vitrea     Show images for Echo Saline Bubble? No  Summary    The left ventricle is normal in " "size with normal systolic function.  The estimated ejection fraction is 65%.  Indeterminate left ventricular diastolic function.  Normal right ventricular size with normal right ventricular systolic function. Significantly better than previous report.  The estimated PA systolic pressure is 36 mm Hg. Considerably lower than previously reported.  Low central venous pressure.     Vitals  Accession #: [id number]  Echocardiogram exercise stress test    Height:  5' 3" (1.6 m)   Weight:  158.8 kg (350 lb)   Blood Pressure:  Not recorded    Date of Study:  9/22/22   Ordering Provider:  Trey Rose MD   Clinical Indications:  Essential hypertension [I10 (ICD-10-CM)], Hypertension associated with diabetes [E11.59, I15.2 (ICD-10-CM)], BMI 60.0-69.9, adult [Z68.44 (ICD-10-CM)], Diabetes mellitus type 2 without retinopathy [E11.9 (ICD-10-CM)], Morbid (severe) obesity with alveolar hypoventilation [E66.2 (ICD-10-CM)], Impaired functional mobility, balance, gait, and endurance [Z74.09 (ICD-10-CM)], Chest pain of uncertain etiology [R07.9 (ICD-10-CM)], Unspecified thoracic, thoracolumbar and lumbosacral intervertebral disc disorder [M51.9 (ICD-10-CM)], Sleep apnea, unspecified type [G47.30 (ICD-10-CM)]       Interpreting Physicians  Performing Staff   Patrick Belcher MD Tech:  Jorge Alberto Todd Jr   Support Staff:  Casper Norris        Reason for Exam  Priority: Routine  Dx: Essential hypertension [I10 (ICD-10-CM)]; Hypertension associated with diabetes [E11.59, I15.2 (ICD-10-CM)]; BMI 60.0-69.9, adult [Z68.44 (ICD-10-CM)]; Diabetes mellitus type 2 without retinopathy [E11.9 (ICD-10-CM)]; Morbid (severe) obesity with alveolar hypoventilation [E66.2 (ICD-10-CM)]; Impaired functional mobility, balance, gait, and endurance [Z74.09 (ICD-10-CM)]; Chest pain of uncertain etiology [R07.9 (ICD-10-CM)]; Unspecified thoracic, thoracolumbar and lumbosacral intervertebral disc disorder [M51.9 (ICD-10-CM)]; Sleep apnea, " unspecified type [G47.30 (ICD-10-CM)]   Comments: Catina protocol     View Images Vital Vitrea     Show images for Stress Echo Which stress agent will be used? Treadmill Exercise; Color Flow Doppler? No  Summary    The patient exercised for 5 minutes and 1 seconds on a Catina protocol, corresponding to a functional capacity of 3 METS, achieving a peak heart rate of 150 bpm, which is 92% of the age predicted maximum heart rate.  The test was stopped because the patient experienced fatigue.  The patient's exercise capacity was below average.  The ECG portion of this study is negative for myocardial ischemia.  During stress, the following significant arrhythmias were observed: rare PACs, rare PVCs.  The left ventricle is normal in size with normal systolic function.  The estimated ejection fraction is 60%.  Normal right ventricular size with normal right ventricular systolic function.  Normal left ventricular diastolic function.  Normal central venous pressure (3 mmHg).  The stress echo portion of this study is negative for myocardial ischemia.         MyChart Results Release    MyChart Status: Active  Results Release      TSH  Order: [id number]  Status: Final result     Visible to patient: Yes (seen)     Next appt: 03/14/2024 at 10:00 AM in Outpatient Rehab (Sharath Mcguire, PT)     Dx: Hypertension associated with diabetes     0 Result Notes             Component Ref Range & Units 5 mo ago  (10/4/23) 1 yr ago  (8/12/22) 2 yr ago  (8/18/21) 3 yr ago  (8/7/20) 5 yr ago  (8/6/18) 5 yr ago  (4/20/18) 6 yr ago  (2/15/18)   TSH 0.400 - 4.000 uIU/mL 1.202  2.108  1.628  1.791  2.068  1.729  1.104    Resulting Agency  [facility]              Specimen Collected: 10/04/23 09:09 CDT Last Resulted: 10/04/23 15:13 CDT              Assessment:     1. Essential hypertension    2. Palpitations    3. Hypertension associated with diabetes    4. Dyslipidemia associated with type 2 diabetes mellitus    5.  Pulmonary hypertension    6. Morbid (severe) obesity with alveolar hypoventilation    7. Type 2 diabetes mellitus without complication, without long-term current use of insulin      Plan:   Halley was seen today for palpitations and hypertension.    Diagnoses and all orders for this visit:    Essential hypertension  -     IN OFFICE EKG 12-LEAD (to [facility])    Palpitations  -     Ambulatory referral/consult to Cardiology  -     IN OFFICE EKG 12-LEAD (to [facility])    Hypertension associated with diabetes  -     IN OFFICE EKG 12-LEAD (to [facility])    Dyslipidemia associated with type 2 diabetes mellitus  -     IN OFFICE EKG 12-LEAD (to [facility])    Pulmonary hypertension  -     IN OFFICE EKG 12-LEAD (to [facility])    Morbid (severe) obesity with alveolar hypoventilation  -     IN OFFICE EKG 12-LEAD (to [facility])    Type 2 diabetes mellitus without complication, without long-term current use of insulin  -     IN OFFICE EKG 12-LEAD (to [facility])     The palpitations may have been paroxysmal atrial tachycardia, possibly exacerbated by Phentermine    Will get a holter monitor    Consider event monitor    Limit caffeine.    Same meds    Pt may need to stop the phentermine if the palpitations recur    Consider low dose metoprolol    RTC 6 months    Follow up in about 6 months (around 9/7/2024).

## 2024-03-12 ENCOUNTER — TELEPHONE (OUTPATIENT)
Dept: CARDIOLOGY | Facility: CLINIC | Age: 49
End: 2024-03-12
Payer: MEDICAID

## 2024-03-12 DIAGNOSIS — I47.10 PAROXYSMAL SUPRAVENTRICULAR TACHYCARDIA: Primary | ICD-10-CM

## 2024-03-12 LAB
OHS QRS DURATION: 108 MS
OHS QTC CALCULATION: 424 MS

## 2024-03-12 RX ORDER — METOPROLOL SUCCINATE 50 MG/1
50 TABLET, EXTENDED RELEASE ORAL DAILY
Qty: 90 TABLET | Refills: 3 | Status: SHIPPED | OUTPATIENT
Start: 2024-03-12 | End: 2025-03-12

## 2024-03-12 NOTE — TELEPHONE ENCOUNTER
Holter shows several episode of nonsustained ectopic atrial tachycardia.  I spoke with pt.  Recommend: discontinue the phentermine (pt did not take phentermine the day of the holter) and begin metoprolol succinate 50 mg daily.  Repeat holter in about 3 weeks.

## 2024-03-13 DIAGNOSIS — E11.9 TYPE 2 DIABETES MELLITUS WITHOUT COMPLICATION, WITHOUT LONG-TERM CURRENT USE OF INSULIN: ICD-10-CM

## 2024-03-13 RX ORDER — GLIPIZIDE 5 MG/1
TABLET, FILM COATED, EXTENDED RELEASE ORAL
Qty: 90 TABLET | Refills: 0 | Status: SHIPPED | OUTPATIENT
Start: 2024-03-13 | End: 2024-06-13

## 2024-03-13 NOTE — TELEPHONE ENCOUNTER
Refill Routing Note   Medication(s) are not appropriate for processing by [facility] for the following reason(s):        Drug-disease interaction: glipiZIDE and Impaired functional mobility, balance, gait, and endurance     [facility] action(s):  Defer             Pharmacist review requested: Yes     Appointments  past 12m or future 3m with PCP    Date Provider   Last Visit   2/15/2024 Yamila Tejeda MD   Next Visit   4/15/2024 Yamila Tejeda MD   ED visits in past 90 days: 0        Note composed:1:28 PM 03/13/2024

## 2024-03-13 NOTE — TELEPHONE ENCOUNTER
No care due was identified.  [facility] Embedded Care Due Messages. Reference number: [id number].   3/13/2024 12:21:13 AM CDT

## 2024-03-13 NOTE — TELEPHONE ENCOUNTER
Refill Decision Note   Halley Moreno  is requesting a refill authorization.  Brief Assessment and Rationale for Refill:  Approve     Medication Therapy Plan:         Pharmacist review requested: Yes   Extended chart review required: Yes   Comments:     Note composed:2:32 PM 03/13/2024

## 2024-03-14 ENCOUNTER — CLINICAL SUPPORT (OUTPATIENT)
Dept: REHABILITATION | Facility: HOSPITAL | Age: 49
End: 2024-03-14
Payer: MEDICAID

## 2024-03-14 DIAGNOSIS — M25.612 DECREASED RANGE OF MOTION OF LEFT SHOULDER: Primary | ICD-10-CM

## 2024-03-14 DIAGNOSIS — M25.512 CHRONIC LEFT SHOULDER PAIN: ICD-10-CM

## 2024-03-14 DIAGNOSIS — G89.29 CHRONIC LEFT SHOULDER PAIN: ICD-10-CM

## 2024-03-14 DIAGNOSIS — R29.3 ABNORMAL POSTURE: ICD-10-CM

## 2024-03-14 DIAGNOSIS — M25.312 DYSKINESIS OF LEFT SCAPULA: ICD-10-CM

## 2024-03-14 PROCEDURE — 97110 THERAPEUTIC EXERCISES: CPT

## 2024-03-14 PROCEDURE — 97161 PT EVAL LOW COMPLEX 20 MIN: CPT

## 2024-03-14 NOTE — TELEPHONE ENCOUNTER
Spoke with patient.  Holter scheduled at [facility] for 04/04/2024 9:00 am.

## 2024-03-14 NOTE — PLAN OF CARE
[facility]   Physical Therapy Initial Evaluation      Name: Halley Moreno  Clinic Number: [id number]    Therapy Diagnosis:   Encounter Diagnoses   Name Primary?    Chronic left shoulder pain     Decreased range of motion of left shoulder Yes    Abnormal posture     Dyskinesis of left scapula         Physician: Yamila Tejeda MD    Physician Orders: PT Eval and Treat   Medical Diagnosis from Referral: Chronic left shoulder pain [M25.512, G89.29]   Evaluation Date: 3/14/2024  Authorization Period Expiration: 2/14/2025  Plan of Care Expiration: 5/9/2024  Progress Note Due: 4/14/2024  Visit # / Visits authorized: 1/ 1   FOTO: 1/3    Precautions: Standard, Diabetes, Hypertension, morbid obesity     Time In: 1021  Time Out: 1103  Total Appointment Time (timed & untimed codes): 42 minutes    Subjective     Date of onset: September/October 2023    History of current condition - Halley is a R handed female who reports: development of L anterior/superior shoulder pain with unknown mechanism of injury last fall. She notes pain in her anterior and superior shoulder when completing reaching activities. No prior history of shoulder pain. No neck pain or radiating symptoms. No dropping objects. No tripping or falling.     Falls: none    Imaging: x-ray L shoulder FINDINGS:  Visualized osseous structures appear unremarkable, with no evidence of recent or healing fracture, lytic destructive process, or glenohumeral arthritic change observed.  No glenohumeral dislocation.  No abnormal soft tissue calcifications.    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 Lifts no greater than 25% from waist to jt.  Prior Level of Function: no difficulty with reaching and lifting activities   Current Level of Function: moderate difficulty with reaching and lifting activities      Pain:  Current 0/10, worst 5/10, best 0/10   Location: left anterior shoulder   Description: Aching, Dull, and Throbbing  Aggravating Factors: reaching behind back, reaching to the side  Easing Factors:  rest, heat    Patients goals: patient would like to be able to complete all of her usual reaching activities with no difficulty.      Medical History:   Past Medical History:   Diagnosis Date    Acute respiratory failure with hypoxia and hypercapnia 10/22/2020    Last Assessment & Plan:  Patient is a chronic CO2 retainer in setting of obesity hypoventilation syndrome 2/2 morbid obesity. Stepped down from ICU 10/26, currently continuing diuresis. De-escalated to NC during the day with BiPAP nightly on 10/31, which patient tolerated well. On Lasix gtt for diuresis through .   Plan:  - Target SpO2 of >88%. Continue BiPAP nightly. Patient on 3LNC, will con    Back pain     BMI 70 and over, adult     Cavernous hemangioma of liver     CHF (congestive heart failure) 10/2020    Depression     Diabetes mellitus     Difficult intubation     DM (diabetes mellitus) type II controlled with renal manifestation     Glaucoma 2022    HTN (hypertension)     Hyperlipidemia     Lumbar disc disease     Morbid obesity     Rosacea     Sleep apnea     cpap    Tear of medial meniscus of right knee, current 2017       Surgical History:   Halley Moreno  has a past surgical history that includes  section (); Dilation and curettage of uterus (); Endometrial ablation ();  section (2000); Intrauterine device insertion (N/A, 2022); Hysteroscopy with dilation and curettage of uterus (N/A, 2022); Dilation and curettage of uterus (N/A, 2023); Intrauterine device insertion (N/A, 2023); Hysteroscopy (N/A, 2023); and Colonoscopy (N/A, 2023).    Medications:   Halley has a current medication list which includes the following prescription(s): atorvastatin, blood sugar  diagnostic, cholecalciferol (vitamin d3), furosemide, glipizide, ibuprofen, lancets, latanoprost, losartan, megestrol, methocarbamol, metoprolol succinate, multivitamin, naproxen, ondansetron, onetouch ultra2 meter, pen needle, diabetic, and ozempic.    Allergies:   Review of patient's allergies indicates:   Allergen Reactions    Victoza [liraglutide] Swelling        Objective        Posture: depressed and downwardly rotated scapula on L, increased thoracic kyphosis, [place] hump    Cervical ROM:  Cervical Right   Flexion 45   Extension 60   Right rotation 80   Left rotation 80     Quadrant test: (-)    Passive Range of Motion:   Shoulder Right Left   Flexion 174 160   Scaption 172 164   ER at 90 100 90   IR 75 60      Active Range of Motion:   Shoulder Right Left   Flexion 170 145   Scaption 170 150   ER  95 85   IR 70 55     Strength:  Shoulder Right Left   Flexion 5/5 4/5   Scaption 5/5 4/5   ER 5/5 4/5   IR 5/5 4+/5   Upper trap 4/5 3-/5   Middle Trapezius 3+/5 3/5   Lower Trapezius 3/5 3-/5   Serratus Anterior 4/5 3+/5       Special Tests:   Right Left   Painful arc (--) (+)   Drop Arm test (--) (--)   Hawkin's Kenndy (--) (+)       Joint Mobility: decreased Glenohumeral joint inferior glide and posterior glide, decreased cervicothoracic junction mobility, decreased thoracic mobility     Palpation: tenderness noted to palpation of supraspinatus tendon at insertion     Sensation: light touch intact in upper extremities       Intake Outcome Measure for FOTO Shoulder Survey    Therapist reviewed FOTO scores for Halley Moreno on 3/14/2024.   FOTO documents entered into EPIC - see Media section.    Intake Score: 58%           Treatment     Total Treatment time (time-based codes) separate from Evaluation: 9 minutes     Halley received the treatments listed below:      therapeutic exercises to develop strength, endurance, ROM, flexibility, and posture for 9 minutes including:  Education on Home exercise program      manual therapy techniques: Joint mobilizations were applied to the: thoracic spine and Glenohumeral joint for 0 minutes, including:    Glenohumeral inferior and posterior glides  Thoracic posterior to anterior glides   Cervicothoracic junction mobilizations       Patient Education and Home Exercises     Education provided:   - education on lifestyle modifications  - prognosis, plan of care, and progressions in therapy     Written Home Exercises Provided: yes. Exercises were reviewed and Halley was able to demonstrate them prior to the end of the session.  Halley demonstrated good  understanding of the education provided. See EMR under Patient Instructions for exercises provided during therapy sessions.    Assessment     Halley is a 48 y.o. female referred to outpatient Physical Therapy with a medical diagnosis of Chronic left shoulder pain [M25.512, G89.29] . Patient presents with decreased shoulder range of motion, decreased thoracic mobility, decreased strength, impaired posture and pain which limits her ability to complete her Activities of Daily Living and Instrumental Activities of Daily Living involving reaching and lifting. Her signs and symptoms are consistent with shoulder impingement due to scapula depression and downward rotation.     Patient prognosis is Good.   Patient will benefit from skilled outpatient Physical Therapy to address the deficits stated above and in the chart below, provide patient /family education, and to maximize patientt's level of independence.     Plan of care discussed with patient: Yes  Patient's spiritual, cultural and educational needs considered and patient is agreeable to the plan of care and goals as stated below:     Anticipated Barriers for therapy: work schedule    Medical Necessity is demonstrated by the following  History  Co-morbidities and personal factors that may impact the plan of care [] LOW: no personal factors / co-morbidities  [] MODERATE: 1-2 personal factors /  co-morbidities  [x] HIGH: 3+ personal factors / co-morbidities    Moderate / High Support Documentation: Diabetes, Hypertension, morbid obesity      Examination  Body Structures and Functions, activity limitations and participation restrictions that may impact the plan of care [] LOW: addressing 1-2 elements  [x] MODERATE: 3+ elements  [] HIGH: 3+ elements (please support below)    Moderate / High Support Documentation: decreased shoulder range of motion, decreased thoracic mobility, decreased strength, impaired posture and pain     Clinical Presentation [x] LOW: stable  [] MODERATE: Evolving  [] HIGH: Unstable     Decision Making/ Complexity Score: low       Goals:  Short term goals: 4 weeks  Patient will be independent and compliant with their HEP to improve their function  Patient will improve their ROM to at least 160 degrees active shoulder flexion to improve her ability to complete overhead reaching activities.   Patient will improve their strength to at least a 3+/5 for all jason-scapular musculature to improve her ability to complete lifting activities.      Long term goals: 8 weeks  Patient will improve their FOTO score to at least 65% as evidence of clinically significant improvements in their function.  Patient will improve their ROM to at least 170 degrees active shoulder flexion to improve her ability to complete overhead reaching activities.   Patient will improve their strength to at least a 4-/5 for all jason-scapular musculature to improve her ability to complete lifting activities.   Plan     Plan of care Certification: 3/14/2024 to 5/9/2024.    Outpatient Physical Therapy 1 times weekly for 8 weeks to include the following interventions: Manual Therapy, Neuromuscular Re-ed, Patient Education, Therapeutic Activities, Therapeutic Exercise, and Dry Needling PRN .     Sharath Mcguire, PT  Board Certified Clinical Specialist in Orthopedic Physical Therapy  Board Certified Clinical Specialist in Sports  Physical Therapy  Fellow, American Academy of Orthopedic Manual Physical Therapists

## 2024-03-18 DIAGNOSIS — Z91.89 CANDIDATE FOR STATIN THERAPY DUE TO RISK OF FUTURE CARDIOVASCULAR EVENT: ICD-10-CM

## 2024-03-18 RX ORDER — ATORVASTATIN CALCIUM 10 MG/1
10 TABLET, FILM COATED ORAL DAILY
Qty: 90 TABLET | Refills: 1 | Status: SHIPPED | OUTPATIENT
Start: 2024-03-18

## 2024-03-18 NOTE — TELEPHONE ENCOUNTER
Halley Moreno  is requesting a refill authorization.  Brief Assessment and Rationale for Refill:  Approve     Medication Therapy Plan:         Comments:     Note composed:10:19 AM 03/18/2024

## 2024-03-18 NOTE — TELEPHONE ENCOUNTER
No care due was identified.  [facility] Embedded Care Due Messages. Reference number: [id number].   3/18/2024 12:21:24 AM CDT

## 2024-03-22 NOTE — PLAN OF CARE
11/06/20 1457   Discharge Reassessment   Assessment Type Discharge Planning Reassessment   Discharge Plan A Rehab   Discharge Plan B Home with family   DME Needed Upon Discharge  other (see comments)  (TBD)   Anticipated Discharge Disposition Rehab   Post-Acute Status   Post-Acute Authorization Placement   Post-Acute Placement Status Pending Payor Review   Discharge Delays (!) Other  (Waiting on insurance approval.)       Fluoroscopic views showed similar limb length and offset relative to the trial reduction.  The joint shuck on the operative table was satisfactory.  The hip was brought through range of motion after removing the limb from the spar and brought to flexion and shown to be stable.  External  rotation with the hip extended was satisfactory and there was no evidence of component impingement.    The checkpoint on the femur was removed as per correct counts, and as well the tracking pins on the opposite ASIS/iliac crest.    The area was thoroughly irrigated again.  The aquamantis was used to verify adequacy of hemostasis by treating the capsular structures.  An injection of orthopedic analgesic mixture was carefully infiltrated with careful aspiration around the capsular area to improve postoperative analgesia.  A solution of dilute chlorhexidine was placed in the wound and allowed to sit for 2-3 minutes to aid in bacterial control.  It was then removed with suction and cleansed again with pulsatile lavage.    The capsule was then approximated with new #2 ethibond traction stitches.    The tensor fascia john fascia was then closed with running #2 Strata Fix suture.  Skin was closed with 2-0 Vicryl and 3-0 Monocryl. Steri strips were applied  and allowed to dry, then a therabond were used to seal the wound.      The patient was then removed from the operative table, awakened and taken to recovery room in stable condition having tolerated the procedure well.  The patient's foot was noted to be warm and pink color removal of the traction boot.  No significant skin lesions were noted either on the feet.    All needle and sponge counts matched the initial count per circulating and scrub personnel x2 prior to terminating this case.    Marcy Breen PA-C assisted me during this surgery. Due to the intricate and complex nature of this procedure,  her services were required as a first assist with patient postioning , prepping,  draping, retraction,  and a meticulous layered closure. The surgery could be not be executed without to skilled sets of hands.       Sincerely,           Ulysses Ching MD [facility]  Orthopaedic Surgeon - Hip Preservation & Sports Medicine   [facility] [address], [address], [address]  Email: [email or web address]  Office: [phone]      03/22/24  1:37 PM           Electronically signed by Ulysses Ching MD on 3/22/2024 at 1:35 PM

## 2024-03-28 ENCOUNTER — CLINICAL SUPPORT (OUTPATIENT)
Dept: REHABILITATION | Facility: HOSPITAL | Age: 49
End: 2024-03-28
Payer: MEDICAID

## 2024-03-28 DIAGNOSIS — R29.3 ABNORMAL POSTURE: ICD-10-CM

## 2024-03-28 DIAGNOSIS — M25.612 DECREASED RANGE OF MOTION OF LEFT SHOULDER: Primary | ICD-10-CM

## 2024-03-28 DIAGNOSIS — M25.312 DYSKINESIS OF LEFT SCAPULA: ICD-10-CM

## 2024-03-28 PROCEDURE — 97110 THERAPEUTIC EXERCISES: CPT

## 2024-03-28 NOTE — PROGRESS NOTES
[facility]   Physical Therapy Treatment Note     Name: Halley Moreno  Clinic Number: [id number]    Therapy Diagnosis:   Encounter Diagnoses   Name Primary?    Decreased range of motion of left shoulder Yes    Abnormal posture     Dyskinesis of left scapula      Physician: Yamila Tejeda MD    Visit Date: 3/28/2024    Physician Orders: PT Eval and Treat   Medical Diagnosis from Referral: Chronic left shoulder pain [M25.512, G89.29]   Evaluation Date: 3/14/2024  Authorization Period Expiration: 12/31/2024  Plan of Care Expiration: 5/9/2024  Progress Note Due: 4/14/2024  Visit # / Visits authorized: 1/20  FOTO: 1/3     Precautions: Standard, Diabetes, Hypertension, morbid obesity      Time In: 0913  Time Out: 1000  Total Appointment Time (timed & untimed codes): 41 minutes    SUBJECTIVE     Pt reports: shoulder is feeling better but she still has difficulty reaching behind her back.  She was compliant with home exercise program.  Response to previous treatment: improved shoulder symptoms  Functional change: improved ability to complete reaching activities     Pain: 4/10  Location: left anterior shoulder     OBJECTIVE     Objective Measures updated at progress report unless specified.     Treatment     Halley received the treatments listed below:      therapeutic exercises to develop strength, endurance, ROM, flexibility, and posture for 29 minutes including:    Supine shoulder Internal rotation /External rotation 2#, 2x12  Seated chicken wing mid trap level I, 2x12  [place] 5#, 2x12  Seated thoracic extensions, 2x10 5 seconds  Wall slides, 2x10     manual therapy techniques: Joint mobilizations were applied to the: thoracic spine and Glenohumeral joint for 12 minutes, including:     Glenohumeral inferior and posterior glides  Thoracic posterior to anterior glides   Cervicothoracic junction mobilizations       Patient Education and Home Exercises     Home Exercises Provided and  Patient Education Provided     Education provided:   - continue with Home exercise program     Written Home Exercises Provided: continue Home exercise program. Exercises were reviewed and Halley was able to demonstrate them prior to the end of the session.  Halley demonstrated good  understanding of the education provided. See EMR under Patient Instructions for exercises provided during therapy sessions    ASSESSMENT     Halley remains with limited shoulder range of motion so further manual techniques and shoulder mobility exercises were performed today which improved her motion. Manual techniques were directed at her Glenohumeral joint and thoracic spine which improved her ability to reach behind her back. She required cueing on proper exercise technique for all of her exercises as well as cueing for proper posture while performing her exercises.     Halley Is progressing well towards her goals.   Pt prognosis is Good.     Pt will continue to benefit from skilled outpatient physical therapy to address the deficits listed in the problem list box on initial evaluation, provide pt/family education and to maximize pt's level of independence in the home and community environment.     Pt's spiritual, cultural and educational needs considered and pt agreeable to plan of care and goals.     Anticipated barriers to physical therapy: work schedule     Goals:   Short term goals: 4 weeks  Patient will be independent and compliant with their HEP to improve their function -Progressing  Patient will improve their ROM to at least 160 degrees active shoulder flexion to improve her ability to complete overhead reaching activities. -Progressing  Patient will improve their strength to at least a 3+/5 for all jason-scapular musculature to improve her ability to complete lifting activities. -Progressing     Long term goals: 8 weeks  Patient will improve their FOTO score to at least 65% as evidence of clinically significant improvements in their  function. -Progressing  Patient will improve their ROM to at least 170 degrees active shoulder flexion to improve her ability to complete overhead reaching activities. -Progressing   Patient will improve their strength to at least a 4-/5 for all jason-scapular musculature to improve her ability to complete lifting activities. -Progressing    PLAN     Plan of care Certification: 3/14/2024 to 5/9/2024.    Progress shoulder range of motion, thoracic mobility, scapular upward rotation.     Sharath Mcguire, PT   Board Certified Clinical Specialist in Orthopedic Physical Therapy  Board Certified Clinical Specialist in Sports Physical Therapy  Fellow, American Academy of Orthopedic Manual Physical Therapists

## 2024-04-10 ENCOUNTER — TELEPHONE (OUTPATIENT)
Dept: CARDIOLOGY | Facility: CLINIC | Age: 49
End: 2024-04-10
Payer: MEDICAID

## 2024-04-10 DIAGNOSIS — I47.19 ECTOPIC ATRIAL TACHYCARDIA: Primary | ICD-10-CM

## 2024-04-10 NOTE — TELEPHONE ENCOUNTER
Although pt is no longer having palpitations since stopping the phentermine and taking the metoprolol, the holter still shows episodes of nonsustained ectopic atrial tachycardia at 140 bpm.  Will consult Dr Lizandro Sidhu at [facility].

## 2024-04-11 ENCOUNTER — TELEPHONE (OUTPATIENT)
Dept: CARDIOLOGY | Facility: CLINIC | Age: 49
End: 2024-04-11
Payer: MEDICAID

## 2024-04-11 NOTE — TELEPHONE ENCOUNTER
Spoke with patient.  Advised her that [facility] will contact her for scheduling.    Referral and records faxed to [facility].        ----- Message from Anitha Solis sent at 4/11/2024  9:26 AM CDT -----  Regarding: Referral      What is the request in detail: Please call pt to schedule Electrophysiology referral.Please advise.    Can the clinic reply by [facility]? No    Best Call Back Number: [phone]       Additional Information:

## 2024-04-15 ENCOUNTER — OFFICE VISIT (OUTPATIENT)
Dept: INTERNAL MEDICINE | Facility: CLINIC | Age: 49
End: 2024-04-15
Payer: MEDICAID

## 2024-04-15 VITALS
WEIGHT: 293 LBS | SYSTOLIC BLOOD PRESSURE: 116 MMHG | TEMPERATURE: 97 F | BODY MASS INDEX: 51.91 KG/M2 | RESPIRATION RATE: 18 BRPM | OXYGEN SATURATION: 95 % | DIASTOLIC BLOOD PRESSURE: 60 MMHG | HEART RATE: 61 BPM | HEIGHT: 63 IN

## 2024-04-15 DIAGNOSIS — E78.5 DYSLIPIDEMIA ASSOCIATED WITH TYPE 2 DIABETES MELLITUS: ICD-10-CM

## 2024-04-15 DIAGNOSIS — E11.9 TYPE 2 DIABETES MELLITUS WITHOUT COMPLICATION, WITHOUT LONG-TERM CURRENT USE OF INSULIN: Primary | ICD-10-CM

## 2024-04-15 DIAGNOSIS — I15.2 HYPERTENSION ASSOCIATED WITH DIABETES: ICD-10-CM

## 2024-04-15 DIAGNOSIS — E11.59 HYPERTENSION ASSOCIATED WITH DIABETES: ICD-10-CM

## 2024-04-15 DIAGNOSIS — R21 RASH OF BACK: ICD-10-CM

## 2024-04-15 DIAGNOSIS — E66.01 MORBID OBESITY WITH BMI OF 60.0-69.9, ADULT: ICD-10-CM

## 2024-04-15 DIAGNOSIS — E11.69 DYSLIPIDEMIA ASSOCIATED WITH TYPE 2 DIABETES MELLITUS: ICD-10-CM

## 2024-04-15 PROBLEM — I10 ESSENTIAL HYPERTENSION: Status: ACTIVE | Noted: 2020-11-10

## 2024-04-15 PROBLEM — I27.29 RIGHT HEART FAILURE DUE TO PULMONARY HYPERTENSION: Status: ACTIVE | Noted: 2020-10-22

## 2024-04-15 PROBLEM — I50.810 RIGHT HEART FAILURE DUE TO PULMONARY HYPERTENSION: Status: ACTIVE | Noted: 2020-10-22

## 2024-04-15 PROCEDURE — 99999 PR PBB SHADOW E&M-EST. PATIENT-LVL IV: CPT | Mod: PBBFAC,,, | Performed by: INTERNAL MEDICINE

## 2024-04-15 PROCEDURE — 3044F HG A1C LEVEL LT 7.0%: CPT | Mod: CPTII,,, | Performed by: INTERNAL MEDICINE

## 2024-04-15 PROCEDURE — 1159F MED LIST DOCD IN RCRD: CPT | Mod: CPTII,,, | Performed by: INTERNAL MEDICINE

## 2024-04-15 PROCEDURE — 1160F RVW MEDS BY RX/DR IN RCRD: CPT | Mod: CPTII,,, | Performed by: INTERNAL MEDICINE

## 2024-04-15 PROCEDURE — 4010F ACE/ARB THERAPY RXD/TAKEN: CPT | Mod: CPTII,,, | Performed by: INTERNAL MEDICINE

## 2024-04-15 PROCEDURE — 3078F DIAST BP <80 MM HG: CPT | Mod: CPTII,,, | Performed by: INTERNAL MEDICINE

## 2024-04-15 PROCEDURE — 3008F BODY MASS INDEX DOCD: CPT | Mod: CPTII,,, | Performed by: INTERNAL MEDICINE

## 2024-04-15 PROCEDURE — 99214 OFFICE O/P EST MOD 30 MIN: CPT | Mod: PBBFAC,PO | Performed by: INTERNAL MEDICINE

## 2024-04-15 PROCEDURE — 3074F SYST BP LT 130 MM HG: CPT | Mod: CPTII,,, | Performed by: INTERNAL MEDICINE

## 2024-04-15 PROCEDURE — 99214 OFFICE O/P EST MOD 30 MIN: CPT | Mod: S$PBB,,, | Performed by: INTERNAL MEDICINE

## 2024-04-15 RX ORDER — CLOTRIMAZOLE AND BETAMETHASONE DIPROPIONATE 10; .5 MG/ML; MG/ML
LOTION TOPICAL 2 TIMES DAILY
Qty: 30 ML | Refills: 0 | Status: SHIPPED | OUTPATIENT
Start: 2024-04-15 | End: 2024-06-05

## 2024-04-15 NOTE — PROGRESS NOTES
Subjective:     Halley Moreno is a 48 y.o. female who presents for   Chief Complaint   Patient presents with    Follow-up    Hypertension    Diabetes    Hyperlipidemia       HPI    Hypertension: The patient has been taking medications as instructed, no medication side effects noted, no chest pain on exertion, no dyspnea on exertion. She has chronic leg edema.    BP Readings from Last 3 Encounters:   04/15/24 116/60   03/07/24 (!) 118/56   02/15/24 132/76     Diabetes: Patient presents for follow up of diabetes. Current symptoms include: none. Symptoms have been well-controlled. Patient denies foot ulcerations and visual disturbances. Evaluation to date has included: hemoglobin A1C.  Current treatment:  glipizide, semaglutide . Last dilated eye exam: has an appointment.    Lab Results   Component Value Date    HGBA1C 6.4 (H) 04/11/2024    HGBA1C 6.3 (H) 02/15/2024     (H) 12/08/2023     Hyperlipidemia: Compliance with treatment has been good. The patient exercises intermittently. Patient denies muscle pain associated with his medications. Previous history of cardiac disease includes: cardiomyopathy.    Lab Results   Component Value Date    CHOL 90 04/11/2024    CHOL 107 (L) 10/04/2023    HDL 38 (L) 04/11/2024    HDL 38 (L) 10/04/2023        Body mass index is 63.62 kg/m².  Wt Readings from Last 3 Encounters:   04/15/24 (!) 162.9 kg (359 lb 2.1 oz)   03/07/24 (!) 166.5 kg (367 lb 1.1 oz)   02/15/24 (!) 165 kg (363 lb 12.1 oz)   - the patient is being evaluated at [facility] for bariatric surgery/ gastric sleeve  - goal wt 180-220 lbs  - sees Nutritionist regularly, patient is working on reducing portion sizes, started tracking eating habits  - has appointments for Cardiology and EGD  - weight decreased from 367 lbs to 359 lbs      Review of Systems   Constitutional:  Negative for chills, diaphoresis, fatigue and fever.   HENT:  Negative for congestion, ear pain and sinus pressure.    Eyes:  Negative for  discharge and visual disturbance.   Respiratory:  Negative for cough and shortness of breath.    Cardiovascular:  Negative for chest pain and palpitations.   Gastrointestinal:  Negative for abdominal pain, constipation, diarrhea, nausea and vomiting.   Musculoskeletal:  Negative for arthralgias and myalgias.   Skin:  Positive for rash (on her back with itching). Negative for wound.   Neurological:  Negative for dizziness, tremors, numbness and headaches.   Psychiatric/Behavioral:  Negative for dysphoric mood. The patient is not nervous/anxious.           Objective:     Physical Exam  Vitals reviewed.   Constitutional:       General: She is awake. She is not in acute distress.     Appearance: Normal appearance. She is well-developed and well-groomed.   HENT:      Head: Normocephalic and atraumatic.      Right Ear: Hearing and external ear normal.      Left Ear: Hearing and external ear normal.      Nose: Nose normal. No congestion.      Mouth/Throat:      Mouth: Mucous membranes are moist.   Eyes:      General: Lids are normal. Vision grossly intact.   Cardiovascular:      Rate and Rhythm: Normal rate and regular rhythm.      Heart sounds: Normal heart sounds. No murmur heard.  Pulmonary:      Effort: Pulmonary effort is normal.      Breath sounds: Normal breath sounds. No decreased breath sounds or wheezing.   Abdominal:      General: Bowel sounds are normal. There is no distension.   Musculoskeletal:         General: Normal range of motion.      Cervical back: Normal range of motion.      Right lower leg: No edema.      Left lower leg: No edema.   Skin:     General: Skin is warm and dry.      Findings: Rash present. No lesion. Rash is macular and papular.      Comments: Red lesion on back overlying the thoracic spine, raised around the edges   Neurological:      Mental Status: She is alert and oriented to person, place, and time.   Psychiatric:         Attention and Perception: Attention normal.         Mood and  Affect: Mood normal.         Behavior: Behavior is cooperative.            Assessment:      1. Type 2 diabetes mellitus without complication, without long-term current use of insulin    2. Hypertension associated with diabetes    3. Dyslipidemia associated with type 2 diabetes mellitus    4. Morbid obesity with BMI of 60.0-69.9, adult    5. Rash of back           Plan:     1. Type 2 diabetes mellitus without complication, without long-term current use of insulin  - last HbA1c was 6.3, controlled, continue glipizide, semaglutide  - Microalbumin/Creatinine Ratio, Urine; Future    2. Hypertension associated with diabetes  - controlled, continue losartan, furosemide, metoprolol    3. Dyslipidemia associated with type 2 diabetes mellitus  - stable, continue Lipitor    4. Morbid obesity with BMI of 60.0-69.9, adult  - proceed with workup for bariatric surgery    5. Rash of back  - clotrimazole-betamethasone (LOTRISONE) lotion; Apply topically 2 (two) times daily.  Dispense: 30 mL; Refill: 0    RTC in 4 months for follow-up or sooner if needed    __________________________    Yamila Tejeda MD, PharmD  [facility]  American Board of Obesity Medicine diplomate  Office [phone]

## 2024-04-16 ENCOUNTER — LAB VISIT (OUTPATIENT)
Dept: LAB | Facility: HOSPITAL | Age: 49
End: 2024-04-16
Attending: INTERNAL MEDICINE
Payer: MEDICAID

## 2024-04-16 DIAGNOSIS — E11.9 TYPE 2 DIABETES MELLITUS WITHOUT COMPLICATION, WITHOUT LONG-TERM CURRENT USE OF INSULIN: ICD-10-CM

## 2024-04-16 LAB
ALBUMIN/CREAT UR: 8.6 UG/MG (ref 0–30)
CREAT UR-MCNC: 280 MG/DL (ref 15–325)
MICROALBUMIN UR DL<=1MG/L-MCNC: 24 UG/ML

## 2024-04-16 PROCEDURE — 82043 UR ALBUMIN QUANTITATIVE: CPT | Performed by: INTERNAL MEDICINE

## 2024-04-18 ENCOUNTER — CLINICAL SUPPORT (OUTPATIENT)
Dept: REHABILITATION | Facility: HOSPITAL | Age: 49
End: 2024-04-18
Payer: MEDICAID

## 2024-04-18 ENCOUNTER — TELEPHONE (OUTPATIENT)
Dept: INTERNAL MEDICINE | Facility: CLINIC | Age: 49
End: 2024-04-18
Payer: MEDICAID

## 2024-04-18 DIAGNOSIS — M25.312 DYSKINESIS OF LEFT SCAPULA: ICD-10-CM

## 2024-04-18 DIAGNOSIS — M25.612 DECREASED RANGE OF MOTION OF LEFT SHOULDER: Primary | ICD-10-CM

## 2024-04-18 DIAGNOSIS — R29.3 ABNORMAL POSTURE: ICD-10-CM

## 2024-04-18 PROCEDURE — 97110 THERAPEUTIC EXERCISES: CPT

## 2024-04-18 NOTE — PROGRESS NOTES
[facility]   Physical Therapy Treatment and Progress Note     Name: Halley Moreno  Clinic Number: [id number]    Therapy Diagnosis:   Encounter Diagnoses   Name Primary?    Decreased range of motion of left shoulder Yes    Abnormal posture     Dyskinesis of left scapula      Physician: Yamila Tejeda MD    Visit Date: 4/18/2024    Physician Orders: PT Eval and Treat   Medical Diagnosis from Referral: Chronic left shoulder pain [M25.512, G89.29]   Evaluation Date: 3/14/2024  Authorization Period Expiration: 12/31/2024  Plan of Care Expiration: 5/9/2024  Progress Note Due: 5/9/2024  Visit # / Visits authorized: 1/20  FOTO: 1/3     Precautions: Standard, Diabetes, Hypertension, morbid obesity      Time In: 0900  Time Out: 0944  Total Appointment Time (timed & untimed codes): 41 minutes    SUBJECTIVE     Pt reports: her shoulder again is doing better, though she did get some muscle spasms in her shoulder a couple days ago when sitting. She continues to have difficulty reaching behind her back.  She was compliant with home exercise program.  Response to previous treatment: improved shoulder symptoms  Functional change: improved ability to complete reaching activities     Pain: 4/10  Location: left anterior shoulder     OBJECTIVE     Objective Measures updated at progress report unless specified.     Active Range of Motion:   Shoulder Right Left   Flexion 170 160   Scaption 170 160   ER  95 90   IR 70 55        Special Tests:    Right Left   Painful arc (--) (--)   Drop Arm test (--) (--)   Rena's [place] (--) (+)        Treatment     Halley received the treatments listed below:      therapeutic exercises to develop strength, endurance, ROM, flexibility, and posture for 29 minutes including:    Supine shoulder Internal rotation /External rotation 2#, 2x12  Seated chicken wing mid trap level I, 2x12  Landmines 5#, 2x12  Seated thoracic extensions, 2x10 5 seconds  Standing shoulder  External rotation green band, 2x12  Wall slides, 2x10     manual therapy techniques: Joint mobilizations were applied to the: thoracic spine and Glenohumeral joint for 12 minutes, including:     Glenohumeral inferior and posterior glides  Thoracic posterior to anterior glides   Cervicothoracic junction mobilizations       Patient Education and Home Exercises     Home Exercises Provided and Patient Education Provided     Education provided:   - continue with Home exercise program     Written Home Exercises Provided: continue Home exercise program. Exercises were reviewed and Halley was able to demonstrate them prior to the end of the session.  Halley demonstrated good  understanding of the education provided. See EMR under Patient Instructions for exercises provided during therapy sessions    ASSESSMENT     Halley has been seen for 3 visits for her L shoulder pain. She demonstrates improved active shoulder range of motion and negative painful arc sign indicating improvements in her objective measures. She has been able to perform more of her reaching activities due to this improvement in her pain and range of motion. She remains with pain in her shoulder with reaching behind her back which limits her ability to don/doff a bra and wash her back.     Halley Is progressing well towards her goals.   Pt prognosis is Good.     Pt will continue to benefit from skilled outpatient physical therapy to address the deficits listed in the problem list box on initial evaluation, provide pt/family education and to maximize pt's level of independence in the home and community environment.     Pt's spiritual, cultural and educational needs considered and pt agreeable to plan of care and goals.     Anticipated barriers to physical therapy: work schedule     Goals:   Short term goals: 4 weeks  Patient will be independent and compliant with their HEP to improve their function -Met  Patient will improve their ROM to at least 160 degrees active  shoulder flexion to improve her ability to complete overhead reaching activities. -Met  Patient will improve their strength to at least a 3+/5 for all jason-scapular musculature to improve her ability to complete lifting activities. -Progressing     Long term goals: 8 weeks  Patient will improve their FOTO score to at least 65% as evidence of clinically significant improvements in their function. -Progressing  Patient will improve their ROM to at least 170 degrees active shoulder flexion to improve her ability to complete overhead reaching activities. -Progressing   Patient will improve their strength to at least a 4-/5 for all jason-scapular musculature to improve her ability to complete lifting activities. -Progressing    PLAN     Plan of care Certification: 3/14/2024 to 5/9/2024.    Progress shoulder range of motion, thoracic mobility, scapular upward rotation.     Sharath Mcguire, PT   Board Certified Clinical Specialist in Orthopedic Physical Therapy  Board Certified Clinical Specialist in Sports Physical Therapy  Fellow, American Academy of Orthopedic Manual Physical Therapists

## 2024-04-22 ENCOUNTER — PATIENT MESSAGE (OUTPATIENT)
Dept: OPTOMETRY | Facility: CLINIC | Age: 49
End: 2024-04-22
Payer: MEDICAID

## 2024-04-23 DIAGNOSIS — H40.053 BILATERAL OCULAR HYPERTENSION: ICD-10-CM

## 2024-04-23 RX ORDER — LATANOPROST 50 UG/ML
1 SOLUTION/ DROPS OPHTHALMIC NIGHTLY
Qty: 7.5 ML | Refills: 3 | Status: SHIPPED | OUTPATIENT
Start: 2024-04-23 | End: 2025-04-23

## 2024-04-23 NOTE — PROGRESS NOTES
[facility]   Physical Therapy Treatment Note     Name: Halley Moreno  Clinic Number: [id number]    Therapy Diagnosis:   Encounter Diagnoses   Name Primary?    Decreased range of motion of left shoulder Yes    Abnormal posture     Dyskinesis of left scapula        Physician: Yamila Tejeda MD    Visit Date: 4/24/2024    Physician Orders: PT Eval and Treat   Medical Diagnosis from Referral: Chronic left shoulder pain [M25.512, G89.29]   Evaluation Date: 3/14/2024  Authorization Period Expiration: 12/31/2024  Plan of Care Expiration: 5/9/2024  Progress Note Due: 5/9/2024  Visit # / Visits authorized: 3/20  FOTO: 1/3     Precautions: Standard, Diabetes, Hypertension, morbid obesity      Time In: 0420  Time Out: 0500  Total Appointment Time (timed & untimed codes): 40 minutes    SUBJECTIVE     Pt reports: she has full range of motion and only notices her symptoms at end range internal rotation and flexion. Some thoracic symptoms when looking down.  She was compliant with home exercise program.  Response to previous treatment: improved shoulder symptoms  Functional change: improved ability to complete reaching activities     Pain: 4/10  Location: left anterior shoulder     OBJECTIVE     Objective Measures updated at progress report unless specified.     Active Range of Motion:   Shoulder Right Left   Flexion 180 160   Scaption 180 160   ER  95 90   IR 70 55        Special Tests:    Right Left   Painful arc (--) (--)   Drop Arm test (--) (--)   Rena's [place] (--) (+)        Treatment     Halley received the treatments listed below:      therapeutic exercises to develop strength, endurance, ROM, flexibility, and posture for 29 minutes including:    Supine shoulder Internal rotation /External rotation 3#, 2x12  Supine D2 flexion / extension - green theraband; 3x12  Seated chicken wing mid trap level I, 2x12  Landmines 5#, 2x12  Seated thoracic extensions, 2x10 5 seconds  Standing shoulder  External rotation green band, 2x12  Standing shoulder internal rotation blue band; 2x10  Wall slides, 2x10     manual therapy techniques: Joint mobilizations were applied to the: thoracic spine and Glenohumeral joint for 12 minutes, including:     Glenohumeral inferior and posterior glides  Humeral head centric work - external rotation/internal rotation   Seated Cervicothoracic junction and Thoracic posterior to anterior glides      Patient Education and Home Exercises     Home Exercises Provided and Patient Education Provided     Education provided:   - continue with Home exercise program     Written Home Exercises Provided: continue Home exercise program. Exercises were reviewed and Halley was able to demonstrate them prior to the end of the session.  Halley demonstrated good  understanding of the education provided. See EMR under Patient Instructions for exercises provided during therapy sessions    ASSESSMENT     Halley presents back to the clinic with full range of motion and pain at end range active internal rotation and passive external rotation. She demonstrated resolution of symptoms following humeral head centricity training. Reinforced positive changes with today's corrective exercises addressing her cuff strength, scapular stabilizer control, and thoracic mobility. Negative symptoms upon completion of today's session.      Halley Is progressing well towards her goals.   Pt prognosis is Good.     Pt will continue to benefit from skilled outpatient physical therapy to address the deficits listed in the problem list box on initial evaluation, provide pt/family education and to maximize pt's level of independence in the home and community environment.     Pt's spiritual, cultural and educational needs considered and pt agreeable to plan of care and goals.     Anticipated barriers to physical therapy: work schedule     Goals:   Short term goals: 4 weeks  Patient will be independent and compliant with their HEP to  improve their function -Met  Patient will improve their ROM to at least 160 degrees active shoulder flexion to improve her ability to complete overhead reaching activities. -Met  Patient will improve their strength to at least a 3+/5 for all jason-scapular musculature to improve her ability to complete lifting activities. -Progressing     Long term goals: 8 weeks  Patient will improve their FOTO score to at least 65% as evidence of clinically significant improvements in their function. -Progressing  Patient will improve their ROM to at least 170 degrees active shoulder flexion to improve her ability to complete overhead reaching activities. -Progressing   Patient will improve their strength to at least a 4-/5 for all jason-scapular musculature to improve her ability to complete lifting activities. -Progressing    PLAN     Plan of care Certification: 3/14/2024 to 5/9/2024.    Progress shoulder range of motion, thoracic mobility, scapular upward rotation.     Tono Zheng PT    Co-treated with Sharath Mcguire PT  Board Certified Clinical Specialist in Orthopedic Physical Therapy  Board Certified Clinical Specialist in Sports Physical Therapy  Fellow, American Academy of Orthopedic Manual Physical Therapists

## 2024-04-24 ENCOUNTER — CLINICAL SUPPORT (OUTPATIENT)
Dept: REHABILITATION | Facility: HOSPITAL | Age: 49
End: 2024-04-24
Payer: MEDICAID

## 2024-04-24 DIAGNOSIS — M25.612 DECREASED RANGE OF MOTION OF LEFT SHOULDER: Primary | ICD-10-CM

## 2024-04-24 DIAGNOSIS — M25.312 DYSKINESIS OF LEFT SCAPULA: ICD-10-CM

## 2024-04-24 DIAGNOSIS — R29.3 ABNORMAL POSTURE: ICD-10-CM

## 2024-04-24 PROCEDURE — 97110 THERAPEUTIC EXERCISES: CPT

## 2024-05-02 ENCOUNTER — CLINICAL SUPPORT (OUTPATIENT)
Dept: REHABILITATION | Facility: HOSPITAL | Age: 49
End: 2024-05-02
Payer: MEDICAID

## 2024-05-02 DIAGNOSIS — M25.612 DECREASED RANGE OF MOTION OF LEFT SHOULDER: Primary | ICD-10-CM

## 2024-05-02 DIAGNOSIS — M25.312 DYSKINESIS OF LEFT SCAPULA: ICD-10-CM

## 2024-05-02 DIAGNOSIS — R29.3 ABNORMAL POSTURE: ICD-10-CM

## 2024-05-02 PROCEDURE — 97110 THERAPEUTIC EXERCISES: CPT

## 2024-05-02 NOTE — PROGRESS NOTES
[facility]   Physical Therapy Treatment Note     Name: Halley Moreno  Clinic Number: [id number]    Therapy Diagnosis:   Encounter Diagnoses   Name Primary?    Decreased range of motion of left shoulder Yes    Abnormal posture     Dyskinesis of left scapula        Physician: Yamila Tejeda MD    Visit Date: 5/2/2024    Physician Orders: PT Eval and Treat   Medical Diagnosis from Referral: Chronic left shoulder pain [M25.512, G89.29]   Evaluation Date: 3/14/2024  Authorization Period Expiration: 12/31/2024  Plan of Care Expiration: 5/9/2024  Progress Note Due: 5/9/2024  Visit # / Visits authorized: 4/20  FOTO: 2/3     Precautions: Standard, Diabetes, Hypertension, morbid obesity      Time In: 0901  Time Out: 0957  Total Appointment Time (timed & untimed codes): 54 minutes    SUBJECTIVE     Pt reports: she continues to have pain when reaching behind her back.   She was compliant with home exercise program.  Response to previous treatment: improved shoulder symptoms  Functional change: improved ability to complete reaching activities     Pain: 4/10  Location: left anterior shoulder     OBJECTIVE     Objective Measures updated at progress report unless specified.       Treatment     Halley received the treatments listed below:      therapeutic exercises to develop strength, endurance, ROM, flexibility, and posture for 32 minutes including:    Supine shoulder Internal rotation /External rotation 3#, 2x12  Seated chicken wing mid trap level I, 2x12  Landmines 7#, 2x12  Seated thoracic extensions, 2x10 5 seconds  Standing shoulder External rotation green band, 2x12  Standing shoulder internal rotation blue band; 2x10  Wall slides, 2x10     manual therapy techniques: Joint mobilizations were applied to the: thoracic spine and Glenohumeral joint for 12 minutes, including:     Glenohumeral inferior and posterior glides  Humeral head centric work - external rotation/internal rotation   Seated  Cervicothoracic junction and Thoracic posterior to anterior glides      Patient Education and Home Exercises     Home Exercises Provided and Patient Education Provided     Education provided:   - continue with Home exercise program     Written Home Exercises Provided: continue Home exercise program. Exercises were reviewed and Halley was able to demonstrate them prior to the end of the session.  Halley demonstrated good  understanding of the education provided. See EMR under Patient Instructions for exercises provided during therapy sessions    ASSESSMENT     Halley continues to progress her shoulder range of motion very well though she remains painful at end range shoulder flexion and internal rotation which resolves following manual techniques and range of motion exercises. She again requires cueing on proper exercise technique for many of her exercises as well as scapula positioning.     Halley Is progressing well towards her goals.   Pt prognosis is Good.     Pt will continue to benefit from skilled outpatient physical therapy to address the deficits listed in the problem list box on initial evaluation, provide pt/family education and to maximize pt's level of independence in the home and community environment.     Pt's spiritual, cultural and educational needs considered and pt agreeable to plan of care and goals.     Anticipated barriers to physical therapy: work schedule     Goals:   Short term goals: 4 weeks  Patient will be independent and compliant with their HEP to improve their function -Met  Patient will improve their ROM to at least 160 degrees active shoulder flexion to improve her ability to complete overhead reaching activities. -Met  Patient will improve their strength to at least a 3+/5 for all jason-scapular musculature to improve her ability to complete lifting activities. -Progressing     Long term goals: 8 weeks  Patient will improve their FOTO score to at least 65% as evidence of clinically  significant improvements in their function. -Progressing  Patient will improve their ROM to at least 170 degrees active shoulder flexion to improve her ability to complete overhead reaching activities. -Progressing   Patient will improve their strength to at least a 4-/5 for all jason-scapular musculature to improve her ability to complete lifting activities. -Progressing    PLAN     Plan of care Certification: 3/14/2024 to 5/9/2024.    Progress shoulder range of motion, thoracic mobility, scapular upward rotation.     Sharath Mcguire, PT  Board Certified Clinical Specialist in Orthopedic Physical Therapy  Board Certified Clinical Specialist in Sports Physical Therapy  Fellow, American Academy of Orthopedic Manual Physical Therapists

## 2024-05-07 ENCOUNTER — PATIENT MESSAGE (OUTPATIENT)
Dept: CARDIOLOGY | Facility: CLINIC | Age: 49
End: 2024-05-07
Payer: MEDICAID

## 2024-05-08 ENCOUNTER — PATIENT MESSAGE (OUTPATIENT)
Dept: INTERNAL MEDICINE | Facility: CLINIC | Age: 49
End: 2024-05-08
Payer: MEDICAID

## 2024-05-20 ENCOUNTER — CLINICAL SUPPORT (OUTPATIENT)
Dept: REHABILITATION | Facility: HOSPITAL | Age: 49
End: 2024-05-20
Payer: MEDICAID

## 2024-05-20 DIAGNOSIS — M25.612 DECREASED RANGE OF MOTION OF LEFT SHOULDER: Primary | ICD-10-CM

## 2024-05-20 DIAGNOSIS — M25.312 DYSKINESIS OF LEFT SCAPULA: ICD-10-CM

## 2024-05-20 DIAGNOSIS — R29.3 ABNORMAL POSTURE: ICD-10-CM

## 2024-05-20 PROCEDURE — 97110 THERAPEUTIC EXERCISES: CPT

## 2024-05-20 NOTE — PROGRESS NOTES
[facility]   Physical Therapy Treatment and Re-evaluation Note     Name: Halley Moreno  Clinic Number: [id number]    Therapy Diagnosis:   Encounter Diagnoses   Name Primary?    Decreased range of motion of left shoulder Yes    Abnormal posture     Dyskinesis of left scapula        Physician: Yamila Tejeda MD    Visit Date: 5/20/2024    Physician Orders: PT Eval and Treat   Medical Diagnosis from Referral: Chronic left shoulder pain [M25.512, G89.29]   Evaluation Date: 3/14/2024  Authorization Period Expiration: 12/31/2024  Plan of Care Expiration: 6/17/2024  Progress Note Due: 6/17/2024  Visit # / Visits authorized: 5/20  FOTO: 2/3     Precautions: Standard, Diabetes, Hypertension, morbid obesity      Time In: 1606  Time Out: 1700  Total Appointment Time (timed & untimed codes): 53 minutes    SUBJECTIVE     Pt reports:much less pain with reaching and lifting activities. Continues with some cramping pain at times in her anterior shoulder.   She was compliant with home exercise program.  Response to previous treatment: improved shoulder symptoms  Functional change: improved ability to complete reaching activities     Pain: 3/10  Location: left anterior shoulder     OBJECTIVE     Objective Measures updated at progress report unless specified.     Passive Range of Motion:   Shoulder Right Left   Flexion 174 168   Scaption 172 168   ER at 90 100 90   IR 75 68      Active Range of Motion:   Shoulder Right Left   Flexion 170 163   Scaption 170 164   ER  95 90   IR 70 60      Strength:  Shoulder Right Left   Flexion 5/5 4+/5   Scaption 5/5 4+/5   ER 5/5 4+/5   IR 5/5 4+/5   Upper trap 4/5 3+/5   Middle Trapezius 3+/5 3+/5   Lower Trapezius 3/5 3+/5   Serratus Anterior 4/5 4-/5          Treatment     Halley received the treatments listed below:      therapeutic exercises to develop strength, endurance, ROM, flexibility, and posture for 42 minutes including:    Supine shoulder Internal  rotation /External rotation 3#, 2x12  Seated chicken wing mid trap level I, 2x12  Landmines 7#, 2x12  Seated thoracic extensions, 2x10 5 seconds  Standing shoulder External rotation green band, 2x12  Standing shoulder internal rotation blue band; 2x10  Unilateral Wall slide with lift off 2#, 2x10     manual therapy techniques: Joint mobilizations were applied to the: thoracic spine and Glenohumeral joint for 11 minutes, including:     Glenohumeral inferior and posterior glides  Humeral head centric work - external rotation/internal rotation     Not today:  Seated Cervicothoracic junction and Thoracic posterior to anterior glides      Patient Education and Home Exercises     Home Exercises Provided and Patient Education Provided     Education provided:   - continue with Home exercise program     Written Home Exercises Provided: continue Home exercise program. Exercises were reviewed and Halley was able to demonstrate them prior to the end of the session.  Halley demonstrated good  understanding of the education provided. See EMR under Patient Instructions for exercises provided during therapy sessions    ASSESSMENT     Halley has been seen for 5 visits for her L shoulder pain over the past couple months. She demonstrates improved shoulder range of motion and increased strength which has led to improvements in her ability to complete reaching and lifting activities at home. She remains with some limitations in her shoulder range of motion, rotator cuff and jason-scapular strength which limits her ability to complete overhead lifting activities and activities behind her back. She remains a good candidate for physical therapy services to address her impairments and to facilitate her return to her prior level of function.     Halley Is progressing well towards her goals.   Pt prognosis is Good.     Pt will continue to benefit from skilled outpatient physical therapy to address the deficits listed in the problem list box on  initial evaluation, provide pt/family education and to maximize pt's level of independence in the home and community environment.     Pt's spiritual, cultural and educational needs considered and pt agreeable to plan of care and goals.     Anticipated barriers to physical therapy: work schedule     Goals:   Short term goals: 4 weeks  Patient will be independent and compliant with their HEP to improve their function -Met  Patient will improve their ROM to at least 160 degrees active shoulder flexion to improve her ability to complete overhead reaching activities. -Met  Patient will improve their strength to at least a 3+/5 for all jason-scapular musculature to improve her ability to complete lifting activities. -Met     Long term goals: 8 weeks  Patient will improve their FOTO score to at least 65% as evidence of clinically significant improvements in their function. -Met  Patient will improve their ROM to at least 170 degrees active shoulder flexion to improve her ability to complete overhead reaching activities. -Progressing   Patient will improve their strength to at least a 4-/5 for all jason-scapular musculature to improve her ability to complete lifting activities. -Partially met    PLAN     Plan of care Certification: 5/20/2024 to 6/17/2024.    Progress shoulder range of motion, thoracic mobility, scapular upward rotation.     Sharath Mcguire, PT  Board Certified Clinical Specialist in Orthopedic Physical Therapy  Board Certified Clinical Specialist in Sports Physical Therapy  Fellow, [employer] Manual Physical Therapists

## 2024-05-23 NOTE — PLAN OF CARE
[facility]   Physical Therapy Treatment and Re-evaluation Note     Name: Halley Moreno  Clinic Number: [id number]    Therapy Diagnosis:   Encounter Diagnoses   Name Primary?    Decreased range of motion of left shoulder Yes    Abnormal posture     Dyskinesis of left scapula        Physician: Yamila Tejeda MD    Visit Date: 5/20/2024    Physician Orders: PT Eval and Treat   Medical Diagnosis from Referral: Chronic left shoulder pain [M25.512, G89.29]   Evaluation Date: 3/14/2024  Authorization Period Expiration: 12/31/2024  Plan of Care Expiration: 6/17/2024  Progress Note Due: 6/17/2024  Visit # / Visits authorized: 5/20  FOTO: 2/3     Precautions: Standard, Diabetes, Hypertension, morbid obesity      Time In: 1606  Time Out: 1700  Total Appointment Time (timed & untimed codes): 53 minutes    SUBJECTIVE     Pt reports:much less pain with reaching and lifting activities. Continues with some cramping pain at times in her anterior shoulder.   She was compliant with home exercise program.  Response to previous treatment: improved shoulder symptoms  Functional change: improved ability to complete reaching activities     Pain: 3/10  Location: left anterior shoulder     OBJECTIVE     Objective Measures updated at progress report unless specified.     Passive Range of Motion:   Shoulder Right Left   Flexion 174 168   Scaption 172 168   ER at 90 100 90   IR 75 68      Active Range of Motion:   Shoulder Right Left   Flexion 170 163   Scaption 170 164   ER  95 90   IR 70 60      Strength:  Shoulder Right Left   Flexion 5/5 4+/5   Scaption 5/5 4+/5   ER 5/5 4+/5   IR 5/5 4+/5   Upper trap 4/5 3+/5   Middle Trapezius 3+/5 3+/5   Lower Trapezius 3/5 3+/5   Serratus Anterior 4/5 4-/5          Treatment     Halley received the treatments listed below:      therapeutic exercises to develop strength, endurance, ROM, flexibility, and posture for 42 minutes including:    Supine shoulder Internal  rotation /External rotation 3#, 2x12  Seated chicken wing mid trap level I, 2x12  Landmines 7#, 2x12  Seated thoracic extensions, 2x10 5 seconds  Standing shoulder External rotation green band, 2x12  Standing shoulder internal rotation blue band; 2x10  Unilateral Wall slide with lift off 2#, 2x10     manual therapy techniques: Joint mobilizations were applied to the: thoracic spine and Glenohumeral joint for 11 minutes, including:     Glenohumeral inferior and posterior glides  Humeral head centric work - external rotation/internal rotation     Not today:  Seated Cervicothoracic junction and Thoracic posterior to anterior glides      Patient Education and Home Exercises     Home Exercises Provided and Patient Education Provided     Education provided:   - continue with Home exercise program     Written Home Exercises Provided: continue Home exercise program. Exercises were reviewed and Halley was able to demonstrate them prior to the end of the session.  Halley demonstrated good  understanding of the education provided. See EMR under Patient Instructions for exercises provided during therapy sessions    ASSESSMENT     Halley has been seen for 5 visits for her L shoulder pain over the past couple months. She demonstrates improved shoulder range of motion and increased strength which has led to improvements in her ability to complete reaching and lifting activities at home. She remains with some limitations in her shoulder range of motion, rotator cuff and jason-scapular strength which limits her ability to complete overhead lifting activities and activities behind her back. She remains a good candidate for physical therapy services to address her impairments and to facilitate her return to her prior level of function.     Halley Is progressing well towards her goals.   Pt prognosis is Good.     Pt will continue to benefit from skilled outpatient physical therapy to address the deficits listed in the problem list box on  initial evaluation, provide pt/family education and to maximize pt's level of independence in the home and community environment.     Pt's spiritual, cultural and educational needs considered and pt agreeable to plan of care and goals.     Anticipated barriers to physical therapy: work schedule     Goals:   Short term goals: 4 weeks  Patient will be independent and compliant with their HEP to improve their function -Met  Patient will improve their ROM to at least 160 degrees active shoulder flexion to improve her ability to complete overhead reaching activities. -Met  Patient will improve their strength to at least a 3+/5 for all jason-scapular musculature to improve her ability to complete lifting activities. -Met     Long term goals: 8 weeks  Patient will improve their FOTO score to at least 65% as evidence of clinically significant improvements in their function. -Met  Patient will improve their ROM to at least 170 degrees active shoulder flexion to improve her ability to complete overhead reaching activities. -Progressing   Patient will improve their strength to at least a 4-/5 for all jason-scapular musculature to improve her ability to complete lifting activities. -Partially met    PLAN     Plan of care Certification: 5/20/2024 to 6/17/2024.    Progress shoulder range of motion, thoracic mobility, scapular upward rotation.     Sharath Mcguire, PT  Board Certified Clinical Specialist in Orthopedic Physical Therapy  Board Certified Clinical Specialist in Sports Physical Therapy  Fellow, [employer] Manual Physical Therapists

## 2024-05-27 ENCOUNTER — CLINICAL SUPPORT (OUTPATIENT)
Dept: REHABILITATION | Facility: HOSPITAL | Age: 49
End: 2024-05-27
Payer: MEDICAID

## 2024-05-27 DIAGNOSIS — M25.612 DECREASED RANGE OF MOTION OF LEFT SHOULDER: Primary | ICD-10-CM

## 2024-05-27 DIAGNOSIS — M25.312 DYSKINESIS OF LEFT SCAPULA: ICD-10-CM

## 2024-05-27 DIAGNOSIS — R29.3 ABNORMAL POSTURE: ICD-10-CM

## 2024-05-27 PROCEDURE — 97110 THERAPEUTIC EXERCISES: CPT

## 2024-05-27 NOTE — PROGRESS NOTES
[facility]   Physical Therapy Treatment and Re-evaluation Note     Name: Halley Moreno  Clinic Number: [id number]    Therapy Diagnosis:   Encounter Diagnoses   Name Primary?    Decreased range of motion of left shoulder Yes    Abnormal posture     Dyskinesis of left scapula        Physician: Yamila Tejeda MD    Visit Date: 5/27/2024    Physician Orders: PT Eval and Treat   Medical Diagnosis from Referral: Chronic left shoulder pain [M25.512, G89.29]   Evaluation Date: 3/14/2024  Authorization Period Expiration: 12/31/2024  Plan of Care Expiration: 6/17/2024  Progress Note Due: 6/17/2024  Visit # / Visits authorized: 6/20  FOTO: 2/3     Precautions: Standard, Diabetes, Hypertension, morbid obesity      Time In: 1602  Time Out: 1700  Total Appointment Time (timed & untimed codes): 53 minutes    SUBJECTIVE     Pt reports: she is able to reach with less pain, but still gets some cramping at times.   She was compliant with home exercise program.  Response to previous treatment: improved shoulder symptoms  Functional change: improved ability to complete reaching activities     Pain: 2/10  Location: left anterior shoulder     OBJECTIVE     Objective Measures updated at progress report unless specified.       Treatment     Halley received the treatments listed below:      therapeutic exercises to develop strength, endurance, ROM, flexibility, and posture for 42 minutes including:    Supine External rotation green band, 2x12  Punchouts at 120 degrees green band, 2x12  Seated thoracic extensions, 2x10 5 seconds  Standing shoulder External rotation green band, 2x12  Standing shoulder internal rotation blue band; 2x10  Unilateral Wall slide with lift off 2#, 2x10  Bilateral shoulder External rotation with green band, 2x12     manual therapy techniques: Joint mobilizations were applied to the: thoracic spine and Glenohumeral joint for 11 minutes, including:     Glenohumeral inferior and  posterior glides  Humeral head centric work - external rotation/internal rotation     Not today:  Seated Cervicothoracic junction and Thoracic posterior to anterior glides      Patient Education and Home Exercises     Home Exercises Provided and Patient Education Provided     Education provided:   - continue with Home exercise program     Written Home Exercises Provided: continue Home exercise program. Exercises were reviewed and Halley was able to demonstrate them prior to the end of the session.  Halley demonstrated good  understanding of the education provided. See EMR under Patient Instructions for exercises provided during therapy sessions    ASSESSMENT     Halley again demonstrates improving shoulder range of motion which has led to improvements in her ability to complete lifting activities overhead. She remains with limited shoulder Internal rotation range of motion which limits her ability to wash her back. She was progressed to additional shoulder strengthening which she was able to perform with moderate cueing for proper posture.    Halley Is progressing well towards her goals.   Pt prognosis is Good.     Pt will continue to benefit from skilled outpatient physical therapy to address the deficits listed in the problem list box on initial evaluation, provide pt/family education and to maximize pt's level of independence in the home and community environment.     Pt's spiritual, cultural and educational needs considered and pt agreeable to plan of care and goals.     Anticipated barriers to physical therapy: work schedule     Goals:   Short term goals: 4 weeks  Patient will be independent and compliant with their HEP to improve their function -Met  Patient will improve their ROM to at least 160 degrees active shoulder flexion to improve her ability to complete overhead reaching activities. -Met  Patient will improve their strength to at least a 3+/5 for all jason-scapular musculature to improve her ability to  complete lifting activities. -Met     Long term goals: 8 weeks  Patient will improve their FOTO score to at least 65% as evidence of clinically significant improvements in their function. -Met  Patient will improve their ROM to at least 170 degrees active shoulder flexion to improve her ability to complete overhead reaching activities. -Progressing   Patient will improve their strength to at least a 4-/5 for all jason-scapular musculature to improve her ability to complete lifting activities. -Partially met    PLAN     Plan of care Certification: 5/20/2024 to 6/17/2024.    Progress shoulder range of motion, thoracic mobility, scapular upward rotation.     Sharath Mcguire, PT  Board Certified Clinical Specialist in Orthopedic Physical Therapy  Board Certified Clinical Specialist in Sports Physical Therapy  Fellow, [employer] Manual Physical Therapists

## 2024-06-03 ENCOUNTER — HOSPITAL ENCOUNTER (EMERGENCY)
Facility: OTHER | Age: 49
Discharge: HOME | End: 2024-06-03
Attending: EMERGENCY MEDICINE
Payer: MEDICAID

## 2024-06-03 ENCOUNTER — TELEPHONE (OUTPATIENT)
Dept: INTERNAL MEDICINE | Facility: CLINIC | Age: 49
End: 2024-06-03
Payer: MEDICAID

## 2024-06-03 VITALS
HEIGHT: 63 IN | OXYGEN SATURATION: 99 % | DIASTOLIC BLOOD PRESSURE: 64 MMHG | WEIGHT: 293 LBS | BODY MASS INDEX: 51.91 KG/M2 | RESPIRATION RATE: 20 BRPM | TEMPERATURE: 98 F | SYSTOLIC BLOOD PRESSURE: 130 MMHG | HEART RATE: 65 BPM

## 2024-06-03 DIAGNOSIS — R10.9 RIGHT FLANK PAIN: Primary | ICD-10-CM

## 2024-06-03 DIAGNOSIS — N20.0 NEPHROLITHIASIS: ICD-10-CM

## 2024-06-03 LAB
ALBUMIN SERPL BCP-MCNC: 3.4 G/DL (ref 3.5–5.2)
ALP SERPL-CCNC: 82 U/L (ref 55–135)
ALT SERPL W/O P-5'-P-CCNC: 17 U/L (ref 10–44)
ANION GAP SERPL CALC-SCNC: 9 MMOL/L (ref 8–16)
AST SERPL-CCNC: 12 U/L (ref 10–40)
B-HCG UR QL: NEGATIVE
BACTERIA #/AREA URNS HPF: ABNORMAL /HPF
BASOPHILS # BLD AUTO: 0.03 K/UL (ref 0–0.2)
BASOPHILS NFR BLD: 0.3 % (ref 0–1.9)
BILIRUB SERPL-MCNC: 0.6 MG/DL (ref 0.1–1)
BILIRUB UR QL STRIP: NEGATIVE
BUN SERPL-MCNC: 9 MG/DL (ref 6–20)
CALCIUM SERPL-MCNC: 8.8 MG/DL (ref 8.7–10.5)
CHLORIDE SERPL-SCNC: 104 MMOL/L (ref 95–110)
CLARITY UR: ABNORMAL
CO2 SERPL-SCNC: 27 MMOL/L (ref 23–29)
COLOR UR: YELLOW
CREAT SERPL-MCNC: 0.8 MG/DL (ref 0.5–1.4)
CTP QC/QA: YES
DIFFERENTIAL METHOD BLD: ABNORMAL
EOSINOPHIL # BLD AUTO: 0.1 K/UL (ref 0–0.5)
EOSINOPHIL NFR BLD: 1.3 % (ref 0–8)
ERYTHROCYTE [DISTWIDTH] IN BLOOD BY AUTOMATED COUNT: 16.8 % (ref 11.5–14.5)
EST. GFR  (NO RACE VARIABLE): >60 ML/MIN/1.73 M^2
GLUCOSE SERPL-MCNC: 93 MG/DL (ref 70–110)
GLUCOSE UR QL STRIP: NEGATIVE
HCT VFR BLD AUTO: 35 % (ref 37–48.5)
HGB BLD-MCNC: 11.4 G/DL (ref 12–16)
HGB UR QL STRIP: ABNORMAL
IMM GRANULOCYTES # BLD AUTO: 0.06 K/UL (ref 0–0.04)
IMM GRANULOCYTES NFR BLD AUTO: 0.6 % (ref 0–0.5)
KETONES UR QL STRIP: NEGATIVE
LEUKOCYTE ESTERASE UR QL STRIP: NEGATIVE
LIPASE SERPL-CCNC: 51 U/L (ref 4–60)
LYMPHOCYTES # BLD AUTO: 1.7 K/UL (ref 1–4.8)
LYMPHOCYTES NFR BLD: 16.6 % (ref 18–48)
MCH RBC QN AUTO: 27.3 PG (ref 27–31)
MCHC RBC AUTO-ENTMCNC: 32.6 G/DL (ref 32–36)
MCV RBC AUTO: 84 FL (ref 82–98)
MICROSCOPIC COMMENT: ABNORMAL
MONOCYTES # BLD AUTO: 0.7 K/UL (ref 0.3–1)
MONOCYTES NFR BLD: 7 % (ref 4–15)
NEUTROPHILS # BLD AUTO: 7.7 K/UL (ref 1.8–7.7)
NEUTROPHILS NFR BLD: 74.2 % (ref 38–73)
NITRITE UR QL STRIP: NEGATIVE
NRBC BLD-RTO: 0 /100 WBC
PH UR STRIP: 5 [PH] (ref 5–8)
PLATELET # BLD AUTO: 180 K/UL (ref 150–450)
PMV BLD AUTO: 9 FL (ref 9.2–12.9)
POTASSIUM SERPL-SCNC: 4 MMOL/L (ref 3.5–5.1)
PROT SERPL-MCNC: 6.9 G/DL (ref 6–8.4)
PROT UR QL STRIP: NEGATIVE
RBC # BLD AUTO: 4.17 M/UL (ref 4–5.4)
RBC #/AREA URNS HPF: >100 /HPF (ref 0–4)
SODIUM SERPL-SCNC: 140 MMOL/L (ref 136–145)
SP GR UR STRIP: 1.01 (ref 1–1.03)
SQUAMOUS #/AREA URNS HPF: 2 /HPF
URN SPEC COLLECT METH UR: ABNORMAL
UROBILINOGEN UR STRIP-ACNC: NEGATIVE EU/DL
WBC # BLD AUTO: 10.39 K/UL (ref 3.9–12.7)
WBC #/AREA URNS HPF: 4 /HPF (ref 0–5)

## 2024-06-03 PROCEDURE — 83690 ASSAY OF LIPASE: CPT | Performed by: PHYSICIAN ASSISTANT

## 2024-06-03 PROCEDURE — 63600175 PHARM REV CODE 636 W HCPCS: Performed by: PHYSICIAN ASSISTANT

## 2024-06-03 PROCEDURE — 96374 THER/PROPH/DIAG INJ IV PUSH: CPT

## 2024-06-03 PROCEDURE — 81025 URINE PREGNANCY TEST: CPT | Performed by: EMERGENCY MEDICINE

## 2024-06-03 PROCEDURE — 85025 COMPLETE CBC W/AUTO DIFF WBC: CPT | Performed by: PHYSICIAN ASSISTANT

## 2024-06-03 PROCEDURE — 25000003 PHARM REV CODE 250: Performed by: PHYSICIAN ASSISTANT

## 2024-06-03 PROCEDURE — 96361 HYDRATE IV INFUSION ADD-ON: CPT

## 2024-06-03 PROCEDURE — 80053 COMPREHEN METABOLIC PANEL: CPT | Performed by: PHYSICIAN ASSISTANT

## 2024-06-03 PROCEDURE — 81000 URINALYSIS NONAUTO W/SCOPE: CPT | Performed by: PHYSICIAN ASSISTANT

## 2024-06-03 PROCEDURE — 99285 EMERGENCY DEPT VISIT HI MDM: CPT | Mod: 25

## 2024-06-03 RX ORDER — KETOROLAC TROMETHAMINE 30 MG/ML
10 INJECTION, SOLUTION INTRAMUSCULAR; INTRAVENOUS
Status: COMPLETED | OUTPATIENT
Start: 2024-06-03 | End: 2024-06-03

## 2024-06-03 RX ORDER — METHOCARBAMOL 750 MG/1
1500 TABLET, FILM COATED ORAL 3 TIMES DAILY
Qty: 30 TABLET | Refills: 0 | Status: SHIPPED | OUTPATIENT
Start: 2024-06-03 | End: 2024-06-08

## 2024-06-03 RX ORDER — ONDANSETRON 4 MG/1
4 TABLET, ORALLY DISINTEGRATING ORAL EVERY 8 HOURS PRN
Qty: 30 TABLET | Refills: 0 | Status: SHIPPED | OUTPATIENT
Start: 2024-06-03

## 2024-06-03 RX ORDER — METOPROLOL TARTRATE 50 MG/1
50 TABLET ORAL DAILY
COMMUNITY

## 2024-06-03 RX ORDER — KETOROLAC TROMETHAMINE 10 MG/1
10 TABLET, FILM COATED ORAL EVERY 6 HOURS
Qty: 12 TABLET | Refills: 0 | Status: SHIPPED | OUTPATIENT
Start: 2024-06-03 | End: 2024-06-06

## 2024-06-03 RX ADMIN — SODIUM CHLORIDE 1000 ML: 0.9 INJECTION, SOLUTION INTRAVENOUS at 02:06

## 2024-06-03 RX ADMIN — KETOROLAC TROMETHAMINE 10 MG: 30 INJECTION, SOLUTION INTRAMUSCULAR; INTRAVENOUS at 02:06

## 2024-06-03 NOTE — ED NOTES
C/o intermittent right flank pain for the past few days. States pain has been relieved by ibuprofen until last night. Took a percocet last night, with only some improvement in her pain. States she also had some blood when wiping after urinating yesterday and this morning. Denies nausea. Reports she has been told in the past that she has kidney stones, but has never had pain from them in the past.

## 2024-06-03 NOTE — Clinical Note
"Halley Aaron" Josh was seen and treated in our emergency department on 6/3/2024.  She may return to work on 06/06/2024.       If you have any questions or concerns, please don't hesitate to call.      Carly Benavidez PA"

## 2024-06-03 NOTE — ED PROVIDER NOTES
Source of History:  Patient     Chief complaint:  Flank Pain (Pt reporting R flank pain x several days. Reports taking ibuprofen without relief in symptoms and states she took one her moms percocets last night. Denies dysuria and hematuria. Reports polyuria. )      HPI:  Halley Moreno is a 49 y.o. female presenting with right-sided flank pain.  Patient states symptoms began near Friday.  She reports they have been constant since onset.  She does report some waxing waning in intensity that occurred last night.  She had tried ibuprofen with only modest improvement.  Last night she took 1 of her mom's Percocets with marked improvement.  She does report probable blood in urine however was unable to fully determined because currently menstruating.  She denies any fever, chills, nausea, vomiting or abdominal pain.    This is the extent to the patients complaints today here in the emergency department.    ROS: As per HPI     Review of patient's allergies indicates:   Allergen Reactions    Victoza [liraglutide] Swelling       PMH:  As per HPI and below:  Past Medical History:   Diagnosis Date    Acute respiratory failure with hypoxia and hypercapnia 10/22/2020    Last Assessment & Plan:  Patient is a chronic CO2 retainer in setting of obesity hypoventilation syndrome 2/2 morbid obesity. Stepped down from ICU 10/26, currently continuing diuresis. De-escalated to NC during the day with BiPAP nightly on 10/31, which patient tolerated well. On Lasix gtt for diuresis through 11/5.   Plan:  - Target SpO2 of >88%. Continue BiPAP nightly. Patient on 3LNC, will con    Back pain     BMI 70 and over, adult     Cavernous hemangioma of liver     CHF (congestive heart failure) 10/2020    Depression     Diabetes mellitus     Difficult intubation     DM (diabetes mellitus) type II controlled with renal manifestation     Glaucoma 09/13/2022    HTN (hypertension)     Hyperlipidemia     Lumbar disc disease     Morbid obesity      "Rosacea     Sleep apnea     cpap    Tear of medial meniscus of right knee, current 2017     Past Surgical History:   Procedure Laterality Date     SECTION       SECTION  2000    COLONOSCOPY N/A 2023    Procedure: COLONOSCOPY;  Surgeon: Sharath Perea MD;  Location: [facility] ([address] [place] [address]);  Service: Endoscopy;  Laterality: N/A;  Any MD  BMI 64.66  pt refused WB, no availability at [facility] in next 3-4 days   ref. by Yamila Tejeda MD, [place], instr. to portal, [facility]  -precall complete-pt verbalized understanding of holding Ozempic-MS    DILATION AND CURETTAGE OF UTERUS      AUB "negative path" per patient    DILATION AND CURETTAGE OF UTERUS N/A 2023    Procedure: DILATION AND CURETTAGE, UTERUS;  Surgeon: Florentino Lovell MD;  Location: [facility];  Service: OB/GYN;  Laterality: N/A;  PROCEED AS INDICATED    ENDOMETRIAL ABLATION      Patient unsure if procedure was performed    HYSTEROSCOPY N/A 2023    Procedure: HYSTEROSCOPY;  Surgeon: Florentino Lovell MD;  Location: [facility];  Service: OB/GYN;  Laterality: N/A;    HYSTEROSCOPY WITH DILATION AND CURETTAGE OF UTERUS N/A 2022    Procedure: HYSTEROSCOPY, WITH DILATION AND CURETTAGE OF UTERUS;  Surgeon: Dixie Rod MD;  Location: [facility];  Service: OB/GYN;  Laterality: N/A;    INTRAUTERINE DEVICE INSERTION N/A 2022    Procedure: INSERTION, INTRAUTERINE DEVICE;  Surgeon: Dixie Rod MD;  Location: [facility];  Service: OB/GYN;  Laterality: N/A;    INTRAUTERINE DEVICE INSERTION N/A 2023    Procedure: INSERTION, INTRAUTERINE DEVICE;  Surgeon: Florentino Lovell MD;  Location: [facility];  Service: OB/GYN;  Laterality: N/A;       Social History     Tobacco Use    Smoking status: Never     Passive exposure: Never    Smokeless tobacco: Never   Substance Use Topics    Alcohol use: No    Drug use: No       Physical Exam:    /64 (BP Location: Right arm, Patient Position: Sitting)   Pulse 65   Temp 97.9 " "°F (36.6 °C) (Oral)   Resp 20   Ht 5' 3" (1.6 m)   Wt (!) 158.8 kg (350 lb)   SpO2 99%   BMI 62.00 kg/m²   Nursing note and vital signs reviewed.  Constitutional: No acute distress.  Nontoxic  Eyes: No conjunctival injection.Extraocular muscles are intact.  ENT: Oropharynx clear.  Normal phonation.   Cardiovascular: Regular rate and rhythm.  No murmurs. No gallops. No rubs  Respiratory: Clear to auscultation bilaterally.  Good air movement.  No wheezes.  No rhonchi. No rales. No accessory muscle use..  Abdomen: Soft.  Not distended.  Nontender.  No guarding.  No rebound. Non-peritoneal.  Right-sided CVA tenderness  Musculoskeletal: Good range of motion all joints.  No deformities.  Neck supple.  No meningismus.  Skin: No rashes seen.  Good turgor.  No abrasions.  No ecchymoses.  Neurologic: Motor intact.  Sensation intact.  No ataxia. No focal neurological deficits.  Psych: Appropriate, conversant    Labs that have been ordered have been independently reviewed and interpreted by myself.    I decided to obtain the patient's medical records.    MDM/ Differential Dx:    Halley Moreno 49 y.o. presented to the ED with c/o right flank pain.   Physical exam reveals well-appearing female in no significant distress.  Exam notable for TTP of right CVA region.  No abdominal tenderness to palpation, guarding or rebound rigidity    Differential Diagnosis includes, but is not limited to:  AAA, aortic dissection, SBO/volvulus, intussusception, ileus, appendicitis, cholecystitis, hepatitis, nephrolithiasis, pancreatitis, IBD/IBS, biliary colic, GERD, PUD, constipation, UTI/pyelonephritis, musculoskeletal pain.       ED management:  Patient seen in tele triage process were initial labs were ordered.  Given her reported history of renal stones and noted hematuria with no overt signs of infection will obtain renal CT..  Labs reassuring with continued anemia.  No significant change.  No acute kidney injury or additional " abnormalities appreciated.  CT does reveal multiple renal stones of the right kidney with slight prominence of the collecting system.  Noted 1.3 cm stone.  This may be contributing to her pain.  No obstructive process noted and no intraureteral stone at this time.  Continued liver lesions noted no significant change in size when compared to previous CT.  Discuss the need for follow-up will reach out to urology to arrange this.  We will send home with anti-inflammatory and muscle relaxant with instructions on prompt return for new or worsening symptoms      Impression/Plan: Patient informed of diagnosis The primary encounter diagnosis was Right flank pain. A diagnosis of Nephrolithiasis was also pertinent to this visit.   Patient cautioned on when to return to ED.  Pt. Understands and agrees with current treatment plan      Results for orders placed or performed during the hospital encounter of 06/03/24   CBC auto differential   Result Value Ref Range    WBC 10.39 3.90 - 12.70 K/uL    RBC 4.17 4.00 - 5.40 M/uL    Hemoglobin 11.4 (L) 12.0 - 16.0 g/dL    Hematocrit 35.0 (L) 37.0 - 48.5 %    MCV 84 82 - 98 fL    MCH 27.3 27.0 - 31.0 pg    MCHC 32.6 32.0 - 36.0 g/dL    RDW 16.8 (H) 11.5 - 14.5 %    Platelets 180 150 - 450 K/uL    MPV 9.0 (L) 9.2 - 12.9 fL    Immature Granulocytes 0.6 (H) 0.0 - 0.5 %    Gran # (ANC) 7.7 1.8 - 7.7 K/uL    Immature Grans (Abs) 0.06 (H) 0.00 - 0.04 K/uL    Lymph # 1.7 1.0 - 4.8 K/uL    Mono # 0.7 0.3 - 1.0 K/uL    Eos # 0.1 0.0 - 0.5 K/uL    Baso # 0.03 0.00 - 0.20 K/uL    nRBC 0 0 /100 WBC    Gran % 74.2 (H) 38.0 - 73.0 %    Lymph % 16.6 (L) 18.0 - 48.0 %    Mono % 7.0 4.0 - 15.0 %    Eosinophil % 1.3 0.0 - 8.0 %    Basophil % 0.3 0.0 - 1.9 %    Differential Method Automated    Comprehensive metabolic panel   Result Value Ref Range    Sodium 140 136 - 145 mmol/L    Potassium 4.0 3.5 - 5.1 mmol/L    Chloride 104 95 - 110 mmol/L    CO2 27 23 - 29 mmol/L    Glucose 93 70 - 110 mg/dL    BUN 9 6  - 20 mg/dL    Creatinine 0.8 0.5 - 1.4 mg/dL    Calcium 8.8 8.7 - 10.5 mg/dL    Total Protein 6.9 6.0 - 8.4 g/dL    Albumin 3.4 (L) 3.5 - 5.2 g/dL    Total Bilirubin 0.6 0.1 - 1.0 mg/dL    Alkaline Phosphatase 82 55 - 135 U/L    AST 12 10 - 40 U/L    ALT 17 10 - 44 U/L    eGFR >60 >60 mL/min/1.73 m^2    Anion Gap 9 8 - 16 mmol/L   Urinalysis, Reflex to Urine Culture Urine, Clean Catch    Specimen: Urine   Result Value Ref Range    Specimen UA Urine, Clean Catch     Color, UA Yellow Yellow, Straw, Kamila    Appearance, UA Hazy (A) Clear    pH, UA 5.0 5.0 - 8.0    Specific Gravity, UA 1.015 1.005 - 1.030    Protein, UA Negative Negative    Glucose, UA Negative Negative    Ketones, UA Negative Negative    Bilirubin (UA) Negative Negative    Occult Blood UA 3+ (A) Negative    Nitrite, UA Negative Negative    Urobilinogen, UA Negative <2.0 EU/dL    Leukocytes, UA Negative Negative   Lipase   Result Value Ref Range    Lipase 51 4 - 60 U/L   Urinalysis Microscopic   Result Value Ref Range    RBC, UA >100 (H) 0 - 4 /hpf    WBC, UA 4 0 - 5 /hpf    Bacteria Rare None-Occ /hpf    Squam Epithel, UA 2 /hpf    Microscopic Comment SEE COMMENT    POCT urine pregnancy   Result Value Ref Range    POC Preg Test, Ur Negative Negative     Acceptable Yes      *Note: Due to a large number of results and/or encounters for the requested time period, some results have not been displayed. A complete set of results can be found in Results Review.     Imaging Results              CT Renal Stone Study ABD Pelvis WO (Final result)  Result time 06/03/24 14:35:23      Final result by Darlene Saxena MD (06/03/24 14:35:23)                   Impression:      Right nephrolithiasis now with a stone near the right renal pelvis.  Lower pole collecting system is mildly prominent.  Ureter is normal caliber.    Negative for obstructive uropathy on the left.    Intrauterine device in the uterus.  A few tiny air bubbles are seen near the  device; please correlate for recent instrumentation.      Electronically signed by: Darlene Saxena  Date:    06/03/2024  Time:    14:35               Narrative:    EXAMINATION:  CT RENAL STONE STUDY ABD PELVIS WO    CLINICAL HISTORY:  Flank pain, kidney stone suspected;    TECHNIQUE:  Low dose axial images, sagittal and coronal reformations were obtained from the lung bases to the pubic symphysis.  Contrast was not administered.    COMPARISON:  12/08/2023    FINDINGS:  : Kidneys are symmetric in size and position.    On the right, stones are present in the lower pole collecting system with a single stone at the level of the renal pelvis estimated at 1.3 cm.  No hydroureter or intraureteral stone identified.    On the left, no hydronephrosis, hydroureter, or intraureteral stone.    Urinary bladder is unremarkable.  Intrauterine device.  A few tiny air bubbles are seen along the uterus.    Lung bases are clear.  Limited evaluation of solid organs due to lack of intravenous contrast.  Allowing for this, at least 2 hypodensities in the liver, largest in the right hepatic lobe measuring 7.9 cm.  Largest lesion has the appearance of a hemangioma, though the 2nd subcapsular lesion in the right hepatic lobe remains indeterminate.  Gallbladder, bile ducts, spleen, pancreas, and adrenal glands have an unremarkable unenhanced appearance.    Stomach and loops of bowel are normal in caliber.  Appendix is normal.  No free fluid in the pelvis.    Regional skeleton shows degenerative change.                                                Diagnostic Impression:    1. Right flank pain    2. Nephrolithiasis         ED Disposition Condition    Discharge Stable            ED Prescriptions       Medication Sig Dispense Start Date End Date Auth. Provider    ketorolac (TORADOL) 10 mg tablet Take 1 tablet (10 mg total) by mouth every 6 (six) hours. for 3 days 12 tablet 6/3/2024 6/6/2024 Carly Benavidez PA    methocarbamoL (ROBAXIN)  750 MG Tab Take 2 tablets (1,500 mg total) by mouth 3 (three) times daily. for 5 days 30 tablet 6/3/2024 6/8/2024 Carly Benavidez PA    ondansetron (ZOFRAN-ODT) 4 MG TbDL Take 1 tablet (4 mg total) by mouth every 8 (eight) hours as needed. 30 tablet 6/3/2024 -- Carly Benavidez PA          Follow-up Information       Follow up With Specialties Details Why Contact Info Additional Information    [facility] Urology Schedule an appointment as soon as possible for a visit   [address], [address]  [facility] [address] [phone] Urology - [facility], 6th Floor, [address] Patients seeing Dr. Smart, please check in at [facility] [address]. Please park in the [place] [address]. Please come with a full bladder to in office visit to provide a urine specimen when you arrive.    [facility] Emergency [facility] Emergency Medicine  If symptoms worsen [address] [facility] [id number] [phone]              Carly Benavidez PA  06/03/24 2988

## 2024-06-03 NOTE — TELEPHONE ENCOUNTER
----- Message from Raquel Ag sent at 6/3/2024  8:16 AM CDT -----  Contact: [phone]  1MEDICALADVICE     Patient is calling for Medical Advice regarding:right side pain to the back kidney she thinks     How long has patient had these symptoms:    Pharmacy name and phone#:    Would like response via Cie Gamest:  no     Comments:  Pt is asking for an appt today no appts coming up fpr me due to the medicaid

## 2024-06-03 NOTE — FIRST PROVIDER EVALUATION
Emergency Department TeleTriage Encounter Note      CHIEF COMPLAINT    Chief Complaint   Patient presents with    Flank Pain     Pt reporting R flank pain x several days. Reports taking ibuprofen without relief in symptoms and states she took one her moms percocets last night. Denies dysuria and hematuria. Reports polyuria.        VITAL SIGNS   Initial Vitals [06/03/24 1125]   BP Pulse Resp Temp SpO2   135/67 60 20 98.2 °F (36.8 °C) 97 %      MAP       --            ALLERGIES    Review of patient's allergies indicates:   Allergen Reactions    Victoza [liraglutide] Swelling       PROVIDER TRIAGE NOTE  Patient presents with complaint of right flank pain and reports urinary frequency. Reports bleeding that she isn't sure vaginal or hematuria. She reports no N/V/D.      Phy:   Constitutional: well nourished, well developed, appearing stated age, NAD        Initial orders will be placed and care will be transferred to an alternate provider when patient is roomed for a full evaluation. Any additional orders and the final disposition will be determined by that provider.        ORDERS  Labs Reviewed - No data to display    ED Orders (720h ago, onward)      None              Virtual Visit Note: The provider triage portion of this emergency department evaluation and documentation was performed via [employer], a HIPAA-compliant telemedicine application, in concert with a tele-presenter in the room. A face to face patient evaluation with one of my colleagues will occur once the patient is placed in an emergency department room.      DISCLAIMER: This note was prepared with [employer]Ever[employer] voice recognition transcription software. Garbled syntax, mangled pronouns, and other bizarre constructions may be attributed to that software system.

## 2024-06-03 NOTE — TELEPHONE ENCOUNTER
Don't see any openings at [place] today.    She has h /o kidney stone.  Pain was sp intense last nite and took 1 of   Mothers percocet to sleep.    Not as bad right now.  Told her important to drink plenty fluids,  Avoid Fatty diet.     I booked her to see dr nina tomorrow but  Explained we can't do same day ct for work up  Like er.  If pain returns, best to get to er for work up  And we can follow up tomorrow at appt.    She expressed understanding.

## 2024-06-05 ENCOUNTER — OFFICE VISIT (OUTPATIENT)
Dept: OPTOMETRY | Facility: CLINIC | Age: 49
End: 2024-06-05
Payer: MEDICAID

## 2024-06-05 DIAGNOSIS — E11.9 TYPE 2 DIABETES MELLITUS WITHOUT COMPLICATION, WITHOUT LONG-TERM CURRENT USE OF INSULIN: ICD-10-CM

## 2024-06-05 DIAGNOSIS — E11.9 TYPE 2 DIABETES MELLITUS WITHOUT RETINOPATHY: Primary | ICD-10-CM

## 2024-06-05 DIAGNOSIS — H40.053 BILATERAL OCULAR HYPERTENSION: ICD-10-CM

## 2024-06-05 PROCEDURE — 2023F DILAT RTA XM W/O RTNOPTHY: CPT | Mod: CPTII,,, | Performed by: OPTOMETRIST

## 2024-06-05 PROCEDURE — 1160F RVW MEDS BY RX/DR IN RCRD: CPT | Mod: CPTII,,, | Performed by: OPTOMETRIST

## 2024-06-05 PROCEDURE — 4010F ACE/ARB THERAPY RXD/TAKEN: CPT | Mod: CPTII,,, | Performed by: OPTOMETRIST

## 2024-06-05 PROCEDURE — 3044F HG A1C LEVEL LT 7.0%: CPT | Mod: CPTII,,, | Performed by: OPTOMETRIST

## 2024-06-05 PROCEDURE — 99999 PR PBB SHADOW E&M-EST. PATIENT-LVL III: CPT | Mod: PBBFAC,,, | Performed by: OPTOMETRIST

## 2024-06-05 PROCEDURE — 3061F NEG MICROALBUMINURIA REV: CPT | Mod: CPTII,,, | Performed by: OPTOMETRIST

## 2024-06-05 PROCEDURE — 92014 COMPRE OPH EXAM EST PT 1/>: CPT | Mod: S$PBB,,, | Performed by: OPTOMETRIST

## 2024-06-05 PROCEDURE — 1159F MED LIST DOCD IN RCRD: CPT | Mod: CPTII,,, | Performed by: OPTOMETRIST

## 2024-06-05 PROCEDURE — 3066F NEPHROPATHY DOC TX: CPT | Mod: CPTII,,, | Performed by: OPTOMETRIST

## 2024-06-05 PROCEDURE — 99213 OFFICE O/P EST LOW 20 MIN: CPT | Mod: PBBFAC,PO | Performed by: OPTOMETRIST

## 2024-06-05 NOTE — PROGRESS NOTES
HPI     Diabetic Eye Exam     Additional comments: DM eye exam           Comments    DLS: 8/2/22    Pt states doing well with dist va and uses +1.50 readers for near. Eyes   tear a lot. No floaters or flashes.     Gtts: Latanoprost QHS OU          Last edited by Quintana, Cheryle on 6/5/2024 11:06 AM.            Assessment /Plan     For exam results, see Encounter Report.    Type 2 diabetes mellitus without retinopathy    Type 2 diabetes mellitus without complication, without long-term current use of insulin  -     Ambulatory referral/consult to Optometry    Bilateral ocular hypertension      1,2 No diabetic retinopathy, no csme. Return in 1 year for dilated eye exam.  3.  IOP stable ou, cont Latanaprost qhs ou, pachy normal, prev oct of rnfl normal, prev hvf normal, gonio open, RTC 12 mos with iop ck and dfe. No fam history of glaucoma.                   Stable

## 2024-06-06 ENCOUNTER — OFFICE VISIT (OUTPATIENT)
Dept: INTERNAL MEDICINE | Facility: CLINIC | Age: 49
End: 2024-06-06
Payer: MEDICAID

## 2024-06-06 VITALS
HEART RATE: 57 BPM | BODY MASS INDEX: 51.91 KG/M2 | TEMPERATURE: 97 F | DIASTOLIC BLOOD PRESSURE: 86 MMHG | SYSTOLIC BLOOD PRESSURE: 132 MMHG | RESPIRATION RATE: 20 BRPM | WEIGHT: 293 LBS | HEIGHT: 63 IN | OXYGEN SATURATION: 96 %

## 2024-06-06 DIAGNOSIS — I15.2 HYPERTENSION ASSOCIATED WITH DIABETES: ICD-10-CM

## 2024-06-06 DIAGNOSIS — E11.9 TYPE 2 DIABETES MELLITUS WITHOUT COMPLICATION, WITHOUT LONG-TERM CURRENT USE OF INSULIN: ICD-10-CM

## 2024-06-06 DIAGNOSIS — E66.01 MORBID OBESITY WITH BMI OF 60.0-69.9, ADULT: ICD-10-CM

## 2024-06-06 DIAGNOSIS — E11.59 HYPERTENSION ASSOCIATED WITH DIABETES: ICD-10-CM

## 2024-06-06 DIAGNOSIS — N20.0 RIGHT NEPHROLITHIASIS: Primary | ICD-10-CM

## 2024-06-06 PROCEDURE — 3075F SYST BP GE 130 - 139MM HG: CPT | Mod: CPTII,,, | Performed by: INTERNAL MEDICINE

## 2024-06-06 PROCEDURE — 3008F BODY MASS INDEX DOCD: CPT | Mod: CPTII,,, | Performed by: INTERNAL MEDICINE

## 2024-06-06 PROCEDURE — 3061F NEG MICROALBUMINURIA REV: CPT | Mod: CPTII,,, | Performed by: INTERNAL MEDICINE

## 2024-06-06 PROCEDURE — 99214 OFFICE O/P EST MOD 30 MIN: CPT | Mod: S$PBB,,, | Performed by: INTERNAL MEDICINE

## 2024-06-06 PROCEDURE — 3066F NEPHROPATHY DOC TX: CPT | Mod: CPTII,,, | Performed by: INTERNAL MEDICINE

## 2024-06-06 PROCEDURE — 3044F HG A1C LEVEL LT 7.0%: CPT | Mod: CPTII,,, | Performed by: INTERNAL MEDICINE

## 2024-06-06 PROCEDURE — 1159F MED LIST DOCD IN RCRD: CPT | Mod: CPTII,,, | Performed by: INTERNAL MEDICINE

## 2024-06-06 PROCEDURE — 99215 OFFICE O/P EST HI 40 MIN: CPT | Mod: PBBFAC,PO | Performed by: INTERNAL MEDICINE

## 2024-06-06 PROCEDURE — 3079F DIAST BP 80-89 MM HG: CPT | Mod: CPTII,,, | Performed by: INTERNAL MEDICINE

## 2024-06-06 PROCEDURE — 1160F RVW MEDS BY RX/DR IN RCRD: CPT | Mod: CPTII,,, | Performed by: INTERNAL MEDICINE

## 2024-06-06 PROCEDURE — 99999 PR PBB SHADOW E&M-EST. PATIENT-LVL V: CPT | Mod: PBBFAC,,, | Performed by: INTERNAL MEDICINE

## 2024-06-06 PROCEDURE — 4010F ACE/ARB THERAPY RXD/TAKEN: CPT | Mod: CPTII,,, | Performed by: INTERNAL MEDICINE

## 2024-06-06 NOTE — PROGRESS NOTES
Subjective:     Halley Moreno is a 49 y.o. female who presents for   Chief Complaint   Patient presents with    ER follow-up    Flank Pain    Hypertension    Diabetes       HPI    49-year-old female with HTN, DM 2 and hyperlipidemia presented to the emergency room on 06/03/2024 due to worsening right-sided flank pain.  His CT showed multiple renal stones in the right kidney. She was discharged with an NSAID and a muscle relaxant and she has an appointment with Urology at [facility].    Hypertension: The patient has been taking medications as instructed, no medication side effects noted, no chest pain on exertion, no dyspnea on exertion. She has chronic BLE edema.    BP Readings from Last 3 Encounters:   06/06/24 132/86   06/03/24 130/64   04/15/24 116/60     Diabetes: Patient presents for follow up of diabetes. Current symptoms include: none. Symptoms have been well-controlled. Patient denies foot ulcerations. Evaluation to date has included: hemoglobin A1C.  Home sugars: BGs consistently in an acceptable range. Current treatment: no recent interventions. Last dilated eye exam: done.    Lab Results   Component Value Date    HGBA1C 6.4 (H) 04/11/2024    HGBA1C 6.3 (H) 02/15/2024     06/13/2024       Body mass index is 65.26 kg/m².  The patient has started the pre-op evaluation for bariatric surgery at [facility].      Review of Systems   Constitutional:  Negative for chills, diaphoresis and fever.   HENT:  Negative for congestion, ear pain, sinus pressure and sore throat.    Eyes:  Negative for discharge and visual disturbance.   Respiratory:  Negative for cough and shortness of breath.    Cardiovascular:  Negative for chest pain and palpitations.   Gastrointestinal:  Negative for abdominal pain, constipation, diarrhea, nausea and vomiting.   Genitourinary:  Negative for dysuria, flank pain, frequency, hematuria and urgency.   Musculoskeletal:  Negative for arthralgias and myalgias.   Skin:  Negative  for rash and wound.   Neurological:  Negative for dizziness, tremors and headaches.   Psychiatric/Behavioral:  Negative for dysphoric mood. The patient is not nervous/anxious.           Objective:     Physical Exam  Vitals reviewed.   Constitutional:       General: She is awake. She is not in acute distress.     Appearance: Normal appearance. She is well-developed and well-groomed.   HENT:      Head: Normocephalic and atraumatic.      Right Ear: Hearing and external ear normal.      Left Ear: Hearing and external ear normal.      Nose: Nose normal. No congestion.      Mouth/Throat:      Mouth: Mucous membranes are moist.   Eyes:      General: Lids are normal. Vision grossly intact.   Cardiovascular:      Rate and Rhythm: Normal rate and regular rhythm.      Heart sounds: Normal heart sounds. No murmur heard.  Pulmonary:      Effort: Pulmonary effort is normal.      Breath sounds: Normal breath sounds. No decreased breath sounds or wheezing.   Abdominal:      General: Bowel sounds are normal. There is no distension.   Musculoskeletal:         General: Normal range of motion.      Cervical back: Normal range of motion.   Skin:     General: Skin is warm and dry.      Findings: No lesion or rash.   Neurological:      Mental Status: She is alert and oriented to person, place, and time.   Psychiatric:         Attention and Perception: Attention normal.         Mood and Affect: Mood normal.         Behavior: Behavior is cooperative.            Assessment:      1. Right nephrolithiasis    2. Type 2 diabetes mellitus without complication, without long-term current use of insulin    3. Hypertension associated with diabetes    4. Morbid obesity with BMI of 60.0-69.9, adult           Plan:     1. Right nephrolithiasis  - Ambulatory referral/consult to Urology; Future  - CBC Auto Differential; Future  - Basic Metabolic Panel; Future  - drink lots of water and call if there is blood in the urine or flank pain    2. Type 2  diabetes mellitus without complication, without long-term current use of insulin  - Ambulatory referral/consult to Podiatry; Future  - controlled, continue glipizide, semaglutide    3. Hypertension associated with diabetes  - controlled, continue metoprolol, losartan    4. Morbid obesity with BMI of 60.0-69.9, adult  - patient is preparing for bariatric surgery    C PRN    __________________________    Yamila Tejeda MD, PharmD  [facility]- Internal Medicine  American Board of Obesity Medicine diplomate  Office [phone]

## 2024-06-10 ENCOUNTER — TELEPHONE (OUTPATIENT)
Dept: INTERNAL MEDICINE | Facility: CLINIC | Age: 49
End: 2024-06-10
Payer: MEDICAID

## 2024-06-10 ENCOUNTER — CLINICAL SUPPORT (OUTPATIENT)
Dept: REHABILITATION | Facility: HOSPITAL | Age: 49
End: 2024-06-10
Payer: MEDICAID

## 2024-06-10 DIAGNOSIS — M25.612 DECREASED RANGE OF MOTION OF LEFT SHOULDER: Primary | ICD-10-CM

## 2024-06-10 DIAGNOSIS — R29.3 ABNORMAL POSTURE: ICD-10-CM

## 2024-06-10 DIAGNOSIS — M25.312 DYSKINESIS OF LEFT SCAPULA: ICD-10-CM

## 2024-06-10 PROCEDURE — 97110 THERAPEUTIC EXERCISES: CPT

## 2024-06-10 NOTE — PROGRESS NOTES
[facility]   Physical Therapy Treatment and Re-evaluation Note     Name: Halley Moreno  Clinic Number: [id number]    Therapy Diagnosis:   Encounter Diagnoses   Name Primary?    Decreased range of motion of left shoulder Yes    Abnormal posture     Dyskinesis of left scapula        Physician: Yamila Tejeda MD    Visit Date: 6/10/2024    Physician Orders: PT Eval and Treat   Medical Diagnosis from Referral: Chronic left shoulder pain [M25.512, G89.29]   Evaluation Date: 3/14/2024  Authorization Period Expiration: 12/31/2024  Plan of Care Expiration: 6/17/2024  Progress Note Due: 6/17/2024  Visit # / Visits authorized: 7/20  FOTO: 2/3     Precautions: Standard, Diabetes, Hypertension, morbid obesity      Time In: 1604  Time Out: 1658  Total Appointment Time (timed & untimed codes): 54 minutes    SUBJECTIVE     Pt reports: a resolution of her cramping sensations in her shoulder. She continues to get pain in her shoulder when reaching for her bra strap.  She was compliant with home exercise program.  Response to previous treatment: improved shoulder symptoms  Functional change: improved ability to complete reaching activities     Pain: 2/10  Location: left anterior shoulder     OBJECTIVE     Objective Measures updated at progress report unless specified.       Treatment     Halley received the treatments listed below:      therapeutic exercises to develop strength, endurance, ROM, flexibility, and posture for 43 minutes including:    Seated thoracic extensions, 2x10 5 seconds  Standing upward row to shoulder External rotation at 90 red band, 2x12  Shoulder Internal rotation at 90 degrees red band 2x15  Unilateral Wall slide with lift off 2#, 2x10  [place] and arrow blue band, 2x12  Bilateral shoulder External rotation with green band, 2x12   Modified  carry 2#, 3x10 yards  Wall pushups 3x8    Not today:  Punchouts at 120 degrees green band, 2x12    manual therapy techniques: Joint  mobilizations were applied to the: thoracic spine and Glenohumeral joint for 11 minutes, including:     Glenohumeral inferior and posterior glides, grade III-IV  Humeral head centric work - external rotation/internal rotation   Combined motions shoulder flexion end range mobilizations, grade III-IV  Lateral Glenohumeral joint manipulation      Not today:  Seated Cervicothoracic junction and Thoracic posterior to anterior glides      Patient Education and Home Exercises     Home Exercises Provided and Patient Education Provided     Education provided:   - continue with Home exercise program     Written Home Exercises Provided: continue Home exercise program. Exercises were reviewed and Halley was able to demonstrate them prior to the end of the session.  Halley demonstrated good  understanding of the education provided. See EMR under Patient Instructions for exercises provided during therapy sessions    ASSESSMENT     Halley was able to achieve shoulder Internal rotation behind her back to her bra line with a 2/10 pain following manual techniques which was a 3 point improvement in her symptoms compared to earlier in the session. She was progressed to additional carrying and lifting activities today to further progress her ability to complete these activities at home.     Halley Is progressing well towards her goals.   Pt prognosis is Good.     Pt will continue to benefit from skilled outpatient physical therapy to address the deficits listed in the problem list box on initial evaluation, provide pt/family education and to maximize pt's level of independence in the home and community environment.     Pt's spiritual, cultural and educational needs considered and pt agreeable to plan of care and goals.     Anticipated barriers to physical therapy: work schedule     Goals:   Short term goals: 4 weeks  Patient will be independent and compliant with their HEP to improve their function -Met  Patient will improve their ROM to at  least 160 degrees active shoulder flexion to improve her ability to complete overhead reaching activities. -Met  Patient will improve their strength to at least a 3+/5 for all jason-scapular musculature to improve her ability to complete lifting activities. -Met     Long term goals: 8 weeks  Patient will improve their FOTO score to at least 65% as evidence of clinically significant improvements in their function. -Met  Patient will improve their ROM to at least 170 degrees active shoulder flexion to improve her ability to complete overhead reaching activities. -Progressing   Patient will improve their strength to at least a 4-/5 for all jason-scapular musculature to improve her ability to complete lifting activities. -Partially met    PLAN     Plan of care Certification: 5/20/2024 to 6/17/2024.    Progress shoulder range of motion, thoracic mobility, scapular upward rotation.     Sharath Mcguire, PT  Board Certified Clinical Specialist in Orthopedic Physical Therapy  Board Certified Clinical Specialist in Sports Physical Therapy  Fellow, [employer] Orth[employer] Manual Physical Therapists

## 2024-06-10 NOTE — TELEPHONE ENCOUNTER
----- Message from Montserrat Krihsna sent at 6/10/2024 12:50 PM CDT -----  Contact: [phone]  Pharmacy is calling to clarify an RX.  RX name:  semaglutide (OZEMPIC) 2 mg/dose (8 mg/3 mL) PnIj  What do they need to clarify:  Need The Dx codes  Comments:       [facility] [place] - [address]  [address] [place] [address]  Phone: [phone] Fax: [phone]

## 2024-06-13 ENCOUNTER — LAB VISIT (OUTPATIENT)
Dept: LAB | Facility: HOSPITAL | Age: 49
End: 2024-06-13
Attending: INTERNAL MEDICINE
Payer: MEDICAID

## 2024-06-13 DIAGNOSIS — N20.0 RIGHT NEPHROLITHIASIS: ICD-10-CM

## 2024-06-13 DIAGNOSIS — E11.9 TYPE 2 DIABETES MELLITUS WITHOUT COMPLICATION, WITHOUT LONG-TERM CURRENT USE OF INSULIN: ICD-10-CM

## 2024-06-13 LAB
ANION GAP SERPL CALC-SCNC: 11 MMOL/L (ref 8–16)
BASOPHILS # BLD AUTO: 0.03 K/UL (ref 0–0.2)
BASOPHILS NFR BLD: 0.3 % (ref 0–1.9)
BUN SERPL-MCNC: 18 MG/DL (ref 6–20)
CALCIUM SERPL-MCNC: 9.8 MG/DL (ref 8.7–10.5)
CHLORIDE SERPL-SCNC: 104 MMOL/L (ref 95–110)
CO2 SERPL-SCNC: 26 MMOL/L (ref 23–29)
CREAT SERPL-MCNC: 1 MG/DL (ref 0.5–1.4)
DIFFERENTIAL METHOD BLD: ABNORMAL
EOSINOPHIL # BLD AUTO: 0.1 K/UL (ref 0–0.5)
EOSINOPHIL NFR BLD: 1.1 % (ref 0–8)
ERYTHROCYTE [DISTWIDTH] IN BLOOD BY AUTOMATED COUNT: 16.7 % (ref 11.5–14.5)
EST. GFR  (NO RACE VARIABLE): >60 ML/MIN/1.73 M^2
GLUCOSE SERPL-MCNC: 107 MG/DL (ref 70–110)
HCT VFR BLD AUTO: 36.8 % (ref 37–48.5)
HGB BLD-MCNC: 11.6 G/DL (ref 12–16)
IMM GRANULOCYTES # BLD AUTO: 0.06 K/UL (ref 0–0.04)
IMM GRANULOCYTES NFR BLD AUTO: 0.6 % (ref 0–0.5)
LYMPHOCYTES # BLD AUTO: 2 K/UL (ref 1–4.8)
LYMPHOCYTES NFR BLD: 19.4 % (ref 18–48)
MCH RBC QN AUTO: 27.6 PG (ref 27–31)
MCHC RBC AUTO-ENTMCNC: 31.5 G/DL (ref 32–36)
MCV RBC AUTO: 88 FL (ref 82–98)
MONOCYTES # BLD AUTO: 0.8 K/UL (ref 0.3–1)
MONOCYTES NFR BLD: 7.9 % (ref 4–15)
NEUTROPHILS # BLD AUTO: 7.2 K/UL (ref 1.8–7.7)
NEUTROPHILS NFR BLD: 70.7 % (ref 38–73)
NRBC BLD-RTO: 0 /100 WBC
PLATELET # BLD AUTO: 247 K/UL (ref 150–450)
PMV BLD AUTO: 10.1 FL (ref 9.2–12.9)
POTASSIUM SERPL-SCNC: 3.9 MMOL/L (ref 3.5–5.1)
RBC # BLD AUTO: 4.2 M/UL (ref 4–5.4)
SODIUM SERPL-SCNC: 141 MMOL/L (ref 136–145)
WBC # BLD AUTO: 10.21 K/UL (ref 3.9–12.7)

## 2024-06-13 PROCEDURE — 85025 COMPLETE CBC W/AUTO DIFF WBC: CPT | Performed by: INTERNAL MEDICINE

## 2024-06-13 PROCEDURE — 36415 COLL VENOUS BLD VENIPUNCTURE: CPT | Mod: PO | Performed by: INTERNAL MEDICINE

## 2024-06-13 PROCEDURE — 80048 BASIC METABOLIC PNL TOTAL CA: CPT | Performed by: INTERNAL MEDICINE

## 2024-06-13 RX ORDER — GLIPIZIDE 5 MG/1
TABLET, FILM COATED, EXTENDED RELEASE ORAL
Qty: 90 TABLET | Refills: 1 | Status: SHIPPED | OUTPATIENT
Start: 2024-06-13

## 2024-06-13 RX ORDER — FUROSEMIDE 80 MG/1
TABLET ORAL
Qty: 90 TABLET | Refills: 3 | Status: SHIPPED | OUTPATIENT
Start: 2024-06-13

## 2024-06-13 NOTE — TELEPHONE ENCOUNTER
Care Due:                  Date            Visit Type   Department     Provider  --------------------------------------------------------------------------------                                EP -                              PRIMARY      MET INTERNAL  Last Visit: 06-      CARE ([facility])   BESSIE Tejeda                              EP -                              PRIMARY      MET INTERNAL  Next Visit: 08-      CARE ([facility])   MEDICINE       Yamila Tejeda                                                            Last  Test          Frequency    Reason                     Performed    Due Date  --------------------------------------------------------------------------------    HBA1C.......  6 months...  glipiZIDE, semaglutide...  02- 08-    [facility] Embedded Care Due Messages. Reference number: [id number].   6/13/2024 1:06:28 AM CDT

## 2024-06-13 NOTE — TELEPHONE ENCOUNTER
Refill Routing Note   Medication(s) are not appropriate for processing by [facility] for the following reason(s):        Drug-disease interaction  Drug-Disease: furosemide and Hypokalemia; DEFER. Drug-Disease: glipiZIDE and Impaired functional mobility, balance, gait, and endurance; DEFER    [facility] action(s):  Defer     Requires labs : Yes             Appointments  past 12m or future 3m with PCP    Date Provider   Last Visit   6/6/2024 Yamila Tejeda MD   Next Visit   8/13/2024 Yamila Tejeda MD   ED visits in past 90 days: 1        Note composed:3:36 AM 06/13/2024

## 2024-06-17 ENCOUNTER — CLINICAL SUPPORT (OUTPATIENT)
Dept: REHABILITATION | Facility: HOSPITAL | Age: 49
End: 2024-06-17
Payer: MEDICAID

## 2024-06-17 DIAGNOSIS — R29.3 ABNORMAL POSTURE: ICD-10-CM

## 2024-06-17 DIAGNOSIS — M25.312 DYSKINESIS OF LEFT SCAPULA: ICD-10-CM

## 2024-06-17 DIAGNOSIS — M25.612 DECREASED RANGE OF MOTION OF LEFT SHOULDER: Primary | ICD-10-CM

## 2024-06-17 PROCEDURE — 97110 THERAPEUTIC EXERCISES: CPT

## 2024-06-17 NOTE — PROGRESS NOTES
[facility]   Physical Therapy Treatment and Re-evaluation Note     Name: Halley Moreno  Clinic Number: [id number]    Therapy Diagnosis:   Encounter Diagnoses   Name Primary?    Decreased range of motion of left shoulder Yes    Abnormal posture     Dyskinesis of left scapula        Physician: Yamila Tejeda MD    Visit Date: 6/17/2024    Physician Orders: PT Eval and Treat   Medical Diagnosis from Referral: Chronic left shoulder pain [M25.512, G89.29]   Evaluation Date: 3/14/2024  Authorization Period Expiration: 12/31/2024  Plan of Care Expiration: 7/15/2024  Progress Note Due: 7/17/2024  Visit # / Visits authorized: 8/20  FOTO: 2/3     Precautions: Standard, Diabetes, Hypertension, morbid obesity      Time In: 1600  Time Out: 1654  Total Appointment Time (timed & untimed codes): 53 minutes    SUBJECTIVE     Pt reports: she is experiencing some shoulder pain today, but can continue to reach further upper her back with less pain.  She was compliant with home exercise program.  Response to previous treatment: improved shoulder symptoms  Functional change: improved ability to complete reaching activities     Pain: 2/10  Location: left anterior shoulder     OBJECTIVE     Objective Measures updated at progress report unless specified.     Active Range of Motion:   Shoulder Right Left   Flexion 170 165   Scaption 170 165   ER  95 90   IR 70 62      Strength:  Shoulder Right Left   Flexion 5/5 4+/5   Scaption 5/5 4+/5   ER 5/5 4+/5   IR 5/5 4+/5   Upper trap 4/5 4-/5   Middle Trapezius 4-/5 3+/5   Lower Trapezius 4-/5 3+/5   Serratus Anterior 4/5 4-/5       Treatment     Halley received the treatments listed below:      therapeutic exercises to develop strength, endurance, ROM, flexibility, and posture for 43 minutes including:    Seated thoracic extensions, 2x10 5 seconds  Standing upward row to shoulder External rotation at 90 red band, 2x12  Shoulder Internal rotation at 90 degrees red  band 2x15  Unilateral Wall slide with lift off 2#, 2x10  [place] and arrow blue band, 2x12  Bilateral shoulder External rotation with green band, 2x12   Modified  carry 2#, 3x10 yards  Wall pushups 3x8    Not today:  Punchouts at 120 degrees green band, 2x12    manual therapy techniques: Joint mobilizations were applied to the: thoracic spine and Glenohumeral joint for 10 minutes, including:     Glenohumeral inferior and posterior glides, grade III-IV  Humeral head centric work - external rotation/internal rotation   Combined motions shoulder flexion end range mobilizations, grade III-IV      Not today:  Seated Cervicothoracic junction and Thoracic posterior to anterior glides      Patient Education and Home Exercises     Home Exercises Provided and Patient Education Provided     Education provided:   - continue with Home exercise program     Written Home Exercises Provided: continue Home exercise program. Exercises were reviewed and Halley was able to demonstrate them prior to the end of the session.  Halley demonstrated good  understanding of the education provided. See EMR under Patient Instructions for exercises provided during therapy sessions    ASSESSMENT     Halley has been seen for 9 visits for her L shoulder pain over the past couple months. She demonstrates improved shoulder range of motion and increased strength which has led to improvements in her ability to complete reaching and lifting activities at home. She remains with some limitations in her shoulder range of motion, rotator cuff and jason-scapular strength which limits her ability to complete overhead lifting activities and activities behind her back. She remains a good candidate for physical therapy services to address her impairments and to facilitate her return to her prior level of function.     Halley Is progressing well towards her goals.   Pt prognosis is Good.     Pt will continue to benefit from skilled outpatient physical therapy to address  the deficits listed in the problem list box on initial evaluation, provide pt/family education and to maximize pt's level of independence in the home and community environment.     Pt's spiritual, cultural and educational needs considered and pt agreeable to plan of care and goals.     Anticipated barriers to physical therapy: work schedule     Goals:   Short term goals: 4 weeks  Patient will be independent and compliant with their HEP to improve their function -Met  Patient will improve their ROM to at least 160 degrees active shoulder flexion to improve her ability to complete overhead reaching activities. -Met  Patient will improve their strength to at least a 3+/5 for all jason-scapular musculature to improve her ability to complete lifting activities. -Met     Long term goals: 8 weeks  Patient will improve their FOTO score to at least 65% as evidence of clinically significant improvements in their function. -Met  Patient will improve their ROM to at least 170 degrees active shoulder flexion to improve her ability to complete overhead reaching activities. -Progressing   Patient will improve their strength to at least a 4-/5 for all jason-scapular musculature to improve her ability to complete lifting activities. -Partially met    PLAN     Plan of care Certification: 6/17/2024 to 7/15/2024.    Progress shoulder range of motion, thoracic mobility, scapular upward rotation.     Sharath Mcguire, PT  Board Certified Clinical Specialist in Orthopedic Physical Therapy  Board Certified Clinical Specialist in Sports Physical Therapy  Fellow, [employer] Orth[employer] Manual Physical Therapists

## 2024-06-18 NOTE — PLAN OF CARE
[facility]   Physical Therapy Treatment and Re-evaluation Note     Name: Halley Moreno  Clinic Number: [id number]    Therapy Diagnosis:   Encounter Diagnoses   Name Primary?    Decreased range of motion of left shoulder Yes    Abnormal posture     Dyskinesis of left scapula        Physician: Yamila Tejeda MD    Visit Date: 6/17/2024    Physician Orders: PT Eval and Treat   Medical Diagnosis from Referral: Chronic left shoulder pain [M25.512, G89.29]   Evaluation Date: 3/14/2024  Authorization Period Expiration: 12/31/2024  Plan of Care Expiration: 7/15/2024  Progress Note Due: 7/17/2024  Visit # / Visits authorized: 8/20  FOTO: 2/3     Precautions: Standard, Diabetes, Hypertension, morbid obesity      Time In: 1600  Time Out: 1654  Total Appointment Time (timed & untimed codes): 53 minutes    SUBJECTIVE     Pt reports: she is experiencing some shoulder pain today, but can continue to reach further upper her back with less pain.  She was compliant with home exercise program.  Response to previous treatment: improved shoulder symptoms  Functional change: improved ability to complete reaching activities     Pain: 2/10  Location: left anterior shoulder     OBJECTIVE     Objective Measures updated at progress report unless specified.     Active Range of Motion:   Shoulder Right Left   Flexion 170 165   Scaption 170 165   ER  95 90   IR 70 62      Strength:  Shoulder Right Left   Flexion 5/5 4+/5   Scaption 5/5 4+/5   ER 5/5 4+/5   IR 5/5 4+/5   Upper trap 4/5 4-/5   Middle Trapezius 4-/5 3+/5   Lower Trapezius 4-/5 3+/5   Serratus Anterior 4/5 4-/5       Treatment     Halley received the treatments listed below:      therapeutic exercises to develop strength, endurance, ROM, flexibility, and posture for 43 minutes including:    Seated thoracic extensions, 2x10 5 seconds  Standing upward row to shoulder External rotation at 90 red band, 2x12  Shoulder Internal rotation at 90 degrees red  band 2x15  Unilateral Wall slide with lift off 2#, 2x10  [place] and arrow blue band, 2x12  Bilateral shoulder External rotation with green band, 2x12   Modified  carry 2#, 3x10 yards  Wall pushups 3x8    Not today:  Punchouts at 120 degrees green band, 2x12    manual therapy techniques: Joint mobilizations were applied to the: thoracic spine and Glenohumeral joint for 10 minutes, including:     Glenohumeral inferior and posterior glides, grade III-IV  Humeral head centric work - external rotation/internal rotation   Combined motions shoulder flexion end range mobilizations, grade III-IV      Not today:  Seated Cervicothoracic junction and Thoracic posterior to anterior glides      Patient Education and Home Exercises     Home Exercises Provided and Patient Education Provided     Education provided:   - continue with Home exercise program     Written Home Exercises Provided: continue Home exercise program. Exercises were reviewed and Halley was able to demonstrate them prior to the end of the session.  Halley demonstrated good  understanding of the education provided. See EMR under Patient Instructions for exercises provided during therapy sessions    ASSESSMENT     Halley has been seen for 9 visits for her L shoulder pain over the past couple months. She demonstrates improved shoulder range of motion and increased strength which has led to improvements in her ability to complete reaching and lifting activities at home. She remains with some limitations in her shoulder range of motion, rotator cuff and jason-scapular strength which limits her ability to complete overhead lifting activities and activities behind her back. She remains a good candidate for physical therapy services to address her impairments and to facilitate her return to her prior level of function.     Halley Is progressing well towards her goals.   Pt prognosis is Good.     Pt will continue to benefit from skilled outpatient physical therapy to address  the deficits listed in the problem list box on initial evaluation, provide pt/family education and to maximize pt's level of independence in the home and community environment.     Pt's spiritual, cultural and educational needs considered and pt agreeable to plan of care and goals.     Anticipated barriers to physical therapy: work schedule     Goals:   Short term goals: 4 weeks  Patient will be independent and compliant with their HEP to improve their function -Met  Patient will improve their ROM to at least 160 degrees active shoulder flexion to improve her ability to complete overhead reaching activities. -Met  Patient will improve their strength to at least a 3+/5 for all jason-scapular musculature to improve her ability to complete lifting activities. -Met     Long term goals: 8 weeks  Patient will improve their FOTO score to at least 65% as evidence of clinically significant improvements in their function. -Met  Patient will improve their ROM to at least 170 degrees active shoulder flexion to improve her ability to complete overhead reaching activities. -Progressing   Patient will improve their strength to at least a 4-/5 for all jason-scapular musculature to improve her ability to complete lifting activities. -Partially met    PLAN     Plan of care Certification: 6/17/2024 to 7/15/2024.    Progress shoulder range of motion, thoracic mobility, scapular upward rotation.     Sharath Mcguire, PT  Board Certified Clinical Specialist in Orthopedic Physical Therapy  Board Certified Clinical Specialist in Sports Physical Therapy  Fellow, [employer] Orth[employer] Manual Physical Therapists

## 2024-06-24 ENCOUNTER — CLINICAL SUPPORT (OUTPATIENT)
Dept: REHABILITATION | Facility: HOSPITAL | Age: 49
End: 2024-06-24
Payer: MEDICAID

## 2024-06-24 DIAGNOSIS — M25.612 DECREASED RANGE OF MOTION OF LEFT SHOULDER: Primary | ICD-10-CM

## 2024-06-24 DIAGNOSIS — R29.3 ABNORMAL POSTURE: ICD-10-CM

## 2024-06-24 DIAGNOSIS — M25.312 DYSKINESIS OF LEFT SCAPULA: ICD-10-CM

## 2024-06-24 PROCEDURE — 97110 THERAPEUTIC EXERCISES: CPT

## 2024-06-24 NOTE — PROGRESS NOTES
[facility]   Physical Therapy Treatment Note     Name: Halley Moreno  Clinic Number: [id number]    Therapy Diagnosis:   Encounter Diagnoses   Name Primary?    Decreased range of motion of left shoulder Yes    Abnormal posture     Dyskinesis of left scapula        Physician: Yamila Tejeda MD    Visit Date: 6/24/2024    Physician Orders: PT Eval and Treat   Medical Diagnosis from Referral: Chronic left shoulder pain [M25.512, G89.29]   Evaluation Date: 3/14/2024  Authorization Period Expiration: 12/31/2024  Plan of Care Expiration: 7/15/2024  Progress Note Due: 7/15/2024  Visit # / Visits authorized: 9/20  FOTO: 2/3     Precautions: Standard, Diabetes, Hypertension, morbid obesity      Time In: 1509  Time Out: 1602  Total Appointment Time (timed & untimed codes): 53 minutes    SUBJECTIVE     Pt reports: she noticed some cramping and shoulder pain when lying down last night.   She was compliant with home exercise program.  Response to previous treatment: improved shoulder symptoms  Functional change: improved ability to complete reaching activities     Pain: 2/10  Location: left anterior shoulder     OBJECTIVE     Objective Measures updated at progress report unless specified.       Treatment     Halley received the treatments listed below:      therapeutic exercises to develop strength, endurance, ROM, flexibility, and posture for 43 minutes including:    Upper body ergometer 3' forward/3' backward  Seated thoracic extensions, 2x10 5 seconds  Supine shoulder External rotation at 90 green band, 2x12  Shoulder Internal rotation at 90 degrees red band 2x15  Wall slides with foam roll, 2x12  Bilateral shoulder External rotation with green band, 2x12   Punchouts at 120 degrees red band, 2x12  Education below    Not today:  Modified  carry 2#, 3x10 yards  Wall pushups 3x8  [place] and arrow blue band, 2x12    manual therapy techniques: Joint mobilizations were applied to the: thoracic  spine and Glenohumeral joint for 10 minutes, including:     Glenohumeral inferior and posterior glides, grade III-IV  Humeral head centric work - external rotation/internal rotation   Combined motions shoulder flexion end range mobilizations, grade III-IV      Not today:  Seated Cervicothoracic junction and Thoracic posterior to anterior glides      Patient Education and Home Exercises     Home Exercises Provided and Patient Education Provided     Education provided:   - continue with Home exercise program   - discussed workstation ergonomics    Written Home Exercises Provided: continue Home exercise program. Exercises were reviewed and Halley was able to demonstrate them prior to the end of the session.  Halley demonstrated good  understanding of the education provided. See EMR under Patient Instructions for exercises provided during therapy sessions    ASSESSMENT     Halley was noting an irritation of her symptoms and more limited shoulder range of motion today, so her therapy program was modified a bit. She could not recall a specific mechanism of injury so we discussed her workstation setup which is currently sitting on the edge of her bed and using a TV tray for her laptop. We discussed using her table and a chair with a back on it to improve her posture which could be contributing to her shoulder symptoms. She was able to complete all other exercises with modifications to her range of motion to a pain free range.     Halley Is progressing well towards her goals.   Pt prognosis is Good.     Pt will continue to benefit from skilled outpatient physical therapy to address the deficits listed in the problem list box on initial evaluation, provide pt/family education and to maximize pt's level of independence in the home and community environment.     Pt's spiritual, cultural and educational needs considered and pt agreeable to plan of care and goals.     Anticipated barriers to physical therapy: work schedule     Goals:    Short term goals: 4 weeks  Patient will be independent and compliant with their HEP to improve their function -Met  Patient will improve their ROM to at least 160 degrees active shoulder flexion to improve her ability to complete overhead reaching activities. -Met  Patient will improve their strength to at least a 3+/5 for all jason-scapular musculature to improve her ability to complete lifting activities. -Met     Long term goals: 8 weeks  Patient will improve their FOTO score to at least 65% as evidence of clinically significant improvements in their function. -Met  Patient will improve their ROM to at least 170 degrees active shoulder flexion to improve her ability to complete overhead reaching activities. -Progressing   Patient will improve their strength to at least a 4-/5 for all jason-scapular musculature to improve her ability to complete lifting activities. -Partially met    PLAN     Plan of care Certification: 6/17/2024 to 7/15/2024.    Progress shoulder range of motion, thoracic mobility, scapular upward rotation.     Sharath Mcguire, PT  Board Certified Clinical Specialist in Orthopedic Physical Therapy  Board Certified Clinical Specialist in Sports Physical Therapy  Fellow, [employer] Orth[employer] Manual Physical Therapists

## 2024-07-01 ENCOUNTER — CLINICAL SUPPORT (OUTPATIENT)
Dept: REHABILITATION | Facility: HOSPITAL | Age: 49
End: 2024-07-01
Payer: MEDICAID

## 2024-07-01 DIAGNOSIS — R29.3 ABNORMAL POSTURE: ICD-10-CM

## 2024-07-01 DIAGNOSIS — M25.312 DYSKINESIS OF LEFT SCAPULA: ICD-10-CM

## 2024-07-01 DIAGNOSIS — M25.612 DECREASED RANGE OF MOTION OF LEFT SHOULDER: Primary | ICD-10-CM

## 2024-07-01 PROCEDURE — 97110 THERAPEUTIC EXERCISES: CPT

## 2024-07-01 NOTE — PROGRESS NOTES
[facility]   Physical Therapy Treatment Note     Name: Halley Moreno  Clinic Number: [id number]    Therapy Diagnosis:   Encounter Diagnoses   Name Primary?    Decreased range of motion of left shoulder Yes    Abnormal posture     Dyskinesis of left scapula        Physician: Yamila Tejeda MD    Visit Date: 7/1/2024    Physician Orders: PT Eval and Treat   Medical Diagnosis from Referral: Chronic left shoulder pain [M25.512, G89.29]   Evaluation Date: 3/14/2024  Authorization Period Expiration: 12/31/2024  Plan of Care Expiration: 7/15/2024  Progress Note Due: 7/15/2024  Visit # / Visits authorized: 10/20  FOTO: 2/3     Precautions: Standard, Diabetes, Hypertension, morbid obesity      Time In: 1603  Time Out: 1700  Total Appointment Time (timed & untimed codes): 54 minutes    SUBJECTIVE     Pt reports: her shoulder pain is much improved this week. She adjusted her workstation to sit at the kitchen table with back support and has noticed less symptoms in her shoulder. She still has difficulty reaching behind her back and the last bit of range when reaching up.  She was compliant with home exercise program.  Response to previous treatment: improved shoulder symptoms  Functional change: improved ability to complete reaching activities     Pain: 0/10  Location: left anterior shoulder     OBJECTIVE     Objective Measures updated at progress report unless specified.       Treatment     Halley received the treatments listed below:      therapeutic exercises to develop strength, endurance, ROM, flexibility, and posture for 44 minutes including:    Upper body ergometer 3' forward/3' backward  Seated thoracic extensions, 2x10 5 seconds  Horizontal abduction with opposite press blue band, 2x15  Shoulder Internal rotation at 90 degrees red band 2x15  Wall slides with foam roll, 2x12  Bilateral shoulder External rotation with green band, 2x12   Punchouts at 120 degrees blue band, 2x12  Modified   carry 2#, 4x10 yards  Education below    Not today:  Wall pushups 3x8  [place] and arrow blue band, 2x12    manual therapy techniques: Joint mobilizations were applied to the: thoracic spine and Glenohumeral joint for 10 minutes, including:     Glenohumeral inferior and posterior glides, grade III-IV  Humeral head centric work - external rotation/internal rotation   Combined motions shoulder flexion end range mobilizations, grade III-IV    Not today:  Seated Cervicothoracic junction and Thoracic posterior to anterior glides      Patient Education and Home Exercises     Home Exercises Provided and Patient Education Provided     Education provided:   - continue with Home exercise program   - discussed workstation ergonomics    Written Home Exercises Provided: continue Home exercise program. Exercises were reviewed and Halley was able to demonstrate them prior to the end of the session.  Halley demonstrated good  understanding of the education provided. See EMR under Patient Instructions for exercises provided during therapy sessions    ASSESSMENT     Halley is doing much better after making the suggested lifestyle modifications as she notes less shoulder pain. She continues with pain at end range shoulder flexion and internal rotation so further manual techniques were utilized to improve her joint arthrokinematics at her end range. She demonstrated improved shoulder range of motion following these manual techniques. She was progressed to additional shoulder girdle strengthening and endurance exercises which she tolerated well with no increase in symptoms.     Halley Is progressing well towards her goals.   Pt prognosis is Good.     Pt will continue to benefit from skilled outpatient physical therapy to address the deficits listed in the problem list box on initial evaluation, provide pt/family education and to maximize pt's level of independence in the home and community environment.     Pt's spiritual, cultural and  educational needs considered and pt agreeable to plan of care and goals.     Anticipated barriers to physical therapy: work schedule     Goals:   Short term goals: 4 weeks  Patient will be independent and compliant with their HEP to improve their function -Met  Patient will improve their ROM to at least 160 degrees active shoulder flexion to improve her ability to complete overhead reaching activities. -Met  Patient will improve their strength to at least a 3+/5 for all jason-scapular musculature to improve her ability to complete lifting activities. -Met     Long term goals: 8 weeks  Patient will improve their FOTO score to at least 65% as evidence of clinically significant improvements in their function. -Met  Patient will improve their ROM to at least 170 degrees active shoulder flexion to improve her ability to complete overhead reaching activities. -Progressing   Patient will improve their strength to at least a 4-/5 for all jason-scapular musculature to improve her ability to complete lifting activities. -Partially met    PLAN     Plan of care Certification: 6/17/2024 to 7/15/2024.    Progress shoulder range of motion, thoracic mobility, scapular upward rotation.     Sharath Mcguire, PT  Board Certified Clinical Specialist in Orthopedic Physical Therapy  Board Certified Clinical Specialist in Sports Physical Therapy  Fellow, [employer] Orth[employer] Manual Physical Therapists

## 2024-07-30 ENCOUNTER — CLINICAL SUPPORT (OUTPATIENT)
Dept: REHABILITATION | Facility: HOSPITAL | Age: 49
End: 2024-07-30
Payer: MEDICAID

## 2024-07-30 DIAGNOSIS — R29.3 ABNORMAL POSTURE: ICD-10-CM

## 2024-07-30 DIAGNOSIS — M25.612 DECREASED RANGE OF MOTION OF LEFT SHOULDER: Primary | ICD-10-CM

## 2024-07-30 DIAGNOSIS — M25.312 DYSKINESIS OF LEFT SCAPULA: ICD-10-CM

## 2024-07-30 PROCEDURE — 97110 THERAPEUTIC EXERCISES: CPT

## 2024-07-30 NOTE — PROGRESS NOTES
[facility]   Physical Therapy Treatment Note     Name: Halley Moreno  Clinic Number: [id number]    Therapy Diagnosis:   Encounter Diagnoses   Name Primary?    Decreased range of motion of left shoulder Yes    Abnormal posture     Dyskinesis of left scapula          Physician: Yamila Tejeda MD    Visit Date: 7/30/2024    Physician Orders: PT Eval and Treat   Medical Diagnosis from Referral: Chronic left shoulder pain [M25.512, G89.29]   Evaluation Date: 3/14/2024  Authorization Period Expiration: 12/31/2024  Plan of Care Expiration: 8/27/2024  Progress Note Due: 8/27/2024  Visit # / Visits authorized: 11/20  FOTO: 2/3     Precautions: Standard, Diabetes, Hypertension, morbid obesity      Time In: 0817  Time Out: 0910  Total Appointment Time (timed & untimed codes): 53 minutes    SUBJECTIVE     Pt reports: noticing more frequent shoulder pain over the past week or so.  She was compliant with home exercise program.  Response to previous treatment: improved shoulder symptoms  Functional change: improved ability to complete reaching activities     Pain: 3/10  Location: left anterior shoulder     OBJECTIVE     Objective Measures updated at progress report unless specified.     Active Range of Motion:   Shoulder Right Left   Flexion 170 168   Scaption 170 168   ER  95 90   IR 70 62        Special Tests:    Right Left   Painful arc (--) (+)   Drop Arm test (--) (--)   Hawkin's [place]nd[place] (--) (+)         Joint Mobility: decreased Glenohumeral joint inferior glide and posterior glide, decreased cervicothoracic junction mobility, decreased thoracic mobility     Treatment     Halley received the treatments listed below:      therapeutic exercises to develop strength, endurance, ROM, flexibility, and posture for 43 minutes including:    Upper body ergometer 3' forward/3' backward  Seated thoracic extensions, 2x10 5 seconds  Horizontal abduction with opposite press blue band, 2x15  Shoulder  Internal rotation at 90 degrees red band 2x15  Wall slides with foam roll, 2x12  Punchouts at 120 degrees blue band, 2x12  Education below  Objective measures    Not today:  Wall pushups 3x8  [place] and arrow blue band, 2x12  Bilateral shoulder External rotation with green band, 2x12   Modified  carry 2#, 4x10 yards    manual therapy techniques: Joint mobilizations were applied to the: thoracic spine and Glenohumeral joint for 10 minutes, including:     Glenohumeral inferior and posterior glides, grade III-IV  Humeral head centric work - external rotation/internal rotation   Combined motions shoulder flexion end range mobilizations, grade III-IV    Not today:  Seated Cervicothoracic junction and Thoracic posterior to anterior glides      Patient Education and Home Exercises     Home Exercises Provided and Patient Education Provided     Education provided:   - continue with Home exercise program     Written Home Exercises Provided: continue Home exercise program. Exercises were reviewed and Halley was able to demonstrate them prior to the end of the session.  Halley demonstrated good  understanding of the education provided. See EMR under Patient Instructions for exercises provided during therapy sessions    ASSESSMENT     Halley returns to the clinic after several weeks with some complaints of L shoulder pain which have become more frequent over the past week. She demonstrates signs and symptoms of shoulder impingement as evident from her above objective measures. Her impingment signs improve significantly following manual techniques and corrective exercises. She continues to require further shoulder shoulder range of motion, thoracic range of motion and mobility, shoulder girdle strengthening, and posture training to improve her symptoms and ability to complete her Instrumental Activities of Daily Living.    Halley Is progressing well towards her goals.   Pt prognosis is Good.     Pt will continue to benefit from  skilled outpatient physical therapy to address the deficits listed in the problem list box on initial evaluation, provide pt/family education and to maximize pt's level of independence in the home and community environment.     Pt's spiritual, cultural and educational needs considered and pt agreeable to plan of care and goals.     Anticipated barriers to physical therapy: work schedule     Goals:   Short term goals: 4 weeks  Patient will be independent and compliant with their HEP to improve their function -Met  Patient will improve their ROM to at least 160 degrees active shoulder flexion to improve her ability to complete overhead reaching activities. -Met  Patient will improve their strength to at least a 3+/5 for all jason-scapular musculature to improve her ability to complete lifting activities. -Met     Long term goals: 8 weeks  Patient will improve their FOTO score to at least 65% as evidence of clinically significant improvements in their function. -Met  Patient will improve their ROM to at least 170 degrees active shoulder flexion to improve her ability to complete overhead reaching activities. -Progressing   Patient will improve their strength to at least a 4-/5 for all jason-scapular musculature to improve her ability to complete lifting activities. -Partially met    PLAN     Plan of care Certification: 7/30/2024 to 8/27/2024.    Progress shoulder range of motion, thoracic mobility, scapular upward rotation.     Sharath Mcguire, PT  Board Certified Clinical Specialist in Orthopedic Physical Therapy  Board Certified Clinical Specialist in Sports Physical Therapy  Fellow, [employer] Orth[employer] Manual Physical Therapists

## 2024-07-31 NOTE — PLAN OF CARE
[facility] [place] [facility]   Physical Therapy Treatment Note     Name: Halley Moreno  Clinic Number: [id number]    Therapy Diagnosis:   Encounter Diagnoses   Name Primary?    Decreased range of motion of left shoulder Yes    Abnormal posture     Dyskinesis of left scapula          Physician: Yamila Tejeda MD    Visit Date: 7/30/2024    Physician Orders: PT Eval and Treat   Medical Diagnosis from Referral: Chronic left shoulder pain [M25.512, G89.29]   Evaluation Date: 3/14/2024  Authorization Period Expiration: 12/31/2024  Plan of Care Expiration: 8/27/2024  Progress Note Due: 8/27/2024  Visit # / Visits authorized: 11/20  FOTO: 2/3     Precautions: Standard, Diabetes, Hypertension, morbid obesity      Time In: 0817  Time Out: 0910  Total Appointment Time (timed & untimed codes): 53 minutes    SUBJECTIVE     Pt reports: noticing more frequent shoulder pain over the past week or so.  She was compliant with home exercise program.  Response to previous treatment: improved shoulder symptoms  Functional change: improved ability to complete reaching activities     Pain: 3/10  Location: left anterior shoulder     OBJECTIVE     Objective Measures updated at progress report unless specified.     Active Range of Motion:   Shoulder Right Left   Flexion 170 168   Scaption 170 168   ER  95 90   IR 70 62        Special Tests:    Right Left   Painful arc (--) (+)   Drop Arm test (--) (--)   Hawkin's Kenndy (--) (+)         Joint Mobility: decreased Glenohumeral joint inferior glide and posterior glide, decreased cervicothoracic junction mobility, decreased thoracic mobility     Treatment     Halley received the treatments listed below:      therapeutic exercises to develop strength, endurance, ROM, flexibility, and posture for 43 minutes including:    Upper body ergometer 3' forward/3' backward  Seated thoracic extensions, 2x10 5 seconds  Horizontal abduction with opposite press blue band, 2x15  Shoulder  Internal rotation at 90 degrees red band 2x15  Wall slides with foam roll, 2x12  Punchouts at 120 degrees blue band, 2x12  Education below  Objective measures    Not today:  Wall pushups 3x8  [place] and arrow blue band, 2x12  Bilateral shoulder External rotation with green band, 2x12   Modified  carry 2#, 4x10 yards    manual therapy techniques: Joint mobilizations were applied to the: thoracic spine and Glenohumeral joint for 10 minutes, including:     Glenohumeral inferior and posterior glides, grade III-IV  Humeral head centric work - external rotation/internal rotation   Combined motions shoulder flexion end range mobilizations, grade III-IV    Not today:  Seated Cervicothoracic junction and Thoracic posterior to anterior glides      Patient Education and Home Exercises     Home Exercises Provided and Patient Education Provided     Education provided:   - continue with Home exercise program     Written Home Exercises Provided: continue Home exercise program. Exercises were reviewed and Halley was able to demonstrate them prior to the end of the session.  Halley demonstrated good  understanding of the education provided. See EMR under Patient Instructions for exercises provided during therapy sessions    ASSESSMENT     Halley returns to the clinic after several weeks with some complaints of L shoulder pain which have become more frequent over the past week. She demonstrates signs and symptoms of shoulder impingement as evident from her above objective measures. Her impingment signs improve significantly following manual techniques and corrective exercises. She continues to require further shoulder shoulder range of motion, thoracic range of motion and mobility, shoulder girdle strengthening, and posture training to improve her symptoms and ability to complete her Instrumental Activities of Daily Living.    Halley Is progressing well towards her goals.   Pt prognosis is Good.     Pt will continue to benefit from  skilled outpatient physical therapy to address the deficits listed in the problem list box on initial evaluation, provide pt/family education and to maximize pt's level of independence in the home and community environment.     Pt's spiritual, cultural and educational needs considered and pt agreeable to plan of care and goals.     Anticipated barriers to physical therapy: work schedule     Goals:   Short term goals: 4 weeks  Patient will be independent and compliant with their HEP to improve their function -Met  Patient will improve their ROM to at least 160 degrees active shoulder flexion to improve her ability to complete overhead reaching activities. -Met  Patient will improve their strength to at least a 3+/5 for all jason-scapular musculature to improve her ability to complete lifting activities. -Met     Long term goals: 8 weeks  Patient will improve their FOTO score to at least 65% as evidence of clinically significant improvements in their function. -Met  Patient will improve their ROM to at least 170 degrees active shoulder flexion to improve her ability to complete overhead reaching activities. -Progressing   Patient will improve their strength to at least a 4-/5 for all jason-scapular musculature to improve her ability to complete lifting activities. -Partially met    PLAN     Plan of care Certification: 7/30/2024 to 8/27/2024.    Progress shoulder range of motion, thoracic mobility, scapular upward rotation.     Sharath Mcguire, PT  Board Certified Clinical Specialist in Orthopedic Physical Therapy  Board Certified Clinical Specialist in Sports Physical Therapy  Fellow, [employer] Orth[employer] Manual Physical Therapists

## 2024-08-08 ENCOUNTER — CLINICAL SUPPORT (OUTPATIENT)
Dept: REHABILITATION | Facility: HOSPITAL | Age: 49
End: 2024-08-08
Payer: MEDICAID

## 2024-08-08 DIAGNOSIS — R29.3 ABNORMAL POSTURE: ICD-10-CM

## 2024-08-08 DIAGNOSIS — M25.612 DECREASED RANGE OF MOTION OF LEFT SHOULDER: Primary | ICD-10-CM

## 2024-08-08 DIAGNOSIS — M25.312 DYSKINESIS OF LEFT SCAPULA: ICD-10-CM

## 2024-08-08 PROCEDURE — 97110 THERAPEUTIC EXERCISES: CPT

## 2024-08-09 NOTE — PROGRESS NOTES
[facility] OUTPATIENT THERAPY AND WELLNESS   Physical Therapy Treatment Note     Name: Halley Moreno  Clinic Number: [id number]    Therapy Diagnosis:   Encounter Diagnoses   Name Primary?    Decreased range of motion of left shoulder Yes    Abnormal posture     Dyskinesis of left scapula        Physician: Yamila Tejeda MD    Visit Date: 8/8/2024    Physician Orders: PT Eval and Treat   Medical Diagnosis from Referral: Chronic left shoulder pain [M25.512, G89.29]   Evaluation Date: 3/14/2024  Authorization Period Expiration: 12/31/2024  Plan of Care Expiration: 8/27/2024  Progress Note Due: 8/27/2024  Visit # / Visits authorized: 12/20  FOTO: 2/3     Precautions: Standard, Diabetes, Hypertension, morbid obesity      Time In: 1600  Time Out: 1655  Total Appointment Time (timed & untimed codes): 55 minutes    SUBJECTIVE     Pt reports: her trip went well. Shoulder is felling pretty good. Still feels pain reaching behind her back.     She was compliant with home exercise program.  Response to previous treatment: improved shoulder symptoms  Functional change: improved ability to complete reaching activities     Pain: 3/10  Location: left anterior shoulder     OBJECTIVE     Objective Measures updated at progress report unless specified.         Treatment     Halley received the treatments listed below:      therapeutic exercises to develop strength, endurance, ROM, flexibility, and posture for 43 minutes including:    Upper body ergometer 3' forward/3' backward-NP  Seated thoracic extensions, 2x10 5 seconds  Supine Internal rotation/External rotation 3 lbs x 20  Horizontal abduction with opposite press blue band, 2x15-NP  Shoulder Internal rotation at 90 degrees blue band 2x12  Shoulder External rotation at 90 deg blue band 2 x 12  Wall slides with foam roll, 2x12-NP  Punchouts at 120 degrees blue band, 2x12-NP  Seated row blue band x 20 x 3 sec  Seated row to External rotation blue band x 20 x 3 sec  Education and  assessment    Not today:  Wall pushups 3x8  [place] and arrow blue band, 2x12  Bilateral shoulder External rotation with green band, 2x12   Modified  carry 2#, 4x10 yards    manual therapy techniques: Joint mobilizations were applied to the: thoracic spine and Glenohumeral joint for 12 minutes, including:     Glenohumeral inferior and posterior glides, grade III-IV  Humeral head centric work - external rotation/internal rotation   Combined motions shoulder flexion end range mobilizations, grade III-IV    Not today:  Seated Cervicothoracic junction and Thoracic posterior to anterior glides      Patient Education and Home Exercises     Home Exercises Provided and Patient Education Provided     Education provided:   - continue with Home exercise program     Written Home Exercises Provided: continue Home exercise program. Exercises were reviewed and Halley was able to demonstrate them prior to the end of the session.  Halley demonstrated good  understanding of the education provided. See EMR under Patient Instructions for exercises provided during therapy sessions    ASSESSMENT     Halley reports pain with shoulder extension and behind back reaching on L. Her trip went well. She presents as anterior glide syndrome and improves with AP glide with RTC activation. Progressing exercises with good tolerance. Mobility improved and she has no pain with extension after session. Re-emphasized Home exercise program and maintaining benefit with symptoms.     Halley Is progressing well towards her goals.   Pt prognosis is Good.     Pt will continue to benefit from skilled outpatient physical therapy to address the deficits listed in the problem list box on initial evaluation, provide pt/family education and to maximize pt's level of independence in the home and community environment.     Pt's spiritual, cultural and educational needs considered and pt agreeable to plan of care and goals.     Anticipated barriers to physical therapy: work  schedule     Goals:   Short term goals: 4 weeks  Patient will be independent and compliant with their HEP to improve their function -Met  Patient will improve their ROM to at least 160 degrees active shoulder flexion to improve her ability to complete overhead reaching activities. -Met  Patient will improve their strength to at least a 3+/5 for all jason-scapular musculature to improve her ability to complete lifting activities. -Met     Long term goals: 8 weeks  Patient will improve their FOTO score to at least 65% as evidence of clinically significant improvements in their function. -Met  Patient will improve their ROM to at least 170 degrees active shoulder flexion to improve her ability to complete overhead reaching activities. -Progressing   Patient will improve their strength to at least a 4-/5 for all jason-scapular musculature to improve her ability to complete lifting activities. -Partially met    PLAN     Plan of care Certification: 7/30/2024 to 8/27/2024.    Progress shoulder range of motion, thoracic mobility, scapular upward rotation.     Francois Rosario PT    Co-treated by Sharath Mcguire PT  Board Certified Clinical Specialist in Orthopedic Physical Therapy  Board Certified Clinical Specialist in Sports Physical Therapy  Fellow, [employer] Orth[employer] Manual Physical Therapists

## 2024-08-13 ENCOUNTER — OFFICE VISIT (OUTPATIENT)
Dept: INTERNAL MEDICINE | Facility: CLINIC | Age: 49
End: 2024-08-13
Payer: MEDICAID

## 2024-08-13 VITALS
OXYGEN SATURATION: 97 % | SYSTOLIC BLOOD PRESSURE: 120 MMHG | TEMPERATURE: 98 F | BODY MASS INDEX: 51.91 KG/M2 | RESPIRATION RATE: 18 BRPM | DIASTOLIC BLOOD PRESSURE: 78 MMHG | WEIGHT: 293 LBS | HEIGHT: 63 IN | HEART RATE: 62 BPM

## 2024-08-13 DIAGNOSIS — E11.69 DYSLIPIDEMIA ASSOCIATED WITH TYPE 2 DIABETES MELLITUS: ICD-10-CM

## 2024-08-13 DIAGNOSIS — E11.59 HYPERTENSION ASSOCIATED WITH DIABETES: Primary | ICD-10-CM

## 2024-08-13 DIAGNOSIS — E78.5 DYSLIPIDEMIA ASSOCIATED WITH TYPE 2 DIABETES MELLITUS: ICD-10-CM

## 2024-08-13 DIAGNOSIS — E66.01 MORBID OBESITY WITH BMI OF 60.0-69.9, ADULT: ICD-10-CM

## 2024-08-13 DIAGNOSIS — E11.9 DIABETES MELLITUS TYPE 2 WITHOUT RETINOPATHY: ICD-10-CM

## 2024-08-13 DIAGNOSIS — I15.2 HYPERTENSION ASSOCIATED WITH DIABETES: Primary | ICD-10-CM

## 2024-08-13 DIAGNOSIS — Z91.89 CANDIDATE FOR STATIN THERAPY DUE TO RISK OF FUTURE CARDIOVASCULAR EVENT: ICD-10-CM

## 2024-08-13 PROCEDURE — 4010F ACE/ARB THERAPY RXD/TAKEN: CPT | Mod: CPTII,,, | Performed by: INTERNAL MEDICINE

## 2024-08-13 PROCEDURE — 99214 OFFICE O/P EST MOD 30 MIN: CPT | Mod: S$PBB,,, | Performed by: INTERNAL MEDICINE

## 2024-08-13 PROCEDURE — 3078F DIAST BP <80 MM HG: CPT | Mod: CPTII,,, | Performed by: INTERNAL MEDICINE

## 2024-08-13 PROCEDURE — 3074F SYST BP LT 130 MM HG: CPT | Mod: CPTII,,, | Performed by: INTERNAL MEDICINE

## 2024-08-13 PROCEDURE — 3044F HG A1C LEVEL LT 7.0%: CPT | Mod: CPTII,,, | Performed by: INTERNAL MEDICINE

## 2024-08-13 PROCEDURE — 3008F BODY MASS INDEX DOCD: CPT | Mod: CPTII,,, | Performed by: INTERNAL MEDICINE

## 2024-08-13 PROCEDURE — 99999 PR PBB SHADOW E&M-EST. PATIENT-LVL IV: CPT | Mod: PBBFAC,,, | Performed by: INTERNAL MEDICINE

## 2024-08-13 PROCEDURE — 3061F NEG MICROALBUMINURIA REV: CPT | Mod: CPTII,,, | Performed by: INTERNAL MEDICINE

## 2024-08-13 PROCEDURE — 99214 OFFICE O/P EST MOD 30 MIN: CPT | Mod: PBBFAC,PO | Performed by: INTERNAL MEDICINE

## 2024-08-13 PROCEDURE — 3066F NEPHROPATHY DOC TX: CPT | Mod: CPTII,,, | Performed by: INTERNAL MEDICINE

## 2024-08-13 RX ORDER — LOSARTAN POTASSIUM 100 MG/1
100 TABLET ORAL DAILY
Qty: 90 TABLET | Refills: 3 | Status: SHIPPED | OUTPATIENT
Start: 2024-08-13

## 2024-08-13 RX ORDER — ATORVASTATIN CALCIUM 10 MG/1
10 TABLET, FILM COATED ORAL DAILY
Qty: 90 TABLET | Refills: 3 | Status: SHIPPED | OUTPATIENT
Start: 2024-08-13

## 2024-08-13 NOTE — PROGRESS NOTES
Subjective:     Halley Moreno is a 49 y.o. female who presents for   Chief Complaint   Patient presents with    Follow-up    Hypertension    Diabetes    Obesity    Hyperlipidemia       HPI    Hypertension: The patient has been taking medications as instructed, no medication side effects noted, no chest pain on exertion and no dyspnea on exertion. She has chronic BLE edema which has gradually improved.    BP Readings from Last 3 Encounters:   08/13/24 120/78   06/06/24 132/86   06/03/24 130/64       Diabetes: Patient presents for follow up of diabetes. Current symptoms include: none. Symptoms have been basically asymptomatic. Patient denies foot ulcerations. Evaluation to date has included: hemoglobin A1C.  Home sugars: BGs consistently in an acceptable range. Current treatment:  glipizide, semaglutide . Last dilated eye exam: done.    Lab Results   Component Value Date    HGBA1C 6.4 (H) 04/11/2024    HGBA1C 6.3 (H) 02/15/2024     (H) 08/28/2024       Hyperlipidemia: Compliance with treatment has been good. The patient exercises weekly. Patient denies muscle pain associated with his medications. Previous history of cardiac disease includes: none.    Lab Results   Component Value Date    CHOL 90 04/11/2024    CHOL 107 (L) 10/04/2023    HDL 38 (L) 04/11/2024    HDL 38 (L) 10/04/2023        Obesity:  Body mass index is 62.68 kg/m².  Wt Readings from Last 3 Encounters:   08/28/24 (!) 154.2 kg (340 lb)   08/22/24 (!) 157.9 kg (348 lb 1.7 oz)   08/13/24 (!) 160.5 kg (353 lb 13.4 oz)   - lost 15 lbs since last appointment, eats mainly meat and vegetables, limits carbs  - currently being evaluated for bariatric surgery at an outside facility      Review of Systems   Constitutional:  Negative for chills, diaphoresis, fatigue and fever.   HENT:  Negative for congestion, ear pain, sinus pressure and sore throat.    Eyes:  Negative for discharge and visual disturbance.   Respiratory:  Negative for cough and  shortness of breath.    Cardiovascular:  Positive for leg swelling (chronic). Negative for chest pain and palpitations.   Gastrointestinal:  Negative for abdominal pain, constipation, diarrhea, nausea and vomiting.   Endocrine: Negative for polydipsia and polyuria.   Musculoskeletal:  Negative for arthralgias and myalgias.   Skin:  Negative for rash and wound.   Neurological:  Negative for dizziness, tremors, numbness and headaches.   Psychiatric/Behavioral:  Negative for dysphoric mood. The patient is not nervous/anxious.           Objective:     Physical Exam  Vitals reviewed.   Constitutional:       General: She is awake. She is not in acute distress.     Appearance: Normal appearance. She is well-developed and well-groomed.   HENT:      Head: Normocephalic and atraumatic.      Right Ear: Hearing and external ear normal.      Left Ear: Hearing and external ear normal.      Nose: Nose normal. No congestion.      Mouth/Throat:      Mouth: Mucous membranes are moist.   Eyes:      General: Lids are normal. Vision grossly intact.   Cardiovascular:      Rate and Rhythm: Normal rate and regular rhythm.      Heart sounds: Normal heart sounds. No murmur heard.  Pulmonary:      Effort: Pulmonary effort is normal.      Breath sounds: Normal breath sounds. No decreased breath sounds or wheezing.   Abdominal:      General: Bowel sounds are normal. There is no distension.   Musculoskeletal:         General: Normal range of motion.      Cervical back: Normal range of motion.      Right lower le+ Edema (non pitting) present.      Left lower le+ Edema (non pitting) present.   Skin:     General: Skin is warm and dry.      Findings: No lesion or rash.      Comments: Hyperpigmentation bilateral lower legs   Neurological:      Mental Status: She is alert and oriented to person, place, and time.   Psychiatric:         Attention and Perception: Attention normal.         Mood and Affect: Mood normal.         Behavior: Behavior  is cooperative.            Assessment:      1. Hypertension associated with diabetes    2. Diabetes mellitus type 2 without retinopathy    3. Dyslipidemia associated with type 2 diabetes mellitus    4. Candidate for statin therapy due to risk of future cardiovascular event    5. Morbid obesity with BMI of 60.0-69.9, adult           Plan:     1. Hypertension associated with diabetes  - controlled, continue metoprolol, losartan, furosemide  - losartan (COZAAR) 100 MG tablet; Take 1 tablet (100 mg total) by mouth once daily.  Dispense: 90 tablet; Refill: 3    2. Diabetes mellitus type 2 without retinopathy  - controlled, continue semaglutide and glipizide    3. Dyslipidemia associated with type 2 diabetes mellitus  - LDL at goal, continue Lipitor    4. Candidate for statin therapy due to risk of future cardiovascular event  - atorvastatin (LIPITOR) 10 MG tablet; Take 1 tablet (10 mg total) by mouth once daily.  Dispense: 90 tablet; Refill: 3    5. Morbid obesity with BMI of 60.0-69.9, adult  - continue working with Bariatric Surgeons, planning surgery with the next few months    RTC in 3-4 months for annual exam or sooner if needed (depending on surgery date)    __________________________    Yamila eTjeda MD, PharmD  [facility]- [facility] Medicine  American Board of Obesity Medicine diplomate  Office [phone]

## 2024-08-22 ENCOUNTER — OFFICE VISIT (OUTPATIENT)
Dept: PODIATRY | Facility: CLINIC | Age: 49
End: 2024-08-22
Payer: MEDICAID

## 2024-08-22 VITALS
BODY MASS INDEX: 51.91 KG/M2 | SYSTOLIC BLOOD PRESSURE: 106 MMHG | HEIGHT: 63 IN | DIASTOLIC BLOOD PRESSURE: 72 MMHG | HEART RATE: 56 BPM | WEIGHT: 293 LBS

## 2024-08-22 DIAGNOSIS — E11.9 TYPE 2 DIABETES MELLITUS WITHOUT COMPLICATION, WITHOUT LONG-TERM CURRENT USE OF INSULIN: ICD-10-CM

## 2024-08-22 DIAGNOSIS — E11.9 ENCOUNTER FOR DIABETIC FOOT EXAM: Primary | ICD-10-CM

## 2024-08-22 PROCEDURE — 3078F DIAST BP <80 MM HG: CPT | Mod: CPTII,,, | Performed by: PODIATRIST

## 2024-08-22 PROCEDURE — 3066F NEPHROPATHY DOC TX: CPT | Mod: CPTII,,, | Performed by: PODIATRIST

## 2024-08-22 PROCEDURE — 99203 OFFICE O/P NEW LOW 30 MIN: CPT | Mod: S$PBB,,, | Performed by: PODIATRIST

## 2024-08-22 PROCEDURE — 3044F HG A1C LEVEL LT 7.0%: CPT | Mod: CPTII,,, | Performed by: PODIATRIST

## 2024-08-22 PROCEDURE — 99213 OFFICE O/P EST LOW 20 MIN: CPT | Mod: PBBFAC,PN | Performed by: PODIATRIST

## 2024-08-22 PROCEDURE — 3074F SYST BP LT 130 MM HG: CPT | Mod: CPTII,,, | Performed by: PODIATRIST

## 2024-08-22 PROCEDURE — 3061F NEG MICROALBUMINURIA REV: CPT | Mod: CPTII,,, | Performed by: PODIATRIST

## 2024-08-22 PROCEDURE — 4010F ACE/ARB THERAPY RXD/TAKEN: CPT | Mod: CPTII,,, | Performed by: PODIATRIST

## 2024-08-22 PROCEDURE — 99999 PR PBB SHADOW E&M-EST. PATIENT-LVL III: CPT | Mod: PBBFAC,,, | Performed by: PODIATRIST

## 2024-08-22 PROCEDURE — 3008F BODY MASS INDEX DOCD: CPT | Mod: CPTII,,, | Performed by: PODIATRIST

## 2024-08-22 NOTE — PROGRESS NOTES
Subjective:      Patient ID: Halley Moreno is a 49 y.o. female.    Chief Complaint: Diabetic Foot Exam    Halley is a 49 y.o. female who presents new to the clinic for evaluation & tx off high risk feet. The patient's cc is diabetic foot exam. No foot complaints. Last saw a DPM in 2018. H/o heel pain & calcaneal spurs.     PCP: Yamila Tejeda MD  8/13/24    Waiting for pre-bariatric surgery @ [facility]  Past Medical History:   Diagnosis Date    Acute respiratory failure with hypoxia and hypercapnia 10/22/2020    Last Assessment & Plan:  Patient is a chronic CO2 retainer in setting of obesity hypoventilation syndrome 2/2 morbid obesity. Stepped down from ICU 10/26, currently continuing diuresis. De-escalated to NC during the day with BiPAP nightly on 10/31, which patient tolerated well. On Lasix gtt for diuresis through 11/5.   Plan:  - Target SpO2 of >88%. Continue BiPAP nightly. Patient on 3LNC, will con    Back pain     BMI 70 and over, adult     Cavernous hemangioma of liver     CHF (congestive heart failure) 10/2020    Depression     Diabetes mellitus     Difficult intubation     DM (diabetes mellitus) type II controlled with renal manifestation     Glaucoma 09/13/2022    HTN (hypertension)     Hyperlipidemia     Lumbar disc disease     Morbid obesity     Rosacea     Sleep apnea     cpap    Tear of medial meniscus of right knee, current 09/05/2017     Patient Active Problem List   Diagnosis    Hypertension associated with diabetes    Morbid obesity    Sleep apnea    Depressive disorder    Rosacea    Unspecified thoracic, thoracolumbar and lumbosacral intervertebral disc disorder    Morbid obesity with BMI of 60.0-69.9, adult    Disorder of metabolism    Vitamin D deficiency    Chronic pain of both knees    Diabetes mellitus type 2 without retinopathy    Morbid (severe) obesity with alveolar hypoventilation    Constipation    Hypokalemia    Congestive heart failure    Pulmonary hypertension    Impaired  functional mobility, balance, gait, and endurance    Right heart failure due to pulmonary hypertension    Essential hypertension    BMI 60.0-69.9, adult    S/P Hscope, D&C, IUD placement     Chest pain of uncertain etiology    Complex endometrial hyperplasia with atypia    Abdominal pannus    Glaucoma    Dyslipidemia associated with type 2 diabetes mellitus    Decreased range of motion of left shoulder    Abnormal posture    Dyskinesis of left scapula    Ectopic atrial tachycardia     Victoza (CHF) so switched to Ozempic and then to Maunjaro - back to Ozempic for more weight loss,  Hemoglobin A1C   Date Value Ref Range Status   04/11/2024 6.4 (H) 4.7 - 5.6 % Final   02/15/2024 6.3 (H) 4.0 - 5.6 % Final     Comment:     ADA Screening Guidelines:  5.7-6.4%  Consistent with prediabetes  >or=6.5%  Consistent with diabetes    High levels of fetal hemoglobin interfere with the HbA1C  assay. Heterozygous hemoglobin variants (HbS, HgC, etc)do  not significantly interfere with this assay.   However, presence of multiple variants may affect accuracy.     10/04/2023 7.5 (H) 4.0 - 5.6 % Final     Comment:     ADA Screening Guidelines:  5.7-6.4%  Consistent with prediabetes  >or=6.5%  Consistent with diabetes    High levels of fetal hemoglobin interfere with the HbA1C  assay. Heterozygous hemoglobin variants (HbS, HgC, etc)do  not significantly interfere with this assay.   However, presence of multiple variants may affect accuracy.     06/23/2023 7.0 (H) 4.0 - 5.6 % Final     Comment:     ADA Screening Guidelines:  5.7-6.4%  Consistent with prediabetes  >or=6.5%  Consistent with diabetes    High levels of fetal hemoglobin interfere with the HbA1C  assay. Heterozygous hemoglobin variants (HbS, HgC, etc)do  not significantly interfere with this assay.   However, presence of multiple variants may affect accuracy.        Objective:      X-Ray Foot Complete Left  Order: [id number]  Status: Final result       Visible to patient: Yes  (seen)       Next appt: 09/16/2024 at 04:00 PM in Outpatient Rehab (Sharath Mcguire, PT)       Dx: Fall, initial encounter    0 Result Notes  Details    Reading Physician Reading Date Result Priority   Marcelo Aguilar MD  [phone] 4/10/2020 STAT     Narrative & Impression  EXAMINATION:  XR FOOT COMPLETE 3 VIEW LEFT     CLINICAL HISTORY:  .  Unspecified fall, initial encounter     TECHNIQUE:  AP, lateral and oblique views of the left foot were performed.     COMPARISON:  None     FINDINGS:  Bones are well mineralized. Overall alignment is within normal limits.  Lisfranc articulation is congruent.  No displaced fracture, dislocation or destructive osseous process.  Mild degenerative change at the 1st MTP joint.  Plantar calcaneal spur noted.  Enthesophyte at the Achilles insertion site.     Prominence of the dorsal soft tissues overlying the metatarsals.  No subcutaneous emphysema or radiodense retained foreign body.     Impression:     Suspected nonspecific dorsal soft tissue swelling overlying the metatarsals, without acute displaced fracture-dislocation identified.        Electronically signed by:Marcelo Aguilar MD  Date:                                            04/10/2020  Time:                                           17:33     Review of Systems   Constitutional: Negative for malaise/fatigue.   Skin:  Negative for color change, dry skin and suspicious lesions.   Musculoskeletal:  Positive for back pain, joint pain and myalgias. Negative for falls.   Neurological:  Negative for focal weakness and sensory change.   Psychiatric/Behavioral:  Positive for depression. The patient is not nervous/anxious.      Physical Exam  Vitals reviewed.   Constitutional:       General: She is not in acute distress.     Appearance: She is well-developed. She is morbidly obese.   Cardiovascular:      Pulses:           Dorsalis pedis pulses are 2+ on the right side and 2+ on the left side.   Musculoskeletal:      Right lower leg:  No edema.      Left lower leg: No edema.   Feet:      Right foot:      Skin integrity: Skin integrity normal.      Left foot:      Skin integrity: Skin integrity normal.      Toenail Condition: Fungal disease present.     Comments: Toenails 1st, 2nd L are thickened, dystrophic, discolored, w/ crumbly subungual debris.   Skin:     General: Skin is warm and dry.      Capillary Refill: Capillary refill takes less than 2 seconds.      Findings: No lesion.   Neurological:      Mental Status: She is alert and oriented to person, place, and time.      Sensory: Sensation is intact. No sensory deficit.      Motor: Motor function is intact. No weakness or abnormal muscle tone.      Gait: Gait abnormal.   Psychiatric:         Mood and Affect: Mood and affect normal.         Behavior: Behavior normal. Behavior is cooperative.         Assessment:      Encounter Diagnoses   Name Primary?    Type 2 diabetes mellitus without complication, without long-term current use of insulin     Encounter for diabetic foot exam Yes       Problem List Items Addressed This Visit    None  Visit Diagnoses       Encounter for diabetic foot exam    -  Primary    Type 2 diabetes mellitus without complication, without long-term current use of insulin              Plan:       Halley was seen today for diabetic foot exam.    Diagnoses and all orders for this visit:    Encounter for diabetic foot exam    Type 2 diabetes mellitus without complication, without long-term current use of insulin  -     Ambulatory referral/consult to Podiatry    I counseled the patient on her conditions, their implications & medical mgmt.    - Shoe inspection. Diabetic Foot Education. Patient reminded of the importance of good nutrition & blood sugar control to help prevent podiatric complications of diabetes. Patient instructed on proper foot hygeine. We discussed wearing proper shoe gear, daily foot inspections, never walking w/out protective shoe gear, annual DM foot exam,  sooner p.r.n.        A total of 31 mins.was spent on chart review, patient visit & documentation.

## 2024-08-26 ENCOUNTER — CLINICAL SUPPORT (OUTPATIENT)
Dept: REHABILITATION | Facility: HOSPITAL | Age: 49
End: 2024-08-26
Payer: MEDICAID

## 2024-08-26 DIAGNOSIS — M25.612 DECREASED RANGE OF MOTION OF LEFT SHOULDER: Primary | ICD-10-CM

## 2024-08-26 DIAGNOSIS — M25.312 DYSKINESIS OF LEFT SCAPULA: ICD-10-CM

## 2024-08-26 DIAGNOSIS — R29.3 ABNORMAL POSTURE: ICD-10-CM

## 2024-08-26 PROCEDURE — 97110 THERAPEUTIC EXERCISES: CPT

## 2024-08-26 NOTE — PROGRESS NOTES
[facility]   Physical Therapy Treatment Note     Name: Halley Moreno  Clinic Number: [id number]    Therapy Diagnosis:   Encounter Diagnoses   Name Primary?    Decreased range of motion of left shoulder Yes    Abnormal posture     Dyskinesis of left scapula        Physician: Yamila Tejeda MD    Visit Date: 8/26/2024    Physician Orders: PT Eval and Treat   Medical Diagnosis from Referral: Chronic left shoulder pain [M25.512, G89.29]   Evaluation Date: 3/14/2024  Authorization Period Expiration: 12/31/2024  Plan of Care Expiration: 9/23/2024  Progress Note Due: 9/23/2024  Visit # / Visits authorized: 13/20  FOTO: 2/3     Precautions: Standard, Diabetes, Hypertension, morbid obesity      Time In: 1501  Time Out: 1600  Total Appointment Time (timed & untimed codes): 54 minutes    SUBJECTIVE     Pt reports: no cramping in her shoulder recently. She has been able to reach overhead without shoulder pain. She does continue to note pain reaching behind her back.     She was compliant with home exercise program.  Response to previous treatment: improved shoulder symptoms  Functional change: improved ability to complete reaching activities     Pain: 1/10  Location: left anterior shoulder     OBJECTIVE     Objective Measures updated at progress report unless specified.     Active Range of Motion:   Shoulder Right Left   Flexion 170 170   Scaption 170 170   ER  95 90   IR 70 62      Strength:  Shoulder Right Left   Flexion 5/5 4+/5   Scaption 5/5 4+/5   ER 5/5 4+/5   IR 5/5 4+/5   Upper trap 4/5 4-/5   Middle Trapezius 4-/5 4-/5   Lower Trapezius 4-/5 4-/5   Serratus Anterior 4/5 4/5        Special Tests:    Right Left   Painful arc (--) (--)   Drop Arm test (--) (--)   Hawkin's [place] (--) (+)         Joint Mobility: decreased Glenohumeral joint inferior glide and posterior glide, decreased cervicothoracic junction mobility, decreased thoracic mobility     Treatment     Halley received the  treatments listed below:      therapeutic exercises to develop strength, endurance, ROM, flexibility, and posture for 43 minutes including:    Upper body ergometer 3' forward/3' backward  Seated thoracic extensions, 2x10 5 seconds  Horizontal abduction with opposite press blue band, 2x15  Shoulder Internal rotation at 90 degrees blue band 2x12  Shoulder External rotation at 90 deg blue band 2 x 12  Wall pushups 3x8  Seated row to External rotation blue band x 20 x 3 sec  Education and assessment    Not today:  [place] and arrow blue band, 2x12  Bilateral shoulder External rotation with green band, 2x12   Supine Internal rotation/External rotation 3 lbs x 20  Supine Internal rotation/External rotation 3 lbs x 20    manual therapy techniques: Joint mobilizations were applied to the: thoracic spine and Glenohumeral joint for 11 minutes, including:     Glenohumeral inferior and posterior glides, grade III-IV  Humeral head centric work - external rotation/internal rotation   Combined motions shoulder flexion end range mobilizations, grade III-IV    Not today:  Seated Cervicothoracic junction and Thoracic posterior to anterior glides      Patient Education and Home Exercises     Home Exercises Provided and Patient Education Provided     Education provided:   - continue with Home exercise program     Written Home Exercises Provided: continue Home exercise program. Exercises were reviewed and Halley was able to demonstrate them prior to the end of the session.  Halley demonstrated good  understanding of the education provided. See EMR under Patient Instructions for exercises provided during therapy sessions    ASSESSMENT     Halley has been seen for 13 visits for her L shoulder pain over the past few months. She demonstrates improved shoulder range of motion and increased strength which has led to improvements in her ability to complete reaching and lifting activities at home. She remains with some limitations in her shoulder  internal rotation range of motion, rotator cuff and jason-scapular strength which limits her ability to complete overhead lifting activities and activities behind her back. She remains a good candidate for physical therapy services to address her impairments and to facilitate her return to her prior level of function.     Halley Is progressing well towards her goals.   Pt prognosis is Good.     Pt will continue to benefit from skilled outpatient physical therapy to address the deficits listed in the problem list box on initial evaluation, provide pt/family education and to maximize pt's level of independence in the home and community environment.     Pt's spiritual, cultural and educational needs considered and pt agreeable to plan of care and goals.     Anticipated barriers to physical therapy: work schedule     Goals:   Short term goals: 4 weeks  Patient will be independent and compliant with their HEP to improve their function -Met  Patient will improve their ROM to at least 160 degrees active shoulder flexion to improve her ability to complete overhead reaching activities. -Met  Patient will improve their strength to at least a 3+/5 for all jason-scapular musculature to improve her ability to complete lifting activities. -Met     Long term goals: 8 weeks  Patient will improve their FOTO score to at least 65% as evidence of clinically significant improvements in their function. -Met  Patient will improve their ROM to at least 170 degrees active shoulder flexion to improve her ability to complete overhead reaching activities. -Met   Patient will improve their strength to at least a 4-/5 for all jason-scapular musculature to improve her ability to complete lifting activities. -Partially met    PLAN     Plan of care Certification: 8/26/2024 to 9/23/2024.    1x/week for 4 weeks. Progress shoulder range of motion, thoracic mobility, scapular upward rotation.     Sharath Mcguire, PT  Board Certified Clinical Specialist in  Orthopedic Physical Therapy  Board Certified Clinical Specialist in Sports Physical Therapy  Fellow, American Academy of Orthopedic Manual Physical Therapists

## 2024-08-28 ENCOUNTER — HOSPITAL ENCOUNTER (EMERGENCY)
Facility: HOSPITAL | Age: 49
Discharge: HOME | End: 2024-08-28
Attending: EMERGENCY MEDICINE
Payer: MEDICAID

## 2024-08-28 VITALS
BODY MASS INDEX: 51.91 KG/M2 | RESPIRATION RATE: 15 BRPM | WEIGHT: 293 LBS | SYSTOLIC BLOOD PRESSURE: 120 MMHG | TEMPERATURE: 98 F | OXYGEN SATURATION: 96 % | DIASTOLIC BLOOD PRESSURE: 71 MMHG | HEART RATE: 58 BPM | HEIGHT: 63 IN

## 2024-08-28 DIAGNOSIS — N20.1 URETEROLITHIASIS: Primary | ICD-10-CM

## 2024-08-28 PROBLEM — E11.59 HYPERTENSION ASSOCIATED WITH DIABETES: Chronic | Status: ACTIVE | Noted: 2020-11-10

## 2024-08-28 PROBLEM — I27.20 PULMONARY HYPERTENSION: Chronic | Status: ACTIVE | Noted: 2021-01-26

## 2024-08-28 PROBLEM — I50.9 CONGESTIVE HEART FAILURE: Chronic | Status: ACTIVE | Noted: 2020-11-09

## 2024-08-28 PROBLEM — E11.9 DIABETES MELLITUS TYPE 2 WITHOUT RETINOPATHY: Chronic | Status: ACTIVE | Noted: 2019-10-14

## 2024-08-28 PROBLEM — I15.2 HYPERTENSION ASSOCIATED WITH DIABETES: Chronic | Status: ACTIVE | Noted: 2020-11-10

## 2024-08-28 LAB
ALBUMIN SERPL BCP-MCNC: 3.7 G/DL (ref 3.5–5.2)
ALP SERPL-CCNC: 85 U/L (ref 55–135)
ALT SERPL W/O P-5'-P-CCNC: 14 U/L (ref 10–44)
ANION GAP SERPL CALC-SCNC: 12 MMOL/L (ref 8–16)
AST SERPL-CCNC: 20 U/L (ref 10–40)
BASOPHILS # BLD AUTO: 0.02 K/UL (ref 0–0.2)
BASOPHILS NFR BLD: 0.1 % (ref 0–1.9)
BILIRUB SERPL-MCNC: 0.5 MG/DL (ref 0.1–1)
BILIRUB UR QL STRIP: NEGATIVE
BUN SERPL-MCNC: 13 MG/DL (ref 6–20)
CALCIUM SERPL-MCNC: 9.7 MG/DL (ref 8.7–10.5)
CHLORIDE SERPL-SCNC: 107 MMOL/L (ref 95–110)
CLARITY UR REFRACT.AUTO: CLEAR
CO2 SERPL-SCNC: 24 MMOL/L (ref 23–29)
COLOR UR AUTO: YELLOW
CREAT SERPL-MCNC: 1.2 MG/DL (ref 0.5–1.4)
DIFFERENTIAL METHOD BLD: ABNORMAL
EOSINOPHIL # BLD AUTO: 0.1 K/UL (ref 0–0.5)
EOSINOPHIL NFR BLD: 0.9 % (ref 0–8)
ERYTHROCYTE [DISTWIDTH] IN BLOOD BY AUTOMATED COUNT: 16.2 % (ref 11.5–14.5)
EST. GFR  (NO RACE VARIABLE): 54.8 ML/MIN/1.73 M^2
GLUCOSE SERPL-MCNC: 124 MG/DL (ref 70–110)
GLUCOSE UR QL STRIP: NEGATIVE
HCT VFR BLD AUTO: 36.2 % (ref 37–48.5)
HGB BLD-MCNC: 11.9 G/DL (ref 12–16)
HGB UR QL STRIP: NEGATIVE
IMM GRANULOCYTES # BLD AUTO: 0.07 K/UL (ref 0–0.04)
IMM GRANULOCYTES NFR BLD AUTO: 0.5 % (ref 0–0.5)
KETONES UR QL STRIP: NEGATIVE
LEUKOCYTE ESTERASE UR QL STRIP: NEGATIVE
LYMPHOCYTES # BLD AUTO: 1.6 K/UL (ref 1–4.8)
LYMPHOCYTES NFR BLD: 10.9 % (ref 18–48)
MCH RBC QN AUTO: 28.1 PG (ref 27–31)
MCHC RBC AUTO-ENTMCNC: 32.9 G/DL (ref 32–36)
MCV RBC AUTO: 86 FL (ref 82–98)
MONOCYTES # BLD AUTO: 0.9 K/UL (ref 0.3–1)
MONOCYTES NFR BLD: 6.2 % (ref 4–15)
NEUTROPHILS # BLD AUTO: 11.6 K/UL (ref 1.8–7.7)
NEUTROPHILS NFR BLD: 81.4 % (ref 38–73)
NITRITE UR QL STRIP: NEGATIVE
NRBC BLD-RTO: 0 /100 WBC
PH UR STRIP: 5 [PH] (ref 5–8)
PLATELET # BLD AUTO: 259 K/UL (ref 150–450)
PMV BLD AUTO: 9.9 FL (ref 9.2–12.9)
POTASSIUM SERPL-SCNC: 4.4 MMOL/L (ref 3.5–5.1)
PROT SERPL-MCNC: 7.7 G/DL (ref 6–8.4)
PROT UR QL STRIP: NEGATIVE
RBC # BLD AUTO: 4.23 M/UL (ref 4–5.4)
SODIUM SERPL-SCNC: 143 MMOL/L (ref 136–145)
SP GR UR STRIP: 1.01 (ref 1–1.03)
URN SPEC COLLECT METH UR: NORMAL
WBC # BLD AUTO: 14.25 K/UL (ref 3.9–12.7)

## 2024-08-28 PROCEDURE — 25000003 PHARM REV CODE 250: Performed by: EMERGENCY MEDICINE

## 2024-08-28 PROCEDURE — 96361 HYDRATE IV INFUSION ADD-ON: CPT

## 2024-08-28 PROCEDURE — 96375 TX/PRO/DX INJ NEW DRUG ADDON: CPT

## 2024-08-28 PROCEDURE — 85025 COMPLETE CBC W/AUTO DIFF WBC: CPT | Performed by: EMERGENCY MEDICINE

## 2024-08-28 PROCEDURE — 80053 COMPREHEN METABOLIC PANEL: CPT | Performed by: EMERGENCY MEDICINE

## 2024-08-28 PROCEDURE — 96374 THER/PROPH/DIAG INJ IV PUSH: CPT

## 2024-08-28 PROCEDURE — 63600175 PHARM REV CODE 636 W HCPCS: Performed by: EMERGENCY MEDICINE

## 2024-08-28 PROCEDURE — 99284 EMERGENCY DEPT VISIT MOD MDM: CPT | Mod: 25

## 2024-08-28 PROCEDURE — 81003 URINALYSIS AUTO W/O SCOPE: CPT | Performed by: EMERGENCY MEDICINE

## 2024-08-28 RX ORDER — ONDANSETRON HYDROCHLORIDE 2 MG/ML
4 INJECTION, SOLUTION INTRAVENOUS
Status: COMPLETED | OUTPATIENT
Start: 2024-08-28 | End: 2024-08-28

## 2024-08-28 RX ORDER — KETOROLAC TROMETHAMINE 30 MG/ML
15 INJECTION, SOLUTION INTRAMUSCULAR; INTRAVENOUS
Status: COMPLETED | OUTPATIENT
Start: 2024-08-28 | End: 2024-08-28

## 2024-08-28 RX ORDER — TAMSULOSIN HYDROCHLORIDE 0.4 MG/1
0.4 CAPSULE ORAL DAILY
Qty: 10 CAPSULE | Refills: 0 | Status: SHIPPED | OUTPATIENT
Start: 2024-08-28 | End: 2024-09-07

## 2024-08-28 RX ORDER — IBUPROFEN 600 MG/1
600 TABLET ORAL EVERY 6 HOURS PRN
Qty: 20 TABLET | Refills: 0 | Status: SHIPPED | OUTPATIENT
Start: 2024-08-28 | End: 2024-09-02

## 2024-08-28 RX ORDER — OXYCODONE AND ACETAMINOPHEN 5; 325 MG/1; MG/1
2 TABLET ORAL EVERY 6 HOURS PRN
Qty: 12 TABLET | Refills: 0 | Status: SHIPPED | OUTPATIENT
Start: 2024-08-28 | End: 2024-08-31

## 2024-08-28 RX ORDER — MORPHINE SULFATE 2 MG/ML
6 INJECTION, SOLUTION INTRAMUSCULAR; INTRAVENOUS
Status: COMPLETED | OUTPATIENT
Start: 2024-08-28 | End: 2024-08-28

## 2024-08-28 RX ORDER — ONDANSETRON 4 MG/1
4 TABLET, ORALLY DISINTEGRATING ORAL EVERY 6 HOURS PRN
Qty: 12 TABLET | Refills: 0 | Status: SHIPPED | OUTPATIENT
Start: 2024-08-28 | End: 2024-08-31

## 2024-08-28 RX ADMIN — MORPHINE SULFATE 6 MG: 2 INJECTION, SOLUTION INTRAMUSCULAR; INTRAVENOUS at 03:08

## 2024-08-28 RX ADMIN — SODIUM CHLORIDE 1000 ML: 9 INJECTION, SOLUTION INTRAVENOUS at 03:08

## 2024-08-28 RX ADMIN — ONDANSETRON 4 MG: 2 INJECTION INTRAMUSCULAR; INTRAVENOUS at 03:08

## 2024-08-28 RX ADMIN — KETOROLAC TROMETHAMINE 15 MG: 30 INJECTION, SOLUTION INTRAMUSCULAR; INTRAVENOUS at 03:08

## 2024-08-28 NOTE — ED TRIAGE NOTES
Halley Moreno, a 49 y.o. female presents to the ED w/ complaint of right side flank pain    Triage note:  Chief Complaint   Patient presents with    Flank Pain     Dx with kidney stone, supposed to have follow up with urology on 9/30, but states the pain worsened tonight     Review of patient's allergies indicates:   Allergen Reactions    Victoza [liraglutide] Swelling     CHF     Past Medical History:   Diagnosis Date    Acute respiratory failure with hypoxia and hypercapnia 10/22/2020    Last Assessment & Plan:  Patient is a chronic CO2 retainer in setting of obesity hypoventilation syndrome 2/2 morbid obesity. Stepped down from ICU 10/26, currently continuing diuresis. De-escalated to NC during the day with BiPAP nightly on 10/31, which patient tolerated well. On Lasix gtt for diuresis through 11/5.   Plan:  - Target SpO2 of >88%. Continue BiPAP nightly. Patient on 3LNC, will con    Back pain     BMI 70 and over, adult     Cavernous hemangioma of liver     CHF (congestive heart failure) 10/2020    Depression     Diabetes mellitus     Difficult intubation     DM (diabetes mellitus) type II controlled with renal manifestation     Glaucoma 09/13/2022    HTN (hypertension)     Hyperlipidemia     Lumbar disc disease     Morbid obesity     Rosacea     Sleep apnea     cpap    Tear of medial meniscus of right knee, current 09/05/2017        Patient identifiers for Halley Moreon checked and correct.    LOC: The patient is awake, alert and aware of environment with an appropriate affect, the patient is oriented x 4 and speaking appropriately.    APPEARANCE: Patient resting comfortably and in no acute distress, patient is clean and well groomed, patient's clothing is properly fastened.    SKIN: The skin is warm and dry, color consistent with ethnicity, patient has normal skin turgor and moist mucus membranes, skin intact, no breakdown or bruising noted.    MUSCULOSKELETAL: Patient moving all extremities well, no  obvious swelling or deformities noted.right side flank pain    RESPIRATORY: Airway is open and patent, respirations are spontaneous and even, patient has a normal effort and rate.    CARDIAC: Patient has a normal rate and rhythm, no periphreal edema noted, capillary refill < 3 seconds.    ABDOMEN: Soft and non tender to palpation, no distention noted. Patient denies any nausea, vomiting, diarrhea, or constipation.     NEUROLOGIC: Eyes open spontaneously, PERRL, behavior appropriate to situation, follows commands, facial expression symmetrical, bilateral hand grasp equal and even, purposeful motor response noted, normal sensation in all extremities.     HEENT: No abnormalities noted. White sclera and pupils equal round and reactive to light. Denies headache, dizziness.     : Pt voids independently, denies dysuria, hematuria, frequency.

## 2024-08-28 NOTE — DISCHARGE INSTRUCTIONS
Diagnosis: Kidney stone    Tests today showed:   Labs Reviewed   CBC W/ AUTO DIFFERENTIAL - Abnormal       Result Value    WBC 14.25 (*)     RBC 4.23      Hemoglobin 11.9 (*)     Hematocrit 36.2 (*)     MCV 86      MCH 28.1      MCHC 32.9      RDW 16.2 (*)     Platelets 259      MPV 9.9      Immature Granulocytes 0.5      Gran # (ANC) 11.6 (*)     Immature Grans (Abs) 0.07 (*)     Lymph # 1.6      Mono # 0.9      Eos # 0.1      Baso # 0.02      nRBC 0      Gran % 81.4 (*)     Lymph % 10.9 (*)     Mono % 6.2      Eosinophil % 0.9      Basophil % 0.1      Differential Method Automated     COMPREHENSIVE METABOLIC PANEL - Abnormal    Sodium 143      Potassium 4.4      Chloride 107      CO2 24      Glucose 124 (*)     BUN 13      Creatinine 1.2      Calcium 9.7      Total Protein 7.7      Albumin 3.7      Total Bilirubin 0.5      Alkaline Phosphatase 85      AST 20      ALT 14      eGFR 54.8 (*)     Anion Gap 12     URINALYSIS, REFLEX TO URINE CULTURE     Imaging Results               CT Renal Stone Study ABD Pelvis WO (Final result)  Result time 08/28/24 03:54:09      Final result by Franklyn Hsu MD (08/28/24 03:54:09)                   Impression:      0.9 cm stone in the right UPJ with adjacent fat stranding and mild hydronephrosis of the right kidney.    Cholelithiasis.  No evidence of acute cholecystitis.    Hepatomegaly and borderline splenomegaly.    Two large known liver lesions, as discussed above.    This report was flagged in Epic as abnormal.    Electronically signed by resident: Kaye Johnson  Date:    08/28/2024  Time:    03:25    Electronically signed by: Franklyn Hsu MD  Date:    08/28/2024  Time:    03:54               Narrative:    EXAMINATION:  CT RENAL STONE STUDY ABD PELVIS WO    CLINICAL HISTORY:  Flank pain, kidney stone suspected;    TECHNIQUE:  Axial images of the abdomen and pelvis were acquired without the use of IV contrast.  Coronal and sagittal reconstructions were also  obtained    COMPARISON:  CT 10/03/2011, nuclear medicine study 10/04/2011, CT 12/08/2023, 06/03/2024    FINDINGS:  Thoracic soft tissues: Partially visualized thoracic soft tissues appear within normal limits.    Heart: Partially visualized heart appears normal in size. No pericardial effusion.    Lungs: Small calcified granuloma at the right lung base.  No pleural effusion.    Esophagus: Unremarkable.    Stomach and duodenum: Unremarkable.    Liver: Hepatomegaly measuring 20.4 cm in craniocaudal dimension.  Two large lesions in the liver.  Largest measures 7.7 cm (2-49) and is increased in size from CT 10/03/2011, but stable from recent priors and previously characterized as a hemangioma.  Smaller lesion measures 4.1 cm (2-34) (previously 8.8 cm in 2011), compatible with benign process.    Gallbladder: Cholelithiasis.    Bile Ducts: No evidence of dilated ducts.    Spleen: Borderline splenomegaly measuring 13 cm in greatest dimension.    Pancreas: No mass or peripancreatic fat stranding.    Adrenals: Unremarkable.    Kidneys/ Ureters: There is a 0.9 cm stone in the right ureteropelvic junction with mild upstream hydronephrosis and adjacent fat stranding.  Additional 0.7 cm nonobstructing stone in the right kidney.  No hydronephrosis or nephrolithiasis. No ureteral dilatation.    Bladder: No evidence of wall thickening.    Reproductive organs: Intrauterine device in expected location..    Bowel/Mesentery: Small bowel is normal in caliber with no evidence of obstruction. No evidence of inflammation or wall thickening.  Colon demonstrates no focal wall thickening.  Appendix is normal.    Peritoneum: No intraperitoneal free air or fluid    Lymph nodes: No retroperitoneal lymphadenopathy.    Vasculature: No aneurysm. No significant calcific atherosclerosis.    Abdominal wall:  Unremarkable.    Bones: No acute fracture. No suspicious osseous lesions.  Degenerative changes of the spine.                                       Treatments you had today:   Medications   sodium chloride 0.9% bolus 1,000 mL 1,000 mL (1,000 mLs Intravenous New Bag 8/28/24 0320)   ketorolac injection 15 mg (15 mg Intravenous Given 8/28/24 0320)   ondansetron injection 4 mg (4 mg Intravenous Given 8/28/24 0320)   morphine injection 6 mg (6 mg Intravenous Given 8/28/24 0320)       Home Care Instructions:  - Strain your urine as instructed  - Stay well hydrated  - Continue taking your home medications as prescribed    Take ibuprofen (also called Advil, Motrin) for your pain. This medicine is available over-the-counter in 200 mg tablets.  - Take up to 600 mg every 6 hours, or 800 mg every 8 hours as needed   - Do not take more than this amount, as it can cause kidney problems, bleeding in your stomach, and other serious problems.   - Do not also take naproxen (Aleve) at the same time or on the same day  - If you have heart problems or uncontrolled high blood pressure, you should not take ibuprofen for more than 3 days without discussing with your doctor    If your pain is not controlled with ibuprofen, you may also take Norco (acetaminophen-hydrocodone).  - Take this medication only when needed for breakthrough pain.   - Do not take more than the prescribed amount to control your pain.  - Do not operate machinery, drive, exercise, perform caregiving, make important decisions or perform important tasks while taking Norco. It can make you drowsy or forgetful.  - Norco is addictive in as little as 3 days, so take only as needed.  - Norco can dangerously slow or stop your breathing if you take too much, or if you combine it with alcohol or sedating medicines (including Xanex, Ativan) or illegal drugs.   - Norco can make you constipated (hard to poop), so take fiber supplements and a stool softener while taking this medicine.   - This medication contains acetaminophen (Tylenol). Each pill has 325 mg of acetaminophen. Do not take more than 4,000 mg of acetaminophen  within 24 hours as this may lead to liver damage.  - Return to the emergency department if Barre does not control your pain.    For nausea and vomiting, take the ondansetron (Zofran) as prescribed.  Place it under your tongue and let the medication dissolve on its own  Do not swallow whole  Give the medication 30 minutes to work before eating or drinking  Drink clear fluids (water, gatorade, broth, etc) and eat simple foods  Do not eat fatty, greasy, heavy meals when using this medication    Follow-up plan:  - Follow-up with: Primary care doctor within 3 - 5  days  - Follow-up for additional testing and/or evaluation as directed by your primary doctor  - Follow up with the urologist as directed    Return to the Emergency Department for symptoms including but not limited to: worsening symptoms, severe abdominal or back pain, shortness of breath or chest pain, vomiting with inability to hold down fluids, blood in your stool, fevers greater than 100.4°F, dizziness or passing out/fainting/unconsciousness, or other concerning symptoms.

## 2024-08-28 NOTE — ED PROVIDER NOTES
Source of History:  Patient  Chart    Chief complaint:  Flank Pain (Dx with kidney stone, supposed to have follow up with urology on 9/30, but states the pain worsened tonight)      HPI:  Halley Moreno is a 49 y.o. female with history of obesity hypoventilation syndrome, heart failure, diabetes, difficult intubation, hypertension, atrial tachycardia, right heart failure secondary to pulmonary hypertension, presenting to emergency department with complaint of right flank pain.    Per chart review, patient was seen at outside hospital emergency department on 06/03/2024 with complaint of right-sided flank pain.  A CT done during that visit demonstrated right nephrolithiasis with a stone near the right renal pelvis.  Lower pole collecting system is mildly prominent.  Ureter is normal caliber.  the stone was 1.3 cm in size.  She has planned urology follow up approximately 1 month from now.    She states this evening she developed sudden onset severe right-sided flank pain that feels similar to her prior kidney stone pain.  She has nausea and vomiting.  No fevers.  No dysuria or hematuria.  She had a loose bowel movement today.  No abdominal pain.  No trauma.  No pain elsewhere.    Review of patient's allergies indicates:   Allergen Reactions    Victoza [liraglutide] Swelling     CHF       No current facility-administered medications on file prior to encounter.     Current Outpatient Medications on File Prior to Encounter   Medication Sig Dispense Refill    atorvastatin (LIPITOR) 10 MG tablet Take 1 tablet (10 mg total) by mouth once daily. 90 tablet 3    blood sugar diagnostic Strp 1 strip by Misc.(Non-Drug; Combo Route) route 2 (two) times daily. Please provide items covered by patient's insurance 100 strip 5    cholecalciferol, vitamin D3, (VITAMIN D3) 1,000 unit capsule Take 2 capsules (2,000 Units total) by mouth once daily.  0    furosemide (LASIX) 80 MG tablet TAKE ONE TABLET BY MOUTH EVERY DAY 90 tablet 3  "   glipiZIDE 5 MG TR24 TAKE ONE TABLET BY MOUTH EVERY MORNING WITH BREAKFAST 90 tablet 1    lancets Misc 1 lancet by Misc.(Non-Drug; Combo Route) route 2 (two) times daily. 100 each 5    latanoprost 0.005 % ophthalmic solution Place 1 drop into both eyes every evening. 7.5 mL 3    losartan (COZAAR) 100 MG tablet Take 1 tablet (100 mg total) by mouth once daily. 90 tablet 3    metoprolol succinate (TOPROL-XL) 50 MG 24 hr tablet Take 1 tablet (50 mg total) by mouth once daily. 90 tablet 3    multivitamin (THERAGRAN) per tablet Take 1 tablet by mouth once daily.      ondansetron (ZOFRAN-ODT) 4 MG TbDL Take 1 tablet (4 mg total) by mouth every 8 (eight) hours as needed. 30 tablet 0    ONETOUCH ULTRA2 METER Misc       pen needle, diabetic 32 gauge x 1/4" Ndle 1 each by Misc.(Non-Drug; Combo Route) route once daily. Pt uses one pen needle weekly for injection of Ozempic 12 each 3    semaglutide (OZEMPIC) 2 mg/dose (8 mg/3 mL) PnIj Inject 2 mg into the skin every 7 days. 3 mL 5       PMH:  As per HPI and below:  Past Medical History:   Diagnosis Date    Acute respiratory failure with hypoxia and hypercapnia 10/22/2020    Last Assessment & Plan:  Patient is a chronic CO2 retainer in setting of obesity hypoventilation syndrome 2/2 morbid obesity. Stepped down from ICU 10/26, currently continuing diuresis. De-escalated to NC during the day with BiPAP nightly on 10/31, which patient tolerated well. On Lasix gtt for diuresis through 11/5.   Plan:  - Target SpO2 of >88%. Continue BiPAP nightly. Patient on 3LNC, will con    Back pain     BMI 70 and over, adult     Cavernous hemangioma of liver     CHF (congestive heart failure) 10/2020    Depression     Diabetes mellitus     Difficult intubation     DM (diabetes mellitus) type II controlled with renal manifestation     Glaucoma 09/13/2022    HTN (hypertension)     Hyperlipidemia     Lumbar disc disease     Morbid obesity     Rosacea     Sleep apnea     cpap    Tear of medial " "meniscus of right knee, current 2017     Past Surgical History:   Procedure Laterality Date     SECTION       SECTION  2000    COLONOSCOPY N/A 2023    Procedure: COLONOSCOPY;  Surgeon: Sharath Perea MD;  Location: [facility] ([address] [place] [address]);  Service: Endoscopy;  Laterality: N/A;  Any MD  BMI 64.66  pt refused WB, no availability at [facility] in next 3-4 days   ref. by Yamila Tejeda MD, [place], instr. to portal, [id number]  -precall complete-pt verbalized understanding of holding Ozempic-MS    DILATION AND CURETTAGE OF UTERUS      AUB "negative path" per patient    DILATION AND CURETTAGE OF UTERUS N/A 2023    Procedure: DILATION AND CURETTAGE, UTERUS;  Surgeon: Florentino Lovell MD;  Location: [facility];  Service: OB/GYN;  Laterality: N/A;  PROCEED AS INDICATED    ENDOMETRIAL ABLATION      Patient unsure if procedure was performed    HYSTEROSCOPY N/A 2023    Procedure: HYSTEROSCOPY;  Surgeon: Florentino Lovell MD;  Location: [facility];  Service: OB/GYN;  Laterality: N/A;    HYSTEROSCOPY WITH DILATION AND CURETTAGE OF UTERUS N/A 2022    Procedure: HYSTEROSCOPY, WITH DILATION AND CURETTAGE OF UTERUS;  Surgeon: Dixie Rod MD;  Location: [facility];  Service: OB/GYN;  Laterality: N/A;    INTRAUTERINE DEVICE INSERTION N/A 2022    Procedure: INSERTION, INTRAUTERINE DEVICE;  Surgeon: Dixie Rod MD;  Location: [facility];  Service: OB/GYN;  Laterality: N/A;    INTRAUTERINE DEVICE INSERTION N/A 2023    Procedure: INSERTION, INTRAUTERINE DEVICE;  Surgeon: Florentino Lovell MD;  Location: [facility];  Service: OB/GYN;  Laterality: N/A;       Social History     Socioeconomic History    Marital status: Single   Tobacco Use    Smoking status: Never     Passive exposure: Never    Smokeless tobacco: Never   Substance and Sexual Activity    Alcohol use: No    Drug use: No    Sexual activity: Not Currently     Partners: Male     Birth control/protection: Surgical   Social " History Narrative    She is a pharmacy tech, works at [employer] in [place].  Nonsmoker, social etoh.  No exercise.     Social Determinants of Health     Financial Resource Strain: Low Risk  (7/17/2024)    Received from [facility]    Overall Financial Resource Strain (CARDIA)     Difficulty of Paying Living Expenses: Not hard at all   Food Insecurity: No Food Insecurity (7/17/2024)    Received from [facility]    Hunger Vital Sign     Worried About Running Out of Food in the Last Year: Never true     Ran Out of Food in the Last Year: Never true   Transportation Needs: No Transportation Needs (7/17/2024)    Received from [facility]    PRAPARE - Transportation     Lack of Transportation (Medical): No     Lack of Transportation (Non-Medical): No   Physical Activity: Insufficiently Active (7/17/2024)    Received from [facility]    Exercise Vital Sign     Days of Exercise per Week: 3 days     Minutes of Exercise per Session: 10 min   Stress: No Stress Concern Present (7/17/2024)    Received from [facility]    [employer] [place] [employer] - Occupational Stress Questionnaire     Feeling of Stress : Not at all   Housing Stability: Low Risk  (4/2/2024)    Received from [facility], [facility]    Housing Stability Vital Sign     Unable to Pay for Housing in the Last Year: No     Number of Places Lived in the Last Year: 1     In the last 12 months, was there a time when you did not have a steady place to sleep or slept in a shelter (including now)?: No       Family History   Problem Relation Name Age of Onset    Hypertension Mother      Diabetes Father      Lymphoma Father          cns lymphoma    Heart disease Paternal Grandfather      Colon cancer Neg Hx      Ovarian cancer Neg Hx      Breast cancer Neg Hx      Amblyopia Neg Hx      Blindness Neg Hx      Cataracts Neg Hx      Glaucoma Neg Hx      Macular degeneration Neg Hx      Retinal detachment Neg Hx      Strabismus Neg Hx      Stroke Neg Hx       Thyroid disease Neg Hx      Uterine cancer Neg Hx         Physical Exam:      Vitals:    08/28/24 0538   BP: 120/71   Pulse: (!) 58   Resp: 15   Temp: 97.8 °F (36.6 °C)     Gen: No acute distress.  Nontoxic.  Well appearing.  Mental Status:  Alert and oriented .  Appropriate, conversant.  Skin: Warm, dry. No rashes seen.  Eyes: No conjunctival injection.  Pulm: CTAB. No increased work of breathing.  No significant tachypnea.  No audible stridor or wheezing.  No conversational dyspnea.    CV: Regular rate. Regular rhythm.   Abd: Soft.  Not distended.  Nontender.   MSK:  Right CVA tenderness  Neuro: Awake. Speech normal. No focal neuro deficit observed.    Laboratory Studies:  Labs Reviewed   CBC W/ AUTO DIFFERENTIAL - Abnormal       Result Value    WBC 14.25 (*)     RBC 4.23      Hemoglobin 11.9 (*)     Hematocrit 36.2 (*)     MCV 86      MCH 28.1      MCHC 32.9      RDW 16.2 (*)     Platelets 259      MPV 9.9      Immature Granulocytes 0.5      Gran # (ANC) 11.6 (*)     Immature Grans (Abs) 0.07 (*)     Lymph # 1.6      Mono # 0.9      Eos # 0.1      Baso # 0.02      nRBC 0      Gran % 81.4 (*)     Lymph % 10.9 (*)     Mono % 6.2      Eosinophil % 0.9      Basophil % 0.1      Differential Method Automated     COMPREHENSIVE METABOLIC PANEL - Abnormal    Sodium 143      Potassium 4.4      Chloride 107      CO2 24      Glucose 124 (*)     BUN 13      Creatinine 1.2      Calcium 9.7      Total Protein 7.7      Albumin 3.7      Total Bilirubin 0.5      Alkaline Phosphatase 85      AST 20      ALT 14      eGFR 54.8 (*)     Anion Gap 12     URINALYSIS, REFLEX TO URINE CULTURE    Specimen UA Urine, Clean Catch      Color, UA Yellow      Appearance, UA Clear      pH, UA 5.0      Specific Gravity, UA 1.010      Protein, UA Negative      Glucose, UA Negative      Ketones, UA Negative      Bilirubin (UA) Negative      Occult Blood UA Negative      Nitrite, UA Negative      Leukocytes, UA Negative      Narrative:     Specimen  Source->Urine     Chart reviewed.     Imaging Results               CT Renal Stone Study ABD Pelvis WO (Final result)  Result time 08/28/24 03:54:09      Final result by Franklyn Hsu MD (08/28/24 03:54:09)                   Impression:      0.9 cm stone in the right UPJ with adjacent fat stranding and mild hydronephrosis of the right kidney.    Cholelithiasis.  No evidence of acute cholecystitis.    Hepatomegaly and borderline splenomegaly.    Two large known liver lesions, as discussed above.    This report was flagged in Epic as abnormal.    Electronically signed by resident: Kaye Johnson  Date:    08/28/2024  Time:    03:25    Electronically signed by: Franklyn Hsu MD  Date:    08/28/2024  Time:    03:54               Narrative:    EXAMINATION:  CT RENAL STONE STUDY ABD PELVIS WO    CLINICAL HISTORY:  Flank pain, kidney stone suspected;    TECHNIQUE:  Axial images of the abdomen and pelvis were acquired without the use of IV contrast.  Coronal and sagittal reconstructions were also obtained    COMPARISON:  CT 10/03/2011, nuclear medicine study 10/04/2011, CT 12/08/2023, 06/03/2024    FINDINGS:  Thoracic soft tissues: Partially visualized thoracic soft tissues appear within normal limits.    Heart: Partially visualized heart appears normal in size. No pericardial effusion.    Lungs: Small calcified granuloma at the right lung base.  No pleural effusion.    Esophagus: Unremarkable.    Stomach and duodenum: Unremarkable.    Liver: Hepatomegaly measuring 20.4 cm in craniocaudal dimension.  Two large lesions in the liver.  Largest measures 7.7 cm (2-49) and is increased in size from CT 10/03/2011, but stable from recent priors and previously characterized as a hemangioma.  Smaller lesion measures 4.1 cm (2-34) (previously 8.8 cm in 2011), compatible with benign process.    Gallbladder: Cholelithiasis.    Bile Ducts: No evidence of dilated ducts.    Spleen: Borderline splenomegaly measuring 13 cm in greatest  dimension.    Pancreas: No mass or peripancreatic fat stranding.    Adrenals: Unremarkable.    Kidneys/ Ureters: There is a 0.9 cm stone in the right ureteropelvic junction with mild upstream hydronephrosis and adjacent fat stranding.  Additional 0.7 cm nonobstructing stone in the right kidney.  No hydronephrosis or nephrolithiasis. No ureteral dilatation.    Bladder: No evidence of wall thickening.    Reproductive organs: Intrauterine device in expected location..    Bowel/Mesentery: Small bowel is normal in caliber with no evidence of obstruction. No evidence of inflammation or wall thickening.  Colon demonstrates no focal wall thickening.  Appendix is normal.    Peritoneum: No intraperitoneal free air or fluid    Lymph nodes: No retroperitoneal lymphadenopathy.    Vasculature: No aneurysm. No significant calcific atherosclerosis.    Abdominal wall:  Unremarkable.    Bones: No acute fracture. No suspicious osseous lesions.  Degenerative changes of the spine.                                      Medications Given:  Medications   sodium chloride 0.9% bolus 1,000 mL 1,000 mL (0 mLs Intravenous Stopped 8/28/24 0451)   ketorolac injection 15 mg (15 mg Intravenous Given 8/28/24 0320)   ondansetron injection 4 mg (4 mg Intravenous Given 8/28/24 0320)   morphine injection 6 mg (6 mg Intravenous Given 8/28/24 0320)       MDM:    49 y.o. female with diagnosis of kidney stone 2 months ago presenting to emergency department with complaint of right flank pain.  She was afebrile and hemodynamically stable, uncomfortable.      Will place IV, give pain medication.  Will obtain labs, urinalysis, CT.    Labs reviewed.  Leukocytosis to 14,000 present.  Believe this is reactive.  Hemoglobin stable.  Creatinine 1.2.  No electrolyte disturbance.  Urinalysis not consistent with infection    CT demonstrating 9 mm UVJ stone.  After 1st round of pain medication, patient is completely pain-free, tolerating oral intake, feels much better.   She was planned follow up with Urology.  Discharged home with medications.  Return precautions discussed at bedside.    Diagnostic Impression:    1. Ureterolithiasis         ED Disposition Condition    Discharge Stable          ED Prescriptions       Medication Sig Dispense Start Date End Date Auth. Provider    ibuprofen (ADVIL,MOTRIN) 600 MG tablet Take 1 tablet (600 mg total) by mouth every 6 (six) hours as needed for Pain. 20 tablet 8/28/2024 9/2/2024 Rupa Stone MD    tamsulosin (FLOMAX) 0.4 mg Cap Take 1 capsule (0.4 mg total) by mouth once daily. for 10 days 10 capsule 8/28/2024 9/7/2024 Rupa Stone MD    ondansetron (ZOFRAN-ODT) 4 MG TbDL Take 1 tablet (4 mg total) by mouth every 6 (six) hours as needed. 12 tablet 8/28/2024 8/31/2024 Rupa Stone MD    oxyCODONE-acetaminophen (PERCOCET) 5-325 mg per tablet Take 2 tablets by mouth every 6 (six) hours as needed for Pain. 12 tablet 8/28/2024 8/31/2024 Rupa Stone MD          Follow-up Information       Follow up With Specialties Details Why Contact Info Additional Information    Yamila Tejeda MD Internal Medicine Schedule an appointment as soon as possible for a visit   [address] [facility] [address]  [phone]       [facility] - Urology [address] Urology Schedule an appointment as soon as possible for a visit   [address] [facility] [address] [phone] Main Building, 4th Floor Please park in [facility] and take [facility] elevator            Patient understands the plan and is in agreement, verbalized understanding, questions answered    Rupa Stone MD  Emergency Medicine         Rupa Stone MD  08/28/24 0510

## 2024-08-28 NOTE — PLAN OF CARE
[facility]   Physical Therapy Treatment and Re-evaluation Note     Name: Halley Moreno  Clinic Number: [id number]    Therapy Diagnosis:   Encounter Diagnoses   Name Primary?    Decreased range of motion of left shoulder Yes    Abnormal posture     Dyskinesis of left scapula        Physician: Yamila Tejeda MD    Visit Date: 8/26/2024    Physician Orders: PT Eval and Treat   Medical Diagnosis from Referral: Chronic left shoulder pain [M25.512, G89.29]   Evaluation Date: 3/14/2024  Authorization Period Expiration: 12/31/2024  Plan of Care Expiration: 9/23/2024  Progress Note Due: 9/23/2024  Visit # / Visits authorized: 13/20  FOTO: 2/3     Precautions: Standard, Diabetes, Hypertension, morbid obesity      Time In: 1501  Time Out: 1600  Total Appointment Time (timed & untimed codes): 54 minutes    SUBJECTIVE     Pt reports: no cramping in her shoulder recently. She has been able to reach overhead without shoulder pain. She does continue to note pain reaching behind her back.     She was compliant with home exercise program.  Response to previous treatment: improved shoulder symptoms  Functional change: improved ability to complete reaching activities     Pain: 1/10  Location: left anterior shoulder     OBJECTIVE     Objective Measures updated at progress report unless specified.     Active Range of Motion:   Shoulder Right Left   Flexion 170 170   Scaption 170 170   ER  95 90   IR 70 62      Strength:  Shoulder Right Left   Flexion 5/5 4+/5   Scaption 5/5 4+/5   ER 5/5 4+/5   IR 5/5 4+/5   Upper trap 4/5 4-/5   Middle Trapezius 4-/5 4-/5   Lower Trapezius 4-/5 4-/5   Serratus Anterior 4/5 4/5        Special Tests:    Right Left   Painful arc (--) (--)   Drop Arm test (--) (--)   Hawkin's Kenndy (--) (+)         Joint Mobility: decreased Glenohumeral joint inferior glide and posterior glide, decreased cervicothoracic junction mobility, decreased thoracic mobility     Treatment      Halley received the treatments listed below:      therapeutic exercises to develop strength, endurance, ROM, flexibility, and posture for 43 minutes including:    Upper body ergometer 3' forward/3' backward  Seated thoracic extensions, 2x10 5 seconds  Horizontal abduction with opposite press blue band, 2x15  Shoulder Internal rotation at 90 degrees blue band 2x12  Shoulder External rotation at 90 deg blue band 2 x 12  Wall pushups 3x8  Seated row to External rotation blue band x 20 x 3 sec  Education and assessment    Not today:  [place] and arrow blue band, 2x12  Bilateral shoulder External rotation with green band, 2x12   Supine Internal rotation/External rotation 3 lbs x 20  Supine Internal rotation/External rotation 3 lbs x 20    manual therapy techniques: Joint mobilizations were applied to the: thoracic spine and Glenohumeral joint for 11 minutes, including:     Glenohumeral inferior and posterior glides, grade III-IV  Humeral head centric work - external rotation/internal rotation   Combined motions shoulder flexion end range mobilizations, grade III-IV    Not today:  Seated Cervicothoracic junction and Thoracic posterior to anterior glides      Patient Education and Home Exercises     Home Exercises Provided and Patient Education Provided     Education provided:   - continue with Home exercise program     Written Home Exercises Provided: continue Home exercise program. Exercises were reviewed and Halley was able to demonstrate them prior to the end of the session.  Halley demonstrated good  understanding of the education provided. See EMR under Patient Instructions for exercises provided during therapy sessions    ASSESSMENT     Halley has been seen for 13 visits for her L shoulder pain over the past few months. She demonstrates improved shoulder range of motion and increased strength which has led to improvements in her ability to complete reaching and lifting activities at home. She remains with some limitations  in her shoulder internal rotation range of motion, rotator cuff and jason-scapular strength which limits her ability to complete overhead lifting activities and activities behind her back. She remains a good candidate for physical therapy services to address her impairments and to facilitate her return to her prior level of function.     Halley Is progressing well towards her goals.   Pt prognosis is Good.     Pt will continue to benefit from skilled outpatient physical therapy to address the deficits listed in the problem list box on initial evaluation, provide pt/family education and to maximize pt's level of independence in the home and community environment.     Pt's spiritual, cultural and educational needs considered and pt agreeable to plan of care and goals.     Anticipated barriers to physical therapy: work schedule     Goals:   Short term goals: 4 weeks  Patient will be independent and compliant with their HEP to improve their function -Met  Patient will improve their ROM to at least 160 degrees active shoulder flexion to improve her ability to complete overhead reaching activities. -Met  Patient will improve their strength to at least a 3+/5 for all jason-scapular musculature to improve her ability to complete lifting activities. -Met     Long term goals: 8 weeks  Patient will improve their FOTO score to at least 65% as evidence of clinically significant improvements in their function. -Met  Patient will improve their ROM to at least 170 degrees active shoulder flexion to improve her ability to complete overhead reaching activities. -Met   Patient will improve their strength to at least a 4-/5 for all jason-scapular musculature to improve her ability to complete lifting activities. -Partially met    PLAN     Plan of care Certification: 8/26/2024 to 9/23/2024.    1x/week for 4 weeks. Progress shoulder range of motion, thoracic mobility, scapular upward rotation.     Sharath Mcguire, PT  Board Certified Clinical  Specialist in Orthopedic Physical Therapy  Board Certified Clinical Specialist in Sports Physical Therapy  Fellow, [employer] Manual Physical Therapists

## 2024-09-16 ENCOUNTER — CLINICAL SUPPORT (OUTPATIENT)
Dept: REHABILITATION | Facility: HOSPITAL | Age: 49
End: 2024-09-16
Payer: MEDICAID

## 2024-09-16 DIAGNOSIS — M25.312 DYSKINESIS OF LEFT SCAPULA: ICD-10-CM

## 2024-09-16 DIAGNOSIS — R29.3 ABNORMAL POSTURE: ICD-10-CM

## 2024-09-16 DIAGNOSIS — M25.612 DECREASED RANGE OF MOTION OF LEFT SHOULDER: Primary | ICD-10-CM

## 2024-09-16 PROCEDURE — 97110 THERAPEUTIC EXERCISES: CPT

## 2024-09-17 PROBLEM — M25.312 DYSKINESIS OF LEFT SCAPULA: Status: RESOLVED | Noted: 2024-03-14 | Resolved: 2024-09-16

## 2024-09-17 PROBLEM — R29.3 ABNORMAL POSTURE: Status: RESOLVED | Noted: 2024-03-14 | Resolved: 2024-09-16

## 2024-09-17 PROBLEM — M25.612 DECREASED RANGE OF MOTION OF LEFT SHOULDER: Status: RESOLVED | Noted: 2024-03-14 | Resolved: 2024-09-16

## 2024-09-17 NOTE — PROGRESS NOTES
[facility]   Physical Therapy Treatment  and Discharge Note     Name: Halley Moreno  Clinic Number: [id number]    Therapy Diagnosis:   Encounter Diagnoses   Name Primary?    Decreased range of motion of left shoulder Yes    Abnormal posture     Dyskinesis of left scapula        Physician: Yamila Tejeda MD    Visit Date: 9/16/2024    Physician Orders: PT Eval and Treat   Medical Diagnosis from Referral: Chronic left shoulder pain [M25.512, G89.29]   Evaluation Date: 3/14/2024  Authorization Period Expiration: 12/31/2024  Plan of Care Expiration: 9/23/2024  Progress Note Due: 9/23/2024  Visit # / Visits authorized: 14/20  FOTO: 2/3     Precautions: Standard, Diabetes, Hypertension, morbid obesity      Time In: 1601  Time Out: 1700  Total Appointment Time (timed & untimed codes): 54 minutes    SUBJECTIVE     Pt reports: only minimal pain in her shoulder recently. She still has some difficulty washing her back and reaching her bra strap.    She was compliant with home exercise program.  Response to previous treatment: improved shoulder symptoms  Functional change: improved ability to complete reaching activities     Pain: 1/10  Location: left anterior shoulder     OBJECTIVE     Objective Measures updated at progress report unless specified.     Active Range of Motion:   Shoulder Right Left   Flexion 170 170   Scaption 170 170   ER  95 90   IR 70 62      Strength:  Shoulder Right Left   Flexion 5/5 4+/5   Scaption 5/5 4+/5   ER 5/5 4+/5   IR 5/5 4+/5   Upper trap 4/5 4-/5   Middle Trapezius 4-/5 4-/5   Lower Trapezius 4-/5 4-/5   Serratus Anterior 4/5 4/5        Special Tests:    Right Left   Painful arc (--) (--)   Drop Arm test (--) (--)   Hawkin's Kenndy (--) (+)         Joint Mobility: decreased Glenohumeral joint inferior glide and posterior glide, decreased cervicothoracic junction mobility, decreased thoracic mobility     Treatment     Halley received the treatments listed  below:      therapeutic exercises to develop strength, endurance, ROM, flexibility, and posture for 43 minutes including:    Upper body ergometer 3' forward/3' backward  Seated thoracic extensions, 2x10 5 seconds  Horizontal abduction with opposite press blue band, 2x15  Shoulder Internal rotation at 90 degrees blue band 2x12  Shoulder External rotation at 90 deg blue band 2 x 12  Wall pushups 3x8  Seated row to External rotation blue band x 20 x 3 sec  Education and assessment    Not today:  [place] and arrow blue band, 2x12  Bilateral shoulder External rotation with green band, 2x12   Supine Internal rotation/External rotation 3 lbs x 20  Supine Internal rotation/External rotation 3 lbs x 20    manual therapy techniques: Joint mobilizations were applied to the: thoracic spine and Glenohumeral joint for 11 minutes, including:     Glenohumeral inferior and posterior glides, grade III-IV  Humeral head centric work - external rotation/internal rotation   Combined motions shoulder flexion end range mobilizations, grade III-IV    Not today:  Seated Cervicothoracic junction and Thoracic posterior to anterior glides      Patient Education and Home Exercises     Home Exercises Provided and Patient Education Provided     Education provided:   - continue with Home exercise program     Written Home Exercises Provided: continue Home exercise program. Exercises were reviewed and Halley was able to demonstrate them prior to the end of the session.  Halley demonstrated good  understanding of the education provided. See EMR under Patient Instructions for exercises provided during therapy sessions    ASSESSMENT     Halley has been seen for 14 visits for her L shoulder pain over the past few months. She demonstrates improved shoulder range of motion and increased strength which has led to improvements in her ability to complete reaching and lifting activities at home. At this point she can perform the large majority of her daily activities.  We discussed her continuing her exercises to improve her ability to reach behind her back more easily. She has met all of her goals and will be discharged at this time.       Goals:   Short term goals: 4 weeks  Patient will be independent and compliant with their HEP to improve their function -Met  Patient will improve their ROM to at least 160 degrees active shoulder flexion to improve her ability to complete overhead reaching activities. -Met  Patient will improve their strength to at least a 3+/5 for all jason-scapular musculature to improve her ability to complete lifting activities. -Met     Long term goals: 8 weeks  Patient will improve their FOTO score to at least 65% as evidence of clinically significant improvements in their function. -Met  Patient will improve their ROM to at least 170 degrees active shoulder flexion to improve her ability to complete overhead reaching activities. -Met   Patient will improve their strength to at least a 4-/5 for all jason-scapular musculature to improve her ability to complete lifting activities. -met    PLAN     Discharge    Sharath Mcguire PT  Board Certified Clinical Specialist in Orthopedic Physical Therapy  Board Certified Clinical Specialist in Sports Physical Therapy  Fellow, [employer] Orth[employer] Manual Physical Therapists

## 2024-09-30 ENCOUNTER — OFFICE VISIT (OUTPATIENT)
Dept: UROLOGY | Facility: CLINIC | Age: 49
End: 2024-09-30
Payer: MEDICAID

## 2024-09-30 ENCOUNTER — TELEPHONE (OUTPATIENT)
Dept: UROLOGY | Facility: CLINIC | Age: 49
End: 2024-09-30
Payer: MEDICAID

## 2024-09-30 VITALS
HEIGHT: 63 IN | RESPIRATION RATE: 16 BRPM | SYSTOLIC BLOOD PRESSURE: 114 MMHG | DIASTOLIC BLOOD PRESSURE: 61 MMHG | WEIGHT: 293 LBS | HEART RATE: 62 BPM | BODY MASS INDEX: 51.91 KG/M2

## 2024-09-30 DIAGNOSIS — N13.2 URETERAL STONE WITH HYDRONEPHROSIS: ICD-10-CM

## 2024-09-30 DIAGNOSIS — N20.0 NEPHROLITHIASIS: Primary | ICD-10-CM

## 2024-09-30 LAB
BILIRUB SERPL-MCNC: NORMAL MG/DL
BLOOD URINE, POC: 50
CLARITY, POC UA: NORMAL
COLOR, POC UA: YELLOW
GLUCOSE UR QL STRIP: NORMAL
KETONES UR QL STRIP: NORMAL
LEUKOCYTE ESTERASE URINE, POC: NORMAL
NITRITE, POC UA: NORMAL
PH, POC UA: 5
PROTEIN, POC: NORMAL
SPECIFIC GRAVITY, POC UA: 1.01
UROBILINOGEN, POC UA: NORMAL

## 2024-09-30 PROCEDURE — 3066F NEPHROPATHY DOC TX: CPT | Mod: CPTII,S$GLB,, | Performed by: NURSE PRACTITIONER

## 2024-09-30 PROCEDURE — 4010F ACE/ARB THERAPY RXD/TAKEN: CPT | Mod: CPTII,S$GLB,, | Performed by: NURSE PRACTITIONER

## 2024-09-30 PROCEDURE — 3008F BODY MASS INDEX DOCD: CPT | Mod: CPTII,S$GLB,, | Performed by: NURSE PRACTITIONER

## 2024-09-30 PROCEDURE — 3061F NEG MICROALBUMINURIA REV: CPT | Mod: CPTII,S$GLB,, | Performed by: NURSE PRACTITIONER

## 2024-09-30 PROCEDURE — 1159F MED LIST DOCD IN RCRD: CPT | Mod: CPTII,S$GLB,, | Performed by: NURSE PRACTITIONER

## 2024-09-30 PROCEDURE — 3074F SYST BP LT 130 MM HG: CPT | Mod: CPTII,S$GLB,, | Performed by: NURSE PRACTITIONER

## 2024-09-30 PROCEDURE — 3044F HG A1C LEVEL LT 7.0%: CPT | Mod: CPTII,S$GLB,, | Performed by: NURSE PRACTITIONER

## 2024-09-30 PROCEDURE — 3078F DIAST BP <80 MM HG: CPT | Mod: CPTII,S$GLB,, | Performed by: NURSE PRACTITIONER

## 2024-09-30 PROCEDURE — 1160F RVW MEDS BY RX/DR IN RCRD: CPT | Mod: CPTII,S$GLB,, | Performed by: NURSE PRACTITIONER

## 2024-09-30 PROCEDURE — 99204 OFFICE O/P NEW MOD 45 MIN: CPT | Mod: S$GLB,,, | Performed by: NURSE PRACTITIONER

## 2024-09-30 PROCEDURE — 87086 URINE CULTURE/COLONY COUNT: CPT | Performed by: NURSE PRACTITIONER

## 2024-09-30 PROCEDURE — 81002 URINALYSIS NONAUTO W/O SCOPE: CPT | Mod: S$GLB,,, | Performed by: NURSE PRACTITIONER

## 2024-09-30 NOTE — PROGRESS NOTES
"Subjective:      Halley Moreno is a 49 y.o. female who was referred by Dr. Stone for evaluation of her nephrolithiasis.    The patient presented to an outside ED on 6/3 with R flank pain. CT done during that visit demonstrated right nephrolithiasis with a stone near the right renal pelvis.  Lower pole collecting system is mildly prominent.  Ureter is normal caliber.  the stone was 1.3 cm in size.    She returned to the ED on 8/28 again with R flank pain and N/V.   CTRSS- "There is a 0.9 cm stone in the right ureteropelvic junction with mild upstream hydronephrosis and adjacent fat stranding. Additional 0.7 cm nonobstructing stone in the right kidney. No hydronephrosis or nephrolithiasis. No ureteral dilatation."     She has not had pain since her last ER visit. She denies dysuria, frequency, urgency, flank pain, gross hematuria and fever/chills. She has not seen the stone pass but also was has not strained her urine.   She drinks tea.     The following portions of the patient's history were reviewed and updated as appropriate: allergies, current medications, past family history, past medical history, past social history, past surgical history and problem list.    Review of Systems  Constitutional: no fever or chills  ENT: no nasal congestion or sore throat  Respiratory: no cough or shortness of breath  Cardiovascular: no chest pain or palpitations  Gastrointestinal: no nausea or vomiting, tolerating diet  Genitourinary: as per HPI  Hematologic/Lymphatic: no easy bruising or lymphadenopathy  Musculoskeletal: no arthralgias or myalgias  Neurological: no seizures or tremors  Behavioral/Psych: no auditory or visual hallucinations     Objective:   Vital Signs:   Vitals:    09/30/24 1017   BP: 114/61   Pulse: 62   Resp: 16       Physical Exam   General: alert and oriented, no acute distress  Head: normocephalic, atraumatic  Neck: supple, normal ROM  Respiratory: Symmetric expansion, non-labored " "breathing  Cardiovascular: regular rate and rhythm  Abdomen: soft, non tender, non distended  Pelvic: deferred  Skin: normal coloration and turgor, no rashes, no suspicious skin lesions noted  Neuro: alert and oriented x3, no gross deficits  Psych: normal judgment and insight, normal mood/affect, and non-anxious    Lab Review   Urinalysis demonstrates : RBCs  Lab Results   Component Value Date    WBC 14.25 (H) 08/28/2024    HGB 11.9 (L) 08/28/2024    HCT 36.2 (L) 08/28/2024    MCV 86 08/28/2024     08/28/2024     Lab Results   Component Value Date    CREATININE 1.2 08/28/2024    BUN 13 08/28/2024       Imaging   (all images personally reviewed; agree with report below)  CTRSS-"There is a 0.9 cm stone in the right ureteropelvic junction with mild upstream hydronephrosis and adjacent fat stranding. Additional 0.7 cm nonobstructing stone in the right kidney. No hydronephrosis or nephrolithiasis. No ureteral dilatation. "  Assessment:     1. Nephrolithiasis    2. Ureteral stone with hydronephrosis      Plan:   Diagnoses and all orders for this visit:    Nephrolithiasis  -     POCT URINE DIPSTICK WITHOUT MICROSCOPE  -     US Retroperitoneal Complete; Future  -     Urine culture    Ureteral stone with hydronephrosis    Plan:  --Suspect that the stone has not passed given the size and location on prior CT  --MIKE next available  --Advised of the options of watch and wait vs intervention via ureteroscopic approach. Will proceed with R URS with LL of ureteral and possibly R renal stone next available. Discussed the risks/benefits of the procedure and answered all questions. Consent will be signed the day of surgery   --Recommend general stone preventive measures, to include increased hydration, low Na diet, and increased citrus intake  --Discussed indications to present to ED, including fever, intractable pain, and intractable nausea/vomitting      ADDENDUM:  --MIKE- "Findings are suspicious for persistent right kidney " "UPJ obstruction from a calculus with hydronephrosis.Right lower pole intrarenal calculi."  --will proceed with R URS with LL and stent placement with Dr. Dumont on 10/10   "

## 2024-09-30 NOTE — TELEPHONE ENCOUNTER
Scheduled appt   ----- Message from Zuleika Henderson NP sent at 9/30/2024 10:47 AM CDT -----  MIKE please

## 2024-10-01 ENCOUNTER — HOSPITAL ENCOUNTER (OUTPATIENT)
Dept: RADIOLOGY | Facility: OTHER | Age: 49
Discharge: HOME | End: 2024-10-01
Attending: NURSE PRACTITIONER
Payer: MEDICAID

## 2024-10-01 DIAGNOSIS — N20.0 NEPHROLITHIASIS: ICD-10-CM

## 2024-10-01 LAB
BACTERIA UR CULT: NORMAL
BACTERIA UR CULT: NORMAL

## 2024-10-01 PROCEDURE — 76770 US EXAM ABDO BACK WALL COMP: CPT | Mod: 26,,, | Performed by: RADIOLOGY

## 2024-10-01 PROCEDURE — 76770 US EXAM ABDO BACK WALL COMP: CPT | Mod: TC

## 2024-10-01 RX ORDER — SODIUM CHLORIDE 9 MG/ML
INJECTION, SOLUTION INTRAVENOUS CONTINUOUS
OUTPATIENT
Start: 2024-10-01

## 2024-10-01 RX ORDER — CIPROFLOXACIN 2 MG/ML
400 INJECTION, SOLUTION INTRAVENOUS
OUTPATIENT
Start: 2024-10-01

## 2024-10-02 DIAGNOSIS — R82.90 ABNORMAL URINALYSIS: Primary | ICD-10-CM

## 2024-10-02 RX ORDER — CEPHALEXIN 500 MG/1
500 CAPSULE ORAL EVERY 12 HOURS
Qty: 14 CAPSULE | Refills: 0 | Status: SHIPPED | OUTPATIENT
Start: 2024-10-02 | End: 2024-10-09

## 2024-10-04 ENCOUNTER — ANESTHESIA EVENT (OUTPATIENT)
Dept: SURGERY | Facility: OTHER | Age: 49
End: 2024-10-04
Payer: MEDICAID

## 2024-10-04 ENCOUNTER — HOSPITAL ENCOUNTER (OUTPATIENT)
Dept: PREADMISSION TESTING | Facility: OTHER | Age: 49
Discharge: HOME | End: 2024-10-04
Attending: UROLOGY
Payer: MEDICAID

## 2024-10-04 VITALS
WEIGHT: 293 LBS | HEART RATE: 55 BPM | HEIGHT: 63 IN | OXYGEN SATURATION: 96 % | BODY MASS INDEX: 51.91 KG/M2 | TEMPERATURE: 98 F | DIASTOLIC BLOOD PRESSURE: 65 MMHG | RESPIRATION RATE: 16 BRPM | SYSTOLIC BLOOD PRESSURE: 121 MMHG

## 2024-10-04 RX ORDER — HYDROMORPHONE HYDROCHLORIDE 2 MG/ML
0.4 INJECTION, SOLUTION INTRAMUSCULAR; INTRAVENOUS; SUBCUTANEOUS EVERY 5 MIN PRN
Status: CANCELLED | OUTPATIENT
Start: 2024-10-04

## 2024-10-04 RX ORDER — OXYCODONE HYDROCHLORIDE 5 MG/1
5 TABLET ORAL
Status: CANCELLED | OUTPATIENT
Start: 2024-10-04

## 2024-10-04 RX ORDER — SODIUM CHLORIDE 0.9 % (FLUSH) 0.9 %
3 SYRINGE (ML) INJECTION
Status: CANCELLED | OUTPATIENT
Start: 2024-10-04

## 2024-10-04 RX ORDER — MEPERIDINE HYDROCHLORIDE 25 MG/ML
12.5 INJECTION INTRAMUSCULAR; INTRAVENOUS; SUBCUTANEOUS ONCE AS NEEDED
Status: CANCELLED | OUTPATIENT
Start: 2024-10-04 | End: 2024-10-05

## 2024-10-04 RX ORDER — PROCHLORPERAZINE EDISYLATE 5 MG/ML
5 INJECTION INTRAMUSCULAR; INTRAVENOUS EVERY 30 MIN PRN
Status: CANCELLED | OUTPATIENT
Start: 2024-10-04

## 2024-10-04 RX ORDER — GLUCAGON 1 MG
1 KIT INJECTION
Status: CANCELLED | OUTPATIENT
Start: 2024-10-04

## 2024-10-04 NOTE — DISCHARGE INSTRUCTIONS
Information to Prepare you for your Surgery    PRE-ADMIT TESTING   [address]  [place] [facility]  [address]     Your surgery has been scheduled at [facility]. We are pleased to have the opportunity to serve you. For Further Information please call [phone].    On the day of surgery please report to Registration on the 1st floor of the [facility].    CONTACT YOUR PHYSICIAN'S OFFICE THE DAY PRIOR TO YOUR SURGERY TO OBTAIN YOUR ARRIVAL TIME.     The evening before surgery do not eat anything after 9 p.m. ( this includes hard candy, chewing gum and mints).  You may only have  WATER  from 9 p.m. until you leave your home.   DO NOT DRINK ANY LIQUIDS ON THE WAY TO THE HOSPITAL.      If you are a diabetic-drink only water prior to surgery.    Outpatient Surgery- May allow 2 adults (18 and older)/ Support Persons (1 being the designated ) for all surgical/procedural patients. A breastfeeding mother will be allowed her infant and 2 adult Support Persons. No one under the age of 18 will be allowed in the building.    MEDICATION INSTRUCTIONS: TAKE medications checked off by the Anesthesiologist on your Medication List.    Angiogram Patients: Take medications as instructed by your physician, including aspirin.     Surgery Patients:  If you take ASPIRIN - Your PHYSICIAN/SURGEON will need to inform you IF/OR when you need to stop taking aspirin prior to your surgery.     Starting the week prior to surgery, do not take any medications containing IBUPROFEN or NSAIDS (Advil, Aleve, BC, Celebrex, Goody's, Ketorolac, Meloxicam, Mobic, Motrin, Naproxen, Toradol, etc).  If you are not sure if you should take a medicine please call your surgeon's office.  You may take Tylenol.    Do Not Wear any make-up (especially eye make-up) to surgery. Please remove any false eyelashes or eyelash extensions. If you arrive the day of surgery with makeup/eyelashes on you will be required to remove prior to  surgery. (There is a risk of corneal abrasions if eye makeup/eyelash extensions are not removed)    Leave all valuables at home.   Do Not wear any jewelry or watches, including any metal in body piercings. Jewelry must be removed prior to coming to the hospital.  There is a possibility that rings that are unable to be removed may be cut off if they are on the surgical extremity.    Please remove all hair extensions, wigs, clips and any other metal accessories/ ornaments from your hair.  These items may pose a flammable/fire risk in Surgery and must be removed.    Do not shave your surgical area at least 5 days prior to your surgery. The surgical prep will be performed at the hospital according to Infection Control regulations.    Contact Lens must be removed before surgery. Either do not wear the contact lens or bring a case and solution for storage.  Please bring a container for eyeglasses or dentures as required.  Bring any paperwork your physician has provided, such as consent forms,  history and physicals, doctor's orders, etc.   Bring comfortable clothes that are loose fitting to wear upon discharge. Take into consideration the type of surgery being performed.  Maintain your diet as advised per your physician the day prior to surgery.    Adequate rest the night before surgery is advised.   Park in the Parking lot behind the hospital or in the [facility] across the street from the parking lot. Parking is complimentary.  If you will be discharged the same day as your procedure, please arrange for a responsible adult to drive you home or to accompany you if traveling by taxi.   YOU WILL NOT BE PERMITTED TO DRIVE OR TO LEAVE THE HOSPITAL ALONE AFTER SURGERY.   If you are being discharged the same day, it is strongly recommended that you arrange for someone to remain with you for the first 24 hrs following your surgery.    The Surgeon will speak to your family/visitor after your surgery regarding the  outcome of your surgery and post op care.  The Surgeon may speak to you after your surgery, but there is a possibility you may not remember the details.  Please check with your family members regarding the conversation with the Surgeon.    We strongly recommend whoever is bringing you home be present for discharge instructions.  This will ensure a thorough understanding for your post op home care.              Bathing Instructions with Hibiclens  Shower the evening before and morning of your procedure with Chlorhexidine (Hibiclens)    Do not use Chlorhexidine on your face or genitals. Do not get in your eyes.  Wash your face with water and your regular face wash/soap  Use your regular shampoo  Apply Chlorhexidine (Hibiclens) directly on your skin or on a wet washcloth and wash gently. When showering: Move away from the shower stream when applying Chlorhexidine (Hibiclens) to avoid rinsing off too soon.  Rinse thoroughly with warm water  Do not dilute Chlorhexidine (Hibiclens)   Dry off as usual, do not use any deodorant, powder, body lotions, perfume, after shave or cologne.     If the patient has fever, cough, or signs/symptoms of Flu or Covid please do not come in for your surgery.   Contact your surgeon and your primary care physician for further instructions.     If applicable, please bring your blood pressure & diabetes medications the day of surgery.

## 2024-10-04 NOTE — ANESTHESIA PREPROCEDURE EVALUATION
10/04/2024  Halley Moreno is a 49 y.o., female.      Pre-op Assessment    I have reviewed the Patient Summary Reports.     I have reviewed the Nursing Notes. I have reviewed the NPO Status.   I have reviewed the Medications.     Review of Systems  Anesthesia Hx:  No problems with previous Anesthesia             Denies Family Hx of Anesthesia complications.   Personal Hx of Anesthesia complications   Difficult Intubation                  Social:  Non-Smoker       Hematology/Oncology:  Hematology Normal   Oncology Normal                                   EENT/Dental:  EENT/Dental Normal  glaucoma          Cardiovascular:  Exercise tolerance: good   Hypertension       CHF        Pulmonary htn                         Pulmonary:        Sleep Apnea                Renal/:  Renal/ Normal                 Musculoskeletal:         Spine Disorders: lumbar Degenerative disease, Disc disease and Chronic Pain           Neurological:  Neurology Normal                                      Endocrine:  Diabetes, poorly controlled   BMI 60      Morbid Obesity / BMI > 40  Dermatological:  Skin Normal    Psych:    depression                Physical Exam  General: Well nourished, Cooperative, Oriented and Alert    Airway:  Mallampati: II / II  Mouth Opening: Normal  TM Distance: Normal  Neck ROM: Normal ROM    Dental:  Intact        Anesthesia Plan  Type of Anesthesia, risks & benefits discussed:    Anesthesia Type: Gen ETT  Intra-op Monitoring Plan: Standard ASA Monitors  Post Op Pain Control Plan: multimodal analgesia  Induction:  IV  Airway Plan: Video and Direct  Informed Consent: Informed consent signed with the Patient and all parties understand the risks and agree with anesthesia plan.  All questions answered.   ASA Score: 3  Day of Surgery Review of History & Physical: H&P Update referred to the  surgeon/provider.  Anesthesia Plan Notes: Difficult ETT long ago but has had several procedures here without apparent probs except for mask airway aspect.    On Ozempic    Ready For Surgery From Anesthesia Perspective.     .

## 2024-10-09 ENCOUNTER — TELEPHONE (OUTPATIENT)
Dept: UROLOGY | Facility: CLINIC | Age: 49
End: 2024-10-09
Payer: MEDICAID

## 2024-10-10 ENCOUNTER — HOSPITAL ENCOUNTER (OUTPATIENT)
Facility: OTHER | Age: 49
Discharge: HOME | End: 2024-10-10
Attending: UROLOGY | Admitting: UROLOGY
Payer: MEDICAID

## 2024-10-10 ENCOUNTER — ANESTHESIA (OUTPATIENT)
Dept: SURGERY | Facility: OTHER | Age: 49
End: 2024-10-10
Payer: MEDICAID

## 2024-10-10 ENCOUNTER — TELEPHONE (OUTPATIENT)
Dept: UROLOGY | Facility: CLINIC | Age: 49
End: 2024-10-10
Payer: MEDICAID

## 2024-10-10 DIAGNOSIS — N13.2 URETERAL STONE WITH HYDRONEPHROSIS: ICD-10-CM

## 2024-10-10 DIAGNOSIS — N20.0 NEPHROLITHIASIS: Primary | ICD-10-CM

## 2024-10-10 LAB
B-HCG UR QL: NEGATIVE
CTP QC/QA: YES
POCT GLUCOSE: 106 MG/DL (ref 70–110)
POCT GLUCOSE: 128 MG/DL (ref 70–110)

## 2024-10-10 PROCEDURE — 71000016 HC POSTOP RECOV ADDL HR: Performed by: UROLOGY

## 2024-10-10 PROCEDURE — 82962 GLUCOSE BLOOD TEST: CPT | Performed by: UROLOGY

## 2024-10-10 PROCEDURE — C2617 STENT, NON-COR, TEM W/O DEL: HCPCS | Performed by: UROLOGY

## 2024-10-10 PROCEDURE — 25500020 PHARM REV CODE 255: Performed by: UROLOGY

## 2024-10-10 PROCEDURE — 52356 CYSTO/URETERO W/LITHOTRIPSY: CPT | Mod: RT,,, | Performed by: UROLOGY

## 2024-10-10 PROCEDURE — 63600175 PHARM REV CODE 636 W HCPCS: Performed by: ANESTHESIOLOGY

## 2024-10-10 PROCEDURE — C1769 GUIDE WIRE: HCPCS | Performed by: UROLOGY

## 2024-10-10 PROCEDURE — C1758 CATHETER, URETERAL: HCPCS | Performed by: UROLOGY

## 2024-10-10 PROCEDURE — 63600175 PHARM REV CODE 636 W HCPCS: Performed by: STUDENT IN AN ORGANIZED HEALTH CARE EDUCATION/TRAINING PROGRAM

## 2024-10-10 PROCEDURE — 27201423 OPTIME MED/SURG SUP & DEVICES STERILE SUPPLY: Performed by: UROLOGY

## 2024-10-10 PROCEDURE — 36000707: Performed by: UROLOGY

## 2024-10-10 PROCEDURE — 81025 URINE PREGNANCY TEST: CPT | Performed by: ANESTHESIOLOGY

## 2024-10-10 PROCEDURE — 63600175 PHARM REV CODE 636 W HCPCS: Performed by: NURSE PRACTITIONER

## 2024-10-10 PROCEDURE — 71000033 HC RECOVERY, INTIAL HOUR: Performed by: UROLOGY

## 2024-10-10 PROCEDURE — 37000008 HC ANESTHESIA 1ST 15 MINUTES: Performed by: UROLOGY

## 2024-10-10 PROCEDURE — 25000003 PHARM REV CODE 250: Performed by: STUDENT IN AN ORGANIZED HEALTH CARE EDUCATION/TRAINING PROGRAM

## 2024-10-10 PROCEDURE — C1894 INTRO/SHEATH, NON-LASER: HCPCS | Performed by: UROLOGY

## 2024-10-10 PROCEDURE — 36000706: Performed by: UROLOGY

## 2024-10-10 PROCEDURE — 71000015 HC POSTOP RECOV 1ST HR: Performed by: UROLOGY

## 2024-10-10 PROCEDURE — 37000009 HC ANESTHESIA EA ADD 15 MINS: Performed by: UROLOGY

## 2024-10-10 DEVICE — STENT URETERAL UNIV 6FR 24CM: Type: IMPLANTABLE DEVICE | Site: URETER | Status: FUNCTIONAL

## 2024-10-10 RX ORDER — HYDROMORPHONE HYDROCHLORIDE 2 MG/ML
0.4 INJECTION, SOLUTION INTRAMUSCULAR; INTRAVENOUS; SUBCUTANEOUS EVERY 5 MIN PRN
Status: DISCONTINUED | OUTPATIENT
Start: 2024-10-10 | End: 2024-10-10 | Stop reason: HOSPADM

## 2024-10-10 RX ORDER — DEXAMETHASONE SODIUM PHOSPHATE 4 MG/ML
INJECTION, SOLUTION INTRA-ARTICULAR; INTRALESIONAL; INTRAMUSCULAR; INTRAVENOUS; SOFT TISSUE
Status: DISCONTINUED | OUTPATIENT
Start: 2024-10-10 | End: 2024-10-10

## 2024-10-10 RX ORDER — FLUCONAZOLE 2 MG/ML
200 INJECTION, SOLUTION INTRAVENOUS ONCE
Status: DISCONTINUED | OUTPATIENT
Start: 2024-10-10 | End: 2024-10-10 | Stop reason: HOSPADM

## 2024-10-10 RX ORDER — OXYCODONE HYDROCHLORIDE 5 MG/1
5 TABLET ORAL
Status: DISCONTINUED | OUTPATIENT
Start: 2024-10-10 | End: 2024-10-10 | Stop reason: HOSPADM

## 2024-10-10 RX ORDER — SODIUM CHLORIDE 0.9 % (FLUSH) 0.9 %
3 SYRINGE (ML) INJECTION
Status: DISCONTINUED | OUTPATIENT
Start: 2024-10-10 | End: 2024-10-10 | Stop reason: HOSPADM

## 2024-10-10 RX ORDER — LIDOCAINE HYDROCHLORIDE 20 MG/ML
INJECTION INTRAVENOUS
Status: DISCONTINUED | OUTPATIENT
Start: 2024-10-10 | End: 2024-10-10

## 2024-10-10 RX ORDER — SODIUM CHLORIDE, SODIUM LACTATE, POTASSIUM CHLORIDE, CALCIUM CHLORIDE 600; 310; 30; 20 MG/100ML; MG/100ML; MG/100ML; MG/100ML
INJECTION, SOLUTION INTRAVENOUS CONTINUOUS PRN
Status: DISCONTINUED | OUTPATIENT
Start: 2024-10-10 | End: 2024-10-10

## 2024-10-10 RX ORDER — GLUCAGON 1 MG
1 KIT INJECTION
Status: DISCONTINUED | OUTPATIENT
Start: 2024-10-10 | End: 2024-10-10 | Stop reason: HOSPADM

## 2024-10-10 RX ORDER — PHENAZOPYRIDINE HYDROCHLORIDE 200 MG/1
200 TABLET, FILM COATED ORAL 3 TIMES DAILY PRN
Qty: 60 TABLET | Refills: 0 | Status: SHIPPED | OUTPATIENT
Start: 2024-10-10 | End: 2024-10-30

## 2024-10-10 RX ORDER — PHENYLEPHRINE HYDROCHLORIDE 10 MG/ML
INJECTION INTRAVENOUS
Status: DISCONTINUED | OUTPATIENT
Start: 2024-10-10 | End: 2024-10-10

## 2024-10-10 RX ORDER — EPHEDRINE SULFATE 50 MG/ML
INJECTION, SOLUTION INTRAVENOUS
Status: DISCONTINUED | OUTPATIENT
Start: 2024-10-10 | End: 2024-10-10

## 2024-10-10 RX ORDER — MEPERIDINE HYDROCHLORIDE 25 MG/ML
12.5 INJECTION INTRAMUSCULAR; INTRAVENOUS; SUBCUTANEOUS ONCE AS NEEDED
Status: DISCONTINUED | OUTPATIENT
Start: 2024-10-10 | End: 2024-10-10 | Stop reason: HOSPADM

## 2024-10-10 RX ORDER — LIDOCAINE HYDROCHLORIDE 10 MG/ML
0.5 INJECTION, SOLUTION EPIDURAL; INFILTRATION; INTRACAUDAL; PERINEURAL ONCE
Status: DISCONTINUED | OUTPATIENT
Start: 2024-10-10 | End: 2024-10-10 | Stop reason: HOSPADM

## 2024-10-10 RX ORDER — CEPHALEXIN 500 MG/1
500 CAPSULE ORAL EVERY 12 HOURS
Qty: 6 CAPSULE | Refills: 0 | Status: SHIPPED | OUTPATIENT
Start: 2024-10-10 | End: 2024-10-13

## 2024-10-10 RX ORDER — OXYBUTYNIN CHLORIDE 5 MG/1
5 TABLET ORAL 3 TIMES DAILY PRN
Qty: 60 TABLET | Refills: 0 | Status: SHIPPED | OUTPATIENT
Start: 2024-10-10 | End: 2024-10-30

## 2024-10-10 RX ORDER — SUCCINYLCHOLINE CHLORIDE 20 MG/ML
INJECTION INTRAMUSCULAR; INTRAVENOUS
Status: DISCONTINUED | OUTPATIENT
Start: 2024-10-10 | End: 2024-10-10

## 2024-10-10 RX ORDER — PROCHLORPERAZINE EDISYLATE 5 MG/ML
5 INJECTION INTRAMUSCULAR; INTRAVENOUS EVERY 30 MIN PRN
Status: DISCONTINUED | OUTPATIENT
Start: 2024-10-10 | End: 2024-10-10 | Stop reason: HOSPADM

## 2024-10-10 RX ORDER — FLUCONAZOLE 2 MG/ML
INJECTION, SOLUTION INTRAVENOUS
Status: DISCONTINUED | OUTPATIENT
Start: 2024-10-10 | End: 2024-10-10

## 2024-10-10 RX ORDER — ONDANSETRON HYDROCHLORIDE 2 MG/ML
INJECTION, SOLUTION INTRAVENOUS
Status: DISCONTINUED | OUTPATIENT
Start: 2024-10-10 | End: 2024-10-10

## 2024-10-10 RX ORDER — TAMSULOSIN HYDROCHLORIDE 0.4 MG/1
0.4 CAPSULE ORAL DAILY
Qty: 20 CAPSULE | Refills: 0 | Status: SHIPPED | OUTPATIENT
Start: 2024-10-10 | End: 2024-10-30

## 2024-10-10 RX ORDER — CIPROFLOXACIN 2 MG/ML
400 INJECTION, SOLUTION INTRAVENOUS
Status: COMPLETED | OUTPATIENT
Start: 2024-10-10 | End: 2024-10-10

## 2024-10-10 RX ORDER — PROPOFOL 10 MG/ML
VIAL (ML) INTRAVENOUS
Status: DISCONTINUED | OUTPATIENT
Start: 2024-10-10 | End: 2024-10-10

## 2024-10-10 RX ORDER — ROCURONIUM BROMIDE 10 MG/ML
INJECTION, SOLUTION INTRAVENOUS
Status: DISCONTINUED | OUTPATIENT
Start: 2024-10-10 | End: 2024-10-10

## 2024-10-10 RX ORDER — FENTANYL CITRATE 50 UG/ML
INJECTION, SOLUTION INTRAMUSCULAR; INTRAVENOUS
Status: DISCONTINUED | OUTPATIENT
Start: 2024-10-10 | End: 2024-10-10

## 2024-10-10 RX ORDER — SODIUM CHLORIDE, SODIUM LACTATE, POTASSIUM CHLORIDE, CALCIUM CHLORIDE 600; 310; 30; 20 MG/100ML; MG/100ML; MG/100ML; MG/100ML
INJECTION, SOLUTION INTRAVENOUS CONTINUOUS
Status: DISCONTINUED | OUTPATIENT
Start: 2024-10-10 | End: 2024-10-10 | Stop reason: HOSPADM

## 2024-10-10 RX ORDER — SODIUM CHLORIDE 9 MG/ML
INJECTION, SOLUTION INTRAVENOUS CONTINUOUS
Status: DISCONTINUED | OUTPATIENT
Start: 2024-10-10 | End: 2024-10-10 | Stop reason: HOSPADM

## 2024-10-10 RX ADMIN — GLYCOPYRROLATE 0.2 MG: 0.2 INJECTION, SOLUTION INTRAMUSCULAR; INTRAVITREAL at 07:10

## 2024-10-10 RX ADMIN — PHENYLEPHRINE HYDROCHLORIDE 100 MCG: 10 INJECTION INTRAVENOUS at 07:10

## 2024-10-10 RX ADMIN — LIDOCAINE HYDROCHLORIDE 100 MG: 20 INJECTION, SOLUTION INTRAVENOUS at 07:10

## 2024-10-10 RX ADMIN — SODIUM CHLORIDE, SODIUM LACTATE, POTASSIUM CHLORIDE, AND CALCIUM CHLORIDE: 600; 310; 30; 20 INJECTION, SOLUTION INTRAVENOUS at 06:10

## 2024-10-10 RX ADMIN — ROCURONIUM BROMIDE 10 MG: 10 SOLUTION INTRAVENOUS at 08:10

## 2024-10-10 RX ADMIN — SUGAMMADEX 400 MG: 100 INJECTION, SOLUTION INTRAVENOUS at 09:10

## 2024-10-10 RX ADMIN — DEXAMETHASONE SODIUM PHOSPHATE 4 MG: 4 INJECTION, SOLUTION INTRAMUSCULAR; INTRAVENOUS at 07:10

## 2024-10-10 RX ADMIN — ROCURONIUM BROMIDE 10 MG: 10 SOLUTION INTRAVENOUS at 07:10

## 2024-10-10 RX ADMIN — PROPOFOL 160 MG: 10 INJECTION, EMULSION INTRAVENOUS at 07:10

## 2024-10-10 RX ADMIN — ONDANSETRON HYDROCHLORIDE 4 MG: 2 INJECTION INTRAMUSCULAR; INTRAVENOUS at 07:10

## 2024-10-10 RX ADMIN — CIPROFLOXACIN 400 MG: 2 INJECTION, SOLUTION INTRAVENOUS at 07:10

## 2024-10-10 RX ADMIN — FENTANYL CITRATE 75 MCG: 50 INJECTION, SOLUTION INTRAMUSCULAR; INTRAVENOUS at 07:10

## 2024-10-10 RX ADMIN — FLUCONAZOLE 200 MG: 2 INJECTION, SOLUTION INTRAVENOUS at 08:10

## 2024-10-10 RX ADMIN — EPHEDRINE SULFATE 10 MG: 50 INJECTION INTRAVENOUS at 07:10

## 2024-10-10 RX ADMIN — SUCCINYLCHOLINE CHLORIDE 160 MG: 20 INJECTION, SOLUTION INTRAMUSCULAR; INTRAVENOUS at 07:10

## 2024-10-10 RX ADMIN — ROCURONIUM BROMIDE 50 MG: 10 SOLUTION INTRAVENOUS at 07:10

## 2024-10-10 RX ADMIN — FENTANYL CITRATE 25 MCG: 50 INJECTION, SOLUTION INTRAMUSCULAR; INTRAVENOUS at 06:10

## 2024-10-10 RX ADMIN — PHENYLEPHRINE HYDROCHLORIDE 200 MCG: 10 INJECTION INTRAVENOUS at 07:10

## 2024-10-10 NOTE — INTERVAL H&P NOTE
The patient has been examined and the H&P has been reviewed:    I concur with the findings and no changes have occurred since H&P was written.    Surgery risks, benefits and alternative options discussed and understood by patient/family.    Right side verified and marked  Consents done  UA neg  Urine cs mult org; contaminated      There are no hospital problems to display for this patient.

## 2024-10-10 NOTE — TELEPHONE ENCOUNTER
----- Message from Ryder Rangel sent at 10/10/2024  9:24 AM CDT -----  Regarding: follow up  Hi,    Could you please schedule this patient for follow up in two weeks with Dr. Dumont for cysto with stent removal. Thank you so much!    Thanks,  Beau

## 2024-10-10 NOTE — TRANSFER OF CARE
"Anesthesia Transfer of Care Note    Patient: Halley Moreno    Procedure(s) Performed: Procedure(s) (LRB):  CYSTOURETEROSCOPY, WITH HOLMIUM LASER LITHOTRIPSY OF URETERAL CALCULUS AND STENT INSERTION (Right)  PYELOGRAM, RETROGRADE (Right)    Patient location: PACU    Anesthesia Type: general    Transport from OR: Transported from OR on 6-10 L/min O2 by face mask with adequate spontaneous ventilation    Post pain: adequate analgesia    Post assessment: tolerated procedure well and no apparent anesthetic complications    Post vital signs: stable    Level of consciousness: awake, alert and oriented    Nausea/Vomiting: no nausea/vomiting    Complications: none    Transfer of care protocol was followed      Last vitals: Visit Vitals  /65 (BP Location: Left arm, Patient Position: Lying)   Pulse 65   Temp 36.9 °C (98.4 °F) (Oral)   Resp 16   Ht 5' 3" (1.6 m)   Wt (!) 154.2 kg (340 lb)   LMP 10/04/2024   SpO2 97%   Breastfeeding No   BMI 60.23 kg/m²     "

## 2024-10-10 NOTE — DISCHARGE INSTRUCTIONS
Post Cystoscopy Instructions  Do not strain to have a bowel movement  No strenuous exercise x 7 days  No driving while you are on narcotic pain medications or if your alamo  catheter is in place    You can expect:  To pass stone fragments if you had a stone procedure  Have pain when you void from your stent if you have a stent in place  See blood in your urine if you have a stent in place    If you have a catheter, please return to the ER if your catheter stops draining or you are having abdominal pain.    Call the doctor if:  Temperature is greater than 101F  Persistent vomiting and inability to keep food down  Inability to void if you do not have a catheter

## 2024-10-10 NOTE — ANESTHESIA PROCEDURE NOTES
Intubation    Date/Time: 10/10/2024 7:02 AM    Performed by: Lesly Matias CRNA  Authorized by: Venkata Krishna MD    Intubation:     Induction:  Rapid sequence induction    Intubated:  Postinduction    Mask Ventilation:  Not attempted    Attempts:  1    Attempted By:  CRNA    Method of Intubation:  Video laryngoscopy    Blade:  Gaxiola 3    Laryngeal View Grade: Grade I - full view of cords      Difficult Airway Encountered?: No      Complications:  None    Airway Device:  Oral endotracheal tube    Airway Device Size:  7.5    Style/Cuff Inflation:  Cuffed (inflated to minimal occlusive pressure)    Tube secured:  23    Secured at:  The lips    Placement Verified By:  Capnometry    Complicating Factors:  Small mouth, obesity, short neck, oropharyngeal edema or fat and retrognathia    Findings Post-Intubation:  BS equal bilateral and atraumatic/condition of teeth unchanged

## 2024-10-10 NOTE — ANESTHESIA POSTPROCEDURE EVALUATION
Anesthesia Post Evaluation    Patient: Halley Moreno    Procedure(s) Performed: Procedure(s) (LRB):  CYSTOURETEROSCOPY, WITH HOLMIUM LASER LITHOTRIPSY OF URETERAL CALCULUS AND STENT INSERTION (Right)  PYELOGRAM, RETROGRADE (Right)    Final Anesthesia Type: general      Patient location during evaluation: PACU  Patient participation: Yes- Able to Participate  Level of consciousness: awake and alert  Post-procedure vital signs: reviewed and stable  Pain management: adequate  Airway patency: patent    PONV status at discharge: No PONV  Anesthetic complications: no      Cardiovascular status: blood pressure returned to baseline  Respiratory status: unassisted  Hydration status: euvolemic  Follow-up not needed.              Vitals Value Taken Time   /62 10/10/24 1002   Temp 36.3 °C (97.3 °F) 10/10/24 0959   Pulse 60 10/10/24 1002   Resp 18 10/10/24 1000   SpO2 97 % 10/10/24 1002   Vitals shown include unfiled device data.      Event Time   Out of Recovery 10:06:21         Pain/Alia Score: Alia Score: 10 (10/10/2024 10:15 AM)

## 2024-10-10 NOTE — BRIEF OP NOTE
[facility] - Surgery (Media)  Brief Operative Note    Surgery Date: 10/10/2024     Surgeons and Role:     * Anurag Dumont MD - Primary    Assisting Surgeon: None    Pre-op Diagnosis:  Nephrolithiasis [N20.0]  Ureteral stone with hydronephrosis [N13.2]    Post-op Diagnosis:  Post-Op Diagnosis Codes:     * Nephrolithiasis [N20.0]     * Ureteral stone with hydronephrosis [N13.2]    Procedure(s) (LRB):  CYSTOURETEROSCOPY, WITH HOLMIUM LASER LITHOTRIPSY OF URETERAL CALCULUS AND STENT INSERTION (Right)  PYELOGRAM, RETROGRADE (Right)    Anesthesia: General    Operative Findings: Procedures as stated above successfully performed. No complications.       Estimated Blood Loss: min         Specimens:   Specimen (24h ago, onward)      None              Discharge Note    OUTCOME: Patient tolerated treatment/procedure well without complication and is now ready for discharge.    DISPOSITION: Home or Self Care    FINAL DIAGNOSIS:  <principal problem not specified>    FOLLOWUP: In clinic in 1-2 weeks for stent removal with Dr. Dumont.    DISCHARGE INSTRUCTIONS:  patient can be discharged home.

## 2024-10-10 NOTE — OP NOTE
[facility]  Operative Note    Date: 10/10/2024    Pre-Op Diagnosis: right kidney stone    Patient Active Problem List    Diagnosis Date Noted    Ectopic atrial tachycardia 04/10/2024    Dyslipidemia associated with type 2 diabetes mellitus 01/31/2024    S/P Hscope, D&C, IUD placement  07/17/2023    Complex endometrial hyperplasia with atypia 09/13/2022    Abdominal pannus 09/13/2022    Glaucoma 09/13/2022    Chest pain of uncertain etiology 09/07/2022    BMI 60.0-69.9, adult 03/16/2022    Impaired functional mobility, balance, gait, and endurance 02/02/2021    Pulmonary hypertension 01/26/2021    Hypertension associated with diabetes 11/10/2020    Sleep apnea 11/10/2020    Depressive disorder 11/10/2020    Rosacea 11/10/2020    Unspecified thoracic, thoracolumbar and lumbosacral intervertebral disc disorder 11/10/2020    Essential hypertension 11/10/2020    Congestive heart failure 11/09/2020    Hypokalemia 10/29/2020    Constipation 10/25/2020    Morbid (severe) obesity with alveolar hypoventilation 10/23/2020    Right heart failure due to pulmonary hypertension 10/22/2020    Diabetes mellitus type 2 without retinopathy 10/14/2019    Chronic pain of both knees 08/16/2019    Vitamin D deficiency 08/13/2019    Disorder of metabolism 02/19/2018    Morbid obesity with BMI of 60.0-69.9, adult     Morbid obesity        Post-Op Diagnosis: same    Procedure(s) Performed:   1. Right ureteroscopy  2. Cystoscopy  3. Right pyeloscopy with laser lithotripsy  4. Right retrograde pyelogram  5. Right ureteral stent placement  6. Fluoro < 1 hr    Specimen(s): none    Staff Surgeon: Anurag Dumont MD    Assistant Surgeon: Ryder Rangel MD    Anesthesia: General endotracheal anesthesia    Indications: Halley Moreno is a 49 y.o. female with a right renal stone presenting for definitive stone management. She is not pre-stented.    Findings:  1. Stones not visible on  film.  2. Significant stone burden  encountered in right kidney. Large stone in lower pole and multiple large stones in middle pole. Dusted with laser.    Estimated Blood Loss: min    Drains:   1. Right 6 Fr x 24 cm JJ ureteral stent without strings    Procedure in detail:  After risks, benefits, and possible complications were explained, the patient elected to undergo the procedure and informed consent was obtained. All questions were answered in the jason-operative area. The patient was transferred to the cystoscopy suite and placed in the supine position. SCDs were applied and working. Anesthesia was administered. The patient was then placed in the dorsal lithotomy position and prepped and draped in the usual sterile fashion. Time out was performed, and jason-procedural antibiotics were confirmed.     The stone was not on  film. A rigid cystoscope in a 22 Fr sheath was introduced into the patient's urethra. This passed easily. The entire urethra was visualized which showed no strictures or masses. Cystoscopy revealed the ureteral orifices in the normal anatomic location bilaterally.    A motion wire was passed up the right ureteral orifice and up into the kidney. This passed easily and placement was confirmed fluoroscopically. The cystoscope was removed keeping the wire in place.    An 8 Fr rigid ureteroscope was passed into the patient's bladder alongside the wire under direct vision. It was then passed through the right ureteral orifice alongside the wire. The entire ureter was systematically surveyed and no stones or abnormalities were encountered.A second motion wire was inserted through the ureteroscope and into the kidney with fluoroscopic confirmation. The ureteroscope was removed keeping both wires in place.    A 10/12 Fr 35 cm ureteral access sheath was advanced over the motion wire. However, we met significant resistance at the mid ureter. Hence decision was made to not use the ureteral access sheath and to pass the flexible  ureteroscope over a wire instead. An 8 Fr flexible ureteroscope was advanced over the motion wire to the level of the renal pelvis under continuous fluoroscopy. This passed easily. The motion wire was removed.    Stones were encountered in middle and lower pole. The laser fiber was inserted per the scope and the stones were dusted.  Systematic pyeloscopy was performed and all remaining stone fragments were deemed small enough to pass spontaneously, <1 mm. The scope was removed keeping the motion wire in place. The entire ureter was surveyed upon exiting and the ureter appeared healthy with no abnormalities.     The cystoscope was reinserted and the bladder was irrigated to remove the stone fragments. The bladder was drained and the cystoscope removed keeping the wire in place.    The cystoscope was backloaded over the wire and advanced into the bladder. We then passed a 6 Fr x 24 cm JJ ureteral stent without strings over the wire to the level of the renal pelvis under direct vision as well as flouroscopy. The wire was removed. A 180 degree coil was observed in the renal pelvis as well as the bladder using fluoroscopy. A 180 degree coil was also seen using direct visualization in the bladder.    The patient tolerated the procedure well and was transferred to the recovery room in stable condition.    Disposition:  The patient will be discharged home from PACU. She will follow up in two weeks for cystoscopy with stent removal with Dr. Dumont.     Ryder Rangel MD

## 2024-10-11 ENCOUNTER — TELEPHONE (OUTPATIENT)
Dept: UROLOGY | Facility: CLINIC | Age: 49
End: 2024-10-11
Payer: MEDICAID

## 2024-10-11 VITALS
HEIGHT: 63 IN | OXYGEN SATURATION: 97 % | HEART RATE: 60 BPM | BODY MASS INDEX: 51.91 KG/M2 | TEMPERATURE: 97 F | SYSTOLIC BLOOD PRESSURE: 124 MMHG | RESPIRATION RATE: 18 BRPM | WEIGHT: 293 LBS | DIASTOLIC BLOOD PRESSURE: 60 MMHG

## 2024-10-16 ENCOUNTER — PATIENT MESSAGE (OUTPATIENT)
Dept: INTERNAL MEDICINE | Facility: CLINIC | Age: 49
End: 2024-10-16
Payer: MEDICAID

## 2024-10-16 ENCOUNTER — PROCEDURE VISIT (OUTPATIENT)
Dept: UROLOGY | Facility: CLINIC | Age: 49
End: 2024-10-16
Payer: MEDICAID

## 2024-10-16 VITALS
HEART RATE: 74 BPM | BODY MASS INDEX: 51.91 KG/M2 | RESPIRATION RATE: 18 BRPM | DIASTOLIC BLOOD PRESSURE: 74 MMHG | WEIGHT: 293 LBS | HEIGHT: 63 IN | SYSTOLIC BLOOD PRESSURE: 111 MMHG

## 2024-10-16 DIAGNOSIS — Z12.31 ENCOUNTER FOR SCREENING MAMMOGRAM FOR MALIGNANT NEOPLASM OF BREAST: Primary | ICD-10-CM

## 2024-10-16 DIAGNOSIS — N20.0 NEPHROLITHIASIS: ICD-10-CM

## 2024-10-16 RX ORDER — LIDOCAINE HYDROCHLORIDE 20 MG/ML
JELLY TOPICAL
Status: COMPLETED | OUTPATIENT
Start: 2024-10-16 | End: 2024-10-16

## 2024-10-16 RX ORDER — CIPROFLOXACIN 500 MG/1
500 TABLET ORAL
Status: COMPLETED | OUTPATIENT
Start: 2024-10-16 | End: 2024-10-16

## 2024-10-16 RX ADMIN — CIPROFLOXACIN 500 MG: 500 TABLET ORAL at 03:10

## 2024-10-16 RX ADMIN — LIDOCAINE HYDROCHLORIDE: 20 JELLY TOPICAL at 03:10

## 2024-10-16 NOTE — PROCEDURES
Cystoscopy w/ stent removal    Date/Time: 10/16/2024 3:46 PM    Performed by: Anurag Dumont MD  Authorized by: Ryder Rangel MD  Preparation: Patient was prepped and draped in the usual sterile fashion.  Local anesthesia used: yes    Anesthesia:  Local anesthesia used: yes    Sedation:  Patient sedated: no    Patient tolerance: patient tolerated the procedure well with no immediate complications  Comments: Stent removed without difficult    Sp ureteroscopy  UTI    RTC 2 months with renal US to see NP  Complete keflex Rx  Cipro*1 given prior to procedure

## 2024-10-17 ENCOUNTER — HOSPITAL ENCOUNTER (OUTPATIENT)
Dept: RADIOLOGY | Facility: HOSPITAL | Age: 49
Discharge: HOME | End: 2024-10-17
Attending: INTERNAL MEDICINE
Payer: MEDICAID

## 2024-10-17 VITALS — BODY MASS INDEX: 60.41 KG/M2 | WEIGHT: 293 LBS

## 2024-10-17 DIAGNOSIS — Z12.31 ENCOUNTER FOR SCREENING MAMMOGRAM FOR MALIGNANT NEOPLASM OF BREAST: ICD-10-CM

## 2024-10-17 PROCEDURE — 77067 SCR MAMMO BI INCL CAD: CPT | Mod: TC

## 2024-11-10 ENCOUNTER — PATIENT MESSAGE (OUTPATIENT)
Dept: INTERNAL MEDICINE | Facility: CLINIC | Age: 49
End: 2024-11-10
Payer: MEDICAID

## 2024-11-11 NOTE — TELEPHONE ENCOUNTER
Unable to retrieve patient chart and identify care due.  [facility] Embedded Care Due Messages. Reference number: [id number].   11/11/2024 8:54:09 AM CST

## 2024-11-12 RX ORDER — SEMAGLUTIDE 2.68 MG/ML
2 INJECTION, SOLUTION SUBCUTANEOUS
Qty: 3 ML | Refills: 5 | Status: SHIPPED | OUTPATIENT
Start: 2024-11-12

## 2024-11-13 DIAGNOSIS — E11.9 TYPE 2 DIABETES MELLITUS WITHOUT COMPLICATION, WITHOUT LONG-TERM CURRENT USE OF INSULIN: Primary | ICD-10-CM

## 2024-11-13 NOTE — TELEPHONE ENCOUNTER
Care Due:                  Date            Visit Type   Department     Provider  --------------------------------------------------------------------------------                                EP -                              PRIMARY      MET INTERNAL  Last Visit: 08-      CARE ([facility])   BESSIE Tejeda                              EP -                              PRIMARY      [facility] INTERNAL  Next Visit: 11-      CARE ([facility])   MEDICINE       Yamila Tejeda                                                            Last  Test          Frequency    Reason                     Performed    Due Date  --------------------------------------------------------------------------------    HBA1C.......  6 months...  glipiZIDE, semaglutide...  02- 08-    Lipid Panel.  12 months..  atorvastatin.............  10-   09-    [facility] Embedded Care Due Messages. Reference number: [id number].   11/13/2024 2:28:20 PM CST

## 2024-11-18 ENCOUNTER — TELEPHONE (OUTPATIENT)
Dept: CARDIOLOGY | Facility: CLINIC | Age: 49
End: 2024-11-18
Payer: MEDICAID

## 2024-11-18 NOTE — TELEPHONE ENCOUNTER
----- Message from Deysi sent at 11/18/2024  3:40 PM CST -----  Contact: pt @ [phone]  Halley Moreno calling regarding Appointment Access  (message) for #pt is calling to speak with nurse concerning paper work she needs to get fill out, asking for call back

## 2024-11-18 NOTE — TELEPHONE ENCOUNTER
LOV 03/07/2024 Rose    Spoke with patient, scheduled for gastric bypass for December 18.  She saw Dr. Bijan Stacy today for palpitations follow up.  He will not fill out clearance letter.  Patient last seen by Dr. Rose in March 2024 and not established with new provider.  Informed that our office's next available appointment in February.  Offered to check the [facility], but next available is January.  Patient declined appointments.  She will try to get in at [place].

## 2024-11-19 RX ORDER — SEMAGLUTIDE 2.68 MG/ML
2 INJECTION, SOLUTION SUBCUTANEOUS
Qty: 3 ML | Refills: 2 | Status: SHIPPED | OUTPATIENT
Start: 2024-11-19

## 2024-11-21 ENCOUNTER — PATIENT MESSAGE (OUTPATIENT)
Dept: ADMINISTRATIVE | Facility: OTHER | Age: 49
End: 2024-11-21
Payer: MEDICAID

## 2024-11-22 ENCOUNTER — OFFICE VISIT (OUTPATIENT)
Dept: INTERNAL MEDICINE | Facility: CLINIC | Age: 49
End: 2024-11-22
Payer: MEDICAID

## 2024-11-22 ENCOUNTER — LAB VISIT (OUTPATIENT)
Dept: LAB | Facility: HOSPITAL | Age: 49
End: 2024-11-22
Attending: INTERNAL MEDICINE
Payer: MEDICAID

## 2024-11-22 VITALS
RESPIRATION RATE: 18 BRPM | DIASTOLIC BLOOD PRESSURE: 70 MMHG | SYSTOLIC BLOOD PRESSURE: 118 MMHG | HEART RATE: 68 BPM | TEMPERATURE: 97 F | OXYGEN SATURATION: 98 % | WEIGHT: 293 LBS | HEIGHT: 63 IN | BODY MASS INDEX: 51.91 KG/M2

## 2024-11-22 DIAGNOSIS — G47.33 OSA ON CPAP: ICD-10-CM

## 2024-11-22 DIAGNOSIS — E11.9 TYPE 2 DIABETES MELLITUS WITHOUT COMPLICATION, WITHOUT LONG-TERM CURRENT USE OF INSULIN: ICD-10-CM

## 2024-11-22 DIAGNOSIS — I15.2 HYPERTENSION ASSOCIATED WITH DIABETES: ICD-10-CM

## 2024-11-22 DIAGNOSIS — E11.69 DYSLIPIDEMIA ASSOCIATED WITH TYPE 2 DIABETES MELLITUS: ICD-10-CM

## 2024-11-22 DIAGNOSIS — I47.19 ATRIAL TACHYCARDIA: ICD-10-CM

## 2024-11-22 DIAGNOSIS — Z01.818 PRE-OP EVALUATION: ICD-10-CM

## 2024-11-22 DIAGNOSIS — E11.59 HYPERTENSION ASSOCIATED WITH DIABETES: ICD-10-CM

## 2024-11-22 DIAGNOSIS — E78.5 DYSLIPIDEMIA ASSOCIATED WITH TYPE 2 DIABETES MELLITUS: ICD-10-CM

## 2024-11-22 DIAGNOSIS — R60.0 LEG EDEMA: ICD-10-CM

## 2024-11-22 DIAGNOSIS — Z01.818 PRE-OP EVALUATION: Primary | ICD-10-CM

## 2024-11-22 LAB
ALBUMIN SERPL BCP-MCNC: 3.8 G/DL (ref 3.5–5.2)
ALP SERPL-CCNC: 88 U/L (ref 40–150)
ALT SERPL W/O P-5'-P-CCNC: 14 U/L (ref 10–44)
ANION GAP SERPL CALC-SCNC: 9 MMOL/L (ref 8–16)
AST SERPL-CCNC: 14 U/L (ref 10–40)
BASOPHILS # BLD AUTO: 0.02 K/UL (ref 0–0.2)
BASOPHILS NFR BLD: 0.2 % (ref 0–1.9)
BILIRUB SERPL-MCNC: 0.5 MG/DL (ref 0.1–1)
BNP SERPL-MCNC: 18 PG/ML (ref 0–99)
BUN SERPL-MCNC: 24 MG/DL (ref 6–20)
CALCIUM SERPL-MCNC: 9.7 MG/DL (ref 8.7–10.5)
CHLORIDE SERPL-SCNC: 107 MMOL/L (ref 95–110)
CO2 SERPL-SCNC: 27 MMOL/L (ref 23–29)
CREAT SERPL-MCNC: 1.2 MG/DL (ref 0.5–1.4)
DIFFERENTIAL METHOD BLD: ABNORMAL
EOSINOPHIL # BLD AUTO: 0.1 K/UL (ref 0–0.5)
EOSINOPHIL NFR BLD: 0.8 % (ref 0–8)
ERYTHROCYTE [DISTWIDTH] IN BLOOD BY AUTOMATED COUNT: 17.2 % (ref 11.5–14.5)
EST. GFR  (NO RACE VARIABLE): 54.8 ML/MIN/1.73 M^2
ESTIMATED AVG GLUCOSE: 111 MG/DL (ref 68–131)
GLUCOSE SERPL-MCNC: 104 MG/DL (ref 70–110)
HBA1C MFR BLD: 5.5 % (ref 4–5.6)
HCT VFR BLD AUTO: 35.5 % (ref 37–48.5)
HCYS SERPL-SCNC: 16.2 UMOL/L (ref 4–15.5)
HGB BLD-MCNC: 11.7 G/DL (ref 12–16)
IMM GRANULOCYTES # BLD AUTO: 0.05 K/UL (ref 0–0.04)
IMM GRANULOCYTES NFR BLD AUTO: 0.5 % (ref 0–0.5)
LYMPHOCYTES # BLD AUTO: 1.8 K/UL (ref 1–4.8)
LYMPHOCYTES NFR BLD: 18.3 % (ref 18–48)
MCH RBC QN AUTO: 28.6 PG (ref 27–31)
MCHC RBC AUTO-ENTMCNC: 33 G/DL (ref 32–36)
MCV RBC AUTO: 87 FL (ref 82–98)
MONOCYTES # BLD AUTO: 0.7 K/UL (ref 0.3–1)
MONOCYTES NFR BLD: 6.7 % (ref 4–15)
NEUTROPHILS # BLD AUTO: 7.2 K/UL (ref 1.8–7.7)
NEUTROPHILS NFR BLD: 73.5 % (ref 38–73)
NRBC BLD-RTO: 0 /100 WBC
PLATELET # BLD AUTO: 233 K/UL (ref 150–450)
PMV BLD AUTO: 10.3 FL (ref 9.2–12.9)
POTASSIUM SERPL-SCNC: 4.4 MMOL/L (ref 3.5–5.1)
PROT SERPL-MCNC: 7.4 G/DL (ref 6–8.4)
RBC # BLD AUTO: 4.09 M/UL (ref 4–5.4)
SODIUM SERPL-SCNC: 143 MMOL/L (ref 136–145)
WBC # BLD AUTO: 9.79 K/UL (ref 3.9–12.7)

## 2024-11-22 PROCEDURE — 85025 COMPLETE CBC W/AUTO DIFF WBC: CPT | Performed by: INTERNAL MEDICINE

## 2024-11-22 PROCEDURE — 83880 ASSAY OF NATRIURETIC PEPTIDE: CPT | Performed by: INTERNAL MEDICINE

## 2024-11-22 PROCEDURE — 3066F NEPHROPATHY DOC TX: CPT | Mod: CPTII,,, | Performed by: INTERNAL MEDICINE

## 2024-11-22 PROCEDURE — 36415 COLL VENOUS BLD VENIPUNCTURE: CPT | Mod: PO | Performed by: INTERNAL MEDICINE

## 2024-11-22 PROCEDURE — 1159F MED LIST DOCD IN RCRD: CPT | Mod: CPTII,,, | Performed by: INTERNAL MEDICINE

## 2024-11-22 PROCEDURE — 3078F DIAST BP <80 MM HG: CPT | Mod: CPTII,,, | Performed by: INTERNAL MEDICINE

## 2024-11-22 PROCEDURE — 83036 HEMOGLOBIN GLYCOSYLATED A1C: CPT | Performed by: INTERNAL MEDICINE

## 2024-11-22 PROCEDURE — 83090 ASSAY OF HOMOCYSTEINE: CPT | Performed by: INTERNAL MEDICINE

## 2024-11-22 PROCEDURE — 3044F HG A1C LEVEL LT 7.0%: CPT | Mod: CPTII,,, | Performed by: INTERNAL MEDICINE

## 2024-11-22 PROCEDURE — 99215 OFFICE O/P EST HI 40 MIN: CPT | Mod: PBBFAC,PO | Performed by: INTERNAL MEDICINE

## 2024-11-22 PROCEDURE — 4010F ACE/ARB THERAPY RXD/TAKEN: CPT | Mod: CPTII,,, | Performed by: INTERNAL MEDICINE

## 2024-11-22 PROCEDURE — 80053 COMPREHEN METABOLIC PANEL: CPT | Performed by: INTERNAL MEDICINE

## 2024-11-22 PROCEDURE — 99999 PR PBB SHADOW E&M-EST. PATIENT-LVL V: CPT | Mod: PBBFAC,,, | Performed by: INTERNAL MEDICINE

## 2024-11-22 PROCEDURE — 3074F SYST BP LT 130 MM HG: CPT | Mod: CPTII,,, | Performed by: INTERNAL MEDICINE

## 2024-11-22 PROCEDURE — 1160F RVW MEDS BY RX/DR IN RCRD: CPT | Mod: CPTII,,, | Performed by: INTERNAL MEDICINE

## 2024-11-22 PROCEDURE — 99214 OFFICE O/P EST MOD 30 MIN: CPT | Mod: S$PBB,,, | Performed by: INTERNAL MEDICINE

## 2024-11-22 PROCEDURE — 3061F NEG MICROALBUMINURIA REV: CPT | Mod: CPTII,,, | Performed by: INTERNAL MEDICINE

## 2024-11-22 PROCEDURE — 3008F BODY MASS INDEX DOCD: CPT | Mod: CPTII,,, | Performed by: INTERNAL MEDICINE

## 2024-11-22 NOTE — PROGRESS NOTES
Subjective:     Halley Moreno is a 49 y.o. female who presents for pre-op assessment.  Chief Complaint   Patient presents with    Pre-op Exam    Obesity    Diabetes    Hypertension       HPI    Surgery: Gastric Sleeve  Surgeon: Dr. Herrera  Date of Surgery: December    Active cardiac issues:  Active decompensated heart failure? No   Unstable angina?  No   Significant uncontrolled arrhythmias? No   Severe valvular heart disease-Aortic or Mitral Stenosis? No   Recent MI or coronary revascularization < 30 days? No     Cardiac Risk Factors  History of CAD/ischemic heart disease? No   History of cerebrovascular disease? No   History of compensated heart failure? No   Type 2 diabetes requiring insulin? No   Serum Creatinine > 2? due   Total cardiac risk factors --     Functional mets 4    Diabetes: Patient presents for follow up of diabetes. Current symptoms include: none. Symptoms have been well-controlled. Patient denies foot ulcerations. Evaluation to date has included: hemoglobin A1C.  Home sugars: BGs consistently in an acceptable range. Current treatment: no recent interventions. Last dilated eye exam: done.    Lab Results   Component Value Date    HGBA1C 5.5 11/22/2024     11/22/2024       Hypertension: The patient has been taking medications as instructed, no medication side effects noted, no chest pain on exertion, no dyspnea on exertion.    BP Readings from Last 3 Encounters:   11/22/24 118/70   10/16/24 111/74   10/10/24 124/60       Review of Systems   Constitutional:  Negative for chills, diaphoresis, fatigue and fever.   HENT:  Negative for congestion, ear pain, postnasal drip, rhinorrhea, sinus pressure and sore throat.    Eyes:  Negative for discharge and redness.   Respiratory:  Negative for cough and shortness of breath.    Cardiovascular:  Positive for leg swelling (chronic, mild). Negative for chest pain and palpitations.   Gastrointestinal:  Negative for abdominal pain, constipation,  diarrhea, nausea and vomiting.   Endocrine: Negative for polydipsia and polyuria.   Genitourinary:  Negative for dysuria, frequency and urgency.   Musculoskeletal:  Negative for arthralgias and myalgias.   Skin:  Positive for color change (hyperpigmented areas bilateral legs). Negative for rash and wound.   Neurological:  Negative for dizziness, tremors, numbness and headaches.   Hematological:  Negative for adenopathy.   Psychiatric/Behavioral:  Negative for dysphoric mood. The patient is not nervous/anxious.           Objective:     Physical Exam  Vitals reviewed.   Constitutional:       General: She is awake. She is not in acute distress.     Appearance: Normal appearance. She is well-developed and well-groomed.   HENT:      Head: Normocephalic and atraumatic.      Right Ear: Hearing, tympanic membrane, ear canal and external ear normal.      Left Ear: Hearing, tympanic membrane, ear canal and external ear normal.      Nose: Nose normal. No congestion or rhinorrhea.      Mouth/Throat:      Mouth: Mucous membranes are moist.   Eyes:      General: Lids are normal. Vision grossly intact. Gaze aligned appropriately.   Cardiovascular:      Rate and Rhythm: Normal rate and regular rhythm.      Heart sounds: Normal heart sounds. No murmur heard.  Pulmonary:      Effort: Pulmonary effort is normal.      Breath sounds: Normal breath sounds. No decreased breath sounds or wheezing.   Abdominal:      General: Bowel sounds are normal. There is no distension.   Musculoskeletal:         General: Normal range of motion.      Cervical back: Normal range of motion. No rigidity. No pain with movement. Normal range of motion.      Right lower leg: Edema present.      Left lower leg: Edema present.   Skin:     General: Skin is warm and dry.      Findings: No lesion or rash.      Comments: No skin breakdown   Neurological:      Mental Status: She is alert and oriented to person, place, and time.   Psychiatric:         Attention and  Perception: Attention normal.         Mood and Affect: Mood normal.         Behavior: Behavior is cooperative.            Assessment:      1. Pre-op evaluation    2. Type 2 diabetes mellitus without complication, without long-term current use of insulin    3. Hypertension associated with diabetes    4. Dyslipidemia associated with type 2 diabetes mellitus    5. Leg edema    6. YARY on CPAP           Plan:     1. Pre-op evaluation  - evaluated by Cardiology, clearance will be provided    -CBC Auto Differential; Future  - HOMOCYSTEINE, SERUM; Future    2. Type 2 diabetes mellitus without complication, without long-term current use of insulin  - controlled, continue current regimen  - CBC Auto Differential; Future  - Hemoglobin A1C; Future    3. Hypertension associated with diabetes  - controlled, continue current regimen  - Comprehensive Metabolic Panel; Future    4. Dyslipidemia associated with type 2 diabetes mellitus  - stable, continue Lipitor    5. Leg edema  - B-TYPE NATRIURETIC PEPTIDE; Future    6. YARY on CPAP  - stable, continue CPAP    RTC in March 2025 for annual exam or sooner if needed    __________________________    Yamila Tejeda MD, PharmD  [facility]- Internal Medicine  American Board of Obesity Medicine diplomate  Office [phone]      ADDENDUM:  She does not have any active cardiac conditions, clinical cardiac risk factors and her exercise tolerance is more than 4 METS.   She will be undergoing a gastric sleeve which carries an intermediate cardiac risk and she is at intermediate risk for the procedure. May proceed as planned.    MD Julian

## 2024-11-27 ENCOUNTER — PATIENT MESSAGE (OUTPATIENT)
Dept: INTERNAL MEDICINE | Facility: CLINIC | Age: 49
End: 2024-11-27
Payer: MEDICAID

## 2024-12-13 ENCOUNTER — HOSPITAL ENCOUNTER (OUTPATIENT)
Dept: RADIOLOGY | Facility: HOSPITAL | Age: 49
Discharge: HOME | End: 2024-12-13
Attending: UROLOGY
Payer: MEDICAID

## 2024-12-13 DIAGNOSIS — N20.0 NEPHROLITHIASIS: ICD-10-CM

## 2024-12-13 PROCEDURE — 76775 US EXAM ABDO BACK WALL LIM: CPT | Mod: TC

## 2024-12-13 PROCEDURE — 76775 US EXAM ABDO BACK WALL LIM: CPT | Mod: 26,,, | Performed by: RADIOLOGY

## 2024-12-23 ENCOUNTER — PATIENT MESSAGE (OUTPATIENT)
Dept: INTERNAL MEDICINE | Facility: CLINIC | Age: 49
End: 2024-12-23
Payer: MEDICAID

## 2025-01-08 ENCOUNTER — HOSPITAL ENCOUNTER (OUTPATIENT)
Dept: RADIOLOGY | Facility: HOSPITAL | Age: 50
Discharge: HOME | End: 2025-01-08
Attending: OBSTETRICS & GYNECOLOGY
Payer: MEDICAID

## 2025-01-08 ENCOUNTER — OFFICE VISIT (OUTPATIENT)
Dept: GYNECOLOGIC ONCOLOGY | Facility: CLINIC | Age: 50
End: 2025-01-08
Payer: MEDICAID

## 2025-01-08 VITALS
BODY MASS INDEX: 56.15 KG/M2 | SYSTOLIC BLOOD PRESSURE: 131 MMHG | TEMPERATURE: 98 F | DIASTOLIC BLOOD PRESSURE: 71 MMHG | OXYGEN SATURATION: 95 % | HEART RATE: 65 BPM | WEIGHT: 293 LBS

## 2025-01-08 DIAGNOSIS — E66.01 MORBID OBESITY WITH BMI OF 60.0-69.9, ADULT: ICD-10-CM

## 2025-01-08 DIAGNOSIS — N85.02 COMPLEX ENDOMETRIAL HYPERPLASIA WITH ATYPIA: Primary | ICD-10-CM

## 2025-01-08 DIAGNOSIS — Z97.5 INTRAUTERINE DEVICE: ICD-10-CM

## 2025-01-08 DIAGNOSIS — N85.02 COMPLEX ENDOMETRIAL HYPERPLASIA WITH ATYPIA: ICD-10-CM

## 2025-01-08 PROCEDURE — 3078F DIAST BP <80 MM HG: CPT | Mod: CPTII,,, | Performed by: OBSTETRICS & GYNECOLOGY

## 2025-01-08 PROCEDURE — 1159F MED LIST DOCD IN RCRD: CPT | Mod: CPTII,,, | Performed by: OBSTETRICS & GYNECOLOGY

## 2025-01-08 PROCEDURE — 72192 CT PELVIS W/O DYE: CPT | Mod: TC

## 2025-01-08 PROCEDURE — 3008F BODY MASS INDEX DOCD: CPT | Mod: CPTII,,, | Performed by: OBSTETRICS & GYNECOLOGY

## 2025-01-08 PROCEDURE — 3072F LOW RISK FOR RETINOPATHY: CPT | Mod: CPTII,,, | Performed by: OBSTETRICS & GYNECOLOGY

## 2025-01-08 PROCEDURE — 99999 PR PBB SHADOW E&M-EST. PATIENT-LVL III: CPT | Mod: PBBFAC,,, | Performed by: OBSTETRICS & GYNECOLOGY

## 2025-01-08 PROCEDURE — 99215 OFFICE O/P EST HI 40 MIN: CPT | Mod: S$PBB,,, | Performed by: OBSTETRICS & GYNECOLOGY

## 2025-01-08 PROCEDURE — 72192 CT PELVIS W/O DYE: CPT | Mod: 26,,, | Performed by: STUDENT IN AN ORGANIZED HEALTH CARE EDUCATION/TRAINING PROGRAM

## 2025-01-08 PROCEDURE — 3075F SYST BP GE 130 - 139MM HG: CPT | Mod: CPTII,,, | Performed by: OBSTETRICS & GYNECOLOGY

## 2025-01-08 PROCEDURE — 99213 OFFICE O/P EST LOW 20 MIN: CPT | Mod: PBBFAC,25 | Performed by: OBSTETRICS & GYNECOLOGY

## 2025-01-08 NOTE — Clinical Note
Hey!  Happy [place]!  She had bariatric surgery and is already losing weight.  I am going to let her plateau and give her about a year and then revisit with her for surgery.  I'll let you know if anything changes!

## 2025-01-08 NOTE — PROGRESS NOTES
REFERRING PROVIDER  Dixie Rod MD     HISTORY OF PRESENT CONDITION  CC: MASSIEL Moreno is a 49 y.o.  who presents in consultation at for an opinion regarding complex hyperplasia with atypia.    +PO. -N/V. +Flatus. +BM. -VD. AUB characterized by intermenstrual bleeding.  -Changes in stool or urine. -Abdominal or pelvic pain. -Unintentional weight loss. -Unintentional weight gain.  Mammogram (22):  BI-RADS1.       REVIEW OF SYSTEMS  All systems reviewed and negative except as noted in HPI.    OBJECTIVE   Vitals:    25 1053   BP: 131/71   Pulse: 65   Temp: 98 °F (36.7 °C)            Body mass index is 56.15 kg/m².      1. General: Well appearing, no apparent distress, alert and oriented.  2. Lymph: Neck symmetric without cervical or supraclavicular adenopathy or mass.  3. Lungs: Normal respiratory rate, no accessory muscle use.  4. Cardiac: Normal rate  5. Psych: Normal affect.  6. Abdomen:  non-distended, soft, non-tender, are no masses, no ascites; large panus with edematous and thickened skin  7. Skin: Warm, dry, no rashes or lesions.   8. Extremities: Bilateral lower extremities without edema or tenderness.  9. Genitourinary: Declined (patient state she is unable to get undressed)               ECOG status: 2    LABORATORY DATA  Lab data reviewed.    RADIOLOGICAL DATA  Radiology data reviewed.    PATHOLOGY DATA  Pathology data reviewed.    ASSESSMENT / PLAN     1. Complex endometrial hyperplasia with atypia    2. Intrauterine device    3. Morbid obesity with BMI of 60.0-69.9, adult      25: CT Pelvis w/o: IUD in place  23: Hysteroscopy, D&C, IUD insertion: residual foci of atypical hyperplasia with treatment effect  23: A1c = 7.0, Cr 1.0, Hb 12.2  22: Pap: NIL  22: Hysteroscopy, D&C: at least EIN  22: Cardiology clearance  3/31/22: HbA1c = 5.7, Hb 12.3, Cr 0.7  3/16/22: 2D Echo  3/2/22: EKG  22: Pelvic US: 12 cm uterus    Overall she still has not  lost weight and still is not a surgical candidate.  There is really no high level evidence to guide treatment but my recommendation is to continue with observation as endometrial sampling is extremely difficult even under anesthesia.  RONC 1 year.  We will only repeat imaging as clinically indicated and the purpose of her follow-up visits will be to evaluate candidacy for surgery.    PATIENT EDUCATION  Ready to learn, no apparent learning barriers were identified; learning preferences include listening.  Explained diagnosis and treatment plan; patient expressed understanding of the content.    INFORMED CONSENT  Discussed the risks, benefits, and alternatives of the procedure and of possible blood transfusion.  Discussed the necessity of other members of the healthcare team participating in the procedure.  All questions answered and consent given.    Florentino Lovell

## 2025-02-05 ENCOUNTER — PATIENT MESSAGE (OUTPATIENT)
Dept: INTERNAL MEDICINE | Facility: CLINIC | Age: 50
End: 2025-02-05
Payer: MEDICAID

## 2025-02-06 ENCOUNTER — PATIENT OUTREACH (OUTPATIENT)
Dept: ADMINISTRATIVE | Facility: HOSPITAL | Age: 50
End: 2025-02-06
Payer: MEDICAID

## 2025-02-27 ENCOUNTER — HOSPITAL ENCOUNTER (OUTPATIENT)
Dept: RADIOLOGY | Facility: HOSPITAL | Age: 50
Discharge: HOME | End: 2025-02-27
Attending: INTERNAL MEDICINE
Payer: MEDICAID

## 2025-02-27 ENCOUNTER — OFFICE VISIT (OUTPATIENT)
Dept: INTERNAL MEDICINE | Facility: CLINIC | Age: 50
End: 2025-02-27
Payer: MEDICAID

## 2025-02-27 VITALS
HEART RATE: 48 BPM | SYSTOLIC BLOOD PRESSURE: 110 MMHG | WEIGHT: 293 LBS | OXYGEN SATURATION: 98 % | TEMPERATURE: 98 F | DIASTOLIC BLOOD PRESSURE: 60 MMHG | BODY MASS INDEX: 51.91 KG/M2 | RESPIRATION RATE: 18 BRPM | HEIGHT: 63 IN

## 2025-02-27 DIAGNOSIS — Z90.3 S/P GASTRIC SLEEVE PROCEDURE: ICD-10-CM

## 2025-02-27 DIAGNOSIS — M25.562 CHRONIC PAIN OF LEFT KNEE: ICD-10-CM

## 2025-02-27 DIAGNOSIS — G89.29 CHRONIC LEFT SHOULDER PAIN: ICD-10-CM

## 2025-02-27 DIAGNOSIS — G89.29 CHRONIC PAIN OF LEFT KNEE: ICD-10-CM

## 2025-02-27 DIAGNOSIS — E11.69 DYSLIPIDEMIA ASSOCIATED WITH TYPE 2 DIABETES MELLITUS: ICD-10-CM

## 2025-02-27 DIAGNOSIS — M25.512 CHRONIC LEFT SHOULDER PAIN: ICD-10-CM

## 2025-02-27 DIAGNOSIS — E11.59 HYPERTENSION ASSOCIATED WITH DIABETES: Primary | Chronic | ICD-10-CM

## 2025-02-27 DIAGNOSIS — G47.33 OSA ON CPAP: ICD-10-CM

## 2025-02-27 DIAGNOSIS — Z23 NEED FOR VACCINATION: ICD-10-CM

## 2025-02-27 DIAGNOSIS — I15.2 HYPERTENSION ASSOCIATED WITH DIABETES: Primary | Chronic | ICD-10-CM

## 2025-02-27 DIAGNOSIS — Z00.00 ANNUAL PHYSICAL EXAM: ICD-10-CM

## 2025-02-27 DIAGNOSIS — E11.9 TYPE 2 DIABETES MELLITUS WITHOUT COMPLICATION, WITHOUT LONG-TERM CURRENT USE OF INSULIN: ICD-10-CM

## 2025-02-27 DIAGNOSIS — H61.22 LEFT EAR IMPACTED CERUMEN: ICD-10-CM

## 2025-02-27 DIAGNOSIS — N85.02 COMPLEX ENDOMETRIAL HYPERPLASIA WITH ATYPIA: ICD-10-CM

## 2025-02-27 DIAGNOSIS — E78.5 DYSLIPIDEMIA ASSOCIATED WITH TYPE 2 DIABETES MELLITUS: ICD-10-CM

## 2025-02-27 DIAGNOSIS — D64.9 ANEMIA, UNSPECIFIED TYPE: ICD-10-CM

## 2025-02-27 PROCEDURE — 99215 OFFICE O/P EST HI 40 MIN: CPT | Mod: PBBFAC,25,PO | Performed by: INTERNAL MEDICINE

## 2025-02-27 PROCEDURE — 73030 X-RAY EXAM OF SHOULDER: CPT | Mod: TC,PO,LT

## 2025-02-27 PROCEDURE — 99999 PR PBB SHADOW E&M-EST. PATIENT-LVL V: CPT | Mod: PBBFAC,,, | Performed by: INTERNAL MEDICINE

## 2025-02-27 PROCEDURE — 99214 OFFICE O/P EST MOD 30 MIN: CPT | Mod: S$PBB,,, | Performed by: INTERNAL MEDICINE

## 2025-02-27 PROCEDURE — 3008F BODY MASS INDEX DOCD: CPT | Mod: CPTII,,, | Performed by: INTERNAL MEDICINE

## 2025-02-27 PROCEDURE — 90656 IIV3 VACC NO PRSV 0.5 ML IM: CPT | Mod: PBBFAC,PO

## 2025-02-27 PROCEDURE — 73562 X-RAY EXAM OF KNEE 3: CPT | Mod: TC,PO,LT

## 2025-02-27 PROCEDURE — 73030 X-RAY EXAM OF SHOULDER: CPT | Mod: 26,LT,, | Performed by: STUDENT IN AN ORGANIZED HEALTH CARE EDUCATION/TRAINING PROGRAM

## 2025-02-27 PROCEDURE — 3074F SYST BP LT 130 MM HG: CPT | Mod: CPTII,,, | Performed by: INTERNAL MEDICINE

## 2025-02-27 PROCEDURE — 3078F DIAST BP <80 MM HG: CPT | Mod: CPTII,,, | Performed by: INTERNAL MEDICINE

## 2025-02-27 PROCEDURE — 3066F NEPHROPATHY DOC TX: CPT | Mod: CPTII,,, | Performed by: INTERNAL MEDICINE

## 2025-02-27 PROCEDURE — 99999PBSHW INFLUENZA - TRIVALENT - PF (ADULT): Mod: PBBFAC,,,

## 2025-02-27 PROCEDURE — 1159F MED LIST DOCD IN RCRD: CPT | Mod: CPTII,,, | Performed by: INTERNAL MEDICINE

## 2025-02-27 PROCEDURE — 3044F HG A1C LEVEL LT 7.0%: CPT | Mod: CPTII,,, | Performed by: INTERNAL MEDICINE

## 2025-02-27 PROCEDURE — 3072F LOW RISK FOR RETINOPATHY: CPT | Mod: CPTII,,, | Performed by: INTERNAL MEDICINE

## 2025-02-27 PROCEDURE — 73562 X-RAY EXAM OF KNEE 3: CPT | Mod: 26,LT,, | Performed by: STUDENT IN AN ORGANIZED HEALTH CARE EDUCATION/TRAINING PROGRAM

## 2025-02-27 PROCEDURE — 1160F RVW MEDS BY RX/DR IN RCRD: CPT | Mod: CPTII,,, | Performed by: INTERNAL MEDICINE

## 2025-02-27 PROCEDURE — 3061F NEG MICROALBUMINURIA REV: CPT | Mod: CPTII,,, | Performed by: INTERNAL MEDICINE

## 2025-02-27 RX ORDER — CHROMIUM PICOLINATE 200 MCG
TABLET ORAL
COMMUNITY

## 2025-02-27 RX ORDER — OMEPRAZOLE 40 MG/1
40 CAPSULE, DELAYED RELEASE ORAL DAILY PRN
COMMUNITY

## 2025-02-27 NOTE — PROGRESS NOTES
Subjective:     PCP: Yamila Tejeda MD Dawn Jennie Moreno is a 49 y.o. female who presents for an annual exam.    Gastric sleeve 12/18/24    Hypertension: The patient has been taking medications as instructed, no medication side effects noted, no chest pain on exertion, no dyspnea on exertion. Chronic leg swelling that has improved.    BP Readings from Last 3 Encounters:   02/27/25 110/60   01/08/25 131/71   11/22/24 118/70     Diabetes: Patient presents for follow up of diabetes. Current symptoms include: none. Symptoms have been well-controlled. Patient denies foot ulcerations. Evaluation to date has included: hemoglobin A1C.  Home sugars: BGs consistently in an acceptable range. Current treatment: no recent interventions. Last dilated eye exam: done.    Lab Results   Component Value Date    HGBA1C 5.3 02/27/2025    GLU 96 02/27/2025     Shoulder Pain: She presents with left shoulder pain. The symptoms began several weeks ago. Aggravating factors: no known event. Pain is located diffusely throughout the shoulder. Discomfort is described as aching. Symptoms are exacerbated by repetitive movements and overhead movements. Evaluation to date: none. Therapy to date includes: Physical Therapy.    Completed PT in September at [facility].    Medical History:   Past Medical History:   Diagnosis Date    Acute respiratory failure with hypoxia and hypercapnia 10/22/2020    Last Assessment & Plan:  Patient is a chronic CO2 retainer in setting of obesity hypoventilation syndrome 2/2 morbid obesity. Stepped down from ICU 10/26, currently continuing diuresis. De-escalated to NC during the day with BiPAP nightly on 10/31, which patient tolerated well. On Lasix gtt for diuresis through 11/5.   Plan:  - Target SpO2 of >88%. Continue BiPAP nightly. Patient on 3LNC, will con    Back pain     BMI 70 and over, adult     Cavernous hemangioma of liver     CHF (congestive heart failure) 10/2020    Depression     Diabetes  "mellitus     Difficult intubation     DM (diabetes mellitus) type II controlled with renal manifestation     Glaucoma 2022    HTN (hypertension)     Hyperlipidemia     Lumbar disc disease     Morbid obesity     Rosacea     Sleep apnea     cpap    Tear of medial meniscus of right knee, current 2017       Family History: family history includes Diabetes in her father; Heart disease in her paternal grandfather; Hypertension in her mother; Lymphoma in her father.    Surgical History:   Past Surgical History:   Procedure Laterality Date     SECTION       SECTION  2000    COLONOSCOPY N/A 2023    Procedure: COLONOSCOPY;  Surgeon: Sharath Perea MD;  Location: [facility] ([address] [place] [address]);  Service: Endoscopy;  Laterality: N/A;  Any MD  BMI 64.66  pt refused WB, no availability at [facility] in next 3-4 days   ref. by Yamila Tejeda MD, [place], instr. to [facility]  -precall complete-pt verbalized understanding of holding Ozempic-MS    CYSTOURETEROSCOPY, WITH HOLMIUM LASER LITHOTRIPSY OF URETERAL CALCULUS AND STENT INSERTION Right 10/10/2024    Procedure: CYSTOURETEROSCOPY, WITH HOLMIUM LASER LITHOTRIPSY OF URETERAL CALCULUS AND STENT INSERTION;  Surgeon: Anurag Dumont MD;  Location[facility];  Service: Urology;  Laterality: Right;    DILATION AND CURETTAGE OF UTERUS      AUB "negative path" per patient    DILATION AND CURETTAGE OF UTERUS N/A 2023    Procedure: DILATION AND CURETTAGE, UTERUS;  Surgeon: Florentino Lovell MD;  Location: [facility];  Service: OB/GYN;  Laterality: N/A;  PROCEED AS INDICATED    ENDOMETRIAL ABLATION      Patient unsure if procedure was performed    HYSTEROSCOPY N/A 2023    Procedure: HYSTEROSCOPY;  Surgeon: Florentino Lovell MD;  Location: [facility];  Service: OB/GYN;  Laterality: N/A;    HYSTEROSCOPY WITH DILATION AND CURETTAGE OF UTERUS N/A 2022    Procedure: HYSTEROSCOPY, WITH DILATION AND CURETTAGE OF UTERUS;  Surgeon: Dixie MCCOLLUM" MD Marcel;  Location: [facility];  Service: OB/GYN;  Laterality: N/A;    INTRAUTERINE DEVICE INSERTION N/A 6/1/2022    Procedure: INSERTION, INTRAUTERINE DEVICE;  Surgeon: Dixie Rod MD;  Location: [facility] OR;  Service: OB/GYN;  Laterality: N/A;    INTRAUTERINE DEVICE INSERTION N/A 7/17/2023    Procedure: INSERTION, INTRAUTERINE DEVICE;  Surgeon: Florentino Lovell MD;  Location: [facility] OR;  Service: OB/GYN;  Laterality: N/A;    RETROGRADE PYELOGRAPHY Right 10/10/2024    Procedure: PYELOGRAM, RETROGRADE;  Surgeon: Anurag Dumont MD;  Location: [facility];  Service: Urology;  Laterality: Right;        Social History:  reports that she has never smoked. She has never been exposed to tobacco smoke. She has never used smokeless tobacco. She reports that she does not drink alcohol and does not use drugs.     Allergies:   Review of patient's allergies indicates:   Allergen Reactions    Victoza [liraglutide] Swelling     CHF       Medications:   Current Outpatient Medications   Medication Sig    atorvastatin (LIPITOR) 10 MG tablet Take 1 tablet (10 mg total) by mouth once daily.    blood sugar diagnostic Strp 1 strip by Misc.(Non-Drug; Combo Route) route 2 (two) times daily. Please provide items covered by patient's insurance    lancets Misc 1 lancet by Misc.(Non-Drug; Combo Route) route 2 (two) times daily.    losartan (COZAAR) 100 MG tablet Take 1 tablet (100 mg total) by mouth once daily.    metoprolol succinate (TOPROL-XL) 50 MG 24 hr tablet Take 1 tablet (50 mg total) by mouth once daily.    multivitamin (THERAGRAN) per tablet Take 1 tablet by mouth once daily.    ondansetron (ZOFRAN-ODT) 4 MG TbDL Take 1 tablet (4 mg total) by mouth every 8 (eight) hours as needed.    ONETOUCH ULTRA2 METER Misc     calcium carbonate-vitamin D3 600 mg-10 mcg (400 unit) Cap Take by mouth.    furosemide (LASIX) 80 MG tablet TAKE ONE TABLET BY MOUTH EVERY DAY (Patient not taking: Reported on 2/27/2025)    glipiZIDE 5 MG TR24 TAKE ONE TABLET  "BY MOUTH EVERY MORNING WITH BREAKFAST (Patient not taking: Reported on 2/27/2025)    latanoprost 0.005 % ophthalmic solution Place 1 drop into both eyes every evening. (Patient not taking: Reported on 2/27/2025)    omeprazole (PRILOSEC) 40 MG capsule Take 40 mg by mouth daily as needed.    pen needle, diabetic 32 gauge x 1/4" Ndle 1 each by Misc.(Non-Drug; Combo Route) route once daily. Pt uses one pen needle weekly for injection of Ozempic (Patient not taking: Reported on 2/27/2025)    semaglutide (OZEMPIC) 2 mg/dose (8 mg/3 mL) PnIj INJECT 2 MG INTO THE SKIN ONCE A WEEK (Patient not taking: Reported on 2/27/2025)     No current facility-administered medications for this visit.       Health Maintenance:   Health Maintenance Topics with due status: Not Due       Topic Last Completion Date    TETANUS VACCINE 02/06/2017    Colorectal Cancer Screening 11/14/2023    Foot Exam 08/22/2024    Mammogram 10/17/2024    Lipid Panel 02/27/2025    Hemoglobin A1c 02/27/2025    Diabetes Urine Screening 03/03/2025    Low Dose Statin 04/28/2025    RSV Vaccine (Age 60+ and Pregnant patients) Not Due     Lab Results   Component Value Date    HEPCAB Non-reactive 12/08/2023    [id number] Non-reactive 12/08/2023     Eye Exam: 6/2024, [facility]  Dental Exam: in 2024  OB/GYN: Dr. Lovell, trying to lose weight before hysterectomy  Pap smear: 6/9/2022  Mammogram: 10/2024   Colonoscopy: Last Colonoscopy completed on 11/14/2023   HIV: 12/2023, neg  Hepatitis C  Ab: 12/2023, neg    Vaccinations:  Immunization History   Administered Date(s) Administered    COVID-19, MRNA, LN-S, PF (Pfizer) (Purple Cap) 03/12/2021, 04/02/2021, 11/20/2021    COVID-19, mRNA, LNP-S, bivalent booster, PF (PFIZER OMICRON) 09/28/2022    Influenza 11/20/2018    Influenza - Quadrivalent - MDCK - PF 09/28/2022    Influenza - Quadrivalent - PF *Preferred* (6 months and older) 11/20/2018, 10/14/2019, 12/02/2020, 11/01/2021    Influenza - Trivalent - Fluarix, Flulaval, " Fluzone, Afluria - PF 11/20/2018, 02/27/2025    Pneumococcal Conjugate - 20 Valent 06/23/2023    Pneumococcal Polysaccharide - 23 Valent 02/06/2017    Td - PF (ADULT) 02/06/2017    Zoster Recombinant 05/16/2022     Influenza: due  Tetanus: 2017  Shingrix: #1 done  Pneumovax: 2017  Prevnar-20: 2023  Covid vaccine: boosted x1    Body mass index is 53.66 kg/m².  Wt Readings from Last 3 Encounters:   02/27/25 (!) 137.4 kg (302 lb 14.6 oz)   01/08/25 (!) 143.8 kg (317 lb)   11/22/24 (!) 154.8 kg (341 lb 4.4 oz)   - seated elliptical maching and squat machine   - joined [employer] with her sister  - eats lots of protein & veggies  - Gastric sleeve done on 12/18/24  - Supplements: Multivitamin and calciun chews      Review of Systems   Constitutional:  Negative for chills, diaphoresis, fatigue and fever.   HENT:  Negative for congestion, dental problem, ear discharge, ear pain, postnasal drip, rhinorrhea, sinus pressure, sore throat and trouble swallowing.    Eyes:  Negative for redness and visual disturbance.   Respiratory:  Negative for cough, chest tightness and shortness of breath.    Cardiovascular:  Negative for chest pain, palpitations and leg swelling.   Gastrointestinal:  Positive for abdominal pain (reports slight upper abdominal pain when she eats too much). Negative for blood in stool, constipation, diarrhea, nausea and vomiting.   Endocrine: Negative for cold intolerance and heat intolerance.   Genitourinary:  Negative for decreased urine volume, dysuria, frequency and hematuria.   Musculoskeletal:  Positive for arthralgias (shoulder and knee pain). Negative for back pain and myalgias.   Skin:  Negative for rash and wound.   Neurological:  Negative for dizziness, weakness, numbness and headaches.   Hematological:  Negative for adenopathy. Bruises/bleeds easily.   Psychiatric/Behavioral:  Negative for dysphoric mood and sleep disturbance. The patient is not nervous/anxious.           Objective:     Physical  Exam  Vitals reviewed.   Constitutional:       General: She is awake. She is not in acute distress.     Appearance: Normal appearance. She is well-developed and well-groomed. She is not diaphoretic.   HENT:      Head: Normocephalic and atraumatic.      Right Ear: Hearing, tympanic membrane, ear canal and external ear normal. Tympanic membrane is not erythematous or bulging.      Left Ear: Hearing and external ear normal. There is impacted cerumen.      Nose: Nose normal. No congestion.      Mouth/Throat:      Mouth: Mucous membranes are moist.      Tongue: No lesions.      Pharynx: Oropharynx is clear. Uvula midline. No oropharyngeal exudate or posterior oropharyngeal erythema.   Eyes:      General: Lids are normal. Vision grossly intact. Gaze aligned appropriately. No scleral icterus.     Conjunctiva/sclera:      Right eye: Right conjunctiva is not injected.      Left eye: Left conjunctiva is not injected.      Pupils: Pupils are equal, round, and reactive to light.   Neck:      Thyroid: No thyroid mass or thyromegaly.   Cardiovascular:      Rate and Rhythm: Normal rate and regular rhythm.      Pulses: Normal pulses.      Heart sounds: Normal heart sounds. No murmur heard.  Pulmonary:      Effort: Pulmonary effort is normal. No respiratory distress.      Breath sounds: Normal breath sounds. No decreased breath sounds or wheezing.   Abdominal:      General: Bowel sounds are normal. There is no distension.      Palpations: Abdomen is soft. Abdomen is not rigid.      Tenderness: There is no abdominal tenderness. There is no guarding or rebound.   Musculoskeletal:      Left shoulder: Decreased range of motion.      Cervical back: Normal range of motion and neck supple.      Right lower leg: No edema.      Left lower leg: No edema.   Lymphadenopathy:      Cervical: No cervical adenopathy.      Upper Body:      Right upper body: No supraclavicular adenopathy.      Left upper body: No supraclavicular adenopathy.   Skin:      General: Skin is warm and dry.      Coloration: Skin is not cyanotic.      Findings: No lesion or rash.      Nails: There is no clubbing.   Neurological:      General: No focal deficit present.      Mental Status: She is alert and oriented to person, place, and time.      Sensory: Sensation is intact.      Coordination: Coordination is intact.      Gait: Gait is intact.      Deep Tendon Reflexes: Reflexes are normal and symmetric.   Psychiatric:         Attention and Perception: Attention normal.         Mood and Affect: Mood normal.         Behavior: Behavior is cooperative.              Assessment:        1. Hypertension associated with diabetes    2. Type 2 diabetes mellitus without complication, without long-term current use of insulin    3. Dyslipidemia associated with type 2 diabetes mellitus    4. Complex endometrial hyperplasia with atypia    5. Chronic left shoulder pain    6. Chronic pain of left knee    7. YARY on CPAP    8. Anemia, unspecified type    9. Left ear impacted cerumen    10. Annual physical exam    11. S/P gastric sleeve procedure    12. Need for vaccination           Plan:     1. Hypertension associated with diabetes  - controlled, continue to monitor closely off medications    2. Type 2 diabetes mellitus without complication, without long-term current use of insulin  - she stopped glipizide, semaglutide, check labs  - Hemoglobin A1C; Future  - Microalbumin/Creatinine Ratio, Urine; Future    3. Dyslipidemia associated with type 2 diabetes mellitus  - Lipid Panel; Future  - continue Lipitor    4. Complex endometrial hyperplasia with atypia  - f/u with Gynecology regularly    5. Chronic left shoulder pain  - X-Ray Shoulder 2 or More Views Left; Future    6. Chronic pain of left knee  - Ambulatory referral/consult to Orthopedics; Future  - X-Ray Knee 3 View Left; Future    7. YARY on CPAP  - compliant, continue CPAP for now    8. Anemia, unspecified type  - Vitamin B1; Future  - Ferritin;  Future  - Iron and TIBC; Future  - Folate; Future  - will replace the above if needed    9. Left ear impacted cerumen  - Ambulatory referral/consult to ENT; Future    10. Annual physical exam  - CBC Auto Differential; Future  - Comprehensive Metabolic Panel; Future  - Lipid Panel; Future  - TSH; Future  - Urinalysis; Future    11. S/P gastric sleeve procedure  - Vitamin D; Future  - Vitamin B1; Future  - Ferritin; Future  - Iron and TIBC; Future  - Folate; Future    12. Need for vaccination  - Influenza - Trivalent - PF (ADULT)    RTC in 3 months for follow-up or sooner if needed    __________________________    Yamila Tejeda MD, PharmD  [facility]- Internal Medicine  American Board of Obesity Medicine diplomate  Office [phone]

## 2025-03-03 ENCOUNTER — LAB VISIT (OUTPATIENT)
Dept: LAB | Facility: HOSPITAL | Age: 50
End: 2025-03-03
Attending: INTERNAL MEDICINE
Payer: MEDICAID

## 2025-03-03 DIAGNOSIS — E11.59 HYPERTENSION ASSOCIATED WITH DIABETES: Chronic | ICD-10-CM

## 2025-03-03 DIAGNOSIS — E11.9 TYPE 2 DIABETES MELLITUS WITHOUT COMPLICATION, WITHOUT LONG-TERM CURRENT USE OF INSULIN: ICD-10-CM

## 2025-03-03 DIAGNOSIS — I15.2 HYPERTENSION ASSOCIATED WITH DIABETES: Chronic | ICD-10-CM

## 2025-03-03 LAB
ALBUMIN/CREAT UR: 6.8 UG/MG (ref 0–30)
BACTERIA #/AREA URNS AUTO: ABNORMAL /HPF
BILIRUB UR QL STRIP: NEGATIVE
CLARITY UR REFRACT.AUTO: ABNORMAL
COLOR UR AUTO: YELLOW
CREAT UR-MCNC: 237 MG/DL (ref 15–325)
GLUCOSE UR QL STRIP: NEGATIVE
HGB UR QL STRIP: NEGATIVE
KETONES UR QL STRIP: NEGATIVE
LEUKOCYTE ESTERASE UR QL STRIP: ABNORMAL
MICROALBUMIN UR DL<=1MG/L-MCNC: 16 UG/ML
MICROSCOPIC COMMENT: ABNORMAL
NITRITE UR QL STRIP: POSITIVE
PH UR STRIP: 6 [PH] (ref 5–8)
PROT UR QL STRIP: ABNORMAL
RBC #/AREA URNS AUTO: 3 /HPF (ref 0–4)
SP GR UR STRIP: 1.03 (ref 1–1.03)
SQUAMOUS #/AREA URNS AUTO: 16 /HPF
URN SPEC COLLECT METH UR: ABNORMAL
WBC #/AREA URNS AUTO: 60 /HPF (ref 0–5)

## 2025-03-03 PROCEDURE — 81001 URINALYSIS AUTO W/SCOPE: CPT | Performed by: INTERNAL MEDICINE

## 2025-03-03 PROCEDURE — 82570 ASSAY OF URINE CREATININE: CPT | Performed by: INTERNAL MEDICINE

## 2025-05-05 DIAGNOSIS — H40.053 BILATERAL OCULAR HYPERTENSION: ICD-10-CM

## 2025-05-05 RX ORDER — LATANOPROST 50 UG/ML
1 SOLUTION/ DROPS OPHTHALMIC
Qty: 7.5 ML | Refills: 3 | Status: SHIPPED | OUTPATIENT
Start: 2025-05-05

## 2025-05-20 ENCOUNTER — PATIENT MESSAGE (OUTPATIENT)
Dept: INTERNAL MEDICINE | Facility: CLINIC | Age: 50
End: 2025-05-20
Payer: MEDICAID

## 2025-07-08 ENCOUNTER — PATIENT MESSAGE (OUTPATIENT)
Dept: INTERNAL MEDICINE | Facility: CLINIC | Age: 50
End: 2025-07-08
Payer: MEDICAID

## (undated) DEVICE — SOL IRR NACL .9% 3000ML

## (undated) DEVICE — SET BASIN 48X48IN 6000ML RING

## (undated) DEVICE — UNDERGLOVE BIOGEL PI SZ 6.5 LF

## (undated) DEVICE — SOL POVIDONE PREP IODINE 4 OZ

## (undated) DEVICE — SOL NACL IRR 3000ML

## (undated) DEVICE — Device

## (undated) DEVICE — SOL POVIDONE SCRUB IODINE 4 OZ

## (undated) DEVICE — CATH URETERAL DUAL LUMEN 10FR

## (undated) DEVICE — SEE MEDLINE ITEM 138778

## (undated) DEVICE — UNDERGLOVES BIOGEL PI SZ 7 LF

## (undated) DEVICE — GUIDE WIRE MOTION .035 X 150CM

## (undated) DEVICE — FIBER QUANTA OPT SDT 272UM

## (undated) DEVICE — DRESSING TELFA N ADH 3X8

## (undated) DEVICE — SET IRR CYSTO 4 LEAD 90IN

## (undated) DEVICE — WAVEGUIDE Y TAPER TIP

## (undated) DEVICE — SOL BETADINE 5%

## (undated) DEVICE — DRAPE TOP 53X102IN

## (undated) DEVICE — JELLY SURGILUBE 5GR

## (undated) DEVICE — SEAL LENS SCOPE MYOSURE

## (undated) DEVICE — CATH URETHRAL 16FR RED

## (undated) DEVICE — PACK FLUENT DISPOSABLE

## (undated) DEVICE — SOL IRR SOD CHL .9% POUR

## (undated) DEVICE — SOL PVP-I SCRUB 7.5% 4OZ

## (undated) DEVICE — TRAY DRY SKIN SCRUB PREP

## (undated) DEVICE — ANOSCOPE DISP W/LIGHT SOURCE

## (undated) DEVICE — SHEATH FLEXOR URET 10.7FRX35CM

## (undated) DEVICE — DEVICE MYOSURE REACH

## (undated) DEVICE — GLOVE SENSICARE PI ORTHO 7.0

## (undated) DEVICE — SEAL UROLOGY

## (undated) DEVICE — SEE L#120831

## (undated) DEVICE — GLOVE BIOGEL SKINSENSE PI 6.5